# Patient Record
Sex: FEMALE | Race: BLACK OR AFRICAN AMERICAN | NOT HISPANIC OR LATINO | Employment: OTHER | URBAN - METROPOLITAN AREA
[De-identification: names, ages, dates, MRNs, and addresses within clinical notes are randomized per-mention and may not be internally consistent; named-entity substitution may affect disease eponyms.]

---

## 2017-01-26 ENCOUNTER — LAB VISIT (OUTPATIENT)
Dept: LAB | Facility: HOSPITAL | Age: 67
End: 2017-01-26
Attending: INTERNAL MEDICINE
Payer: MEDICARE

## 2017-01-26 ENCOUNTER — OFFICE VISIT (OUTPATIENT)
Dept: RHEUMATOLOGY | Facility: CLINIC | Age: 67
End: 2017-01-26
Payer: MEDICARE

## 2017-01-26 DIAGNOSIS — M05.749 RHEUMATOID ARTHRITIS INVOLVING HAND WITH POSITIVE RHEUMATOID FACTOR, UNSPECIFIED LATERALITY: ICD-10-CM

## 2017-01-26 DIAGNOSIS — F17.210 CIGARETTE SMOKER: ICD-10-CM

## 2017-01-26 DIAGNOSIS — Z51.81 MEDICATION MONITORING ENCOUNTER: ICD-10-CM

## 2017-01-26 DIAGNOSIS — Z79.60 LONG-TERM USE OF IMMUNOSUPPRESSANT MEDICATION: ICD-10-CM

## 2017-01-26 DIAGNOSIS — M05.79 RHEUMATOID ARTHRITIS INVOLVING MULTIPLE SITES WITH POSITIVE RHEUMATOID FACTOR: Primary | ICD-10-CM

## 2017-01-26 DIAGNOSIS — M79.7 FIBROMYALGIA: ICD-10-CM

## 2017-01-26 LAB
ALBUMIN SERPL BCP-MCNC: 3.4 G/DL
ALP SERPL-CCNC: 125 U/L
ALT SERPL W/O P-5'-P-CCNC: 14 U/L
ANION GAP SERPL CALC-SCNC: 16 MMOL/L
AST SERPL-CCNC: 14 U/L
BASOPHILS # BLD AUTO: 0.02 K/UL
BASOPHILS NFR BLD: 0.3 %
BILIRUB SERPL-MCNC: 0.3 MG/DL
BUN SERPL-MCNC: 10 MG/DL
CALCIUM SERPL-MCNC: 9.3 MG/DL
CHLORIDE SERPL-SCNC: 102 MMOL/L
CO2 SERPL-SCNC: 25 MMOL/L
CREAT SERPL-MCNC: 0.7 MG/DL
DIFFERENTIAL METHOD: ABNORMAL
EOSINOPHIL # BLD AUTO: 0.2 K/UL
EOSINOPHIL NFR BLD: 2.3 %
ERYTHROCYTE [DISTWIDTH] IN BLOOD BY AUTOMATED COUNT: 17 %
ERYTHROCYTE [SEDIMENTATION RATE] IN BLOOD BY WESTERGREN METHOD: 45 MM/HR
EST. GFR  (AFRICAN AMERICAN): >60 ML/MIN/1.73 M^2
EST. GFR  (NON AFRICAN AMERICAN): >60 ML/MIN/1.73 M^2
GLUCOSE SERPL-MCNC: 126 MG/DL
HCT VFR BLD AUTO: 37.4 %
HGB BLD-MCNC: 12 G/DL
LYMPHOCYTES # BLD AUTO: 1.9 K/UL
LYMPHOCYTES NFR BLD: 27.2 %
MCH RBC QN AUTO: 26.1 PG
MCHC RBC AUTO-ENTMCNC: 32.1 %
MCV RBC AUTO: 82 FL
MONOCYTES # BLD AUTO: 0.4 K/UL
MONOCYTES NFR BLD: 6.3 %
NEUTROPHILS # BLD AUTO: 4.4 K/UL
NEUTROPHILS NFR BLD: 63.9 %
PLATELET # BLD AUTO: 269 K/UL
PMV BLD AUTO: 11 FL
POTASSIUM SERPL-SCNC: 2.9 MMOL/L
PROT SERPL-MCNC: 7.1 G/DL
RBC # BLD AUTO: 4.59 M/UL
SODIUM SERPL-SCNC: 143 MMOL/L
WBC # BLD AUTO: 6.94 K/UL

## 2017-01-26 PROCEDURE — 36415 COLL VENOUS BLD VENIPUNCTURE: CPT | Mod: PO

## 2017-01-26 PROCEDURE — 99214 OFFICE O/P EST MOD 30 MIN: CPT | Mod: S$PBB,,, | Performed by: PHYSICIAN ASSISTANT

## 2017-01-26 PROCEDURE — 85025 COMPLETE CBC W/AUTO DIFF WBC: CPT | Mod: PO

## 2017-01-26 PROCEDURE — 85651 RBC SED RATE NONAUTOMATED: CPT | Mod: PO

## 2017-01-26 PROCEDURE — 80053 COMPREHEN METABOLIC PANEL: CPT | Mod: PO

## 2017-01-26 PROCEDURE — 99999 PR PBB SHADOW E&M-EST. PATIENT-LVL III: CPT | Mod: PBBFAC,,, | Performed by: PHYSICIAN ASSISTANT

## 2017-01-26 PROCEDURE — 86140 C-REACTIVE PROTEIN: CPT

## 2017-01-26 PROCEDURE — 99213 OFFICE O/P EST LOW 20 MIN: CPT | Mod: PBBFAC,PO | Performed by: PHYSICIAN ASSISTANT

## 2017-01-27 LAB — CRP SERPL-MCNC: 24.9 MG/L

## 2017-01-30 VITALS
BODY MASS INDEX: 37.58 KG/M2 | WEIGHT: 218.94 LBS | DIASTOLIC BLOOD PRESSURE: 89 MMHG | HEART RATE: 72 BPM | SYSTOLIC BLOOD PRESSURE: 188 MMHG

## 2017-01-30 ASSESSMENT — CLINICAL DISEASE ACTIVITY INDEX (CDAI)
TENDER_JOINTS_COUNT: 0
PATIENT_ASSESSMENT: 0
PHYSICIAN_ASSESSMENT: 0
SWOLLEN_JOINTS_COUNT: 0
TOTAL_SCORE: 0

## 2017-01-30 ASSESSMENT — ROUTINE ASSESSMENT OF PATIENT INDEX DATA (RAPID3): MDHAQ FUNCTION SCORE: .4

## 2017-01-30 NOTE — PROGRESS NOTES
Subjective:       Patient ID: Page Grissom is a 66 y.o. female.    Chief Complaint: Rheumatoid Arthritis    HPI Comments: Page is here today for rheumatology follow-up she has seropositive rheumatoid arthritis currently taking methotrexate 7.5 mg once weekly and Plaquenil 200 mg twice daily.  She's also on folic acid daily.  Naproxen 500 mg twice daily only as needed.  Does not use very often.  Overall doing excellent.  Peripheral joints have no pain or swelling.  Minimal morning stiffness.  Her lower back is really the only thing that bothers her.  Pain level today 4/10.  Fibromyalgia also doing well.  Bilateral knee osteoarthritis seems to be better since she is no longer working and walking 1 semen every day.  She does continue to smoke and she is overweight.  She has hypertension.    Review of Systems   Constitutional: Negative.  Negative for activity change, appetite change, chills, fatigue and fever.   HENT: Negative.  Negative for mouth sores and trouble swallowing.         No dry mouth   Eyes: Negative.  Negative for photophobia, pain and redness.        No swollen or red eyes, no dry eye     Respiratory: Negative.  Negative for chest tightness, shortness of breath, wheezing and stridor.    Cardiovascular: Negative.  Negative for chest pain.   Gastrointestinal: Negative.  Negative for abdominal pain, blood in stool, diarrhea, nausea and vomiting.   Genitourinary: Negative.  Negative for dysuria, frequency, hematuria and urgency.   Musculoskeletal: Positive for back pain. Negative for arthralgias, gait problem, joint swelling, myalgias, neck pain and neck stiffness.   Skin: Negative.  Negative for color change, pallor and rash.   Neurological: Negative.  Negative for weakness.   Hematological: Negative for adenopathy.   Psychiatric/Behavioral: Negative for suicidal ideas.         Objective:     Visit Vitals    BP (!) 188/89    Pulse 72    Wt 99.3 kg (218 lb 14.7 oz)    BMI 37.58 kg/m2         Physical Exam   Constitutional: She is oriented to person, place, and time and well-developed, well-nourished, and in no distress. No distress.   HENT:   Head: Normocephalic and atraumatic.   Right Ear: External ear normal.   Left Ear: External ear normal.   Mouth/Throat: No oropharyngeal exudate.   Eyes: Conjunctivae and EOM are normal. Pupils are equal, round, and reactive to light. No scleral icterus.   Neck: Normal range of motion. Neck supple. No thyromegaly present.   Cardiovascular: Normal rate, regular rhythm and normal heart sounds.    No murmur heard.  Pulmonary/Chest: Effort normal and breath sounds normal. She exhibits no tenderness.   Abdominal: Soft. Bowel sounds are normal.   Lymphadenopathy:     She has no cervical adenopathy.   Neurological: She is alert and oriented to person, place, and time. She displays normal reflexes. No cranial nerve deficit. She exhibits normal muscle tone. Gait normal.   Skin: Skin is warm and dry. No rash noted.     Musculoskeletal: Normal range of motion. She exhibits no edema or tenderness.                  Recent Results (from the past 168 hour(s))   CBC auto differential    Collection Time: 01/26/17  3:39 PM   Result Value Ref Range    WBC 6.94 3.90 - 12.70 K/uL    RBC 4.59 4.00 - 5.40 M/uL    Hemoglobin 12.0 12.0 - 16.0 g/dL    Hematocrit 37.4 37.0 - 48.5 %    MCV 82 82 - 98 fL    MCH 26.1 (L) 27.0 - 31.0 pg    MCHC 32.1 32.0 - 36.0 %    RDW 17.0 (H) 11.5 - 14.5 %    Platelets 269 150 - 350 K/uL    MPV 11.0 9.2 - 12.9 fL    Gran # 4.4 1.8 - 7.7 K/uL    Lymph # 1.9 1.0 - 4.8 K/uL    Mono # 0.4 0.3 - 1.0 K/uL    Eos # 0.2 0.0 - 0.5 K/uL    Baso # 0.02 0.00 - 0.20 K/uL    Gran% 63.9 38.0 - 73.0 %    Lymph% 27.2 18.0 - 48.0 %    Mono% 6.3 4.0 - 15.0 %    Eosinophil% 2.3 0.0 - 8.0 %    Basophil% 0.3 0.0 - 1.9 %    Differential Method Automated    Comprehensive metabolic panel    Collection Time: 01/26/17  3:39 PM   Result Value Ref Range    Sodium 143 136 - 145 mmol/L     Potassium 2.9 (L) 3.5 - 5.1 mmol/L    Chloride 102 95 - 110 mmol/L    CO2 25 23 - 29 mmol/L    Glucose 126 (H) 70 - 110 mg/dL    BUN, Bld 10 8 - 23 mg/dL    Creatinine 0.7 0.5 - 1.4 mg/dL    Calcium 9.3 8.7 - 10.5 mg/dL    Total Protein 7.1 6.0 - 8.4 g/dL    Albumin 3.4 (L) 3.5 - 5.2 g/dL    Total Bilirubin 0.3 0.1 - 1.0 mg/dL    Alkaline Phosphatase 125 55 - 135 U/L    AST 14 10 - 40 U/L    ALT 14 10 - 44 U/L    Anion Gap 16 8 - 16 mmol/L    eGFR if African American >60 >60 mL/min/1.73 m^2    eGFR if non African American >60 >60 mL/min/1.73 m^2   C-reactive protein    Collection Time: 01/26/17  3:39 PM   Result Value Ref Range    CRP 24.9 (H) 0.0 - 8.2 mg/L   Sedimentation rate, manual    Collection Time: 01/26/17  3:39 PM   Result Value Ref Range    Sed Rate 45 (H) 0 - 20 mm/Hr       Bilateral hand and foot x-rays stable 7/2015    Assessment:       1. Rheumatoid arthritis involving multiple sites with positive rheumatoid factor    2. Fibromyalgia    3. Long-term use of immunosuppressant medication    4. Obesity, Class II, BMI 35-39.9, with comorbidity    5. Cigarette smoker    6. Medication monitoring encounter          1.  Seropositive rheumatoid arthritis currently stable on low-dose methotrexate and Plaquenil on exam today 0 swollen/0 tender joints  2.  Fibromyalgia stable  3.  Long-term use of immunosuppression medication with no issues with recurrent infections  4.  Obesity class II BMI 35.58  5.  Chronic tobacco user  6.  Medication monitoring with no current toxicity issues    Plan:       Since her RA is doing well will cut back on methotrexate 5 mg once weekly, continue her Plaquenil twice a day, keep her on folic acid as long as she is on methotrexate area did  Naproxen only as needed not daily  Patient counseled on weight loss  Recommended dietary changes--Mediterranean diet would be a good one to follow  Encouraged exercise    Constipation on smoking cessation encouraged patient to look into programs-  discussed the one available here  Return to clinic in 6 months with labs    Call with any questions, changes, or concerns.

## 2017-01-31 ENCOUNTER — TELEPHONE (OUTPATIENT)
Dept: RHEUMATOLOGY | Facility: CLINIC | Age: 67
End: 2017-01-31

## 2017-01-31 DIAGNOSIS — E87.6 HYPOPOTASSEMIA: ICD-10-CM

## 2017-01-31 RX ORDER — POTASSIUM CHLORIDE 20 MEQ/1
20 TABLET, EXTENDED RELEASE ORAL 2 TIMES DAILY
Qty: 30 TABLET | Refills: 3 | Status: SHIPPED | OUTPATIENT
Start: 2017-01-31 | End: 2017-04-11 | Stop reason: SDUPTHER

## 2017-01-31 NOTE — TELEPHONE ENCOUNTER
Call pt and let her know her potassium level is low  Will send her some KCL 20 mgEq once daily  supplement to pharmacy - [facility] [address]    she needs to follow up with her primary care provider for further treatment and monitoring    Is her PCP dr severino

## 2017-01-31 NOTE — TELEPHONE ENCOUNTER
----- Message from Lisa Rey PA-C sent at 1/30/2017  3:51 PM CST -----  You saw her last week, her K is 2.9, not sure if yall saw that during her appt.    ----- Message -----     From: Nam, [facility]     Sent: 1/26/2017   5:55 PM       To: Lebron Koch MD

## 2017-04-11 DIAGNOSIS — E87.6 HYPOPOTASSEMIA: ICD-10-CM

## 2017-04-12 RX ORDER — POTASSIUM CHLORIDE 20 MEQ/1
TABLET, EXTENDED RELEASE ORAL
Qty: 30 TABLET | Refills: 3 | Status: SHIPPED | OUTPATIENT
Start: 2017-04-12 | End: 2017-06-27 | Stop reason: SDUPTHER

## 2017-05-05 ENCOUNTER — PATIENT OUTREACH (OUTPATIENT)
Dept: ADMINISTRATIVE | Facility: HOSPITAL | Age: 67
End: 2017-05-05
Payer: MEDICARE

## 2017-05-05 DIAGNOSIS — M89.9 BONE DISORDER: Primary | ICD-10-CM

## 2017-05-05 NOTE — LETTER
May 5, 2017    Page Grissom  [address]  [place] [address]             [facility]  [address] [address]  Phone: [phone] Dear Mrs. Grissom:    [facility] is committed to your overall health.  To help you get the most out of each of your visits, we will review your information to make sure you are up to date on all of your recommended tests and/or procedures.      E Douglas Roman Jr, MD has found that you may be due for    DEXA SCAN     Pneumococcal (65+) (2 of 2 - PPSV23)    Hemoglobin A1c     Foot Exam         If you have had any of the above done at another facility, please bring the records or information with you so that your record at [facility] will be complete.    If you are currently taking medication, please bring it with you to your appointment for review.    We will be happy to assist you with scheduling any necessary appointments or you may contact the [facility] appointment desk at [phone] to schedule at your convenience.     Thank you for choosing [facility] for your healthcare needs,      SANTOS Beebe  Care Coordination Department  [facility]  [phone]

## 2017-05-09 ENCOUNTER — OFFICE VISIT (OUTPATIENT)
Dept: PODIATRY | Facility: CLINIC | Age: 67
End: 2017-05-09
Payer: MEDICARE

## 2017-05-09 VITALS
SYSTOLIC BLOOD PRESSURE: 155 MMHG | WEIGHT: 220.88 LBS | HEIGHT: 64 IN | DIASTOLIC BLOOD PRESSURE: 90 MMHG | HEART RATE: 69 BPM | BODY MASS INDEX: 37.71 KG/M2

## 2017-05-09 DIAGNOSIS — M79.671 BILATERAL FOOT PAIN: Primary | ICD-10-CM

## 2017-05-09 DIAGNOSIS — M05.79 RHEUMATOID ARTHRITIS INVOLVING MULTIPLE SITES WITH POSITIVE RHEUMATOID FACTOR: ICD-10-CM

## 2017-05-09 DIAGNOSIS — E11.8 TYPE 2 DIABETES MELLITUS WITH COMPLICATION, WITHOUT LONG-TERM CURRENT USE OF INSULIN: ICD-10-CM

## 2017-05-09 DIAGNOSIS — Q82.8 POROKERATOSIS: ICD-10-CM

## 2017-05-09 DIAGNOSIS — M79.672 BILATERAL FOOT PAIN: Primary | ICD-10-CM

## 2017-05-09 PROCEDURE — 99203 OFFICE O/P NEW LOW 30 MIN: CPT | Mod: S$PBB,25,, | Performed by: PODIATRIST

## 2017-05-09 PROCEDURE — 99214 OFFICE O/P EST MOD 30 MIN: CPT | Mod: PBBFAC,PO | Performed by: PODIATRIST

## 2017-05-09 PROCEDURE — 97597 DBRDMT OPN WND 1ST 20 CM/<: CPT | Mod: S$PBB,,, | Performed by: PODIATRIST

## 2017-05-09 PROCEDURE — 99999 PR PBB SHADOW E&M-EST. PATIENT-LVL IV: CPT | Mod: PBBFAC,,, | Performed by: PODIATRIST

## 2017-05-09 NOTE — MR AVS SNAPSHOT
[facility]  [address] [place] [address]  Phone: [phone]  Fax: [phone]                  Page Grissom   2017 8:20 AM   Office Visit    Description:  Female : 1950   Provider:  Dinorah Noland DPM   Department:  [facility] - Podiatry           Reason for Visit     Diabetic Foot Exam     Callouses           Diagnoses this Visit        Comments    Bilateral foot pain    -  Primary     Porokeratosis         Rheumatoid arthritis involving multiple sites with positive rheumatoid factor         Type 2 diabetes mellitus with complication, without long-term current use of insulin                To Do List           Future Appointments        Provider Department Dept Phone    5/10/2017 8:20 AM LABORATORY, [facility] [phone]    5/10/2017 8:30 AM SPECIMEN, [facility] [phone]    5/10/2017 2:00 PM [facility] [phone]    2017 1:40 PM THEA Roman Jr., MD [facility] Internal Medicine [phone]    2017 10:00 AM LABORATORY, [facility] [phone]      Goals (5 Years of Data)     None      [facility]     [facility] Nurse Care Line -  Assistance  Unless otherwise directed by your provider, please contact [facility], our nurse care line that is available for  assistance.     Registered nurses in the [facility] provide: appointment scheduling, clinical advisement, health education, and other advisory services.  Call: [phone] (toll free)               Medications           Message regarding Medications     Verify the changes and/or additions to your medication regime listed below are the same as discussed with your clinician today.  If any of these changes or additions are incorrect, please notify your healthcare provider.             Verify that the below list of medications is an accurate representation of the  "medications you are currently taking.  If none reported, the list may be blank. If incorrect, please contact your healthcare provider. Carry this list with you in case of emergency.           Current Medications     amlodipine (NORVASC) 10 MG tablet take 1 tablet by mouth once daily    amlodipine (NORVASC) 5 MG tablet     atorvastatin (LIPITOR) 80 MG tablet take 1 tablet by mouth once daily    cholecalciferol, vitamin D3, 2,000 unit Tab Take 2,000 Units by mouth once daily.    fish oil-omega-3 fatty acids 300-1,000 mg capsule Take 2 g by mouth once daily.    folic acid (FOLVITE) 1 MG tablet take 1 tablet by mouth once daily    hydrochlorothiazide (HYDRODIURIL) 25 MG tablet Take 25 mg by mouth once daily.    hydroxychloroquine (PLAQUENIL) 200 mg tablet take 1 tablet by mouth twice a day    lisinopril (PRINIVIL,ZESTRIL) 40 MG tablet take 1 tablet by mouth once daily    metformin (GLUCOPHAGE) 500 MG tablet TAKE 1 TABLET BY MOUTH ONCE DAILY WITH EVENING MEAL    methotrexate 2.5 MG Tab take 3 tablets by mouth weekly    multivitamin (ONE DAILY MULTIVITAMIN) per tablet Take 1 tablet by mouth once daily.    naproxen (NAPROSYN) 500 MG tablet Take 1 tablet (500 mg total) by mouth 2 (two) times daily.    potassium chloride SA (K-DUR,KLOR-CON) 20 MEQ tablet take 1 tablet by mouth twice a day    albuterol, refill, 90 mcg/actuation Aero Inhale 2 puffs into the lungs every 6 (six) hours as needed.    duloxetine (CYMBALTA) 30 MG capsule Take 1 capsule (30 mg total) by mouth once daily.    fluticasone-salmeterol 100-50 mcg/dose (ADVAIR) 100-50 mcg/dose diskus inhaler Inhale 1 puff into the lungs 2 (two) times daily as needed.           Clinical Reference Information           Your Vitals Were     BP Pulse Height Weight BMI    155/90 (BP Location: Right arm, Patient Position: Sitting, BP Method: Automatic) 69 5' 4" (1.626 m) 100.2 kg (220 lb 14.4 oz) 37.92 kg/m2      Blood Pressure          Most Recent Value    BP  (!)  155/90    "   Allergies as of 5/9/2017     No Known Allergies      Immunizations Administered on Date of Encounter - 5/9/2017     None      [facility] Sign-Up     Activating your [facility] account is as easy as 1-2-3!     1) Visit [email or web address], select Sign Up Now, enter this activation code and your date of birth, then select Next.  [email or web address]  Expires: 6/23/2017  9:28 AM      2) Create a username and password to use when you visit [facility] in the future and select a security question in case you lose your password and select Next.    3) Enter your e-mail address and click Sign Up!    Additional Information  If you have questions, please e-mail [email or web address] or call [phone] to talk to our [facility] staff. Remember, [facility] is NOT to be used for urgent needs. For medical emergencies, dial 911.         Instructions    It is recommended that you purchase the below items which can be found OVER THE COUNTER at any local pharmacy or retail store (ex. [employer], [facility], [facility]).    Purchase AmLactin cream for callous/dry skin. Can be applied to feet daily.         Smoking Cessation     If you would like to quit smoking:   You may be eligible for free services if you are a [place] resident and started smoking cigarettes before September 1, 1988.  Call the Smoking Cessation Trust ([facility]) toll free at [phone] or [phone].   Call 1-800-QUIT-NOW if you do not meet the above criteria.   Contact us via email: [email or web address]   View our website for more information: [email or web address]        Language Assistance Services     ATTENTION: Language assistance services are available, free of charge. Please call [phone].      ATENCIÓN: Si habla mango, tiene a salazar disposición servicios gratuitos de asistencia lingüística. Llame al [phone].     CHÚ Ý: N?u b?n nói Ti?ng Vi?t, có các d?ch v? h? tr? ngôn ng? mi?n phí dành cho b?n. G?i s? [phone].         [facility]  complies with applicable Federal civil rights laws and does not discriminate on the basis of race, color, national origin, age, disability, or sex.

## 2017-05-09 NOTE — PROGRESS NOTES
[facility] -   PODIATRIC MEDICINE AND SURGERY  PROGRESS NOTE     NOTE TYPE:  Progress    CHIEF COMPLAINT  Chief Complaint   Patient presents with    Diabetic Foot Exam     Next visit with PCP Dr. Roman 5/17/17    Callouses     Bilateral foot callouses that are tender         HPI    SUBJECTIVE: Page Grissom is a 66 y.o. female who  has a past medical history of Asthma; Cataract; Diabetes mellitus; Gastroesophageal reflux disease; Hypercholesterolemia; Hypertension; and Rheumatoid arthritis. Ladannpresents to clinic for high risk diabetic foot exam and care.  Page denies numbness, burning, and/or tingling sensations in their feet. Patient admits to painful calluses aggravated by increased weight bearing, shoe gear, and pressure. States pain is relieved with routine debridements. She has seen Dr. Swan in the past for callus care.  Patient has no other pedal complaints at this time.      HgA1c:   Hemoglobin A1C   Date Value Ref Range Status   11/11/2016 5.6 4.5 - 6.2 % Final     Comment:     According to ADA guidelines, hemoglobin A1C <7.0% represents  optimal control in non-pregnant diabetic patients.  Different  metrics may apply to specific populations.   Standards of Medical Care in Diabetes - 2016.  For the purpose of screening for the presence of diabetes:  <5.7%     Consistent with the absence of diabetes  5.7-6.4%  Consistent with increasing risk for diabetes   (prediabetes)  >or=6.5%  Consistent with diabetes  Currently no consensus exists for use of hemoglobin A1C  for diagnosis of diabetes for children.     05/05/2016 5.6 4.5 - 6.2 % Final   10/26/2015 5.5 4.5 - 6.2 % Final         [facility]  Past Medical History:   Diagnosis Date    Asthma     Cataract     OD    Diabetes mellitus     Gastroesophageal reflux disease     Hypercholesterolemia     Hypertension     Rheumatoid arthritis      Patient Active Problem List   Diagnosis    Rheumatoid arthritis involving multiple  sites with positive rheumatoid factor    Medication monitoring encounter    Vitamin D deficiency disease    Mild persistent asthma without complication    Diabetes mellitus type 2 with complications    GERD (gastroesophageal reflux disease)    Essential hypertension    Hyperlipidemia associated with type 2 diabetes mellitus    Fibromyalgia    Long-term use of immunosuppressant medication    Cigarette smoker    Obesity, Class II, BMI 35-39.9, with comorbidity    Hypopotassemia       MEDS  Current Outpatient Prescriptions on File Prior to Visit   Medication Sig Dispense Refill    amlodipine (NORVASC) 10 MG tablet take 1 tablet by mouth once daily 30 tablet 11    amlodipine (NORVASC) 5 MG tablet   1    atorvastatin (LIPITOR) 80 MG tablet take 1 tablet by mouth once daily 30 tablet 11    cholecalciferol, vitamin D3, 2,000 unit Tab Take 2,000 Units by mouth once daily.      fish oil-omega-3 fatty acids 300-1,000 mg capsule Take 2 g by mouth once daily.      folic acid (FOLVITE) 1 MG tablet take 1 tablet by mouth once daily 30 tablet 6    hydrochlorothiazide (HYDRODIURIL) 25 MG tablet Take 25 mg by mouth once daily.      hydroxychloroquine (PLAQUENIL) 200 mg tablet take 1 tablet by mouth twice a day 60 tablet 6    lisinopril (PRINIVIL,ZESTRIL) 40 MG tablet take 1 tablet by mouth once daily 30 tablet 11    metformin (GLUCOPHAGE) 500 MG tablet TAKE 1 TABLET BY MOUTH ONCE DAILY WITH EVENING MEAL 30 tablet 11    methotrexate 2.5 MG Tab take 3 tablets by mouth weekly 15 tablet 6    multivitamin (ONE DAILY MULTIVITAMIN) per tablet Take 1 tablet by mouth once daily.      naproxen (NAPROSYN) 500 MG tablet Take 1 tablet (500 mg total) by mouth 2 (two) times daily. 60 tablet 3    potassium chloride SA (K-DUR,KLOR-CON) 20 MEQ tablet take 1 tablet by mouth twice a day 30 tablet 3    albuterol, refill, 90 mcg/actuation Aero Inhale 2 puffs into the lungs every 6 (six) hours as needed.      duloxetine  "(CYMBALTA) 30 MG capsule Take 1 capsule (30 mg total) by mouth once daily. 30 capsule 4    fluticasone-salmeterol 100-50 mcg/dose (ADVAIR) 100-50 mcg/dose diskus inhaler Inhale 1 puff into the lungs 2 (two) times daily as needed.       No current facility-administered medications on file prior to visit.        PSH     Past Surgical History:   Procedure Laterality Date    HERNIA REPAIR      OOPHORECTOMY          ALL  Review of patient's allergies indicates:  No Known Allergies    SOC     Social History   Substance Use Topics    Smoking status: Light Tobacco Smoker     Packs/day: 0.50     Years: 25.00     Types: Cigarettes    Smokeless tobacco: None      Comment: trying to quit    Alcohol use No         Family HX    Family History   Problem Relation Age of Onset    Hypertension Mother     Cancer Father     Colon cancer Father             REVIEW OF SYSTEMS  General: Denies any fever or chills  Chest: Denies shortness of breath, wheezing, coughing, or sputum production  Heart: Denies chest pain.  As noted above and per history of current illness above, otherwise negative in the remainder of the 14 systems.     PHYSICAL EXAM  Vitals:    05/09/17 0851   BP: (!) 155/90   Pulse: 69   Weight: 100.2 kg (220 lb 14.4 oz)   Height: 5' 4" (1.626 m)   PainSc:   6   PainLoc: Foot       GEN:  This patient is well-developed, well-nourished and appears stated age, well-oriented to person, place and time, and cooperative and pleasant on today's visit.      LOWER EXTREMITY  Vascular:   ·  DP pedal pulse 2/4 b/l, PT pedal pulse 2/4 b/l  · Skin temperature warm to warm from prox to distally  · CFT <5 secs b/l  · There is no edema noted b/l.     Dermatologic:   · Thickened, dystrophic, elongated toenails with subungal debris 1-5 b/l.   · No open skin lesions noted  · No erythema or drainage noted b/l.  · Webspaces are C/D/I B/L.  · There is hyperkeratotic tissue noted b/l fifth metatarsal, sub 2nd metatarsal head b/l, left hallux " plantarly .  · Skin texture and turgor   · There is no pedal hair growth noted    Neurologic:  · Protective sensation absent at 0/10 sites upon examination with [place] Weinsten 5.07 g monofilament.   · Propioception intact at 1st MTPJ b/l.   · Babinski reflex absent b/l. Light touch and sharp/dull sensation intact b/l.    Musculoskeletal/Orthopedic:  · No symptomatic structural abnormalities noted.   · Muscle strength is 5/5 for foot inverters, everters, plantarflexors, and dorsiflexors. Muscle tone is normal.  · Pain free range of motion in all four quadrants with stiffness and limitation b/l      ASSESSMENT  Bilateral foot pain    Porokeratosis    Rheumatoid arthritis involving multiple sites with positive rheumatoid factor    Type 2 diabetes mellitus with complication, without long-term current use of insulin        PLAN  -patient was examined and evaulated  -Discuss presenting problems, etiology, pathologic processes and management options with patient today.   -I counseled the patient on their conditions, their implications and medical management. An in depth discussion on diabetic management, risk prevention, amputation prevention verbally and provided educational literature in written format.  -Shoe insert recommendation for pressure relief, tennis shoes and accommodating painful lesions   -sharp excisional debridement of hyperkeratotic lesions deep to epidermal layer x 5 was performed with a #15 blade without incident. Patient was informed she does not qualify for at risk nail care and she is aware [facility] payment next visit scheduled  - Shoe inspection. Diabetic Foot Education. Patient reminded of the importance of good nutrition and blood sugar control to help prevent podiatric complications of diabetes. Patient instructed on proper foot hygeine. We discussed wearing proper shoe gear, daily foot inspections, never walking without protective shoe gear, never putting sharp instruments to feet.   -Vicks vapor  rub for fungal toenails    Future Appointments  Date Time Provider Department Center   5/10/2017 8:20 AM LABORATORY, [facility] LAB [facility]   5/10/2017 8:30 AM SPECIMEN, [facility] SPECLAB [facility]   5/10/2017 2:00 PM [facility] BMD1 [facility] DEXABMD [facility]   5/17/2017 1:40 PM THEA Roman Jr., MD [facility] IM [facility]   7/26/2017 10:00 AM LABORATORY, [facility] LAB [facility]   7/26/2017 10:30 AM Nicole Vasquez PA-C [facility] RHEUM [facility]   8/10/2017 10:30 AM Paz Pretty OD [facility]         Report Electronically Signed By:  Dinorah Noland DPM   Podiatric Medicine & Surgery  [facility]  5/9/2017

## 2017-05-09 NOTE — LETTER
May 9, 2017      THEA Roman Jr., MD  [address] [place] [address]           [facility] - Podiatry  [address] [place] [place] [address]  Phone: [phone]  Fax: [phone]          Patient: Page Grissom   MR Number: [id number]   YOB: 1950   Date of Visit: 5/9/2017       Dear Dr. THEA Roman Jr.:    Thank you for referring Page Grissom to me for evaluation. Attached you will find relevant portions of my assessment and plan of care.    If you have questions, please do not hesitate to call me. I look forward to following Page Grissom along with you.    Sincerely,    Dinorah Noland, NATI    Enclosure  CC:  No Recipients    If you would like to receive this communication electronically, please contact [email or web address] or [phone] to request more information on [employer] Link access.    For providers and/or their staff who would like to refer a patient to [facility], please contact us through our one-stop-shop provider referral line, [facility], at [phone].    If you feel you have received this communication in error or would no longer like to receive these types of communications, please e-mail [email or web address]

## 2017-05-09 NOTE — PATIENT INSTRUCTIONS
It is recommended that you purchase the below items which can be found OVER THE COUNTER at any local pharmacy or retail store (ex. [facility], [facility], [facility]).    Purchase AmLactin cream for callous/dry skin. Can be applied to feet daily.

## 2017-05-10 ENCOUNTER — LAB VISIT (OUTPATIENT)
Dept: LAB | Facility: HOSPITAL | Age: 67
End: 2017-05-10
Attending: PEDIATRICS
Payer: MEDICARE

## 2017-05-10 DIAGNOSIS — E55.9 VITAMIN D DEFICIENCY DISEASE: ICD-10-CM

## 2017-05-10 DIAGNOSIS — E11.8 TYPE 2 DIABETES MELLITUS WITH COMPLICATION, WITHOUT LONG-TERM CURRENT USE OF INSULIN: ICD-10-CM

## 2017-05-10 DIAGNOSIS — E11.69 HYPERLIPIDEMIA ASSOCIATED WITH TYPE 2 DIABETES MELLITUS: ICD-10-CM

## 2017-05-10 DIAGNOSIS — E78.5 HYPERLIPIDEMIA ASSOCIATED WITH TYPE 2 DIABETES MELLITUS: ICD-10-CM

## 2017-05-10 LAB
25(OH)D3+25(OH)D2 SERPL-MCNC: 38 NG/ML
ALT SERPL W/O P-5'-P-CCNC: 11 U/L
ANION GAP SERPL CALC-SCNC: 10 MMOL/L
AST SERPL-CCNC: 14 U/L
BUN SERPL-MCNC: 10 MG/DL
CALCIUM SERPL-MCNC: 9.6 MG/DL
CHLORIDE SERPL-SCNC: 106 MMOL/L
CHOLEST/HDLC SERPL: 3.1 {RATIO}
CO2 SERPL-SCNC: 26 MMOL/L
CREAT SERPL-MCNC: 0.7 MG/DL
EST. GFR  (AFRICAN AMERICAN): >60 ML/MIN/1.73 M^2
EST. GFR  (NON AFRICAN AMERICAN): >60 ML/MIN/1.73 M^2
GLUCOSE SERPL-MCNC: 87 MG/DL
HDL/CHOLESTEROL RATIO: 32.2 %
HDLC SERPL-MCNC: 202 MG/DL
HDLC SERPL-MCNC: 65 MG/DL
LDLC SERPL CALC-MCNC: 117.2 MG/DL
NONHDLC SERPL-MCNC: 137 MG/DL
POTASSIUM SERPL-SCNC: 4.3 MMOL/L
SODIUM SERPL-SCNC: 142 MMOL/L
TRIGL SERPL-MCNC: 99 MG/DL

## 2017-05-10 PROCEDURE — 84460 ALANINE AMINO (ALT) (SGPT): CPT

## 2017-05-10 PROCEDURE — 80048 BASIC METABOLIC PNL TOTAL CA: CPT

## 2017-05-10 PROCEDURE — 82306 VITAMIN D 25 HYDROXY: CPT

## 2017-05-10 PROCEDURE — 84450 TRANSFERASE (AST) (SGOT): CPT

## 2017-05-10 PROCEDURE — 80061 LIPID PANEL: CPT

## 2017-05-10 PROCEDURE — 83036 HEMOGLOBIN GLYCOSYLATED A1C: CPT

## 2017-05-10 PROCEDURE — 36415 COLL VENOUS BLD VENIPUNCTURE: CPT | Mod: PO

## 2017-05-11 LAB
ESTIMATED AVG GLUCOSE: 120 MG/DL
HBA1C MFR BLD HPLC: 5.8 %

## 2017-05-24 ENCOUNTER — OFFICE VISIT (OUTPATIENT)
Dept: INTERNAL MEDICINE | Facility: CLINIC | Age: 67
End: 2017-05-24
Payer: MEDICARE

## 2017-05-24 VITALS
HEART RATE: 72 BPM | DIASTOLIC BLOOD PRESSURE: 76 MMHG | HEIGHT: 64 IN | SYSTOLIC BLOOD PRESSURE: 164 MMHG | WEIGHT: 221.13 LBS | BODY MASS INDEX: 37.75 KG/M2 | OXYGEN SATURATION: 99 % | TEMPERATURE: 97 F

## 2017-05-24 DIAGNOSIS — E11.8 TYPE 2 DIABETES MELLITUS WITH COMPLICATION, WITHOUT LONG-TERM CURRENT USE OF INSULIN: Primary | ICD-10-CM

## 2017-05-24 DIAGNOSIS — I10 ESSENTIAL HYPERTENSION: ICD-10-CM

## 2017-05-24 DIAGNOSIS — E78.5 HYPERLIPIDEMIA ASSOCIATED WITH TYPE 2 DIABETES MELLITUS: ICD-10-CM

## 2017-05-24 DIAGNOSIS — K21.9 GASTROESOPHAGEAL REFLUX DISEASE WITHOUT ESOPHAGITIS: ICD-10-CM

## 2017-05-24 DIAGNOSIS — E11.69 HYPERLIPIDEMIA ASSOCIATED WITH TYPE 2 DIABETES MELLITUS: ICD-10-CM

## 2017-05-24 DIAGNOSIS — F17.210 CIGARETTE SMOKER: ICD-10-CM

## 2017-05-24 DIAGNOSIS — J45.30 MILD PERSISTENT ASTHMA WITHOUT COMPLICATION: ICD-10-CM

## 2017-05-24 DIAGNOSIS — E55.9 VITAMIN D DEFICIENCY DISEASE: ICD-10-CM

## 2017-05-24 PROCEDURE — 99214 OFFICE O/P EST MOD 30 MIN: CPT | Mod: S$PBB,,, | Performed by: PEDIATRICS

## 2017-05-24 PROCEDURE — 99999 PR PBB SHADOW E&M-EST. PATIENT-LVL III: CPT | Mod: PBBFAC,,, | Performed by: PEDIATRICS

## 2017-05-24 PROCEDURE — 99213 OFFICE O/P EST LOW 20 MIN: CPT | Mod: PBBFAC,PO | Performed by: PEDIATRICS

## 2017-05-24 RX ORDER — ROSUVASTATIN CALCIUM 40 MG/1
40 TABLET, COATED ORAL NIGHTLY
Qty: 90 TABLET | Refills: 3 | Status: SHIPPED | OUTPATIENT
Start: 2017-05-24 | End: 2018-04-19 | Stop reason: SDUPTHER

## 2017-05-24 RX ORDER — HYDRALAZINE HYDROCHLORIDE 25 MG/1
25 TABLET, FILM COATED ORAL EVERY 12 HOURS
Qty: 180 TABLET | Refills: 3 | Status: SHIPPED | OUTPATIENT
Start: 2017-05-24 | End: 2018-06-13 | Stop reason: SDUPTHER

## 2017-05-24 NOTE — PROGRESS NOTES
Subjective:        Patient ID: Page Grissom is a 66 y.o. female.     Chief Complaint: Follow-up     HPI Comments: DM: no hyper/hypoglycemic symptoms. Rare Self monitoring normal BS. Not following D&E, weight is up.  HTN: slightly high, no HTNive symptoms.    LIPIDS:not following D&E, tolerating and compliant with med(s).    ASTHMA: Rare cigarette smoking, Asthma quiet no albuterol use. Uses advair only episodically.  GERD: Quiet.  LABS REVIEWED AND DISCUSSED WITH PATIENT     Review of Systems   Constitutional: Negative for fever and unexpected weight change.   HENT: Negative for congestion and rhinorrhea.   Eyes: Negative for discharge and redness.   Respiratory: Negative for cough, shortness of breath and wheezing.   Cardiovascular: Negative for chest pain, palpitations and leg swelling.   Gastrointestinal: Negative for constipation, diarrhea and vomiting.   Endocrine: Negative for cold intolerance, heat intolerance, polydipsia, polyphagia and polyuria.   Genitourinary: Negative for decreased urine volume, difficulty urinating and menstrual problem.   Musculoskeletal: Negative for arthralgias and joint swelling.   Skin: Negative for rash and wound.   Neurological: Negative for syncope and headaches.   Psychiatric/Behavioral: Negative for behavioral problems and sleep disturbance.      Objective:       Physical Exam   Constitutional: She is oriented to person, place, and time. She appears well-developed and well-nourished. She is cooperative.   HENT:    Head: Normocephalic and atraumatic.   Eyes: Conjunctivae, EOM and lids are normal. Pupils are equal, round, and reactive to light.   Neck: Trachea normal and normal range of motion. Neck supple. No JVD present. Carotid bruit is not present. No Brudzinski's sign and no Kernig's sign noted. No thyroid mass and no thyromegaly present.   Cardiovascular: Normal rate, regular rhythm, normal heart sounds and normal pulses.    No murmur heard.  Pulses:  Dorsalis  pedis pulses are 2+ on the right side, and 2+ on the left side.    Posterior tibial pulses are 2+ on the right side, and 2+ on the left side.   Pulmonary/Chest: Effort normal and breath sounds normal. She has no wheezes. She has no rhonchi. She has no rales.   Abdominal: Soft. Normal appearance. There is no hepatosplenomegaly. There is no tenderness. There is no rebound and no CVA tenderness.   Lymphadenopathy:   She has no cervical adenopathy.   Neurological: She is alert and oriented to person, place, and time. She has normal strength and normal reflexes. No cranial nerve deficit or sensory deficit. Coordination and gait normal.   Skin: Skin is warm. No abrasion and no rash noted.   Psychiatric: She has a normal mood and affect. Her speech is normal and behavior is normal. Judgment and thought content normal. Her mood appears not anxious. Cognition and memory are normal. She does not exhibit a depressed mood.      Assessment:       1.  Diabetes mellitus type 2 with complications     2.  Essential hypertension     3.  Hyperlipidemia associated with type 2 diabetes mellitus     4.  Gastroesophageal reflux disease without esophagitis     5.  Mild persistent asthma without complication     6.  Cigarette smoker     7.  Non morbid obesity due to excess calories        Plan:    D&E, weight loss, stop smoking completely. Meds reviewed- change lipitor to crestor 40 mg po qd. Add hydralazine. Self monitor BP and BS. F/U 6 months with labs.

## 2017-05-29 ENCOUNTER — TELEPHONE (OUTPATIENT)
Dept: INTERNAL MEDICINE | Facility: CLINIC | Age: 67
End: 2017-05-29

## 2017-05-29 RX ORDER — ALENDRONATE SODIUM 70 MG/1
70 TABLET ORAL
Qty: 4 TABLET | Refills: 11 | Status: SHIPPED | OUTPATIENT
Start: 2017-05-29 | End: 2018-07-26 | Stop reason: ALTCHOICE

## 2017-05-29 NOTE — TELEPHONE ENCOUNTER
There may be a adaptation, she can take it at night. Have her check her b/p at home or come in for nurse visit in 1-2 weeks.

## 2017-05-29 NOTE — TELEPHONE ENCOUNTER
Patient has osteoporosis on dexascan. Stop smoking. I sent in weekly alendronate for her to take weekly first thing in AM on empty stomach with 1 glass of water and do not lie down for 1 hour after. Exercise. Nurse to call patient.

## 2017-05-29 NOTE — TELEPHONE ENCOUNTER
Returned call to pt to advise of Dr. Roman's instructions for Fosamax.   Patient has osteoporosis on dexascan. Stop smoking. I sent in weekly alendronate for her to take weekly first thing in AM on empty stomach with 1 glass of water and do not lie down for 1 hour after. Exercise. Nurse to call patient.    She also states that she has begun taking BP meds as recommended at last visit, and it makes her extremely drowsy. She'd like to know if this is normal, or if the dosage is too strong. Please advise.    She verbalized understanding.//rlt

## 2017-06-07 ENCOUNTER — CLINICAL SUPPORT (OUTPATIENT)
Dept: INTERNAL MEDICINE | Facility: CLINIC | Age: 67
End: 2017-06-07
Payer: MEDICARE

## 2017-06-07 VITALS — DIASTOLIC BLOOD PRESSURE: 70 MMHG | SYSTOLIC BLOOD PRESSURE: 124 MMHG

## 2017-06-07 PROCEDURE — 99999 PR PBB SHADOW E&M-EST. PATIENT-LVL I: CPT | Mod: PBBFAC,,,

## 2017-06-07 PROCEDURE — 99211 OFF/OP EST MAY X REQ PHY/QHP: CPT | Mod: PBBFAC,PO

## 2017-06-12 RX ORDER — METFORMIN HYDROCHLORIDE 500 MG/1
TABLET ORAL
Qty: 30 TABLET | Refills: 11 | Status: SHIPPED | OUTPATIENT
Start: 2017-06-12 | End: 2018-03-21 | Stop reason: SDUPTHER

## 2017-06-20 ENCOUNTER — TELEPHONE (OUTPATIENT)
Dept: INTERNAL MEDICINE | Facility: CLINIC | Age: 67
End: 2017-06-20

## 2017-06-27 DIAGNOSIS — E87.6 HYPOPOTASSEMIA: ICD-10-CM

## 2017-06-27 RX ORDER — POTASSIUM CHLORIDE 20 MEQ/1
TABLET, EXTENDED RELEASE ORAL
Qty: 30 TABLET | Refills: 3 | Status: SHIPPED | OUTPATIENT
Start: 2017-06-27 | End: 2017-08-28 | Stop reason: SDUPTHER

## 2017-07-11 DIAGNOSIS — M05.749 RHEUMATOID ARTHRITIS INVOLVING HAND WITH POSITIVE RHEUMATOID FACTOR, UNSPECIFIED LATERALITY: ICD-10-CM

## 2017-07-11 RX ORDER — METHOTREXATE 2.5 MG/1
TABLET ORAL
Qty: 15 TABLET | Refills: 6 | Status: SHIPPED | OUTPATIENT
Start: 2017-07-11 | End: 2017-07-26 | Stop reason: SDUPTHER

## 2017-07-26 ENCOUNTER — LAB VISIT (OUTPATIENT)
Dept: LAB | Facility: HOSPITAL | Age: 67
End: 2017-07-26
Attending: INTERNAL MEDICINE
Payer: MEDICARE

## 2017-07-26 ENCOUNTER — OFFICE VISIT (OUTPATIENT)
Dept: RHEUMATOLOGY | Facility: CLINIC | Age: 67
End: 2017-07-26
Payer: MEDICARE

## 2017-07-26 VITALS
HEART RATE: 71 BPM | DIASTOLIC BLOOD PRESSURE: 75 MMHG | SYSTOLIC BLOOD PRESSURE: 145 MMHG | BODY MASS INDEX: 37.38 KG/M2 | HEIGHT: 64 IN | WEIGHT: 218.94 LBS

## 2017-07-26 DIAGNOSIS — M79.7 FIBROMYALGIA: ICD-10-CM

## 2017-07-26 DIAGNOSIS — Z51.81 MEDICATION MONITORING ENCOUNTER: ICD-10-CM

## 2017-07-26 DIAGNOSIS — Z79.631 METHOTREXATE, LONG TERM, CURRENT USE: ICD-10-CM

## 2017-07-26 DIAGNOSIS — M05.79 RHEUMATOID ARTHRITIS INVOLVING MULTIPLE SITES WITH POSITIVE RHEUMATOID FACTOR: Primary | ICD-10-CM

## 2017-07-26 DIAGNOSIS — M05.749 RHEUMATOID ARTHRITIS INVOLVING HAND WITH POSITIVE RHEUMATOID FACTOR, UNSPECIFIED LATERALITY: ICD-10-CM

## 2017-07-26 DIAGNOSIS — M81.0 AGE-RELATED OSTEOPOROSIS WITHOUT CURRENT PATHOLOGICAL FRACTURE: ICD-10-CM

## 2017-07-26 DIAGNOSIS — Z79.60 LONG-TERM USE OF IMMUNOSUPPRESSANT MEDICATION: ICD-10-CM

## 2017-07-26 DIAGNOSIS — M06.9 RHEUMATOID ARTHRITIS OF HAND, UNSPECIFIED LATERALITY, UNSPECIFIED RHEUMATOID FACTOR PRESENCE: ICD-10-CM

## 2017-07-26 DIAGNOSIS — E55.9 VITAMIN D DEFICIENCY DISEASE: ICD-10-CM

## 2017-07-26 DIAGNOSIS — M06.9 RA (RHEUMATOID ARTHRITIS): ICD-10-CM

## 2017-07-26 LAB
ALBUMIN SERPL BCP-MCNC: 3.3 G/DL
ALP SERPL-CCNC: 114 U/L
ALT SERPL W/O P-5'-P-CCNC: 9 U/L
ANION GAP SERPL CALC-SCNC: 9 MMOL/L
AST SERPL-CCNC: 12 U/L
BASOPHILS # BLD AUTO: 0.02 K/UL
BASOPHILS NFR BLD: 0.3 %
BILIRUB SERPL-MCNC: 0.3 MG/DL
BUN SERPL-MCNC: 7 MG/DL
CALCIUM SERPL-MCNC: 9.1 MG/DL
CHLORIDE SERPL-SCNC: 108 MMOL/L
CO2 SERPL-SCNC: 26 MMOL/L
CREAT SERPL-MCNC: 0.7 MG/DL
DIFFERENTIAL METHOD: ABNORMAL
EOSINOPHIL # BLD AUTO: 0.1 K/UL
EOSINOPHIL NFR BLD: 1.3 %
ERYTHROCYTE [DISTWIDTH] IN BLOOD BY AUTOMATED COUNT: 17.1 %
ERYTHROCYTE [SEDIMENTATION RATE] IN BLOOD BY WESTERGREN METHOD: 40 MM/HR
EST. GFR  (AFRICAN AMERICAN): >60 ML/MIN/1.73 M^2
EST. GFR  (NON AFRICAN AMERICAN): >60 ML/MIN/1.73 M^2
GLUCOSE SERPL-MCNC: 105 MG/DL
HCT VFR BLD AUTO: 38.7 %
HGB BLD-MCNC: 12.4 G/DL
LYMPHOCYTES # BLD AUTO: 1.8 K/UL
LYMPHOCYTES NFR BLD: 22.9 %
MCH RBC QN AUTO: 26.4 PG
MCHC RBC AUTO-ENTMCNC: 32 G/DL
MCV RBC AUTO: 83 FL
MONOCYTES # BLD AUTO: 0.7 K/UL
MONOCYTES NFR BLD: 8.5 %
NEUTROPHILS # BLD AUTO: 5.2 K/UL
NEUTROPHILS NFR BLD: 67 %
PLATELET # BLD AUTO: 282 K/UL
PMV BLD AUTO: 10.5 FL
POTASSIUM SERPL-SCNC: 4.3 MMOL/L
PROT SERPL-MCNC: 7.1 G/DL
RBC # BLD AUTO: 4.69 M/UL
SODIUM SERPL-SCNC: 143 MMOL/L
WBC # BLD AUTO: 7.68 K/UL

## 2017-07-26 PROCEDURE — 99214 OFFICE O/P EST MOD 30 MIN: CPT | Mod: PBBFAC,PO | Performed by: PHYSICIAN ASSISTANT

## 2017-07-26 PROCEDURE — 99999 PR PBB SHADOW E&M-EST. PATIENT-LVL IV: CPT | Mod: PBBFAC,,, | Performed by: PHYSICIAN ASSISTANT

## 2017-07-26 PROCEDURE — 99214 OFFICE O/P EST MOD 30 MIN: CPT | Mod: S$PBB,,, | Performed by: PHYSICIAN ASSISTANT

## 2017-07-26 RX ORDER — HYDROXYCHLOROQUINE SULFATE 200 MG/1
200 TABLET, FILM COATED ORAL DAILY
Qty: 30 TABLET | Refills: 6 | Status: SHIPPED | OUTPATIENT
Start: 2017-07-26 | End: 2018-03-18 | Stop reason: SDUPTHER

## 2017-07-26 RX ORDER — FOLIC ACID 1 MG/1
1000 TABLET ORAL DAILY
Qty: 30 TABLET | Refills: 6 | Status: SHIPPED | OUTPATIENT
Start: 2017-07-26 | End: 2018-03-04 | Stop reason: SDUPTHER

## 2017-07-26 RX ORDER — NAPROXEN 500 MG/1
500 TABLET ORAL 2 TIMES DAILY PRN
Qty: 60 TABLET | Refills: 3 | Status: SHIPPED | OUTPATIENT
Start: 2017-07-26 | End: 2019-06-24 | Stop reason: SDUPTHER

## 2017-07-26 RX ORDER — METHOTREXATE 2.5 MG/1
7.5 TABLET ORAL
Qty: 12 TABLET | Refills: 6 | Status: SHIPPED | OUTPATIENT
Start: 2017-07-26 | End: 2018-09-20 | Stop reason: SDUPTHER

## 2017-07-26 ASSESSMENT — CLINICAL DISEASE ACTIVITY INDEX (CDAI)
PHYSICIAN_ASSESSMENT: 0
PATIENT_ASSESSMENT: 0
SWOLLEN_JOINTS_COUNT: 0
TENDER_JOINTS_COUNT: 0
TOTAL_SCORE: 0

## 2017-07-26 ASSESSMENT — ROUTINE ASSESSMENT OF PATIENT INDEX DATA (RAPID3): MDHAQ FUNCTION SCORE: .2

## 2017-07-26 NOTE — PROGRESS NOTES
"Subjective:       Patient ID: Page Grissom is a 66 y.o. female.    Chief Complaint: Rheumatoid Arthritis; Fibromyalgia; and immunosuppressed    HPI  Review of Systems      Objective:   BP (!) 145/75   Pulse 71   Ht 5' 4" (1.626 m)   Wt 99.3 kg (218 lb 14.7 oz)   BMI 37.58 kg/m²      Physical Exam      Assessment:       1. Rheumatoid arthritis involving multiple sites with positive rheumatoid factor    2. Long-term use of immunosuppressant medication    3. Medication monitoring encounter    4. Fibromyalgia    5. Age-related osteoporosis without current pathological fracture            Plan:       ***    "

## 2017-07-26 NOTE — PROGRESS NOTES
Subjective:       Patient ID: Page Grissom is a 66 y.o. female.    Chief Complaint: Rheumatoid Arthritis; Fibromyalgia; and immunosuppressed    Page is here today for rheumatology follow-up she has seropositive rheumatoid arthritis currently taking methotrexate 7.5 mg once weekly and Plaquenil 200 mg twice daily.  She's also on folic acid daily.  Naproxen 500 mg twice daily only as needed.  Does not use very often.  Overall doing well. No issues. No RA exacerbations.   Peripheral joints have no pain or swelling.  Minimal morning stiffness.  Her lower back is really the only thing that bothers her.  Pain level today 2/10.  Having to take care of her  who had a stroke and cannot care for himself. This has caused more stress. She had quit smoking but started back.  Fibromyalgia also doing well.  Bilateral knee osteoarthritis seems to be better since she is no longer working and walking 1 cement every day like when she was working at [employer]. Bone density was done 5/10/17 meeting criteria for osteoporosis she was placed on Fosamax once she is currently tolerating this well with.  No falls or fractures. Low vit D on supplement last vit d was 38      Review of Systems   Constitutional: Negative.  Negative for activity change, appetite change, chills, fatigue and fever.   HENT: Negative.  Negative for mouth sores and trouble swallowing.         No dry mouth   Eyes: Negative.  Negative for photophobia, pain and redness.        No swollen or red eyes, no dry eye     Respiratory: Negative.  Negative for chest tightness, shortness of breath, wheezing and stridor.    Cardiovascular: Negative.  Negative for chest pain.   Gastrointestinal: Negative.  Negative for abdominal pain, blood in stool, diarrhea, nausea and vomiting.   Genitourinary: Negative.  Negative for dysuria, frequency, hematuria and urgency.   Musculoskeletal: Positive for back pain. Negative for arthralgias, gait problem, joint swelling,  "myalgias, neck pain and neck stiffness.   Skin: Negative.  Negative for color change, pallor and rash.   Neurological: Negative.  Negative for weakness.   Hematological: Negative for adenopathy.   Psychiatric/Behavioral: Negative for suicidal ideas.         Objective:     BP (!) 145/75   Pulse 71   Ht 5' 4" (1.626 m)   Wt 99.3 kg (218 lb 14.7 oz)   BMI 37.58 kg/m²      Physical Exam   Constitutional: She is oriented to person, place, and time and well-developed, well-nourished, and in no distress. No distress.   HENT:   Head: Normocephalic and atraumatic.   Right Ear: External ear normal.   Left Ear: External ear normal.   Mouth/Throat: No oropharyngeal exudate.   Eyes: Conjunctivae and EOM are normal. Pupils are equal, round, and reactive to light. No scleral icterus.   Neck: Normal range of motion. Neck supple. No thyromegaly present.   Cardiovascular: Normal rate, regular rhythm and normal heart sounds.    No murmur heard.  Pulmonary/Chest: Effort normal and breath sounds normal. She exhibits no tenderness.   Abdominal: Soft. Bowel sounds are normal.   Lymphadenopathy:     She has no cervical adenopathy.   Neurological: She is alert and oriented to person, place, and time. She displays normal reflexes. No cranial nerve deficit. She exhibits normal muscle tone. Gait normal.   Skin: Skin is warm and dry. No rash noted.     Musculoskeletal: Normal range of motion. She exhibits no edema or tenderness.                  Recent Results (from the past 168 hour(s))   CBC auto differential    Collection Time: 07/26/17 11:05 AM   Result Value Ref Range    WBC 7.68 3.90 - 12.70 K/uL    RBC 4.69 4.00 - 5.40 M/uL    Hemoglobin 12.4 12.0 - 16.0 g/dL    Hematocrit 38.7 37.0 - 48.5 %    MCV 83 82 - 98 fL    MCH 26.4 (L) 27.0 - 31.0 pg    MCHC 32.0 32.0 - 36.0 g/dL    RDW 17.1 (H) 11.5 - 14.5 %    Platelets 282 150 - 350 K/uL    MPV 10.5 9.2 - 12.9 fL    Gran # 5.2 1.8 - 7.7 K/uL    Lymph # 1.8 1.0 - 4.8 K/uL    Mono # 0.7 0.3 " - 1.0 K/uL    Eos # 0.1 0.0 - 0.5 K/uL    Baso # 0.02 0.00 - 0.20 K/uL    Gran% 67.0 38.0 - 73.0 %    Lymph% 22.9 18.0 - 48.0 %    Mono% 8.5 4.0 - 15.0 %    Eosinophil% 1.3 0.0 - 8.0 %    Basophil% 0.3 0.0 - 1.9 %    Differential Method Automated      DEXA 5/10/17 total femur T score -3.0, femur neck -2.5, spine -1.8 impression osteoporosis  Bilateral hand and foot x-rays stable 7/2015    Assessment:       1. Rheumatoid arthritis involving multiple sites with positive rheumatoid factor    2. Long-term use of immunosuppressant medication    3. Medication monitoring encounter    4. Fibromyalgia    5. Age-related osteoporosis without current pathological fracture    6. Vitamin D deficiency disease    7. Rheumatoid arthritis involving hand with positive rheumatoid factor, unspecified laterality    8. Rheumatoid arthritis of hand, unspecified laterality, unspecified rheumatoid factor presence    9. Methotrexate, long term, current use          1.  Seropositive rheumatoid arthritis currently stable on low-dose methotrexate and Plaquenil on exam today 0 swollen/0 tender joints- HERSON 0.2, CDAI 0  2.  Osteoporosis new diagnosis in May with a T score at the total femur -3.0 recently started on Fosamax  Patient currently not on prednisone but she is a current smoker  3.  Fibromyalgia stable  4.  Long-term use of immunosuppression medication with no issues with recurrent infections  5.  Chronic tobacco user  6.  Medication monitoring with no current toxicity issues  7. Vaccines up to date     Plan:       Since her RA is doing well will cut back on Plaquenil to once a day to minimize the risk to her eye with long term use, will stop next visit if doing well  Keep her mtx at 7.5 mg daily since she did not cut back last visit, keep folic acid as long as she is on methotrexate area did  Naproxen only as needed not daily      Continue Fosamax 70 mg once weekly for osteoporosis and repeat her bone density in 2-3 years    Counseled  patient on smoking cessation encouraged patient to look into programs- discussed the one available here    Return to clinic in 6 months with labs    Call with any questions, changes, or concerns.

## 2017-07-27 LAB — CRP SERPL-MCNC: 8.3 MG/L

## 2017-07-31 RX ORDER — LISINOPRIL 40 MG/1
40 TABLET ORAL DAILY
Qty: 30 TABLET | Refills: 11 | Status: SHIPPED | OUTPATIENT
Start: 2017-07-31 | End: 2018-06-13 | Stop reason: SDUPTHER

## 2017-08-10 ENCOUNTER — OFFICE VISIT (OUTPATIENT)
Dept: OPHTHALMOLOGY | Facility: CLINIC | Age: 67
End: 2017-08-10
Payer: MEDICARE

## 2017-08-10 DIAGNOSIS — Z13.5 GLAUCOMA SCREENING: ICD-10-CM

## 2017-08-10 DIAGNOSIS — E11.9 TYPE 2 DIABETES MELLITUS WITHOUT RETINOPATHY: Primary | ICD-10-CM

## 2017-08-10 DIAGNOSIS — H25.013 CATARACT CORTICAL, SENILE, BILATERAL: ICD-10-CM

## 2017-08-10 PROCEDURE — 99999 PR PBB SHADOW E&M-EST. PATIENT-LVL II: CPT | Mod: PBBFAC,,, | Performed by: OPTOMETRIST

## 2017-08-10 PROCEDURE — 92014 COMPRE OPH EXAM EST PT 1/>: CPT | Mod: S$PBB,,, | Performed by: OPTOMETRIST

## 2017-08-10 PROCEDURE — 99212 OFFICE O/P EST SF 10 MIN: CPT | Mod: PBBFAC,PO | Performed by: OPTOMETRIST

## 2017-08-10 NOTE — PROGRESS NOTES
HPI     Diabetic Eye Exam    Additional comments: did not check bs this morning           Comments   Pt was last seen 8/4/16 by slc. Uses otc readers, various strengths.   States no noticeable change in va. No other complaints. Not using any   gtts.   1. DM x2012  2. NS OD       Last edited by Natasha Moyer on 8/10/2017 10:25 AM. (History)            Assessment /Plan     For exam results, see Encounter Report.    Type 2 diabetes mellitus without retinopathy    Cataract cortical, senile, bilateral    Glaucoma screening    Cataract(s) not yet affecting activities of daily living, therefore, surgery consult is not yet indicated.    No diabetic retinopathy both eyes.  Glaucoma screening negative both eyes.  Continue over the counter readers.  Return to clinic 1 yr.

## 2017-08-28 DIAGNOSIS — E87.6 HYPOPOTASSEMIA: ICD-10-CM

## 2017-08-28 RX ORDER — POTASSIUM CHLORIDE 20 MEQ/1
TABLET, EXTENDED RELEASE ORAL
Qty: 30 TABLET | Refills: 3 | Status: SHIPPED | OUTPATIENT
Start: 2017-08-28 | End: 2017-10-15 | Stop reason: SDUPTHER

## 2017-10-15 DIAGNOSIS — E87.6 HYPOPOTASSEMIA: ICD-10-CM

## 2017-10-16 RX ORDER — POTASSIUM CHLORIDE 20 MEQ/1
TABLET, EXTENDED RELEASE ORAL
Qty: 30 TABLET | Refills: 3 | Status: SHIPPED | OUTPATIENT
Start: 2017-10-16 | End: 2017-12-30 | Stop reason: SDUPTHER

## 2017-11-04 DIAGNOSIS — I10 ESSENTIAL HYPERTENSION: ICD-10-CM

## 2017-11-06 RX ORDER — AMLODIPINE BESYLATE 10 MG/1
TABLET ORAL
Qty: 30 TABLET | Refills: 11 | Status: SHIPPED | OUTPATIENT
Start: 2017-11-06 | End: 2017-12-06 | Stop reason: SDUPTHER

## 2017-11-10 DIAGNOSIS — I10 ESSENTIAL HYPERTENSION: ICD-10-CM

## 2017-11-13 RX ORDER — AMLODIPINE BESYLATE 10 MG/1
TABLET ORAL
Qty: 30 TABLET | Refills: 11 | Status: SHIPPED | OUTPATIENT
Start: 2017-11-13 | End: 2018-06-13 | Stop reason: SDUPTHER

## 2017-11-20 ENCOUNTER — LAB VISIT (OUTPATIENT)
Dept: LAB | Facility: HOSPITAL | Age: 67
End: 2017-11-20
Attending: PEDIATRICS
Payer: MEDICARE

## 2017-11-20 DIAGNOSIS — E55.9 VITAMIN D DEFICIENCY DISEASE: ICD-10-CM

## 2017-11-20 DIAGNOSIS — E11.69 HYPERLIPIDEMIA ASSOCIATED WITH TYPE 2 DIABETES MELLITUS: ICD-10-CM

## 2017-11-20 DIAGNOSIS — E11.8 TYPE 2 DIABETES MELLITUS WITH COMPLICATION, WITHOUT LONG-TERM CURRENT USE OF INSULIN: ICD-10-CM

## 2017-11-20 DIAGNOSIS — E78.5 HYPERLIPIDEMIA ASSOCIATED WITH TYPE 2 DIABETES MELLITUS: ICD-10-CM

## 2017-11-20 LAB
25(OH)D3+25(OH)D2 SERPL-MCNC: 51 NG/ML
ALT SERPL W/O P-5'-P-CCNC: 12 U/L
AST SERPL-CCNC: 18 U/L
CHOLEST SERPL-MCNC: 155 MG/DL
CHOLEST/HDLC SERPL: 2.4 {RATIO}
ESTIMATED AVG GLUCOSE: 105 MG/DL
HBA1C MFR BLD HPLC: 5.3 %
HDLC SERPL-MCNC: 64 MG/DL
HDLC SERPL: 41.3 %
LDLC SERPL CALC-MCNC: 76.4 MG/DL
NONHDLC SERPL-MCNC: 91 MG/DL
TRIGL SERPL-MCNC: 73 MG/DL

## 2017-11-20 PROCEDURE — 83036 HEMOGLOBIN GLYCOSYLATED A1C: CPT

## 2017-11-20 PROCEDURE — 80061 LIPID PANEL: CPT

## 2017-11-20 PROCEDURE — 84450 TRANSFERASE (AST) (SGOT): CPT

## 2017-11-20 PROCEDURE — 82306 VITAMIN D 25 HYDROXY: CPT

## 2017-11-20 PROCEDURE — 84460 ALANINE AMINO (ALT) (SGPT): CPT

## 2017-11-20 PROCEDURE — 36415 COLL VENOUS BLD VENIPUNCTURE: CPT | Mod: PO

## 2017-12-06 ENCOUNTER — OFFICE VISIT (OUTPATIENT)
Dept: INTERNAL MEDICINE | Facility: CLINIC | Age: 67
End: 2017-12-06
Payer: MEDICARE

## 2017-12-06 VITALS
OXYGEN SATURATION: 95 % | DIASTOLIC BLOOD PRESSURE: 82 MMHG | WEIGHT: 234.81 LBS | HEART RATE: 93 BPM | SYSTOLIC BLOOD PRESSURE: 128 MMHG | HEIGHT: 64 IN | TEMPERATURE: 97 F | BODY MASS INDEX: 40.09 KG/M2

## 2017-12-06 DIAGNOSIS — E11.8 TYPE 2 DIABETES MELLITUS WITH COMPLICATION, WITHOUT LONG-TERM CURRENT USE OF INSULIN: Primary | ICD-10-CM

## 2017-12-06 DIAGNOSIS — E11.69 HYPERLIPIDEMIA ASSOCIATED WITH TYPE 2 DIABETES MELLITUS: ICD-10-CM

## 2017-12-06 DIAGNOSIS — J45.30 MILD PERSISTENT ASTHMA WITHOUT COMPLICATION: ICD-10-CM

## 2017-12-06 DIAGNOSIS — K21.9 GASTROESOPHAGEAL REFLUX DISEASE WITHOUT ESOPHAGITIS: ICD-10-CM

## 2017-12-06 DIAGNOSIS — E55.9 VITAMIN D DEFICIENCY DISEASE: ICD-10-CM

## 2017-12-06 DIAGNOSIS — E78.5 HYPERLIPIDEMIA ASSOCIATED WITH TYPE 2 DIABETES MELLITUS: ICD-10-CM

## 2017-12-06 DIAGNOSIS — I10 ESSENTIAL HYPERTENSION: ICD-10-CM

## 2017-12-06 PROCEDURE — 99214 OFFICE O/P EST MOD 30 MIN: CPT | Mod: S$PBB,,, | Performed by: PEDIATRICS

## 2017-12-06 PROCEDURE — G0008 ADMIN INFLUENZA VIRUS VAC: HCPCS | Mod: PBBFAC,PO

## 2017-12-06 PROCEDURE — 99999 PR PBB SHADOW E&M-EST. PATIENT-LVL III: CPT | Mod: PBBFAC,,, | Performed by: PEDIATRICS

## 2017-12-06 PROCEDURE — 99213 OFFICE O/P EST LOW 20 MIN: CPT | Mod: PBBFAC,PO | Performed by: PEDIATRICS

## 2017-12-06 NOTE — PROGRESS NOTES
Subjective:        Patient ID: Page Grissom is a 67 y.o. female.     Chief Complaint: Follow-up     HPI Comments: DM: no hyper/hypoglycemic symptoms. Rare self monitoring normal BS. Not following D&E, weight is up due to 's illness.  HTN: slightly high, no HTNive symptoms.    LIPIDS:not following D&E, tolerating and compliant with med(s).    ASTHMA: Rare cigarette smoking, Asthma quiet no albuterol use. Uses advair only episodically.  GERD: Quiet.  LABS REVIEWED AND DISCUSSED WITH PATIENT     Review of Systems   Constitutional: Negative for fever and unexpected weight change.   HENT: Negative for congestion and rhinorrhea.   Eyes: Negative for discharge and redness.   Respiratory: Negative for cough, shortness of breath and wheezing.   Cardiovascular: Negative for chest pain, palpitations and leg swelling.   Gastrointestinal: Negative for constipation, diarrhea and vomiting.   Endocrine: Negative for cold intolerance, heat intolerance, polydipsia, polyphagia and polyuria.   Genitourinary: Negative for decreased urine volume, difficulty urinating and menstrual problem.   Musculoskeletal: Negative for arthralgias and joint swelling.   Skin: Negative for rash and wound.   Neurological: Negative for syncope and headaches.   Psychiatric/Behavioral: Negative for behavioral problems and sleep disturbance.      Objective:       Physical Exam   Constitutional: She is oriented to person, place, and time. She appears well-developed and well-nourished. She is cooperative.   Neck: Trachea normal and normal range of motion. Neck supple. No JVD present. Carotid bruit is not present. No Brudzinski's sign and no Kernig's sign noted. No thyroid mass and no thyromegaly present.   Cardiovascular: Normal rate, regular rhythm, normal heart sounds and normal pulses.    No murmur heard.  Pulses:  Dorsalis pedis pulses are 2+ on the right side, and 2+ on the left side.    Posterior tibial pulses are 2+ on the right side, and  2+ on the left side.   Pulmonary/Chest: Effort normal and breath sounds normal. She has no wheezes. She has no rhonchi. She has no rales.   Abdominal: Soft. Normal appearance. There is no hepatosplenomegaly. There is no tenderness. There is no rebound and no CVA tenderness.   Lymphadenopathy:   She has no cervical adenopathy.   Neurological: She is alert and oriented to person, place, and time. She has normal strength and normal reflexes. No cranial nerve deficit or sensory deficit. Coordination and gait normal.   Skin: Skin is warm. No abrasion and no rash noted.   Psychiatric: She has a normal mood and affect. Her speech is normal and behavior is normal. Judgment and thought content normal. Her mood appears not anxious. Cognition and memory are normal. She does not exhibit a depressed mood.      Assessment:       1.  Diabetes mellitus type 2 with complications     2.  Essential hypertension     3.  Hyperlipidemia associated with type 2 diabetes mellitus     4.  Gastroesophageal reflux disease without esophagitis     5.  Mild persistent asthma without complication     6.  Cigarette smoker     7.  Non morbid obesity due to excess calories        Plan:    D&E, weight loss, stop smoking completely. Meds reviewed. Self monitor BP and BS. F/U 6 months with labs.

## 2017-12-15 ENCOUNTER — TELEPHONE (OUTPATIENT)
Dept: INTERNAL MEDICINE | Facility: CLINIC | Age: 67
End: 2017-12-15

## 2017-12-15 DIAGNOSIS — Z12.39 SCREENING FOR MALIGNANT NEOPLASM OF BREAST: Primary | ICD-10-CM

## 2017-12-15 NOTE — TELEPHONE ENCOUNTER
----- Message from Marguerite Estevez sent at 12/15/2017  9:29 AM CST -----  Contact: Patient   Patient need orders in for a Mammogram, Please call her at [phone].    Thanks  td

## 2017-12-21 ENCOUNTER — HOSPITAL ENCOUNTER (OUTPATIENT)
Dept: RADIOLOGY | Facility: HOSPITAL | Age: 67
Discharge: HOME | End: 2017-12-21
Attending: PEDIATRICS
Payer: MEDICARE

## 2017-12-21 VITALS — BODY MASS INDEX: 39.95 KG/M2 | HEIGHT: 64 IN | WEIGHT: 234 LBS

## 2017-12-21 DIAGNOSIS — Z12.39 SCREENING FOR MALIGNANT NEOPLASM OF BREAST: ICD-10-CM

## 2017-12-21 PROCEDURE — 77063 BREAST TOMOSYNTHESIS BI: CPT | Mod: 26,,, | Performed by: RADIOLOGY

## 2017-12-21 PROCEDURE — 77067 SCR MAMMO BI INCL CAD: CPT | Mod: 26,,, | Performed by: RADIOLOGY

## 2017-12-21 PROCEDURE — 77067 SCR MAMMO BI INCL CAD: CPT | Mod: TC,PO

## 2017-12-30 DIAGNOSIS — E87.6 HYPOPOTASSEMIA: ICD-10-CM

## 2018-01-02 RX ORDER — POTASSIUM CHLORIDE 20 MEQ/1
TABLET, EXTENDED RELEASE ORAL
Qty: 30 TABLET | Refills: 3 | Status: SHIPPED | OUTPATIENT
Start: 2018-01-02 | End: 2018-03-04 | Stop reason: SDUPTHER

## 2018-01-25 ENCOUNTER — OFFICE VISIT (OUTPATIENT)
Dept: RHEUMATOLOGY | Facility: CLINIC | Age: 68
End: 2018-01-25
Payer: MEDICARE

## 2018-01-25 ENCOUNTER — LAB VISIT (OUTPATIENT)
Dept: LAB | Facility: HOSPITAL | Age: 68
End: 2018-01-25
Attending: PHYSICIAN ASSISTANT
Payer: MEDICARE

## 2018-01-25 VITALS
BODY MASS INDEX: 39.33 KG/M2 | DIASTOLIC BLOOD PRESSURE: 86 MMHG | HEIGHT: 64 IN | WEIGHT: 230.38 LBS | SYSTOLIC BLOOD PRESSURE: 183 MMHG | HEART RATE: 71 BPM

## 2018-01-25 DIAGNOSIS — E55.9 VITAMIN D DEFICIENCY DISEASE: ICD-10-CM

## 2018-01-25 DIAGNOSIS — M79.7 FIBROMYALGIA: ICD-10-CM

## 2018-01-25 DIAGNOSIS — M05.79 RHEUMATOID ARTHRITIS INVOLVING MULTIPLE SITES WITH POSITIVE RHEUMATOID FACTOR: ICD-10-CM

## 2018-01-25 DIAGNOSIS — Z51.81 MEDICATION MONITORING ENCOUNTER: ICD-10-CM

## 2018-01-25 DIAGNOSIS — Z79.60 LONG-TERM USE OF IMMUNOSUPPRESSANT MEDICATION: ICD-10-CM

## 2018-01-25 DIAGNOSIS — M81.0 AGE-RELATED OSTEOPOROSIS WITHOUT CURRENT PATHOLOGICAL FRACTURE: ICD-10-CM

## 2018-01-25 DIAGNOSIS — M05.79 RHEUMATOID ARTHRITIS INVOLVING MULTIPLE SITES WITH POSITIVE RHEUMATOID FACTOR: Primary | ICD-10-CM

## 2018-01-25 LAB
25(OH)D3+25(OH)D2 SERPL-MCNC: 50 NG/ML
ALBUMIN SERPL BCP-MCNC: 3.8 G/DL
ALP SERPL-CCNC: 119 U/L
ALT SERPL W/O P-5'-P-CCNC: 15 U/L
ANION GAP SERPL CALC-SCNC: 9 MMOL/L
AST SERPL-CCNC: 16 U/L
BASOPHILS # BLD AUTO: 0.02 K/UL
BASOPHILS NFR BLD: 0.3 %
BILIRUB SERPL-MCNC: 0.3 MG/DL
BUN SERPL-MCNC: 11 MG/DL
CALCIUM SERPL-MCNC: 9.7 MG/DL
CHLORIDE SERPL-SCNC: 104 MMOL/L
CO2 SERPL-SCNC: 29 MMOL/L
CREAT SERPL-MCNC: 0.7 MG/DL
CRP SERPL-MCNC: 7.3 MG/L
DIFFERENTIAL METHOD: ABNORMAL
EOSINOPHIL # BLD AUTO: 0.1 K/UL
EOSINOPHIL NFR BLD: 1.5 %
ERYTHROCYTE [DISTWIDTH] IN BLOOD BY AUTOMATED COUNT: 17.2 %
ERYTHROCYTE [SEDIMENTATION RATE] IN BLOOD BY WESTERGREN METHOD: 14 MM/HR
EST. GFR  (AFRICAN AMERICAN): >60 ML/MIN/1.73 M^2
EST. GFR  (NON AFRICAN AMERICAN): >60 ML/MIN/1.73 M^2
GLUCOSE SERPL-MCNC: 104 MG/DL
HCT VFR BLD AUTO: 42.7 %
HGB BLD-MCNC: 13.3 G/DL
LYMPHOCYTES # BLD AUTO: 1.6 K/UL
LYMPHOCYTES NFR BLD: 22.9 %
MCH RBC QN AUTO: 26 PG
MCHC RBC AUTO-ENTMCNC: 31.1 G/DL
MCV RBC AUTO: 84 FL
MONOCYTES # BLD AUTO: 0.5 K/UL
MONOCYTES NFR BLD: 6.3 %
NEUTROPHILS # BLD AUTO: 4.9 K/UL
NEUTROPHILS NFR BLD: 69 %
PLATELET # BLD AUTO: 243 K/UL
PMV BLD AUTO: 10.7 FL
POTASSIUM SERPL-SCNC: 4.3 MMOL/L
PROT SERPL-MCNC: 8 G/DL
RBC # BLD AUTO: 5.11 M/UL
SODIUM SERPL-SCNC: 142 MMOL/L
WBC # BLD AUTO: 7.15 K/UL

## 2018-01-25 PROCEDURE — 99214 OFFICE O/P EST MOD 30 MIN: CPT | Mod: PBBFAC,PO | Performed by: PHYSICIAN ASSISTANT

## 2018-01-25 PROCEDURE — 85025 COMPLETE CBC W/AUTO DIFF WBC: CPT | Mod: PO

## 2018-01-25 PROCEDURE — 80053 COMPREHEN METABOLIC PANEL: CPT | Mod: PO

## 2018-01-25 PROCEDURE — 85651 RBC SED RATE NONAUTOMATED: CPT | Mod: PO

## 2018-01-25 PROCEDURE — 86140 C-REACTIVE PROTEIN: CPT

## 2018-01-25 PROCEDURE — 99214 OFFICE O/P EST MOD 30 MIN: CPT | Mod: S$PBB,,, | Performed by: PHYSICIAN ASSISTANT

## 2018-01-25 PROCEDURE — 36415 COLL VENOUS BLD VENIPUNCTURE: CPT | Mod: PO

## 2018-01-25 PROCEDURE — 82306 VITAMIN D 25 HYDROXY: CPT

## 2018-01-25 PROCEDURE — 99999 PR PBB SHADOW E&M-EST. PATIENT-LVL IV: CPT | Mod: PBBFAC,,, | Performed by: PHYSICIAN ASSISTANT

## 2018-01-25 ASSESSMENT — CLINICAL DISEASE ACTIVITY INDEX (CDAI)
SWOLLEN_JOINTS_COUNT: 0
PHYSICIAN_ASSESSMENT: 1
PATIENT_ASSESSMENT: 1
TOTAL_SCORE: 4
TENDER_JOINTS_COUNT: 2

## 2018-01-25 ASSESSMENT — ROUTINE ASSESSMENT OF PATIENT INDEX DATA (RAPID3): MDHAQ FUNCTION SCORE: .2

## 2018-01-25 NOTE — PROGRESS NOTES
Subjective:       Patient ID: Page Grissom is a 67 y.o. female.    Chief Complaint: Rheumatoid Arthritis; Fibromyalgia; Osteoporosis; and Vitamin D Deficiency    Page is here today for rheumatology follow-up she has seropositive rheumatoid arthritis currently taking methotrexate 7.5 mg once weekly and Plaquenil 200 mg twice a day.  She's also on folic acid daily.  Naproxen 500 mg twice daily only as needed.  Does not use very often.  Overall doing well. Bilateral knee osteoarthritis Reports the cold weather aggravates both knees. This comes and goes. Pain today rated 4/10. No swelling. No other issues. No RA exacerbations.   Peripheral joints have no pain or swelling.  Minimal morning stiffness.  Fibromyalgia also doing well.     Bone density was done 5/10/17 meeting criteria for osteoporosis she was placed on Fosamax once she is currently tolerating this well with.  No falls or fractures. Low vit D on supplement last vit d was 38      Fibromyalgia   Associated symptoms include arthralgias. Pertinent negatives include no abdominal pain, chest pain, chills, fatigue, fever, joint swelling, myalgias, nausea, neck pain, rash, vomiting or weakness.     Review of Systems   Constitutional: Negative.  Negative for activity change, appetite change, chills, fatigue and fever.   HENT: Negative.  Negative for mouth sores and trouble swallowing.         No dry mouth   Eyes: Negative.  Negative for photophobia, pain and redness.        No swollen or red eyes, no dry eye     Respiratory: Negative.  Negative for chest tightness, shortness of breath, wheezing and stridor.    Cardiovascular: Negative.  Negative for chest pain.   Gastrointestinal: Negative.  Negative for abdominal pain, blood in stool, diarrhea, nausea and vomiting.   Genitourinary: Negative.  Negative for dysuria, frequency, hematuria and urgency.   Musculoskeletal: Positive for arthralgias. Negative for back pain, gait problem, joint swelling,  "myalgias, neck pain and neck stiffness.   Skin: Negative.  Negative for color change, pallor and rash.   Neurological: Negative.  Negative for weakness.   Hematological: Negative for adenopathy.   Psychiatric/Behavioral: Negative for suicidal ideas.         Objective:     BP (!) 183/86   Pulse 71   Ht 5' 4" (1.626 m)   Wt 104.5 kg (230 lb 6.1 oz)   BMI 39.54 kg/m²      Physical Exam   Constitutional: She is oriented to person, place, and time and well-developed, well-nourished, and in no distress. No distress.   HENT:   Head: Normocephalic and atraumatic.   Right Ear: External ear normal.   Left Ear: External ear normal.   Mouth/Throat: No oropharyngeal exudate.   Eyes: Conjunctivae and EOM are normal. Pupils are equal, round, and reactive to light. No scleral icterus.   Neck: Normal range of motion. Neck supple. No thyromegaly present.   Cardiovascular: Normal rate, regular rhythm and normal heart sounds.    No murmur heard.  Pulmonary/Chest: Effort normal and breath sounds normal. She exhibits no tenderness.   Abdominal: Soft. Bowel sounds are normal.       Right Side Rheumatological Exam     Examination finds the shoulder, elbow, wrist, 1st PIP, 1st MCP, 2nd PIP, 2nd MCP, 3rd PIP, 3rd MCP, 4th PIP, 4th MCP, 5th PIP and 5th MCP normal.    The patient is tender to palpation of the knee    Left Side Rheumatological Exam     Examination finds the shoulder, elbow, wrist, 1st PIP, 1st MCP, 2nd PIP, 2nd MCP, 3rd PIP, 3rd MCP, 4th PIP, 4th MCP, 5th PIP and 5th MCP normal.    The patient is tender to palpation of the knee.      Lymphadenopathy:     She has no cervical adenopathy.   Neurological: She is alert and oriented to person, place, and time. She displays normal reflexes. No cranial nerve deficit. She exhibits normal muscle tone. Gait normal.   Skin: Skin is warm and dry. No rash noted.     Musculoskeletal: Normal range of motion. She exhibits tenderness. She exhibits no edema.                  Recent Results " (from the past 168 hour(s))   CBC auto differential    Collection Time: 01/25/18 11:01 AM   Result Value Ref Range    WBC 7.15 3.90 - 12.70 K/uL    RBC 5.11 4.00 - 5.40 M/uL    Hemoglobin 13.3 12.0 - 16.0 g/dL    Hematocrit 42.7 37.0 - 48.5 %    MCV 84 82 - 98 fL    MCH 26.0 (L) 27.0 - 31.0 pg    MCHC 31.1 (L) 32.0 - 36.0 g/dL    RDW 17.2 (H) 11.5 - 14.5 %    Platelets 243 150 - 350 K/uL    MPV 10.7 9.2 - 12.9 fL    Gran # (ANC) 4.9 1.8 - 7.7 K/uL    Lymph # 1.6 1.0 - 4.8 K/uL    Mono # 0.5 0.3 - 1.0 K/uL    Eos # 0.1 0.0 - 0.5 K/uL    Baso # 0.02 0.00 - 0.20 K/uL    Gran% 69.0 38.0 - 73.0 %    Lymph% 22.9 18.0 - 48.0 %    Mono% 6.3 4.0 - 15.0 %    Eosinophil% 1.5 0.0 - 8.0 %    Basophil% 0.3 0.0 - 1.9 %    Differential Method Automated      DEXA 5/10/17 total femur T score -3.0, femur neck -2.5, spine -1.8 impression osteoporosis  Bilateral hand and foot x-rays stable 7/2015    Assessment:       1. Rheumatoid arthritis involving multiple sites with positive rheumatoid factor    2. Long-term use of immunosuppressant medication    3. Age-related osteoporosis without current pathological fracture    4. Vitamin D deficiency disease    5. Fibromyalgia    6. Medication monitoring encounter          1.  Seropositive rheumatoid arthritis currently stable on low-dose methotrexate and Plaquenil on exam today 0 swollen/2 tender joints- HERSON 0.2, CDAI 4    2.  Osteoporosis new diagnosis in May with a T score at the total femur -3.0 recently started on Fosamax  Patient currently not on prednisone but she is a current smoker    3.  Fibromyalgia stable    4.  Long-term use of immunosuppression medication with no issues with recurrent infections    5.  Chronic tobacco user    6.  Medication monitoring with no current toxicity issues    7. Vaccines up to date     Plan:       Since her RA is doing well will cut back on Plaquenil to once a day to minimize the risk to her eye with long term use, will stop next visit if doing well  Keep  her mtx at 7.5 mg daily  And keep folic acid 1 mg daily as long as she is on methotrexate   Naproxen only as needed not daily    Continue Fosamax 70 mg once weekly for osteoporosis and repeat her bone density in 2-3 years- 2019    Counseled patient on smoking cessation encouraged patient to look into programs- discussed the one available here    Return to clinic in 6 months with labs- cbc, cmp, esr, crp     Call with any questions, changes, or concerns.

## 2018-03-04 DIAGNOSIS — E87.6 HYPOPOTASSEMIA: ICD-10-CM

## 2018-03-04 DIAGNOSIS — Z79.631 METHOTREXATE, LONG TERM, CURRENT USE: ICD-10-CM

## 2018-03-05 RX ORDER — FOLIC ACID 1 MG/1
TABLET ORAL
Qty: 30 TABLET | Refills: 6 | Status: SHIPPED | OUTPATIENT
Start: 2018-03-05 | End: 2018-10-04 | Stop reason: SDUPTHER

## 2018-03-05 RX ORDER — POTASSIUM CHLORIDE 20 MEQ/1
TABLET, EXTENDED RELEASE ORAL
Qty: 30 TABLET | Refills: 3 | Status: SHIPPED | OUTPATIENT
Start: 2018-03-05 | End: 2018-05-04 | Stop reason: SDUPTHER

## 2018-03-18 DIAGNOSIS — M05.749 RHEUMATOID ARTHRITIS INVOLVING HAND WITH POSITIVE RHEUMATOID FACTOR, UNSPECIFIED LATERALITY: ICD-10-CM

## 2018-03-18 RX ORDER — HYDROXYCHLOROQUINE SULFATE 200 MG/1
TABLET, FILM COATED ORAL
Qty: 30 TABLET | Refills: 6 | Status: SHIPPED | OUTPATIENT
Start: 2018-03-18 | End: 2018-10-09 | Stop reason: SDUPTHER

## 2018-03-21 DIAGNOSIS — E11.8 TYPE 2 DIABETES MELLITUS WITH COMPLICATION, WITHOUT LONG-TERM CURRENT USE OF INSULIN: Primary | ICD-10-CM

## 2018-03-21 NOTE — TELEPHONE ENCOUNTER
Fax received from pt's pharm requesting 3-month supply for Metformin rx.    LV 12/06/2017  Next visit 06/06/2018

## 2018-03-22 RX ORDER — METFORMIN HYDROCHLORIDE 500 MG/1
500 TABLET ORAL DAILY
Qty: 90 TABLET | Refills: 3 | Status: SHIPPED | OUTPATIENT
Start: 2018-03-22 | End: 2018-06-13 | Stop reason: SDUPTHER

## 2018-04-19 DIAGNOSIS — E78.5 HYPERLIPIDEMIA ASSOCIATED WITH TYPE 2 DIABETES MELLITUS: ICD-10-CM

## 2018-04-19 DIAGNOSIS — E11.69 HYPERLIPIDEMIA ASSOCIATED WITH TYPE 2 DIABETES MELLITUS: ICD-10-CM

## 2018-04-19 RX ORDER — ROSUVASTATIN CALCIUM 40 MG/1
TABLET, COATED ORAL
Qty: 90 TABLET | Refills: 3 | Status: SHIPPED | OUTPATIENT
Start: 2018-04-19 | End: 2018-06-13 | Stop reason: SDUPTHER

## 2018-05-04 DIAGNOSIS — E87.6 HYPOPOTASSEMIA: ICD-10-CM

## 2018-05-05 RX ORDER — POTASSIUM CHLORIDE 20 MEQ/1
TABLET, EXTENDED RELEASE ORAL
Qty: 30 TABLET | Refills: 3 | Status: SHIPPED | OUTPATIENT
Start: 2018-05-05 | End: 2018-06-13 | Stop reason: SDUPTHER

## 2018-05-30 ENCOUNTER — LAB VISIT (OUTPATIENT)
Dept: LAB | Facility: HOSPITAL | Age: 68
End: 2018-05-30
Attending: PEDIATRICS
Payer: MEDICARE

## 2018-05-30 DIAGNOSIS — E11.8 TYPE 2 DIABETES MELLITUS WITH COMPLICATION, WITHOUT LONG-TERM CURRENT USE OF INSULIN: ICD-10-CM

## 2018-05-30 LAB
ALT SERPL W/O P-5'-P-CCNC: 12 U/L
ANION GAP SERPL CALC-SCNC: 7 MMOL/L
AST SERPL-CCNC: 15 U/L
BUN SERPL-MCNC: 11 MG/DL
CALCIUM SERPL-MCNC: 10 MG/DL
CHLORIDE SERPL-SCNC: 107 MMOL/L
CHOLEST SERPL-MCNC: 133 MG/DL
CHOLEST/HDLC SERPL: 2.6 {RATIO}
CO2 SERPL-SCNC: 30 MMOL/L
CREAT SERPL-MCNC: 0.7 MG/DL
EST. GFR  (AFRICAN AMERICAN): >60 ML/MIN/1.73 M^2
EST. GFR  (NON AFRICAN AMERICAN): >60 ML/MIN/1.73 M^2
ESTIMATED AVG GLUCOSE: 108 MG/DL
GLUCOSE SERPL-MCNC: 99 MG/DL
HBA1C MFR BLD HPLC: 5.4 %
HDLC SERPL-MCNC: 52 MG/DL
HDLC SERPL: 39.1 %
LDLC SERPL CALC-MCNC: 56.8 MG/DL
NONHDLC SERPL-MCNC: 81 MG/DL
POTASSIUM SERPL-SCNC: 4.7 MMOL/L
SODIUM SERPL-SCNC: 144 MMOL/L
TRIGL SERPL-MCNC: 121 MG/DL

## 2018-05-30 PROCEDURE — 84460 ALANINE AMINO (ALT) (SGPT): CPT

## 2018-05-30 PROCEDURE — 80061 LIPID PANEL: CPT

## 2018-05-30 PROCEDURE — 84450 TRANSFERASE (AST) (SGOT): CPT

## 2018-05-30 PROCEDURE — 36415 COLL VENOUS BLD VENIPUNCTURE: CPT | Mod: PO

## 2018-05-30 PROCEDURE — 83036 HEMOGLOBIN GLYCOSYLATED A1C: CPT

## 2018-05-30 PROCEDURE — 80048 BASIC METABOLIC PNL TOTAL CA: CPT

## 2018-06-11 ENCOUNTER — DOCUMENTATION ONLY (OUTPATIENT)
Dept: INTERNAL MEDICINE | Facility: CLINIC | Age: 68
End: 2018-06-11

## 2018-06-13 ENCOUNTER — OFFICE VISIT (OUTPATIENT)
Dept: INTERNAL MEDICINE | Facility: CLINIC | Age: 68
End: 2018-06-13
Payer: MEDICARE

## 2018-06-13 VITALS
OXYGEN SATURATION: 96 % | BODY MASS INDEX: 40.46 KG/M2 | HEIGHT: 64 IN | DIASTOLIC BLOOD PRESSURE: 82 MMHG | SYSTOLIC BLOOD PRESSURE: 128 MMHG | TEMPERATURE: 98 F | WEIGHT: 237 LBS | HEART RATE: 64 BPM

## 2018-06-13 DIAGNOSIS — E55.9 VITAMIN D DEFICIENCY DISEASE: ICD-10-CM

## 2018-06-13 DIAGNOSIS — E78.5 HYPERLIPIDEMIA ASSOCIATED WITH TYPE 2 DIABETES MELLITUS: ICD-10-CM

## 2018-06-13 DIAGNOSIS — I10 ESSENTIAL HYPERTENSION: ICD-10-CM

## 2018-06-13 DIAGNOSIS — E11.8 TYPE 2 DIABETES MELLITUS WITH COMPLICATION, WITHOUT LONG-TERM CURRENT USE OF INSULIN: Primary | ICD-10-CM

## 2018-06-13 DIAGNOSIS — E11.69 HYPERLIPIDEMIA ASSOCIATED WITH TYPE 2 DIABETES MELLITUS: ICD-10-CM

## 2018-06-13 DIAGNOSIS — M79.7 FIBROMYALGIA: ICD-10-CM

## 2018-06-13 DIAGNOSIS — E87.6 HYPOPOTASSEMIA: ICD-10-CM

## 2018-06-13 DIAGNOSIS — K21.9 GASTROESOPHAGEAL REFLUX DISEASE WITHOUT ESOPHAGITIS: ICD-10-CM

## 2018-06-13 DIAGNOSIS — F17.210 CIGARETTE SMOKER: ICD-10-CM

## 2018-06-13 DIAGNOSIS — J45.30 MILD PERSISTENT ASTHMA WITHOUT COMPLICATION: ICD-10-CM

## 2018-06-13 PROCEDURE — 99999 PR PBB SHADOW E&M-EST. PATIENT-LVL III: CPT | Mod: PBBFAC,,, | Performed by: PEDIATRICS

## 2018-06-13 PROCEDURE — 99214 OFFICE O/P EST MOD 30 MIN: CPT | Mod: S$PBB,,, | Performed by: PEDIATRICS

## 2018-06-13 PROCEDURE — 99213 OFFICE O/P EST LOW 20 MIN: CPT | Mod: PBBFAC,PO | Performed by: PEDIATRICS

## 2018-06-13 RX ORDER — DULOXETIN HYDROCHLORIDE 30 MG/1
30 CAPSULE, DELAYED RELEASE ORAL DAILY
Qty: 30 CAPSULE | Refills: 11 | Status: SHIPPED | OUTPATIENT
Start: 2018-06-13 | End: 2019-06-17 | Stop reason: SDUPTHER

## 2018-06-13 RX ORDER — LISINOPRIL 40 MG/1
40 TABLET ORAL DAILY
Qty: 30 TABLET | Refills: 11 | Status: SHIPPED | OUTPATIENT
Start: 2018-06-13 | End: 2018-12-04 | Stop reason: SDUPTHER

## 2018-06-13 RX ORDER — FLUTICASONE PROPIONATE AND SALMETEROL 100; 50 UG/1; UG/1
1 POWDER RESPIRATORY (INHALATION) 2 TIMES DAILY PRN
Qty: 1 EACH | Refills: 11 | Status: SHIPPED | OUTPATIENT
Start: 2018-06-13 | End: 2020-12-14

## 2018-06-13 RX ORDER — ROSUVASTATIN CALCIUM 40 MG/1
40 TABLET, COATED ORAL NIGHTLY
Qty: 30 TABLET | Refills: 11 | Status: SHIPPED | OUTPATIENT
Start: 2018-06-13 | End: 2019-05-02 | Stop reason: SDUPTHER

## 2018-06-13 RX ORDER — AMLODIPINE BESYLATE 10 MG/1
10 TABLET ORAL DAILY
Qty: 30 TABLET | Refills: 11 | Status: SHIPPED | OUTPATIENT
Start: 2018-06-13 | End: 2019-06-03 | Stop reason: SDUPTHER

## 2018-06-13 RX ORDER — METFORMIN HYDROCHLORIDE 500 MG/1
500 TABLET ORAL DAILY
Qty: 30 TABLET | Refills: 11 | Status: SHIPPED | OUTPATIENT
Start: 2018-06-13 | End: 2018-08-10 | Stop reason: SDUPTHER

## 2018-06-13 RX ORDER — POTASSIUM CHLORIDE 20 MEQ/1
20 TABLET, EXTENDED RELEASE ORAL 2 TIMES DAILY
Qty: 30 TABLET | Refills: 11 | Status: SHIPPED | OUTPATIENT
Start: 2018-06-13 | End: 2018-12-13 | Stop reason: SDUPTHER

## 2018-06-13 RX ORDER — HYDRALAZINE HYDROCHLORIDE 25 MG/1
25 TABLET, FILM COATED ORAL EVERY 12 HOURS
Qty: 60 TABLET | Refills: 11 | Status: SHIPPED | OUTPATIENT
Start: 2018-06-13 | End: 2019-05-26 | Stop reason: SDUPTHER

## 2018-06-13 RX ORDER — HYDROCHLOROTHIAZIDE 25 MG/1
25 TABLET ORAL DAILY
Qty: 30 TABLET | Refills: 11 | Status: SHIPPED | OUTPATIENT
Start: 2018-06-13 | End: 2019-05-28 | Stop reason: SDUPTHER

## 2018-06-13 RX ORDER — ALBUTEROL SULFATE 90 UG/1
2 AEROSOL, METERED RESPIRATORY (INHALATION) EVERY 6 HOURS PRN
Qty: 1 INHALER | Refills: 11 | Status: SHIPPED | OUTPATIENT
Start: 2018-06-13 | End: 2020-12-14 | Stop reason: CLARIF

## 2018-06-13 NOTE — PROGRESS NOTES
Subjective:        Patient ID: Pageshell Grissom is a 67 y.o. female.     Chief Complaint: Follow-up     HPI Comments: DM: no hyper/hypoglycemic symptoms. Rare self monitoring normal BS. Not following D&E, weight is up due to 's illness. He is in house hospice.  HTN: normal, no HTNive symptoms.    LIPIDS:not following D&E, tolerating and compliant with med(s).    ASTHMA: Rare cigarette smoking, Asthma quiet no albuterol or advair currently.  GERD: Quiet.  Rheum: sees rheum regularly. Currently stable.  LABS REVIEWED AND DISCUSSED WITH PATIENT     Review of Systems   Constitutional: Negative for fever and unexpected weight change.   HENT: Negative for congestion and rhinorrhea.   Eyes: Negative for discharge and redness.   Respiratory: Negative for cough, shortness of breath and wheezing.   Cardiovascular: Negative for chest pain, palpitations and leg swelling.   Gastrointestinal: Negative for constipation, diarrhea and vomiting.   Endocrine: Negative for cold intolerance, heat intolerance, polydipsia, polyphagia and polyuria.   Genitourinary: Negative for decreased urine volume, difficulty urinating and menstrual problem.   Musculoskeletal: Negative for arthralgias and joint swelling.   Skin: Negative for rash and wound.   Neurological: Negative for syncope and headaches.   Psychiatric/Behavioral: Negative for behavioral problems and sleep disturbance.      Objective:       Physical Exam   Constitutional: She is oriented to person, place, and time. She appears well-developed and well-nourished. She is cooperative.   Neck: Trachea normal and normal range of motion. Neck supple. No JVD present. Carotid bruit is not present. No Brudzinski's sign and no Kernig's sign noted. No thyroid mass and no thyromegaly present.   Cardiovascular: Normal rate, regular rhythm, normal heart sounds and normal pulses.    No murmur heard.  Pulses:  Dorsalis pedis pulses are 2+ on the right side, and 2+ on the left side.     Posterior tibial pulses are 2+ on the right side, and 2+ on the left side.   Pulmonary/Chest: Effort normal and breath sounds normal. She has no wheezes. She has no rhonchi. She has no rales.   Abdominal: Soft. Normal appearance. There is no hepatosplenomegaly. There is no tenderness. There is no rebound and no CVA tenderness.   Lymphadenopathy:   She has no cervical adenopathy.   Neurological: She is alert and oriented to person, place, and time. She has normal strength and normal reflexes. No cranial nerve deficit or sensory deficit. Coordination and gait normal.   Skin: Skin is warm. No abrasion and no rash noted.   Psychiatric: She has a normal mood and affect. Her speech is normal and behavior is normal. Judgment and thought content normal. Her mood appears not anxious. Cognition and memory are normal. She does not exhibit a depressed mood.     Foot hygiene was good, no ulcers, no onychomycosis, no tinea, monofilament intact     Assessment:       1.  Diabetes mellitus type 2 with complications     2.  Essential hypertension     3.  Hyperlipidemia associated with type 2 diabetes mellitus     4.  Gastroesophageal reflux disease without esophagitis     5.  Mild persistent asthma without complication     6.  Cigarette smoker     7.  Non morbid obesity due to excess calories        Plan:    D&E, weight loss, stop smoking completely. Meds reviewed, early restart advair if needed. Self monitor BP and BS. F/U 6 months with labs.

## 2018-07-17 ENCOUNTER — TELEPHONE (OUTPATIENT)
Dept: RHEUMATOLOGY | Facility: CLINIC | Age: 68
End: 2018-07-17

## 2018-07-17 NOTE — TELEPHONE ENCOUNTER
Spoke with Mrs. Grissom she requested appointment to be changed to Thursday 7/26/2018 @ 1:00. Change in appointment given.

## 2018-07-17 NOTE — TELEPHONE ENCOUNTER
----- Message from Charito Kulkarni sent at 7/17/2018  7:18 AM CDT -----  Contact: wife  Please call pt wife @ [phone] regarding appt on 7/25.

## 2018-07-26 ENCOUNTER — OFFICE VISIT (OUTPATIENT)
Dept: RHEUMATOLOGY | Facility: CLINIC | Age: 68
End: 2018-07-26
Payer: MEDICARE

## 2018-07-26 ENCOUNTER — LAB VISIT (OUTPATIENT)
Dept: LAB | Facility: HOSPITAL | Age: 68
End: 2018-07-26
Attending: INTERNAL MEDICINE
Payer: MEDICARE

## 2018-07-26 VITALS
WEIGHT: 201.5 LBS | HEART RATE: 81 BPM | SYSTOLIC BLOOD PRESSURE: 139 MMHG | DIASTOLIC BLOOD PRESSURE: 77 MMHG | HEIGHT: 64 IN | BODY MASS INDEX: 34.4 KG/M2

## 2018-07-26 DIAGNOSIS — M79.7 FIBROMYALGIA: ICD-10-CM

## 2018-07-26 DIAGNOSIS — M05.79 RHEUMATOID ARTHRITIS INVOLVING MULTIPLE SITES WITH POSITIVE RHEUMATOID FACTOR: Primary | ICD-10-CM

## 2018-07-26 DIAGNOSIS — E55.9 VITAMIN D DEFICIENCY DISEASE: ICD-10-CM

## 2018-07-26 DIAGNOSIS — M81.0 AGE-RELATED OSTEOPOROSIS WITHOUT CURRENT PATHOLOGICAL FRACTURE: ICD-10-CM

## 2018-07-26 DIAGNOSIS — M05.79 RHEUMATOID ARTHRITIS INVOLVING MULTIPLE SITES WITH POSITIVE RHEUMATOID FACTOR: ICD-10-CM

## 2018-07-26 DIAGNOSIS — Z51.81 MEDICATION MONITORING ENCOUNTER: ICD-10-CM

## 2018-07-26 LAB
ALBUMIN SERPL BCP-MCNC: 3.6 G/DL
ALP SERPL-CCNC: 114 U/L
ALT SERPL W/O P-5'-P-CCNC: 13 U/L
ANION GAP SERPL CALC-SCNC: 9 MMOL/L
AST SERPL-CCNC: 15 U/L
BASOPHILS # BLD AUTO: 0.03 K/UL
BASOPHILS NFR BLD: 0.3 %
BILIRUB SERPL-MCNC: 0.3 MG/DL
BUN SERPL-MCNC: 14 MG/DL
CALCIUM SERPL-MCNC: 9.8 MG/DL
CHLORIDE SERPL-SCNC: 102 MMOL/L
CO2 SERPL-SCNC: 29 MMOL/L
CREAT SERPL-MCNC: 0.9 MG/DL
CRP SERPL-MCNC: 10.5 MG/L
DIFFERENTIAL METHOD: ABNORMAL
EOSINOPHIL # BLD AUTO: 0.1 K/UL
EOSINOPHIL NFR BLD: 1.4 %
ERYTHROCYTE [DISTWIDTH] IN BLOOD BY AUTOMATED COUNT: 16.4 %
ERYTHROCYTE [SEDIMENTATION RATE] IN BLOOD BY WESTERGREN METHOD: 50 MM/HR
EST. GFR  (AFRICAN AMERICAN): >60 ML/MIN/1.73 M^2
EST. GFR  (NON AFRICAN AMERICAN): >60 ML/MIN/1.73 M^2
GLUCOSE SERPL-MCNC: 118 MG/DL
HCT VFR BLD AUTO: 41.7 %
HGB BLD-MCNC: 13.3 G/DL
LYMPHOCYTES # BLD AUTO: 1.8 K/UL
LYMPHOCYTES NFR BLD: 19.7 %
MCH RBC QN AUTO: 26.3 PG
MCHC RBC AUTO-ENTMCNC: 31.9 G/DL
MCV RBC AUTO: 83 FL
MONOCYTES # BLD AUTO: 0.8 K/UL
MONOCYTES NFR BLD: 8.8 %
NEUTROPHILS # BLD AUTO: 6.5 K/UL
NEUTROPHILS NFR BLD: 69.8 %
PLATELET # BLD AUTO: 236 K/UL
PMV BLD AUTO: 10.4 FL
POTASSIUM SERPL-SCNC: 3.9 MMOL/L
PROT SERPL-MCNC: 7.7 G/DL
RBC # BLD AUTO: 5.05 M/UL
SODIUM SERPL-SCNC: 140 MMOL/L
WBC # BLD AUTO: 9.25 K/UL

## 2018-07-26 PROCEDURE — 80053 COMPREHEN METABOLIC PANEL: CPT | Mod: PO

## 2018-07-26 PROCEDURE — 36415 COLL VENOUS BLD VENIPUNCTURE: CPT | Mod: PO

## 2018-07-26 PROCEDURE — 85651 RBC SED RATE NONAUTOMATED: CPT | Mod: PO

## 2018-07-26 PROCEDURE — 86140 C-REACTIVE PROTEIN: CPT

## 2018-07-26 PROCEDURE — 99214 OFFICE O/P EST MOD 30 MIN: CPT | Mod: S$PBB,,, | Performed by: PHYSICIAN ASSISTANT

## 2018-07-26 PROCEDURE — 99999 PR PBB SHADOW E&M-EST. PATIENT-LVL III: CPT | Mod: PBBFAC,,, | Performed by: PHYSICIAN ASSISTANT

## 2018-07-26 PROCEDURE — 85025 COMPLETE CBC W/AUTO DIFF WBC: CPT | Mod: PO

## 2018-07-26 PROCEDURE — 99213 OFFICE O/P EST LOW 20 MIN: CPT | Mod: PBBFAC,PO | Performed by: PHYSICIAN ASSISTANT

## 2018-07-26 RX ORDER — HEPARIN 100 UNIT/ML
500 SYRINGE INTRAVENOUS
Status: CANCELLED | OUTPATIENT
Start: 2018-07-26

## 2018-07-26 RX ORDER — ZOLEDRONIC ACID 5 MG/100ML
5 INJECTION, SOLUTION INTRAVENOUS
Status: CANCELLED | OUTPATIENT
Start: 2018-07-26

## 2018-07-26 RX ORDER — ACETAMINOPHEN 325 MG/1
975 TABLET ORAL
Status: CANCELLED | OUTPATIENT
Start: 2018-07-26

## 2018-07-26 RX ORDER — SODIUM CHLORIDE 0.9 % (FLUSH) 0.9 %
10 SYRINGE (ML) INJECTION
Status: CANCELLED | OUTPATIENT
Start: 2018-07-26

## 2018-07-26 ASSESSMENT — CLINICAL DISEASE ACTIVITY INDEX (CDAI)
PATIENT_ASSESSMENT: 0
TOTAL_SCORE: 0
PHYSICIAN_ASSESSMENT: 0
TENDER_JOINTS_COUNT: 0
SWOLLEN_JOINTS_COUNT: 0

## 2018-07-26 ASSESSMENT — ROUTINE ASSESSMENT OF PATIENT INDEX DATA (RAPID3): MDHAQ FUNCTION SCORE: 0

## 2018-07-26 NOTE — PROGRESS NOTES
Subjective:       Patient ID: Page Grissom is a 67 y.o. female.    Chief Complaint: Rheumatoid Arthritis    Page is here today for rheumatology follow-up she has seropositive rheumatoid arthritis currently taking methotrexate 7.5 mg once weekly and Plaquenil 200 mg twice a day.  She's also on folic acid daily.  Naproxen 500 mg twice daily only as needed.  Does not use very often.  Overall doing well. Bilateral knee osteoarthritis pain in her knees come and go.  Pain today rated 2/10. No swelling. No other issues. No RA exacerbations.   Peripheral joints have no pain or swelling.  Minimal morning stiffness.  Fibromyalgia also doing well.     Bone density was done 5/10/17 meeting criteria for osteoporosis she was placed on Fosamax once weekly, she ran out of her fosamax 5 months ago and has not been taking. She forgets to take. Prefers not to add more tablets to her current meds.    No falls or fractures since last visit . Low vit D on supplement last vit d was 38      Fibromyalgia   Associated symptoms include arthralgias. Pertinent negatives include no abdominal pain, chest pain, chills, fatigue, fever, joint swelling, myalgias, nausea, neck pain, rash, vomiting or weakness.     Review of Systems   Constitutional: Negative.  Negative for activity change, appetite change, chills, fatigue and fever.   HENT: Negative.  Negative for mouth sores and trouble swallowing.         No dry mouth   Eyes: Negative.  Negative for photophobia, pain and redness.        No swollen or red eyes, no dry eye     Respiratory: Negative.  Negative for chest tightness, shortness of breath, wheezing and stridor.    Cardiovascular: Negative.  Negative for chest pain.   Gastrointestinal: Negative.  Negative for abdominal pain, blood in stool, diarrhea, nausea and vomiting.   Genitourinary: Negative.  Negative for dysuria, frequency, hematuria and urgency.   Musculoskeletal: Positive for arthralgias. Negative for back pain, gait  "problem, joint swelling, myalgias, neck pain and neck stiffness.   Skin: Negative.  Negative for color change, pallor and rash.   Neurological: Negative.  Negative for weakness.   Hematological: Negative for adenopathy.   Psychiatric/Behavioral: Negative for suicidal ideas.         Objective:     /77   Pulse 81   Ht 5' 4" (1.626 m)   Wt 91.4 kg (201 lb 8 oz)   BMI 34.59 kg/m²      Physical Exam   Constitutional: She is oriented to person, place, and time and well-developed, well-nourished, and in no distress. No distress.   HENT:   Head: Normocephalic and atraumatic.   Right Ear: External ear normal.   Left Ear: External ear normal.   Mouth/Throat: No oropharyngeal exudate.   Eyes: Conjunctivae and EOM are normal. Pupils are equal, round, and reactive to light. No scleral icterus.   Neck: Normal range of motion. Neck supple. No thyromegaly present.   Cardiovascular: Normal rate, regular rhythm and normal heart sounds.    No murmur heard.  Pulmonary/Chest: Effort normal and breath sounds normal. She exhibits no tenderness.   Abdominal: Soft. Bowel sounds are normal.       Right Side Rheumatological Exam     Examination finds the shoulder, elbow, wrist, knee, 1st PIP, 1st MCP, 2nd PIP, 2nd MCP, 3rd PIP, 3rd MCP, 4th PIP, 4th MCP, 5th PIP and 5th MCP normal.    Left Side Rheumatological Exam     Examination finds the shoulder, elbow, wrist, knee, 1st PIP, 1st MCP, 2nd PIP, 2nd MCP, 3rd PIP, 3rd MCP, 4th PIP, 4th MCP, 5th PIP and 5th MCP normal.      Lymphadenopathy:     She has no cervical adenopathy.   Neurological: She is alert and oriented to person, place, and time. She displays normal reflexes. No cranial nerve deficit. She exhibits normal muscle tone. Gait normal.   Skin: Skin is warm and dry. No rash noted.     Musculoskeletal: Normal range of motion. She exhibits tenderness. She exhibits no edema.                  Recent Results (from the past 168 hour(s))   CBC auto differential    Collection Time: " 07/26/18  1:24 PM   Result Value Ref Range    WBC 9.25 3.90 - 12.70 K/uL    RBC 5.05 4.00 - 5.40 M/uL    Hemoglobin 13.3 12.0 - 16.0 g/dL    Hematocrit 41.7 37.0 - 48.5 %    MCV 83 82 - 98 fL    MCH 26.3 (L) 27.0 - 31.0 pg    MCHC 31.9 (L) 32.0 - 36.0 g/dL    RDW 16.4 (H) 11.5 - 14.5 %    Platelets 236 150 - 350 K/uL    MPV 10.4 9.2 - 12.9 fL    Gran # (ANC) 6.5 1.8 - 7.7 K/uL    Lymph # 1.8 1.0 - 4.8 K/uL    Mono # 0.8 0.3 - 1.0 K/uL    Eos # 0.1 0.0 - 0.5 K/uL    Baso # 0.03 0.00 - 0.20 K/uL    Gran% 69.8 38.0 - 73.0 %    Lymph% 19.7 18.0 - 48.0 %    Mono% 8.8 4.0 - 15.0 %    Eosinophil% 1.4 0.0 - 8.0 %    Basophil% 0.3 0.0 - 1.9 %    Differential Method Automated          DEXA 5/10/17 total femur T score -3.0, femur neck -2.5, spine -1.8 impression osteoporosis  Bilateral hand and foot x-rays stable 7/2015    Assessment:       1. Rheumatoid arthritis involving multiple sites with positive rheumatoid factor    2. Age-related osteoporosis without current pathological fracture    3. Vitamin D deficiency disease    4. Medication monitoring encounter    5. Fibromyalgia          1.  Seropositive rheumatoid arthritis currently stable on low-dose methotrexate and Plaquenil on exam today 0 swollen/0 tender joints- HERSON 0, CDAI 0    2.  Osteoporosis new diagnosis in May with a T score at the total femur -3.0 recently started on Fosamax but forgets to take, does not want to add any more pills to take   Patient currently not on prednisone but she is a current smoker    3.  Fibromyalgia stable    4.  Long-term use of immunosuppression medication with no issues with recurrent infections    5.  Chronic tobacco user    6.  Medication monitoring with no current toxicity issues    7. Vaccines up to date     Plan:           C/w  Plaquenil to once a day   Keep her mtx at 7.5 mg daily  And keep folic acid 1 mg daily as long as she is on methotrexate   Naproxen only as needed not daily    Stop  Fosamax and move to IV Reclast once  yearly for osteoporosis and repeat her bone density in 2-3 years- 2019  Will start with her next visit     Counseled patient on smoking cessation encouraged patient to look into programs- discussed the one available here    Return to clinic in 3 months with labs- cbc, cmp, esr, crp and start reclast     Call with any questions, changes, or concerns.

## 2018-07-26 NOTE — Clinical Note
Add reclast infusion to her next apt [facility], New tp entered Apt with me and labs already booked   Nicole

## 2018-08-10 DIAGNOSIS — E11.8 TYPE 2 DIABETES MELLITUS WITH COMPLICATION, WITHOUT LONG-TERM CURRENT USE OF INSULIN: ICD-10-CM

## 2018-08-10 RX ORDER — METFORMIN HYDROCHLORIDE 500 MG/1
500 TABLET ORAL DAILY
Qty: 90 TABLET | Refills: 3 | Status: SHIPPED | OUTPATIENT
Start: 2018-08-10 | End: 2019-05-26 | Stop reason: SDUPTHER

## 2018-08-10 NOTE — TELEPHONE ENCOUNTER
Rx needed for new pharmacy, pended for provider signature.    [facility] 06/13/18  [facility] 12/13/18    Last A1c 05/30/18 = 5.4%, repeat sched 12/06/18.

## 2018-08-16 ENCOUNTER — OFFICE VISIT (OUTPATIENT)
Dept: OPHTHALMOLOGY | Facility: CLINIC | Age: 68
End: 2018-08-16
Payer: MEDICARE

## 2018-08-16 DIAGNOSIS — M05.79 RHEUMATOID ARTHRITIS INVOLVING MULTIPLE SITES WITH POSITIVE RHEUMATOID FACTOR: ICD-10-CM

## 2018-08-16 DIAGNOSIS — I10 ESSENTIAL HYPERTENSION: ICD-10-CM

## 2018-08-16 DIAGNOSIS — Z13.5 GLAUCOMA SCREENING: ICD-10-CM

## 2018-08-16 DIAGNOSIS — E11.8 TYPE 2 DIABETES MELLITUS WITH COMPLICATION, UNSPECIFIED WHETHER LONG TERM INSULIN USE: Primary | ICD-10-CM

## 2018-08-16 DIAGNOSIS — H25.013 CATARACT CORTICAL, SENILE, BILATERAL: ICD-10-CM

## 2018-08-16 PROCEDURE — 99213 OFFICE O/P EST LOW 20 MIN: CPT | Mod: PBBFAC,PO | Performed by: OPTOMETRIST

## 2018-08-16 PROCEDURE — 92014 COMPRE OPH EXAM EST PT 1/>: CPT | Mod: S$PBB,,, | Performed by: OPTOMETRIST

## 2018-08-16 PROCEDURE — 99999 PR PBB SHADOW E&M-EST. PATIENT-LVL III: CPT | Mod: PBBFAC,,, | Performed by: OPTOMETRIST

## 2018-08-16 NOTE — PROGRESS NOTES
HPI     Diabetic Eye Exam      Additional comments: Yearly              Comments     Last seen by [facility] on 8/10/17 for yearly eye exam  No noticeable changes in vision since last eye exam.  Uses over the   counter readers.  Blood sugar stable  Wears OTC readers  No other complaints  No drops  1. DM          Last edited by Paz Pretty, OD on 8/16/2018  2:11 PM. (History)            Assessment /Plan     For exam results, see Encounter Report.    Type 2 diabetes mellitus with complication, unspecified whether long term insulin use    Cataract cortical, senile, bilateral    Essential hypertension    Rheumatoid arthritis involving multiple sites with positive rheumatoid factor    Glaucoma screening      No diabetic retinopathy both eyes.  Cataract(s) not yet affecting activities of daily living, therefore, surgery consult is not yet indicated.  Glaucoma screening negative both eyes.  continue over the counter readers.  Return to clinic 1 yr.

## 2018-09-20 DIAGNOSIS — M05.749 RHEUMATOID ARTHRITIS INVOLVING HAND WITH POSITIVE RHEUMATOID FACTOR, UNSPECIFIED LATERALITY: ICD-10-CM

## 2018-09-20 RX ORDER — METHOTREXATE 2.5 MG/1
TABLET ORAL
Qty: 12 TABLET | Refills: 3 | Status: SHIPPED | OUTPATIENT
Start: 2018-09-20 | End: 2018-10-09 | Stop reason: SDUPTHER

## 2018-10-04 DIAGNOSIS — Z79.631 METHOTREXATE, LONG TERM, CURRENT USE: ICD-10-CM

## 2018-10-04 RX ORDER — FOLIC ACID 1 MG/1
TABLET ORAL
Qty: 30 TABLET | Refills: 11 | Status: SHIPPED | OUTPATIENT
Start: 2018-10-04 | End: 2018-10-09 | Stop reason: SDUPTHER

## 2018-10-09 ENCOUNTER — OFFICE VISIT (OUTPATIENT)
Dept: RHEUMATOLOGY | Facility: CLINIC | Age: 68
End: 2018-10-09
Payer: MEDICARE

## 2018-10-09 ENCOUNTER — INFUSION (OUTPATIENT)
Dept: RHEUMATOLOGY | Facility: HOSPITAL | Age: 68
End: 2018-10-09
Attending: INTERNAL MEDICINE
Payer: MEDICARE

## 2018-10-09 ENCOUNTER — LAB VISIT (OUTPATIENT)
Dept: LAB | Facility: HOSPITAL | Age: 68
End: 2018-10-09
Attending: INTERNAL MEDICINE
Payer: MEDICARE

## 2018-10-09 VITALS
WEIGHT: 239.44 LBS | SYSTOLIC BLOOD PRESSURE: 128 MMHG | HEIGHT: 64 IN | DIASTOLIC BLOOD PRESSURE: 63 MMHG | BODY MASS INDEX: 40.88 KG/M2 | HEART RATE: 75 BPM

## 2018-10-09 VITALS
WEIGHT: 239.44 LBS | RESPIRATION RATE: 18 BRPM | SYSTOLIC BLOOD PRESSURE: 121 MMHG | DIASTOLIC BLOOD PRESSURE: 65 MMHG | BODY MASS INDEX: 41.1 KG/M2 | HEART RATE: 63 BPM

## 2018-10-09 DIAGNOSIS — Z79.631 METHOTREXATE, LONG TERM, CURRENT USE: ICD-10-CM

## 2018-10-09 DIAGNOSIS — M81.0 AGE-RELATED OSTEOPOROSIS WITHOUT CURRENT PATHOLOGICAL FRACTURE: ICD-10-CM

## 2018-10-09 DIAGNOSIS — M05.79 RHEUMATOID ARTHRITIS INVOLVING MULTIPLE SITES WITH POSITIVE RHEUMATOID FACTOR: Primary | ICD-10-CM

## 2018-10-09 DIAGNOSIS — Z79.60 LONG-TERM USE OF IMMUNOSUPPRESSANT MEDICATION: ICD-10-CM

## 2018-10-09 DIAGNOSIS — M81.0 AGE-RELATED OSTEOPOROSIS WITHOUT CURRENT PATHOLOGICAL FRACTURE: Primary | ICD-10-CM

## 2018-10-09 DIAGNOSIS — M05.749 RHEUMATOID ARTHRITIS INVOLVING HAND WITH POSITIVE RHEUMATOID FACTOR, UNSPECIFIED LATERALITY: ICD-10-CM

## 2018-10-09 DIAGNOSIS — E55.9 VITAMIN D DEFICIENCY DISEASE: ICD-10-CM

## 2018-10-09 DIAGNOSIS — M05.79 RHEUMATOID ARTHRITIS INVOLVING MULTIPLE SITES WITH POSITIVE RHEUMATOID FACTOR: ICD-10-CM

## 2018-10-09 DIAGNOSIS — Z51.81 MEDICATION MONITORING ENCOUNTER: ICD-10-CM

## 2018-10-09 LAB
ALBUMIN SERPL BCP-MCNC: 3.5 G/DL
ALP SERPL-CCNC: 102 U/L
ALT SERPL W/O P-5'-P-CCNC: 14 U/L
ANION GAP SERPL CALC-SCNC: 10 MMOL/L
AST SERPL-CCNC: 16 U/L
BASOPHILS # BLD AUTO: 0.02 K/UL
BASOPHILS NFR BLD: 0.3 %
BILIRUB SERPL-MCNC: 0.4 MG/DL
BUN SERPL-MCNC: 20 MG/DL
CALCIUM SERPL-MCNC: 9.5 MG/DL
CHLORIDE SERPL-SCNC: 102 MMOL/L
CO2 SERPL-SCNC: 29 MMOL/L
CREAT SERPL-MCNC: 1 MG/DL
CRP SERPL-MCNC: 8.8 MG/L
DIFFERENTIAL METHOD: ABNORMAL
EOSINOPHIL # BLD AUTO: 0.1 K/UL
EOSINOPHIL NFR BLD: 1.8 %
ERYTHROCYTE [DISTWIDTH] IN BLOOD BY AUTOMATED COUNT: 16.8 %
ERYTHROCYTE [SEDIMENTATION RATE] IN BLOOD BY WESTERGREN METHOD: 55 MM/HR
EST. GFR  (AFRICAN AMERICAN): >60 ML/MIN/1.73 M^2
EST. GFR  (NON AFRICAN AMERICAN): 58 ML/MIN/1.73 M^2
GLUCOSE SERPL-MCNC: 105 MG/DL
HCT VFR BLD AUTO: 39 %
HGB BLD-MCNC: 12.4 G/DL
LYMPHOCYTES # BLD AUTO: 1.5 K/UL
LYMPHOCYTES NFR BLD: 22.7 %
MCH RBC QN AUTO: 27.1 PG
MCHC RBC AUTO-ENTMCNC: 31.8 G/DL
MCV RBC AUTO: 85 FL
MONOCYTES # BLD AUTO: 0.4 K/UL
MONOCYTES NFR BLD: 6.3 %
NEUTROPHILS # BLD AUTO: 4.6 K/UL
NEUTROPHILS NFR BLD: 68.9 %
PLATELET # BLD AUTO: 261 K/UL
PMV BLD AUTO: 9.9 FL
POTASSIUM SERPL-SCNC: 4.2 MMOL/L
PROT SERPL-MCNC: 7.3 G/DL
RBC # BLD AUTO: 4.58 M/UL
SODIUM SERPL-SCNC: 141 MMOL/L
WBC # BLD AUTO: 6.7 K/UL

## 2018-10-09 PROCEDURE — 63600175 PHARM REV CODE 636 W HCPCS: Mod: PO | Performed by: PHYSICIAN ASSISTANT

## 2018-10-09 PROCEDURE — 96365 THER/PROPH/DIAG IV INF INIT: CPT | Mod: PO

## 2018-10-09 PROCEDURE — 80053 COMPREHEN METABOLIC PANEL: CPT | Mod: PO

## 2018-10-09 PROCEDURE — 86140 C-REACTIVE PROTEIN: CPT

## 2018-10-09 PROCEDURE — 25000003 PHARM REV CODE 250: Mod: PO | Performed by: PHYSICIAN ASSISTANT

## 2018-10-09 PROCEDURE — 85651 RBC SED RATE NONAUTOMATED: CPT | Mod: PO

## 2018-10-09 PROCEDURE — 36415 COLL VENOUS BLD VENIPUNCTURE: CPT | Mod: PO

## 2018-10-09 PROCEDURE — 99999 PR PBB SHADOW E&M-EST. PATIENT-LVL III: CPT | Mod: PBBFAC,,, | Performed by: PHYSICIAN ASSISTANT

## 2018-10-09 PROCEDURE — 99213 OFFICE O/P EST LOW 20 MIN: CPT | Mod: PBBFAC,PO,25 | Performed by: PHYSICIAN ASSISTANT

## 2018-10-09 PROCEDURE — 85025 COMPLETE CBC W/AUTO DIFF WBC: CPT | Mod: PO

## 2018-10-09 PROCEDURE — A4216 STERILE WATER/SALINE, 10 ML: HCPCS | Mod: PO | Performed by: PHYSICIAN ASSISTANT

## 2018-10-09 PROCEDURE — 99214 OFFICE O/P EST MOD 30 MIN: CPT | Mod: S$PBB,,, | Performed by: PHYSICIAN ASSISTANT

## 2018-10-09 RX ORDER — SODIUM CHLORIDE 0.9 % (FLUSH) 0.9 %
10 SYRINGE (ML) INJECTION
Status: CANCELLED | OUTPATIENT
Start: 2018-10-09

## 2018-10-09 RX ORDER — ACETAMINOPHEN 325 MG/1
975 TABLET ORAL
Status: COMPLETED | OUTPATIENT
Start: 2018-10-09 | End: 2018-10-09

## 2018-10-09 RX ORDER — ZOLEDRONIC ACID 5 MG/100ML
5 INJECTION, SOLUTION INTRAVENOUS
Status: COMPLETED | OUTPATIENT
Start: 2018-10-09 | End: 2018-10-09

## 2018-10-09 RX ORDER — ZOLEDRONIC ACID 5 MG/100ML
5 INJECTION, SOLUTION INTRAVENOUS
Status: CANCELLED | OUTPATIENT
Start: 2018-10-09

## 2018-10-09 RX ORDER — SODIUM CHLORIDE 0.9 % (FLUSH) 0.9 %
10 SYRINGE (ML) INJECTION
Status: DISCONTINUED | OUTPATIENT
Start: 2018-10-09 | End: 2018-10-09 | Stop reason: HOSPADM

## 2018-10-09 RX ORDER — METHOTREXATE 2.5 MG/1
TABLET ORAL
Qty: 12 TABLET | Refills: 6 | Status: SHIPPED | OUTPATIENT
Start: 2018-10-09 | End: 2019-02-11 | Stop reason: SDUPTHER

## 2018-10-09 RX ORDER — HEPARIN 100 UNIT/ML
500 SYRINGE INTRAVENOUS
Status: CANCELLED | OUTPATIENT
Start: 2018-10-09

## 2018-10-09 RX ORDER — ACETAMINOPHEN 325 MG/1
975 TABLET ORAL
Status: CANCELLED | OUTPATIENT
Start: 2018-10-09

## 2018-10-09 RX ORDER — FOLIC ACID 1 MG/1
1000 TABLET ORAL DAILY
Qty: 30 TABLET | Refills: 11 | Status: SHIPPED | OUTPATIENT
Start: 2018-10-09 | End: 2019-11-04 | Stop reason: SDUPTHER

## 2018-10-09 RX ORDER — HYDROXYCHLOROQUINE SULFATE 200 MG/1
200 TABLET, FILM COATED ORAL DAILY
Qty: 30 TABLET | Refills: 11 | Status: SHIPPED | OUTPATIENT
Start: 2018-10-09 | End: 2018-10-10 | Stop reason: SDUPTHER

## 2018-10-09 RX ADMIN — ACETAMINOPHEN 975 MG: 325 TABLET ORAL at 10:10

## 2018-10-09 RX ADMIN — SODIUM CHLORIDE, PRESERVATIVE FREE 10 ML: 5 INJECTION INTRAVENOUS at 10:10

## 2018-10-09 RX ADMIN — ZOLEDRONIC ACID 5 MG: 5 INJECTION, SOLUTION INTRAVENOUS at 10:10

## 2018-10-09 ASSESSMENT — CLINICAL DISEASE ACTIVITY INDEX (CDAI)
SWOLLEN_JOINTS_COUNT: 0
TENDER_JOINTS_COUNT: 0
TOTAL_SCORE: 0
PATIENT_ASSESSMENT: 0
PHYSICIAN_ASSESSMENT: 0

## 2018-10-09 ASSESSMENT — ROUTINE ASSESSMENT OF PATIENT INDEX DATA (RAPID3): MDHAQ FUNCTION SCORE: 0

## 2018-10-09 NOTE — PROGRESS NOTES
Subjective:       Patient ID: Page Grissom is a 68 y.o. female.    Chief Complaint: Rheumatoid Arthritis    Page is here today for rheumatology follow-up she has seropositive rheumatoid arthritis currently taking methotrexate 7.5 mg once weekly and Plaquenil 200 mg once a day.  She's also on folic acid daily.  Naproxen 500 mg twice daily only as needed.  Does not use very often.  Overall doing well. Bilateral knee osteoarthritis pain in her knees come and go.  Pain today rated 0/10. No swelling. No other issues. No RA exacerbations.    Minimal morning stiffness.  Fibromyalgia also doing well.     Bone density was done 5/10/17 meeting criteria for osteoporosis she was placed on Fosamax once weekly, she ran out of her fosamax 5 months ago and has not been taking. She forgets to take. Prefers not to add more tablets to her current meds.  She will start IV Reclast today.   No falls or fractures since last visit . Low vit D on supplement last vit d was 38      Fibromyalgia   Pertinent negatives include no abdominal pain, arthralgias, chest pain, chills, fatigue, fever, joint swelling, myalgias, nausea, neck pain, rash, vomiting or weakness.     Review of Systems   Constitutional: Negative.  Negative for activity change, appetite change, chills, fatigue and fever.   HENT: Negative.  Negative for mouth sores and trouble swallowing.         No dry mouth   Eyes: Negative.  Negative for photophobia, pain and redness.        No swollen or red eyes, no dry eye     Respiratory: Negative.  Negative for chest tightness, shortness of breath, wheezing and stridor.    Cardiovascular: Negative.  Negative for chest pain.   Gastrointestinal: Negative.  Negative for abdominal pain, blood in stool, diarrhea, nausea and vomiting.   Genitourinary: Negative.  Negative for dysuria, frequency, hematuria and urgency.   Musculoskeletal: Negative for arthralgias, back pain, gait problem, joint swelling, myalgias, neck pain and neck  "stiffness.   Skin: Negative.  Negative for color change, pallor and rash.   Neurological: Negative.  Negative for weakness.   Hematological: Negative for adenopathy.   Psychiatric/Behavioral: Negative for suicidal ideas.         Objective:     /63   Pulse 75   Ht 5' 4" (1.626 m)   Wt 108.6 kg (239 lb 6.7 oz)   BMI 41.10 kg/m²      Physical Exam   Constitutional: She is oriented to person, place, and time and well-developed, well-nourished, and in no distress. No distress.   HENT:   Head: Normocephalic and atraumatic.   Right Ear: External ear normal.   Left Ear: External ear normal.   Mouth/Throat: No oropharyngeal exudate.   Eyes: Conjunctivae and EOM are normal. Pupils are equal, round, and reactive to light. No scleral icterus.   Neck: Normal range of motion. Neck supple. No thyromegaly present.   Cardiovascular: Normal rate, regular rhythm and normal heart sounds.    No murmur heard.  Pulmonary/Chest: Effort normal and breath sounds normal. She exhibits no tenderness.   Abdominal: Soft. Bowel sounds are normal.       Right Side Rheumatological Exam     Examination finds the shoulder, elbow, wrist, knee, 1st PIP, 1st MCP, 2nd PIP, 2nd MCP, 3rd PIP, 3rd MCP, 4th PIP, 4th MCP, 5th PIP and 5th MCP normal.    Left Side Rheumatological Exam     Examination finds the shoulder, elbow, wrist, knee, 1st PIP, 1st MCP, 2nd PIP, 2nd MCP, 3rd PIP, 3rd MCP, 4th PIP, 4th MCP, 5th PIP and 5th MCP normal.      Lymphadenopathy:     She has no cervical adenopathy.   Neurological: She is alert and oriented to person, place, and time. She displays normal reflexes. No cranial nerve deficit. She exhibits normal muscle tone. Gait normal.   Skin: Skin is warm and dry. No rash noted.     Musculoskeletal: Normal range of motion. She exhibits no edema or tenderness.                  Recent Results (from the past 168 hour(s))   CBC auto differential    Collection Time: 10/09/18  8:59 AM   Result Value Ref Range    WBC 6.70 3.90 - " 12.70 K/uL    RBC 4.58 4.00 - 5.40 M/uL    Hemoglobin 12.4 12.0 - 16.0 g/dL    Hematocrit 39.0 37.0 - 48.5 %    MCV 85 82 - 98 fL    MCH 27.1 27.0 - 31.0 pg    MCHC 31.8 (L) 32.0 - 36.0 g/dL    RDW 16.8 (H) 11.5 - 14.5 %    Platelets 261 150 - 350 K/uL    MPV 9.9 9.2 - 12.9 fL    Gran # (ANC) 4.6 1.8 - 7.7 K/uL    Lymph # 1.5 1.0 - 4.8 K/uL    Mono # 0.4 0.3 - 1.0 K/uL    Eos # 0.1 0.0 - 0.5 K/uL    Baso # 0.02 0.00 - 0.20 K/uL    Gran% 68.9 38.0 - 73.0 %    Lymph% 22.7 18.0 - 48.0 %    Mono% 6.3 4.0 - 15.0 %    Eosinophil% 1.8 0.0 - 8.0 %    Basophil% 0.3 0.0 - 1.9 %    Differential Method Automated    Comprehensive metabolic panel    Collection Time: 10/09/18  8:59 AM   Result Value Ref Range    Sodium 141 136 - 145 mmol/L    Potassium 4.2 3.5 - 5.1 mmol/L    Chloride 102 95 - 110 mmol/L    CO2 29 23 - 29 mmol/L    Glucose 105 70 - 110 mg/dL    BUN, Bld 20 8 - 23 mg/dL    Creatinine 1.0 0.5 - 1.4 mg/dL    Calcium 9.5 8.7 - 10.5 mg/dL    Total Protein 7.3 6.0 - 8.4 g/dL    Albumin 3.5 3.5 - 5.2 g/dL    Total Bilirubin 0.4 0.1 - 1.0 mg/dL    Alkaline Phosphatase 102 55 - 135 U/L    AST 16 10 - 40 U/L    ALT 14 10 - 44 U/L    Anion Gap 10 8 - 16 mmol/L    eGFR if African American >60 >60 mL/min/1.73 m^2    eGFR if non African American 58 (A) >60 mL/min/1.73 m^2         DEXA 5/10/17 total femur T score -3.0, femur neck -2.5, spine -1.8 impression osteoporosis  Bilateral hand and foot x-rays stable 7/2015    Assessment:       1. Rheumatoid arthritis involving multiple sites with positive rheumatoid factor    2. Age-related osteoporosis without current pathological fracture    3. Vitamin D deficiency disease    4. Long-term use of immunosuppressant medication    5. Medication monitoring encounter          1.  Seropositive rheumatoid arthritis currently stable on low-dose methotrexate and Plaquenil on exam today 0 swollen/0 tender joints- HERSON 0, CDAI 0    2.  Osteoporosis new diagnosis in May with a T score at the total  femur -3.0 recently  Compliance issues with oral Fosamax--  forgets to take, does not want to add any more pills to take   Will start IV Reclast today   Patient currently not on prednisone but she is a current smoker    3.  Fibromyalgia stable    4.  Long-term use of immunosuppression medication with no issues with recurrent infections  Will get her flu shot when she sees dr severino in early dec    5.  Chronic tobacco user    6.  Medication monitoring with no current toxicity issues    7. Vaccines up to date     Plan:           C/w  Plaquenil to once a day   Keep her mtx at 7.5 mg daily  And keep folic acid 1 mg daily as long as she is on methotrexate   Naproxen only as needed not daily    OK for IV Reclast 5 mg infusion per protocol today   Repeat yearly for osteoporosis and repeat her bone density in 2-3 years- 2019   she will get her flu shot in dec with dr severino  Prefers not to do today with starting reclast   Discussed risk versus benefits and potential side effects of reclast . Medication Literature given to patient      Counseled patient on smoking cessation encouraged patient to look into programs- discussed the one available here    Return to clinic in 4 months with labs- cbc, cmp, esr, crp    Call with any questions, changes, or concerns.

## 2018-10-09 NOTE — PROGRESS NOTES
Infusion # 1 - Reclast 5 mg q 1 year    Any invasive dental procedures in past 3 months or upcoming 3 months: denies    Recent labs? 10/9/18  Last Rheumatology provider visit- Seen by HANNAH Rivera on 10/9/18     Premeds? 975 mg Tylenol PO     Reclast 5 mg administered IV at a 20 minute rate per orders; see MAR and vitals for more  details. Tolerated well without adverse events, discharged and ambulatory out of clinic.

## 2018-10-09 NOTE — PATIENT INSTRUCTIONS
Zoledronic Acid injection (Paget's Disease, Osteoporosis)  What is this medicine?  ZOLEDRONIC ACID (CA le dron ik AS id) lowers the amount of calcium loss from bone. It is used to treat Paget's disease and osteoporosis in women.  How should I use this medicine?  This medicine is for infusion into a vein. It is given by a health care professional in a hospital or clinic setting.  Talk to your pediatrician regarding the use of this medicine in children. This medicine is not approved for use in children.  What side effects may I notice from receiving this medicine?  Side effects that you should report to your doctor or health care professional as soon as possible:  · allergic reactions like skin rash, itching or hives, swelling of the face, lips, or tongue  · anxiety, confusion, or depression  · breathing problems  · changes in vision  · eye pain  · feeling faint or lightheaded, falls  · jaw pain, especially after dental work  · mouth sores  · muscle cramps, stiffness, or weakness  · redness, blistering, peeling or loosening of the skin, including inside the mouth  · trouble passing urine or change in the amount of urine  Side effects that usually do not require medical attention (report to your doctor or health care professional if they continue or are bothersome):  · bone, joint, or muscle pain  · constipation  · diarrhea  · fever  · hair loss  · irritation at site where injected  · loss of appetite  · nausea, vomiting  · stomach upset  · trouble sleeping  · trouble swallowing  · weak or tired  What may interact with this medicine?  · certain antibiotics given by injection  · NSAIDs, medicines for pain and inflammation, like ibuprofen or naproxen  · some diuretics like bumetanide, furosemide  · teriparatide  What if I miss a dose?  It is important not to miss your dose. Call your doctor or health care professional if you are unable to keep an appointment.  Where should I keep my medicine?  This drug is given in a  hospital or clinic and will not be stored at home.  What should I tell my health care provider before I take this medicine?  They need to know if you have any of these conditions:  · aspirin-sensitive asthma  · cancer, especially if you are receiving medicines used to treat cancer  · dental disease or wear dentures  · infection  · kidney disease  · low levels of calcium in the blood  · past surgery on the parathyroid gland or intestines  · receiving corticosteroids like dexamethasone or prednisone  · an unusual or allergic reaction to zoledronic acid, other medicines, foods, dyes, or preservatives  · pregnant or trying to get pregnant  · breast-feeding  What should I watch for while using this medicine?  Visit your doctor or health care professional for regular checkups. It may be some time before you see the benefit from this medicine. Do not stop taking your medicine unless your doctor tells you to. Your doctor may order blood tests or other tests to see how you are doing.  Women should inform their doctor if they wish to become pregnant or think they might be pregnant. There is a potential for serious side effects to an unborn child. Talk to your health care professional or pharmacist for more information.  You should make sure that you get enough calcium and vitamin D while you are taking this medicine. Discuss the foods you eat and the vitamins you take with your health care professional.  Some people who take this medicine have severe bone, joint, and/or muscle pain. This medicine may also increase your risk for jaw problems or a broken thigh bone. Tell your doctor right away if you have severe pain in your jaw, bones, joints, or muscles. Tell your doctor if you have any pain that does not go away or that gets worse.  Tell your dentist and dental surgeon that you are taking this medicine. You should not have major dental surgery while on this medicine. See your dentist to have a dental exam and fix any dental  problems before starting this medicine. Take good care of your teeth while on this medicine. Make sure you see your dentist for regular follow-up appointments.  NOTE:This sheet is a summary. It may not cover all possible information. If you have questions about this medicine, talk to your doctor, pharmacist, or health care provider. Copyright© 2017 [employer]

## 2018-10-09 NOTE — PATIENT INSTRUCTIONS
Reclast (zoledronic acid) injection (Paget's Disease, Osteoporosis)  What is this medicine?  ZOLEDRONIC ACID (CA le dron ik AS id) lowers the amount of calcium loss from bone. It is used to treat Paget's disease and osteoporosis in women.  How should I use this medicine?  This medicine is for infusion into a vein. It is given by a health care professional in a hospital or clinic setting.  Talk to your pediatrician regarding the use of this medicine in children. This medicine is not approved for use in children.  What side effects may I notice from receiving this medicine?  Side effects that you should report to your doctor or health care professional as soon as possible:  · allergic reactions like skin rash, itching or hives, swelling of the face, lips, or tongue  · anxiety, confusion, or depression  · breathing problems  · changes in vision  · eye pain  · feeling faint or lightheaded, falls  · jaw pain, especially after dental work  · mouth sores  · muscle cramps, stiffness, or weakness  · redness, blistering, peeling or loosening of the skin, including inside the mouth  · trouble passing urine or change in the amount of urine  Side effects that usually do not require medical attention (report to your doctor or health care professional if they continue or are bothersome):  · bone, joint, or muscle pain  · constipation  · diarrhea  · fever  · hair loss  · irritation at site where injected  · loss of appetite  · nausea, vomiting  · stomach upset  · trouble sleeping  · trouble swallowing  · weak or tired  What may interact with this medicine?  · certain antibiotics given by injection  · NSAIDs, medicines for pain and inflammation, like ibuprofen or naproxen  · some diuretics like bumetanide, furosemide  · teriparatide  What if I miss a dose?  It is important not to miss your dose. Call your doctor or health care professional if you are unable to keep an appointment.  Where should I keep my medicine?  This drug is  given in a hospital or clinic and will not be stored at home.  What should I tell my health care provider before I take this medicine?  They need to know if you have any of these conditions:  · aspirin-sensitive asthma  · cancer, especially if you are receiving medicines used to treat cancer  · dental disease or wear dentures  · infection  · kidney disease  · low levels of calcium in the blood  · past surgery on the parathyroid gland or intestines  · receiving corticosteroids like dexamethasone or prednisone  · an unusual or allergic reaction to zoledronic acid, other medicines, foods, dyes, or preservatives  · pregnant or trying to get pregnant  · breast-feeding  What should I watch for while using this medicine?  Visit your doctor or health care professional for regular checkups. It may be some time before you see the benefit from this medicine. Do not stop taking your medicine unless your doctor tells you to. Your doctor may order blood tests or other tests to see how you are doing.  Women should inform their doctor if they wish to become pregnant or think they might be pregnant. There is a potential for serious side effects to an unborn child. Talk to your health care professional or pharmacist for more information.  You should make sure that you get enough calcium and vitamin D while you are taking this medicine. Discuss the foods you eat and the vitamins you take with your health care professional.  Some people who take this medicine have severe bone, joint, and/or muscle pain. This medicine may also increase your risk for jaw problems or a broken thigh bone. Tell your doctor right away if you have severe pain in your jaw, bones, joints, or muscles. Tell your doctor if you have any pain that does not go away or that gets worse.  Tell your dentist and dental surgeon that you are taking this medicine. You should not have major dental surgery while on this medicine. See your dentist to have a dental exam and fix  any dental problems before starting this medicine. Take good care of your teeth while on this medicine. Make sure you see your dentist for regular follow-up appointments.  NOTE:This sheet is a summary. It may not cover all possible information. If you have questions about this medicine, talk to your doctor, pharmacist, or health care provider. Copyright© 2017 [employer]

## 2018-10-10 DIAGNOSIS — M05.749 RHEUMATOID ARTHRITIS INVOLVING HAND WITH POSITIVE RHEUMATOID FACTOR, UNSPECIFIED LATERALITY: ICD-10-CM

## 2018-10-10 RX ORDER — HYDROXYCHLOROQUINE SULFATE 200 MG/1
TABLET, FILM COATED ORAL
Qty: 30 TABLET | Refills: 11 | Status: SHIPPED | OUTPATIENT
Start: 2018-10-10 | End: 2019-12-02 | Stop reason: SDUPTHER

## 2018-12-04 DIAGNOSIS — I10 ESSENTIAL HYPERTENSION: ICD-10-CM

## 2018-12-04 NOTE — TELEPHONE ENCOUNTER
Fax refill request received from pt's pharmacy for #90 supply [Lisinopril], sent to provider for authorization.    LV 06/13/18  NV 12/13/18

## 2018-12-05 RX ORDER — LISINOPRIL 40 MG/1
40 TABLET ORAL DAILY
Qty: 90 TABLET | Refills: 3 | Status: SHIPPED | OUTPATIENT
Start: 2018-12-05 | End: 2019-06-27 | Stop reason: SDUPTHER

## 2018-12-06 ENCOUNTER — LAB VISIT (OUTPATIENT)
Dept: LAB | Facility: HOSPITAL | Age: 68
End: 2018-12-06
Attending: PEDIATRICS
Payer: MEDICARE

## 2018-12-06 DIAGNOSIS — E11.8 TYPE 2 DIABETES MELLITUS WITH COMPLICATION, WITHOUT LONG-TERM CURRENT USE OF INSULIN: ICD-10-CM

## 2018-12-06 LAB
ALT SERPL W/O P-5'-P-CCNC: 12 U/L
AST SERPL-CCNC: 15 U/L
CHOLEST SERPL-MCNC: 148 MG/DL
CHOLEST/HDLC SERPL: 2.7 {RATIO}
ESTIMATED AVG GLUCOSE: 108 MG/DL
HBA1C MFR BLD HPLC: 5.4 %
HDLC SERPL-MCNC: 55 MG/DL
HDLC SERPL: 37.2 %
LDLC SERPL CALC-MCNC: 69 MG/DL
NONHDLC SERPL-MCNC: 93 MG/DL
TRIGL SERPL-MCNC: 120 MG/DL

## 2018-12-06 PROCEDURE — 83036 HEMOGLOBIN GLYCOSYLATED A1C: CPT

## 2018-12-06 PROCEDURE — 84460 ALANINE AMINO (ALT) (SGPT): CPT

## 2018-12-06 PROCEDURE — 84450 TRANSFERASE (AST) (SGOT): CPT

## 2018-12-06 PROCEDURE — 80061 LIPID PANEL: CPT

## 2018-12-06 PROCEDURE — 36415 COLL VENOUS BLD VENIPUNCTURE: CPT | Mod: PO

## 2018-12-13 ENCOUNTER — OFFICE VISIT (OUTPATIENT)
Dept: INTERNAL MEDICINE | Facility: CLINIC | Age: 68
End: 2018-12-13
Payer: MEDICARE

## 2018-12-13 VITALS
BODY MASS INDEX: 41.48 KG/M2 | HEIGHT: 64 IN | DIASTOLIC BLOOD PRESSURE: 68 MMHG | WEIGHT: 242.94 LBS | TEMPERATURE: 97 F | SYSTOLIC BLOOD PRESSURE: 132 MMHG | OXYGEN SATURATION: 95 % | HEART RATE: 77 BPM

## 2018-12-13 DIAGNOSIS — M79.7 FIBROMYALGIA: ICD-10-CM

## 2018-12-13 DIAGNOSIS — Z12.11 COLON CANCER SCREENING: ICD-10-CM

## 2018-12-13 DIAGNOSIS — F17.210 CIGARETTE SMOKER: ICD-10-CM

## 2018-12-13 DIAGNOSIS — K21.9 GASTROESOPHAGEAL REFLUX DISEASE WITHOUT ESOPHAGITIS: ICD-10-CM

## 2018-12-13 DIAGNOSIS — I10 ESSENTIAL HYPERTENSION: ICD-10-CM

## 2018-12-13 DIAGNOSIS — M05.79 RHEUMATOID ARTHRITIS INVOLVING MULTIPLE SITES WITH POSITIVE RHEUMATOID FACTOR: ICD-10-CM

## 2018-12-13 DIAGNOSIS — J45.30 MILD PERSISTENT ASTHMA WITHOUT COMPLICATION: Primary | ICD-10-CM

## 2018-12-13 DIAGNOSIS — Z12.31 BREAST CANCER SCREENING BY MAMMOGRAM: ICD-10-CM

## 2018-12-13 DIAGNOSIS — E66.01 CLASS 3 SEVERE OBESITY DUE TO EXCESS CALORIES WITH SERIOUS COMORBIDITY AND BODY MASS INDEX (BMI) OF 40.0 TO 44.9 IN ADULT: ICD-10-CM

## 2018-12-13 DIAGNOSIS — E11.8 TYPE 2 DIABETES MELLITUS WITH COMPLICATION, UNSPECIFIED WHETHER LONG TERM INSULIN USE: ICD-10-CM

## 2018-12-13 DIAGNOSIS — Z79.60 LONG-TERM USE OF IMMUNOSUPPRESSANT MEDICATION: ICD-10-CM

## 2018-12-13 DIAGNOSIS — E78.5 HYPERLIPIDEMIA ASSOCIATED WITH TYPE 2 DIABETES MELLITUS: ICD-10-CM

## 2018-12-13 DIAGNOSIS — E87.6 HYPOPOTASSEMIA: ICD-10-CM

## 2018-12-13 DIAGNOSIS — M81.0 AGE-RELATED OSTEOPOROSIS WITHOUT CURRENT PATHOLOGICAL FRACTURE: ICD-10-CM

## 2018-12-13 DIAGNOSIS — E11.69 HYPERLIPIDEMIA ASSOCIATED WITH TYPE 2 DIABETES MELLITUS: ICD-10-CM

## 2018-12-13 PROCEDURE — 99999 PR PBB SHADOW E&M-EST. PATIENT-LVL V: CPT | Mod: PBBFAC,,, | Performed by: PEDIATRICS

## 2018-12-13 PROCEDURE — 90662 IIV NO PRSV INCREASED AG IM: CPT | Mod: PBBFAC,PO

## 2018-12-13 PROCEDURE — 99215 OFFICE O/P EST HI 40 MIN: CPT | Mod: PBBFAC,PO,25 | Performed by: PEDIATRICS

## 2018-12-13 PROCEDURE — 99214 OFFICE O/P EST MOD 30 MIN: CPT | Mod: S$PBB,,, | Performed by: PEDIATRICS

## 2018-12-13 PROCEDURE — 90732 PPSV23 VACC 2 YRS+ SUBQ/IM: CPT | Mod: PBBFAC,PO

## 2018-12-13 RX ORDER — POTASSIUM CHLORIDE 20 MEQ/1
20 TABLET, EXTENDED RELEASE ORAL 2 TIMES DAILY
Qty: 60 TABLET | Refills: 11 | Status: SHIPPED | OUTPATIENT
Start: 2018-12-13 | End: 2019-12-09 | Stop reason: SDUPTHER

## 2018-12-13 NOTE — PROGRESS NOTES
Subjective:        Patient ID: Pageshell Grissom is a 68 y.o. female.     Chief Complaint: Follow-up     HPI Comments: DM: no hyper/hypoglycemic symptoms. Rare self monitoring normal BS. Not following D&E, weight is up due to 's illness- passed 7/27/18.   HTN: normal, no HTNive symptoms.    LIPIDS:not following D&E, tolerating and compliant with med(s).    ASTHMA: Rare cigarette smoking, Asthma quiet no albuterol or advair currently.  GERD: Quiet.  Rheum/osteoporosis: sees rheum regularly.  LABS REVIEWED AND DISCUSSED WITH PATIENT     Review of Systems   Constitutional: Negative for fever and unexpected weight change.   HENT: Negative for congestion and rhinorrhea.   Eyes: Negative for discharge and redness.   Respiratory: Negative for cough, shortness of breath and wheezing.   Cardiovascular: Negative for chest pain, palpitations and leg swelling.   Gastrointestinal: Negative for constipation, diarrhea and vomiting.   Endocrine: Negative for cold intolerance, heat intolerance, polydipsia, polyphagia and polyuria.   Genitourinary: Negative for decreased urine volume, difficulty urinating and menstrual problem.   Musculoskeletal: Negative for arthralgias and joint swelling.   Skin: Negative for rash and wound.   Neurological: Negative for syncope and headaches.   Psychiatric/Behavioral: Negative for behavioral problems and sleep disturbance.      Objective:       Physical Exam   Constitutional: She is oriented to person, place, and time. She appears well-developed and well-nourished. She is cooperative.   Neck: Trachea normal and normal range of motion. Neck supple. No JVD present. Carotid bruit is not present. No Brudzinski's sign and no Kernig's sign noted. No thyroid mass and no thyromegaly present.   Cardiovascular: Normal rate, regular rhythm, normal heart sounds and normal pulses.    No murmur heard.  Pulses:  Dorsalis pedis pulses are 2+ on the right side, and 2+ on the left side.    Posterior  tibial pulses are 2+ on the right side, and 2+ on the left side.   Pulmonary/Chest: Effort normal and breath sounds normal. She has no wheezes. She has no rhonchi. She has no rales.   Abdominal: Soft. Normal appearance. There is no hepatosplenomegaly. There is no tenderness. There is no rebound and no CVA tenderness.   Lymphadenopathy:   She has no cervical adenopathy.   Neurological: She is alert and oriented to person, place, and time. She has normal strength and normal reflexes. No cranial nerve deficit or sensory deficit. Coordination and gait normal.   Skin: Skin is warm. No abrasion and no rash noted. Mild noninflammed varicosities, trace edema  Psychiatric: She has a normal mood and affect. Her speech is normal and behavior is normal. Judgment and thought content normal. Her mood appears not anxious. Cognition and memory are normal. She does not exhibit a depressed mood.           Assessment:       1.  Diabetes mellitus type 2 with complications     2.  Essential hypertension     3.  Hyperlipidemia associated with type 2 diabetes mellitus     4.  Gastroesophageal reflux disease without esophagitis     5.  Mild persistent asthma without complication     6.  Cigarette smoker     7.  morbid obesity due to excess calories     8.        Rheumatoid arthritis    9.        Osteoporosis  10.      Colon cancer screening due  11.      Breast cancer screening  Plan:    D&E, weight loss, stop smoking completely. Meds reviewed, early restart advair if needed. Self monitor BP and BS. F/U 6 months with labs. Flu and PCV 23 now. Mammogram and Colonoscopy due.

## 2018-12-21 ENCOUNTER — TELEPHONE (OUTPATIENT)
Dept: ENDOSCOPY | Facility: HOSPITAL | Age: 68
End: 2018-12-21

## 2018-12-21 NOTE — TELEPHONE ENCOUNTER
Endoscopy Scheduling Questionnaire:    Call Type:Outgoing call    1. Have you been admitted overnight to the hospital in the past 3 months? no  2. Do you get CP and SOB while walking up a flight of stairs? no  3. Have you had a stent placed in the past 12 months? no  4. Have you had a stroke or heart attack in the past 6 months? no  5. Have you had any chest pain in the past 3 months? no      If so, have you been evaluated by your PCP or Cardiologist? no  6. Do you take weight loss medications? no  7. Have you been diagnosed with Diverticulitis within the past 3 months? no  8. Are you having any GI symptoms that you feel need to be evaluated prior to your procedure? no  9. Are you on dialysis? no  10. Are you diabetic? yes  11. Do you have any other health issues that you feel might limit your ability to safely have the procedure and/or sedation? no  12. Is the patient over 81 yo? no        If so, has the patient been seen by their PCP or GI in the last 3 months? N/A       -I have reviewed the last colonoscopy for recommendations regarding surveillance and bowel prep  Yes  -I have reviewed the patient's medications and allergies. She is not on high risk medications and will require cardiac clearance. A clearance request NA  -I have verified the pharmacy information. The prep being used is Suprep. The patient's prep instructions were sent via mail..    Date Endoscopy Scheduled: Date: 2-14-19  Or  Date Gastro office visit Scheduled: NA

## 2018-12-24 RX ORDER — SODIUM, POTASSIUM,MAG SULFATES 17.5-3.13G
SOLUTION, RECONSTITUTED, ORAL ORAL
Qty: 1 KIT | Refills: 0 | Status: SHIPPED | OUTPATIENT
Start: 2018-12-24 | End: 2020-12-14

## 2019-01-02 ENCOUNTER — TELEPHONE (OUTPATIENT)
Dept: ENDOSCOPY | Facility: HOSPITAL | Age: 69
End: 2019-01-02

## 2019-01-03 ENCOUNTER — HOSPITAL ENCOUNTER (OUTPATIENT)
Dept: RADIOLOGY | Facility: HOSPITAL | Age: 69
Discharge: HOME | End: 2019-01-03
Attending: PEDIATRICS
Payer: MEDICARE

## 2019-01-03 DIAGNOSIS — Z12.31 BREAST CANCER SCREENING BY MAMMOGRAM: ICD-10-CM

## 2019-01-03 PROCEDURE — 77067 SCR MAMMO BI INCL CAD: CPT | Mod: 26,,, | Performed by: RADIOLOGY

## 2019-01-03 PROCEDURE — 77063 MAMMO DIGITAL SCREENING BILAT WITH TOMOSYNTHESIS_CAD: ICD-10-PCS | Mod: 26,,, | Performed by: RADIOLOGY

## 2019-01-03 PROCEDURE — 77063 BREAST TOMOSYNTHESIS BI: CPT | Mod: 26,,, | Performed by: RADIOLOGY

## 2019-01-03 PROCEDURE — 77067 SCR MAMMO BI INCL CAD: CPT | Mod: TC,PO

## 2019-01-03 PROCEDURE — 77067 MAMMO DIGITAL SCREENING BILAT WITH TOMOSYNTHESIS_CAD: ICD-10-PCS | Mod: 26,,, | Performed by: RADIOLOGY

## 2019-02-11 DIAGNOSIS — M05.749 RHEUMATOID ARTHRITIS INVOLVING HAND WITH POSITIVE RHEUMATOID FACTOR, UNSPECIFIED LATERALITY: ICD-10-CM

## 2019-02-11 RX ORDER — METHOTREXATE 2.5 MG/1
TABLET ORAL
Qty: 12 TABLET | Refills: 6 | Status: SHIPPED | OUTPATIENT
Start: 2019-02-11 | End: 2019-08-20 | Stop reason: SDUPTHER

## 2019-02-12 ENCOUNTER — LAB VISIT (OUTPATIENT)
Dept: LAB | Facility: HOSPITAL | Age: 69
End: 2019-02-12
Attending: INTERNAL MEDICINE
Payer: MEDICARE

## 2019-02-12 ENCOUNTER — OFFICE VISIT (OUTPATIENT)
Dept: RHEUMATOLOGY | Facility: CLINIC | Age: 69
End: 2019-02-12
Payer: MEDICARE

## 2019-02-12 VITALS
DIASTOLIC BLOOD PRESSURE: 76 MMHG | HEART RATE: 74 BPM | BODY MASS INDEX: 41.52 KG/M2 | HEIGHT: 64 IN | SYSTOLIC BLOOD PRESSURE: 139 MMHG | WEIGHT: 243.19 LBS

## 2019-02-12 DIAGNOSIS — M05.79 RHEUMATOID ARTHRITIS INVOLVING MULTIPLE SITES WITH POSITIVE RHEUMATOID FACTOR: ICD-10-CM

## 2019-02-12 DIAGNOSIS — M05.79 RHEUMATOID ARTHRITIS INVOLVING MULTIPLE SITES WITH POSITIVE RHEUMATOID FACTOR: Primary | ICD-10-CM

## 2019-02-12 DIAGNOSIS — M79.7 FIBROMYALGIA: ICD-10-CM

## 2019-02-12 DIAGNOSIS — Z51.81 MEDICATION MONITORING ENCOUNTER: ICD-10-CM

## 2019-02-12 DIAGNOSIS — M81.0 AGE-RELATED OSTEOPOROSIS WITHOUT CURRENT PATHOLOGICAL FRACTURE: ICD-10-CM

## 2019-02-12 DIAGNOSIS — E55.9 VITAMIN D DEFICIENCY DISEASE: ICD-10-CM

## 2019-02-12 DIAGNOSIS — Z79.60 LONG-TERM USE OF IMMUNOSUPPRESSANT MEDICATION: ICD-10-CM

## 2019-02-12 LAB
ALBUMIN SERPL BCP-MCNC: 3.5 G/DL
ALP SERPL-CCNC: 97 U/L
ALT SERPL W/O P-5'-P-CCNC: 12 U/L
ANION GAP SERPL CALC-SCNC: 11 MMOL/L
AST SERPL-CCNC: 13 U/L
BASOPHILS # BLD AUTO: 0.03 K/UL
BASOPHILS NFR BLD: 0.3 %
BILIRUB SERPL-MCNC: 0.3 MG/DL
BUN SERPL-MCNC: 23 MG/DL
CALCIUM SERPL-MCNC: 9.8 MG/DL
CHLORIDE SERPL-SCNC: 104 MMOL/L
CO2 SERPL-SCNC: 26 MMOL/L
CREAT SERPL-MCNC: 1 MG/DL
CRP SERPL-MCNC: 15.1 MG/L
DIFFERENTIAL METHOD: ABNORMAL
EOSINOPHIL # BLD AUTO: 0.2 K/UL
EOSINOPHIL NFR BLD: 1.8 %
ERYTHROCYTE [DISTWIDTH] IN BLOOD BY AUTOMATED COUNT: 15.7 %
ERYTHROCYTE [SEDIMENTATION RATE] IN BLOOD BY WESTERGREN METHOD: 57 MM/HR
EST. GFR  (AFRICAN AMERICAN): >60 ML/MIN/1.73 M^2
EST. GFR  (NON AFRICAN AMERICAN): 58 ML/MIN/1.73 M^2
GLUCOSE SERPL-MCNC: 117 MG/DL
HCT VFR BLD AUTO: 40.7 %
HGB BLD-MCNC: 12.3 G/DL
IMM GRANULOCYTES # BLD AUTO: 0.02 K/UL
IMM GRANULOCYTES NFR BLD AUTO: 0.2 %
LYMPHOCYTES # BLD AUTO: 1.7 K/UL
LYMPHOCYTES NFR BLD: 19.6 %
MCH RBC QN AUTO: 26.5 PG
MCHC RBC AUTO-ENTMCNC: 30.2 G/DL
MCV RBC AUTO: 88 FL
MONOCYTES # BLD AUTO: 0.8 K/UL
MONOCYTES NFR BLD: 9.3 %
NEUTROPHILS # BLD AUTO: 6 K/UL
NEUTROPHILS NFR BLD: 69 %
NRBC BLD-RTO: 0 /100 WBC
PLATELET # BLD AUTO: 312 K/UL
PMV BLD AUTO: 9.9 FL
POTASSIUM SERPL-SCNC: 4.4 MMOL/L
PROT SERPL-MCNC: 7.6 G/DL
RBC # BLD AUTO: 4.65 M/UL
SODIUM SERPL-SCNC: 141 MMOL/L
WBC # BLD AUTO: 8.69 K/UL

## 2019-02-12 PROCEDURE — 99214 OFFICE O/P EST MOD 30 MIN: CPT | Mod: S$PBB,,, | Performed by: PHYSICIAN ASSISTANT

## 2019-02-12 PROCEDURE — 99214 PR OFFICE/OUTPT VISIT, EST, LEVL IV, 30-39 MIN: ICD-10-PCS | Mod: S$PBB,,, | Performed by: PHYSICIAN ASSISTANT

## 2019-02-12 PROCEDURE — 85025 COMPLETE CBC W/AUTO DIFF WBC: CPT

## 2019-02-12 PROCEDURE — 99213 OFFICE O/P EST LOW 20 MIN: CPT | Mod: PBBFAC,PN | Performed by: PHYSICIAN ASSISTANT

## 2019-02-12 PROCEDURE — 99999 PR PBB SHADOW E&M-EST. PATIENT-LVL III: CPT | Mod: PBBFAC,,, | Performed by: PHYSICIAN ASSISTANT

## 2019-02-12 PROCEDURE — 86140 C-REACTIVE PROTEIN: CPT

## 2019-02-12 PROCEDURE — 85652 RBC SED RATE AUTOMATED: CPT

## 2019-02-12 PROCEDURE — 36415 COLL VENOUS BLD VENIPUNCTURE: CPT

## 2019-02-12 PROCEDURE — 80053 COMPREHEN METABOLIC PANEL: CPT

## 2019-02-12 PROCEDURE — 99999 PR PBB SHADOW E&M-EST. PATIENT-LVL III: ICD-10-PCS | Mod: PBBFAC,,, | Performed by: PHYSICIAN ASSISTANT

## 2019-02-12 ASSESSMENT — ROUTINE ASSESSMENT OF PATIENT INDEX DATA (RAPID3): MDHAQ FUNCTION SCORE: 0

## 2019-02-12 ASSESSMENT — CLINICAL DISEASE ACTIVITY INDEX (CDAI)
PATIENT_ASSESSMENT: 0
SWOLLEN_JOINTS_COUNT: 0
TOTAL_SCORE: 0
TENDER_JOINTS_COUNT: 0
PHYSICIAN_ASSESSMENT: 0

## 2019-02-12 NOTE — PROGRESS NOTES
Subjective:       Patient ID: Page rGissom is a 68 y.o. female.    Chief Complaint: Rheumatoid Arthritis    Page is here today for rheumatology follow-up she has seropositive rheumatoid arthritis currently taking methotrexate 7.5 mg once weekly and Plaquenil 200 mg once a day.  She's also on folic acid daily.  Naproxen 500 mg twice daily only as needed.  Does not use very often.  Overall doing well. Bilateral knee osteoarthritis her knee pain comes and goes.  Pain today rated 0/10. No swelling. No other issues. No RA exacerbations.    Minimal morning stiffness.  Fibromyalgia also doing well.     Bone density was done 5/10/17 meeting criteria for osteoporosis she was placed on Fosamax once weekly, she ran out of her fosamax 5 months ago and has not been taking. She forgets to take. Preferrednot to add more tablets to her current meds.  We moved to  [facility] 10/09/18- she did well. Had some mild arthralgias and myalgias for 2 days then resolved. .   No falls or fractures since last visit . Low vit D on supplement last vit d was 38      Fibromyalgia   Pertinent negatives include no abdominal pain, arthralgias, chest pain, chills, fatigue, fever, joint swelling, myalgias, nausea, neck pain, rash, vomiting or weakness.     Review of Systems   Constitutional: Negative.  Negative for activity change, appetite change, chills, fatigue and fever.   HENT: Negative.  Negative for mouth sores and trouble swallowing.         No dry mouth   Eyes: Negative.  Negative for photophobia, pain and redness.        No swollen or red eyes, no dry eye     Respiratory: Negative.  Negative for chest tightness, shortness of breath, wheezing and stridor.    Cardiovascular: Negative.  Negative for chest pain.   Gastrointestinal: Negative.  Negative for abdominal pain, blood in stool, diarrhea, nausea and vomiting.   Genitourinary: Negative.  Negative for dysuria, frequency, hematuria and urgency.   Musculoskeletal: Negative for  "arthralgias, back pain, gait problem, joint swelling, myalgias, neck pain and neck stiffness.   Skin: Negative.  Negative for color change, pallor and rash.   Neurological: Negative.  Negative for weakness.   Hematological: Negative for adenopathy.   Psychiatric/Behavioral: Negative for suicidal ideas.         Objective:     /76   Pulse 74   Ht 5' 4" (1.626 m)   Wt 110.3 kg (243 lb 2.7 oz)   BMI 41.74 kg/m²      Physical Exam   Constitutional: She is oriented to person, place, and time and well-developed, well-nourished, and in no distress. No distress.   HENT:   Head: Normocephalic and atraumatic.   Right Ear: External ear normal.   Left Ear: External ear normal.   Mouth/Throat: No oropharyngeal exudate.   Eyes: Conjunctivae and EOM are normal. Pupils are equal, round, and reactive to light. No scleral icterus.   Neck: Normal range of motion. Neck supple. No thyromegaly present.   Cardiovascular: Normal rate, regular rhythm and normal heart sounds.    No murmur heard.  Pulmonary/Chest: Effort normal and breath sounds normal. She exhibits no tenderness.   Abdominal: Soft. Bowel sounds are normal.       Right Side Rheumatological Exam     Examination finds the shoulder, elbow, wrist, knee, 1st PIP, 1st MCP, 2nd PIP, 2nd MCP, 3rd PIP, 3rd MCP, 4th PIP, 4th MCP, 5th PIP and 5th MCP normal.    Left Side Rheumatological Exam     Examination finds the shoulder, elbow, wrist, knee, 1st PIP, 1st MCP, 2nd PIP, 2nd MCP, 3rd PIP, 3rd MCP, 4th PIP, 4th MCP, 5th PIP and 5th MCP normal.      Lymphadenopathy:     She has no cervical adenopathy.   Neurological: She is alert and oriented to person, place, and time. She displays normal reflexes. No cranial nerve deficit. She exhibits normal muscle tone. Gait normal.   Skin: Skin is warm and dry. No rash noted.     Musculoskeletal: Normal range of motion. She exhibits no edema or tenderness.                  Recent Results (from the past 168 hour(s))   CBC auto differential "    Collection Time: 02/12/19  9:08 AM   Result Value Ref Range    WBC 8.69 3.90 - 12.70 K/uL    RBC 4.65 4.00 - 5.40 M/uL    Hemoglobin 12.3 12.0 - 16.0 g/dL    Hematocrit 40.7 37.0 - 48.5 %    MCV 88 82 - 98 fL    MCH 26.5 (L) 27.0 - 31.0 pg    MCHC 30.2 (L) 32.0 - 36.0 g/dL    RDW 15.7 (H) 11.5 - 14.5 %    Platelets 312 150 - 350 K/uL    MPV 9.9 9.2 - 12.9 fL    Immature Granulocytes 0.2 0.0 - 0.5 %    Gran # (ANC) 6.0 1.8 - 7.7 K/uL    Immature Grans (Abs) 0.02 0.00 - 0.04 K/uL    Lymph # 1.7 1.0 - 4.8 K/uL    Mono # 0.8 0.3 - 1.0 K/uL    Eos # 0.2 0.0 - 0.5 K/uL    Baso # 0.03 0.00 - 0.20 K/uL    nRBC 0 0 /100 WBC    Gran% 69.0 38.0 - 73.0 %    Lymph% 19.6 18.0 - 48.0 %    Mono% 9.3 4.0 - 15.0 %    Eosinophil% 1.8 0.0 - 8.0 %    Basophil% 0.3 0.0 - 1.9 %    Differential Method Automated    Comprehensive metabolic panel    Collection Time: 02/12/19  9:08 AM   Result Value Ref Range    Sodium 141 136 - 145 mmol/L    Potassium 4.4 3.5 - 5.1 mmol/L    Chloride 104 95 - 110 mmol/L    CO2 26 23 - 29 mmol/L    Glucose 117 (H) 70 - 110 mg/dL    BUN, Bld 23 8 - 23 mg/dL    Creatinine 1.0 0.5 - 1.4 mg/dL    Calcium 9.8 8.7 - 10.5 mg/dL    Total Protein 7.6 6.0 - 8.4 g/dL    Albumin 3.5 3.5 - 5.2 g/dL    Total Bilirubin 0.3 0.1 - 1.0 mg/dL    Alkaline Phosphatase 97 55 - 135 U/L    AST 13 10 - 40 U/L    ALT 12 10 - 44 U/L    Anion Gap 11 8 - 16 mmol/L    eGFR if African American >60 >60 mL/min/1.73 m^2    eGFR if non African American 58 (A) >60 mL/min/1.73 m^2   C-reactive protein    Collection Time: 02/12/19  9:08 AM   Result Value Ref Range    CRP 15.1 (H) 0.0 - 8.2 mg/L     Results for ANA AGUERO (MRN [id number]) as of 2/12/2019 10:06   Ref. Range 1/25/2018 11:01   Vit D, 25-Hydroxy Latest Ref Range: 30 - 96 ng/mL 50       DEXA 5/10/17 total femur T score -3.0, femur neck -2.5, spine -1.8 impression osteoporosis    Bilateral hand and foot x-rays stable 7/2015    Assessment:       1. Rheumatoid arthritis  involving multiple sites with positive rheumatoid factor    2. Vitamin D deficiency disease    3. Long-term use of immunosuppressant medication    4. Medication monitoring encounter    5. Fibromyalgia    6. Age-related osteoporosis without current pathological fracture          1.  Seropositive rheumatoid arthritis currently stable on low-dose methotrexate and Plaquenil on exam today 0 swollen/0 tender joints- HERSON 0, CDAI 0    2.  Osteoporosis new diagnosis in May with a T score at the total femur -3.0 recently  Compliance issues with oral Fosamax--  forgets to take, does not want to add any more pills to take   Moved  IV Reclast 10/9/18  Patient currently not on prednisone but she is a current smoker    3.  Fibromyalgia stable    4.  Long-term use of immunosuppression medication with no issues with recurrent infections  Will get her flu shot when she sees dr severino in early dec    5.  Chronic tobacco user    6.  Medication monitoring with no current toxicity issues    7. Vaccines up to date      Plan:           C/w  Plaquenil to once a day   Keep her mtx at 7.5 mg daily  And keep folic acid 1 mg daily as long as she is on methotrexate   Naproxen only as needed not daily    yearly  IV Reclast 5 mg- due again after 10/9/19  Repeat yearly for osteoporosis and repeat her bone density in 2-3 years- 2019    Counseled patient on smoking cessation encouraged patient to look into programs- discussed the one available here    Return to clinic in 4 months with labs- cbc, cmp, esr, crp and vit D     Call with any questions, changes, or concerns.

## 2019-02-14 ENCOUNTER — ANESTHESIA (OUTPATIENT)
Dept: ENDOSCOPY | Facility: HOSPITAL | Age: 69
End: 2019-02-14
Payer: MEDICARE

## 2019-02-14 ENCOUNTER — ANESTHESIA EVENT (OUTPATIENT)
Dept: ENDOSCOPY | Facility: HOSPITAL | Age: 69
End: 2019-02-14
Payer: MEDICARE

## 2019-02-14 ENCOUNTER — HOSPITAL ENCOUNTER (OUTPATIENT)
Facility: HOSPITAL | Age: 69
Discharge: HOME | End: 2019-02-14
Attending: SURGERY | Admitting: SURGERY
Payer: MEDICARE

## 2019-02-14 VITALS
OXYGEN SATURATION: 100 % | DIASTOLIC BLOOD PRESSURE: 56 MMHG | WEIGHT: 239 LBS | HEART RATE: 68 BPM | RESPIRATION RATE: 18 BRPM | HEIGHT: 64 IN | BODY MASS INDEX: 40.8 KG/M2 | TEMPERATURE: 98 F | SYSTOLIC BLOOD PRESSURE: 131 MMHG

## 2019-02-14 DIAGNOSIS — Z12.11 COLON CANCER SCREENING: ICD-10-CM

## 2019-02-14 LAB — POCT GLUCOSE: 111 MG/DL (ref 70–110)

## 2019-02-14 PROCEDURE — 37000009 HC ANESTHESIA EA ADD 15 MINS: Performed by: SURGERY

## 2019-02-14 PROCEDURE — G0105 COLORECTAL SCRN; HI RISK IND: ICD-10-PCS | Mod: ,,, | Performed by: SURGERY

## 2019-02-14 PROCEDURE — 25000003 PHARM REV CODE 250: Performed by: SURGERY

## 2019-02-14 PROCEDURE — 63600175 PHARM REV CODE 636 W HCPCS: Performed by: NURSE ANESTHETIST, CERTIFIED REGISTERED

## 2019-02-14 PROCEDURE — 37000008 HC ANESTHESIA 1ST 15 MINUTES: Performed by: SURGERY

## 2019-02-14 PROCEDURE — G0105 COLORECTAL SCRN; HI RISK IND: HCPCS | Performed by: SURGERY

## 2019-02-14 PROCEDURE — G0105 COLORECTAL SCRN; HI RISK IND: HCPCS | Mod: ,,, | Performed by: SURGERY

## 2019-02-14 PROCEDURE — 25000003 PHARM REV CODE 250: Performed by: NURSE ANESTHETIST, CERTIFIED REGISTERED

## 2019-02-14 RX ORDER — SODIUM CHLORIDE, SODIUM LACTATE, POTASSIUM CHLORIDE, CALCIUM CHLORIDE 600; 310; 30; 20 MG/100ML; MG/100ML; MG/100ML; MG/100ML
INJECTION, SOLUTION INTRAVENOUS CONTINUOUS PRN
Status: DISCONTINUED | OUTPATIENT
Start: 2019-02-14 | End: 2019-02-14

## 2019-02-14 RX ORDER — SODIUM CHLORIDE, SODIUM LACTATE, POTASSIUM CHLORIDE, CALCIUM CHLORIDE 600; 310; 30; 20 MG/100ML; MG/100ML; MG/100ML; MG/100ML
INJECTION, SOLUTION INTRAVENOUS CONTINUOUS
Status: DISCONTINUED | OUTPATIENT
Start: 2019-02-14 | End: 2019-02-14 | Stop reason: HOSPADM

## 2019-02-14 RX ORDER — PROPOFOL 10 MG/ML
INJECTION, EMULSION INTRAVENOUS
Status: DISCONTINUED | OUTPATIENT
Start: 2019-02-14 | End: 2019-02-14

## 2019-02-14 RX ORDER — LIDOCAINE HCL/PF 100 MG/5ML
SYRINGE (ML) INTRAVENOUS
Status: DISCONTINUED | OUTPATIENT
Start: 2019-02-14 | End: 2019-02-14

## 2019-02-14 RX ORDER — SODIUM CHLORIDE 0.9 % (FLUSH) 0.9 %
3 SYRINGE (ML) INJECTION
Status: DISCONTINUED | OUTPATIENT
Start: 2019-02-14 | End: 2019-02-14 | Stop reason: HOSPADM

## 2019-02-14 RX ADMIN — SODIUM CHLORIDE, SODIUM LACTATE, POTASSIUM CHLORIDE, AND CALCIUM CHLORIDE: .6; .31; .03; .02 INJECTION, SOLUTION INTRAVENOUS at 01:02

## 2019-02-14 RX ADMIN — PROPOFOL 30 MG: 10 INJECTION, EMULSION INTRAVENOUS at 01:02

## 2019-02-14 RX ADMIN — PROPOFOL 30 MG: 10 INJECTION, EMULSION INTRAVENOUS at 02:02

## 2019-02-14 RX ADMIN — PROPOFOL 80 MG: 10 INJECTION, EMULSION INTRAVENOUS at 01:02

## 2019-02-14 RX ADMIN — SODIUM CHLORIDE, SODIUM LACTATE, POTASSIUM CHLORIDE, AND CALCIUM CHLORIDE: 600; 310; 30; 20 INJECTION, SOLUTION INTRAVENOUS at 01:02

## 2019-02-14 RX ADMIN — LIDOCAINE HYDROCHLORIDE 50 MG: 20 INJECTION, SOLUTION INTRAVENOUS at 01:02

## 2019-02-14 NOTE — BRIEF OP NOTE
[facility]  Brief Operative Note     SUMMARY     Surgery Date: 2/14/2019     Surgeon(s) and Role:     * Yury Atwood MD - Primary    Assisting Surgeon: None    Pre-op Diagnosis:  Screening [Z13.9]    Post-op Diagnosis:  Post-Op Diagnosis Codes:     * Screening [Z13.9]    Procedure(s) (LRB):  COLONOSCOPY (N/A)    Anesthesia: Choice    Description of the findings of the procedure: colonoscopy    Findings/Key Components: see op note    Estimated Blood Loss: * No values recorded between 2/14/2019 12:00 AM and 2/14/2019  1:38 PM *         Specimens:   Specimen (12h ago, onward)    None          Discharge Note    SUMMARY     Admit Date: 2/14/2019    Discharge Date and Time:  02/14/2019 1:38 PM    Hospital Course The patient underwent colonoscopy and was discharged post op    Final Diagnosis: Post-Op Diagnosis Codes:     * Screening [Z13.9]    Disposition: Home or Self Care    Follow Up/Patient Instructions:     Medications:  Reconciled Home Medications:      Medication List      CONTINUE taking these medications    albuterol 90 mcg/actuation inhaler  Commonly known as:  PROVENTIL/VENTOLIN HFA  Inhale 2 puffs into the lungs every 6 (six) hours as needed for Wheezing. Dispense with spacer.     amLODIPine 10 MG tablet  Commonly known as:  NORVASC  Take 1 tablet (10 mg total) by mouth once daily.     cholecalciferol (vitamin D3) 2,000 unit Tab  Commonly known as:  VITAMIN D3  Take 2,000 Units by mouth once daily.     DULoxetine 30 MG capsule  Commonly known as:  CYMBALTA  Take 1 capsule (30 mg total) by mouth once daily.     fish oil-omega-3 fatty acids 300-1,000 mg capsule  Take 2 g by mouth once daily.     fluticasone-salmeterol 100-50 mcg/dose 100-50 mcg/dose diskus inhaler  Commonly known as:  ADVAIR  Inhale 1 puff into the lungs 2 (two) times daily as needed.     folic acid 1 MG tablet  Commonly known as:  FOLVITE  Take 1 tablet (1,000 mcg total) by mouth once daily.     hydrALAZINE 25 MG  tablet  Commonly known as:  APRESOLINE  Take 1 tablet (25 mg total) by mouth every 12 (twelve) hours.     hydroCHLOROthiazide 25 MG tablet  Commonly known as:  HYDRODIURIL  Take 1 tablet (25 mg total) by mouth once daily.     hydroxychloroquine 200 mg tablet  Commonly known as:  PLAQUENIL  TAKE 1 TABLET BY MOUTH ONCE DAILY     lisinopril 40 MG tablet  Commonly known as:  PRINIVIL,ZESTRIL  Take 1 tablet (40 mg total) by mouth once daily.     metFORMIN 500 MG tablet  Commonly known as:  GLUCOPHAGE  Take 1 tablet (500 mg total) by mouth once daily.     methotrexate 2.5 MG Tab  TAKE 3 TABLETS BY MOUTH EVERY 7 DAYS     naproxen 500 MG tablet  Commonly known as:  NAPROSYN  Take 1 tablet (500 mg total) by mouth 2 (two) times daily as needed.     ONE DAILY MULTIVITAMIN per tablet  Generic drug:  multivitamin  Take 1 tablet by mouth once daily.     potassium chloride SA 20 MEQ tablet  Commonly known as:  K-DUR,KLOR-CON  Take 1 tablet (20 mEq total) by mouth 2 (two) times daily.     rosuvastatin 40 MG Tab  Commonly known as:  CRESTOR  Take 1 tablet (40 mg total) by mouth every evening.        ASK your doctor about these medications    sodium,potassium,mag sulfates 17.5-3.13-1.6 gram Solr  Commonly known as:  SUPREP BOWEL PREP KIT  Take as directed          Discharge Procedure Orders   Notify your health care provider if you experience any of the following:  temperature >100.4     Notify your health care provider if you experience any of the following:  persistent nausea and vomiting or diarrhea     Notify your health care provider if you experience any of the following:  severe uncontrolled pain     Notify your health care provider if you experience any of the following:  redness, tenderness, or signs of infection (pain, swelling, redness, odor or green/yellow discharge around incision site)     Notify your health care provider if you experience any of the following:  difficulty breathing or increased cough     Notify your  health care provider if you experience any of the following:  severe persistent headache     Notify your health care provider if you experience any of the following:  worsening rash     Notify your health care provider if you experience any of the following:  persistent dizziness, light-headedness, or visual disturbances     Notify your health care provider if you experience any of the following:  increased confusion or weakness     Activity as tolerated     Follow-up Information     Yury Atwood MD.    Specialties:  General Surgery, Bariatrics  Why:  As needed  Contact information:  [address] [place] [address] [address]ve[address] JOSE [id number] [phone]

## 2019-02-14 NOTE — ANESTHESIA RELEASE NOTE
"Anesthesia Release from PACU Note    Patient: Page Grissom    Procedure(s) Performed: Procedure(s) (LRB):  COLONOSCOPY (N/A)    Anesthesia type: MAC    Post pain: Adequate analgesia    Post assessment: no apparent anesthetic complications, tolerated procedure well and no evidence of recall    Last Vitals:   Visit Vitals  /71 (BP Location: Left arm, Patient Position: Sitting)   Pulse 73   Temp 36.6 °C (97.9 °F) (Temporal)   Resp 16   Ht 5' 4" (1.626 m)   Wt 108.4 kg (239 lb)   SpO2 98%   Breastfeeding? No   BMI 41.02 kg/m²       Post vital signs: stable    Level of consciousness: responds to stimulation    Nausea/Vomiting: no nausea/no vomiting    Complications: none    Airway Patency: patent    Respiratory: unassisted    Cardiovascular: stable and blood pressure at baseline    Hydration: euvolemic  "

## 2019-02-14 NOTE — DISCHARGE INSTRUCTIONS
Colonoscopy     A camera attached to a flexible tube with a viewing lens is used to take video pictures.     Colonoscopy is a test to view the inside of your lower digestive tract (colon and rectum). Sometimes it can show the last part of the small intestine (ileum). During the test, small pieces of tissue may be removed for testing. This is called a biopsy. Small growths, such as polyps, may also be removed.   Why is colonoscopy done?  The test is done to help look for colon cancer. And it can help find the source of abdominal pain, bleeding, and changes in bowel habits. It may be needed once a year, depending on factors such as your:  · Age  · Health history  · Family health history  · Symptoms  · Results from any prior colonoscopy  Risks and possible complications  These include:  · Bleeding               · A puncture or tear in the colon   · Risks of anesthesia  · A cancer lesion not being seen  Getting ready   To prepare for the test:  · Talk with your healthcare provider about the risks of the test (see below). Also ask your healthcare provider about alternatives to the test.  · Tell your healthcare provider about any medicines you take. Also tell him or her about any health conditions you may have.  · Make sure your rectum and colon are empty for the test. Follow the diet and bowel prep instructions exactly. If you dont, the test may need to be rescheduled.  · Plan for a friend or family member to drive you home after the test.     Colonoscopy provides an inside view of the entire colon.     You may discuss the results with your doctor right away or at a future visit.  During the test   The test is usually done in the hospital on an outpatient basis. This means you go home the same day. The procedure takes about 30 minutes. During that time:  · You are given relaxing (sedating) medicine through an IV line. You may be drowsy, or fully asleep.  · The healthcare provider will first give you a physical exam to  check for anal and rectal problems.  · Then the anus is lubricated and the scope inserted.  · If you are awake, you may have a feeling similar to needing to have a bowel movement. You may also feel pressure as air is pumped into the colon. Its OK to pass gas during the procedure.  · Biopsy, polyp removal, or other treatments may be done during the test.  After the test   You may have gas right after the test. It can help to try to pass it to help prevent later bloating. Your healthcare provider may discuss the results with you right away. Or you may need to schedule a follow-up visit to talk about the results. After the test, you can go back to your normal eating and other activities. You may be tired from the sedation and need to rest for a few hours.  Date Last Reviewed: 11/1/2016 © 2000-2017 [employer]. [address] [place] [address], [place] [address]. All rights reserved. This information is not intended as a substitute for professional medical care. Always follow your healthcare professional's instructions.

## 2019-02-14 NOTE — PLAN OF CARE
TEXT SENT TO DAUGHTER THAT MOTHER WILL BE READY AT 2:55. DR MARQUEZ SPOKE TO PT. DISCHARGE INSTRUCTIONS GIVEN . VERBALIZE UNDERSTANDING.

## 2019-02-14 NOTE — [employer] PATIENT INSTRUCTIONS
Discharge Summary/Instructions after an Endoscopic Procedure  Patient Name: Page Grissom  Patient MRN: [id number]  Patient YOB: 1950  Thursday, February 14, 2019 Yury Atwood MD  RESTRICTIONS:  During your procedure today, you received medications for sedation.  These   medications may affect your judgment, balance and coordination.  Therefore,   for 24 hours, you have the following restrictions:   - DO NOT drive a car, operate machinery, make legal/financial decisions,   sign important papers or drink alcohol.    ACTIVITY:  Today: no heavy lifting, straining or running due to procedural   sedation/anesthesia.  The following day: return to full activity including work.  DIET:  Eat and drink normally unless instructed otherwise.     TREATMENT FOR COMMON SIDE EFFECTS:  - Mild abdominal pain, nausea, belching, bloating or excessive gas:  rest,   eat lightly and use a heating pad.  - Sore Throat: treat with throat lozenges and/or gargle with warm salt   water.  - Because air was used during the procedure, expelling large amounts of air   from your rectum or belching is normal.  - If a bowel prep was taken, you may not have a bowel movement for 1-3 days.    This is normal.  SYMPTOMS TO WATCH FOR AND REPORT TO YOUR PHYSICIAN:  1. Abdominal pain or bloating, other than gas cramps.  2. Chest pain.  3. Back pain.  4. Signs of infection such as: chills or fever occurring within 24 hours   after the procedure.  5. Rectal bleeding, which would show as bright red, maroon, or black stools.   (A tablespoon of blood from the rectum is not serious, especially if   hemorrhoids are present.)  6. Vomiting.  7. Weakness or dizziness.  GO DIRECTLY TO THE NEAREST EMERGENCY ROOM IF YOU HAVE ANY OF THE FOLLOWING:      Difficulty breathing              Chills and/or fever over 101 F   Persistent vomiting and/or vomiting blood   Severe abdominal pain   Severe chest pain   Black, tarry stools   Bleeding- more than one  tablespoon   Any other symptom or condition that you feel may need urgent attention  Your doctor recommends these additional instructions:  If any biopsies were taken, your doctors clinic will contact you in 1 to 2   weeks with any results.  - Patient has a contact number available for emergencies.  The signs and   symptoms of potential delayed complications were discussed with the   patient.  Return to normal activities tomorrow.  Written discharge   instructions were provided to the patient.   - Resume previous diet.   - Continue present medications.   - Repeat colonoscopy in 5 years for surveillance.   - Return to referring physician as previously scheduled.   - Discharge patient to home.  For questions, problems or results please call your physician Yury Atwood MD at Work:  [phone]  If you have any questions about the above instructions, call the GI   department at [phone] or call the endoscopy unit at [phone]   from 7am until 3 pm.  [facility], EMERGENCY ROOM PHONE NUMBER:   [phone]  IF A COMPLICATION OR EMERGENCY SITUATION ARISES AND YOU ARE UNABLE TO REACH   YOUR PHYSICIAN - GO DIRECTLY TO THE EMERGENCY ROOM.  I have read or have had read to me these discharge instructions for my   procedure and have received a written copy.  I understand these   instructions and will follow-up with my physician if I have any questions.     __________________________________       _____________________________________  Nurse Signature                                          Patient/Designated   Responsible Party Signature  Yury Atwood MD  2/14/2019 2:18:12 PM  This report has been verified and signed electronically.  [employer]

## 2019-02-14 NOTE — H&P
Endoscopy H&P    Procedure : Colonoscopy      personal history of colon polyps and most recent endoscopic exam 5 years ago      Past Medical History:   Diagnosis Date    Asthma     Cataract     OD    Diabetes mellitus     Gastroesophageal reflux disease     Hypercholesterolemia     Hypertension     Rheumatoid arthritis        Past Surgical History:   Procedure Laterality Date    COLONOSCOPY N/A 8/8/2013    Performed by Isak Moura III, MD at [facility] ENDO    HERNIA REPAIR      OOPHORECTOMY       No current facility-administered medications on file prior to encounter.      Current Outpatient Medications on File Prior to Encounter   Medication Sig Dispense Refill    albuterol 90 mcg/actuation inhaler Inhale 2 puffs into the lungs every 6 (six) hours as needed for Wheezing. Dispense with spacer. 1 Inhaler 11    lisinopril (PRINIVIL,ZESTRIL) 40 MG tablet Take 1 tablet (40 mg total) by mouth once daily. 90 tablet 3    naproxen (NAPROSYN) 500 MG tablet Take 1 tablet (500 mg total) by mouth 2 (two) times daily as needed. 60 tablet 3    amLODIPine (NORVASC) 10 MG tablet Take 1 tablet (10 mg total) by mouth once daily. 30 tablet 11    cholecalciferol, vitamin D3, 2,000 unit Tab Take 2,000 Units by mouth once daily.      DULoxetine (CYMBALTA) 30 MG capsule Take 1 capsule (30 mg total) by mouth once daily. 30 capsule 11    fish oil-omega-3 fatty acids 300-1,000 mg capsule Take 2 g by mouth once daily.      fluticasone-salmeterol 100-50 mcg/dose (ADVAIR) 100-50 mcg/dose diskus inhaler Inhale 1 puff into the lungs 2 (two) times daily as needed. 1 each 11    folic acid (FOLVITE) 1 MG tablet Take 1 tablet (1,000 mcg total) by mouth once daily. 30 tablet 11    hydrALAZINE (APRESOLINE) 25 MG tablet Take 1 tablet (25 mg total) by mouth every 12 (twelve) hours. 60 tablet 11    hydroCHLOROthiazide (HYDRODIURIL) 25 MG tablet Take 1 tablet (25 mg total) by mouth once daily. 30 tablet 11    hydroxychloroquine  (PLAQUENIL) 200 mg tablet TAKE 1 TABLET BY MOUTH ONCE DAILY 30 tablet 11    metFORMIN (GLUCOPHAGE) 500 MG tablet Take 1 tablet (500 mg total) by mouth once daily. 90 tablet 3    multivitamin (ONE DAILY MULTIVITAMIN) per tablet Take 1 tablet by mouth once daily.      potassium chloride SA (K-DUR,KLOR-CON) 20 MEQ tablet Take 1 tablet (20 mEq total) by mouth 2 (two) times daily. 60 tablet 11    rosuvastatin (CRESTOR) 40 MG Tab Take 1 tablet (40 mg total) by mouth every evening. 30 tablet 11     Review of patient's allergies indicates:  No Known Allergies        Review of Systems -ROS:  GENERAL: No fever, chills, fatigability or weight loss.  CHEST: Denies HERMOSILLO, cyanosis, wheezing, cough and sputum production.  CARDIOVASCULAR: Denies chest pain, PND, orthopnea or reduced exercise tolerance.   Musculoskeletal ROS: negative for - gait disturbance or joint pain  Neurological ROS: negative for - confusion or memory loss        Physical Exam:  General: well developed, well nourished, no distress  Head: normocephalic  Neck: supple, symmetrical, trachea midline  Lungs:  clear to auscultation bilaterally and normal respiratory effort  Heart: regular rate and rhythm, S1, S2 normal, no murmur, rub or gallop and regular rate and rhythm  Abdomen: soft, non-tender non-distented; bowel sounds normal; no masses,  no organomegaly  Extremities: no cyanosis or edema, or clubbing       Moderate Sedation (choice): Mallampati Score 1    ASA : II    IMP: personal history of colon polyps and most recent endoscopic exam 5 years ago    Plan: Colonoscopy with Moderate sedation.  I have explained the procedure including indications, alternatives, expected outcomes and potential complications. The patient appears to understand and gives informed consent. The patient is medically ready for surgery.

## 2019-02-14 NOTE — ANESTHESIA PREPROCEDURE EVALUATION
02/14/2019  Page Grissom is a 68 y.o., female.    Anesthesia Evaluation    I have reviewed the Patient Summary Reports.    I have reviewed the Nursing Notes.   I have reviewed the Medications.     Review of Systems  Anesthesia Hx:  No problems with previous Anesthesia  Denies Family Hx of Anesthesia complications.   Denies Personal Hx of Anesthesia complications.   Social:  Non-Smoker    Hematology/Oncology:  Hematology Normal   Oncology Normal     EENT/Dental:EENT/Dental Normal   Cardiovascular:   Exercise tolerance: good Hypertension, well controlled  Hypertension, Essential Hypertension    Pulmonary:   Asthma mild  Asthma:    Renal/:  Renal/ Normal     Hepatic/GI:   GERD, well controlled  Esophageal / Stomach Disorders Gerd    Musculoskeletal:  Musculoskeletal Normal    Neurological:   Neuromuscular Disease,    Endocrine:   Diabetes, type 2  Diabetes    Dermatological:  Skin Normal    Psych:  Psychiatric Normal           Physical Exam  General:  Morbid Obesity    Airway/Jaw/Neck:  Airway Findings: Mouth Opening: Normal Tongue: Normal  General Airway Assessment: Adult, Average  Mallampati: II  TM Distance: Normal, at least 6 cm      Dental:  Dental Findings: In tact   Chest/Lungs:  Chest/Lungs Findings: Clear to auscultation, Normal Respiratory Rate     Heart/Vascular:  Heart Findings: Rate: Normal  Rhythm: Regular Rhythm  Sounds: Normal        Mental Status:  Mental Status Findings:  Cooperative, Alert and Oriented         Anesthesia Plan  Type of Anesthesia, risks & benefits discussed:  Anesthesia Type:  MAC  Patient's Preference:   Intra-op Monitoring Plan:   Intra-op Monitoring Plan Comments:   Post Op Pain Control Plan:   Post Op Pain Control Plan Comments:   Induction:   IV  Beta Blocker:  Patient is not currently on a Beta-Blocker (No further documentation required).       Informed  Consent: Patient understands risks and agrees with Anesthesia plan.  Questions answered. Anesthesia consent signed with patient.  ASA Score: 2     Day of Surgery Review of History & Physical: I have interviewed and examined the patient. I have reviewed the patient's H&P dated:  There are no significant changes.          Ready For Surgery From Anesthesia Perspective.

## 2019-02-14 NOTE — TRANSFER OF CARE
"Anesthesia Transfer of Care Note    Patient: Page Grissom    Procedure(s) Performed: Procedure(s) (LRB):  COLONOSCOPY (N/A)    Patient location: PACU    Anesthesia Type: MAC    Transport from OR: Transported from OR on room air with adequate spontaneous ventilation    Post pain: adequate analgesia    Post assessment: no apparent anesthetic complications    Post vital signs: stable    Level of consciousness: responds to stimulation    Nausea/Vomiting: no nausea/vomiting    Complications: none    Transfer of care protocol was followed      Last vitals:   Visit Vitals  /71 (BP Location: Left arm, Patient Position: Sitting)   Pulse 73   Temp 36.6 °C (97.9 °F) (Temporal)   Resp 16   Ht 5' 4" (1.626 m)   Wt 108.4 kg (239 lb)   SpO2 98%   Breastfeeding? No   BMI 41.02 kg/m²     "

## 2019-02-14 NOTE — ANESTHESIA POSTPROCEDURE EVALUATION
"Anesthesia Post Evaluation    Patient: Page Grissom    Procedure(s) Performed: Procedure(s) (LRB):  COLONOSCOPY (N/A)    Final Anesthesia Type: MAC  Patient location during evaluation: PACU  Patient participation: Yes- Able to Participate  Level of consciousness: awake, awake and alert and oriented  Post-procedure vital signs: reviewed and stable  Pain management: adequate  Airway patency: patent  PONV status at discharge: No PONV  Anesthetic complications: no      Cardiovascular status: blood pressure returned to baseline  Respiratory status: spontaneous ventilation, unassisted and room air  Hydration status: euvolemic  Follow-up not needed.        Visit Vitals  /71 (BP Location: Left arm, Patient Position: Sitting)   Pulse 73   Temp 36.6 °C (97.9 °F) (Temporal)   Resp 16   Ht 5' 4" (1.626 m)   Wt 108.4 kg (239 lb)   SpO2 98%   Breastfeeding? No   BMI 41.02 kg/m²       Pain/José Score: No Data Recorded      "

## 2019-04-22 ENCOUNTER — TELEPHONE (OUTPATIENT)
Dept: OPHTHALMOLOGY | Facility: CLINIC | Age: 69
End: 2019-04-22

## 2019-04-22 NOTE — TELEPHONE ENCOUNTER
----- Message from Jacque Lenz sent at 4/22/2019  3:55 PM CDT -----  Patient needs call back to schedule appointment..[phone]

## 2019-05-02 DIAGNOSIS — E11.69 HYPERLIPIDEMIA ASSOCIATED WITH TYPE 2 DIABETES MELLITUS: ICD-10-CM

## 2019-05-02 DIAGNOSIS — E78.5 HYPERLIPIDEMIA ASSOCIATED WITH TYPE 2 DIABETES MELLITUS: ICD-10-CM

## 2019-05-02 RX ORDER — ROSUVASTATIN CALCIUM 40 MG/1
40 TABLET, COATED ORAL NIGHTLY
Qty: 30 TABLET | Refills: 11 | Status: SHIPPED | OUTPATIENT
Start: 2019-05-02 | End: 2019-05-03 | Stop reason: SDUPTHER

## 2019-05-02 NOTE — TELEPHONE ENCOUNTER
----- Message from Catrina Herrera sent at 5/2/2019  3:16 PM CDT -----  Contact: Patient  Type:  RX Refill Request    Who Called:  Isabelle  Refill or New Rx: refill  RX Name and Strength: Rosuvastatin 40 mg  How is the patient currently taking it? (ex. 1XDay): 1xdaily  Is this a 30 day or 90 day RX: 90 day  Preferred Pharmacy with phone number:   [id number]  [place], [place] - [address] AT [address]  [address]  [place] [address]  Phone: [phone] Fax: [phone]    Local or Mail Order: local  Ordering Provider: Dr. Roman  Would the patient rather a call back or a response via [facility]?  Call back   Best Call Back Number: [phone]  Additional Information: n/a    Thanks,  Catrina

## 2019-05-02 NOTE — TELEPHONE ENCOUNTER
Returned call to pt regarding refill request. Advised pt that refill has been sent over to the pharmacy. Pt verbalized understanding.

## 2019-05-03 DIAGNOSIS — E78.5 HYPERLIPIDEMIA ASSOCIATED WITH TYPE 2 DIABETES MELLITUS: ICD-10-CM

## 2019-05-03 DIAGNOSIS — E11.69 HYPERLIPIDEMIA ASSOCIATED WITH TYPE 2 DIABETES MELLITUS: ICD-10-CM

## 2019-05-03 RX ORDER — ROSUVASTATIN CALCIUM 40 MG/1
TABLET, COATED ORAL
Qty: 90 TABLET | Refills: 0 | OUTPATIENT
Start: 2019-05-03

## 2019-05-03 NOTE — TELEPHONE ENCOUNTER
Fax received from pt's pharmacy requesting #[phone] supply, sent to provider for authorization [Rosuvastatin].    [place] 12/13/18  [place] 06/13/19    Last lipid panel 12/06/18, repeat sched 06/06/19.

## 2019-05-05 RX ORDER — ROSUVASTATIN CALCIUM 40 MG/1
40 TABLET, COATED ORAL NIGHTLY
Qty: 90 TABLET | Refills: 3 | Status: SHIPPED | OUTPATIENT
Start: 2019-05-05 | End: 2020-05-25

## 2019-05-26 DIAGNOSIS — E11.8 TYPE 2 DIABETES MELLITUS WITH COMPLICATION, WITHOUT LONG-TERM CURRENT USE OF INSULIN: ICD-10-CM

## 2019-05-26 DIAGNOSIS — I10 ESSENTIAL HYPERTENSION: ICD-10-CM

## 2019-05-27 RX ORDER — METFORMIN HYDROCHLORIDE 500 MG/1
TABLET ORAL
Qty: 30 TABLET | Refills: 11 | Status: SHIPPED | OUTPATIENT
Start: 2019-05-27 | End: 2020-02-24 | Stop reason: SDUPTHER

## 2019-05-27 RX ORDER — HYDRALAZINE HYDROCHLORIDE 25 MG/1
TABLET, FILM COATED ORAL
Qty: 60 TABLET | Refills: 11 | Status: SHIPPED | OUTPATIENT
Start: 2019-05-27 | End: 2020-05-25

## 2019-05-28 NOTE — TELEPHONE ENCOUNTER
Fax refill request received from pt's pharmacy, sent to Dr. Roman for authorization [HCTZ].    [facility] 12/13/18  [place] 06/17/19

## 2019-05-29 RX ORDER — HYDROCHLOROTHIAZIDE 25 MG/1
25 TABLET ORAL DAILY
Qty: 30 TABLET | Refills: 11 | Status: SHIPPED | OUTPATIENT
Start: 2019-05-29 | End: 2020-05-25

## 2019-06-03 ENCOUNTER — PATIENT OUTREACH (OUTPATIENT)
Dept: ADMINISTRATIVE | Facility: HOSPITAL | Age: 69
End: 2019-06-03

## 2019-06-03 DIAGNOSIS — I10 ESSENTIAL HYPERTENSION: ICD-10-CM

## 2019-06-03 NOTE — TELEPHONE ENCOUNTER
----- Message from Lottie Romero sent at 6/3/2019  9:31 AM CDT -----  Contact: pt  1. What is the name of the medication you are requesting? Amlodipine Besylate  2. What is the dose? 10mg  3. How do you take the medication? Orally, topically, etc? Orally  4. How often do you take this medication? n/a  5. Do you need a 30 day or 90 day supply? n/a  6. How many refills are you requesting? n/a  7. What is your preferred pharmacy and location of the pharmacy? [facility] in [place]  8. Who can we contact with further questions? The pt at [phone]

## 2019-06-04 RX ORDER — AMLODIPINE BESYLATE 10 MG/1
10 TABLET ORAL DAILY
Qty: 30 TABLET | Refills: 11 | Status: SHIPPED | OUTPATIENT
Start: 2019-06-04 | End: 2020-06-03

## 2019-06-07 ENCOUNTER — LAB VISIT (OUTPATIENT)
Dept: LAB | Facility: HOSPITAL | Age: 69
End: 2019-06-07
Attending: PEDIATRICS
Payer: MEDICARE

## 2019-06-07 DIAGNOSIS — E55.9 VITAMIN D DEFICIENCY DISEASE: ICD-10-CM

## 2019-06-07 DIAGNOSIS — M81.0 AGE-RELATED OSTEOPOROSIS WITHOUT CURRENT PATHOLOGICAL FRACTURE: ICD-10-CM

## 2019-06-07 DIAGNOSIS — M05.79 RHEUMATOID ARTHRITIS INVOLVING MULTIPLE SITES WITH POSITIVE RHEUMATOID FACTOR: ICD-10-CM

## 2019-06-07 DIAGNOSIS — Z79.60 LONG-TERM USE OF IMMUNOSUPPRESSANT MEDICATION: ICD-10-CM

## 2019-06-07 DIAGNOSIS — E11.8 TYPE 2 DIABETES MELLITUS WITH COMPLICATION, UNSPECIFIED WHETHER LONG TERM INSULIN USE: ICD-10-CM

## 2019-06-07 LAB
25(OH)D3+25(OH)D2 SERPL-MCNC: 45 NG/ML (ref 30–96)
ALBUMIN SERPL BCP-MCNC: 3.3 G/DL (ref 3.5–5.2)
ALP SERPL-CCNC: 94 U/L (ref 55–135)
ALT SERPL W/O P-5'-P-CCNC: 11 U/L (ref 10–44)
ALT SERPL W/O P-5'-P-CCNC: 11 U/L (ref 10–44)
ANION GAP SERPL CALC-SCNC: 10 MMOL/L (ref 8–16)
ANION GAP SERPL CALC-SCNC: 10 MMOL/L (ref 8–16)
AST SERPL-CCNC: 10 U/L (ref 10–40)
AST SERPL-CCNC: 10 U/L (ref 10–40)
BASOPHILS # BLD AUTO: 0.05 K/UL (ref 0–0.2)
BASOPHILS NFR BLD: 0.6 % (ref 0–1.9)
BILIRUB SERPL-MCNC: 0.2 MG/DL (ref 0.1–1)
BUN SERPL-MCNC: 17 MG/DL (ref 8–23)
BUN SERPL-MCNC: 17 MG/DL (ref 8–23)
CALCIUM SERPL-MCNC: 9.6 MG/DL (ref 8.7–10.5)
CALCIUM SERPL-MCNC: 9.6 MG/DL (ref 8.7–10.5)
CHLORIDE SERPL-SCNC: 104 MMOL/L (ref 95–110)
CHLORIDE SERPL-SCNC: 104 MMOL/L (ref 95–110)
CHOLEST SERPL-MCNC: 137 MG/DL (ref 120–199)
CHOLEST/HDLC SERPL: 3.1 {RATIO} (ref 2–5)
CO2 SERPL-SCNC: 27 MMOL/L (ref 23–29)
CO2 SERPL-SCNC: 27 MMOL/L (ref 23–29)
CREAT SERPL-MCNC: 1.3 MG/DL (ref 0.5–1.4)
CREAT SERPL-MCNC: 1.3 MG/DL (ref 0.5–1.4)
CRP SERPL-MCNC: 9.1 MG/L (ref 0–8.2)
DIFFERENTIAL METHOD: ABNORMAL
EOSINOPHIL # BLD AUTO: 0.2 K/UL (ref 0–0.5)
EOSINOPHIL NFR BLD: 2.7 % (ref 0–8)
ERYTHROCYTE [DISTWIDTH] IN BLOOD BY AUTOMATED COUNT: 15.4 % (ref 11.5–14.5)
ERYTHROCYTE [SEDIMENTATION RATE] IN BLOOD BY WESTERGREN METHOD: 64 MM/HR (ref 0–36)
EST. GFR  (AFRICAN AMERICAN): 49 ML/MIN/1.73 M^2
EST. GFR  (AFRICAN AMERICAN): 49 ML/MIN/1.73 M^2
EST. GFR  (NON AFRICAN AMERICAN): 43 ML/MIN/1.73 M^2
EST. GFR  (NON AFRICAN AMERICAN): 43 ML/MIN/1.73 M^2
ESTIMATED AVG GLUCOSE: 114 MG/DL (ref 68–131)
GLUCOSE SERPL-MCNC: 106 MG/DL (ref 70–110)
GLUCOSE SERPL-MCNC: 106 MG/DL (ref 70–110)
HBA1C MFR BLD HPLC: 5.6 % (ref 4–5.6)
HCT VFR BLD AUTO: 36.8 % (ref 37–48.5)
HDLC SERPL-MCNC: 44 MG/DL (ref 40–75)
HDLC SERPL: 32.1 % (ref 20–50)
HGB BLD-MCNC: 11.7 G/DL (ref 12–16)
IMM GRANULOCYTES # BLD AUTO: 0.03 K/UL (ref 0–0.04)
IMM GRANULOCYTES NFR BLD AUTO: 0.3 % (ref 0–0.5)
LDLC SERPL CALC-MCNC: 65.8 MG/DL (ref 63–159)
LYMPHOCYTES # BLD AUTO: 2.1 K/UL (ref 1–4.8)
LYMPHOCYTES NFR BLD: 23.9 % (ref 18–48)
MCH RBC QN AUTO: 27.5 PG (ref 27–31)
MCHC RBC AUTO-ENTMCNC: 31.8 G/DL (ref 32–36)
MCV RBC AUTO: 86 FL (ref 82–98)
MONOCYTES # BLD AUTO: 0.8 K/UL (ref 0.3–1)
MONOCYTES NFR BLD: 9.3 % (ref 4–15)
NEUTROPHILS # BLD AUTO: 5.5 K/UL (ref 1.8–7.7)
NEUTROPHILS NFR BLD: 63.5 % (ref 38–73)
NONHDLC SERPL-MCNC: 93 MG/DL
NRBC BLD-RTO: 0 /100 WBC
PLATELET # BLD AUTO: 278 K/UL (ref 150–350)
PMV BLD AUTO: 10.3 FL (ref 9.2–12.9)
POTASSIUM SERPL-SCNC: 4 MMOL/L (ref 3.5–5.1)
POTASSIUM SERPL-SCNC: 4 MMOL/L (ref 3.5–5.1)
PROT SERPL-MCNC: 7.1 G/DL (ref 6–8.4)
RBC # BLD AUTO: 4.26 M/UL (ref 4–5.4)
SODIUM SERPL-SCNC: 141 MMOL/L (ref 136–145)
SODIUM SERPL-SCNC: 141 MMOL/L (ref 136–145)
TRIGL SERPL-MCNC: 136 MG/DL (ref 30–150)
WBC # BLD AUTO: 8.61 K/UL (ref 3.9–12.7)

## 2019-06-07 PROCEDURE — 80061 LIPID PANEL: CPT

## 2019-06-07 PROCEDURE — 85025 COMPLETE CBC W/AUTO DIFF WBC: CPT

## 2019-06-07 PROCEDURE — 80053 COMPREHEN METABOLIC PANEL: CPT

## 2019-06-07 PROCEDURE — 85652 RBC SED RATE AUTOMATED: CPT

## 2019-06-07 PROCEDURE — 36415 COLL VENOUS BLD VENIPUNCTURE: CPT

## 2019-06-07 PROCEDURE — 86140 C-REACTIVE PROTEIN: CPT

## 2019-06-07 PROCEDURE — 83036 HEMOGLOBIN GLYCOSYLATED A1C: CPT

## 2019-06-07 PROCEDURE — 82306 VITAMIN D 25 HYDROXY: CPT

## 2019-06-13 ENCOUNTER — OFFICE VISIT (OUTPATIENT)
Dept: RHEUMATOLOGY | Facility: CLINIC | Age: 69
End: 2019-06-13
Payer: MEDICARE

## 2019-06-13 VITALS
DIASTOLIC BLOOD PRESSURE: 65 MMHG | BODY MASS INDEX: 39.9 KG/M2 | HEIGHT: 64 IN | SYSTOLIC BLOOD PRESSURE: 118 MMHG | HEART RATE: 76 BPM | WEIGHT: 233.69 LBS

## 2019-06-13 DIAGNOSIS — M79.7 FIBROMYALGIA: ICD-10-CM

## 2019-06-13 DIAGNOSIS — M05.79 RHEUMATOID ARTHRITIS INVOLVING MULTIPLE SITES WITH POSITIVE RHEUMATOID FACTOR: Primary | ICD-10-CM

## 2019-06-13 DIAGNOSIS — Z51.81 MEDICATION MONITORING ENCOUNTER: ICD-10-CM

## 2019-06-13 DIAGNOSIS — M81.0 AGE-RELATED OSTEOPOROSIS WITHOUT CURRENT PATHOLOGICAL FRACTURE: ICD-10-CM

## 2019-06-13 DIAGNOSIS — Z79.60 LONG-TERM USE OF IMMUNOSUPPRESSANT MEDICATION: ICD-10-CM

## 2019-06-13 DIAGNOSIS — E55.9 VITAMIN D DEFICIENCY DISEASE: ICD-10-CM

## 2019-06-13 PROCEDURE — 99214 OFFICE O/P EST MOD 30 MIN: CPT | Mod: PBBFAC | Performed by: PHYSICIAN ASSISTANT

## 2019-06-13 PROCEDURE — 99999 PR PBB SHADOW E&M-EST. PATIENT-LVL IV: CPT | Mod: PBBFAC,,, | Performed by: PHYSICIAN ASSISTANT

## 2019-06-13 PROCEDURE — 99214 OFFICE O/P EST MOD 30 MIN: CPT | Mod: S$PBB,,, | Performed by: PHYSICIAN ASSISTANT

## 2019-06-13 PROCEDURE — 99999 PR PBB SHADOW E&M-EST. PATIENT-LVL IV: ICD-10-PCS | Mod: PBBFAC,,, | Performed by: PHYSICIAN ASSISTANT

## 2019-06-13 PROCEDURE — 99214 PR OFFICE/OUTPT VISIT, EST, LEVL IV, 30-39 MIN: ICD-10-PCS | Mod: S$PBB,,, | Performed by: PHYSICIAN ASSISTANT

## 2019-06-13 RX ORDER — HEPARIN 100 UNIT/ML
500 SYRINGE INTRAVENOUS
Status: CANCELLED | OUTPATIENT
Start: 2019-10-10

## 2019-06-13 RX ORDER — ZOLEDRONIC ACID 5 MG/100ML
5 INJECTION, SOLUTION INTRAVENOUS
Status: CANCELLED | OUTPATIENT
Start: 2019-10-10

## 2019-06-13 RX ORDER — SODIUM CHLORIDE 0.9 % (FLUSH) 0.9 %
10 SYRINGE (ML) INJECTION
Status: CANCELLED | OUTPATIENT
Start: 2019-10-10

## 2019-06-13 RX ORDER — ACETAMINOPHEN 325 MG/1
975 TABLET ORAL
Status: CANCELLED | OUTPATIENT
Start: 2019-10-10

## 2019-06-13 ASSESSMENT — ROUTINE ASSESSMENT OF PATIENT INDEX DATA (RAPID3): MDHAQ FUNCTION SCORE: .2

## 2019-06-13 NOTE — PROGRESS NOTES
Subjective:       Patient ID: Page Grissom is a 68 y.o. female.    Chief Complaint: Rheumatoid Arthritis    Page is here today for rheumatology follow-up she has seropositive rheumatoid arthritis currently taking methotrexate 7.5 mg once weekly and Plaquenil 200 mg once a day.  She's also on folic acid daily.  Naproxen 500 mg twice daily only as needed.  Does not use very often. Overall doing well. Bilateral knee osteoarthritis her knee pain comes and goes.  Pain today rated 0/10. No swelling. No other issues. No RA exacerbations.    Minimal morning stiffness.  Fibromyalgia also doing well.     Bone density was done 5/10/17 meeting criteria for osteoporosis she was placed on Fosamax once weekly, she ran out of her fosamax 5 months ago and has not been taking. She forgets to take. Preferred not to add more tablets to her current meds.  We moved to  [facility] 10/09/18- she did well. Had some mild arthralgias and myalgias for 2 days then resolved. No falls or fractures since last visit. Low vit D on supplement vit D3 2000 IU last vit d was 50    Fibromyalgia   Pertinent negatives include no abdominal pain, arthralgias, chest pain, chills, fatigue, fever, joint swelling, myalgias, nausea, neck pain, rash, vomiting or weakness.     Review of Systems   Constitutional: Negative.  Negative for activity change, appetite change, chills, fatigue and fever.   HENT: Negative.  Negative for mouth sores and trouble swallowing.         No dry mouth   Eyes: Negative.  Negative for photophobia, pain and redness.        No swollen or red eyes, no dry eye     Respiratory: Negative.  Negative for chest tightness, shortness of breath, wheezing and stridor.    Cardiovascular: Negative.  Negative for chest pain.   Gastrointestinal: Negative.  Negative for abdominal pain, blood in stool, diarrhea, nausea and vomiting.   Genitourinary: Negative.  Negative for dysuria, frequency, hematuria and urgency.   Musculoskeletal:  "Negative for arthralgias, back pain, gait problem, joint swelling, myalgias, neck pain and neck stiffness.   Skin: Negative.  Negative for color change, pallor and rash.   Neurological: Negative.  Negative for weakness.   Hematological: Negative for adenopathy.   Psychiatric/Behavioral: Negative for suicidal ideas.         Objective:     /65   Pulse 76   Ht 5' 4" (1.626 m)   Wt 106 kg (233 lb 11 oz)   BMI 40.11 kg/m²      Physical Exam   Constitutional: She is oriented to person, place, and time and well-developed, well-nourished, and in no distress. No distress.   HENT:   Head: Normocephalic and atraumatic.   Right Ear: External ear normal.   Left Ear: External ear normal.   Mouth/Throat: No oropharyngeal exudate.   Eyes: Conjunctivae and EOM are normal. Pupils are equal, round, and reactive to light. No scleral icterus.   Neck: Normal range of motion. Neck supple. No thyromegaly present.   Cardiovascular: Normal rate, regular rhythm and normal heart sounds.    No murmur heard.  Pulmonary/Chest: Effort normal and breath sounds normal. She exhibits no tenderness.   Abdominal: Soft. Bowel sounds are normal.       Right Side Rheumatological Exam     Examination finds the shoulder, elbow, wrist, knee, 1st PIP, 1st MCP, 2nd PIP, 2nd MCP, 3rd PIP, 3rd MCP, 4th PIP, 4th MCP, 5th PIP and 5th MCP normal.    Left Side Rheumatological Exam     Examination finds the shoulder, elbow, wrist, knee, 1st PIP, 1st MCP, 2nd PIP, 2nd MCP, 3rd PIP, 3rd MCP, 4th PIP, 4th MCP, 5th PIP and 5th MCP normal.      Lymphadenopathy:     She has no cervical adenopathy.   Neurological: She is alert and oriented to person, place, and time. She displays normal reflexes. No cranial nerve deficit. She exhibits normal muscle tone. Gait normal.   Skin: Skin is warm and dry. No rash noted.     Musculoskeletal: Normal range of motion. She exhibits no edema or tenderness.                  Recent Results (from the past 168 hour(s)) "   Microalbumin/creatinine urine ratio    Collection Time: 06/07/19  7:43 AM   Result Value Ref Range    Microalbum.,U,Random 43.0 ug/mL    Creatinine, Random Ur 68.0 15.0 - 325.0 mg/dL    Microalb Creat Ratio 63.2 (H) 0.0 - 30.0 ug/mg   Hemoglobin A1c    Collection Time: 06/07/19  7:55 AM   Result Value Ref Range    Hemoglobin A1C 5.6 4.0 - 5.6 %    Estimated Avg Glucose 114 68 - 131 mg/dL   Lipid panel    Collection Time: 06/07/19  7:55 AM   Result Value Ref Range    Cholesterol 137 120 - 199 mg/dL    Triglycerides 136 30 - 150 mg/dL    HDL 44 40 - 75 mg/dL    LDL Cholesterol 65.8 63.0 - 159.0 mg/dL    Hdl/Cholesterol Ratio 32.1 20.0 - 50.0 %    Total Cholesterol/HDL Ratio 3.1 2.0 - 5.0    Non-HDL Cholesterol 93 mg/dL   ALT (SGPT)    Collection Time: 06/07/19  7:55 AM   Result Value Ref Range    ALT 11 10 - 44 U/L   AST (SGOT)    Collection Time: 06/07/19  7:55 AM   Result Value Ref Range    AST 10 10 - 40 U/L   Basic metabolic panel    Collection Time: 06/07/19  7:55 AM   Result Value Ref Range    Sodium 141 136 - 145 mmol/L    Potassium 4.0 3.5 - 5.1 mmol/L    Chloride 104 95 - 110 mmol/L    CO2 27 23 - 29 mmol/L    Glucose 106 70 - 110 mg/dL    BUN, Bld 17 8 - 23 mg/dL    Creatinine 1.3 0.5 - 1.4 mg/dL    Calcium 9.6 8.7 - 10.5 mg/dL    Anion Gap 10 8 - 16 mmol/L    eGFR if African American 49 (A) >60 mL/min/1.73 m^2    eGFR if non African American 43 (A) >60 mL/min/1.73 m^2   Sedimentation rate    Collection Time: 06/07/19  7:55 AM   Result Value Ref Range    Sed Rate 64 (H) 0 - 36 mm/Hr   C-reactive protein    Collection Time: 06/07/19  7:55 AM   Result Value Ref Range    CRP 9.1 (H) 0.0 - 8.2 mg/L   Comprehensive metabolic panel    Collection Time: 06/07/19  7:55 AM   Result Value Ref Range    Sodium 141 136 - 145 mmol/L    Potassium 4.0 3.5 - 5.1 mmol/L    Chloride 104 95 - 110 mmol/L    CO2 27 23 - 29 mmol/L    Glucose 106 70 - 110 mg/dL    BUN, Bld 17 8 - 23 mg/dL    Creatinine 1.3 0.5 - 1.4 mg/dL     Calcium 9.6 8.7 - 10.5 mg/dL    Total Protein 7.1 6.0 - 8.4 g/dL    Albumin 3.3 (L) 3.5 - 5.2 g/dL    Total Bilirubin 0.2 0.1 - 1.0 mg/dL    Alkaline Phosphatase 94 55 - 135 U/L    AST 10 10 - 40 U/L    ALT 11 10 - 44 U/L    Anion Gap 10 8 - 16 mmol/L    eGFR if African American 49 (A) >60 mL/min/1.73 m^2    eGFR if non African American 43 (A) >60 mL/min/1.73 m^2   CBC auto differential    Collection Time: 06/07/19  7:55 AM   Result Value Ref Range    WBC 8.61 3.90 - 12.70 K/uL    RBC 4.26 4.00 - 5.40 M/uL    Hemoglobin 11.7 (L) 12.0 - 16.0 g/dL    Hematocrit 36.8 (L) 37.0 - 48.5 %    Mean Corpuscular Volume 86 82 - 98 fL    Mean Corpuscular Hemoglobin 27.5 27.0 - 31.0 pg    Mean Corpuscular Hemoglobin Conc 31.8 (L) 32.0 - 36.0 g/dL    RDW 15.4 (H) 11.5 - 14.5 %    Platelets 278 150 - 350 K/uL    MPV 10.3 9.2 - 12.9 fL    Immature Granulocytes 0.3 0.0 - 0.5 %    Gran # (ANC) 5.5 1.8 - 7.7 K/uL    Immature Grans (Abs) 0.03 0.00 - 0.04 K/uL    Lymph # 2.1 1.0 - 4.8 K/uL    Mono # 0.8 0.3 - 1.0 K/uL    Eos # 0.2 0.0 - 0.5 K/uL    Baso # 0.05 0.00 - 0.20 K/uL    nRBC 0 0 /100 WBC    Gran% 63.5 38.0 - 73.0 %    Lymph% 23.9 18.0 - 48.0 %    Mono% 9.3 4.0 - 15.0 %    Eosinophil% 2.7 0.0 - 8.0 %    Basophil% 0.6 0.0 - 1.9 %    Differential Method Automated    Vitamin D    Collection Time: 06/07/19  7:55 AM   Result Value Ref Range    Vit D, 25-Hydroxy 45 30 - 96 ng/mL        Ref. Range 1/25/2018 11:01   Vit D, 25-Hydroxy Latest Ref Range: 30 - 96 ng/mL 50       DEXA 5/10/17 total femur T score -3.0, femur neck -2.5, spine -1.8 impression osteoporosis    Bilateral hand and foot x-rays stable 7/2015    Assessment:       1. Rheumatoid arthritis involving multiple sites with positive rheumatoid factor    2. Age-related osteoporosis without current pathological fracture    3. Fibromyalgia    4. Long-term use of immunosuppressant medication    5. Medication monitoring encounter    6. Vitamin D deficiency disease          1.   Seropositive rheumatoid arthritis currently stable on low-dose methotrexate and Plaquenil on exam today 0 swollen/0 tender joints- HERSON 0, CDAI 0    2.  Osteoporosis new diagnosis in May with a T score at the total femur -3.0 recently  Compliance issues with oral Fosamax--  forgets to take, does not want to add any more pills to take   Moved  IV Reclast 10/9/18  Patient currently not on prednisone but she is a current smoker    3.  Fibromyalgia stable    4.  Long-term use of immunosuppression medication with no issues with recurrent infections  Will get her flu shot when she sees dr severino in early dec    5.  Chronic tobacco user    6.  Medication monitoring with no current toxicity issues    7. Vaccines up to date      Plan:         C/w  Plaquenil to once a day   Keep her mtx at 7.5 mg daily  And keep folic acid 1 mg daily as long as she is on methotrexate   Naproxen only as needed not daily    yearly  IV Reclast 5 mg- due again after 10/9/19    Repeat yearly for osteoporosis and repeat her bone density in 2-3 years- 2019    Counseled patient on smoking cessation encouraged patient to look into programs- discussed the one available here    Return to clinic in 4 months with labs- cbc, cmp, esr, crp - and IV Reclast, dexa   Call with any questions, changes, or concerns.

## 2019-06-17 ENCOUNTER — OFFICE VISIT (OUTPATIENT)
Dept: INTERNAL MEDICINE | Facility: CLINIC | Age: 69
End: 2019-06-17
Payer: MEDICARE

## 2019-06-17 VITALS
BODY MASS INDEX: 40.12 KG/M2 | TEMPERATURE: 99 F | DIASTOLIC BLOOD PRESSURE: 74 MMHG | HEIGHT: 64 IN | HEART RATE: 87 BPM | SYSTOLIC BLOOD PRESSURE: 133 MMHG | WEIGHT: 235 LBS | OXYGEN SATURATION: 95 %

## 2019-06-17 DIAGNOSIS — E11.69 HYPERLIPIDEMIA ASSOCIATED WITH TYPE 2 DIABETES MELLITUS: ICD-10-CM

## 2019-06-17 DIAGNOSIS — E66.01 CLASS 3 SEVERE OBESITY DUE TO EXCESS CALORIES WITH SERIOUS COMORBIDITY AND BODY MASS INDEX (BMI) OF 40.0 TO 44.9 IN ADULT: ICD-10-CM

## 2019-06-17 DIAGNOSIS — E55.9 VITAMIN D DEFICIENCY DISEASE: ICD-10-CM

## 2019-06-17 DIAGNOSIS — M79.7 FIBROMYALGIA: ICD-10-CM

## 2019-06-17 DIAGNOSIS — K21.9 GASTROESOPHAGEAL REFLUX DISEASE WITHOUT ESOPHAGITIS: ICD-10-CM

## 2019-06-17 DIAGNOSIS — F17.210 CIGARETTE SMOKER: ICD-10-CM

## 2019-06-17 DIAGNOSIS — E78.5 HYPERLIPIDEMIA ASSOCIATED WITH TYPE 2 DIABETES MELLITUS: ICD-10-CM

## 2019-06-17 DIAGNOSIS — M81.0 AGE-RELATED OSTEOPOROSIS WITHOUT CURRENT PATHOLOGICAL FRACTURE: ICD-10-CM

## 2019-06-17 DIAGNOSIS — J45.30 MILD PERSISTENT ASTHMA WITHOUT COMPLICATION: ICD-10-CM

## 2019-06-17 DIAGNOSIS — M05.79 RHEUMATOID ARTHRITIS INVOLVING MULTIPLE SITES WITH POSITIVE RHEUMATOID FACTOR: ICD-10-CM

## 2019-06-17 DIAGNOSIS — E11.8 TYPE 2 DIABETES MELLITUS WITH COMPLICATION, UNSPECIFIED WHETHER LONG TERM INSULIN USE: ICD-10-CM

## 2019-06-17 DIAGNOSIS — I10 ESSENTIAL HYPERTENSION: Primary | ICD-10-CM

## 2019-06-17 PROBLEM — Z12.11 COLON CANCER SCREENING: Status: RESOLVED | Noted: 2019-02-14 | Resolved: 2019-06-17

## 2019-06-17 PROCEDURE — 99999 PR PBB SHADOW E&M-EST. PATIENT-LVL V: ICD-10-PCS | Mod: PBBFAC,,, | Performed by: NURSE PRACTITIONER

## 2019-06-17 PROCEDURE — 99999 PR PBB SHADOW E&M-EST. PATIENT-LVL V: CPT | Mod: PBBFAC,,, | Performed by: NURSE PRACTITIONER

## 2019-06-17 PROCEDURE — 99397 PER PM REEVAL EST PAT 65+ YR: CPT | Mod: S$PBB,,, | Performed by: NURSE PRACTITIONER

## 2019-06-17 PROCEDURE — 99397 PR PREVENTIVE VISIT,EST,65 & OVER: ICD-10-PCS | Mod: S$PBB,,, | Performed by: NURSE PRACTITIONER

## 2019-06-17 PROCEDURE — 99215 OFFICE O/P EST HI 40 MIN: CPT | Mod: PBBFAC | Performed by: NURSE PRACTITIONER

## 2019-06-17 RX ORDER — DULOXETIN HYDROCHLORIDE 30 MG/1
30 CAPSULE, DELAYED RELEASE ORAL DAILY
Qty: 30 CAPSULE | Refills: 11 | Status: SHIPPED | OUTPATIENT
Start: 2019-06-17 | End: 2020-12-14

## 2019-06-17 NOTE — PROGRESS NOTES
Subjective:       Patient ID: Page Grissom is a 68 y.o. female.    Chief Complaint: Follow-up (6mth f/u)    HPI     DM: no hyper/hypoglycemic symptoms. Rare self monitoring normal BS. Not following D&E, weight is up due to 's illness- passed 7/27/18.   HTN: normal, no HTNive symptoms.    LIPIDS:not following D&E, tolerating and compliant with med(s).    ASTHMA: Rare cigarette smoking, Asthma quiet no albuterol or advair currently.  GERD: Quiet.  Rheum/osteoporosis: sees rheum regularly.  Upset because her daughter is moving out (aged 32). Does not want to live alone. Not interested in counseling. Does not have many friends. Prefers to stay to herself. She does communicate with two of her neighbors.  LABS REVIEWED AND DISCUSSED WITH PATIENT    Review of Systems   Constitutional: Negative for activity change, appetite change, chills, diaphoresis, fatigue, fever and unexpected weight change.   HENT: Negative for congestion, ear pain, postnasal drip, rhinorrhea, sinus pressure, sinus pain, sneezing, sore throat, tinnitus, trouble swallowing and voice change.    Eyes: Negative for photophobia, pain and visual disturbance.   Respiratory: Negative for cough, chest tightness, shortness of breath and wheezing.    Cardiovascular: Negative for chest pain, palpitations and leg swelling.   Gastrointestinal: Negative for abdominal distention, abdominal pain, constipation, diarrhea, nausea and vomiting.   Genitourinary: Negative for decreased urine volume, difficulty urinating, dysuria, flank pain, frequency, hematuria and urgency.   Musculoskeletal: Negative for arthralgias, back pain, joint swelling, neck pain and neck stiffness.   Allergic/Immunologic: Negative for immunocompromised state.   Neurological: Negative for dizziness, tremors, seizures, syncope, facial asymmetry, speech difficulty, weakness, light-headedness, numbness and headaches.   Hematological: Negative for adenopathy. Does not bruise/bleed  easily.   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 Negative for agitation, behavioral problems, confusion, decreased concentration, hallucinations, self-injury and suicidal ideas. The patient is not nervous/anxious and is not hyperactive.        Objective:      Physical Exam   Constitutional: She is oriented to person, place, and time.   HENT:   Right Ear: Tympanic membrane normal.   Left Ear: Tympanic membrane normal.   Nose: Nose normal.   Mouth/Throat: Uvula is midline, oropharynx is clear and moist and mucous membranes are normal.   Eyes: Pupils are equal, round, and reactive to light. Conjunctivae and EOM are normal.   Neck: Normal range of motion.   Cardiovascular: Normal rate, regular rhythm, normal heart sounds and intact distal pulses.   Pulses:       Dorsalis pedis pulses are 2+ on the right side, and 2+ on the left side.   Pulmonary/Chest: Effort normal and breath sounds normal.   Abdominal: Bowel sounds are normal.   Obese    Musculoskeletal:        Right foot: There is normal range of motion and no deformity.        Left foot: There is normal range of motion and no deformity.   Feet:   Right Foot:   Protective Sensation: 10 sites tested. 10 sites sensed.   Skin Integrity: Negative for ulcer, blister, skin breakdown, erythema, warmth, callus or dry skin.   Left Foot:   Protective Sensation: 10 sites tested. 10 sites sensed.   Skin Integrity: Negative for ulcer, blister, skin breakdown, erythema, warmth, callus or dry skin.   Neurological: She is alert and oriented to person, place, and time.   Skin: Skin is warm and dry.       Assessment:       1. Essential hypertension    2. Hyperlipidemia associated with type 2 diabetes mellitus    3. Mild persistent asthma without complication    4. Type 2 diabetes mellitus with complication, unspecified whether long term insulin use    5. Vitamin D deficiency disease     6. Gastroesophageal reflux disease without esophagitis    7. Age-related osteoporosis without current pathological fracture    8. Fibromyalgia    9. Rheumatoid arthritis involving multiple sites with positive rheumatoid factor    10. Class 3 severe obesity due to excess calories with serious comorbidity and body mass index (BMI) of 40.0 to 44.9 in adult    11. Cigarette smoker        Plan:   Pt still grieving over . Upset because daughter Is moving out. Not interested in counseling. Consider increasing Cymbalta to 60 mg daily  Maintain current meds for now  Keep subspeciality  Will wait for shingles vaccine  Labs and follow up in 6 months

## 2019-06-24 DIAGNOSIS — M05.749 RHEUMATOID ARTHRITIS INVOLVING HAND WITH POSITIVE RHEUMATOID FACTOR, UNSPECIFIED LATERALITY: ICD-10-CM

## 2019-06-24 RX ORDER — NAPROXEN 500 MG/1
500 TABLET ORAL 2 TIMES DAILY PRN
Qty: 60 TABLET | Refills: 3 | Status: SHIPPED | OUTPATIENT
Start: 2019-06-24 | End: 2019-10-10

## 2019-06-24 NOTE — TELEPHONE ENCOUNTER
----- Message from Rebeka Goddard sent at 6/24/2019  9:28 AM CDT -----  Contact: Pt  ..Type:  RX Refill Request    Who Called:  Pt  Refill or New Rx: Refill   RX Name and Strength: NAPROXEN 500 Mg  How is the patient currently taking it? (ex. 1XDay): as needed  Is this a 30 day or 90 day RX: 60  Preferred Pharmacy with phone number: .  [id number]  [place], [place] - [address] AT [facility] [address]  [address]  [place] [address]  Phone: [phone] Fax: [phone]  Local or Mail Order: local   Ordering Provider: Christina  Would the patient rather a call back or a response via [facility]? Call back   Best Call Back Number: [phone]  Additional Information:

## 2019-06-24 NOTE — TELEPHONE ENCOUNTER
----- Message from Esha Valles sent at 6/24/2019 12:26 PM CDT -----  Contact: pt  Type:  RX Refill Request    Who Called: Isabelle (pt)  Refill or New Rx: refill   RX Name and Strength: naproxen (NAPROSYN) 500 MG tablet  How is the patient currently taking it? (ex. 1XDay): 2XDay  Is this a 30 day or 90 day RX: 30 Day  Preferred Pharmacy with phone number:   [id number] - [place], [place] - [address] AT [facility] [address]  [address]  [place] [address]  Phone: [phone] Fax: [phone]  Local or Mail Order: Local  Ordering Provider: IESHA Courtney  Would the patient rather a call back or a response via [facility]? Callback  Best Call Back Number: [phone]  Additional Information: No

## 2019-06-24 NOTE — TELEPHONE ENCOUNTER
Spoke to the patient and informed her that her medication refill (Naproxen 500 mg) was called into the pharmacy.

## 2019-06-27 DIAGNOSIS — I10 ESSENTIAL HYPERTENSION: ICD-10-CM

## 2019-06-27 RX ORDER — LISINOPRIL 40 MG/1
TABLET ORAL
Qty: 90 TABLET | Refills: 3 | Status: SHIPPED | OUTPATIENT
Start: 2019-06-27 | End: 2020-06-22

## 2019-07-01 ENCOUNTER — OFFICE VISIT (OUTPATIENT)
Dept: PODIATRY | Facility: CLINIC | Age: 69
End: 2019-07-01
Payer: MEDICARE

## 2019-07-01 VITALS — HEIGHT: 64 IN | BODY MASS INDEX: 41.3 KG/M2 | WEIGHT: 241.94 LBS

## 2019-07-01 DIAGNOSIS — Q82.8 POROKERATOSIS: ICD-10-CM

## 2019-07-01 DIAGNOSIS — M77.42 METATARSALGIA OF LEFT FOOT: Primary | ICD-10-CM

## 2019-07-01 DIAGNOSIS — M24.571 CONTRACTURE, RIGHT ANKLE: ICD-10-CM

## 2019-07-01 DIAGNOSIS — E11.8 TYPE 2 DIABETES MELLITUS WITH COMPLICATION, UNSPECIFIED WHETHER LONG TERM INSULIN USE: ICD-10-CM

## 2019-07-01 DIAGNOSIS — E66.01 CLASS 3 SEVERE OBESITY DUE TO EXCESS CALORIES WITH SERIOUS COMORBIDITY AND BODY MASS INDEX (BMI) OF 40.0 TO 44.9 IN ADULT: ICD-10-CM

## 2019-07-01 DIAGNOSIS — M77.41 METATARSALGIA, RIGHT FOOT: ICD-10-CM

## 2019-07-01 DIAGNOSIS — F17.210 CIGARETTE SMOKER: ICD-10-CM

## 2019-07-01 DIAGNOSIS — L84 CALLUS: ICD-10-CM

## 2019-07-01 DIAGNOSIS — M24.572 CONTRACTURE, LEFT ANKLE: ICD-10-CM

## 2019-07-01 PROCEDURE — 99999 PR PBB SHADOW E&M-EST. PATIENT-LVL II: ICD-10-PCS | Mod: PBBFAC,,, | Performed by: PODIATRIST

## 2019-07-01 PROCEDURE — 99212 OFFICE O/P EST SF 10 MIN: CPT | Mod: PBBFAC | Performed by: PODIATRIST

## 2019-07-01 PROCEDURE — 99213 PR OFFICE/OUTPT VISIT, EST, LEVL III, 20-29 MIN: ICD-10-PCS | Mod: S$PBB,,, | Performed by: PODIATRIST

## 2019-07-01 PROCEDURE — 99999 PR PBB SHADOW E&M-EST. PATIENT-LVL II: CPT | Mod: PBBFAC,,, | Performed by: PODIATRIST

## 2019-07-01 PROCEDURE — 99213 OFFICE O/P EST LOW 20 MIN: CPT | Mod: S$PBB,,, | Performed by: PODIATRIST

## 2019-07-01 NOTE — PROGRESS NOTES
Subjective:       Patient ID: Page Grissom is a 68 y.o. female.    Chief Complaint: Callouses (bilateral callouses; pt reports pain at 4/10.)      HPI: Page Grissom presents to the office today, with complaints of pains to the left and right foot. The pains are stated as moderate to severe to the ball(s) of the foot. Patient states limping with gait at times due to the pains. Pains are exacerbated by walking and standing. Sitting and limited WB does alleviate and/or decrease the pains. The patient does have hammer toe contractures. States alternation of shoe gear has not been helpful. Patient's Primary Care Provider is NEELAM Roman Jr, MD.  Patient is a DMII without stated pedal complications.  Patient states controlled blood sugars.    Hemoglobin A1C   Date Value Ref Range Status   06/07/2019 5.6 4.0 - 5.6 % Final     Comment:     ADA Screening Guidelines:  5.7-6.4%  Consistent with prediabetes  >or=6.5%  Consistent with diabetes  High levels of fetal hemoglobin interfere with the HbA1C  assay. Heterozygous hemoglobin variants (HbS, HgC, etc)do  not significantly interfere with this assay.   However, presence of multiple variants may affect accuracy.     12/06/2018 5.4 4.0 - 5.6 % Final     Comment:     ADA Screening Guidelines:  5.7-6.4%  Consistent with prediabetes  >or=6.5%  Consistent with diabetes  High levels of fetal hemoglobin interfere with the HbA1C  assay. Heterozygous hemoglobin variants (HbS, HgC, etc)do  not significantly interfere with this assay.   However, presence of multiple variants may affect accuracy.     05/30/2018 5.4 4.0 - 5.6 % Final     Comment:     ADA Screening Guidelines:  5.7-6.4%  Consistent with prediabetes  >or=6.5%  Consistent with diabetes  High levels of fetal hemoglobin interfere with the HbA1C  assay. Heterozygous hemoglobin variants (HbS, HgC, etc)do  not significantly interfere with this assay.   However, presence of multiple variants may affect  accuracy.         Review of patient's allergies indicates:  No Known Allergies    Past Medical History:   Diagnosis Date    Asthma     Cataract     OD    Diabetes mellitus     Gastroesophageal reflux disease     Hypercholesterolemia     Hypertension     Rheumatoid arthritis        Family History   Problem Relation Age of Onset    Hypertension Mother     Cancer Father     Colon cancer Father     Breast cancer Sister        Social History     Socioeconomic History    Marital status:      Spouse name: Not on file    Number of children: Not on file    Years of education: Not on file    Highest education level: Not on file   Occupational History    Not on file   Social Needs    Financial resource strain: Not on file    Food insecurity:     Worry: Not on file     Inability: Not on file    Transportation needs:     Medical: Not on file     Non-medical: Not on file   Tobacco Use    Smoking status: Light Tobacco Smoker     Packs/day: 0.50     Years: 25.00     Pack years: 12.50     Types: Cigarettes    Smokeless tobacco: Never Used    Tobacco comment: trying to quit   Substance and Sexual Activity    Alcohol use: No    Drug use: No    Sexual activity: Not on file   Lifestyle    Physical activity:     Days per week: Not on file     Minutes per session: Not on file    Stress: Not on file   Relationships    Social connections:     Talks on phone: Not on file     Gets together: Not on file     Attends Confucianist service: Not on file     Active member of club or organization: Not on file     Attends meetings of clubs or organizations: Not on file     Relationship status: Not on file   Other Topics Concern    Not on file   Social History Narrative    Not on file       Past Surgical History:   Procedure Laterality Date    COLONOSCOPY N/A 2/14/2019    Performed by Yury Atwood MD at [facility] ENDO    COLONOSCOPY N/A 8/8/2013    Performed by Isak Moura III, MD at [facility] ENDO    HERNIA REPAIR    "   OOPHORECTOMY         Review of Systems   Constitutional: Negative for chills, fatigue and fever.   HENT: Negative for hearing loss.    Eyes: Negative for photophobia and visual disturbance.   Respiratory: Negative for cough, chest tightness, shortness of breath and wheezing.    Cardiovascular: Negative for chest pain and palpitations.   Gastrointestinal: Negative for constipation, diarrhea, nausea and vomiting.   Endocrine: Negative for cold intolerance and heat intolerance.   Genitourinary: Negative for flank pain.   Musculoskeletal: Positive for gait problem. Negative for neck pain and neck stiffness.   Skin: Positive for wound.   Neurological: Negative for light-headedness, numbness and headaches.   Psychiatric/Behavioral: Negative for sleep disturbance.         Objective:   Ht 5' 4" (1.626 m)   Wt 109.8 kg (241 lb 15.3 oz)   BMI 41.53 kg/m²         LOWER EXTREMITY PHYSICAL EXAMINATION    DERMATOLOGY: Skin is supple, dry and intact. No ecchymosis is noted.  Callus formation, plantar aspect of the bilateral 5th metatarsophalangeal joint, plantar right 3rd metatarsophalangeal joint, plantar left 1st metatarsophalangeal joint, and plantar left heel bone.  No erythema or cellulitis is noted.     VASCULAR: The right dorsalis pedis pulse is 1/4 and the posterior tibial pulse is 1/4. The left dorsalis pedis pulse is 1/4 and the posterior tibial pulse is 1/4. Hair growth is noted on the dorsal foot and digits. Proximal to distal, warm to warm. Capillary refill time is WNL at less than 3s.    NEUROLOGY: Protective sensation is intact via 5.07 [place] monofilament. Proprioception is intact. Sensation to light touch is intact. Upon palpation of the interspaces, there are no neurological sensations stated that radiate proximal or distal. Upon compression of the metatarsal heads from medial to lateral, no neurological sensations or symptoms are stated.    ORTHOPEDIC: Manual Muscle Testing is 5/5 in all planes " on the left and right foot, without pains, with and without resistance. Gait pattern is nonantalgic.  Flexible hammertoe contractures are appreciated to the lower extremity.  Equinus contracture is noted. Metatarsalgia is noted. Plantar fat pad atrophy with displacement is noted.    Assessment:     1. Metatarsalgia of left foot    2. Metatarsalgia, right foot    3. Contracture, right ankle    4. Contracture, left ankle    5. Porokeratosis    6. Callus    7. Class 3 severe obesity due to excess calories with serious comorbidity and body mass index (BMI) of 40.0 to 44.9 in adult    8. Cigarette smoker    9. Type 2 diabetes mellitus with complication, unspecified whether long term insulin use        Plan:     Metatarsalgia of left foot  Metatarsalgia, right foot  Contracture, right ankle  Contracture, left ankle  Thorough discussion is had with the patient today, concerning the diagnosis, its etiology, and the treatment algorithm at present.  No x-rays were taken this afternoon.    NSAIDs and/or Tylenol if no overt contraindication for pain relief.Did discuss proper and supportive shoe gear in detail and at length with the patient. These are shoes with firm and robust arch support; medial counter.  Shoes which only bend at the metatarsophalangeal joint and which are rigid in the midfoot and hindfoot. Patient urged to purchase running type or cross training type shoes gear which are designed for pronation control.       Callus  Porokeratosis  Hypertrophic skin formation, as outlined within the examination portion of this note, is/are trimmed/pared surgically debrided with sharp #10/#15 blade, to alleviate discomfort with weight bearing and ambulation, and to lessen the possibility of skin complications, e.g., ulceration due to pressure. No ulceration(s) is are noted with/post debridement.    Patient does not meet the qualifications for, or have the class findings necessary for routine foot care. There is no lack of or  diminished sensation and he/she has no significant findings of PVD to the B/L LE. At any follow-up appointment concerning routine foot care, patient will be PROC-B, and will be required to pay for services.  This fact is explained to the patient and/or any family who is present at today's appt.     Class 3 severe obesity due to excess calories with serious comorbidity and body mass index (BMI) of 40.0 to 44.9 in adult  Patient is counseled and reminded concerning the importance of good nutrition and healthy eating habits, which may include blood sugar control, to prevent and/or help podiatric foot and ankle complications.    Cigarette smoker  Did discuss in detail the harmful effects of nicotine/tobacco/cigarette smoking, especially in relation to the lower extremity. I do rec. consultation with primary care provider for further discussed of smoking cessation methods. Smoking & Tobacco use cessation couseling was rendered at today's visit; intermediate, bewteen 3 and 10 minutes.    Type 2 diabetes mellitus with complication, unspecified whether long term insulin use   I counseled the patient on his/her Diabetic Mellitus regarding today's clinical examination and objection findings. We did also discuss recent medication changes, pertinent labs and imaging evaluations and other medical consultation notes and progress notes. Greater than 50% of this visit was spent on counseling and coordination of care. Greater than 20 minutes of this appt. was spent on education about the diabetic foot, in relation to PVD and/or neuropathy, and the prevention of limb loss.     Shoe gear is inspected and wear and proper fit/type. Patient is reminded of the importance of good nutrition and blood sugar control to help prevent podiatric complications of diabetes. Patient instructed on proper foot hygeine. We discussed wearing proper shoe gear, daily foot inspections, never walking without protective shoe gear, never putting sharp  instruments to feet.  Patient  will continue to monitor the areas daily, inspect feet, wear protective shoe gear when ambulatory, moisturizer to maintain skin integrity.     Patient's DMI/DMII is managed by Primary Care Provider and/or Endocrinology Advanced Practice Provider. As per the most recent glycohemoglobin level, this patient is at goal.          Future Appointments   Date Time Provider Department Center   8/19/2019  9:40 AM Paz Pretty OD [facility] OPHTHAL [facility]   10/10/2019  9:00 AM [facility] BMD1 [facility] DEXABMD [facility]   10/10/2019  9:30 AM Nicole Vasquez PA-C [facility] RHEUM [facility]   10/10/2019  9:35 AM LABORATORY, HGV [facility] LAB [facility]   10/10/2019 10:00 AM RHEUMATOLOGY, INFUSION [facility] RHEUMIN [facility]   12/17/2019  9:35 AM LABORATORY, [facility] LAB [facility]   12/24/2019 10:20 AM [facility] Jr., [facility]

## 2019-08-19 ENCOUNTER — OFFICE VISIT (OUTPATIENT)
Dept: OPHTHALMOLOGY | Facility: CLINIC | Age: 69
End: 2019-08-19
Payer: MEDICARE

## 2019-08-19 DIAGNOSIS — E11.8 TYPE 2 DIABETES MELLITUS WITH COMPLICATION, UNSPECIFIED WHETHER LONG TERM INSULIN USE: Primary | ICD-10-CM

## 2019-08-19 DIAGNOSIS — H25.013 CATARACT CORTICAL, SENILE, BILATERAL: ICD-10-CM

## 2019-08-19 DIAGNOSIS — M05.79 RHEUMATOID ARTHRITIS INVOLVING MULTIPLE SITES WITH POSITIVE RHEUMATOID FACTOR: ICD-10-CM

## 2019-08-19 DIAGNOSIS — Z13.5 GLAUCOMA SCREENING: ICD-10-CM

## 2019-08-19 DIAGNOSIS — I10 ESSENTIAL HYPERTENSION: ICD-10-CM

## 2019-08-19 PROCEDURE — 99999 PR PBB SHADOW E&M-EST. PATIENT-LVL II: ICD-10-PCS | Mod: PBBFAC,,, | Performed by: OPTOMETRIST

## 2019-08-19 PROCEDURE — 92014 COMPRE OPH EXAM EST PT 1/>: CPT | Mod: S$PBB,,, | Performed by: OPTOMETRIST

## 2019-08-19 PROCEDURE — 92014 PR EYE EXAM, EST PATIENT,COMPREHESV: ICD-10-PCS | Mod: S$PBB,,, | Performed by: OPTOMETRIST

## 2019-08-19 PROCEDURE — 99212 OFFICE O/P EST SF 10 MIN: CPT | Mod: PBBFAC | Performed by: OPTOMETRIST

## 2019-08-19 PROCEDURE — 99999 PR PBB SHADOW E&M-EST. PATIENT-LVL II: CPT | Mod: PBBFAC,,, | Performed by: OPTOMETRIST

## 2019-08-20 ENCOUNTER — TELEPHONE (OUTPATIENT)
Dept: RHEUMATOLOGY | Facility: CLINIC | Age: 69
End: 2019-08-20

## 2019-08-20 DIAGNOSIS — M05.749 RHEUMATOID ARTHRITIS INVOLVING HAND WITH POSITIVE RHEUMATOID FACTOR, UNSPECIFIED LATERALITY: ICD-10-CM

## 2019-08-20 RX ORDER — METHOTREXATE 2.5 MG/1
TABLET ORAL
Qty: 12 TABLET | Refills: 6 | Status: SHIPPED | OUTPATIENT
Start: 2019-08-20 | End: 2019-10-10 | Stop reason: ALTCHOICE

## 2019-08-20 NOTE — TELEPHONE ENCOUNTER
----- Message from Chayito Nagel sent at 8/20/2019  8:50 AM CDT -----  Contact: Patient   Type:  RX Refill Request    Who Called: Page Grissom   Refill or New Rx:refill  RX Name and Strength:methotrexate 2.5 MG Tab  How is the patient currently taking it? (ex. 1XDay):3 every saturday  Is this a 30 day or 90 day RX:n/a  Preferred Pharmacy with phone number:  [id number] - [place], [place] - [address] AT [facility] [address]  [address]  [place] [address]  Phone: [phone] Fax: [phone]  ocal or Mail Order:local  Ordering Provider:Cristian Vasquez  Would the patient rather a call back or a response via [facility]? Call  Best Call Back Number:[phone]  Additional Information: n/a

## 2019-09-03 ENCOUNTER — OFFICE VISIT (OUTPATIENT)
Dept: INTERNAL MEDICINE | Facility: CLINIC | Age: 69
End: 2019-09-03
Payer: MEDICARE

## 2019-09-03 VITALS
BODY MASS INDEX: 39.97 KG/M2 | TEMPERATURE: 97 F | WEIGHT: 234.13 LBS | SYSTOLIC BLOOD PRESSURE: 100 MMHG | HEIGHT: 64 IN | DIASTOLIC BLOOD PRESSURE: 68 MMHG

## 2019-09-03 DIAGNOSIS — E55.9 VITAMIN D DEFICIENCY DISEASE: ICD-10-CM

## 2019-09-03 DIAGNOSIS — E78.5 HYPERLIPIDEMIA ASSOCIATED WITH TYPE 2 DIABETES MELLITUS: ICD-10-CM

## 2019-09-03 DIAGNOSIS — F17.210 CIGARETTE SMOKER: ICD-10-CM

## 2019-09-03 DIAGNOSIS — E11.69 HYPERLIPIDEMIA ASSOCIATED WITH TYPE 2 DIABETES MELLITUS: ICD-10-CM

## 2019-09-03 DIAGNOSIS — Z00.00 ENCOUNTER FOR PREVENTIVE HEALTH EXAMINATION: Primary | ICD-10-CM

## 2019-09-03 DIAGNOSIS — Z79.60 LONG-TERM USE OF IMMUNOSUPPRESSANT MEDICATION: ICD-10-CM

## 2019-09-03 DIAGNOSIS — M81.0 AGE-RELATED OSTEOPOROSIS WITHOUT CURRENT PATHOLOGICAL FRACTURE: ICD-10-CM

## 2019-09-03 DIAGNOSIS — M05.79 RHEUMATOID ARTHRITIS INVOLVING MULTIPLE SITES WITH POSITIVE RHEUMATOID FACTOR: ICD-10-CM

## 2019-09-03 DIAGNOSIS — J45.30 MILD PERSISTENT ASTHMA WITHOUT COMPLICATION: ICD-10-CM

## 2019-09-03 DIAGNOSIS — E87.6 HYPOPOTASSEMIA: ICD-10-CM

## 2019-09-03 DIAGNOSIS — I10 ESSENTIAL HYPERTENSION: ICD-10-CM

## 2019-09-03 DIAGNOSIS — E66.01 CLASS 3 SEVERE OBESITY DUE TO EXCESS CALORIES WITH SERIOUS COMORBIDITY AND BODY MASS INDEX (BMI) OF 40.0 TO 44.9 IN ADULT: ICD-10-CM

## 2019-09-03 DIAGNOSIS — E11.8 TYPE 2 DIABETES MELLITUS WITH COMPLICATION, WITHOUT LONG-TERM CURRENT USE OF INSULIN: ICD-10-CM

## 2019-09-03 DIAGNOSIS — M79.7 FIBROMYALGIA: ICD-10-CM

## 2019-09-03 DIAGNOSIS — I77.1 TORTUOUS AORTA: ICD-10-CM

## 2019-09-03 PROCEDURE — 99999 PR PBB SHADOW E&M-EST. PATIENT-LVL III: CPT | Mod: PBBFAC,,, | Performed by: PHYSICIAN ASSISTANT

## 2019-09-03 PROCEDURE — 99999 PR PBB SHADOW E&M-EST. PATIENT-LVL III: ICD-10-PCS | Mod: PBBFAC,,, | Performed by: PHYSICIAN ASSISTANT

## 2019-09-03 PROCEDURE — G0439 PPPS, SUBSEQ VISIT: HCPCS | Mod: S$GLB,,, | Performed by: PHYSICIAN ASSISTANT

## 2019-09-03 PROCEDURE — 99213 OFFICE O/P EST LOW 20 MIN: CPT | Mod: PBBFAC | Performed by: PHYSICIAN ASSISTANT

## 2019-09-03 PROCEDURE — G0439 PR MEDICARE ANNUAL WELLNESS SUBSEQUENT VISIT: ICD-10-PCS | Mod: S$GLB,,, | Performed by: PHYSICIAN ASSISTANT

## 2019-09-03 NOTE — PATIENT INSTRUCTIONS
Counseling and Referral of Other Preventative  (Italic type indicates deductible and co-insurance are waived)    Patient Name: Page Grissom  Today's Date: 9/3/2019    Health Maintenance       Date Due Completion Date    Shingles Vaccine (2 of 3) 06/25/2015 4/30/2015    Influenza Vaccine (1) 09/01/2019 12/13/2018    Override on 11/10/2016: Done    Hemoglobin A1c 12/07/2019 6/7/2019    Mammogram 01/03/2020 1/3/2019    Override on 11/9/2012: Done    DEXA SCAN 05/10/2020 5/10/2017    Lipid Panel 06/07/2020 6/7/2019    Urine Microalbumin 06/07/2020 6/7/2019    Foot Exam 06/17/2020 6/17/2019    Override on 6/17/2019: Done    Override on 6/13/2018: Done    Override on 5/9/2017: Done    Override on 4/30/2015: Done    Override on 4/30/2014: Done (per Trey Swan DPM)    Eye Exam 08/19/2020 8/19/2019    Override on 8/19/2019: Done    Override on 8/10/2017: Done    Override on 8/4/2016: Done    Override on 5/3/2013: Done    TETANUS VACCINE 10/16/2022 10/16/2012    Colonoscopy 02/14/2024 2/14/2019        No orders of the defined types were placed in this encounter.    The following information is provided to all patients.  This information is to help you find resources for any of the problems found today that may be affecting your health:                Living healthy guide: www.[facility].[place].gov      Understanding Diabetes: www.diabetes.org      Eating healthy: www.cdc.gov/healthyweight      CDC home safety checklist: www.cdc.gov/steadi/patient.html      Agency on Aging: www.goea.[place].gov      Alcoholics anonymous (AA): www.aa.org      Physical Activity: www.rosalinda.nih.gov/vl0jdhk      Tobacco use: www.quitwithusla.org

## 2019-09-03 NOTE — PROGRESS NOTES
"Page Grissom presented for a  Medicare AWV and comprehensive Health Risk Assessment today. The following components were reviewed and updated:    · Medical history  · Family History  · Social history  · Allergies and Current Medications  · Health Risk Assessment  · Health Maintenance  · Care Team     ** See Completed Assessments for Annual Wellness Visit within the encounter summary.**       The following assessments were completed:  · Living Situation  · CAGE  · Depression Screening  · Timed Get Up and Go  · Whisper Test  · Cognitive Function Screening  · Nutrition Screening  · ADL Screening  · PAQ Screening    Patient Care Team:  THEA Roman Jr., MD as PCP - General (Pediatrics)  THEA Roman Jr., MD as PCP - MSSP Attributed  Concepción Chamberlain LPN as Care Coordinator (Internal Medicine)  Nicole Vasquez PA-C as Physician Assistant (Rheumatology)  Paz Pretty OD as Consulting Physician (Optometry)  No Milligan LPN as Care Coordinator  Teofilo Lin DPM as Consulting Physician (Podiatry)    Vitals:    09/03/19 1040   BP: 100/68   BP Location: Right arm   Temp: 97.1 °F (36.2 °C)   TempSrc: Tympanic   Weight: 106.2 kg (234 lb 2.1 oz)   Height: 5' 4" (1.626 m)     Body mass index is 40.19 kg/m².     Physical Exam   Constitutional: She is oriented to person, place, and time. She appears well-developed and well-nourished. No distress.   HENT:   Head: Normocephalic and atraumatic.   Eyes: EOM are normal. No scleral icterus.   Neck: Neck supple.   Cardiovascular: Normal rate and regular rhythm.   Pulmonary/Chest: Effort normal. No respiratory distress. She has no decreased breath sounds. She has wheezes. She has no rhonchi. She has no rales.   Musculoskeletal: Normal range of motion.   Neurological: She is alert and oriented to person, place, and time. No cranial nerve deficit.   Skin: Skin is warm and dry.   Psychiatric: She has a normal mood and affect. Her speech is normal and " behavior is normal. Thought content normal.         Diagnoses and health risks identified today and associated recommendations/orders:    1. Encounter for preventive health examination    2. Tortuous aorta  Stable. CXR 5/13/10. Continue current treatment plan as prescribed by your PCP and f/u with your PCP for further management.    3. Class 3 severe obesity due to excess calories with serious comorbidity and body mass index (BMI) of 40.0 to 44.9 in adult  Not currently controlled. F/u with your PCP to discuss adjustments to your treatment plan.    4. Type 2 diabetes mellitus with complication, without long-term current use of insulin  Controlled. Pt taking metformin. Monitor glucose, check feet daily, and stay UTD with eye doctor. Continue current treatment plan as prescribed by your PCP and f/u with your PCP for further management.  Component      Latest Ref Rng & Units 6/7/2019 12/6/2018   Hemoglobin A1C External      4.0 - 5.6 % 5.6 5.4   Estimated Avg Glucose      68 - 131 mg/dL 114 108     5. Hyperlipidemia associated with type 2 diabetes mellitus  Stable. Pt taking Crestor as well as fish oil. Continue current treatment plan as prescribed by your PCP and f/u with your PCP for further management.  Component      Latest Ref Rng & Units 6/7/2019 12/6/2018   Cholesterol      120 - 199 mg/dL 137 148   Triglycerides      30 - 150 mg/dL 136 120   HDL      40 - 75 mg/dL 44 55   LDL Cholesterol External      63.0 - 159.0 mg/dL 65.8 69.0   Hdl/Cholesterol Ratio      20.0 - 50.0 % 32.1 37.2   Total Cholesterol/HDL Ratio      2.0 - 5.0 3.1 2.7   Non-HDL Cholesterol      mg/dL 93 93     6. Rheumatoid arthritis involving multiple sites with positive rheumatoid factor, 7. Long-term use of immunosuppressant medication  Stable. Pt taking methotrexate, Plaquenil, and folic acid. Continue current treatment plan as prescribed by your PCP and rheumatologist and f/u with them for further management.    8. Fibromyalgia  Stable. Pt  not currently taking Cymbalta, as she says she feels she is doing okay without it. Continue current treatment plan as prescribed by your PCP and rheumatologist and f/u with them for further management.    9. Age-related osteoporosis without current pathological fracture  Stable. DEXA 5/10/17. Pt taking IV Reclast and vit D suppl. Continue current treatment plan as prescribed by your PCP and rheumatologist and f/u with them for further management.    10. Mild persistent asthma without complication  Stable. Pt not currently using her Advair or albuterol. Pt with wheezing on auscultation today - pt reports she recently caught a cold but it is getting better - declines to be seen for sxs today. Continue current treatment plan as prescribed by your PCP and f/u with your PCP for further management.    11. Essential hypertension  Controlled. Pt taking amlodipine, hydralazine, HCTZ, and lisinopril. Continue current treatment plan as prescribed by your PCP and f/u with your PCP for further management.    12. Hypopotassemia  Controlled. Pt taking K suppl. Continue current treatment plan as prescribed by your PCP and f/u with your PCP for further management.  Component      Latest Ref Rng & Units 6/7/2019 2/12/2019   Potassium      3.5 - 5.1 mmol/L 4.0 4.4     13. Vitamin D deficiency disease  Controlled. Pt taking vit D suppl. Continue current treatment plan as prescribed by your PCP and rheumatologist and f/u with them for further management.  Component      Latest Ref Rng & Units 6/7/2019 1/25/2018   Vit D, 25-Hydroxy      30 - 96 ng/mL 45 50     14. Cigarette smoker  Not currently controlled. Pt reports smoking about 10 cigarettes daily. Try to stop smoking. F/u with your PCP for further management.    Please obtain any medical records from past / present outside medical providers and give to PCP for review and further medical recommendations.    Provided Page with a 5-10 year written screening schedule and personal  prevention plan. Recommendations were developed using the USPSTF age appropriate recommendations. Education, counseling, and referrals were provided as needed. After Visit Summary printed and given to patient which includes a list of additional screenings\tests needed.    Follow up in about 1 year (around 9/3/2020) for HRA. F/u with PCP Dr. Roman as scheduled 12/24/19 and specialists as recommended for health management.    HANNAH Balderas  I offered to discuss end of life issues, including information on how to make advance directives that the patient could use to name someone who would make medical decisions on their behalf if they became too ill to make themselves.    ___Patient declined  _X_Patient is interested, I provided paper work and offered to discuss.

## 2019-10-10 ENCOUNTER — LAB VISIT (OUTPATIENT)
Dept: LAB | Facility: HOSPITAL | Age: 69
End: 2019-10-10
Attending: PHYSICIAN ASSISTANT
Payer: MEDICARE

## 2019-10-10 ENCOUNTER — OFFICE VISIT (OUTPATIENT)
Dept: RHEUMATOLOGY | Facility: CLINIC | Age: 69
End: 2019-10-10
Payer: MEDICARE

## 2019-10-10 VITALS
SYSTOLIC BLOOD PRESSURE: 129 MMHG | WEIGHT: 233.94 LBS | HEART RATE: 68 BPM | BODY MASS INDEX: 39.94 KG/M2 | HEIGHT: 64 IN | DIASTOLIC BLOOD PRESSURE: 80 MMHG

## 2019-10-10 DIAGNOSIS — E55.9 VITAMIN D DEFICIENCY DISEASE: ICD-10-CM

## 2019-10-10 DIAGNOSIS — Z79.60 LONG-TERM USE OF IMMUNOSUPPRESSANT MEDICATION: ICD-10-CM

## 2019-10-10 DIAGNOSIS — M81.0 AGE-RELATED OSTEOPOROSIS WITHOUT CURRENT PATHOLOGICAL FRACTURE: ICD-10-CM

## 2019-10-10 DIAGNOSIS — M79.7 FIBROMYALGIA: ICD-10-CM

## 2019-10-10 DIAGNOSIS — M05.79 RHEUMATOID ARTHRITIS INVOLVING MULTIPLE SITES WITH POSITIVE RHEUMATOID FACTOR: ICD-10-CM

## 2019-10-10 DIAGNOSIS — M05.79 RHEUMATOID ARTHRITIS INVOLVING MULTIPLE SITES WITH POSITIVE RHEUMATOID FACTOR: Primary | ICD-10-CM

## 2019-10-10 LAB
ALBUMIN SERPL BCP-MCNC: 3.4 G/DL (ref 3.5–5.2)
ALP SERPL-CCNC: 102 U/L (ref 55–135)
ALT SERPL W/O P-5'-P-CCNC: 12 U/L (ref 10–44)
ANION GAP SERPL CALC-SCNC: 11 MMOL/L (ref 8–16)
AST SERPL-CCNC: 13 U/L (ref 10–40)
BASOPHILS # BLD AUTO: 0.03 K/UL (ref 0–0.2)
BASOPHILS NFR BLD: 0.4 % (ref 0–1.9)
BILIRUB SERPL-MCNC: 0.3 MG/DL (ref 0.1–1)
BUN SERPL-MCNC: 24 MG/DL (ref 8–23)
CALCIUM SERPL-MCNC: 9.8 MG/DL (ref 8.7–10.5)
CHLORIDE SERPL-SCNC: 103 MMOL/L (ref 95–110)
CO2 SERPL-SCNC: 26 MMOL/L (ref 23–29)
CREAT SERPL-MCNC: 1.6 MG/DL (ref 0.5–1.4)
CRP SERPL-MCNC: 10.3 MG/L (ref 0–8.2)
DIFFERENTIAL METHOD: ABNORMAL
EOSINOPHIL # BLD AUTO: 0.2 K/UL (ref 0–0.5)
EOSINOPHIL NFR BLD: 2.3 % (ref 0–8)
ERYTHROCYTE [DISTWIDTH] IN BLOOD BY AUTOMATED COUNT: 15.7 % (ref 11.5–14.5)
ERYTHROCYTE [SEDIMENTATION RATE] IN BLOOD BY WESTERGREN METHOD: 44 MM/HR (ref 0–36)
EST. GFR  (AFRICAN AMERICAN): 38 ML/MIN/1.73 M^2
EST. GFR  (NON AFRICAN AMERICAN): 33 ML/MIN/1.73 M^2
GLUCOSE SERPL-MCNC: 110 MG/DL (ref 70–110)
HCT VFR BLD AUTO: 37.3 % (ref 37–48.5)
HGB BLD-MCNC: 11.5 G/DL (ref 12–16)
IMM GRANULOCYTES # BLD AUTO: 0.03 K/UL (ref 0–0.04)
IMM GRANULOCYTES NFR BLD AUTO: 0.4 % (ref 0–0.5)
LYMPHOCYTES # BLD AUTO: 1.6 K/UL (ref 1–4.8)
LYMPHOCYTES NFR BLD: 20.7 % (ref 18–48)
MCH RBC QN AUTO: 27.4 PG (ref 27–31)
MCHC RBC AUTO-ENTMCNC: 30.8 G/DL (ref 32–36)
MCV RBC AUTO: 89 FL (ref 82–98)
MONOCYTES # BLD AUTO: 0.6 K/UL (ref 0.3–1)
MONOCYTES NFR BLD: 7.9 % (ref 4–15)
NEUTROPHILS # BLD AUTO: 5.2 K/UL (ref 1.8–7.7)
NEUTROPHILS NFR BLD: 68.7 % (ref 38–73)
NRBC BLD-RTO: 0 /100 WBC
PLATELET # BLD AUTO: 302 K/UL (ref 150–350)
PMV BLD AUTO: 10.2 FL (ref 9.2–12.9)
POTASSIUM SERPL-SCNC: 4.6 MMOL/L (ref 3.5–5.1)
PROT SERPL-MCNC: 7.8 G/DL (ref 6–8.4)
RBC # BLD AUTO: 4.2 M/UL (ref 4–5.4)
SODIUM SERPL-SCNC: 140 MMOL/L (ref 136–145)
WBC # BLD AUTO: 7.49 K/UL (ref 3.9–12.7)

## 2019-10-10 PROCEDURE — 99999 PR PBB SHADOW E&M-EST. PATIENT-LVL IV: ICD-10-PCS | Mod: PBBFAC,,, | Performed by: PHYSICIAN ASSISTANT

## 2019-10-10 PROCEDURE — 99214 OFFICE O/P EST MOD 30 MIN: CPT | Mod: S$PBB,,, | Performed by: PHYSICIAN ASSISTANT

## 2019-10-10 PROCEDURE — 99999 PR PBB SHADOW E&M-EST. PATIENT-LVL IV: CPT | Mod: PBBFAC,,, | Performed by: PHYSICIAN ASSISTANT

## 2019-10-10 PROCEDURE — 85652 RBC SED RATE AUTOMATED: CPT

## 2019-10-10 PROCEDURE — 36415 COLL VENOUS BLD VENIPUNCTURE: CPT

## 2019-10-10 PROCEDURE — 86140 C-REACTIVE PROTEIN: CPT

## 2019-10-10 PROCEDURE — 99214 OFFICE O/P EST MOD 30 MIN: CPT | Mod: PBBFAC,25 | Performed by: PHYSICIAN ASSISTANT

## 2019-10-10 PROCEDURE — 99214 PR OFFICE/OUTPT VISIT, EST, LEVL IV, 30-39 MIN: ICD-10-PCS | Mod: S$PBB,,, | Performed by: PHYSICIAN ASSISTANT

## 2019-10-10 PROCEDURE — 80053 COMPREHEN METABOLIC PANEL: CPT

## 2019-10-10 PROCEDURE — 85025 COMPLETE CBC W/AUTO DIFF WBC: CPT

## 2019-10-10 NOTE — PROGRESS NOTES
Subjective:       [facility], [place] [facility]     Patient ID: Page Grissom is a 69 y.o. female.    Chief Complaint: Rheumatoid Arthritis    Page is here today for rheumatology follow-up she has seropositive rheumatoid arthritis currently taking methotrexate 7.5 mg once weekly and Plaquenil 200 mg once a day.  She's also on folic acid daily.  Naproxen 500 mg twice daily only as needed.  Does not use very often. Overall doing well. Bilateral knee osteoarthritis her knee pain comes and goes.  Pain today rated 0/10. No swelling. No other issues. No RA exacerbations.    Minimal morning stiffness.  Fibromyalgia also doing well.     Bone density was done 5/10/17 meeting criteria for osteoporosis she was placed on Fosamax once weekly, she ran out of her fosamax and stopped it. Could not remember to take it. She  Preferred not to add more tablets to her current meds.  We moved to   Reclast 10/09/18- she did well. Had some mild arthralgias and myalgias for 2 days then resolved. No falls or fractures since last visit. Low vit D on supplement vit D3 2000 IU last vit d was 50  Due for another dose today, repeat dexa today 10/10/19- stable and improved- now osteopenia with mod risk fx.     Fibromyalgia   Pertinent negatives include no abdominal pain, arthralgias, chest pain, chills, fatigue, fever, joint swelling, myalgias, nausea, neck pain, rash, vomiting or weakness.     Review of Systems   Constitutional: Negative.  Negative for activity change, appetite change, chills, fatigue and fever.   HENT: Negative.  Negative for mouth sores and trouble swallowing.         No dry mouth   Eyes: Negative.  Negative for photophobia, pain and redness.        No swollen or red eyes, no dry eye     Respiratory: Negative.  Negative for chest tightness, shortness of breath, wheezing and stridor.    Cardiovascular: Negative.  Negative for chest pain.   Gastrointestinal: Negative.  Negative for abdominal  "pain, blood in stool, diarrhea, nausea and vomiting.   Genitourinary: Negative.  Negative for dysuria, frequency, hematuria and urgency.   Musculoskeletal: Negative.  Negative for arthralgias, back pain, gait problem, joint swelling, myalgias, neck pain and neck stiffness.   Skin: Negative.  Negative for color change, pallor and rash.   Neurological: Negative.  Negative for weakness.   Hematological: Negative for adenopathy.   Psychiatric/Behavioral: Negative for suicidal ideas.         Objective:   /80   Pulse 68   Ht 5' 4" (1.626 m)   Wt 106.1 kg (233 lb 14.5 oz)   BMI 40.15 kg/m²        Past Medical History:   Diagnosis Date    Asthma     Back pain     Cataract     OD    Diabetes mellitus     Fibromyalgia     Gastroesophageal reflux disease     Hypercholesterolemia     Hypertension     Immune deficiency disorder     Osteoporosis     Rheumatoid arthritis     Tobacco dependence     Trouble in sleeping     Type 2 diabetes mellitus       Immunization History   Administered Date(s) Administered    Influenza 10/10/2006, 12/19/2007, 10/08/2009, 12/15/2010, 11/09/2011, 10/16/2012    Influenza - High Dose - PF (65 years and older) 12/06/2017, 12/13/2018    Influenza - Quadrivalent 10/30/2014    Influenza Split 10/08/2013    PPD Test 04/05/2010, 04/05/2010    Pneumococcal Conjugate - 13 Valent 04/30/2015    Pneumococcal Polysaccharide - 23 Valent 02/12/2007, 12/13/2018    Tdap 10/16/2012    Zoster 04/30/2015          Physical Exam   Constitutional: She is oriented to person, place, and time and well-developed, well-nourished, and in no distress. No distress.   HENT:   Head: Normocephalic and atraumatic.   Right Ear: External ear normal.   Left Ear: External ear normal.   Mouth/Throat: No oropharyngeal exudate.   Eyes: Conjunctivae and EOM are normal. Pupils are equal, round, and reactive to light. No scleral icterus.   Neck: Normal range of motion. Neck supple. No thyromegaly present. "   Cardiovascular: Normal rate, regular rhythm and normal heart sounds.    No murmur heard.  Pulmonary/Chest: Effort normal and breath sounds normal. She exhibits no tenderness.   Abdominal: Soft. Bowel sounds are normal.   Lymphadenopathy:     She has no cervical adenopathy.   Neurological: She is alert and oriented to person, place, and time. She displays normal reflexes. No cranial nerve deficit. She exhibits normal muscle tone. Gait normal.   Skin: Skin is warm and dry. No rash noted.     Musculoskeletal: Normal range of motion. She exhibits no edema or tenderness.                Physical Exam     CANO-28 tender joint count: 0  CANO-28 swollen joint count: 0  CRP (mg/L): 10.3  Patient global assessment: 0  CANO-28 CRP score: 1.83 (Remission)      Recent Results (from the past 336 hour(s))   C-reactive protein    Collection Time: 10/10/19  9:09 AM   Result Value Ref Range    CRP 10.3 (H) 0.0 - 8.2 mg/L   Comprehensive metabolic panel    Collection Time: 10/10/19  9:09 AM   Result Value Ref Range    Sodium 140 136 - 145 mmol/L    Potassium 4.6 3.5 - 5.1 mmol/L    Chloride 103 95 - 110 mmol/L    CO2 26 23 - 29 mmol/L    Glucose 110 70 - 110 mg/dL    BUN, Bld 24 (H) 8 - 23 mg/dL    Creatinine 1.6 (H) 0.5 - 1.4 mg/dL    Calcium 9.8 8.7 - 10.5 mg/dL    Total Protein 7.8 6.0 - 8.4 g/dL    Albumin 3.4 (L) 3.5 - 5.2 g/dL    Total Bilirubin 0.3 0.1 - 1.0 mg/dL    Alkaline Phosphatase 102 55 - 135 U/L    AST 13 10 - 40 U/L    ALT 12 10 - 44 U/L    Anion Gap 11 8 - 16 mmol/L    eGFR if African American 38 (A) >60 mL/min/1.73 m^2    eGFR if non African American 33 (A) >60 mL/min/1.73 m^2   CBC auto differential    Collection Time: 10/10/19  9:09 AM   Result Value Ref Range    WBC 7.49 3.90 - 12.70 K/uL    RBC 4.20 4.00 - 5.40 M/uL    Hemoglobin 11.5 (L) 12.0 - 16.0 g/dL    Hematocrit 37.3 37.0 - 48.5 %    Mean Corpuscular Volume 89 82 - 98 fL    Mean Corpuscular Hemoglobin 27.4 27.0 - 31.0 pg    Mean Corpuscular Hemoglobin Conc  30.8 (L) 32.0 - 36.0 g/dL    RDW 15.7 (H) 11.5 - 14.5 %    Platelets 302 150 - 350 K/uL    MPV 10.2 9.2 - 12.9 fL    Immature Granulocytes 0.4 0.0 - 0.5 %    Gran # (ANC) 5.2 1.8 - 7.7 K/uL    Immature Grans (Abs) 0.03 0.00 - 0.04 K/uL    Lymph # 1.6 1.0 - 4.8 K/uL    Mono # 0.6 0.3 - 1.0 K/uL    Eos # 0.2 0.0 - 0.5 K/uL    Baso # 0.03 0.00 - 0.20 K/uL    nRBC 0 0 /100 WBC    Gran% 68.7 38.0 - 73.0 %    Lymph% 20.7 18.0 - 48.0 %    Mono% 7.9 4.0 - 15.0 %    Eosinophil% 2.3 0.0 - 8.0 %    Basophil% 0.4 0.0 - 1.9 %    Differential Method Automated          Results for orders placed during the hospital encounter of 03/10/16   X-Ray Hand Complete Bilateral    Narrative 3 views of either hand, 6 views total    Comparison: 07/16/2015    Findings: There some stable mild joint space narrowing involving the interphalangeal joints.  No acute fracture or dislocation.  No new erosive changes are suggested.    Impression  Stable exam  ______________________________________     Electronically signed by: BOSSMAN DEJESUS D.O.  Date:     03/10/16  Time:    11:30      Results for orders placed during the hospital encounter of 03/10/16   X-Ray Foot Complete Bilateral    Narrative 3 views of either foot, 6 views total    Comparison: 07/16/2015    Findings: There is no evidence to suggest acute fracture or dislocation.  There are stable moderate to severe degenerative changes in the region of the midfoot.  Dorsal and plantar calcaneal enthesophytes are also present bilaterally.  Generalized osteopenia is noted.  There is a healed fracture involving the fifth proximal phalanx on the right.    Impression  As above  ______________________________________     Electronically signed by: BOSSMAN DEJESUS D.O.  Date:     03/10/16  Time:    11:29      Results for orders placed during the hospital encounter of 07/16/15   X-Ray Chest PA And Lateral    Narrative Findings: There is been no change from previous radiograph 4/10/13.2 views     Impression  No active disease.      Electronically signed by: PRO MAURER MD  Date:     07/16/15  Time:    12:13        Results for orders placed in visit on 10/10/19   DXA Bone Density Spine And Hip    Narrative EXAMINATION:  DEXA BONE DENSITY SPINE HIP    CLINICAL HISTORY:  suspected osteoporosis; Age-related osteoporosis without current pathological fracture    TECHNIQUE:  DXA scanning was performed over the left hip and lumbar spine.  Review of the images confirms satisfactory positioning and technique.    COMPARISON:  05/10/2017    FINDINGS:  The L1 to L4 vertebral bone mineral density is equal to 1.104 g/cm squared with a T score of -0.7.  There has been a positive 7.0% statistically significant change relative to the prior study.    The left femoral neck bone mineral density is equal to 0.775 g/cm squared with a T score of -1.9.  There has been a positive 7.6% increase relative to the prior study.    There is a 5.9% risk of a major osteoporotic fracture and a 1.5% risk of hip fracture in the next 10 years (FRAX).      Impression Osteopenia    Consider FDA approved medical therapies in postmenopausal women and men aged 50 years and older, based on the following:    *A hip or vertebral (clinical or morphometric) fracture  *T score less than or equal to -2.5 at the femoral neck or spine after appropriate evaluation to exclude secondary causes.  *Low bone mass -- also known as osteopenia (T score between -1.0 and -2.5 at the femoral neck or spine) and a 10 year probability of hip fracture greater than or equal to 3% or a 10 year probability of major osteoporosis-related fracture greater than or equal to 20% based on the [place]-adapted WHO algorithm.  *Clinicians judgment and/or patient preference may indicate treatment for people with 10 year fracture probabilities is above or below these levels.      Electronically signed by: Kuldeep Flores DO  Date:    10/10/2019  Time:    09:47        Ref. Range 6/7/2019 07:55    Vit D, 25-Hydroxy Latest Ref Range: 30 - 96 ng/mL 45         DEXA 5/10/17 total femur T score -3.0, femur neck -2.5, spine -1.8 impression osteoporosis     Bilateral hand and foot x-rays stable 7/2015    Assessment:       1. Rheumatoid arthritis involving multiple sites with positive rheumatoid factor    2. Fibromyalgia    3. Age-related osteoporosis without current pathological fracture    4. Long-term use of immunosuppressant medication    5. Vitamin D deficiency disease              1.  Seropositive rheumatoid arthritis currently stable on low-dose methotrexate and Plaquenil on exam today 0 swollen/0 tender joints- HERSON 0, CDAI 0, lowery 28 1.83      2.  Osteoporosis new diagnosis in May with a T score at the total femur -3.0 recently  Compliance issues with oral Fosamax--  forgets to take, does not want to add any more pills to take   Moved  IV Reclast 10/9/18 X 1 dose  Due for 2nd dose today - with improved bmd to mod risk fx will stop reclast   Patient currently not on prednisone but she is a current smoker     3.  Fibromyalgia stable     4.  Long-term use of immunosuppression medication with no issues with recurrent infections  Will get her flu shot when she sees dr severino in early dec     5.  Chronic tobacco user     6.  Medication monitoring with no current toxicity issues     7. Vaccines up to date      8. Mild ARF on Chronic mild RI- hydrate  D/c reclast  Stop mtx  Avoid naids  Keep bp controlled  Follow labs       Plan:     No orders of the defined types were placed in this encounter.         C/w  Plaquenil to once a day    Stop her mtx at 7.5 mg daily      C/w keep folic acid 1 mg daily     Avoid nsaids, tylenol for pain      yearly  IV Reclast 5 mg- due today - with improved BMD now to Osteopenia with mod risk fx- can place on holiday and hold reclast today     repeat her bone density in 2-3 years- 2022   Counseled patient on smoking cessation encouraged patient to look into programs- discussed the one  available here    Creatinine 1.6, BUN 24- eGFR 38  Encouraged hydration and watch her renal function      Return to clinic in 4 months with labs- cbc, cmp, esr, crp   Call with any questions, changes, or concerns.

## 2019-10-29 RX ORDER — NAPROXEN 500 MG/1
500 TABLET ORAL 2 TIMES DAILY WITH MEALS
Qty: 60 TABLET | Refills: 3 | Status: SHIPPED | OUTPATIENT
Start: 2019-10-29 | End: 2020-11-11 | Stop reason: SDUPTHER

## 2019-10-29 NOTE — TELEPHONE ENCOUNTER
Patient is requesting a refill of her Naproxen to be sent to the [facility] in [place]. Thanks, Fernandez

## 2019-11-04 DIAGNOSIS — Z79.631 METHOTREXATE, LONG TERM, CURRENT USE: ICD-10-CM

## 2019-11-04 RX ORDER — FOLIC ACID 1 MG/1
1000 TABLET ORAL DAILY
Qty: 30 TABLET | Refills: 11 | Status: SHIPPED | OUTPATIENT
Start: 2019-11-04 | End: 2020-10-26 | Stop reason: SDUPTHER

## 2019-11-04 NOTE — TELEPHONE ENCOUNTER
----- Message from Marguerite Estevez sent at 11/4/2019 10:08 AM CST -----  Contact: Patient   Type:  RX Refill Request    Who Called: Patient   Refill or New Rx: Refill   RX Name and Strength: Folic Acid   How is the patient currently taking it? (ex. 1XDay): 1x day  Is this a 30 day or 90 day RX: 30  Preferred Pharmacy with phone number:   [id number] - [place], [place] - [address] AT [facility] [address]  [address]  [place] [address]  Phone: [phone] Fax: [phone]  Local or Mail Order: Local   Ordering Provider: Nicole Vasquez/PA  Would the patient rather a call back or a response via [facility]? Call back   Best Call Back Number: Please call her at [phone]  Additional Information: n/a

## 2019-12-02 DIAGNOSIS — M05.749 RHEUMATOID ARTHRITIS INVOLVING HAND WITH POSITIVE RHEUMATOID FACTOR, UNSPECIFIED LATERALITY: ICD-10-CM

## 2019-12-02 RX ORDER — HYDROXYCHLOROQUINE SULFATE 200 MG/1
200 TABLET, FILM COATED ORAL DAILY
Qty: 30 TABLET | Refills: 11 | Status: SHIPPED | OUTPATIENT
Start: 2019-12-02 | End: 2020-11-19 | Stop reason: SDUPTHER

## 2019-12-09 DIAGNOSIS — E87.6 HYPOPOTASSEMIA: ICD-10-CM

## 2019-12-09 RX ORDER — POTASSIUM CHLORIDE 20 MEQ/1
TABLET, EXTENDED RELEASE ORAL
Qty: 60 TABLET | Refills: 11 | Status: SHIPPED | OUTPATIENT
Start: 2019-12-09 | End: 2020-12-29

## 2019-12-17 ENCOUNTER — LAB VISIT (OUTPATIENT)
Dept: LAB | Facility: HOSPITAL | Age: 69
End: 2019-12-17
Attending: NURSE PRACTITIONER
Payer: MEDICARE

## 2019-12-17 DIAGNOSIS — E78.5 HYPERLIPIDEMIA ASSOCIATED WITH TYPE 2 DIABETES MELLITUS: ICD-10-CM

## 2019-12-17 DIAGNOSIS — E11.8 TYPE 2 DIABETES MELLITUS WITH COMPLICATION: ICD-10-CM

## 2019-12-17 DIAGNOSIS — I10 ESSENTIAL HYPERTENSION: ICD-10-CM

## 2019-12-17 DIAGNOSIS — E11.69 HYPERLIPIDEMIA ASSOCIATED WITH TYPE 2 DIABETES MELLITUS: ICD-10-CM

## 2019-12-17 LAB
ALBUMIN SERPL BCP-MCNC: 3.5 G/DL (ref 3.5–5.2)
ALP SERPL-CCNC: 101 U/L (ref 55–135)
ALT SERPL W/O P-5'-P-CCNC: 9 U/L (ref 10–44)
ANION GAP SERPL CALC-SCNC: 7 MMOL/L (ref 8–16)
AST SERPL-CCNC: 13 U/L (ref 10–40)
BILIRUB SERPL-MCNC: 0.3 MG/DL (ref 0.1–1)
BUN SERPL-MCNC: 31 MG/DL (ref 8–23)
CALCIUM SERPL-MCNC: 9.6 MG/DL (ref 8.7–10.5)
CHLORIDE SERPL-SCNC: 104 MMOL/L (ref 95–110)
CHOLEST SERPL-MCNC: 145 MG/DL (ref 120–199)
CHOLEST/HDLC SERPL: 2.8 {RATIO} (ref 2–5)
CO2 SERPL-SCNC: 30 MMOL/L (ref 23–29)
CREAT SERPL-MCNC: 1.6 MG/DL (ref 0.5–1.4)
EST. GFR  (AFRICAN AMERICAN): 37.9 ML/MIN/1.73 M^2
EST. GFR  (NON AFRICAN AMERICAN): 32.9 ML/MIN/1.73 M^2
ESTIMATED AVG GLUCOSE: 108 MG/DL (ref 68–131)
GLUCOSE SERPL-MCNC: 100 MG/DL (ref 70–110)
HBA1C MFR BLD HPLC: 5.4 % (ref 4–5.6)
HDLC SERPL-MCNC: 52 MG/DL (ref 40–75)
HDLC SERPL: 35.9 % (ref 20–50)
LDLC SERPL CALC-MCNC: 74 MG/DL (ref 63–159)
NONHDLC SERPL-MCNC: 93 MG/DL
POTASSIUM SERPL-SCNC: 4.6 MMOL/L (ref 3.5–5.1)
PROT SERPL-MCNC: 7.6 G/DL (ref 6–8.4)
SODIUM SERPL-SCNC: 141 MMOL/L (ref 136–145)
TRIGL SERPL-MCNC: 95 MG/DL (ref 30–150)

## 2019-12-17 PROCEDURE — 36415 COLL VENOUS BLD VENIPUNCTURE: CPT

## 2019-12-17 PROCEDURE — 83036 HEMOGLOBIN GLYCOSYLATED A1C: CPT

## 2019-12-17 PROCEDURE — 80053 COMPREHEN METABOLIC PANEL: CPT

## 2019-12-17 PROCEDURE — 80061 LIPID PANEL: CPT

## 2019-12-24 ENCOUNTER — OFFICE VISIT (OUTPATIENT)
Dept: INTERNAL MEDICINE | Facility: CLINIC | Age: 69
End: 2019-12-24
Payer: MEDICARE

## 2019-12-24 VITALS
TEMPERATURE: 97 F | BODY MASS INDEX: 39.67 KG/M2 | HEIGHT: 64 IN | SYSTOLIC BLOOD PRESSURE: 116 MMHG | OXYGEN SATURATION: 95 % | HEART RATE: 87 BPM | DIASTOLIC BLOOD PRESSURE: 62 MMHG | WEIGHT: 232.38 LBS

## 2019-12-24 DIAGNOSIS — Z12.39 BREAST CANCER SCREENING: ICD-10-CM

## 2019-12-24 DIAGNOSIS — I10 ESSENTIAL HYPERTENSION: ICD-10-CM

## 2019-12-24 DIAGNOSIS — J45.30 MILD PERSISTENT ASTHMA WITHOUT COMPLICATION: ICD-10-CM

## 2019-12-24 DIAGNOSIS — E78.5 HYPERLIPIDEMIA ASSOCIATED WITH TYPE 2 DIABETES MELLITUS: ICD-10-CM

## 2019-12-24 DIAGNOSIS — E11.69 HYPERLIPIDEMIA ASSOCIATED WITH TYPE 2 DIABETES MELLITUS: ICD-10-CM

## 2019-12-24 DIAGNOSIS — Z12.31 ENCOUNTER FOR SCREENING MAMMOGRAM FOR BREAST CANCER: ICD-10-CM

## 2019-12-24 DIAGNOSIS — E55.9 VITAMIN D DEFICIENCY DISEASE: ICD-10-CM

## 2019-12-24 DIAGNOSIS — F17.210 CIGARETTE SMOKER: ICD-10-CM

## 2019-12-24 DIAGNOSIS — E87.6 HYPOPOTASSEMIA: ICD-10-CM

## 2019-12-24 DIAGNOSIS — M05.79 RHEUMATOID ARTHRITIS INVOLVING MULTIPLE SITES WITH POSITIVE RHEUMATOID FACTOR: ICD-10-CM

## 2019-12-24 DIAGNOSIS — I77.1 TORTUOUS AORTA: ICD-10-CM

## 2019-12-24 DIAGNOSIS — E11.8 DIABETES MELLITUS TYPE 2 WITH COMPLICATIONS: Primary | ICD-10-CM

## 2019-12-24 DIAGNOSIS — E66.01 CLASS 3 SEVERE OBESITY DUE TO EXCESS CALORIES WITH SERIOUS COMORBIDITY AND BODY MASS INDEX (BMI) OF 40.0 TO 44.9 IN ADULT: ICD-10-CM

## 2019-12-24 DIAGNOSIS — K21.9 GASTROESOPHAGEAL REFLUX DISEASE WITHOUT ESOPHAGITIS: ICD-10-CM

## 2019-12-24 DIAGNOSIS — Z79.60 LONG-TERM USE OF IMMUNOSUPPRESSANT MEDICATION: ICD-10-CM

## 2019-12-24 DIAGNOSIS — E86.0 DEHYDRATION: ICD-10-CM

## 2019-12-24 DIAGNOSIS — M79.7 FIBROMYALGIA: ICD-10-CM

## 2019-12-24 DIAGNOSIS — M81.0 AGE-RELATED OSTEOPOROSIS WITHOUT CURRENT PATHOLOGICAL FRACTURE: ICD-10-CM

## 2019-12-24 PROCEDURE — 99999 PR PBB SHADOW E&M-EST. PATIENT-LVL III: ICD-10-PCS | Mod: PBBFAC,,, | Performed by: PEDIATRICS

## 2019-12-24 PROCEDURE — 99214 PR OFFICE/OUTPT VISIT, EST, LEVL IV, 30-39 MIN: ICD-10-PCS | Mod: S$PBB,,, | Performed by: PEDIATRICS

## 2019-12-24 PROCEDURE — 1126F AMNT PAIN NOTED NONE PRSNT: CPT | Mod: ,,, | Performed by: PEDIATRICS

## 2019-12-24 PROCEDURE — 99999 PR PBB SHADOW E&M-EST. PATIENT-LVL III: CPT | Mod: PBBFAC,,, | Performed by: PEDIATRICS

## 2019-12-24 PROCEDURE — 99214 OFFICE O/P EST MOD 30 MIN: CPT | Mod: S$PBB,,, | Performed by: PEDIATRICS

## 2019-12-24 PROCEDURE — 90662 IIV NO PRSV INCREASED AG IM: CPT | Mod: PBBFAC

## 2019-12-24 PROCEDURE — 99213 OFFICE O/P EST LOW 20 MIN: CPT | Mod: PBBFAC | Performed by: PEDIATRICS

## 2019-12-24 PROCEDURE — 1159F MED LIST DOCD IN RCRD: CPT | Mod: ,,, | Performed by: PEDIATRICS

## 2019-12-24 PROCEDURE — 1159F PR MEDICATION LIST DOCUMENTED IN MEDICAL RECORD: ICD-10-PCS | Mod: ,,, | Performed by: PEDIATRICS

## 2019-12-24 PROCEDURE — 1126F PR PAIN SEVERITY QUANTIFIED, NO PAIN PRESENT: ICD-10-PCS | Mod: ,,, | Performed by: PEDIATRICS

## 2019-12-24 NOTE — PROGRESS NOTES
Subjective:        Patient ID: Page Grissom is a 68 y.o. female.     Chief Complaint: Follow-up     HPI Comments: DM: no hyper/hypoglycemic symptoms. Rare self monitoring normal BS. Somewhatt following D&E, weight is down.   HTN: normal, no HTNive symptoms.    LIPIDS:not following D&E, tolerating and compliant with med(s).    ASTHMA: Rare cigarette smoking, Asthma quiet no albuterol or advair currently.  GERD: Quiet.  Rheum/osteoporosis: sees rheum regularly.  LABS REVIEWED AND DISCUSSED WITH PATIENT     Review of Systems   Constitutional: Negative for fever and unexpected weight change.   HENT: Negative for congestion and rhinorrhea.   Eyes: Negative for discharge and redness.   Respiratory: Negative for cough, shortness of breath and wheezing.   Cardiovascular: Negative for chest pain, palpitations and leg swelling.   Gastrointestinal: Negative for constipation, diarrhea and vomiting.   Endocrine: Negative for cold intolerance, heat intolerance, polydipsia, polyphagia and polyuria.   Genitourinary: Negative for decreased urine volume, difficulty urinating and menstrual problem.   Musculoskeletal: Negative for arthralgias and joint swelling.   Skin: Negative for rash and wound.   Neurological: Negative for syncope and headaches.   Psychiatric/Behavioral: Negative for behavioral problems and sleep disturbance. Adapting to 's death.     Objective:       Physical Exam   Constitutional: She is oriented to person, place, and time. She appears well-developed and well-nourished. She is cooperative.   Neck: Trachea normal and normal range of motion. Neck supple. No JVD present. Carotid bruit is not present. No Brudzinski's sign and no Kernig's sign noted. No thyroid mass and no thyromegaly present.   Cardiovascular: Normal rate, regular rhythm, normal heart sounds and normal pulses.    No murmur heard.  Pulses:  Dorsalis pedis pulses are 2+ on the right side, and 2+ on the left side.    Posterior tibial  pulses are 2+ on the right side, and 2+ on the left side.   Pulmonary/Chest: Effort normal and breath sounds normal. She has no wheezes. She has no rhonchi. She has no rales.   Abdominal: Soft. Normal appearance. There is no hepatosplenomegaly. There is no tenderness. There is no rebound and no CVA tenderness.   Lymphadenopathy:   She has no cervical adenopathy.   Neurological: She is alert and oriented to person, place, and time. She has normal strength and normal reflexes. No cranial nerve deficit or sensory deficit. Coordination and gait normal.   Skin: Skin is warm. No abrasion and no rash noted. Mild noninflammed varicosities, trace edema  Psychiatric: She has a normal mood and affect. Her speech is normal and behavior is normal. Judgment and thought content normal. Her mood appears not anxious. Cognition and memory are normal. She does not exhibit a depressed mood.            Assessment:       1.  Diabetes mellitus type 2 with complications     2.  Essential hypertension     3.  Hyperlipidemia associated with type 2 diabetes mellitus     4.  Gastroesophageal reflux disease without esophagitis     5.  Mild persistent asthma without complication     6.  Cigarette smoker     7.  morbid obesity due to excess calories     8.        Rheumatoid arthritis    9.        Osteoporosis  10.      Breast cancer screening  11. Dehydration    Plan:    I suspect her cre is up due to poor water intake, her BUN is also up. 3 extra glasses of water and recheck C7 next week. D&E, weight loss, stop smoking completely. Meds reviewed, early restart advair if needed. Self monitor BP and BS. F/U 6 months with labs. Flu now. Mammogram due.

## 2020-01-07 ENCOUNTER — LAB VISIT (OUTPATIENT)
Dept: LAB | Facility: HOSPITAL | Age: 70
End: 2020-01-07
Attending: PEDIATRICS
Payer: MEDICARE

## 2020-01-07 DIAGNOSIS — E11.8 DIABETES MELLITUS TYPE 2 WITH COMPLICATIONS: ICD-10-CM

## 2020-01-07 LAB
ANION GAP SERPL CALC-SCNC: 11 MMOL/L (ref 8–16)
BUN SERPL-MCNC: 18 MG/DL (ref 8–23)
CALCIUM SERPL-MCNC: 9.7 MG/DL (ref 8.7–10.5)
CHLORIDE SERPL-SCNC: 105 MMOL/L (ref 95–110)
CO2 SERPL-SCNC: 26 MMOL/L (ref 23–29)
CREAT SERPL-MCNC: 1.2 MG/DL (ref 0.5–1.4)
EST. GFR  (AFRICAN AMERICAN): 53.7 ML/MIN/1.73 M^2
EST. GFR  (NON AFRICAN AMERICAN): 46.5 ML/MIN/1.73 M^2
GLUCOSE SERPL-MCNC: 91 MG/DL (ref 70–110)
POTASSIUM SERPL-SCNC: 4.8 MMOL/L (ref 3.5–5.1)
SODIUM SERPL-SCNC: 142 MMOL/L (ref 136–145)

## 2020-01-07 PROCEDURE — 36415 COLL VENOUS BLD VENIPUNCTURE: CPT

## 2020-01-07 PROCEDURE — 80048 BASIC METABOLIC PNL TOTAL CA: CPT

## 2020-01-08 NOTE — PROGRESS NOTES
Notify patient normal results. Her kidney function normalized. Advise patient to make sure she stays hydrated.

## 2020-01-16 ENCOUNTER — HOSPITAL ENCOUNTER (OUTPATIENT)
Dept: RADIOLOGY | Facility: HOSPITAL | Age: 70
Discharge: HOME | End: 2020-01-16
Attending: PEDIATRICS
Payer: MEDICARE

## 2020-01-16 VITALS — HEIGHT: 64 IN | BODY MASS INDEX: 39.89 KG/M2

## 2020-01-16 DIAGNOSIS — Z12.31 ENCOUNTER FOR SCREENING MAMMOGRAM FOR BREAST CANCER: ICD-10-CM

## 2020-01-16 PROCEDURE — 77067 MAMMO DIGITAL SCREENING BILAT WITH TOMOSYNTHESIS_CAD: ICD-10-PCS | Mod: 26,,, | Performed by: RADIOLOGY

## 2020-01-16 PROCEDURE — 77063 MAMMO DIGITAL SCREENING BILAT WITH TOMOSYNTHESIS_CAD: ICD-10-PCS | Mod: 26,,, | Performed by: RADIOLOGY

## 2020-01-16 PROCEDURE — 77063 BREAST TOMOSYNTHESIS BI: CPT | Mod: 26,,, | Performed by: RADIOLOGY

## 2020-01-16 PROCEDURE — 77067 SCR MAMMO BI INCL CAD: CPT | Mod: 26,,, | Performed by: RADIOLOGY

## 2020-01-16 PROCEDURE — 77067 SCR MAMMO BI INCL CAD: CPT | Mod: TC

## 2020-02-11 ENCOUNTER — OFFICE VISIT (OUTPATIENT)
Dept: RHEUMATOLOGY | Facility: CLINIC | Age: 70
End: 2020-02-11
Payer: MEDICARE

## 2020-02-11 ENCOUNTER — LAB VISIT (OUTPATIENT)
Dept: LAB | Facility: HOSPITAL | Age: 70
End: 2020-02-11
Attending: PHYSICIAN ASSISTANT
Payer: MEDICARE

## 2020-02-11 VITALS
RESPIRATION RATE: 16 BRPM | BODY MASS INDEX: 41.03 KG/M2 | HEART RATE: 74 BPM | SYSTOLIC BLOOD PRESSURE: 122 MMHG | HEIGHT: 64 IN | WEIGHT: 240.31 LBS | DIASTOLIC BLOOD PRESSURE: 60 MMHG

## 2020-02-11 DIAGNOSIS — M81.0 AGE-RELATED OSTEOPOROSIS WITHOUT CURRENT PATHOLOGICAL FRACTURE: ICD-10-CM

## 2020-02-11 DIAGNOSIS — E55.9 VITAMIN D DEFICIENCY DISEASE: ICD-10-CM

## 2020-02-11 DIAGNOSIS — M05.79 RHEUMATOID ARTHRITIS INVOLVING MULTIPLE SITES WITH POSITIVE RHEUMATOID FACTOR: Primary | ICD-10-CM

## 2020-02-11 DIAGNOSIS — Z79.899 HIGH RISK MEDICATION USE: ICD-10-CM

## 2020-02-11 DIAGNOSIS — M79.7 FIBROMYALGIA: ICD-10-CM

## 2020-02-11 DIAGNOSIS — M05.79 RHEUMATOID ARTHRITIS INVOLVING MULTIPLE SITES WITH POSITIVE RHEUMATOID FACTOR: ICD-10-CM

## 2020-02-11 DIAGNOSIS — F17.210 CIGARETTE SMOKER: ICD-10-CM

## 2020-02-11 DIAGNOSIS — Z79.60 LONG-TERM USE OF IMMUNOSUPPRESSANT MEDICATION: ICD-10-CM

## 2020-02-11 LAB
ALBUMIN SERPL BCP-MCNC: 3.4 G/DL (ref 3.5–5.2)
ALP SERPL-CCNC: 94 U/L (ref 55–135)
ALT SERPL W/O P-5'-P-CCNC: 9 U/L (ref 10–44)
ANION GAP SERPL CALC-SCNC: 8 MMOL/L (ref 8–16)
AST SERPL-CCNC: 11 U/L (ref 10–40)
BASOPHILS # BLD AUTO: 0.05 K/UL (ref 0–0.2)
BASOPHILS NFR BLD: 0.6 % (ref 0–1.9)
BILIRUB SERPL-MCNC: 0.2 MG/DL (ref 0.1–1)
BUN SERPL-MCNC: 20 MG/DL (ref 8–23)
CALCIUM SERPL-MCNC: 9.3 MG/DL (ref 8.7–10.5)
CHLORIDE SERPL-SCNC: 105 MMOL/L (ref 95–110)
CO2 SERPL-SCNC: 28 MMOL/L (ref 23–29)
CREAT SERPL-MCNC: 1.3 MG/DL (ref 0.5–1.4)
CRP SERPL-MCNC: 5.5 MG/L (ref 0–8.2)
DIFFERENTIAL METHOD: ABNORMAL
EOSINOPHIL # BLD AUTO: 0.2 K/UL (ref 0–0.5)
EOSINOPHIL NFR BLD: 2.4 % (ref 0–8)
ERYTHROCYTE [DISTWIDTH] IN BLOOD BY AUTOMATED COUNT: 14.4 % (ref 11.5–14.5)
ERYTHROCYTE [SEDIMENTATION RATE] IN BLOOD BY WESTERGREN METHOD: 38 MM/HR (ref 0–36)
EST. GFR  (AFRICAN AMERICAN): 49 ML/MIN/1.73 M^2
EST. GFR  (NON AFRICAN AMERICAN): 42 ML/MIN/1.73 M^2
GLUCOSE SERPL-MCNC: 108 MG/DL (ref 70–110)
HCT VFR BLD AUTO: 36.8 % (ref 37–48.5)
HGB BLD-MCNC: 11.5 G/DL (ref 12–16)
IMM GRANULOCYTES # BLD AUTO: 0.03 K/UL (ref 0–0.04)
IMM GRANULOCYTES NFR BLD AUTO: 0.4 % (ref 0–0.5)
LYMPHOCYTES # BLD AUTO: 2.1 K/UL (ref 1–4.8)
LYMPHOCYTES NFR BLD: 26.6 % (ref 18–48)
MCH RBC QN AUTO: 27.2 PG (ref 27–31)
MCHC RBC AUTO-ENTMCNC: 31.3 G/DL (ref 32–36)
MCV RBC AUTO: 87 FL (ref 82–98)
MONOCYTES # BLD AUTO: 0.9 K/UL (ref 0.3–1)
MONOCYTES NFR BLD: 10.8 % (ref 4–15)
NEUTROPHILS # BLD AUTO: 4.8 K/UL (ref 1.8–7.7)
NEUTROPHILS NFR BLD: 59.6 % (ref 38–73)
NRBC BLD-RTO: 0 /100 WBC
PLATELET # BLD AUTO: 273 K/UL (ref 150–350)
PMV BLD AUTO: 10 FL (ref 9.2–12.9)
POTASSIUM SERPL-SCNC: 4.6 MMOL/L (ref 3.5–5.1)
PROT SERPL-MCNC: 7.1 G/DL (ref 6–8.4)
RBC # BLD AUTO: 4.23 M/UL (ref 4–5.4)
SODIUM SERPL-SCNC: 141 MMOL/L (ref 136–145)
WBC # BLD AUTO: 7.97 K/UL (ref 3.9–12.7)

## 2020-02-11 PROCEDURE — 80053 COMPREHEN METABOLIC PANEL: CPT

## 2020-02-11 PROCEDURE — 99214 PR OFFICE/OUTPT VISIT, EST, LEVL IV, 30-39 MIN: ICD-10-PCS | Mod: S$PBB,,, | Performed by: PHYSICIAN ASSISTANT

## 2020-02-11 PROCEDURE — 99214 OFFICE O/P EST MOD 30 MIN: CPT | Mod: PBBFAC | Performed by: PHYSICIAN ASSISTANT

## 2020-02-11 PROCEDURE — 99214 OFFICE O/P EST MOD 30 MIN: CPT | Mod: S$PBB,,, | Performed by: PHYSICIAN ASSISTANT

## 2020-02-11 PROCEDURE — 36415 COLL VENOUS BLD VENIPUNCTURE: CPT

## 2020-02-11 PROCEDURE — 99999 PR PBB SHADOW E&M-EST. PATIENT-LVL IV: CPT | Mod: PBBFAC,,, | Performed by: PHYSICIAN ASSISTANT

## 2020-02-11 PROCEDURE — 85025 COMPLETE CBC W/AUTO DIFF WBC: CPT

## 2020-02-11 PROCEDURE — 85652 RBC SED RATE AUTOMATED: CPT

## 2020-02-11 PROCEDURE — 99999 PR PBB SHADOW E&M-EST. PATIENT-LVL IV: ICD-10-PCS | Mod: PBBFAC,,, | Performed by: PHYSICIAN ASSISTANT

## 2020-02-11 PROCEDURE — 86140 C-REACTIVE PROTEIN: CPT

## 2020-02-11 ASSESSMENT — ROUTINE ASSESSMENT OF PATIENT INDEX DATA (RAPID3): MDHAQ FUNCTION SCORE: .1

## 2020-02-11 NOTE — PROGRESS NOTES
Subjective:       [facility], [place]     Patient ID: Page Grissom is a 69 y.o. female.    Chief Complaint: Follow-up (4 mo) and Rheumatoid Arthritis    Page is here today for rheumatology follow-up     she has seropositive rheumatoid arthritis- last visit RA was stable, weaned down on her mtx to  7.5 mg once weekly and stopped last visit  Still taking Plaquenil 200 mg once a day.  She's also on folic acid daily.  Naproxen 500 mg twice daily only as needed. She has some mild ckd; we discussed not using nsaids and changing to tylenol. Overall doing well. Bilateral knee osteoarthritis her knee pain comes and goes.  Pain today rated 0/10. No swelling. No other issues. No RA exacerbations.    Minimal morning stiffness.  Fibromyalgia also doing well.     Bone density was done 5/10/17 meeting criteria for osteoporosis she was placed on Fosamax once weekly, she ran out of her fosamax and stopped it. Could not remember to take it. She  Preferred not to add more tablets to her current meds.  We moved to  IV Reclast 10/09/18- she did well. Had some mild arthralgias and myalgias for 2 days then resolved. No falls or fractures since last visit. Low vit D on supplement vit D3 2000 IU last vit d was 50  Due for another dose today, repeat dexa today 10/10/19- stable and improved- now osteopenia with mod risk fx; Reclast was d/c      Fibromyalgia   Pertinent negatives include no abdominal pain, arthralgias, chest pain, chills, fatigue, fever, joint swelling, myalgias, nausea, neck pain, rash, vomiting or weakness.   Follow-up   Pertinent negatives include no abdominal pain, arthralgias, chest pain, chills, fatigue, fever, joint swelling, myalgias, nausea, neck pain, rash, vomiting or weakness.     Review of Systems   Constitutional: Negative.  Negative for activity change, appetite change, chills, fatigue and fever.   HENT: Negative.  Negative for mouth sores and trouble swallowing.       "   No dry mouth   Eyes: Negative.  Negative for photophobia, pain and redness.        No swollen or red eyes, no dry eye     Respiratory: Negative.  Negative for chest tightness, shortness of breath, wheezing and stridor.    Cardiovascular: Negative.  Negative for chest pain.   Gastrointestinal: Negative.  Negative for abdominal pain, blood in stool, diarrhea, nausea and vomiting.   Genitourinary: Negative.  Negative for dysuria, frequency, hematuria and urgency.   Musculoskeletal: Negative.  Negative for arthralgias, back pain, gait problem, joint swelling, myalgias, neck pain and neck stiffness.   Skin: Negative.  Negative for color change, pallor and rash.   Neurological: Negative.  Negative for weakness.   Hematological: Negative for adenopathy.   Psychiatric/Behavioral: Negative for suicidal ideas.         Objective:   /60 (BP Location: Right arm, Patient Position: Sitting, BP Method: Large (Manual))   Pulse 74   Resp 16   Ht 5' 4" (1.626 m)   Wt 109 kg (240 lb 4.8 oz)   BMI 41.25 kg/m²        Past Medical History:   Diagnosis Date    Asthma     Back pain     Cataract     OD    Diabetes mellitus     Fibromyalgia     Gastroesophageal reflux disease     Hypercholesterolemia     Hypertension     Immune deficiency disorder     Osteoporosis     Rheumatoid arthritis     Tobacco dependence     Trouble in sleeping     Type 2 diabetes mellitus       Immunization History   Administered Date(s) Administered    Influenza 10/10/2006, 12/19/2007, 10/08/2009, 12/15/2010, 11/09/2011, 10/16/2012    Influenza - High Dose - PF (65 years and older) 12/06/2017, 12/13/2018, 12/24/2019    Influenza - Quadrivalent 10/30/2014    Influenza Split 10/08/2013    PPD Test 04/05/2010, 04/05/2010    Pneumococcal Conjugate - 13 Valent 04/30/2015    Pneumococcal Polysaccharide - 23 Valent 02/12/2007, 12/13/2018    Tdap 10/16/2012    Zoster 04/30/2015          Physical Exam   Constitutional: She is oriented to " person, place, and time and well-developed, well-nourished, and in no distress. No distress.   HENT:   Head: Normocephalic and atraumatic.   Right Ear: External ear normal.   Left Ear: External ear normal.   Mouth/Throat: No oropharyngeal exudate.   Eyes: Conjunctivae and EOM are normal. Pupils are equal, round, and reactive to light. No scleral icterus.   Neck: Normal range of motion. Neck supple. No thyromegaly present.   Cardiovascular: Normal rate, regular rhythm and normal heart sounds.    No murmur heard.  Pulmonary/Chest: Effort normal and breath sounds normal. She exhibits no tenderness.   Abdominal: Soft. Bowel sounds are normal.   Lymphadenopathy:     She has no cervical adenopathy.   Neurological: She is alert and oriented to person, place, and time. She displays normal reflexes. No cranial nerve deficit. She exhibits normal muscle tone. Gait normal.   Skin: Skin is warm and dry. No rash noted.     Musculoskeletal: Normal range of motion. She exhibits no edema or tenderness.                Physical Exam     CANO-28 tender joint count: 0  CANO-28 swollen joint count: 0  CRP (mg/L): 5.5  Patient global assessment: 0  CANO-28 CRP score: 1.63 (Remission)      Recent Results (from the past 336 hour(s))   C-reactive protein    Collection Time: 02/11/20  9:16 AM   Result Value Ref Range    CRP 5.5 0.0 - 8.2 mg/L   Comprehensive metabolic panel    Collection Time: 02/11/20  9:16 AM   Result Value Ref Range    Sodium 141 136 - 145 mmol/L    Potassium 4.6 3.5 - 5.1 mmol/L    Chloride 105 95 - 110 mmol/L    CO2 28 23 - 29 mmol/L    Glucose 108 70 - 110 mg/dL    BUN, Bld 20 8 - 23 mg/dL    Creatinine 1.3 0.5 - 1.4 mg/dL    Calcium 9.3 8.7 - 10.5 mg/dL    Total Protein 7.1 6.0 - 8.4 g/dL    Albumin 3.4 (L) 3.5 - 5.2 g/dL    Total Bilirubin 0.2 0.1 - 1.0 mg/dL    Alkaline Phosphatase 94 55 - 135 U/L    AST 11 10 - 40 U/L    ALT 9 (L) 10 - 44 U/L    Anion Gap 8 8 - 16 mmol/L    eGFR if  49 (A) >60  mL/min/1.73 m^2    eGFR if non African American 42 (A) >60 mL/min/1.73 m^2   CBC auto differential    Collection Time: 02/11/20  9:16 AM   Result Value Ref Range    WBC 7.97 3.90 - 12.70 K/uL    RBC 4.23 4.00 - 5.40 M/uL    Hemoglobin 11.5 (L) 12.0 - 16.0 g/dL    Hematocrit 36.8 (L) 37.0 - 48.5 %    Mean Corpuscular Volume 87 82 - 98 fL    Mean Corpuscular Hemoglobin 27.2 27.0 - 31.0 pg    Mean Corpuscular Hemoglobin Conc 31.3 (L) 32.0 - 36.0 g/dL    RDW 14.4 11.5 - 14.5 %    Platelets 273 150 - 350 K/uL    MPV 10.0 9.2 - 12.9 fL    Immature Granulocytes 0.4 0.0 - 0.5 %    Gran # (ANC) 4.8 1.8 - 7.7 K/uL    Immature Grans (Abs) 0.03 0.00 - 0.04 K/uL    Lymph # 2.1 1.0 - 4.8 K/uL    Mono # 0.9 0.3 - 1.0 K/uL    Eos # 0.2 0.0 - 0.5 K/uL    Baso # 0.05 0.00 - 0.20 K/uL    nRBC 0 0 /100 WBC    Gran% 59.6 38.0 - 73.0 %    Lymph% 26.6 18.0 - 48.0 %    Mono% 10.8 4.0 - 15.0 %    Eosinophil% 2.4 0.0 - 8.0 %    Basophil% 0.6 0.0 - 1.9 %    Differential Method Automated          Results for orders placed during the hospital encounter of 03/10/16   X-Ray Hand Complete Bilateral    Narrative 3 views of either hand, 6 views total    Comparison: 07/16/2015    Findings: There some stable mild joint space narrowing involving the interphalangeal joints.  No acute fracture or dislocation.  No new erosive changes are suggested.    Impression  Stable exam  ______________________________________     Electronically signed by: BOSSMAN DEJESUS D.O.  Date:     03/10/16  Time:    11:30      Results for orders placed during the hospital encounter of 03/10/16   X-Ray Foot Complete Bilateral    Narrative 3 views of either foot, 6 views total    Comparison: 07/16/2015    Findings: There is no evidence to suggest acute fracture or dislocation.  There are stable moderate to severe degenerative changes in the region of the midfoot.  Dorsal and plantar calcaneal enthesophytes are also present bilaterally.  Generalized osteopenia is noted.  There is  a healed fracture involving the fifth proximal phalanx on the right.    Impression  As above  ______________________________________     Electronically signed by: BOSSMAN DEJESUS D.O.  Date:     03/10/16  Time:    11:29      Results for orders placed during the hospital encounter of 07/16/15   X-Ray Chest PA And Lateral    Narrative Findings: There is been no change from previous radiograph 4/10/13.2 views    Impression  No active disease.      Electronically signed by: PRO MAURER MD  Date:     07/16/15  Time:    12:13        Results for orders placed in visit on 10/10/19   DXA Bone Density Spine And Hip    Narrative EXAMINATION:  DEXA BONE DENSITY SPINE HIP    CLINICAL HISTORY:  suspected osteoporosis; Age-related osteoporosis without current pathological fracture    TECHNIQUE:  DXA scanning was performed over the left hip and lumbar spine.  Review of the images confirms satisfactory positioning and technique.    COMPARISON:  05/10/2017    FINDINGS:  The L1 to L4 vertebral bone mineral density is equal to 1.104 g/cm squared with a T score of -0.7.  There has been a positive 7.0% statistically significant change relative to the prior study.    The left femoral neck bone mineral density is equal to 0.775 g/cm squared with a T score of -1.9.  There has been a positive 7.6% increase relative to the prior study.    There is a 5.9% risk of a major osteoporotic fracture and a 1.5% risk of hip fracture in the next 10 years (FRAX).      Impression Osteopenia    Consider FDA approved medical therapies in postmenopausal women and men aged 50 years and older, based on the following:    *A hip or vertebral (clinical or morphometric) fracture  *T score less than or equal to -2.5 at the femoral neck or spine after appropriate evaluation to exclude secondary causes.  *Low bone mass -- also known as osteopenia (T score between -1.0 and -2.5 at the femoral neck or spine) and a 10 year probability of hip fracture greater than or  equal to 3% or a 10 year probability of major osteoporosis-related fracture greater than or equal to 20% based on the [place]-adapted WHO algorithm.  *Clinicians judgment and/or patient preference may indicate treatment for people with 10 year fracture probabilities is above or below these levels.      Electronically signed by: Kuldeep Flores DO  Date:    10/10/2019  Time:    09:47        Ref. Range 6/7/2019 07:55   Vit D, 25-Hydroxy Latest Ref Range: 30 - 96 ng/mL 45         DEXA 5/10/17 total femur T score -3.0, femur neck -2.5, spine -1.8 impression osteoporosis     Bilateral hand and foot x-rays stable 7/2015    Assessment:       1. Rheumatoid arthritis involving multiple sites with positive rheumatoid factor    2. Age-related osteoporosis without current pathological fracture    3. Fibromyalgia    4. Long-term use of immunosuppressant medication    5. Cigarette smoker    6. High risk medication use              1.  Seropositive rheumatoid arthritis currently stable  Plaquenil     HERSON 0, CDAI 0,   CANO-28 (CRP): 1.63 (Remission)     2.  Osteoporosis new diagnosis in May with a T score at the total femur -3.0 recently  Compliance issues with oral Fosamax--  forgets to take, does not want to add any more pills to take   Moved  IV Reclast 10/9/18 X 1 dose  Due for 2nd dose today - with improved bmd to mod risk fx will stop reclast   Patient currently not on prednisone but she is a current smoker     3.  Fibromyalgia stable     4.  Long-term use of immunosuppression medication with no issues with recurrent infections  Will get her flu shot when she sees dr severino in early dec     5.  Chronic tobacco user     6.  Medication monitoring with no current toxicity issues     7. Vaccines up to date        Plan:     No orders of the defined types were placed in this encounter.         C/w  Plaquenil to once a day    Stay off  Mtx; no need to add back   C/w keep folic acid 1 mg daily     Avoid nsaids on regular basis  Would be ok  to use once to twice a month since tylenol not effective       yearly  IV Reclast 5 mg now on hold w/improved BMD to Osteopenia with mod risk fx-    repeat her bone density in 2-3 years- 2022   Counseled patient on smoking cessation encouraged patient to look into programs- discussed the one available here     Return to clinic in 6 months with labs- cbc, cmp, esr, crp   Call with any questions, changes, or concerns.

## 2020-02-24 DIAGNOSIS — E11.8 TYPE 2 DIABETES MELLITUS WITH COMPLICATION, WITHOUT LONG-TERM CURRENT USE OF INSULIN: ICD-10-CM

## 2020-02-24 NOTE — TELEPHONE ENCOUNTER
Fax rec'vd from pt's pharmacy requesting #[phone] ct refill confirming pt insurance covers 3-mth supply, refill request sent to Dr. Roman for auth [Metformin 500mg].    LV 12/24/19  NV 06/05/20    Last A1c 12/17/19, repeat sched 05/29/20.

## 2020-02-25 RX ORDER — METFORMIN HYDROCHLORIDE 500 MG/1
500 TABLET ORAL DAILY
Qty: 90 TABLET | Refills: 3 | Status: SHIPPED | OUTPATIENT
Start: 2020-02-25 | End: 2021-03-18

## 2020-03-23 ENCOUNTER — TELEPHONE (OUTPATIENT)
Dept: INTERNAL MEDICINE | Facility: CLINIC | Age: 70
End: 2020-03-23

## 2020-03-23 NOTE — TELEPHONE ENCOUNTER
S/w pt c/o prod cough > 2wks, taking OTC Claritin 24hr daily and Mucinex sinus max caps PRN, pt denies SOB/CP/Fever/Nasal congestion, pt in no audible distress, pt reports exposed to dtr and grandson w/ +flu over 6wks ago. Advised pt would send message to Dr. Roman, and let pt know when we receive 's response. Pt voiced understanding.

## 2020-03-23 NOTE — TELEPHONE ENCOUNTER
----- Message from Luciana Piper sent at 3/23/2020  8:57 AM CDT -----  Contact: [place]  Good Morning,    Patient would like a call back regarding appointment. Patient states she's been having a cough x1mth. Patient believes it's allergies.    Please call patient at [phone].    Thank you,  Luciana Velarde

## 2020-05-24 DIAGNOSIS — E11.69 HYPERLIPIDEMIA ASSOCIATED WITH TYPE 2 DIABETES MELLITUS: ICD-10-CM

## 2020-05-24 DIAGNOSIS — E78.5 HYPERLIPIDEMIA ASSOCIATED WITH TYPE 2 DIABETES MELLITUS: ICD-10-CM

## 2020-05-24 DIAGNOSIS — I10 ESSENTIAL HYPERTENSION: ICD-10-CM

## 2020-05-25 RX ORDER — HYDRALAZINE HYDROCHLORIDE 25 MG/1
TABLET, FILM COATED ORAL
Qty: 60 TABLET | Refills: 11 | Status: SHIPPED | OUTPATIENT
Start: 2020-05-25 | End: 2021-05-29

## 2020-05-25 RX ORDER — HYDROCHLOROTHIAZIDE 25 MG/1
TABLET ORAL
Qty: 30 TABLET | Refills: 11 | Status: SHIPPED | OUTPATIENT
Start: 2020-05-25 | End: 2021-05-14

## 2020-05-25 RX ORDER — ROSUVASTATIN CALCIUM 40 MG/1
TABLET, COATED ORAL
Qty: 30 TABLET | Refills: 11 | Status: SHIPPED | OUTPATIENT
Start: 2020-05-25 | End: 2021-06-07 | Stop reason: SDUPTHER

## 2020-05-29 ENCOUNTER — LAB VISIT (OUTPATIENT)
Dept: LAB | Facility: HOSPITAL | Age: 70
End: 2020-05-29
Attending: PEDIATRICS
Payer: MEDICARE

## 2020-05-29 DIAGNOSIS — E86.0 DEHYDRATION: ICD-10-CM

## 2020-05-29 DIAGNOSIS — E11.8 DIABETES MELLITUS TYPE 2 WITH COMPLICATIONS: ICD-10-CM

## 2020-05-29 LAB
ALT SERPL W/O P-5'-P-CCNC: 10 U/L (ref 10–44)
ANION GAP SERPL CALC-SCNC: 7 MMOL/L (ref 8–16)
AST SERPL-CCNC: 13 U/L (ref 10–40)
BUN SERPL-MCNC: 23 MG/DL (ref 8–23)
CALCIUM SERPL-MCNC: 9 MG/DL (ref 8.7–10.5)
CHLORIDE SERPL-SCNC: 106 MMOL/L (ref 95–110)
CHOLEST SERPL-MCNC: 127 MG/DL (ref 120–199)
CHOLEST/HDLC SERPL: 2.4 {RATIO} (ref 2–5)
CO2 SERPL-SCNC: 26 MMOL/L (ref 23–29)
CREAT SERPL-MCNC: 1.3 MG/DL (ref 0.5–1.4)
EST. GFR  (AFRICAN AMERICAN): 48.7 ML/MIN/1.73 M^2
EST. GFR  (NON AFRICAN AMERICAN): 42.3 ML/MIN/1.73 M^2
ESTIMATED AVG GLUCOSE: 111 MG/DL (ref 68–131)
GLUCOSE SERPL-MCNC: 100 MG/DL (ref 70–110)
HBA1C MFR BLD HPLC: 5.5 % (ref 4–5.6)
HDLC SERPL-MCNC: 53 MG/DL (ref 40–75)
HDLC SERPL: 41.7 % (ref 20–50)
LDLC SERPL CALC-MCNC: 57.8 MG/DL (ref 63–159)
NONHDLC SERPL-MCNC: 74 MG/DL
POTASSIUM SERPL-SCNC: 4.4 MMOL/L (ref 3.5–5.1)
SODIUM SERPL-SCNC: 139 MMOL/L (ref 136–145)
TRIGL SERPL-MCNC: 81 MG/DL (ref 30–150)

## 2020-05-29 PROCEDURE — 80048 BASIC METABOLIC PNL TOTAL CA: CPT

## 2020-05-29 PROCEDURE — 36415 COLL VENOUS BLD VENIPUNCTURE: CPT

## 2020-05-29 PROCEDURE — 80061 LIPID PANEL: CPT

## 2020-05-29 PROCEDURE — 84450 TRANSFERASE (AST) (SGOT): CPT

## 2020-05-29 PROCEDURE — 84460 ALANINE AMINO (ALT) (SGPT): CPT

## 2020-05-29 PROCEDURE — 83036 HEMOGLOBIN GLYCOSYLATED A1C: CPT

## 2020-06-05 ENCOUNTER — OFFICE VISIT (OUTPATIENT)
Dept: INTERNAL MEDICINE | Facility: CLINIC | Age: 70
End: 2020-06-05
Payer: MEDICARE

## 2020-06-05 VITALS
TEMPERATURE: 98 F | OXYGEN SATURATION: 100 % | WEIGHT: 241.88 LBS | HEIGHT: 64 IN | DIASTOLIC BLOOD PRESSURE: 80 MMHG | SYSTOLIC BLOOD PRESSURE: 138 MMHG | BODY MASS INDEX: 41.29 KG/M2 | HEART RATE: 92 BPM

## 2020-06-05 DIAGNOSIS — I10 ESSENTIAL HYPERTENSION: Primary | ICD-10-CM

## 2020-06-05 DIAGNOSIS — M79.7 FIBROMYALGIA: ICD-10-CM

## 2020-06-05 DIAGNOSIS — K21.9 GASTROESOPHAGEAL REFLUX DISEASE WITHOUT ESOPHAGITIS: ICD-10-CM

## 2020-06-05 DIAGNOSIS — M05.79 RHEUMATOID ARTHRITIS INVOLVING MULTIPLE SITES WITH POSITIVE RHEUMATOID FACTOR: ICD-10-CM

## 2020-06-05 DIAGNOSIS — E78.5 HYPERLIPIDEMIA ASSOCIATED WITH TYPE 2 DIABETES MELLITUS: ICD-10-CM

## 2020-06-05 DIAGNOSIS — E66.01 CLASS 3 SEVERE OBESITY DUE TO EXCESS CALORIES WITH SERIOUS COMORBIDITY AND BODY MASS INDEX (BMI) OF 40.0 TO 44.9 IN ADULT: ICD-10-CM

## 2020-06-05 DIAGNOSIS — Z79.60 LONG-TERM USE OF IMMUNOSUPPRESSANT MEDICATION: ICD-10-CM

## 2020-06-05 DIAGNOSIS — M81.0 AGE-RELATED OSTEOPOROSIS WITHOUT CURRENT PATHOLOGICAL FRACTURE: ICD-10-CM

## 2020-06-05 DIAGNOSIS — E11.8 DIABETES MELLITUS TYPE 2 WITH COMPLICATIONS: ICD-10-CM

## 2020-06-05 DIAGNOSIS — E11.69 HYPERLIPIDEMIA ASSOCIATED WITH TYPE 2 DIABETES MELLITUS: ICD-10-CM

## 2020-06-05 DIAGNOSIS — E55.9 VITAMIN D DEFICIENCY DISEASE: ICD-10-CM

## 2020-06-05 DIAGNOSIS — F17.210 CIGARETTE SMOKER: ICD-10-CM

## 2020-06-05 DIAGNOSIS — I77.1 TORTUOUS AORTA: ICD-10-CM

## 2020-06-05 DIAGNOSIS — J45.30 MILD PERSISTENT ASTHMA WITHOUT COMPLICATION: ICD-10-CM

## 2020-06-05 PROCEDURE — 99214 OFFICE O/P EST MOD 30 MIN: CPT | Mod: PBBFAC | Performed by: NURSE PRACTITIONER

## 2020-06-05 PROCEDURE — 99999 PR PBB SHADOW E&M-EST. PATIENT-LVL IV: ICD-10-PCS | Mod: PBBFAC,,, | Performed by: NURSE PRACTITIONER

## 2020-06-05 PROCEDURE — 99214 OFFICE O/P EST MOD 30 MIN: CPT | Mod: S$PBB,,, | Performed by: NURSE PRACTITIONER

## 2020-06-05 PROCEDURE — 99214 PR OFFICE/OUTPT VISIT, EST, LEVL IV, 30-39 MIN: ICD-10-PCS | Mod: S$PBB,,, | Performed by: NURSE PRACTITIONER

## 2020-06-05 PROCEDURE — 99999 PR PBB SHADOW E&M-EST. PATIENT-LVL IV: CPT | Mod: PBBFAC,,, | Performed by: NURSE PRACTITIONER

## 2020-06-05 NOTE — PROGRESS NOTES
Subjective:       Patient ID: Page Grissom is a 69 y.o. female.    Chief Complaint: Follow-up (6mth f/u)    HPI     DM: no hyper/hypoglycemic symptoms. Rare self monitoring normal BS. Somewhat following Diet, not exercising Weight is up.  HTN: normal, no HTNive symptoms.    LIPIDS:  tolerating and compliant with med(s).    ASTHMA: cigarette smoking, Asthma quiet no albuterol or advair currently.  GERD: Quiet.  Rheum/osteoporosis: sees rheum regularly.  LABS REVIEWED AND DISCUSSED WITH PATIENT         Past Medical History:   Diagnosis Date    Asthma     Back pain     Cataract     OD    Diabetes mellitus     Fibromyalgia     Gastroesophageal reflux disease     Hypercholesterolemia     Hypertension     Immune deficiency disorder     Osteoporosis     Rheumatoid arthritis     Tobacco dependence     Trouble in sleeping     Type 2 diabetes mellitus      Past Surgical History:   Procedure Laterality Date    COLONOSCOPY N/A 2/14/2019    Procedure: COLONOSCOPY;  Surgeon: Yury Atwood MD;  [facility];  Service: Endoscopy;  Laterality: N/A;    HERNIA REPAIR      OOPHORECTOMY       Social History     Socioeconomic History    Marital status:      Spouse name: Not on file    Number of children: Not on file    Years of education: Not on file    Highest education level: Not on file   Occupational History    Not on file   Social Needs    Financial resource strain: Not on file    Food insecurity:     Worry: Not on file     Inability: Not on file    Transportation needs:     Medical: Not on file     Non-medical: Not on file   Tobacco Use    Smoking status: Light Tobacco Smoker     Packs/day: 0.50     Years: 25.00     Pack years: 12.50     Types: Cigarettes    Smokeless tobacco: Never Used    Tobacco comment: trying to quit   Substance and Sexual Activity    Alcohol use: No    Drug use: No    Sexual activity: Not on file   Lifestyle    Physical activity:     Days per week: Not  on file     Minutes per session: Not on file    Stress: Not on file   Relationships    Social connections:     Talks on phone: Not on file     Gets together: Not on file     Attends [employer] service: Not on file     Active member of club or organization: Not on file     Attends meetings of clubs or organizations: Not on file     Relationship status: Not on file   Other Topics Concern    Not on file   Social History Narrative    Not on file     Review of patient's allergies indicates:  No Known Allergies  Current Outpatient Medications   Medication Sig    albuterol 90 mcg/actuation inhaler Inhale 2 puffs into the lungs every 6 (six) hours as needed for Wheezing. Dispense with spacer.    amLODIPine (NORVASC) 10 MG tablet TAKE 1 TABLET(10 MG) BY MOUTH EVERY DAY    cholecalciferol, vitamin D3, 2,000 unit Tab Take 2,000 Units by mouth once daily.    DULoxetine (CYMBALTA) 30 MG capsule Take 1 capsule (30 mg total) by mouth once daily.    fish oil-omega-3 fatty acids 300-1,000 mg capsule Take 2 g by mouth once daily.    fluticasone-salmeterol 100-50 mcg/dose (ADVAIR) 100-50 mcg/dose diskus inhaler Inhale 1 puff into the lungs 2 (two) times daily as needed.    folic acid (FOLVITE) 1 MG tablet Take 1 tablet (1,000 mcg total) by mouth once daily.    hydrALAZINE (APRESOLINE) 25 MG tablet TAKE 1 TABLET BY MOUTH EVERY 12 HOURS    hydroCHLOROthiazide (HYDRODIURIL) 25 MG tablet TAKE 1 TABLET(25 MG) BY MOUTH EVERY DAY    hydroxychloroquine (PLAQUENIL) 200 mg tablet Take 1 tablet (200 mg total) by mouth once daily.    lisinopril (PRINIVIL,ZESTRIL) 40 MG tablet TAKE 1 TABLET BY MOUTH ONCE DAILY    loratadine/pseudoephedrine (CLARITIN-D 24 HOUR ORAL) Take 1 tablet by mouth daily as needed.    metFORMIN (GLUCOPHAGE) 500 MG tablet Take 1 tablet (500 mg total) by mouth once daily.    multivitamin (ONE DAILY MULTIVITAMIN) per tablet Take 1 tablet by mouth once daily.    naproxen (NAPROSYN) 500 MG tablet Take 1 tablet  (500 mg total) by mouth 2 (two) times daily with meals. (Patient taking differently: Take 500 mg by mouth as needed. )    phenylephrine/DM/acetaminop/GG (MUCINEX SINUS-MAX SEV TERRIE,DM, ORAL) Take by mouth as needed.    potassium chloride SA (K-DUR,KLOR-CON) 20 MEQ tablet TAKE 1 TABLET(20 MEQ) BY MOUTH TWICE DAILY    rosuvastatin (CRESTOR) 40 MG Tab TAKE 1 TABLET(40 MG) BY MOUTH EVERY EVENING    sodium,potassium,mag sulfates (SUPREP BOWEL PREP KIT) 17.5-3.13-1.6 gram SolR Take as directed     No current facility-administered medications for this visit.            Review of Systems   Constitutional: Negative for activity change, appetite change, chills, diaphoresis, fatigue, fever and unexpected weight change.   HENT: Negative for congestion, ear pain, postnasal drip, rhinorrhea, sinus pressure, sinus pain, sneezing, sore throat, tinnitus, trouble swallowing and voice change.    Eyes: Negative for photophobia, pain and visual disturbance.   Respiratory: Negative for cough, chest tightness, shortness of breath and wheezing.    Cardiovascular: Negative for chest pain, palpitations and leg swelling.   Gastrointestinal: Negative for abdominal distention, abdominal pain, constipation, diarrhea, nausea and vomiting.   Genitourinary: Negative for decreased urine volume, difficulty urinating, dysuria, flank pain, frequency, hematuria and urgency.   Musculoskeletal: Positive for arthralgias and myalgias. Negative for back pain, joint swelling, neck pain and neck stiffness.   Allergic/Immunologic: Negative for immunocompromised state.   Neurological: Negative for dizziness, tremors, seizures, syncope, facial asymmetry, speech difficulty, weakness, light-headedness, numbness and headaches.   Hematological: Negative for adenopathy. Does not bruise/bleed easily.   Psychiatric/Behavioral: Negative for confusion and sleep disturbance.       Objective:      Physical Exam   Constitutional: She is oriented to person, place, and  time.   HENT:   Head: Normocephalic and atraumatic.   Right Ear: Tympanic membrane normal.   Left Ear: Tympanic membrane normal.   Eyes: Conjunctivae and EOM are normal.   Neck: Normal range of motion. Neck supple.   Cardiovascular: Normal rate, regular rhythm, normal heart sounds and intact distal pulses.   Pulmonary/Chest: Effort normal and breath sounds normal.   Abdominal: Soft. Bowel sounds are normal.   Musculoskeletal: Normal range of motion.   Neurological: She is alert and oriented to person, place, and time.   Skin: Skin is warm and dry.       Assessment:     Vitals:    06/05/20 1202   BP: 138/80   Pulse: 92   Temp: 97.5 °F (36.4 °C)         1. Essential hypertension    2. Hyperlipidemia associated with type 2 diabetes mellitus    3. Tortuous aorta    4. Mild persistent asthma without complication    5. Vitamin D deficiency disease    6. Diabetes mellitus type 2 with complications    7. Gastroesophageal reflux disease without esophagitis    8. Rheumatoid arthritis involving multiple sites with positive rheumatoid factor    9. Fibromyalgia    10. Age-related osteoporosis without current pathological fracture    11. Long-term use of immunosuppressant medication    12. Class 3 severe obesity due to excess calories with serious comorbidity and body mass index (BMI) of 40.0 to 44.9 in adult    13. Cigarette smoker        Plan:   Essential hypertension    Hyperlipidemia associated with type 2 diabetes mellitus  -     Comprehensive metabolic panel; Future; Expected date: 12/05/2020  -     Lipid Panel; Future; Expected date: 12/05/2020    Tortuous aorta    Mild persistent asthma without complication    Vitamin D deficiency disease    Diabetes mellitus type 2 with complications  -     Microalbumin/creatinine urine ratio; Future; Expected date: 12/05/2020  -     Hemoglobin A1C; Future; Expected date: 12/05/2020    Gastroesophageal reflux disease without esophagitis    Rheumatoid arthritis involving multiple sites  with positive rheumatoid factor    Fibromyalgia    Age-related osteoporosis without current pathological fracture    Long-term use of immunosuppressant medication    Class 3 severe obesity due to excess calories with serious comorbidity and body mass index (BMI) of 40.0 to 44.9 in adult    Cigarette smoker      Smoking cessation discussed  Diet, weight loss, exercise discussed  Continue rheum follow up  shingrix discussed- not comfortable with getting immunization at pharmacy  Labs and follow up in 6 months

## 2020-07-02 ENCOUNTER — OFFICE VISIT (OUTPATIENT)
Dept: INTERNAL MEDICINE | Facility: CLINIC | Age: 70
End: 2020-07-02
Payer: MEDICARE

## 2020-07-02 VITALS
TEMPERATURE: 97 F | WEIGHT: 241.88 LBS | HEIGHT: 64 IN | BODY MASS INDEX: 41.29 KG/M2 | DIASTOLIC BLOOD PRESSURE: 74 MMHG | SYSTOLIC BLOOD PRESSURE: 134 MMHG | OXYGEN SATURATION: 95 % | HEART RATE: 87 BPM

## 2020-07-02 DIAGNOSIS — E78.5 HYPERLIPIDEMIA ASSOCIATED WITH TYPE 2 DIABETES MELLITUS: ICD-10-CM

## 2020-07-02 DIAGNOSIS — E55.9 VITAMIN D DEFICIENCY DISEASE: ICD-10-CM

## 2020-07-02 DIAGNOSIS — J45.30 MILD PERSISTENT ASTHMA WITHOUT COMPLICATION: ICD-10-CM

## 2020-07-02 DIAGNOSIS — Z79.899 HIGH RISK MEDICATION USE: ICD-10-CM

## 2020-07-02 DIAGNOSIS — M79.7 FIBROMYALGIA: ICD-10-CM

## 2020-07-02 DIAGNOSIS — Z79.60 LONG-TERM USE OF IMMUNOSUPPRESSANT MEDICATION: ICD-10-CM

## 2020-07-02 DIAGNOSIS — M85.89 OSTEOPENIA OF MULTIPLE SITES: ICD-10-CM

## 2020-07-02 DIAGNOSIS — E11.8 DIABETES MELLITUS TYPE 2 WITH COMPLICATIONS: ICD-10-CM

## 2020-07-02 DIAGNOSIS — Z00.00 ENCOUNTER FOR PREVENTIVE HEALTH EXAMINATION: Primary | ICD-10-CM

## 2020-07-02 DIAGNOSIS — M05.79 RHEUMATOID ARTHRITIS INVOLVING MULTIPLE SITES WITH POSITIVE RHEUMATOID FACTOR: ICD-10-CM

## 2020-07-02 DIAGNOSIS — I10 ESSENTIAL HYPERTENSION: ICD-10-CM

## 2020-07-02 DIAGNOSIS — E11.69 HYPERLIPIDEMIA ASSOCIATED WITH TYPE 2 DIABETES MELLITUS: ICD-10-CM

## 2020-07-02 DIAGNOSIS — F17.210 CIGARETTE SMOKER: ICD-10-CM

## 2020-07-02 DIAGNOSIS — K21.9 GASTROESOPHAGEAL REFLUX DISEASE WITHOUT ESOPHAGITIS: ICD-10-CM

## 2020-07-02 DIAGNOSIS — I77.1 TORTUOUS AORTA: ICD-10-CM

## 2020-07-02 DIAGNOSIS — E66.01 CLASS 3 SEVERE OBESITY DUE TO EXCESS CALORIES WITH SERIOUS COMORBIDITY AND BODY MASS INDEX (BMI) OF 40.0 TO 44.9 IN ADULT: ICD-10-CM

## 2020-07-02 PROBLEM — E87.6 HYPOPOTASSEMIA: Status: RESOLVED | Noted: 2017-01-31 | Resolved: 2020-07-02

## 2020-07-02 PROCEDURE — 99999 PR PBB SHADOW E&M-EST. PATIENT-LVL V: ICD-10-PCS | Mod: PBBFAC,,, | Performed by: NURSE PRACTITIONER

## 2020-07-02 PROCEDURE — 99215 OFFICE O/P EST HI 40 MIN: CPT | Mod: PBBFAC | Performed by: NURSE PRACTITIONER

## 2020-07-02 PROCEDURE — 99213 OFFICE O/P EST LOW 20 MIN: CPT | Mod: S$GLB,ICN,, | Performed by: NURSE PRACTITIONER

## 2020-07-02 PROCEDURE — 99999 PR PBB SHADOW E&M-EST. PATIENT-LVL V: CPT | Mod: PBBFAC,,, | Performed by: NURSE PRACTITIONER

## 2020-07-02 PROCEDURE — 99213 PR OFFICE/OUTPT VISIT, EST, LEVL III, 20-29 MIN: ICD-10-PCS | Mod: S$GLB,ICN,, | Performed by: NURSE PRACTITIONER

## 2020-07-02 NOTE — PROGRESS NOTES
"  Page Grissom presented for a  Medicare AWV and comprehensive Health Risk Assessment today. The following components were reviewed and updated:    · Medical history  · Family History  · Social history  · Allergies and Current Medications  · Health Risk Assessment  · Health Maintenance  · Care Team     ** See Completed Assessments for Annual Wellness Visit within the encounter summary.**       The following assessments were completed:  · Living Situation  · CAGE  · Depression Screening  · Timed Get Up and Go  · Whisper Test  · Cognitive Function Screening  · Nutrition Screening  · ADL Screening  · PAQ Screening    Vitals:    07/02/20 1323   BP: 134/74   BP Location: Left arm   Patient Position: Sitting   BP Method: Medium (Manual)   Pulse: 87   Temp: 96.8 °F (36 °C)   TempSrc: Tympanic   SpO2: 95%   Weight: 109.7 kg (241 lb 13.5 oz)   Height: 5' 4" (1.626 m)     Body mass index is 41.51 kg/m².  Physical Exam  HENT:      Head: Normocephalic and atraumatic.      Right Ear: Tympanic membrane normal.      Left Ear: Tympanic membrane normal.   Eyes:      Conjunctiva/sclera: Conjunctivae normal.   Neck:      Musculoskeletal: Normal range of motion and neck supple.   Cardiovascular:      Rate and Rhythm: Normal rate and regular rhythm.      Heart sounds: Normal heart sounds.   Pulmonary:      Effort: Pulmonary effort is normal.      Breath sounds: Normal breath sounds.   Abdominal:      General: Bowel sounds are normal.      Palpations: Abdomen is soft.   Musculoskeletal:      Right knee: She exhibits decreased range of motion.      Left knee: She exhibits decreased range of motion.   Skin:     General: Skin is warm and dry.   Neurological:      Mental Status: She is alert and oriented to person, place, and time.           Diagnoses and health risks identified today and associated recommendations/orders:    1. Encounter for preventive health examination   Completed today     2. Tortuous aorta  Stable. CXR 5/13/10. " Continue current treatment plan as prescribed by your PCP and f/u with your PCP for further management.     3. Class 3 severe obesity due to excess calories with serious comorbidity and body mass index (BMI) of 40.0 to 44.9 in adult  Not currently controlled. F/u with your PCP to discuss adjustments to your treatment plan.     4. Type 2 diabetes mellitus with complication, without long-term current use of insulin  Controlled. Pt taking metformin. Monitor glucose, check feet daily, and stay UTD with eye doctor. Continue current treatment plan as prescribed by your PCP and f/u with your PCP for further management.     Component      Latest Ref Rng & Units 5/29/2020   Hemoglobin A1C External      4.0 - 5.6 % 5.5   Estimated Avg Glucose      68 - 131 mg/dL 111     5. Hyperlipidemia associated with type 2 diabetes mellitus  Stable. Pt taking Crestor as well as fish oil. Continue current treatment plan as prescribed by your PCP and f/u with your PCP for further management.     05/29/20 1007 HDL 53 -- Final result   05/29/20 1007 CHOL 127 -- Final result   05/29/20 1007 TRIG 81 -- Final result   05/29/20 1007 LDLCALC 57.8 Low Final result   05/29/20 1007 CHOLHDL 41.7 -- Final result   05/29/20 1007 NONHDLCHOL 74 -- Final result   05/29/20 1007 TOTALCHOLEST 2.4 -- Final res       6. Rheumatoid arthritis involving multiple sites with positive rheumatoid factor, 7. Long-term use of immunosuppressant medication  Stable. Continue current treatment plan as prescribed by your PCP and rheumatologist and f/u with them for further management.     8. Fibromyalgia  Stable.Continue current treatment plan as prescribed by your PCP and rheumatologist and f/u with them for further management.     9.osteopenia  Stable. DEXA 2019. UTDContinue current treatment plan as prescribed by your PCP and rheumatologist and f/u with them for further management.     10. Mild persistent asthma without complication  Stable.  Continue current treatment  plan as prescribed by your PCP and f/u with your PCP for further management.     11. Essential hypertension  Controlled.  Continue current treatment plan as prescribed by your PCP and f/u with your PCP for further management.        13. Vitamin D deficiency disease  Controlled. Pt taking vit D suppl. Continue current treatment plan as prescribed by your PCP and rheumatologist and f/u with them for further management.     14. Cigarette smoker  Not currently controlled. Pt reports smoking about 4 cigarettes daily. Trying to stop smoking. F/u with your PCP for further management.      Provided Page with a 5-10 year written screening schedule and personal prevention plan. Recommendations were developed using the USPSTF age appropriate recommendations. Education, counseling, and referrals were provided as needed. After Visit Summary printed and given to patient which includes a list of additional screenings\tests needed.    Follow up with PCP and specialists as scheduled  Wants to wait until [facility] has shingrix  HRA follow up in 1 year    Cony Davidson NP

## 2020-07-02 NOTE — PATIENT INSTRUCTIONS
Counseling and Referral of Other Preventative  (Italic type indicates deductible and co-insurance are waived)    Patient Name: Page Grissom  Today's Date: 7/2/2020    Health Maintenance       Date Due Completion Date    Shingles Vaccine (2 of 3) 06/25/2015 4/30/2015    Eye Exam 08/19/2020 8/19/2019    Override on 8/19/2019: Done    Override on 8/10/2017: Done    Override on 8/4/2016: Done    Override on 5/3/2013: Done    Influenza Vaccine (1) 09/01/2020 12/24/2019    Override on 11/10/2016: Done    Hemoglobin A1c 11/29/2020 5/29/2020    Mammogram 01/16/2021 1/16/2020    Override on 11/9/2012: Done    Lipid Panel 05/29/2021 5/29/2020    Urine Microalbumin 05/29/2021 5/29/2020    Foot Exam 06/05/2021 6/5/2020 (Done)    Override on 6/5/2020: Done    Override on 6/17/2019: Done    Override on 6/13/2018: Done    Override on 5/9/2017: Done    Override on 4/30/2015: Done    Override on 4/30/2014: Done (per Trey Swan DPM)    DEXA SCAN 10/10/2022 10/10/2019    TETANUS VACCINE 10/16/2022 10/16/2012    Colorectal Cancer Screening 02/14/2024 2/14/2019        No orders of the defined types were placed in this encounter.    The following information is provided to all patients.  This information is to help you find resources for any of the problems found today that may be affecting your health:                Living healthy guide: www.[facility].[place].gov      Understanding Diabetes: www.diabetes.org      Eating healthy: www.cdc.gov/healthyweight      CDC home safety checklist: www.cdc.gov/steadi/patient.html      Agency on Aging: www.goea.[place].gov      Alcoholics anonymous (AA): www.aa.org      Physical Activity: www.rosalinda.nih.gov/qw8ggwy      Tobacco use: www.quitwithusla.org

## 2020-07-21 ENCOUNTER — OFFICE VISIT (OUTPATIENT)
Dept: OPHTHALMOLOGY | Facility: CLINIC | Age: 70
End: 2020-07-21
Payer: MEDICARE

## 2020-07-21 DIAGNOSIS — Z79.899 LONG-TERM USE OF PLAQUENIL: ICD-10-CM

## 2020-07-21 DIAGNOSIS — E11.9 TYPE 2 DIABETES MELLITUS WITHOUT RETINOPATHY: Primary | ICD-10-CM

## 2020-07-21 DIAGNOSIS — M05.79 RHEUMATOID ARTHRITIS INVOLVING MULTIPLE SITES WITH POSITIVE RHEUMATOID FACTOR: ICD-10-CM

## 2020-07-21 DIAGNOSIS — H25.13 NUCLEAR SCLEROSIS, BILATERAL: ICD-10-CM

## 2020-07-21 DIAGNOSIS — H25.013 CORTICAL AGE-RELATED CATARACT OF BOTH EYES: ICD-10-CM

## 2020-07-21 PROCEDURE — 92014 COMPRE OPH EXAM EST PT 1/>: CPT | Mod: S$PBB,,, | Performed by: OPTOMETRIST

## 2020-07-21 PROCEDURE — 99213 OFFICE O/P EST LOW 20 MIN: CPT | Mod: PBBFAC | Performed by: OPTOMETRIST

## 2020-07-21 PROCEDURE — 99999 PR PBB SHADOW E&M-EST. PATIENT-LVL III: ICD-10-PCS | Mod: PBBFAC,,, | Performed by: OPTOMETRIST

## 2020-07-21 PROCEDURE — 99999 PR PBB SHADOW E&M-EST. PATIENT-LVL III: CPT | Mod: PBBFAC,,, | Performed by: OPTOMETRIST

## 2020-07-21 PROCEDURE — 92014 PR EYE EXAM, EST PATIENT,COMPREHESV: ICD-10-PCS | Mod: S$PBB,,, | Performed by: OPTOMETRIST

## 2020-07-21 NOTE — PROGRESS NOTES
HPI     Diabetic Eye Exam     Comments: Yearly              Comments     Patient [place] [facility] [place].  Diabetic eye exam  Diagnosed with diabetes over 20 years ago  Recent vision fluctuations No  Lab Results       Component                Value               Date                       HGBA1C                   5.5                 05/29/2020              HPI    Any vision changes since last exam: No, but sensitive with bright lights   from vehicles while driving at night.  Eye pain: No  Other ocular symptoms: No    Do you wear currently wear glasses or contacts? OTC Readers    Interested in contacts today? No    Do you plan on getting new glasses today? If Needed                    Last edited by Tabitha Garza on 7/21/2020  2:27 PM. (History)            Assessment /Plan     For exam results, see Encounter Report.    Type 2 diabetes mellitus without retinopathy  No diabetic retinopathy in either eye  Continue close care with PCP   Monitor 12 months    Nuclear sclerosis, bilateral  Cortical age-related cataract of both eyes  Cataract accounts for change in vision. Patient is at the option stage.   Cataract surgery will improve vision, but necessity is dependent on patient's symptoms.   Patient prefers to wait on surgery at this time. Pt to call back if symptoms worsen before next appointment.    Long-term use of Plaquenil  Rheumatoid arthritis involving multiple sites with positive rheumatoid factor  No maculopathy OD, OS today  Will get mOCT at next visit    RTC 1 yr for dilated eye exam with mOCT or PRN if any problems.   Discussed above and answered questions.

## 2020-08-11 ENCOUNTER — OFFICE VISIT (OUTPATIENT)
Dept: RHEUMATOLOGY | Facility: CLINIC | Age: 70
End: 2020-08-11
Payer: MEDICARE

## 2020-08-11 ENCOUNTER — LAB VISIT (OUTPATIENT)
Dept: LAB | Facility: HOSPITAL | Age: 70
End: 2020-08-11
Attending: PHYSICIAN ASSISTANT
Payer: MEDICARE

## 2020-08-11 VITALS
HEART RATE: 77 BPM | BODY MASS INDEX: 41.06 KG/M2 | WEIGHT: 240.5 LBS | DIASTOLIC BLOOD PRESSURE: 69 MMHG | SYSTOLIC BLOOD PRESSURE: 123 MMHG | HEIGHT: 64 IN

## 2020-08-11 DIAGNOSIS — E55.9 VITAMIN D DEFICIENCY DISEASE: ICD-10-CM

## 2020-08-11 DIAGNOSIS — M79.7 FIBROMYALGIA: ICD-10-CM

## 2020-08-11 DIAGNOSIS — Z79.899 HIGH RISK MEDICATION USE: ICD-10-CM

## 2020-08-11 DIAGNOSIS — Z79.60 LONG-TERM USE OF IMMUNOSUPPRESSANT MEDICATION: ICD-10-CM

## 2020-08-11 DIAGNOSIS — M85.89 OSTEOPENIA OF MULTIPLE SITES: ICD-10-CM

## 2020-08-11 DIAGNOSIS — M81.0 AGE-RELATED OSTEOPOROSIS WITHOUT CURRENT PATHOLOGICAL FRACTURE: ICD-10-CM

## 2020-08-11 DIAGNOSIS — M05.79 RHEUMATOID ARTHRITIS INVOLVING MULTIPLE SITES WITH POSITIVE RHEUMATOID FACTOR: Primary | ICD-10-CM

## 2020-08-11 DIAGNOSIS — M05.79 RHEUMATOID ARTHRITIS INVOLVING MULTIPLE SITES WITH POSITIVE RHEUMATOID FACTOR: ICD-10-CM

## 2020-08-11 LAB
ALBUMIN SERPL BCP-MCNC: 3.4 G/DL (ref 3.5–5.2)
ALP SERPL-CCNC: 96 U/L (ref 55–135)
ALT SERPL W/O P-5'-P-CCNC: 9 U/L (ref 10–44)
ANION GAP SERPL CALC-SCNC: 10 MMOL/L (ref 8–16)
AST SERPL-CCNC: 12 U/L (ref 10–40)
BASOPHILS # BLD AUTO: 0.06 K/UL (ref 0–0.2)
BASOPHILS NFR BLD: 0.6 % (ref 0–1.9)
BILIRUB SERPL-MCNC: 0.2 MG/DL (ref 0.1–1)
BUN SERPL-MCNC: 23 MG/DL (ref 8–23)
CALCIUM SERPL-MCNC: 9.5 MG/DL (ref 8.7–10.5)
CHLORIDE SERPL-SCNC: 102 MMOL/L (ref 95–110)
CO2 SERPL-SCNC: 26 MMOL/L (ref 23–29)
CREAT SERPL-MCNC: 1.5 MG/DL (ref 0.5–1.4)
CRP SERPL-MCNC: 9 MG/L (ref 0–8.2)
DIFFERENTIAL METHOD: ABNORMAL
EOSINOPHIL # BLD AUTO: 0.5 K/UL (ref 0–0.5)
EOSINOPHIL NFR BLD: 5.8 % (ref 0–8)
ERYTHROCYTE [DISTWIDTH] IN BLOOD BY AUTOMATED COUNT: 14.9 % (ref 11.5–14.5)
ERYTHROCYTE [SEDIMENTATION RATE] IN BLOOD BY WESTERGREN METHOD: 57 MM/HR (ref 0–36)
EST. GFR  (AFRICAN AMERICAN): 41 ML/MIN/1.73 M^2
EST. GFR  (NON AFRICAN AMERICAN): 36 ML/MIN/1.73 M^2
GLUCOSE SERPL-MCNC: 100 MG/DL (ref 70–110)
HCT VFR BLD AUTO: 38.8 % (ref 37–48.5)
HGB BLD-MCNC: 11.7 G/DL (ref 12–16)
IMM GRANULOCYTES # BLD AUTO: 0.04 K/UL (ref 0–0.04)
IMM GRANULOCYTES NFR BLD AUTO: 0.4 % (ref 0–0.5)
LYMPHOCYTES # BLD AUTO: 1.9 K/UL (ref 1–4.8)
LYMPHOCYTES NFR BLD: 20 % (ref 18–48)
MCH RBC QN AUTO: 25.8 PG (ref 27–31)
MCHC RBC AUTO-ENTMCNC: 30.2 G/DL (ref 32–36)
MCV RBC AUTO: 86 FL (ref 82–98)
MONOCYTES # BLD AUTO: 0.9 K/UL (ref 0.3–1)
MONOCYTES NFR BLD: 9.4 % (ref 4–15)
NEUTROPHILS # BLD AUTO: 6 K/UL (ref 1.8–7.7)
NEUTROPHILS NFR BLD: 64.2 % (ref 38–73)
NRBC BLD-RTO: 0 /100 WBC
PLATELET # BLD AUTO: 284 K/UL (ref 150–350)
PMV BLD AUTO: 10.5 FL (ref 9.2–12.9)
POTASSIUM SERPL-SCNC: 4.7 MMOL/L (ref 3.5–5.1)
PROT SERPL-MCNC: 7.7 G/DL (ref 6–8.4)
RBC # BLD AUTO: 4.54 M/UL (ref 4–5.4)
SODIUM SERPL-SCNC: 138 MMOL/L (ref 136–145)
WBC # BLD AUTO: 9.38 K/UL (ref 3.9–12.7)

## 2020-08-11 PROCEDURE — 36415 COLL VENOUS BLD VENIPUNCTURE: CPT

## 2020-08-11 PROCEDURE — 99214 OFFICE O/P EST MOD 30 MIN: CPT | Mod: S$PBB,,, | Performed by: PHYSICIAN ASSISTANT

## 2020-08-11 PROCEDURE — 99214 PR OFFICE/OUTPT VISIT, EST, LEVL IV, 30-39 MIN: ICD-10-PCS | Mod: S$PBB,,, | Performed by: PHYSICIAN ASSISTANT

## 2020-08-11 PROCEDURE — 99999 PR PBB SHADOW E&M-EST. PATIENT-LVL IV: ICD-10-PCS | Mod: PBBFAC,,, | Performed by: PHYSICIAN ASSISTANT

## 2020-08-11 PROCEDURE — 85025 COMPLETE CBC W/AUTO DIFF WBC: CPT

## 2020-08-11 PROCEDURE — 85652 RBC SED RATE AUTOMATED: CPT

## 2020-08-11 PROCEDURE — 99999 PR PBB SHADOW E&M-EST. PATIENT-LVL IV: CPT | Mod: PBBFAC,,, | Performed by: PHYSICIAN ASSISTANT

## 2020-08-11 PROCEDURE — 86140 C-REACTIVE PROTEIN: CPT

## 2020-08-11 PROCEDURE — 99214 OFFICE O/P EST MOD 30 MIN: CPT | Mod: PBBFAC | Performed by: PHYSICIAN ASSISTANT

## 2020-08-11 PROCEDURE — 80053 COMPREHEN METABOLIC PANEL: CPT

## 2020-08-11 ASSESSMENT — ROUTINE ASSESSMENT OF PATIENT INDEX DATA (RAPID3): MDHAQ FUNCTION SCORE: 0

## 2020-08-11 NOTE — PROGRESS NOTES
Subjective:       [facility] [place] [facility]     Patient ID: Page Grissom is a 69 y.o. female.    Chief Complaint: Rheumatoid Arthritis    Page is here today for rheumatology follow-up    she has seropositive rheumatoid arthritis- last visit RA was stable, weaned down on her mtx to  7.5 mg once weekly and stopped 1 yr ago.     Still taking Plaquenil 200 mg once a day.       She has some mild ckd; tylenol does not help, uses naproxen 1 tablet about twice a month and uses topical Biofreeze, last eGFR 45-48    Bilateral knee osteoarthritis her knee pain comes and goes.  Pain today rated 0/10.     No swelling. No other issues. No RA exacerbations.    Minimal morning stiffness.  Fibromyalgia also doing well.     Bone density was done 5/10/17 meeting criteria for osteoporosis she was placed on Fosamax once weekly, she ran out of her fosamax and stopped it. Could not remember to take it. She  Preferred not to add more tablets to her current meds.  We moved to  IV Reclast 10/09/18- she did well. Had some mild arthralgias and myalgias for 2 days then resolved. No falls or fractures since last visit. Low vit D on supplement vit D3 2000 IU last vit d was 50  repeat dexa today 10/10/19- stable and improved- now osteopenia with mod risk fx; Reclast was d/c'd  Will repeat dexa  In 2 yrs.       Follow-up  Pertinent negatives include no abdominal pain, arthralgias, chest pain, chills, fatigue, fever, joint swelling, myalgias, nausea, neck pain, rash, vomiting or weakness.   Fibromyalgia  Pertinent negatives include no abdominal pain, arthralgias, chest pain, chills, fatigue, fever, joint swelling, myalgias, nausea, neck pain, rash, vomiting or weakness.     Review of Systems   Constitutional: Negative.  Negative for activity change, appetite change, chills, fatigue and fever.   HENT: Negative.  Negative for mouth sores and trouble swallowing.         No dry mouth   Eyes: Negative.  Negative for  "photophobia, pain and redness.        No swollen or red eyes, no dry eye     Respiratory: Negative.  Negative for chest tightness, shortness of breath, wheezing and stridor.    Cardiovascular: Negative.  Negative for chest pain.   Gastrointestinal: Negative.  Negative for abdominal pain, blood in stool, diarrhea, nausea and vomiting.   Genitourinary: Negative.  Negative for dysuria, frequency, hematuria and urgency.   Musculoskeletal: Negative.  Negative for arthralgias, back pain, gait problem, joint swelling, myalgias, neck pain and neck stiffness.   Skin: Negative.  Negative for color change, pallor and rash.   Neurological: Negative.  Negative for weakness.   Hematological: Negative for adenopathy.   Psychiatric/Behavioral: Negative for suicidal ideas.         Objective:   /69   Pulse 77   Ht 5' 4" (1.626 m)   Wt 109.1 kg (240 lb 8.4 oz)   BMI 41.29 kg/m²        Past Medical History:   Diagnosis Date    Asthma     Back pain     Cataract     OD    Diabetes mellitus     Fibromyalgia     Gastroesophageal reflux disease     Hypercholesterolemia     Hypertension     Immune deficiency disorder     Osteoporosis     Rheumatoid arthritis     Tobacco dependence     Trouble in sleeping     Type 2 diabetes mellitus       Immunization History   Administered Date(s) Administered    Influenza 10/10/2006, 12/19/2007, 10/08/2009, 12/15/2010, 11/09/2011, 10/16/2012    Influenza - High Dose - PF (65 years and older) 12/06/2017, 12/13/2018, 12/24/2019    Influenza - Quadrivalent 10/30/2014    Influenza Split 10/08/2013    PPD Test 04/05/2010, 04/05/2010    Pneumococcal Conjugate - 13 Valent 04/30/2015    Pneumococcal Polysaccharide - 23 Valent 02/12/2007, 12/13/2018    Tdap 10/16/2012    Zoster 04/30/2015          Physical Exam   Constitutional: She is oriented to person, place, and time and well-developed, well-nourished, and in no distress. No distress.   HENT:   Head: Normocephalic and " atraumatic.   Right Ear: External ear normal.   Left Ear: External ear normal.   Mouth/Throat: No oropharyngeal exudate.   Eyes: Conjunctivae and EOM are normal. Pupils are equal, round, and reactive to light. No scleral icterus.   Neck: Normal range of motion. Neck supple. No thyromegaly present.   Cardiovascular: Normal rate, regular rhythm and normal heart sounds.    No murmur heard.  Pulmonary/Chest: Effort normal and breath sounds normal. She exhibits no tenderness.   Abdominal: Soft. Bowel sounds are normal.   Lymphadenopathy:     She has no cervical adenopathy.   Neurological: She is alert and oriented to person, place, and time. She displays normal reflexes. No cranial nerve deficit. She exhibits normal muscle tone. Gait normal.   Skin: Skin is warm and dry. No rash noted.     Musculoskeletal: Normal range of motion. No tenderness or edema.      Comments:              Physical Exam    No results found for this or any previous visit (from the past 336 hour(s)).      Results for orders placed during the hospital encounter of 03/10/16   X-Ray Hand Complete Bilateral    Narrative 3 views of either hand, 6 views total    Comparison: 07/16/2015    Findings: There some stable mild joint space narrowing involving the interphalangeal joints.  No acute fracture or dislocation.  No new erosive changes are suggested.    Impression  Stable exam  ______________________________________     Electronically signed by: BOSSMAN DEJESUS D.O.  Date:     03/10/16  Time:    11:30      Results for orders placed during the hospital encounter of 03/10/16   X-Ray Foot Complete Bilateral    Narrative 3 views of either foot, 6 views total    Comparison: 07/16/2015    Findings: There is no evidence to suggest acute fracture or dislocation.  There are stable moderate to severe degenerative changes in the region of the midfoot.  Dorsal and plantar calcaneal enthesophytes are also present bilaterally.  Generalized osteopenia is noted.   There is a healed fracture involving the fifth proximal phalanx on the right.    Impression  As above  ______________________________________     Electronically signed by: BOSSMAN DEJESUS D.O.  Date:     03/10/16  Time:    11:29      Results for orders placed during the hospital encounter of 07/16/15   X-Ray Chest PA And Lateral    Narrative Findings: There is been no change from previous radiograph 4/10/13.2 views    Impression  No active disease.      Electronically signed by: PRO MAURER MD  Date:     07/16/15  Time:    12:13        Results for orders placed in visit on 10/10/19   DXA Bone Density Spine And Hip    Narrative EXAMINATION:  DEXA BONE DENSITY SPINE HIP    CLINICAL HISTORY:  suspected osteoporosis; Age-related osteoporosis without current pathological fracture    TECHNIQUE:  DXA scanning was performed over the left hip and lumbar spine.  Review of the images confirms satisfactory positioning and technique.    COMPARISON:  05/10/2017    FINDINGS:  The L1 to L4 vertebral bone mineral density is equal to 1.104 g/cm squared with a T score of -0.7.  There has been a positive 7.0% statistically significant change relative to the prior study.    The left femoral neck bone mineral density is equal to 0.775 g/cm squared with a T score of -1.9.  There has been a positive 7.6% increase relative to the prior study.    There is a 5.9% risk of a major osteoporotic fracture and a 1.5% risk of hip fracture in the next 10 years (FRAX).      Impression Osteopenia    Consider FDA approved medical therapies in postmenopausal women and men aged 50 years and older, based on the following:    *A hip or vertebral (clinical or morphometric) fracture  *T score less than or equal to -2.5 at the femoral neck or spine after appropriate evaluation to exclude secondary causes.  *Low bone mass -- also known as osteopenia (T score between -1.0 and -2.5 at the femoral neck or spine) and a 10 year probability of hip fracture  greater than or equal to 3% or a 10 year probability of major osteoporosis-related fracture greater than or equal to 20% based on the [place]-adapted WHO algorithm.  *Clinicians judgment and/or patient preference may indicate treatment for people with 10 year fracture probabilities is above or below these levels.      Electronically signed by: Kuldeep Flores DO  Date:    10/10/2019  Time:    09:47        Ref. Range 6/7/2019 07:55   Vit D, 25-Hydroxy Latest Ref Range: 30 - 96 ng/mL 45         DEXA 5/10/17 total femur T score -3.0, femur neck -2.5, spine -1.8 impression osteoporosis     Bilateral hand and foot x-rays stable 7/2015    Assessment:       1. Rheumatoid arthritis involving multiple sites with positive rheumatoid factor    2. Fibromyalgia    3. Osteopenia of multiple sites    4. High risk medication use    5. Long-term use of immunosuppressant medication              1.  Seropositive rheumatoid arthritis currently stable  Plaquenil     HERSON 0, CDAI 0,   CANO-28 (CRP): --     2.  Osteoporosis new diagnosis in May with a T score at the total femur -3.0 recently  Compliance issues with oral Fosamax--  forgets to take, does not want to add any more pills to take   Moved  IV Reclast 10/9/18 X 1 dose  Stopped Reclast w/improved dexa       3.  Fibromyalgia stable     4.  Long-term use of immunosuppression medication with no issues with recurrent infections     5.  Chronic tobacco user     6.  Medication monitoring with no current toxicity issues     7. Vaccines up to date        Plan:     No orders of the defined types were placed in this encounter.         C/w  Plaquenil to once a day    Stay off  Mtx; no need to add back   C/w keep folic acid 1 mg daily     Avoid nsaids on regular basis  Would be ok to use once to twice a month since tylenol not effective       yearly  IV Reclast 5 mg now on hold w/improved BMD to Osteopenia with mod risk fx-    repeat her bone density in 2-3 years- 2022   Counseled patient on smoking  cessation encouraged patient to look into programs- discussed the one available here     RTC  8 months with labs- cbc, cmp, esr, crp and vit D  Call with any questions, changes, or concerns.

## 2020-10-26 DIAGNOSIS — Z79.631 METHOTREXATE, LONG TERM, CURRENT USE: ICD-10-CM

## 2020-10-26 RX ORDER — FOLIC ACID 1 MG/1
1000 TABLET ORAL DAILY
Qty: 30 TABLET | Refills: 11 | Status: SHIPPED | OUTPATIENT
Start: 2020-10-26 | End: 2021-06-09 | Stop reason: ALTCHOICE

## 2020-11-11 NOTE — TELEPHONE ENCOUNTER
----- Message from Mily Ross sent at 11/11/2020  1:51 PM CST -----  Regarding: refill  Contact: pt  Caller is requesting a call back regarding refilling her naproxen (NAPROSYN) 500 MG tablet.  Please call back at [phone].  Thanks.    [id number] - [place], [place] - [address] AT [facility] OF [address]  [address]  [place] [address]  Phone: [phone] Fax: [phone]

## 2020-11-12 RX ORDER — NAPROXEN 500 MG/1
500 TABLET ORAL 2 TIMES DAILY WITH MEALS
Qty: 60 TABLET | Refills: 3 | Status: SHIPPED | OUTPATIENT
Start: 2020-11-12 | End: 2021-06-09 | Stop reason: ALTCHOICE

## 2020-11-13 ENCOUNTER — OFFICE VISIT (OUTPATIENT)
Dept: INTERNAL MEDICINE | Facility: CLINIC | Age: 70
End: 2020-11-13
Payer: MEDICARE

## 2020-11-13 ENCOUNTER — TELEPHONE (OUTPATIENT)
Dept: INTERNAL MEDICINE | Facility: CLINIC | Age: 70
End: 2020-11-13

## 2020-11-13 ENCOUNTER — HOSPITAL ENCOUNTER (OUTPATIENT)
Dept: RADIOLOGY | Facility: HOSPITAL | Age: 70
Discharge: HOME | End: 2020-11-13
Attending: FAMILY MEDICINE
Payer: MEDICARE

## 2020-11-13 VITALS
HEART RATE: 74 BPM | OXYGEN SATURATION: 95 % | HEIGHT: 62 IN | SYSTOLIC BLOOD PRESSURE: 124 MMHG | WEIGHT: 244.06 LBS | TEMPERATURE: 99 F | BODY MASS INDEX: 44.91 KG/M2 | DIASTOLIC BLOOD PRESSURE: 60 MMHG | RESPIRATION RATE: 20 BRPM

## 2020-11-13 DIAGNOSIS — M25.562 ACUTE PAIN OF LEFT KNEE: Primary | ICD-10-CM

## 2020-11-13 DIAGNOSIS — M25.562 ACUTE PAIN OF LEFT KNEE: ICD-10-CM

## 2020-11-13 PROCEDURE — 99215 OFFICE O/P EST HI 40 MIN: CPT | Mod: PBBFAC,25,PO | Performed by: FAMILY MEDICINE

## 2020-11-13 PROCEDURE — 73562 XR KNEE 3 VIEW LEFT: ICD-10-PCS | Mod: 26,LT,, | Performed by: RADIOLOGY

## 2020-11-13 PROCEDURE — 99213 OFFICE O/P EST LOW 20 MIN: CPT | Mod: S$PBB,,, | Performed by: FAMILY MEDICINE

## 2020-11-13 PROCEDURE — 73562 X-RAY EXAM OF KNEE 3: CPT | Mod: TC,PO,LT

## 2020-11-13 PROCEDURE — 99999 PR PBB SHADOW E&M-EST. PATIENT-LVL V: CPT | Mod: PBBFAC,,, | Performed by: FAMILY MEDICINE

## 2020-11-13 PROCEDURE — 73562 X-RAY EXAM OF KNEE 3: CPT | Mod: 26,LT,, | Performed by: RADIOLOGY

## 2020-11-13 PROCEDURE — 99213 PR OFFICE/OUTPT VISIT, EST, LEVL III, 20-29 MIN: ICD-10-PCS | Mod: S$PBB,,, | Performed by: FAMILY MEDICINE

## 2020-11-13 PROCEDURE — 99999 PR PBB SHADOW E&M-EST. PATIENT-LVL V: ICD-10-PCS | Mod: PBBFAC,,, | Performed by: FAMILY MEDICINE

## 2020-11-13 RX ORDER — PREDNISONE 20 MG/1
20 TABLET ORAL DAILY
Qty: 4 TABLET | Refills: 0 | Status: SHIPPED | OUTPATIENT
Start: 2020-11-13 | End: 2020-11-17

## 2020-11-13 RX ORDER — TRAMADOL HYDROCHLORIDE 50 MG/1
50 TABLET ORAL EVERY 6 HOURS PRN
Qty: 20 TABLET | Refills: 0 | Status: SHIPPED | OUTPATIENT
Start: 2020-11-13 | End: 2020-12-14

## 2020-11-13 NOTE — LETTER
November 13, 2020      [facility]  [address] [address]  Phone: [phone]  Fax: [phone]       Patient: Page Grissom   YOB: 1950  Date of Visit: 11/13/2020    To Whom It May Concern:    Ester Grissom  was at [facility] on 11/13/2020 and daughter Cassy Grissom present with her for assistance. Please excuse Cassy Grissom from work 11/13/20 may return to 11/16/20. If you have any questions or concerns, or if I can be of further assistance, please do not hesitate to contact me.    Sincerely,    Frieda Carlin LPN

## 2020-11-13 NOTE — TELEPHONE ENCOUNTER
----- Message from Erika Goins MD sent at 11/13/2020  1:37 PM CST -----  Please notify her xray shows bad arthritis changes but nothing else abnormal. If no improvement in her knee in about a week on the new medication, let us know and we will get her to see orthopedics.

## 2020-11-15 NOTE — PROGRESS NOTES
Subjective:      Patient ID: Page Grissom is a 70 y.o. female.    Chief Complaint: Leg Pain (left)      Patient reports increased pain and swelling of the left knee for the past week or so.  No recent injury or overuse of the knee.  The worst pain is posterior to her knee.  She has been using bioderm and icy hot topically with no relief.    Leg Pain       Review of Systems   Musculoskeletal: Positive for arthralgias, gait problem and joint swelling.     Past Medical History:   Diagnosis Date    Asthma     Back pain     Cataract     OD    Diabetes mellitus     Fibromyalgia     Gastroesophageal reflux disease     Hypercholesterolemia     Hypertension     Immune deficiency disorder     Osteoporosis     Rheumatoid arthritis     Tobacco dependence     Trouble in sleeping     Type 2 diabetes mellitus           Past Surgical History:   Procedure Laterality Date    COLONOSCOPY N/A 2/14/2019    Procedure: COLONOSCOPY;  Surgeon: Yury Atwood MD;  Location: [facility];  Service: Endoscopy;  Laterality: N/A;    HERNIA REPAIR      OOPHORECTOMY       Family History   Problem Relation Age of Onset    Hypertension Mother     Cancer Father     Colon cancer Father     Breast cancer Sister      Social History     Socioeconomic History    Marital status:      Spouse name: Not on file    Number of children: Not on file    Years of education: Not on file    Highest education level: Not on file   Occupational History    Not on file   Social Needs    Financial resource strain: Not on file    Food insecurity     Worry: Not on file     Inability: Not on file    Transportation needs     Medical: Not on file     Non-medical: Not on file   Tobacco Use    Smoking status: Light Tobacco Smoker     Packs/day: 0.50     Years: 25.00     Pack years: 12.50     Types: Cigarettes    Smokeless tobacco: Never Used    Tobacco comment: trying to quit   Substance and Sexual Activity    Alcohol use: No    Drug  "use: No    Sexual activity: Not on file   Lifestyle    Physical activity     Days per week: Not on file     Minutes per session: Not on file    Stress: Not on file   Relationships    Social connections     Talks on phone: Not on file     Gets together: Not on file     Attends Alevism service: Not on file     Active member of club or organization: Not on file     Attends meetings of clubs or organizations: Not on file     Relationship status: Not on file   Other Topics Concern    Not on file   Social History Narrative    Not on file     Review of patient's allergies indicates:  No Known Allergies    Objective:       /60   Pulse 74   Temp 99.1 °F (37.3 °C) (Temporal)   Resp 20   Ht 5' 2" (1.575 m)   Wt 110.7 kg (244 lb 0.8 oz)   SpO2 95%   BMI 44.64 kg/m²   Physical Exam  Constitutional:       General: She is not in acute distress.     Appearance: Normal appearance. She is well-developed. She is not ill-appearing or diaphoretic.   Musculoskeletal: Normal range of motion.         General: Swelling (Generalized left knee) and tenderness (Generalized left knee) present. No deformity or signs of injury.   Neurological:      Mental Status: She is alert and oriented to person, place, and time.   Psychiatric:         Mood and Affect: Mood normal.         Behavior: Behavior normal.         Thought Content: Thought content normal.         Judgment: Judgment normal.       Assessment:     1. Acute pain of left knee      Plan:   Acute pain of left knee  -     Cancel: X-Ray Hip 2 View Left; Future; Expected date: 11/13/2020  -     X-Ray Knee 3 View Left; Future; Expected date: 11/13/2020    Other orders  -     predniSONE (DELTASONE) 20 MG tablet; Take 1 tablet (20 mg total) by mouth once daily. for 4 days  Dispense: 4 tablet; Refill: 0  -     traMADoL (ULTRAM) 50 mg tablet; Take 1 tablet (50 mg total) by mouth every 6 (six) hours as needed for Pain.  Dispense: 20 tablet; Refill: 0     X-ray today shows severe " to moderate tricompartmental degenerative changes.  Asked patient to contact us if symptoms worsen or do not improve in about a week, consider ortho referral  Medication List with Changes/Refills   New Medications    PREDNISONE (DELTASONE) 20 MG TABLET    Take 1 tablet (20 mg total) by mouth once daily. for 4 days    TRAMADOL (ULTRAM) 50 MG TABLET    Take 1 tablet (50 mg total) by mouth every 6 (six) hours as needed for Pain.   Current Medications    ALBUTEROL 90 MCG/ACTUATION INHALER    Inhale 2 puffs into the lungs every 6 (six) hours as needed for Wheezing. Dispense with spacer.    AMLODIPINE (NORVASC) 10 MG TABLET    TAKE 1 TABLET(10 MG) BY MOUTH EVERY DAY    CHOLECALCIFEROL, VITAMIN D3, 2,000 UNIT TAB    Take 2,000 Units by mouth once daily.    DULOXETINE (CYMBALTA) 30 MG CAPSULE    Take 1 capsule (30 mg total) by mouth once daily.    FISH OIL-OMEGA-3 FATTY ACIDS 300-1,000 MG CAPSULE    Take 2 g by mouth once daily.    FLUTICASONE-SALMETEROL 100-50 MCG/DOSE (ADVAIR) 100-50 MCG/DOSE DISKUS INHALER    Inhale 1 puff into the lungs 2 (two) times daily as needed.    FOLIC ACID (FOLVITE) 1 MG TABLET    Take 1 tablet (1,000 mcg total) by mouth once daily.    HYDRALAZINE (APRESOLINE) 25 MG TABLET    TAKE 1 TABLET BY MOUTH EVERY 12 HOURS    HYDROCHLOROTHIAZIDE (HYDRODIURIL) 25 MG TABLET    TAKE 1 TABLET(25 MG) BY MOUTH EVERY DAY    HYDROXYCHLOROQUINE (PLAQUENIL) 200 MG TABLET    Take 1 tablet (200 mg total) by mouth once daily.    LISINOPRIL (PRINIVIL,ZESTRIL) 40 MG TABLET    TAKE 1 TABLET BY MOUTH EVERY DAY    LORATADINE/PSEUDOEPHEDRINE (CLARITIN-D 24 HOUR ORAL)    Take 1 tablet by mouth daily as needed.    METFORMIN (GLUCOPHAGE) 500 MG TABLET    Take 1 tablet (500 mg total) by mouth once daily.    MULTIVITAMIN (ONE DAILY MULTIVITAMIN) PER TABLET    Take 1 tablet by mouth once daily.    NAPROXEN (NAPROSYN) 500 MG TABLET    Take 1 tablet (500 mg total) by mouth 2 (two) times daily with meals.     PHENYLEPHRINE/DM/ACETAMINOP/GG (MUCINEX SINUS-MAX SEV TERRIE,DM, ORAL)    Take by mouth as needed.    POTASSIUM CHLORIDE SA (K-DUR,KLOR-CON) 20 MEQ TABLET    TAKE 1 TABLET(20 MEQ) BY MOUTH TWICE DAILY    ROSUVASTATIN (CRESTOR) 40 MG TAB    TAKE 1 TABLET(40 MG) BY MOUTH EVERY EVENING    SODIUM,POTASSIUM,MAG SULFATES (SUPREP BOWEL PREP KIT) 17.5-3.13-1.6 GRAM SOLR    Take as directed

## 2020-11-19 DIAGNOSIS — M05.749 RHEUMATOID ARTHRITIS INVOLVING HAND WITH POSITIVE RHEUMATOID FACTOR, UNSPECIFIED LATERALITY: ICD-10-CM

## 2020-11-19 RX ORDER — HYDROXYCHLOROQUINE SULFATE 200 MG/1
200 TABLET, FILM COATED ORAL DAILY
Qty: 30 TABLET | Refills: 11 | Status: SHIPPED | OUTPATIENT
Start: 2020-11-19 | End: 2021-10-13 | Stop reason: SDUPTHER

## 2020-12-02 ENCOUNTER — NURSE TRIAGE (OUTPATIENT)
Dept: ADMINISTRATIVE | Facility: CLINIC | Age: 70
End: 2020-12-02

## 2020-12-03 ENCOUNTER — OFFICE VISIT (OUTPATIENT)
Dept: INTERNAL MEDICINE | Facility: CLINIC | Age: 70
End: 2020-12-03
Payer: MEDICARE

## 2020-12-03 VITALS
HEART RATE: 88 BPM | SYSTOLIC BLOOD PRESSURE: 136 MMHG | TEMPERATURE: 98 F | DIASTOLIC BLOOD PRESSURE: 80 MMHG | BODY MASS INDEX: 40.73 KG/M2 | HEIGHT: 64 IN | OXYGEN SATURATION: 94 % | WEIGHT: 238.56 LBS

## 2020-12-03 DIAGNOSIS — M54.50 LUMBAR BACK PAIN: Primary | ICD-10-CM

## 2020-12-03 PROCEDURE — 99213 PR OFFICE/OUTPT VISIT, EST, LEVL III, 20-29 MIN: ICD-10-PCS | Mod: S$PBB,,, | Performed by: INTERNAL MEDICINE

## 2020-12-03 PROCEDURE — 99999 PR PBB SHADOW E&M-EST. PATIENT-LVL IV: CPT | Mod: PBBFAC,,, | Performed by: INTERNAL MEDICINE

## 2020-12-03 PROCEDURE — 99213 OFFICE O/P EST LOW 20 MIN: CPT | Mod: S$PBB,,, | Performed by: INTERNAL MEDICINE

## 2020-12-03 PROCEDURE — 99999 PR PBB SHADOW E&M-EST. PATIENT-LVL IV: ICD-10-PCS | Mod: PBBFAC,,, | Performed by: INTERNAL MEDICINE

## 2020-12-03 PROCEDURE — 99214 OFFICE O/P EST MOD 30 MIN: CPT | Mod: PBBFAC,PO | Performed by: INTERNAL MEDICINE

## 2020-12-03 RX ORDER — CYCLOBENZAPRINE HCL 10 MG
10 TABLET ORAL 3 TIMES DAILY PRN
Qty: 40 TABLET | Refills: 1 | Status: SHIPPED | OUTPATIENT
Start: 2020-12-03 | End: 2020-12-13

## 2020-12-03 NOTE — PROGRESS NOTES
"HPI:  Patient is a 70-year-old female who comes in today with complaints of lower lumbar back pain intermittently over the last several weeks to months.  She states it is currently not bother her.  She states it does flare up when she does lifting.  She denies any pain down into the legs.  The pain is all located in her back and buttocks area.    Current meds have been verified and updated per the EMR  Exam:/80 (BP Location: Right arm)   Pulse 88   Temp 97.9 °F (36.6 °C) (Tympanic)   Ht 5' 4" (1.626 m)   Wt 108.2 kg (238 lb 8.6 oz)   SpO2 (!) 94%   BMI 40.94 kg/m²   She has tenderness of bilateral paraspinous muscles in the lumbar region.  She has no significant vertebral tenderness.  Her straight leg raises are normal.  Reflexes are 2+ equal.    Lab Results   Component Value Date    WBC 9.38 08/11/2020    HGB 11.7 (L) 08/11/2020    HCT 38.8 08/11/2020     08/11/2020    CHOL 127 05/29/2020    TRIG 81 05/29/2020    HDL 53 05/29/2020    ALT 9 (L) 08/11/2020    AST 12 08/11/2020     08/11/2020    K 4.7 08/11/2020     08/11/2020    CREATININE 1.5 (H) 08/11/2020    BUN 23 08/11/2020    CO2 26 08/11/2020    TSH 0.717 03/08/2010    GLUF 122 (H) 12/16/2009    HGBA1C 5.5 05/29/2020       Impression:  Muscular lower back pain  Patient Active Problem List   Diagnosis    Rheumatoid arthritis involving multiple sites with positive rheumatoid factor    High risk medication use    Vitamin D deficiency disease    Mild persistent asthma without complication    Diabetes mellitus type 2 with complications    GERD (gastroesophageal reflux disease)    Essential hypertension    Hyperlipidemia associated with type 2 diabetes mellitus    Fibromyalgia    Long-term use of immunosuppressant medication    Cigarette smoker    Class 3 severe obesity due to excess calories with serious comorbidity and body mass index (BMI) of 40.0 to 44.9 in adult    Osteopenia of multiple sites    Tortuous aorta "       Plan:  Orders Placed This Encounter    cyclobenzaprine (FLEXERIL) 10 MG tablet     She should take her naproxen.  I gave her some Flexeril use as well.  Patient will follow-up with her primary care physician.    This note is generated with speech recognition software and is subject to transcription error and sound alike phrases that may be missed by proofreading.

## 2020-12-03 NOTE — TELEPHONE ENCOUNTER
Saturday- vomiting. Monday- had a BM. Back pain 7/10. Advised to see the physician within three days. Appointment made for tomorrow. Verbalized understanding.     Reason for Disposition   [1] MODERATE back pain (e.g., interferes with normal activities) AND [2] present > 3 days    Additional Information   Negative: Passed out (i.e., lost consciousness, collapsed and was not responding)   Negative: Shock suspected (e.g., cold/pale/clammy skin, too weak to stand, low BP, rapid pulse)   Negative: Sounds like a life-threatening emergency to the triager   Negative: [1] SEVERE back pain (e.g., excruciating) AND [2] sudden onset AND [3] age > 60   Negative: [1] Unable to urinate (or only a few drops) > 4 hours AND [2] bladder feels very full (e.g., palpable bladder or strong urge to urinate)   Negative: [1] Loss of bladder or bowel control (urine or bowel incontinence; wetting self, leaking stool) AND [2] new onset   Negative: Numbness in groin or rectal area (i.e., loss of sensation)   Negative: [1] SEVERE abdominal pain AND [2] present > 1 hour   Negative: [1] Abdominal pain AND [2] age > 60   Negative: Weakness of a leg or foot (e.g., unable to bear weight, dragging foot)   Negative: Unable to walk   Negative: Patient sounds very sick or weak to the triager   Negative: [1] SEVERE back pain (e.g., excruciating, unable to do any normal activities) AND [2] not improved 2 hours after pain medicine   Negative: [1] Pain radiates into the thigh or further down the leg AND [2] both legs   Negative: [1] Fever > 100.0 F (37.8 C) AND [2] flank pain (i.e., in side, below ribs and above hip)   Negative: [1] Pain or burning with passing urine (urination) AND [2] flank pain (i.e., in side, below ribs and above hip)   Negative: High-risk adult (e.g., history of cancer, HIV, or IV drug abuse)   Negative: [1] Fever AND [2] no symptoms of UTI  (Exception: has generalized muscle pains, not localized back pain)    "Negative: Rash in same area as pain (may be described as "small blisters")   Negative: Numbness in a leg or foot (i.e., loss of sensation)   Negative: [1] Numbness in an arm or hand (i.e., loss of sensation) AND [2] upper back pain   Negative: Blood in urine (red, pink, or tea-colored)    Protocols used: BACK PAIN-A-AH      "

## 2020-12-05 ENCOUNTER — LAB VISIT (OUTPATIENT)
Dept: LAB | Facility: HOSPITAL | Age: 70
End: 2020-12-05
Attending: NURSE PRACTITIONER
Payer: MEDICARE

## 2020-12-05 DIAGNOSIS — E78.5 HYPERLIPIDEMIA ASSOCIATED WITH TYPE 2 DIABETES MELLITUS: ICD-10-CM

## 2020-12-05 DIAGNOSIS — E11.69 HYPERLIPIDEMIA ASSOCIATED WITH TYPE 2 DIABETES MELLITUS: ICD-10-CM

## 2020-12-05 DIAGNOSIS — E11.8 DIABETES MELLITUS TYPE 2 WITH COMPLICATIONS: ICD-10-CM

## 2020-12-05 LAB
ALBUMIN SERPL BCP-MCNC: 2.9 G/DL (ref 3.5–5.2)
ALP SERPL-CCNC: 106 U/L (ref 55–135)
ALT SERPL W/O P-5'-P-CCNC: 8 U/L (ref 10–44)
ANION GAP SERPL CALC-SCNC: 10 MMOL/L (ref 8–16)
AST SERPL-CCNC: 11 U/L (ref 10–40)
BILIRUB SERPL-MCNC: 0.2 MG/DL (ref 0.1–1)
BUN SERPL-MCNC: 40 MG/DL (ref 8–23)
CALCIUM SERPL-MCNC: 8.7 MG/DL (ref 8.7–10.5)
CHLORIDE SERPL-SCNC: 103 MMOL/L (ref 95–110)
CHOLEST SERPL-MCNC: 127 MG/DL (ref 120–199)
CHOLEST/HDLC SERPL: 2.2 {RATIO} (ref 2–5)
CO2 SERPL-SCNC: 25 MMOL/L (ref 23–29)
CREAT SERPL-MCNC: 2.8 MG/DL (ref 0.5–1.4)
EST. GFR  (AFRICAN AMERICAN): 19.1 ML/MIN/1.73 M^2
EST. GFR  (NON AFRICAN AMERICAN): 16.6 ML/MIN/1.73 M^2
ESTIMATED AVG GLUCOSE: 117 MG/DL (ref 68–131)
GLUCOSE SERPL-MCNC: 80 MG/DL (ref 70–110)
HBA1C MFR BLD HPLC: 5.7 % (ref 4–5.6)
HDLC SERPL-MCNC: 57 MG/DL (ref 40–75)
HDLC SERPL: 44.9 % (ref 20–50)
LDLC SERPL CALC-MCNC: 57.8 MG/DL (ref 63–159)
NONHDLC SERPL-MCNC: 70 MG/DL
POTASSIUM SERPL-SCNC: 5.4 MMOL/L (ref 3.5–5.1)
PROT SERPL-MCNC: 7.3 G/DL (ref 6–8.4)
SODIUM SERPL-SCNC: 138 MMOL/L (ref 136–145)
TRIGL SERPL-MCNC: 61 MG/DL (ref 30–150)

## 2020-12-05 PROCEDURE — 80061 LIPID PANEL: CPT

## 2020-12-05 PROCEDURE — 80053 COMPREHEN METABOLIC PANEL: CPT

## 2020-12-05 PROCEDURE — 83036 HEMOGLOBIN GLYCOSYLATED A1C: CPT

## 2020-12-05 PROCEDURE — 36415 COLL VENOUS BLD VENIPUNCTURE: CPT

## 2020-12-09 ENCOUNTER — PATIENT OUTREACH (OUTPATIENT)
Dept: ADMINISTRATIVE | Facility: OTHER | Age: 70
End: 2020-12-09

## 2020-12-09 DIAGNOSIS — Z12.31 ENCOUNTER FOR SCREENING MAMMOGRAM FOR MALIGNANT NEOPLASM OF BREAST: Primary | ICD-10-CM

## 2020-12-10 ENCOUNTER — LAB VISIT (OUTPATIENT)
Dept: LAB | Facility: HOSPITAL | Age: 70
End: 2020-12-10
Attending: PEDIATRICS
Payer: MEDICARE

## 2020-12-10 ENCOUNTER — OFFICE VISIT (OUTPATIENT)
Dept: INTERNAL MEDICINE | Facility: CLINIC | Age: 70
End: 2020-12-10
Payer: MEDICARE

## 2020-12-10 ENCOUNTER — OFFICE VISIT (OUTPATIENT)
Dept: PODIATRY | Facility: CLINIC | Age: 70
End: 2020-12-10
Payer: MEDICARE

## 2020-12-10 VITALS
BODY MASS INDEX: 40.34 KG/M2 | HEART RATE: 78 BPM | HEIGHT: 64 IN | DIASTOLIC BLOOD PRESSURE: 58 MMHG | SYSTOLIC BLOOD PRESSURE: 104 MMHG | WEIGHT: 236.31 LBS

## 2020-12-10 VITALS
BODY MASS INDEX: 40.57 KG/M2 | TEMPERATURE: 99 F | DIASTOLIC BLOOD PRESSURE: 58 MMHG | WEIGHT: 236.31 LBS | SYSTOLIC BLOOD PRESSURE: 104 MMHG | OXYGEN SATURATION: 91 % | HEART RATE: 78 BPM

## 2020-12-10 DIAGNOSIS — E11.8 DIABETES MELLITUS TYPE 2 WITH COMPLICATIONS: ICD-10-CM

## 2020-12-10 DIAGNOSIS — M05.79 RHEUMATOID ARTHRITIS INVOLVING MULTIPLE SITES WITH POSITIVE RHEUMATOID FACTOR: ICD-10-CM

## 2020-12-10 DIAGNOSIS — E66.01 CLASS 3 SEVERE OBESITY DUE TO EXCESS CALORIES WITH SERIOUS COMORBIDITY AND BODY MASS INDEX (BMI) OF 40.0 TO 44.9 IN ADULT: ICD-10-CM

## 2020-12-10 DIAGNOSIS — E11.8 DIABETES MELLITUS TYPE 2 WITH COMPLICATIONS: Primary | ICD-10-CM

## 2020-12-10 DIAGNOSIS — E78.5 HYPERLIPIDEMIA ASSOCIATED WITH TYPE 2 DIABETES MELLITUS: ICD-10-CM

## 2020-12-10 DIAGNOSIS — Q82.8 POROKERATOSIS: ICD-10-CM

## 2020-12-10 DIAGNOSIS — I77.1 TORTUOUS AORTA: ICD-10-CM

## 2020-12-10 DIAGNOSIS — M85.89 OSTEOPENIA OF MULTIPLE SITES: ICD-10-CM

## 2020-12-10 DIAGNOSIS — K21.9 GASTROESOPHAGEAL REFLUX DISEASE WITHOUT ESOPHAGITIS: ICD-10-CM

## 2020-12-10 DIAGNOSIS — E55.9 VITAMIN D DEFICIENCY DISEASE: ICD-10-CM

## 2020-12-10 DIAGNOSIS — J45.30 MILD PERSISTENT ASTHMA WITHOUT COMPLICATION: ICD-10-CM

## 2020-12-10 DIAGNOSIS — E11.69 HYPERLIPIDEMIA ASSOCIATED WITH TYPE 2 DIABETES MELLITUS: ICD-10-CM

## 2020-12-10 DIAGNOSIS — I10 ESSENTIAL HYPERTENSION: ICD-10-CM

## 2020-12-10 DIAGNOSIS — N17.9 AKI (ACUTE KIDNEY INJURY): ICD-10-CM

## 2020-12-10 PROCEDURE — 99999 PR PBB SHADOW E&M-EST. PATIENT-LVL V: CPT | Mod: PBBFAC,,, | Performed by: PEDIATRICS

## 2020-12-10 PROCEDURE — 99214 OFFICE O/P EST MOD 30 MIN: CPT | Mod: PBBFAC,27,25 | Performed by: PODIATRIST

## 2020-12-10 PROCEDURE — 36415 COLL VENOUS BLD VENIPUNCTURE: CPT

## 2020-12-10 PROCEDURE — 80048 BASIC METABOLIC PNL TOTAL CA: CPT

## 2020-12-10 PROCEDURE — 99213 PR OFFICE/OUTPT VISIT, EST, LEVL III, 20-29 MIN: ICD-10-PCS | Mod: 25,S$PBB,, | Performed by: PODIATRIST

## 2020-12-10 PROCEDURE — 99214 OFFICE O/P EST MOD 30 MIN: CPT | Mod: S$PBB,,, | Performed by: PEDIATRICS

## 2020-12-10 PROCEDURE — 90694 VACC AIIV4 NO PRSRV 0.5ML IM: CPT | Mod: PBBFAC

## 2020-12-10 PROCEDURE — 99999 PR PBB SHADOW E&M-EST. PATIENT-LVL IV: CPT | Mod: PBBFAC,,, | Performed by: PODIATRIST

## 2020-12-10 PROCEDURE — 99999 PR PBB SHADOW E&M-EST. PATIENT-LVL IV: ICD-10-PCS | Mod: PBBFAC,,, | Performed by: PODIATRIST

## 2020-12-10 PROCEDURE — 99215 OFFICE O/P EST HI 40 MIN: CPT | Mod: PBBFAC | Performed by: PEDIATRICS

## 2020-12-10 PROCEDURE — 99999 PR PBB SHADOW E&M-EST. PATIENT-LVL V: ICD-10-PCS | Mod: PBBFAC,,, | Performed by: PEDIATRICS

## 2020-12-10 PROCEDURE — 99214 PR OFFICE/OUTPT VISIT, EST, LEVL IV, 30-39 MIN: ICD-10-PCS | Mod: S$PBB,,, | Performed by: PEDIATRICS

## 2020-12-10 PROCEDURE — G0008 ADMIN INFLUENZA VIRUS VAC: HCPCS | Mod: PBBFAC

## 2020-12-10 PROCEDURE — 99213 OFFICE O/P EST LOW 20 MIN: CPT | Mod: 25,S$PBB,, | Performed by: PODIATRIST

## 2020-12-10 RX ORDER — AMMONIUM LACTATE 12 G/100G
1 CREAM TOPICAL 2 TIMES DAILY
Qty: 1 TUBE | Refills: 3 | Status: ON HOLD | OUTPATIENT
Start: 2020-12-10 | End: 2022-04-11 | Stop reason: HOSPADM

## 2020-12-10 NOTE — PROGRESS NOTES
Subjective:     Patient ID: Page Grissom is a 70 y.o. female.    Chief Complaint: Foot Pain (pt c/o of foot pain 5-6/10 in both feet, diabetic pt wears slipper w/o socks, PCP Dr. Roman last seen 12/10/2020)    Page is a 70 y.o. female who presents to the clinic for evaluation and treatment of high risk feet. Page has a past medical history of Asthma, Back pain, Cataract, Diabetes mellitus, Fibromyalgia, Gastroesophageal reflux disease, Hypercholesterolemia, Hypertension, Immune deficiency disorder, Osteoporosis, Rheumatoid arthritis, Tobacco dependence, Trouble in sleeping, and Type 2 diabetes mellitus. The patient's chief complaint is painful callus on both feet. This patient has documented high risk feet requiring routine maintenance secondary to diabetes mellitis and those secondary complications of diabetes, as mentioned..    PCP: NEELAM Roman Jr, MD    Date Last Seen by PCP: 12/10/2020    Current shoe gear:  Affected Foot: Slip-on shoes     Unaffected Foot: Slip-on shoes    Hemoglobin A1C   Date Value Ref Range Status   12/05/2020 5.7 (H) 4.0 - 5.6 % Final     Comment:     ADA Screening Guidelines:  5.7-6.4%  Consistent with prediabetes  >or=6.5%  Consistent with diabetes  High levels of fetal hemoglobin interfere with the HbA1C  assay. Heterozygous hemoglobin variants (HbS, HgC, etc)do  not significantly interfere with this assay.   However, presence of multiple variants may affect accuracy.     05/29/2020 5.5 4.0 - 5.6 % Final     Comment:     ADA Screening Guidelines:  5.7-6.4%  Consistent with prediabetes  >or=6.5%  Consistent with diabetes  High levels of fetal hemoglobin interfere with the HbA1C  assay. Heterozygous hemoglobin variants (HbS, HgC, etc)do  not significantly interfere with this assay.   However, presence of multiple variants may affect accuracy.     12/17/2019 5.4 4.0 - 5.6 % Final     Comment:     ADA Screening Guidelines:  5.7-6.4%  Consistent with  prediabetes  >or=6.5%  Consistent with diabetes  High levels of fetal hemoglobin interfere with the HbA1C  assay. Heterozygous hemoglobin variants (HbS, HgC, etc)do  not significantly interfere with this assay.   However, presence of multiple variants may affect accuracy.         Patient Active Problem List   Diagnosis    Rheumatoid arthritis involving multiple sites with positive rheumatoid factor    High risk medication use    Vitamin D deficiency disease    Mild persistent asthma without complication    Diabetes mellitus type 2 with complications    GERD (gastroesophageal reflux disease)    Essential hypertension    Hyperlipidemia associated with type 2 diabetes mellitus    Fibromyalgia    Long-term use of immunosuppressant medication    Cigarette smoker    Class 3 severe obesity due to excess calories with serious comorbidity and body mass index (BMI) of 40.0 to 44.9 in adult    Osteopenia of multiple sites    Tortuous aorta       Medication List with Changes/Refills   New Medications    AMMONIUM LACTATE 12 % CREA    Apply 1 Act topically 2 (two) times daily.    VARICELLA-ZOSTER [id number], PF, (SHINGRIX, PF,) 50 MCG/0.5 ML INJECTION    Inject 0.5 mLs into the muscle once. for 1 dose   Current Medications    ALBUTEROL 90 MCG/ACTUATION INHALER    Inhale 2 puffs into the lungs every 6 (six) hours as needed for Wheezing. Dispense with spacer.    AMLODIPINE (NORVASC) 10 MG TABLET    TAKE 1 TABLET(10 MG) BY MOUTH EVERY DAY    CHOLECALCIFEROL, VITAMIN D3, 2,000 UNIT TAB    Take 2,000 Units by mouth once daily.    CYCLOBENZAPRINE (FLEXERIL) 10 MG TABLET    Take 1 tablet (10 mg total) by mouth 3 (three) times daily as needed for Muscle spasms.    DULOXETINE (CYMBALTA) 30 MG CAPSULE    Take 1 capsule (30 mg total) by mouth once daily.    FISH OIL-OMEGA-3 FATTY ACIDS 300-1,000 MG CAPSULE    Take 2 g by mouth once daily.    FLUTICASONE-SALMETEROL 100-50 MCG/DOSE (ADVAIR) 100-50 MCG/DOSE DISKUS INHALER    Inhale  1 puff into the lungs 2 (two) times daily as needed.    FOLIC ACID (FOLVITE) 1 MG TABLET    Take 1 tablet (1,000 mcg total) by mouth once daily.    HYDRALAZINE (APRESOLINE) 25 MG TABLET    TAKE 1 TABLET BY MOUTH EVERY 12 HOURS    HYDROCHLOROTHIAZIDE (HYDRODIURIL) 25 MG TABLET    TAKE 1 TABLET(25 MG) BY MOUTH EVERY DAY    HYDROXYCHLOROQUINE (PLAQUENIL) 200 MG TABLET    Take 1 tablet (200 mg total) by mouth once daily.    LISINOPRIL (PRINIVIL,ZESTRIL) 40 MG TABLET    TAKE 1 TABLET BY MOUTH EVERY DAY    LORATADINE/PSEUDOEPHEDRINE (CLARITIN-D 24 HOUR ORAL)    Take 1 tablet by mouth daily as needed.    METFORMIN (GLUCOPHAGE) 500 MG TABLET    Take 1 tablet (500 mg total) by mouth once daily.    MULTIVITAMIN (ONE DAILY MULTIVITAMIN) PER TABLET    Take 1 tablet by mouth once daily.    NAPROXEN (NAPROSYN) 500 MG TABLET    Take 1 tablet (500 mg total) by mouth 2 (two) times daily with meals.    PHENYLEPHRINE/DM/ACETAMINOP/GG (MUCINEX SINUS-MAX SEV TERRIE,DM, ORAL)    Take by mouth as needed.    POTASSIUM CHLORIDE SA (K-DUR,KLOR-CON) 20 MEQ TABLET    TAKE 1 TABLET(20 MEQ) BY MOUTH TWICE DAILY    ROSUVASTATIN (CRESTOR) 40 MG TAB    TAKE 1 TABLET(40 MG) BY MOUTH EVERY EVENING    SODIUM,POTASSIUM,MAG SULFATES (SUPREP BOWEL PREP KIT) 17.5-3.13-1.6 GRAM SOLR    Take as directed    TRAMADOL (ULTRAM) 50 MG TABLET    Take 1 tablet (50 mg total) by mouth every 6 (six) hours as needed for Pain.       Review of patient's allergies indicates:  No Known Allergies    Past Surgical History:   Procedure Laterality Date    COLONOSCOPY N/A 2/14/2019    Procedure: COLONOSCOPY;  Surgeon: Yury Atwood MD;  Location: [facility];  Service: Endoscopy;  Laterality: N/A;    HERNIA REPAIR      OOPHORECTOMY         Family History   Problem Relation Age of Onset    Hypertension Mother     Cancer Father     Colon cancer Father     Breast cancer Sister        Social History     Socioeconomic History    Marital status:      Spouse name: Not on  "file    Number of children: Not on file    Years of education: Not on file    Highest education level: Not on file   Occupational History    Not on file   Social Needs    Financial resource strain: Not on file    Food insecurity     Worry: Not on file     Inability: Not on file    Transportation needs     Medical: Not on file     Non-medical: Not on file   Tobacco Use    Smoking status: Current Every Day Smoker     Packs/day: 0.50     Years: 25.00     Pack years: 12.50     Types: Cigarettes    Smokeless tobacco: Never Used    Tobacco comment: trying to quit   Substance and Sexual Activity    Alcohol use: No    Drug use: No    Sexual activity: Not on file   Lifestyle    Physical activity     Days per week: Not on file     Minutes per session: Not on file    Stress: Not on file   Relationships    Social connections     Talks on phone: Not on file     Gets together: Not on file     Attends Denominational service: Not on file     Active member of club or organization: Not on file     Attends meetings of clubs or organizations: Not on file     Relationship status: Not on file   Other Topics Concern    Not on file   Social History Narrative    Not on file       Vitals:    12/10/20 1618   BP: (!) 104/58   Pulse: 78   Weight: 107.2 kg (236 lb 5.3 oz)   Height: 5' 4" (1.626 m)   PainSc:   6       Hemoglobin A1C   Date Value Ref Range Status   12/05/2020 5.7 (H) 4.0 - 5.6 % Final     Comment:     ADA Screening Guidelines:  5.7-6.4%  Consistent with prediabetes  >or=6.5%  Consistent with diabetes  High levels of fetal hemoglobin interfere with the HbA1C  assay. Heterozygous hemoglobin variants (HbS, HgC, etc)do  not significantly interfere with this assay.   However, presence of multiple variants may affect accuracy.     05/29/2020 5.5 4.0 - 5.6 % Final     Comment:     ADA Screening Guidelines:  5.7-6.4%  Consistent with prediabetes  >or=6.5%  Consistent with diabetes  High levels of fetal hemoglobin interfere " with the HbA1C  assay. Heterozygous hemoglobin variants (HbS, HgC, etc)do  not significantly interfere with this assay.   However, presence of multiple variants may affect accuracy.     12/17/2019 5.4 4.0 - 5.6 % Final     Comment:     ADA Screening Guidelines:  5.7-6.4%  Consistent with prediabetes  >or=6.5%  Consistent with diabetes  High levels of fetal hemoglobin interfere with the HbA1C  assay. Heterozygous hemoglobin variants (HbS, HgC, etc)do  not significantly interfere with this assay.   However, presence of multiple variants may affect accuracy.         Review of Systems   Constitutional: Negative for chills and fever.   Respiratory: Negative for shortness of breath.    Cardiovascular: Negative for chest pain, palpitations, orthopnea, claudication and leg swelling.   Gastrointestinal: Negative for diarrhea, nausea and vomiting.   Musculoskeletal: Negative for joint pain.   Skin: Negative for rash.   Neurological: Positive for tingling and sensory change.   Psychiatric/Behavioral: Negative.          Objective:      PHYSICAL EXAM: Apperance: Alert and orient in no distress,well developed, and with good attention to grooming and body habits  Patient presents ambulating in slip on shoes.   LOWER EXTREMITY EXAM:  VASCULAR: Dorsalis pedis pulses 2/4 bilateral and Posterior Tibial pulses 2/4 bilateral. Capillary fill time <4 seconds bilateral. No edema observed bilateral. Varicosities absent bilateral. Skin temperature of the lower extremities is warm to warm, proximal to distal. Hair growth dim bilateral.  DERMATOLOGICAL: No skin rashes, subcutaneous nodules, lesions, or ulcers observed bilateral. Nails 1,2,3,4,5 bilateral normal length and thickness. Webspaces 1,2,3,4 bilateral clean, dry and without evidence of break in skin integrity. Mild hyperkeratotic tissue with thickened center core noted to right plantar 5th submetatarsal, left plantar 2nd sulcus, plantar lateral foot.   NEUROLOGICAL: Light touch,  sharp-dull, proprioception all present and equal bilaterally.  Vibratory sensation diminished at bilateral hallux. Protective sensation intact at all 10 sites as tested with a Emelle-Marcia 5.07 monofilament.   MUSCULOSKELETAL: Muscle strength is 5/5 for foot inverters, everters, plantarflexors, and dorsiflexors. Muscle tone is normal.           Assessment:       Encounter Diagnoses   Name Primary?    Diabetes mellitus type 2 with complications Yes    Porokeratosis          Plan:   Diabetes mellitus type 2 with complications    Porokeratosis  -     ammonium lactate 12 % Crea; Apply 1 Act topically 2 (two) times daily.  Dispense: 1 Tube; Refill: 3      I counseled the patient on her conditions, regarding findings of my examination, my impressions, and usual treatment plan.   Greater than 50% of this visit spent on counseling and coordination of care.  Greater than 15 minutes of a 20 minute appointment spent on education about the diabetic foot, neuropathy, and prevention of limb loss.  Shoe inspection. Diabetic Foot Education. Patient reminded of the importance of good nutrition and blood sugar control to help prevent podiatric complications of diabetes. Patient instructed on proper foot hygeine. We discussed wearing proper shoe gear, daily foot inspections, never walking without protective shoe gear, never putting sharp instruments to feet.    Prescription written for Ammonium Lactate 12% cream to be applied twice daily to area for inflammation.  Patient  will continue to monitor the areas daily, inspect feet, wear protective shoe gear when ambulatory, moisturizer to maintain skin integrity. Patient reminded of the importance of good nutrition and blood sugar control to help prevent podiatric complications of diabetes.  Patient to return 6 months or sooner if needed.                 Trey Swan DPM  [facility]

## 2020-12-10 NOTE — PROGRESS NOTES
Subjective:        Patient ID: Page Grissom is a 68 y.o. female.     Chief Complaint: Follow-up     HPI Comments: DM: no hyper/hypoglycemic symptoms. Rare self monitoring normal BS. Somewhatt following D&E, weight is down.   HTN: normal, no HTNive symptoms.    LIPIDS:not following D&E, tolerating and compliant with med(s).    ASTHMA: Rare cigarette smoking, Asthma quiet no albuterol or advair currently.  GERD: Quiet.  Rheum/osteoporosis: sees rheum regularly.  LABS REVIEWED AND DISCUSSED WITH PATIENT     Review of Systems   Constitutional: Negative for fever and unexpected weight change.   HENT: Negative for congestion and rhinorrhea.   Eyes: Negative for discharge and redness.   Respiratory: Negative for cough, shortness of breath and wheezing.   Cardiovascular: Negative for chest pain, palpitations and leg swelling.   Gastrointestinal: Negative for constipation, diarrhea and vomiting.   Endocrine: Negative for cold intolerance, heat intolerance, polydipsia, polyphagia and polyuria.   Genitourinary: Negative for decreased urine volume, difficulty urinating and menstrual problem.   Musculoskeletal: Negative for arthralgias and joint swelling.   Skin: Negative for rash and wound.   Neurological: Negative for syncope and headaches.   Psychiatric/Behavioral: Negative for behavioral problems and sleep disturbance. Adapting to 's death.     Objective:       Physical Exam   Constitutional: She is oriented to person, place, and time. She appears well-developed and well-nourished. She is cooperative.   Neck: Trachea normal and normal range of motion. Neck supple. No JVD present. Carotid bruit is not present. No Brudzinski's sign and no Kernig's sign noted. No thyroid mass and no thyromegaly present.   Cardiovascular: Normal rate, regular rhythm, normal heart sounds and normal pulses.    No murmur heard.  Pulses:  Dorsalis pedis pulses are 2+ on the right side, and 2+ on the left side.    Posterior tibial  pulses are 2+ on the right side, and 2+ on the left side.   Pulmonary/Chest: Effort normal and breath sounds normal. She has no wheezes. She has no rhonchi. She has no rales.   Abdominal: Soft. Normal appearance. There is no hepatosplenomegaly. There is no tenderness. There is no rebound and no CVA tenderness.   Lymphadenopathy:   She has no cervical adenopathy.   Neurological: She is alert and oriented to person, place, and time. She has normal strength and normal reflexes. No cranial nerve deficit or sensory deficit. Coordination and gait normal.   Skin: Skin is warm. No abrasion and no rash noted. Mild noninflammed varicosities, trace edema  Psychiatric: She has a normal mood and affect. Her speech is normal and behavior is normal. Judgment and thought content normal. Her mood appears not anxious. Cognition and memory are normal. She does not exhibit a depressed mood.     Foot hygiene was good, no ulcers, no onychomycosis, no tinea, monofilament intact           Assessment:       1.  Diabetes mellitus type 2 with complications     2.  Essential hypertension     3.  Hyperlipidemia associated with type 2 diabetes mellitus     4.  Gastroesophageal reflux disease without esophagitis     5.  Mild persistent asthma without complication     6.  Cigarette smoker     7.  morbid obesity due to excess calories     8.        Rheumatoid arthritis    9.        Osteoporosis  10.      KYE  Plan:    I suspect her cre is up again due to poor water intake and recent naprosyn. See renal urgently due to elevation. She was able to correct last year. D&E, weight loss, stop smoking completely. Meds reviewed, early restart advair if needed. Self monitor BP and BS. F/U 6 months with labs. Flu now.

## 2020-12-11 LAB
ANION GAP SERPL CALC-SCNC: 12 MMOL/L (ref 8–16)
BUN SERPL-MCNC: 29 MG/DL (ref 8–23)
CALCIUM SERPL-MCNC: 9.5 MG/DL (ref 8.7–10.5)
CHLORIDE SERPL-SCNC: 104 MMOL/L (ref 95–110)
CO2 SERPL-SCNC: 22 MMOL/L (ref 23–29)
CREAT SERPL-MCNC: 2.4 MG/DL (ref 0.5–1.4)
EST. GFR  (AFRICAN AMERICAN): 23 ML/MIN/1.73 M^2
EST. GFR  (NON AFRICAN AMERICAN): 20 ML/MIN/1.73 M^2
GLUCOSE SERPL-MCNC: 92 MG/DL (ref 70–110)
POTASSIUM SERPL-SCNC: 5.3 MMOL/L (ref 3.5–5.1)
SODIUM SERPL-SCNC: 138 MMOL/L (ref 136–145)

## 2020-12-14 ENCOUNTER — OFFICE VISIT (OUTPATIENT)
Dept: NEPHROLOGY | Facility: CLINIC | Age: 70
End: 2020-12-14
Payer: MEDICARE

## 2020-12-14 VITALS
BODY MASS INDEX: 39.95 KG/M2 | HEART RATE: 73 BPM | SYSTOLIC BLOOD PRESSURE: 132 MMHG | HEIGHT: 64 IN | OXYGEN SATURATION: 98 % | DIASTOLIC BLOOD PRESSURE: 80 MMHG | WEIGHT: 234 LBS

## 2020-12-14 DIAGNOSIS — K21.9 GASTROESOPHAGEAL REFLUX DISEASE WITHOUT ESOPHAGITIS: ICD-10-CM

## 2020-12-14 DIAGNOSIS — F17.210 CIGARETTE SMOKER: ICD-10-CM

## 2020-12-14 DIAGNOSIS — M05.79 RHEUMATOID ARTHRITIS INVOLVING MULTIPLE SITES WITH POSITIVE RHEUMATOID FACTOR: ICD-10-CM

## 2020-12-14 DIAGNOSIS — I77.1 TORTUOUS AORTA: ICD-10-CM

## 2020-12-14 DIAGNOSIS — J45.30 MILD PERSISTENT ASTHMA WITHOUT COMPLICATION: ICD-10-CM

## 2020-12-14 DIAGNOSIS — N17.9 AKI (ACUTE KIDNEY INJURY): Primary | ICD-10-CM

## 2020-12-14 DIAGNOSIS — M85.89 OSTEOPENIA OF MULTIPLE SITES: ICD-10-CM

## 2020-12-14 DIAGNOSIS — E11.69 HYPERLIPIDEMIA ASSOCIATED WITH TYPE 2 DIABETES MELLITUS: ICD-10-CM

## 2020-12-14 DIAGNOSIS — M79.7 FIBROMYALGIA: ICD-10-CM

## 2020-12-14 DIAGNOSIS — Z79.60 LONG-TERM USE OF IMMUNOSUPPRESSANT MEDICATION: ICD-10-CM

## 2020-12-14 DIAGNOSIS — E11.8 DIABETES MELLITUS TYPE 2 WITH COMPLICATIONS: ICD-10-CM

## 2020-12-14 DIAGNOSIS — E66.01 CLASS 3 SEVERE OBESITY DUE TO EXCESS CALORIES WITH SERIOUS COMORBIDITY AND BODY MASS INDEX (BMI) OF 40.0 TO 44.9 IN ADULT: ICD-10-CM

## 2020-12-14 DIAGNOSIS — E55.9 VITAMIN D DEFICIENCY DISEASE: ICD-10-CM

## 2020-12-14 DIAGNOSIS — E78.5 HYPERLIPIDEMIA ASSOCIATED WITH TYPE 2 DIABETES MELLITUS: ICD-10-CM

## 2020-12-14 DIAGNOSIS — I10 ESSENTIAL HYPERTENSION: ICD-10-CM

## 2020-12-14 DIAGNOSIS — Z79.899 HIGH RISK MEDICATION USE: ICD-10-CM

## 2020-12-14 DIAGNOSIS — N18.32 STAGE 3B CHRONIC KIDNEY DISEASE: ICD-10-CM

## 2020-12-14 PROCEDURE — 99204 OFFICE O/P NEW MOD 45 MIN: CPT | Mod: S$PBB,,, | Performed by: INTERNAL MEDICINE

## 2020-12-14 PROCEDURE — 99999 PR PBB SHADOW E&M-EST. PATIENT-LVL V: CPT | Mod: PBBFAC,,, | Performed by: INTERNAL MEDICINE

## 2020-12-14 PROCEDURE — 99999 PR PBB SHADOW E&M-EST. PATIENT-LVL V: ICD-10-PCS | Mod: PBBFAC,,, | Performed by: INTERNAL MEDICINE

## 2020-12-14 PROCEDURE — 99215 OFFICE O/P EST HI 40 MIN: CPT | Mod: PBBFAC,PO | Performed by: INTERNAL MEDICINE

## 2020-12-14 PROCEDURE — 99204 PR OFFICE/OUTPT VISIT, NEW, LEVL IV, 45-59 MIN: ICD-10-PCS | Mod: S$PBB,,, | Performed by: INTERNAL MEDICINE

## 2020-12-14 NOTE — PROGRESS NOTES
Subjective:       Patient ID: Page Grissom is a 70 y.o. Black or  female who presents for new evaluation of Acute Kidney Injury and Chronic Kidney Disease    HPI     She is referred by her PCP for KYE on CKD with baseline creatinine ranging 1.3-1.6mg/dL. She was in her usual state of health until she injured herself and was given tramadol and prednisone, developed severe constipation. This has nor sorted itself out and she feels better. No NSAIDs recently and no ABX.  No IV contrast. No LUTS. She admits to too much soda. She has used an unknown cough medicine? In lieu of Mucinex    Peak Cr was 2.8 with repeat 2.5mg/dL    Review of Systems   Constitutional: Positive for fatigue. Negative for activity change and appetite change.   HENT: Negative for facial swelling.    Eyes: Negative for visual disturbance.   Respiratory: Negative for shortness of breath.    Cardiovascular: Negative for chest pain and leg swelling.   Gastrointestinal: Positive for abdominal pain, constipation and nausea. Negative for abdominal distention, diarrhea and vomiting.   Endocrine: Negative for polydipsia and polyuria.   Genitourinary: Negative for difficulty urinating, dysuria, flank pain and hematuria.   Musculoskeletal: Positive for arthralgias and back pain.   Skin: Negative for rash.   Allergic/Immunologic: Positive for immunocompromised state.   Neurological: Negative for weakness.   Hematological: Does not bruise/bleed easily.   Psychiatric/Behavioral: Negative for confusion, decreased concentration and sleep disturbance.       Objective:      Physical Exam  Vitals signs and nursing note reviewed.   Constitutional:       General: She is not in acute distress.     Appearance: Normal appearance. She is not ill-appearing.   HENT:      Head: Atraumatic.   Eyes:      General: No scleral icterus.  Neck:      Musculoskeletal: Neck supple.      Vascular: No JVD.   Cardiovascular:      Rate and Rhythm: Normal rate.       Heart sounds: S1 normal and S2 normal. No friction rub.   Pulmonary:      Breath sounds: Normal breath sounds. No wheezing or rales.   Abdominal:      Palpations: Abdomen is soft.   Musculoskeletal:      Right lower leg: No edema.      Left lower leg: No edema.   Skin:     General: Skin is warm and dry.      Coloration: Skin is not jaundiced.   Neurological:      General: No focal deficit present.      Mental Status: She is alert and oriented to person, place, and time.      Gait: Gait abnormal.   Psychiatric:         Mood and Affect: Mood normal.         Behavior: Behavior normal.         Thought Content: Thought content normal.         Judgment: Judgment normal.         Assessment:       1. KYE (acute kidney injury)    2. Stage 3b chronic kidney disease    3. Cigarette smoker    4. Diabetes mellitus type 2 with complications    5. Vitamin D deficiency disease    6. Essential hypertension    7. Hyperlipidemia associated with type 2 diabetes mellitus    8. Mild persistent asthma without complication    9. Tortuous aorta    10. Class 3 severe obesity due to excess calories with serious comorbidity and body mass index (BMI) of 40.0 to 44.9 in adult    11. Rheumatoid arthritis involving multiple sites with positive rheumatoid factor    12. Osteopenia of multiple sites    13. Fibromyalgia    14. Gastroesophageal reflux disease without esophagitis    15. Long-term use of immunosuppressant medication    16. High risk medication use        Plan:           KYE on CKD  - per history unclear etiology as even though Naproxen is on her med list she is not taking it  - modest improvement in her chemistries  - will repeat chemistries, check urine studies and check renal u/s to rule out hydronephrosis     Labs and renal u/s--call with results

## 2020-12-14 NOTE — PATIENT INSTRUCTIONS
1. Stop potassium pills for now  2. Check kidney tests  3. Can stop folic acid since off methotrexate since on multivitamin   4. No Naproxen--HARD ON KIDNEYS

## 2020-12-14 NOTE — LETTER
December 20, 2020      [place]  [address] [address] [place] [address]           [facility]  [address] [address] [address]  Phone: [phone]          Patient: Page Grissom   MR Number: [id number]   YOB: 1950   Date of Visit: 12/14/2020       Dear Dr. THEA Roman Jr.:    Thank you for referring Page Grissom to me for evaluation. Attached you will find relevant portions of my assessment and plan of care.    If you have questions, please do not hesitate to call me. I look forward to following Page Grissom along with you.    Sincerely,    Frank Winkler MD    Enclosure  CC:  No Recipients    If you would like to receive this communication electronically, please contact [email or web address] or [phone] to request more information on [employer] Link access.    For providers and/or their staff who would like to refer a patient to [facility], please contact us through our one-stop-shop provider referral line, [facility], at [phone].    If you feel you have received this communication in error or would no longer like to receive these types of communications, please e-mail [email or web address]

## 2020-12-20 ENCOUNTER — TELEPHONE (OUTPATIENT)
Dept: NEPHROLOGY | Facility: CLINIC | Age: 70
End: 2020-12-20

## 2020-12-20 PROBLEM — N18.30 CKD (CHRONIC KIDNEY DISEASE) STAGE 3, GFR 30-59 ML/MIN: Status: ACTIVE | Noted: 2020-12-20

## 2020-12-21 ENCOUNTER — TELEPHONE (OUTPATIENT)
Dept: NEPHROLOGY | Facility: CLINIC | Age: 70
End: 2020-12-21

## 2020-12-21 NOTE — TELEPHONE ENCOUNTER
----- Message from Ericka Mcmahan sent at 12/21/2020  9:58 AM CST -----  Contact: Pt  Type:  Patient Returning Call    Who Called:pt   Who Left Message for Patient:nurse  Does the patient know what this is regarding?:test to be done   Would the patient rather a call back or a response via [facility]? Phone   Best Call Back Number: [phone]  Additional Information:

## 2020-12-23 ENCOUNTER — TELEPHONE (OUTPATIENT)
Dept: RADIOLOGY | Facility: HOSPITAL | Age: 70
End: 2020-12-23

## 2020-12-24 DIAGNOSIS — E87.6 HYPOPOTASSEMIA: ICD-10-CM

## 2020-12-24 NOTE — TELEPHONE ENCOUNTER
Fax refill request rec'vd from pt's pharm for KCl, rx request sent to pt's nephro Dr. Winkler's staff for f/u. Thank you.

## 2020-12-28 ENCOUNTER — HOSPITAL ENCOUNTER (OUTPATIENT)
Dept: RADIOLOGY | Facility: HOSPITAL | Age: 70
Discharge: HOME | End: 2020-12-28
Attending: INTERNAL MEDICINE
Payer: MEDICARE

## 2020-12-28 DIAGNOSIS — N17.9 AKI (ACUTE KIDNEY INJURY): ICD-10-CM

## 2020-12-28 PROCEDURE — 76770 US EXAM ABDO BACK WALL COMP: CPT | Mod: 26,,, | Performed by: RADIOLOGY

## 2020-12-28 PROCEDURE — 76770 US RETROPERITONEAL COMPLETE: ICD-10-PCS | Mod: 26,,, | Performed by: RADIOLOGY

## 2020-12-28 PROCEDURE — 76770 US EXAM ABDO BACK WALL COMP: CPT | Mod: TC

## 2020-12-28 RX ORDER — POTASSIUM CHLORIDE 20 MEQ/1
20 TABLET, EXTENDED RELEASE ORAL 2 TIMES DAILY
Qty: 60 TABLET | Refills: 11 | Status: CANCELLED | OUTPATIENT
Start: 2020-12-28

## 2020-12-28 NOTE — TELEPHONE ENCOUNTER
I had stopped patient's potassium supplements at her last visit, because her potassium was too high. Please call pt and remind her. (The refill may have been auto-generated by pharmacy)

## 2020-12-29 ENCOUNTER — TELEPHONE (OUTPATIENT)
Dept: NEPHROLOGY | Facility: CLINIC | Age: 70
End: 2020-12-29

## 2020-12-29 DIAGNOSIS — N18.32 STAGE 3B CHRONIC KIDNEY DISEASE: Primary | ICD-10-CM

## 2020-12-29 NOTE — TELEPHONE ENCOUNTER
Please inform pt that her kidney function has improved and is back to her usual levels, like back in August. Also her kidney u/s is normal.     Also, her potassium is in normal range. She no longer needs potassium supplements and I have removed it from her medication list. Please have her stop all potassium supplements.     Her kidney function is not quite normal and I would like to continue to monitor, next appt due in about 6 mo with labs prior in [place].     Thank you

## 2020-12-29 NOTE — TELEPHONE ENCOUNTER
Left message for patient not to take Potassium Chloride. Also pharmacy was called and a detailed message was left to explain why patient is not to take Kcl.

## 2021-01-19 ENCOUNTER — HOSPITAL ENCOUNTER (OUTPATIENT)
Dept: RADIOLOGY | Facility: HOSPITAL | Age: 71
Discharge: HOME | End: 2021-01-19
Attending: PEDIATRICS
Payer: MEDICARE

## 2021-01-19 VITALS — WEIGHT: 233.94 LBS | HEIGHT: 64 IN | BODY MASS INDEX: 39.94 KG/M2

## 2021-01-19 DIAGNOSIS — Z12.31 ENCOUNTER FOR SCREENING MAMMOGRAM FOR MALIGNANT NEOPLASM OF BREAST: ICD-10-CM

## 2021-01-19 PROCEDURE — 77063 MAMMO DIGITAL SCREENING BILAT WITH TOMO: ICD-10-PCS | Mod: 26,,, | Performed by: RADIOLOGY

## 2021-01-19 PROCEDURE — 77067 SCR MAMMO BI INCL CAD: CPT | Mod: TC

## 2021-01-19 PROCEDURE — 77067 MAMMO DIGITAL SCREENING BILAT WITH TOMO: ICD-10-PCS | Mod: 26,,, | Performed by: RADIOLOGY

## 2021-01-19 PROCEDURE — 77067 SCR MAMMO BI INCL CAD: CPT | Mod: 26,,, | Performed by: RADIOLOGY

## 2021-01-19 PROCEDURE — 77063 BREAST TOMOSYNTHESIS BI: CPT | Mod: 26,,, | Performed by: RADIOLOGY

## 2021-04-13 ENCOUNTER — LAB VISIT (OUTPATIENT)
Dept: LAB | Facility: HOSPITAL | Age: 71
End: 2021-04-13
Attending: PHYSICIAN ASSISTANT
Payer: MEDICARE

## 2021-04-13 ENCOUNTER — OFFICE VISIT (OUTPATIENT)
Dept: RHEUMATOLOGY | Facility: CLINIC | Age: 71
End: 2021-04-13
Payer: MEDICARE

## 2021-04-13 VITALS
DIASTOLIC BLOOD PRESSURE: 77 MMHG | HEIGHT: 64 IN | SYSTOLIC BLOOD PRESSURE: 128 MMHG | HEART RATE: 76 BPM | BODY MASS INDEX: 41.21 KG/M2 | WEIGHT: 241.38 LBS

## 2021-04-13 DIAGNOSIS — E55.9 VITAMIN D DEFICIENCY DISEASE: ICD-10-CM

## 2021-04-13 DIAGNOSIS — M05.79 RHEUMATOID ARTHRITIS INVOLVING MULTIPLE SITES WITH POSITIVE RHEUMATOID FACTOR: ICD-10-CM

## 2021-04-13 DIAGNOSIS — Z79.899 HIGH RISK MEDICATION USE: ICD-10-CM

## 2021-04-13 DIAGNOSIS — M85.89 OSTEOPENIA OF MULTIPLE SITES: ICD-10-CM

## 2021-04-13 DIAGNOSIS — M81.0 AGE-RELATED OSTEOPOROSIS WITHOUT CURRENT PATHOLOGICAL FRACTURE: ICD-10-CM

## 2021-04-13 DIAGNOSIS — M05.79 RHEUMATOID ARTHRITIS INVOLVING MULTIPLE SITES WITH POSITIVE RHEUMATOID FACTOR: Primary | ICD-10-CM

## 2021-04-13 DIAGNOSIS — Z79.60 LONG-TERM USE OF IMMUNOSUPPRESSANT MEDICATION: ICD-10-CM

## 2021-04-13 LAB
25(OH)D3+25(OH)D2 SERPL-MCNC: 52 NG/ML (ref 30–96)
ALBUMIN SERPL BCP-MCNC: 3.6 G/DL (ref 3.5–5.2)
ALP SERPL-CCNC: 109 U/L (ref 55–135)
ALT SERPL W/O P-5'-P-CCNC: 13 U/L (ref 10–44)
ANION GAP SERPL CALC-SCNC: 10 MMOL/L (ref 8–16)
AST SERPL-CCNC: 15 U/L (ref 10–40)
BASOPHILS # BLD AUTO: 0.05 K/UL (ref 0–0.2)
BASOPHILS NFR BLD: 0.5 % (ref 0–1.9)
BILIRUB SERPL-MCNC: 0.3 MG/DL (ref 0.1–1)
BUN SERPL-MCNC: 20 MG/DL (ref 8–23)
CALCIUM SERPL-MCNC: 9.5 MG/DL (ref 8.7–10.5)
CHLORIDE SERPL-SCNC: 98 MMOL/L (ref 95–110)
CO2 SERPL-SCNC: 31 MMOL/L (ref 23–29)
CREAT SERPL-MCNC: 1.6 MG/DL (ref 0.5–1.4)
CRP SERPL-MCNC: 10.5 MG/L (ref 0–8.2)
DIFFERENTIAL METHOD: ABNORMAL
EOSINOPHIL # BLD AUTO: 0.2 K/UL (ref 0–0.5)
EOSINOPHIL NFR BLD: 2.1 % (ref 0–8)
ERYTHROCYTE [DISTWIDTH] IN BLOOD BY AUTOMATED COUNT: 15 % (ref 11.5–14.5)
ERYTHROCYTE [SEDIMENTATION RATE] IN BLOOD BY WESTERGREN METHOD: 55 MM/HR (ref 0–20)
EST. GFR  (AFRICAN AMERICAN): 38 ML/MIN/1.73 M^2
EST. GFR  (NON AFRICAN AMERICAN): 33 ML/MIN/1.73 M^2
GLUCOSE SERPL-MCNC: 109 MG/DL (ref 70–110)
HCT VFR BLD AUTO: 38.9 % (ref 37–48.5)
HGB BLD-MCNC: 12.1 G/DL (ref 12–16)
IMM GRANULOCYTES # BLD AUTO: 0.02 K/UL (ref 0–0.04)
IMM GRANULOCYTES NFR BLD AUTO: 0.2 % (ref 0–0.5)
LYMPHOCYTES # BLD AUTO: 2 K/UL (ref 1–4.8)
LYMPHOCYTES NFR BLD: 20.2 % (ref 18–48)
MCH RBC QN AUTO: 25.5 PG (ref 27–31)
MCHC RBC AUTO-ENTMCNC: 31.1 G/DL (ref 32–36)
MCV RBC AUTO: 82 FL (ref 82–98)
MONOCYTES # BLD AUTO: 0.8 K/UL (ref 0.3–1)
MONOCYTES NFR BLD: 8.4 % (ref 4–15)
NEUTROPHILS # BLD AUTO: 6.6 K/UL (ref 1.8–7.7)
NEUTROPHILS NFR BLD: 68.6 % (ref 38–73)
NRBC BLD-RTO: 0 /100 WBC
PLATELET # BLD AUTO: 301 K/UL (ref 150–450)
PMV BLD AUTO: 10.2 FL (ref 9.2–12.9)
POTASSIUM SERPL-SCNC: 3.5 MMOL/L (ref 3.5–5.1)
PROT SERPL-MCNC: 8 G/DL (ref 6–8.4)
RBC # BLD AUTO: 4.75 M/UL (ref 4–5.4)
SODIUM SERPL-SCNC: 139 MMOL/L (ref 136–145)
WBC # BLD AUTO: 9.64 K/UL (ref 3.9–12.7)

## 2021-04-13 PROCEDURE — 99214 OFFICE O/P EST MOD 30 MIN: CPT | Mod: S$PBB,,, | Performed by: PHYSICIAN ASSISTANT

## 2021-04-13 PROCEDURE — 99214 PR OFFICE/OUTPT VISIT, EST, LEVL IV, 30-39 MIN: ICD-10-PCS | Mod: S$PBB,,, | Performed by: PHYSICIAN ASSISTANT

## 2021-04-13 PROCEDURE — 36415 COLL VENOUS BLD VENIPUNCTURE: CPT | Performed by: PHYSICIAN ASSISTANT

## 2021-04-13 PROCEDURE — 85025 COMPLETE CBC W/AUTO DIFF WBC: CPT | Performed by: PHYSICIAN ASSISTANT

## 2021-04-13 PROCEDURE — 86140 C-REACTIVE PROTEIN: CPT | Performed by: PHYSICIAN ASSISTANT

## 2021-04-13 PROCEDURE — 85651 RBC SED RATE NONAUTOMATED: CPT | Performed by: PHYSICIAN ASSISTANT

## 2021-04-13 PROCEDURE — 80053 COMPREHEN METABOLIC PANEL: CPT | Performed by: PHYSICIAN ASSISTANT

## 2021-04-13 PROCEDURE — 99999 PR PBB SHADOW E&M-EST. PATIENT-LVL IV: CPT | Mod: PBBFAC,,, | Performed by: PHYSICIAN ASSISTANT

## 2021-04-13 PROCEDURE — 99214 OFFICE O/P EST MOD 30 MIN: CPT | Mod: PBBFAC | Performed by: PHYSICIAN ASSISTANT

## 2021-04-13 PROCEDURE — 82306 VITAMIN D 25 HYDROXY: CPT | Performed by: PHYSICIAN ASSISTANT

## 2021-04-13 PROCEDURE — 99999 PR PBB SHADOW E&M-EST. PATIENT-LVL IV: ICD-10-PCS | Mod: PBBFAC,,, | Performed by: PHYSICIAN ASSISTANT

## 2021-04-13 ASSESSMENT — ROUTINE ASSESSMENT OF PATIENT INDEX DATA (RAPID3): MDHAQ FUNCTION SCORE: 0

## 2021-05-10 ENCOUNTER — PES CALL (OUTPATIENT)
Dept: ADMINISTRATIVE | Facility: CLINIC | Age: 71
End: 2021-05-10

## 2021-05-11 ENCOUNTER — PES CALL (OUTPATIENT)
Dept: ADMINISTRATIVE | Facility: CLINIC | Age: 71
End: 2021-05-11

## 2021-06-02 ENCOUNTER — LAB VISIT (OUTPATIENT)
Dept: LAB | Facility: HOSPITAL | Age: 71
End: 2021-06-02
Attending: INTERNAL MEDICINE
Payer: MEDICARE

## 2021-06-02 DIAGNOSIS — N18.32 STAGE 3B CHRONIC KIDNEY DISEASE: ICD-10-CM

## 2021-06-02 LAB
ALBUMIN SERPL BCP-MCNC: 3.2 G/DL (ref 3.5–5.2)
ANION GAP SERPL CALC-SCNC: 10 MMOL/L (ref 8–16)
BASOPHILS # BLD AUTO: 0.04 K/UL (ref 0–0.2)
BASOPHILS NFR BLD: 0.5 % (ref 0–1.9)
BUN SERPL-MCNC: 25 MG/DL (ref 8–23)
CALCIUM SERPL-MCNC: 9.3 MG/DL (ref 8.7–10.5)
CHLORIDE SERPL-SCNC: 103 MMOL/L (ref 95–110)
CO2 SERPL-SCNC: 26 MMOL/L (ref 23–29)
CREAT SERPL-MCNC: 1.2 MG/DL (ref 0.5–1.4)
DIFFERENTIAL METHOD: ABNORMAL
EOSINOPHIL # BLD AUTO: 0.2 K/UL (ref 0–0.5)
EOSINOPHIL NFR BLD: 2.4 % (ref 0–8)
ERYTHROCYTE [DISTWIDTH] IN BLOOD BY AUTOMATED COUNT: 16.4 % (ref 11.5–14.5)
EST. GFR  (AFRICAN AMERICAN): 53.3 ML/MIN/1.73 M^2
EST. GFR  (NON AFRICAN AMERICAN): 46.2 ML/MIN/1.73 M^2
GLUCOSE SERPL-MCNC: 120 MG/DL (ref 70–110)
HCT VFR BLD AUTO: 35.9 % (ref 37–48.5)
HGB BLD-MCNC: 10.9 G/DL (ref 12–16)
IMM GRANULOCYTES # BLD AUTO: 0.03 K/UL (ref 0–0.04)
IMM GRANULOCYTES NFR BLD AUTO: 0.3 % (ref 0–0.5)
LYMPHOCYTES # BLD AUTO: 2 K/UL (ref 1–4.8)
LYMPHOCYTES NFR BLD: 23.2 % (ref 18–48)
MCH RBC QN AUTO: 25.2 PG (ref 27–31)
MCHC RBC AUTO-ENTMCNC: 30.4 G/DL (ref 32–36)
MCV RBC AUTO: 83 FL (ref 82–98)
MONOCYTES # BLD AUTO: 0.8 K/UL (ref 0.3–1)
MONOCYTES NFR BLD: 9 % (ref 4–15)
NEUTROPHILS # BLD AUTO: 5.6 K/UL (ref 1.8–7.7)
NEUTROPHILS NFR BLD: 64.6 % (ref 38–73)
NRBC BLD-RTO: 0 /100 WBC
PHOSPHATE SERPL-MCNC: 3.4 MG/DL (ref 2.7–4.5)
PLATELET # BLD AUTO: 306 K/UL (ref 150–450)
PMV BLD AUTO: 11.1 FL (ref 9.2–12.9)
POTASSIUM SERPL-SCNC: 4.2 MMOL/L (ref 3.5–5.1)
PTH-INTACT SERPL-MCNC: 127 PG/ML (ref 9–77)
RBC # BLD AUTO: 4.33 M/UL (ref 4–5.4)
SODIUM SERPL-SCNC: 139 MMOL/L (ref 136–145)
WBC # BLD AUTO: 8.69 K/UL (ref 3.9–12.7)

## 2021-06-02 PROCEDURE — 83970 ASSAY OF PARATHORMONE: CPT | Performed by: INTERNAL MEDICINE

## 2021-06-02 PROCEDURE — 80069 RENAL FUNCTION PANEL: CPT | Performed by: INTERNAL MEDICINE

## 2021-06-02 PROCEDURE — 36415 COLL VENOUS BLD VENIPUNCTURE: CPT | Performed by: INTERNAL MEDICINE

## 2021-06-02 PROCEDURE — 85025 COMPLETE CBC W/AUTO DIFF WBC: CPT | Performed by: INTERNAL MEDICINE

## 2021-06-07 DIAGNOSIS — E11.69 HYPERLIPIDEMIA ASSOCIATED WITH TYPE 2 DIABETES MELLITUS: ICD-10-CM

## 2021-06-07 DIAGNOSIS — E78.5 HYPERLIPIDEMIA ASSOCIATED WITH TYPE 2 DIABETES MELLITUS: ICD-10-CM

## 2021-06-07 RX ORDER — ROSUVASTATIN CALCIUM 40 MG/1
40 TABLET, COATED ORAL NIGHTLY
Qty: 30 TABLET | Refills: 11 | Status: SHIPPED | OUTPATIENT
Start: 2021-06-07 | End: 2022-08-17

## 2021-06-09 ENCOUNTER — OFFICE VISIT (OUTPATIENT)
Dept: NEPHROLOGY | Facility: CLINIC | Age: 71
End: 2021-06-09
Payer: MEDICARE

## 2021-06-09 VITALS
BODY MASS INDEX: 41.4 KG/M2 | DIASTOLIC BLOOD PRESSURE: 80 MMHG | HEIGHT: 64 IN | SYSTOLIC BLOOD PRESSURE: 112 MMHG | WEIGHT: 242.5 LBS | OXYGEN SATURATION: 93 % | HEART RATE: 69 BPM

## 2021-06-09 DIAGNOSIS — E66.01 CLASS 3 SEVERE OBESITY DUE TO EXCESS CALORIES WITH SERIOUS COMORBIDITY AND BODY MASS INDEX (BMI) OF 40.0 TO 44.9 IN ADULT: ICD-10-CM

## 2021-06-09 DIAGNOSIS — K21.9 GASTROESOPHAGEAL REFLUX DISEASE WITHOUT ESOPHAGITIS: ICD-10-CM

## 2021-06-09 DIAGNOSIS — D50.8 OTHER IRON DEFICIENCY ANEMIA: ICD-10-CM

## 2021-06-09 DIAGNOSIS — M05.79 RHEUMATOID ARTHRITIS INVOLVING MULTIPLE SITES WITH POSITIVE RHEUMATOID FACTOR: ICD-10-CM

## 2021-06-09 DIAGNOSIS — M85.89 OSTEOPENIA OF MULTIPLE SITES: ICD-10-CM

## 2021-06-09 DIAGNOSIS — N18.32 STAGE 3B CHRONIC KIDNEY DISEASE: Primary | ICD-10-CM

## 2021-06-09 DIAGNOSIS — Z79.899 HIGH RISK MEDICATION USE: ICD-10-CM

## 2021-06-09 DIAGNOSIS — E78.5 HYPERLIPIDEMIA ASSOCIATED WITH TYPE 2 DIABETES MELLITUS: ICD-10-CM

## 2021-06-09 DIAGNOSIS — I10 ESSENTIAL HYPERTENSION: ICD-10-CM

## 2021-06-09 DIAGNOSIS — J45.30 MILD PERSISTENT ASTHMA WITHOUT COMPLICATION: ICD-10-CM

## 2021-06-09 DIAGNOSIS — E55.9 VITAMIN D DEFICIENCY DISEASE: ICD-10-CM

## 2021-06-09 DIAGNOSIS — E11.69 HYPERLIPIDEMIA ASSOCIATED WITH TYPE 2 DIABETES MELLITUS: ICD-10-CM

## 2021-06-09 DIAGNOSIS — N25.81 SECONDARY HYPERPARATHYROIDISM OF RENAL ORIGIN: ICD-10-CM

## 2021-06-09 DIAGNOSIS — F17.210 CIGARETTE SMOKER: ICD-10-CM

## 2021-06-09 DIAGNOSIS — E11.8 DIABETES MELLITUS TYPE 2 WITH COMPLICATIONS: ICD-10-CM

## 2021-06-09 DIAGNOSIS — M79.7 FIBROMYALGIA: ICD-10-CM

## 2021-06-09 PROCEDURE — 99215 OFFICE O/P EST HI 40 MIN: CPT | Mod: S$PBB,,, | Performed by: INTERNAL MEDICINE

## 2021-06-09 PROCEDURE — 99214 OFFICE O/P EST MOD 30 MIN: CPT | Mod: PBBFAC,PO | Performed by: INTERNAL MEDICINE

## 2021-06-09 PROCEDURE — 99215 PR OFFICE/OUTPT VISIT, EST, LEVL V, 40-54 MIN: ICD-10-PCS | Mod: S$PBB,,, | Performed by: INTERNAL MEDICINE

## 2021-06-09 PROCEDURE — 99999 PR PBB SHADOW E&M-EST. PATIENT-LVL IV: ICD-10-PCS | Mod: PBBFAC,,, | Performed by: INTERNAL MEDICINE

## 2021-06-09 PROCEDURE — 99999 PR PBB SHADOW E&M-EST. PATIENT-LVL IV: CPT | Mod: PBBFAC,,, | Performed by: INTERNAL MEDICINE

## 2021-06-09 RX ORDER — DIPHENHYDRAMINE HCL 25 MG
25 TABLET ORAL NIGHTLY PRN
Status: ON HOLD | COMMUNITY
End: 2022-04-11 | Stop reason: HOSPADM

## 2021-06-10 ENCOUNTER — LAB VISIT (OUTPATIENT)
Dept: LAB | Facility: HOSPITAL | Age: 71
End: 2021-06-10
Attending: PEDIATRICS
Payer: MEDICARE

## 2021-06-10 DIAGNOSIS — E11.8 DIABETES MELLITUS TYPE 2 WITH COMPLICATIONS: ICD-10-CM

## 2021-06-10 LAB
ALT SERPL W/O P-5'-P-CCNC: 10 U/L (ref 10–44)
ANION GAP SERPL CALC-SCNC: 11 MMOL/L (ref 8–16)
AST SERPL-CCNC: 14 U/L (ref 10–40)
BUN SERPL-MCNC: 20 MG/DL (ref 8–23)
CALCIUM SERPL-MCNC: 9.1 MG/DL (ref 8.7–10.5)
CHLORIDE SERPL-SCNC: 103 MMOL/L (ref 95–110)
CHOLEST SERPL-MCNC: 146 MG/DL (ref 120–199)
CHOLEST/HDLC SERPL: 2.8 {RATIO} (ref 2–5)
CO2 SERPL-SCNC: 27 MMOL/L (ref 23–29)
CREAT SERPL-MCNC: 1.3 MG/DL (ref 0.5–1.4)
EST. GFR  (AFRICAN AMERICAN): 48.4 ML/MIN/1.73 M^2
EST. GFR  (NON AFRICAN AMERICAN): 42 ML/MIN/1.73 M^2
ESTIMATED AVG GLUCOSE: 123 MG/DL (ref 68–131)
GLUCOSE SERPL-MCNC: 105 MG/DL (ref 70–110)
HBA1C MFR BLD: 5.9 % (ref 4–5.6)
HDLC SERPL-MCNC: 53 MG/DL (ref 40–75)
HDLC SERPL: 36.3 % (ref 20–50)
LDLC SERPL CALC-MCNC: 72.8 MG/DL (ref 63–159)
NONHDLC SERPL-MCNC: 93 MG/DL
POTASSIUM SERPL-SCNC: 4.2 MMOL/L (ref 3.5–5.1)
SODIUM SERPL-SCNC: 141 MMOL/L (ref 136–145)
TRIGL SERPL-MCNC: 101 MG/DL (ref 30–150)

## 2021-06-10 PROCEDURE — 80061 LIPID PANEL: CPT | Performed by: PEDIATRICS

## 2021-06-10 PROCEDURE — 83036 HEMOGLOBIN GLYCOSYLATED A1C: CPT | Performed by: PEDIATRICS

## 2021-06-10 PROCEDURE — 36415 COLL VENOUS BLD VENIPUNCTURE: CPT | Performed by: PEDIATRICS

## 2021-06-10 PROCEDURE — 84450 TRANSFERASE (AST) (SGOT): CPT | Performed by: PEDIATRICS

## 2021-06-10 PROCEDURE — 80048 BASIC METABOLIC PNL TOTAL CA: CPT | Performed by: PEDIATRICS

## 2021-06-10 PROCEDURE — 84460 ALANINE AMINO (ALT) (SGPT): CPT | Performed by: PEDIATRICS

## 2021-06-17 ENCOUNTER — HOSPITAL ENCOUNTER (OUTPATIENT)
Dept: RADIOLOGY | Facility: HOSPITAL | Age: 71
Discharge: HOME | End: 2021-06-17
Attending: NURSE PRACTITIONER
Payer: MEDICARE

## 2021-06-17 ENCOUNTER — OFFICE VISIT (OUTPATIENT)
Dept: INTERNAL MEDICINE | Facility: CLINIC | Age: 71
End: 2021-06-17
Payer: MEDICARE

## 2021-06-17 VITALS
HEIGHT: 64 IN | OXYGEN SATURATION: 98 % | SYSTOLIC BLOOD PRESSURE: 132 MMHG | WEIGHT: 245.56 LBS | HEART RATE: 68 BPM | TEMPERATURE: 99 F | BODY MASS INDEX: 41.92 KG/M2 | DIASTOLIC BLOOD PRESSURE: 64 MMHG

## 2021-06-17 DIAGNOSIS — J45.31 MILD PERSISTENT ASTHMA WITH ACUTE EXACERBATION: ICD-10-CM

## 2021-06-17 DIAGNOSIS — E11.69 HYPERLIPIDEMIA ASSOCIATED WITH TYPE 2 DIABETES MELLITUS: ICD-10-CM

## 2021-06-17 DIAGNOSIS — F17.210 CIGARETTE SMOKER: ICD-10-CM

## 2021-06-17 DIAGNOSIS — E11.8 DIABETES MELLITUS TYPE 2 WITH COMPLICATIONS: ICD-10-CM

## 2021-06-17 DIAGNOSIS — I77.1 TORTUOUS AORTA: ICD-10-CM

## 2021-06-17 DIAGNOSIS — R63.5 WEIGHT GAIN: ICD-10-CM

## 2021-06-17 DIAGNOSIS — M85.89 OSTEOPENIA OF MULTIPLE SITES: ICD-10-CM

## 2021-06-17 DIAGNOSIS — M05.79 RHEUMATOID ARTHRITIS INVOLVING MULTIPLE SITES WITH POSITIVE RHEUMATOID FACTOR: ICD-10-CM

## 2021-06-17 DIAGNOSIS — I10 ESSENTIAL HYPERTENSION: Primary | ICD-10-CM

## 2021-06-17 DIAGNOSIS — E66.01 CLASS 3 SEVERE OBESITY DUE TO EXCESS CALORIES WITH SERIOUS COMORBIDITY AND BODY MASS INDEX (BMI) OF 40.0 TO 44.9 IN ADULT: ICD-10-CM

## 2021-06-17 DIAGNOSIS — K21.9 GASTROESOPHAGEAL REFLUX DISEASE WITHOUT ESOPHAGITIS: ICD-10-CM

## 2021-06-17 DIAGNOSIS — E78.5 HYPERLIPIDEMIA ASSOCIATED WITH TYPE 2 DIABETES MELLITUS: ICD-10-CM

## 2021-06-17 DIAGNOSIS — E55.9 VITAMIN D DEFICIENCY DISEASE: ICD-10-CM

## 2021-06-17 DIAGNOSIS — M79.7 FIBROMYALGIA: ICD-10-CM

## 2021-06-17 DIAGNOSIS — Z79.60 LONG-TERM USE OF IMMUNOSUPPRESSANT MEDICATION: ICD-10-CM

## 2021-06-17 DIAGNOSIS — N18.32 STAGE 3B CHRONIC KIDNEY DISEASE: ICD-10-CM

## 2021-06-17 PROCEDURE — 99214 OFFICE O/P EST MOD 30 MIN: CPT | Mod: PBBFAC,25 | Performed by: NURSE PRACTITIONER

## 2021-06-17 PROCEDURE — 99214 OFFICE O/P EST MOD 30 MIN: CPT | Mod: S$PBB,,, | Performed by: NURSE PRACTITIONER

## 2021-06-17 PROCEDURE — 71046 X-RAY EXAM CHEST 2 VIEWS: CPT | Mod: TC

## 2021-06-17 PROCEDURE — 99999 PR PBB SHADOW E&M-EST. PATIENT-LVL IV: CPT | Mod: PBBFAC,,, | Performed by: NURSE PRACTITIONER

## 2021-06-17 PROCEDURE — 99214 PR OFFICE/OUTPT VISIT, EST, LEVL IV, 30-39 MIN: ICD-10-PCS | Mod: S$PBB,,, | Performed by: NURSE PRACTITIONER

## 2021-06-17 PROCEDURE — 99999 PR PBB SHADOW E&M-EST. PATIENT-LVL IV: ICD-10-PCS | Mod: PBBFAC,,, | Performed by: NURSE PRACTITIONER

## 2021-06-17 PROCEDURE — 71046 XR CHEST PA AND LATERAL: ICD-10-PCS | Mod: 26,,, | Performed by: RADIOLOGY

## 2021-06-17 PROCEDURE — 71046 X-RAY EXAM CHEST 2 VIEWS: CPT | Mod: 26,,, | Performed by: RADIOLOGY

## 2021-06-17 RX ORDER — ALBUTEROL SULFATE 90 UG/1
2 AEROSOL, METERED RESPIRATORY (INHALATION) EVERY 6 HOURS PRN
Qty: 18 G | Refills: 0 | Status: SHIPPED | OUTPATIENT
Start: 2021-06-17 | End: 2021-07-10 | Stop reason: SDUPTHER

## 2021-07-06 ENCOUNTER — PES CALL (OUTPATIENT)
Dept: ADMINISTRATIVE | Facility: CLINIC | Age: 71
End: 2021-07-06

## 2021-07-27 ENCOUNTER — OFFICE VISIT (OUTPATIENT)
Dept: OPHTHALMOLOGY | Facility: CLINIC | Age: 71
End: 2021-07-27
Payer: MEDICARE

## 2021-07-27 DIAGNOSIS — H52.203 HYPEROPIA WITH ASTIGMATISM AND PRESBYOPIA, BILATERAL: ICD-10-CM

## 2021-07-27 DIAGNOSIS — E11.36 DIABETIC CATARACT OF BOTH EYES: ICD-10-CM

## 2021-07-27 DIAGNOSIS — H52.4 HYPEROPIA WITH ASTIGMATISM AND PRESBYOPIA, BILATERAL: ICD-10-CM

## 2021-07-27 DIAGNOSIS — H52.03 HYPEROPIA WITH ASTIGMATISM AND PRESBYOPIA, BILATERAL: ICD-10-CM

## 2021-07-27 DIAGNOSIS — H25.13 NUCLEAR SCLEROSIS, BILATERAL: ICD-10-CM

## 2021-07-27 DIAGNOSIS — H25.013 CORTICAL AGE-RELATED CATARACT OF BOTH EYES: ICD-10-CM

## 2021-07-27 DIAGNOSIS — Z79.899 LONG-TERM USE OF PLAQUENIL: ICD-10-CM

## 2021-07-27 DIAGNOSIS — M05.79 RHEUMATOID ARTHRITIS INVOLVING MULTIPLE SITES WITH POSITIVE RHEUMATOID FACTOR: ICD-10-CM

## 2021-07-27 DIAGNOSIS — E11.9 TYPE 2 DIABETES MELLITUS WITHOUT RETINOPATHY: Primary | ICD-10-CM

## 2021-07-27 PROCEDURE — 92015 PR REFRACTION: ICD-10-PCS | Mod: ,,, | Performed by: OPTOMETRIST

## 2021-07-27 PROCEDURE — 92134 CPTRZ OPH DX IMG PST SGM RTA: CPT | Mod: PBBFAC | Performed by: OPTOMETRIST

## 2021-07-27 PROCEDURE — 92015 DETERMINE REFRACTIVE STATE: CPT | Mod: ,,, | Performed by: OPTOMETRIST

## 2021-07-27 PROCEDURE — 92014 PR EYE EXAM, EST PATIENT,COMPREHESV: ICD-10-PCS | Mod: S$PBB,,, | Performed by: OPTOMETRIST

## 2021-07-27 PROCEDURE — 92014 COMPRE OPH EXAM EST PT 1/>: CPT | Mod: S$PBB,,, | Performed by: OPTOMETRIST

## 2021-07-27 PROCEDURE — 99999 PR PBB SHADOW E&M-EST. PATIENT-LVL III: CPT | Mod: PBBFAC,,, | Performed by: OPTOMETRIST

## 2021-07-27 PROCEDURE — 92134 POSTERIOR SEGMENT OCT RETINA (OCULAR COHERENCE TOMOGRAPHY)-BOTH EYES: ICD-10-PCS | Mod: 26,S$PBB,, | Performed by: OPTOMETRIST

## 2021-07-27 PROCEDURE — 99213 OFFICE O/P EST LOW 20 MIN: CPT | Mod: PBBFAC | Performed by: OPTOMETRIST

## 2021-07-27 PROCEDURE — 99999 PR PBB SHADOW E&M-EST. PATIENT-LVL III: ICD-10-PCS | Mod: PBBFAC,,, | Performed by: OPTOMETRIST

## 2021-08-17 ENCOUNTER — NURSE TRIAGE (OUTPATIENT)
Dept: ADMINISTRATIVE | Facility: CLINIC | Age: 71
End: 2021-08-17

## 2021-08-17 ENCOUNTER — IMMUNIZATION (OUTPATIENT)
Dept: PHARMACY | Facility: CLINIC | Age: 71
End: 2021-08-17
Payer: MEDICARE

## 2021-08-17 DIAGNOSIS — Z23 NEED FOR VACCINATION: Primary | ICD-10-CM

## 2021-09-17 ENCOUNTER — IMMUNIZATION (OUTPATIENT)
Dept: PHARMACY | Facility: CLINIC | Age: 71
End: 2021-09-17
Payer: MEDICARE

## 2021-09-17 DIAGNOSIS — Z23 NEED FOR VACCINATION: Primary | ICD-10-CM

## 2021-10-06 ENCOUNTER — PES CALL (OUTPATIENT)
Dept: ADMINISTRATIVE | Facility: CLINIC | Age: 71
End: 2021-10-06

## 2021-10-13 DIAGNOSIS — M05.749 RHEUMATOID ARTHRITIS INVOLVING HAND WITH POSITIVE RHEUMATOID FACTOR, UNSPECIFIED LATERALITY: ICD-10-CM

## 2021-10-13 RX ORDER — HYDROXYCHLOROQUINE SULFATE 200 MG/1
200 TABLET, FILM COATED ORAL DAILY
Qty: 30 TABLET | Refills: 5 | Status: SHIPPED | OUTPATIENT
Start: 2021-10-13 | End: 2022-02-21 | Stop reason: SDUPTHER

## 2021-10-19 PROBLEM — I70.0 AORTIC ATHEROSCLEROSIS: Status: ACTIVE | Noted: 2021-10-19

## 2021-10-19 PROBLEM — I77.1 TORTUOUS AORTA: Status: RESOLVED | Noted: 2019-09-03 | Resolved: 2021-10-19

## 2021-10-19 PROBLEM — N25.81 SECONDARY HYPERPARATHYROIDISM OF RENAL ORIGIN: Status: ACTIVE | Noted: 2021-10-19

## 2021-10-20 ENCOUNTER — OFFICE VISIT (OUTPATIENT)
Dept: INTERNAL MEDICINE | Facility: CLINIC | Age: 71
End: 2021-10-20
Payer: MEDICARE

## 2021-10-20 VITALS
BODY MASS INDEX: 42.3 KG/M2 | HEIGHT: 64 IN | DIASTOLIC BLOOD PRESSURE: 70 MMHG | WEIGHT: 247.81 LBS | SYSTOLIC BLOOD PRESSURE: 112 MMHG

## 2021-10-20 DIAGNOSIS — J45.30 MILD PERSISTENT ASTHMA WITHOUT COMPLICATION: ICD-10-CM

## 2021-10-20 DIAGNOSIS — Z79.60 LONG-TERM USE OF IMMUNOSUPPRESSANT MEDICATION: ICD-10-CM

## 2021-10-20 DIAGNOSIS — E66.01 CLASS 3 SEVERE OBESITY DUE TO EXCESS CALORIES WITH SERIOUS COMORBIDITY AND BODY MASS INDEX (BMI) OF 40.0 TO 44.9 IN ADULT: ICD-10-CM

## 2021-10-20 DIAGNOSIS — M85.89 OSTEOPENIA OF MULTIPLE SITES: ICD-10-CM

## 2021-10-20 DIAGNOSIS — M79.7 FIBROMYALGIA: ICD-10-CM

## 2021-10-20 DIAGNOSIS — I10 ESSENTIAL HYPERTENSION: ICD-10-CM

## 2021-10-20 DIAGNOSIS — N18.31 STAGE 3A CHRONIC KIDNEY DISEASE: ICD-10-CM

## 2021-10-20 DIAGNOSIS — Z74.09 OTHER REDUCED MOBILITY: ICD-10-CM

## 2021-10-20 DIAGNOSIS — N25.81 SECONDARY HYPERPARATHYROIDISM OF RENAL ORIGIN: ICD-10-CM

## 2021-10-20 DIAGNOSIS — E11.69 HYPERLIPIDEMIA ASSOCIATED WITH TYPE 2 DIABETES MELLITUS: ICD-10-CM

## 2021-10-20 DIAGNOSIS — E11.8 DIABETES MELLITUS TYPE 2 WITH COMPLICATIONS: ICD-10-CM

## 2021-10-20 DIAGNOSIS — Z00.00 ENCOUNTER FOR PREVENTIVE HEALTH EXAMINATION: Primary | ICD-10-CM

## 2021-10-20 DIAGNOSIS — E55.9 VITAMIN D DEFICIENCY DISEASE: ICD-10-CM

## 2021-10-20 DIAGNOSIS — R26.9 ABNORMALITY OF GAIT AND MOBILITY: ICD-10-CM

## 2021-10-20 DIAGNOSIS — F17.210 CIGARETTE SMOKER: ICD-10-CM

## 2021-10-20 DIAGNOSIS — E78.5 HYPERLIPIDEMIA ASSOCIATED WITH TYPE 2 DIABETES MELLITUS: ICD-10-CM

## 2021-10-20 DIAGNOSIS — I70.0 AORTIC ATHEROSCLEROSIS: ICD-10-CM

## 2021-10-20 DIAGNOSIS — M05.79 RHEUMATOID ARTHRITIS INVOLVING MULTIPLE SITES WITH POSITIVE RHEUMATOID FACTOR: ICD-10-CM

## 2021-10-20 PROCEDURE — G0439 PR MEDICARE ANNUAL WELLNESS SUBSEQUENT VISIT: ICD-10-PCS | Mod: ,,, | Performed by: PHYSICIAN ASSISTANT

## 2021-10-20 PROCEDURE — 99999 PR PBB SHADOW E&M-EST. PATIENT-LVL III: CPT | Mod: PBBFAC,,, | Performed by: PHYSICIAN ASSISTANT

## 2021-10-20 PROCEDURE — 99999 PR PBB SHADOW E&M-EST. PATIENT-LVL III: ICD-10-PCS | Mod: PBBFAC,,, | Performed by: PHYSICIAN ASSISTANT

## 2021-10-20 PROCEDURE — 99213 OFFICE O/P EST LOW 20 MIN: CPT | Mod: PBBFAC | Performed by: PHYSICIAN ASSISTANT

## 2021-10-20 PROCEDURE — G0439 PPPS, SUBSEQ VISIT: HCPCS | Mod: ,,, | Performed by: PHYSICIAN ASSISTANT

## 2021-11-11 ENCOUNTER — TELEPHONE (OUTPATIENT)
Dept: NEPHROLOGY | Facility: CLINIC | Age: 71
End: 2021-11-11
Payer: MEDICARE

## 2021-11-29 DIAGNOSIS — M05.79 RHEUMATOID ARTHRITIS INVOLVING MULTIPLE SITES WITH POSITIVE RHEUMATOID FACTOR: Primary | ICD-10-CM

## 2021-11-29 DIAGNOSIS — Z79.899 HIGH RISK MEDICATION USE: ICD-10-CM

## 2021-11-29 DIAGNOSIS — Z79.60 LONG-TERM USE OF IMMUNOSUPPRESSANT MEDICATION: ICD-10-CM

## 2021-12-10 ENCOUNTER — LAB VISIT (OUTPATIENT)
Dept: LAB | Facility: HOSPITAL | Age: 71
End: 2021-12-10
Attending: PEDIATRICS
Payer: MEDICARE

## 2021-12-10 DIAGNOSIS — E78.5 HYPERLIPIDEMIA ASSOCIATED WITH TYPE 2 DIABETES MELLITUS: ICD-10-CM

## 2021-12-10 DIAGNOSIS — E11.69 HYPERLIPIDEMIA ASSOCIATED WITH TYPE 2 DIABETES MELLITUS: ICD-10-CM

## 2021-12-10 DIAGNOSIS — E11.8 DIABETES MELLITUS TYPE 2 WITH COMPLICATIONS: ICD-10-CM

## 2021-12-10 LAB
ALBUMIN SERPL BCP-MCNC: 3.1 G/DL (ref 3.5–5.2)
ALP SERPL-CCNC: 99 U/L (ref 55–135)
ALT SERPL W/O P-5'-P-CCNC: 9 U/L (ref 10–44)
ANION GAP SERPL CALC-SCNC: 10 MMOL/L (ref 8–16)
AST SERPL-CCNC: 13 U/L (ref 10–40)
BILIRUB SERPL-MCNC: 0.3 MG/DL (ref 0.1–1)
BUN SERPL-MCNC: 28 MG/DL (ref 8–23)
CALCIUM SERPL-MCNC: 9.3 MG/DL (ref 8.7–10.5)
CHLORIDE SERPL-SCNC: 98 MMOL/L (ref 95–110)
CHOLEST SERPL-MCNC: 125 MG/DL (ref 120–199)
CHOLEST/HDLC SERPL: 2.3 {RATIO} (ref 2–5)
CO2 SERPL-SCNC: 30 MMOL/L (ref 23–29)
CREAT SERPL-MCNC: 1.4 MG/DL (ref 0.5–1.4)
EST. GFR  (AFRICAN AMERICAN): 43.9 ML/MIN/1.73 M^2
EST. GFR  (NON AFRICAN AMERICAN): 38.1 ML/MIN/1.73 M^2
ESTIMATED AVG GLUCOSE: 117 MG/DL (ref 68–131)
GLUCOSE SERPL-MCNC: 100 MG/DL (ref 70–110)
HBA1C MFR BLD: 5.7 % (ref 4–5.6)
HDLC SERPL-MCNC: 55 MG/DL (ref 40–75)
HDLC SERPL: 44 % (ref 20–50)
LDLC SERPL CALC-MCNC: 53.8 MG/DL (ref 63–159)
NONHDLC SERPL-MCNC: 70 MG/DL
POTASSIUM SERPL-SCNC: 4 MMOL/L (ref 3.5–5.1)
PROT SERPL-MCNC: 7.3 G/DL (ref 6–8.4)
SODIUM SERPL-SCNC: 138 MMOL/L (ref 136–145)
TRIGL SERPL-MCNC: 81 MG/DL (ref 30–150)

## 2021-12-10 PROCEDURE — 36415 COLL VENOUS BLD VENIPUNCTURE: CPT | Performed by: NURSE PRACTITIONER

## 2021-12-10 PROCEDURE — 83036 HEMOGLOBIN GLYCOSYLATED A1C: CPT | Performed by: NURSE PRACTITIONER

## 2021-12-10 PROCEDURE — 80061 LIPID PANEL: CPT | Performed by: NURSE PRACTITIONER

## 2021-12-10 PROCEDURE — 80053 COMPREHEN METABOLIC PANEL: CPT | Performed by: NURSE PRACTITIONER

## 2021-12-17 ENCOUNTER — OFFICE VISIT (OUTPATIENT)
Dept: INTERNAL MEDICINE | Facility: CLINIC | Age: 71
End: 2021-12-17
Payer: MEDICARE

## 2021-12-17 VITALS
HEART RATE: 79 BPM | DIASTOLIC BLOOD PRESSURE: 67 MMHG | SYSTOLIC BLOOD PRESSURE: 134 MMHG | WEIGHT: 248.44 LBS | TEMPERATURE: 97 F | OXYGEN SATURATION: 95 % | BODY MASS INDEX: 42.65 KG/M2 | RESPIRATION RATE: 18 BRPM

## 2021-12-17 DIAGNOSIS — E55.9 VITAMIN D DEFICIENCY DISEASE: ICD-10-CM

## 2021-12-17 DIAGNOSIS — M79.7 FIBROMYALGIA: ICD-10-CM

## 2021-12-17 DIAGNOSIS — I70.0 AORTIC ATHEROSCLEROSIS: ICD-10-CM

## 2021-12-17 DIAGNOSIS — E11.69 HYPERLIPIDEMIA ASSOCIATED WITH TYPE 2 DIABETES MELLITUS: ICD-10-CM

## 2021-12-17 DIAGNOSIS — Z12.31 ENCOUNTER FOR SCREENING MAMMOGRAM FOR BREAST CANCER: ICD-10-CM

## 2021-12-17 DIAGNOSIS — E66.01 CLASS 3 SEVERE OBESITY DUE TO EXCESS CALORIES WITH SERIOUS COMORBIDITY AND BODY MASS INDEX (BMI) OF 40.0 TO 44.9 IN ADULT: ICD-10-CM

## 2021-12-17 DIAGNOSIS — J45.30 MILD PERSISTENT ASTHMA WITHOUT COMPLICATION: ICD-10-CM

## 2021-12-17 DIAGNOSIS — E78.5 HYPERLIPIDEMIA ASSOCIATED WITH TYPE 2 DIABETES MELLITUS: ICD-10-CM

## 2021-12-17 DIAGNOSIS — F17.210 CIGARETTE SMOKER: ICD-10-CM

## 2021-12-17 DIAGNOSIS — E11.8 DIABETES MELLITUS TYPE 2 WITH COMPLICATIONS: Primary | ICD-10-CM

## 2021-12-17 DIAGNOSIS — K21.9 GASTROESOPHAGEAL REFLUX DISEASE WITHOUT ESOPHAGITIS: ICD-10-CM

## 2021-12-17 DIAGNOSIS — N18.31 STAGE 3A CHRONIC KIDNEY DISEASE: ICD-10-CM

## 2021-12-17 DIAGNOSIS — M05.79 RHEUMATOID ARTHRITIS INVOLVING MULTIPLE SITES WITH POSITIVE RHEUMATOID FACTOR: ICD-10-CM

## 2021-12-17 DIAGNOSIS — I10 ESSENTIAL HYPERTENSION: ICD-10-CM

## 2021-12-17 PROCEDURE — 99999 PR PBB SHADOW E&M-EST. PATIENT-LVL IV: ICD-10-PCS | Mod: PBBFAC,,, | Performed by: PEDIATRICS

## 2021-12-17 PROCEDURE — 99999 PR PBB SHADOW E&M-EST. PATIENT-LVL IV: CPT | Mod: PBBFAC,,, | Performed by: PEDIATRICS

## 2021-12-17 PROCEDURE — 99214 PR OFFICE/OUTPT VISIT, EST, LEVL IV, 30-39 MIN: ICD-10-PCS | Mod: S$PBB,,, | Performed by: PEDIATRICS

## 2021-12-17 PROCEDURE — 99214 OFFICE O/P EST MOD 30 MIN: CPT | Mod: S$PBB,,, | Performed by: PEDIATRICS

## 2021-12-17 PROCEDURE — 99214 OFFICE O/P EST MOD 30 MIN: CPT | Mod: PBBFAC | Performed by: PEDIATRICS

## 2021-12-17 RX ORDER — TRAMADOL HYDROCHLORIDE 50 MG/1
50 TABLET ORAL 3 TIMES DAILY PRN
Qty: 90 TABLET | Refills: 0 | Status: SHIPPED | OUTPATIENT
Start: 2021-12-17 | End: 2021-12-27

## 2022-01-05 ENCOUNTER — LAB VISIT (OUTPATIENT)
Dept: LAB | Facility: HOSPITAL | Age: 72
End: 2022-01-05
Attending: INTERNAL MEDICINE
Payer: MEDICARE

## 2022-01-05 DIAGNOSIS — N18.32 STAGE 3B CHRONIC KIDNEY DISEASE: ICD-10-CM

## 2022-01-05 DIAGNOSIS — D50.8 OTHER IRON DEFICIENCY ANEMIA: ICD-10-CM

## 2022-01-05 LAB
ALBUMIN SERPL BCP-MCNC: 3.3 G/DL (ref 3.5–5.2)
ANION GAP SERPL CALC-SCNC: 10 MMOL/L (ref 8–16)
BUN SERPL-MCNC: 25 MG/DL (ref 8–23)
CALCIUM SERPL-MCNC: 9.8 MG/DL (ref 8.7–10.5)
CHLORIDE SERPL-SCNC: 98 MMOL/L (ref 95–110)
CO2 SERPL-SCNC: 31 MMOL/L (ref 23–29)
CREAT SERPL-MCNC: 1.2 MG/DL (ref 0.5–1.4)
EST. GFR  (AFRICAN AMERICAN): 52.9 ML/MIN/1.73 M^2
EST. GFR  (NON AFRICAN AMERICAN): 45.9 ML/MIN/1.73 M^2
FERRITIN SERPL-MCNC: 20 NG/ML (ref 20–300)
GLUCOSE SERPL-MCNC: 96 MG/DL (ref 70–110)
IRON SERPL-MCNC: 32 UG/DL (ref 30–160)
PHOSPHATE SERPL-MCNC: 2.6 MG/DL (ref 2.7–4.5)
POTASSIUM SERPL-SCNC: 4.6 MMOL/L (ref 3.5–5.1)
PTH-INTACT SERPL-MCNC: 130.6 PG/ML (ref 9–77)
SATURATED IRON: 7 % (ref 20–50)
SODIUM SERPL-SCNC: 139 MMOL/L (ref 136–145)
TOTAL IRON BINDING CAPACITY: 462 UG/DL (ref 250–450)
TRANSFERRIN SERPL-MCNC: 312 MG/DL (ref 200–375)

## 2022-01-05 PROCEDURE — 83970 ASSAY OF PARATHORMONE: CPT | Performed by: INTERNAL MEDICINE

## 2022-01-05 PROCEDURE — 84466 ASSAY OF TRANSFERRIN: CPT | Performed by: INTERNAL MEDICINE

## 2022-01-05 PROCEDURE — 85025 COMPLETE CBC W/AUTO DIFF WBC: CPT | Performed by: INTERNAL MEDICINE

## 2022-01-05 PROCEDURE — 36415 COLL VENOUS BLD VENIPUNCTURE: CPT | Performed by: INTERNAL MEDICINE

## 2022-01-05 PROCEDURE — 80069 RENAL FUNCTION PANEL: CPT | Performed by: INTERNAL MEDICINE

## 2022-01-05 PROCEDURE — 82728 ASSAY OF FERRITIN: CPT | Performed by: INTERNAL MEDICINE

## 2022-01-06 LAB
BASOPHILS # BLD AUTO: 0.05 K/UL (ref 0–0.2)
BASOPHILS NFR BLD: 0.5 % (ref 0–1.9)
DIFFERENTIAL METHOD: ABNORMAL
EOSINOPHIL # BLD AUTO: 0.2 K/UL (ref 0–0.5)
EOSINOPHIL NFR BLD: 1.7 % (ref 0–8)
ERYTHROCYTE [DISTWIDTH] IN BLOOD BY AUTOMATED COUNT: 17 % (ref 11.5–14.5)
HCT VFR BLD AUTO: 41.7 % (ref 37–48.5)
HGB BLD-MCNC: 12.3 G/DL (ref 12–16)
IMM GRANULOCYTES # BLD AUTO: 0.02 K/UL (ref 0–0.04)
IMM GRANULOCYTES NFR BLD AUTO: 0.2 % (ref 0–0.5)
LYMPHOCYTES # BLD AUTO: 2 K/UL (ref 1–4.8)
LYMPHOCYTES NFR BLD: 20.5 % (ref 18–48)
MCH RBC QN AUTO: 25 PG (ref 27–31)
MCHC RBC AUTO-ENTMCNC: 29.5 G/DL (ref 32–36)
MCV RBC AUTO: 85 FL (ref 82–98)
MONOCYTES # BLD AUTO: 0.8 K/UL (ref 0.3–1)
MONOCYTES NFR BLD: 8.3 % (ref 4–15)
NEUTROPHILS # BLD AUTO: 6.6 K/UL (ref 1.8–7.7)
NEUTROPHILS NFR BLD: 68.8 % (ref 38–73)
NRBC BLD-RTO: 0 /100 WBC
PLATELET # BLD AUTO: 305 K/UL (ref 150–450)
PMV BLD AUTO: 10.8 FL (ref 9.2–12.9)
RBC # BLD AUTO: 4.92 M/UL (ref 4–5.4)
WBC # BLD AUTO: 9.62 K/UL (ref 3.9–12.7)

## 2022-01-20 ENCOUNTER — HOSPITAL ENCOUNTER (OUTPATIENT)
Dept: RADIOLOGY | Facility: HOSPITAL | Age: 72
Discharge: HOME | End: 2022-01-20
Attending: PEDIATRICS
Payer: MEDICARE

## 2022-01-20 ENCOUNTER — IMMUNIZATION (OUTPATIENT)
Dept: INTERNAL MEDICINE | Facility: CLINIC | Age: 72
End: 2022-01-20
Payer: MEDICARE

## 2022-01-20 DIAGNOSIS — Z12.31 ENCOUNTER FOR SCREENING MAMMOGRAM FOR BREAST CANCER: ICD-10-CM

## 2022-01-20 PROCEDURE — 77063 BREAST TOMOSYNTHESIS BI: CPT | Mod: TC

## 2022-01-20 PROCEDURE — 77063 BREAST TOMOSYNTHESIS BI: CPT | Mod: 26,,, | Performed by: RADIOLOGY

## 2022-01-20 PROCEDURE — G0008 ADMIN INFLUENZA VIRUS VAC: HCPCS | Mod: PBBFAC

## 2022-01-20 PROCEDURE — 77067 SCR MAMMO BI INCL CAD: CPT | Mod: TC

## 2022-01-20 PROCEDURE — 77067 SCR MAMMO BI INCL CAD: CPT | Mod: 26,,, | Performed by: RADIOLOGY

## 2022-01-20 PROCEDURE — 77063 MAMMO DIGITAL SCREENING BILAT WITH TOMO: ICD-10-PCS | Mod: 26,,, | Performed by: RADIOLOGY

## 2022-01-20 PROCEDURE — 77067 MAMMO DIGITAL SCREENING BILAT WITH TOMO: ICD-10-PCS | Mod: 26,,, | Performed by: RADIOLOGY

## 2022-01-24 ENCOUNTER — HOSPITAL ENCOUNTER (OUTPATIENT)
Dept: RADIOLOGY | Facility: HOSPITAL | Age: 72
Discharge: HOME | End: 2022-01-24
Attending: PEDIATRICS
Payer: MEDICARE

## 2022-01-24 DIAGNOSIS — R92.8 ABNORMAL MAMMOGRAM: ICD-10-CM

## 2022-01-24 PROCEDURE — 76642 ULTRASOUND BREAST LIMITED: CPT | Mod: 26,LT,, | Performed by: RADIOLOGY

## 2022-01-24 PROCEDURE — 76642 ULTRASOUND BREAST LIMITED: CPT | Mod: TC,LT

## 2022-01-24 PROCEDURE — 76642 US BREAST LEFT LIMITED: ICD-10-PCS | Mod: 26,LT,, | Performed by: RADIOLOGY

## 2022-02-01 ENCOUNTER — OFFICE VISIT (OUTPATIENT)
Dept: NEPHROLOGY | Facility: CLINIC | Age: 72
End: 2022-02-01
Payer: MEDICARE

## 2022-02-01 VITALS
SYSTOLIC BLOOD PRESSURE: 144 MMHG | HEIGHT: 64 IN | DIASTOLIC BLOOD PRESSURE: 88 MMHG | BODY MASS INDEX: 41.4 KG/M2 | WEIGHT: 242.5 LBS | HEART RATE: 74 BPM | OXYGEN SATURATION: 94 %

## 2022-02-01 DIAGNOSIS — E66.01 CLASS 3 SEVERE OBESITY DUE TO EXCESS CALORIES WITH SERIOUS COMORBIDITY AND BODY MASS INDEX (BMI) OF 40.0 TO 44.9 IN ADULT: ICD-10-CM

## 2022-02-01 DIAGNOSIS — E11.69 HYPERLIPIDEMIA ASSOCIATED WITH TYPE 2 DIABETES MELLITUS: ICD-10-CM

## 2022-02-01 DIAGNOSIS — E78.5 HYPERLIPIDEMIA ASSOCIATED WITH TYPE 2 DIABETES MELLITUS: ICD-10-CM

## 2022-02-01 DIAGNOSIS — M05.79 RHEUMATOID ARTHRITIS INVOLVING MULTIPLE SITES WITH POSITIVE RHEUMATOID FACTOR: ICD-10-CM

## 2022-02-01 DIAGNOSIS — E11.8 DIABETES MELLITUS TYPE 2 WITH COMPLICATIONS: ICD-10-CM

## 2022-02-01 DIAGNOSIS — N25.81 SECONDARY HYPERPARATHYROIDISM OF RENAL ORIGIN: ICD-10-CM

## 2022-02-01 DIAGNOSIS — I10 ESSENTIAL HYPERTENSION: ICD-10-CM

## 2022-02-01 DIAGNOSIS — E55.9 VITAMIN D DEFICIENCY DISEASE: ICD-10-CM

## 2022-02-01 DIAGNOSIS — Z79.60 LONG-TERM USE OF IMMUNOSUPPRESSANT MEDICATION: ICD-10-CM

## 2022-02-01 DIAGNOSIS — M79.7 FIBROMYALGIA: ICD-10-CM

## 2022-02-01 DIAGNOSIS — F17.210 CIGARETTE SMOKER: ICD-10-CM

## 2022-02-01 DIAGNOSIS — N18.31 STAGE 3A CHRONIC KIDNEY DISEASE: Primary | ICD-10-CM

## 2022-02-01 DIAGNOSIS — K21.9 GASTROESOPHAGEAL REFLUX DISEASE WITHOUT ESOPHAGITIS: ICD-10-CM

## 2022-02-01 PROCEDURE — 99999 PR PBB SHADOW E&M-EST. PATIENT-LVL III: CPT | Mod: PBBFAC,,, | Performed by: INTERNAL MEDICINE

## 2022-02-01 PROCEDURE — 99215 PR OFFICE/OUTPT VISIT, EST, LEVL V, 40-54 MIN: ICD-10-PCS | Mod: S$PBB,,, | Performed by: INTERNAL MEDICINE

## 2022-02-01 PROCEDURE — 99215 OFFICE O/P EST HI 40 MIN: CPT | Mod: S$PBB,,, | Performed by: INTERNAL MEDICINE

## 2022-02-01 PROCEDURE — 99999 PR PBB SHADOW E&M-EST. PATIENT-LVL III: ICD-10-PCS | Mod: PBBFAC,,, | Performed by: INTERNAL MEDICINE

## 2022-02-01 PROCEDURE — 99213 OFFICE O/P EST LOW 20 MIN: CPT | Mod: PBBFAC,PO | Performed by: INTERNAL MEDICINE

## 2022-02-01 NOTE — PROGRESS NOTES
Subjective:       Patient ID: Page Grissom is a 71 y.o. Black or  female who presents for follow up evaluation of Chronic Kidney Disease    HPI     She is referred by her PCP for KYE on CKD with baseline creatinine ranging 1.3-1.6mg/dL. She was in her usual state of health until she injured herself and was given tramadol and prednisone, developed severe constipation. This has nor sorted itself out and she feels better. No NSAIDs recently and no ABX.  No IV contrast. No LUTS. She admits to too much soda. She has used an unknown cough medicine? In lieu of Mucinex  Peak Cr was 2.8 with repeat 2.5mg/dL    Interval history June 2021: Tylenol for OA knee pain, walks in [employer] for exercise, no new medications.     Interval History Feb 2022: Doing well, still smoking. Increased water intake, two jarek a day. Will quit smoking when dtr gets . Seeing new Rheumatologist    Review of Systems   Constitutional: Negative for activity change, appetite change, fatigue and unexpected weight change.   Respiratory: Negative for shortness of breath.    Cardiovascular: Negative for leg swelling.   Gastrointestinal: Negative for constipation and diarrhea.   Genitourinary: Negative for difficulty urinating.   Musculoskeletal: Positive for arthralgias and back pain.   Allergic/Immunologic: Positive for immunocompromised state.   Psychiatric/Behavioral: Negative for confusion and decreased concentration.       Objective:      Physical Exam  Vitals and nursing note reviewed.   Constitutional:       General: She is not in acute distress.     Appearance: Normal appearance. She is not ill-appearing.   HENT:      Head: Atraumatic.   Eyes:      General: No scleral icterus.  Neck:      Vascular: No JVD.   Cardiovascular:      Rate and Rhythm: Normal rate.      Heart sounds: S1 normal and S2 normal. No friction rub.   Pulmonary:      Breath sounds: Normal breath sounds. No wheezing or rales.   Abdominal:       General: There is no distension.   Musculoskeletal:      Cervical back: Neck supple.      Right lower leg: No edema.      Left lower leg: No edema.   Skin:     General: Skin is warm and dry.      Coloration: Skin is not jaundiced.   Neurological:      Mental Status: She is alert and oriented to person, place, and time. Mental status is at baseline.      Gait: Gait abnormal.   Psychiatric:         Mood and Affect: Mood normal.         Behavior: Behavior normal.         Thought Content: Thought content normal.         Judgment: Judgment normal.         Assessment:       1. Stage 3a chronic kidney disease    2. Secondary hyperparathyroidism of renal origin    3. Essential hypertension    4. Hyperlipidemia associated with type 2 diabetes mellitus    5. Vitamin D deficiency disease    6. Diabetes mellitus type 2 with complications    7. Gastroesophageal reflux disease without esophagitis    8. Rheumatoid arthritis involving multiple sites with positive rheumatoid factor    9. Fibromyalgia    10. Long-term use of immunosuppressant medication    11. Class 3 severe obesity due to excess calories with serious comorbidity and body mass index (BMI) of 40.0 to 44.9 in adult    12. Cigarette smoker        Plan:           CKD Stage 3  - stable kidney function   - Continue RAAS blockade for renal preservation  - Avoid NSAIDs, continue a low sodium diet, and ensure hydration    HTN  - typically controlled, monitor    Mineral and Bone Disease  - trend PTH    DM  - well controlled, no retinopathy     RTC 8mo    49  minutes of total time spent on the encounter, which includes face to face time and non-face to face time preparing to see the patient (eg, review of tests), Obtaining and/or reviewing separately obtained history, Documenting clinical information in the electronic or other health record, Independently interpreting results (not separately reported) and communicating results to the patient/family/caregiver, or Care  coordination (not separately reported).

## 2022-02-18 ENCOUNTER — TELEPHONE (OUTPATIENT)
Dept: RHEUMATOLOGY | Facility: CLINIC | Age: 72
End: 2022-02-18
Payer: MEDICARE

## 2022-02-21 ENCOUNTER — OFFICE VISIT (OUTPATIENT)
Dept: PODIATRY | Facility: CLINIC | Age: 72
End: 2022-02-21
Payer: MEDICARE

## 2022-02-21 ENCOUNTER — APPOINTMENT (OUTPATIENT)
Dept: RADIOLOGY | Facility: HOSPITAL | Age: 72
End: 2022-02-21
Attending: PHYSICIAN ASSISTANT
Payer: MEDICARE

## 2022-02-21 ENCOUNTER — TELEPHONE (OUTPATIENT)
Dept: RHEUMATOLOGY | Facility: CLINIC | Age: 72
End: 2022-02-21

## 2022-02-21 ENCOUNTER — OFFICE VISIT (OUTPATIENT)
Dept: RHEUMATOLOGY | Facility: CLINIC | Age: 72
End: 2022-02-21
Payer: MEDICARE

## 2022-02-21 VITALS
HEIGHT: 64 IN | BODY MASS INDEX: 41.74 KG/M2 | DIASTOLIC BLOOD PRESSURE: 59 MMHG | WEIGHT: 244.5 LBS | HEART RATE: 64 BPM | SYSTOLIC BLOOD PRESSURE: 124 MMHG

## 2022-02-21 VITALS — HEIGHT: 64 IN | BODY MASS INDEX: 41.4 KG/M2 | WEIGHT: 242.5 LBS

## 2022-02-21 DIAGNOSIS — Z79.60 LONG-TERM USE OF IMMUNOSUPPRESSANT MEDICATION: ICD-10-CM

## 2022-02-21 DIAGNOSIS — E11.8 DIABETES MELLITUS TYPE 2 WITH COMPLICATIONS: Primary | ICD-10-CM

## 2022-02-21 DIAGNOSIS — M81.0 AGE-RELATED OSTEOPOROSIS WITHOUT CURRENT PATHOLOGICAL FRACTURE: ICD-10-CM

## 2022-02-21 DIAGNOSIS — E55.9 VITAMIN D DEFICIENCY DISEASE: ICD-10-CM

## 2022-02-21 DIAGNOSIS — Q82.8 POROKERATOSIS: ICD-10-CM

## 2022-02-21 DIAGNOSIS — M85.89 OSTEOPENIA OF MULTIPLE SITES: ICD-10-CM

## 2022-02-21 DIAGNOSIS — Z79.899 HIGH RISK MEDICATION USE: ICD-10-CM

## 2022-02-21 DIAGNOSIS — M05.79 RHEUMATOID ARTHRITIS INVOLVING MULTIPLE SITES WITH POSITIVE RHEUMATOID FACTOR: Primary | ICD-10-CM

## 2022-02-21 DIAGNOSIS — M79.7 FIBROMYALGIA: ICD-10-CM

## 2022-02-21 PROCEDURE — 99214 OFFICE O/P EST MOD 30 MIN: CPT | Mod: PBBFAC,25

## 2022-02-21 PROCEDURE — 77080 DXA BONE DENSITY AXIAL: CPT | Mod: 26,,, | Performed by: RADIOLOGY

## 2022-02-21 PROCEDURE — 99213 OFFICE O/P EST LOW 20 MIN: CPT | Mod: 25,S$PBB,, | Performed by: PODIATRIST

## 2022-02-21 PROCEDURE — 99999 PR PBB SHADOW E&M-EST. PATIENT-LVL IV: CPT | Mod: PBBFAC,,,

## 2022-02-21 PROCEDURE — 77080 DXA BONE DENSITY AXIAL: CPT | Mod: TC

## 2022-02-21 PROCEDURE — 99213 OFFICE O/P EST LOW 20 MIN: CPT | Mod: PBBFAC,25,27 | Performed by: PODIATRIST

## 2022-02-21 PROCEDURE — 77080 DEXA BONE DENSITY SPINE HIP: ICD-10-PCS | Mod: 26,,, | Performed by: RADIOLOGY

## 2022-02-21 PROCEDURE — 99215 PR OFFICE/OUTPT VISIT, EST, LEVL V, 40-54 MIN: ICD-10-PCS | Mod: S$PBB,,,

## 2022-02-21 PROCEDURE — 99215 OFFICE O/P EST HI 40 MIN: CPT | Mod: S$PBB,,,

## 2022-02-21 PROCEDURE — 99999 PR PBB SHADOW E&M-EST. PATIENT-LVL III: CPT | Mod: PBBFAC,,, | Performed by: PODIATRIST

## 2022-02-21 PROCEDURE — 99213 PR OFFICE/OUTPT VISIT, EST, LEVL III, 20-29 MIN: ICD-10-PCS | Mod: 25,S$PBB,, | Performed by: PODIATRIST

## 2022-02-21 PROCEDURE — 99999 PR PBB SHADOW E&M-EST. PATIENT-LVL III: ICD-10-PCS | Mod: PBBFAC,,, | Performed by: PODIATRIST

## 2022-02-21 PROCEDURE — 99999 PR PBB SHADOW E&M-EST. PATIENT-LVL IV: ICD-10-PCS | Mod: PBBFAC,,,

## 2022-02-21 RX ORDER — HYDROXYCHLOROQUINE SULFATE 200 MG/1
200 TABLET, FILM COATED ORAL DAILY
Qty: 30 TABLET | Refills: 5 | Status: ON HOLD | OUTPATIENT
Start: 2022-02-21 | End: 2022-04-11 | Stop reason: HOSPADM

## 2022-02-21 RX ORDER — METHYLPREDNISOLONE 4 MG/1
TABLET ORAL
Qty: 21 EACH | Refills: 0 | Status: SHIPPED | OUTPATIENT
Start: 2022-02-21 | End: 2022-03-29 | Stop reason: ALTCHOICE

## 2022-02-21 RX ORDER — ACETAMINOPHEN 325 MG/1
650 TABLET ORAL
Status: CANCELLED | OUTPATIENT
Start: 2022-02-22

## 2022-02-21 RX ORDER — ZOLEDRONIC ACID 5 MG/100ML
5 INJECTION, SOLUTION INTRAVENOUS
Status: CANCELLED | OUTPATIENT
Start: 2022-02-22

## 2022-02-21 RX ORDER — SODIUM CHLORIDE 0.9 % (FLUSH) 0.9 %
10 SYRINGE (ML) INJECTION
Status: CANCELLED | OUTPATIENT
Start: 2022-02-22

## 2022-02-21 RX ORDER — HEPARIN 100 UNIT/ML
500 SYRINGE INTRAVENOUS
Status: CANCELLED | OUTPATIENT
Start: 2022-02-22

## 2022-02-21 NOTE — TELEPHONE ENCOUNTER
----- Message from Tiara Avalos PA-C sent at 2/21/2022 12:42 PM CST -----  Please schedule for reclast at earliest available. No labs thank you!

## 2022-02-21 NOTE — TELEPHONE ENCOUNTER
Spoke with pt. She is scheduled to resume reclast on 3/04/22 at 10:30 at the [facility]. She will arrive a few min. Early for registration, no fasting, take home meds as directed and hydrate well.

## 2022-02-21 NOTE — PROGRESS NOTES
Subjective:       [facility], [place]     Patient ID: Page Grissom is a 71 y.o. female.    Chief Complaint: Osteopenia, Fibromyalgia, and Rheumatoid Arthritis  Page is here today for rheumatology follow-up    Seropositive rheumatoid arthritis- 2019 RA was stable, weaned down and off mtx- stopped 1.5 yr ago.     Still taking Plaquenil 200 mg once a day.      She has some mild ckd; tylenol arthritis strength; off nsaids, uses topical Biofreeze, last eGFR 44  Pt. States tylenol arthritis does not work for her. She prefers ibuprofen.     Bilateral knee osteoarthritis her knee pain comes and goes.  Pain today rated 0/10.   At times gets mild arthralgias   No swelling. No other issues. No RA exacerbations.    Minimal morning stiffness.  Fibromyalgia also doing well.     Bone density was done 5/10/17 meeting criteria for osteoporosis she was placed on Fosamax once weekly, she ran out of her fosamax and stopped it. Could not remember to take it. She  Preferred not to add more tablets to her current meds.  We moved to  IV Reclast 10/09/18- she did well. Had some mild arthralgias and myalgias for 2 days then resolved. No falls or fractures since last visit. Low vit D on supplement vit D3 2000 IU last vit d was 50  repeat dexa 10/10/19- stable and improved- now osteopenia with mod risk fx; Reclast was d/c'd. She had dexa done today which we will discuss.         Follow-up  Pertinent negatives include no abdominal pain, arthralgias, chest pain, chills, fatigue, fever, joint swelling, myalgias, nausea, neck pain, rash, vomiting or weakness.   Fibromyalgia  Pertinent negatives include no abdominal pain, arthralgias, chest pain, chills, fatigue, fever, joint swelling, myalgias, nausea, neck pain, rash, vomiting or weakness.     Review of Systems   Constitutional: Negative.  Negative for activity change, appetite change, chills, fatigue and fever.   HENT: Negative.  Negative for mouth  "sores and trouble swallowing.         No dry mouth   Eyes: Negative.  Negative for photophobia, pain and redness.        No swollen or red eyes, no dry eye     Respiratory: Negative.  Negative for chest tightness, shortness of breath, wheezing and stridor.    Cardiovascular: Negative.  Negative for chest pain.   Gastrointestinal: Negative.  Negative for abdominal pain, blood in stool, diarrhea, nausea and vomiting.   Genitourinary: Negative.  Negative for dysuria, frequency, hematuria and urgency.   Musculoskeletal: Negative.  Negative for arthralgias, back pain, gait problem, joint swelling, myalgias, neck pain and neck stiffness.   Skin: Negative.  Negative for color change, pallor and rash.   Neurological: Negative.  Negative for weakness.   Hematological: Negative for adenopathy.   Psychiatric/Behavioral: Negative for suicidal ideas.         Objective:   BP (!) 124/59   Pulse 64   Ht 5' 4" (1.626 m)   Wt 110.9 kg (244 lb 7.8 oz)   BMI 41.97 kg/m²        Past Medical History:   Diagnosis Date    Asthma     Back pain     Cataract     OD    CKD (chronic kidney disease) stage 3, GFR 30-59 ml/min 12/20/2020    Diabetes mellitus     Fibromyalgia     Gastroesophageal reflux disease     Hypercholesterolemia     Hypertension     Immune deficiency disorder     Obesity     Osteoporosis     Rheumatoid arthritis     Tobacco dependence     Trouble in sleeping     Type 2 diabetes mellitus       Immunization History   Administered Date(s) Administered    COVID-19, MRNA, LN-S, PF (Pfizer) (Purple Cap) 08/17/2021, 09/17/2021    Influenza 10/10/2006, 12/19/2007, 10/08/2009, 12/15/2010, 11/09/2011, 10/16/2012    Influenza (FLUAD) - Quadrivalent - Adjuvanted - PF *Preferred* (65+) 12/10/2020, 01/20/2022    Influenza - High Dose - PF (65 years and older) 12/06/2017, 12/13/2018, 12/24/2019    Influenza - Quadrivalent 10/30/2014    Influenza Split 10/10/2006, 12/19/2007, 10/08/2009, 12/15/2010, 11/09/2011, " 10/16/2012, 10/08/2013    PPD Test 04/05/2010, 04/07/2010    Pneumococcal Conjugate - 13 Valent 04/30/2015    Pneumococcal Polysaccharide - 23 Valent 02/12/2007, 12/13/2018    Tdap 10/16/2012    Zoster 04/30/2015          Physical Exam   Constitutional: She is oriented to person, place, and time. No distress.   HENT:   Head: Normocephalic and atraumatic.   Right Ear: External ear normal.   Left Ear: External ear normal.   Mouth/Throat: No oropharyngeal exudate.   Eyes: Pupils are equal, round, and reactive to light. Conjunctivae are normal. No scleral icterus.   Neck: No thyromegaly present.   Cardiovascular: Normal rate, regular rhythm and normal heart sounds.   No murmur heard.  Pulmonary/Chest: Effort normal and breath sounds normal. She exhibits no tenderness.   Abdominal: Soft. Bowel sounds are normal.   Musculoskeletal:         General: No tenderness. Normal range of motion.      Cervical back: Normal range of motion and neck supple.      Comments: Crepitus bilat knees  ttp bilat GTB  Strength 5/5 bilat UE/LE   Lymphadenopathy:     She has no cervical adenopathy.   Neurological: She is alert and oriented to person, place, and time. She displays normal reflexes. No cranial nerve deficit. She exhibits normal muscle tone. Gait normal.   Skin: Skin is warm and dry. No rash noted.           Recent Results (from the past 336 hour(s))   CBC Auto Differential    Collection Time: 02/21/22 10:34 AM   Result Value Ref Range    WBC 9.16 3.90 - 12.70 K/uL    RBC 4.86 4.00 - 5.40 M/uL    Hemoglobin 12.3 12.0 - 16.0 g/dL    Hematocrit 39.6 37.0 - 48.5 %    MCV 82 82 - 98 fL    MCH 25.3 (L) 27.0 - 31.0 pg    MCHC 31.1 (L) 32.0 - 36.0 g/dL    RDW 16.9 (H) 11.5 - 14.5 %    Platelets 268 150 - 450 K/uL    MPV 10.0 9.2 - 12.9 fL    Immature Granulocytes 0.3 0.0 - 0.5 %    Gran # (ANC) 6.4 1.8 - 7.7 K/uL    Immature Grans (Abs) 0.03 0.00 - 0.04 K/uL    Lymph # 1.8 1.0 - 4.8 K/uL    Mono # 0.8 0.3 - 1.0 K/uL    Eos # 0.1 0.0 -  0.5 K/uL    Baso # 0.04 0.00 - 0.20 K/uL    nRBC 0 0 /100 WBC    Gran % 69.5 38.0 - 73.0 %    Lymph % 20.0 18.0 - 48.0 %    Mono % 8.4 4.0 - 15.0 %    Eosinophil % 1.4 0.0 - 8.0 %    Basophil % 0.4 0.0 - 1.9 %    Differential Method Automated    Comprehensive Metabolic Panel    Collection Time: 02/21/22 10:34 AM   Result Value Ref Range    Sodium 140 136 - 145 mmol/L    Potassium 3.6 3.5 - 5.1 mmol/L    Chloride 97 95 - 110 mmol/L    CO2 31 (H) 23 - 29 mmol/L    Glucose 102 70 - 110 mg/dL    BUN 20 8 - 23 mg/dL    Creatinine 1.4 0.5 - 1.4 mg/dL    Calcium 9.8 8.7 - 10.5 mg/dL    Total Protein 8.1 6.0 - 8.4 g/dL    Albumin 3.4 (L) 3.5 - 5.2 g/dL    Total Bilirubin 0.3 0.1 - 1.0 mg/dL    Alkaline Phosphatase 114 55 - 135 U/L    AST 15 10 - 40 U/L    ALT 15 10 - 44 U/L    Anion Gap 12 8 - 16 mmol/L    eGFR if African American 44 (A) >60 mL/min/1.73 m^2    eGFR if non African American 38 (A) >60 mL/min/1.73 m^2   C-Reactive Protein    Collection Time: 02/21/22 10:34 AM   Result Value Ref Range    CRP 32.6 (H) 0.0 - 8.2 mg/L         Results for orders placed during the hospital encounter of 03/10/16   X-Ray Hand Complete Bilateral    Narrative 3 views of either hand, 6 views total    Comparison: 07/16/2015    Findings: There some stable mild joint space narrowing involving the interphalangeal joints.  No acute fracture or dislocation.  No new erosive changes are suggested.    Impression  Stable exam  ______________________________________     Electronically signed by: BOSSMAN DEJESUS D.O.  Date:     03/10/16  Time:    11:30      Results for orders placed during the hospital encounter of 03/10/16   X-Ray Foot Complete Bilateral    Narrative 3 views of either foot, 6 views total    Comparison: 07/16/2015    Findings: There is no evidence to suggest acute fracture or dislocation.  There are stable moderate to severe degenerative changes in the region of the midfoot.  Dorsal and plantar calcaneal enthesophytes are also  present bilaterally.  Generalized osteopenia is noted.  There is a healed fracture involving the fifth proximal phalanx on the right.    Impression  As above  ______________________________________     Electronically signed by: BOSSMAN DEJESUS D.O.  Date:     03/10/16  Time:    11:29      Results for orders placed during the hospital encounter of 07/16/15   X-Ray Chest PA And Lateral    Narrative Findings: There is been no change from previous radiograph 4/10/13.2 views    Impression  No active disease.      Electronically signed by: PRO MAURER MD  Date:     07/16/15  Time:    12:13        Results for orders placed in visit on 10/10/19   DXA Bone Density Spine And Hip    Narrative EXAMINATION:  DEXA BONE DENSITY SPINE HIP    CLINICAL HISTORY:  suspected osteoporosis; Age-related osteoporosis without current pathological fracture    TECHNIQUE:  DXA scanning was performed over the left hip and lumbar spine.  Review of the images confirms satisfactory positioning and technique.    COMPARISON:  05/10/2017    FINDINGS:  The L1 to L4 vertebral bone mineral density is equal to 1.104 g/cm squared with a T score of -0.7.  There has been a positive 7.0% statistically significant change relative to the prior study.    The left femoral neck bone mineral density is equal to 0.775 g/cm squared with a T score of -1.9.  There has been a positive 7.6% increase relative to the prior study.    There is a 5.9% risk of a major osteoporotic fracture and a 1.5% risk of hip fracture in the next 10 years (FRAX).      Impression Osteopenia    Consider FDA approved medical therapies in postmenopausal women and men aged 50 years and older, based on the following:    *A hip or vertebral (clinical or morphometric) fracture  *T score less than or equal to -2.5 at the femoral neck or spine after appropriate evaluation to exclude secondary causes.  *Low bone mass -- also known as osteopenia (T score between -1.0 and -2.5 at the femoral neck or  spine) and a 10 year probability of hip fracture greater than or equal to 3% or a 10 year probability of major osteoporosis-related fracture greater than or equal to 20% based on the [place]-adapted WHO algorithm.  *Clinicians judgment and/or patient preference may indicate treatment for people with 10 year fracture probabilities is above or below these levels.      Electronically signed by: Kuldeep Flores DO  Date:    10/10/2019  Time:    09:47        Ref. Range 6/7/2019 07:55   Vit D, 25-Hydroxy Latest Ref Range: 30 - 96 ng/mL 45         DEXA 5/10/17 total femur T score -3.0, femur neck -2.5, spine -1.8 impression osteoporosis     DEXA 02/21/2022:  L3-1.9, FN-2.6, TH-2.2,-12.3% decline in hip.  Osteoporosis    Bilateral hand and foot x-rays stable 7/2015    Assessment:       1. Rheumatoid arthritis involving multiple sites with positive rheumatoid factor    2. High risk medication use    3. Age-related osteoporosis without current pathological fracture    4. Vitamin D deficiency disease    5. Long-term use of immunosuppressant medication    6. Fibromyalgia              Seropositive rheumatoid arthritis currently stable Plaquenil 200 mg daily    HERSON 0  CDAI Score: 1 / 76   CANO-28 (CRP): 2.79 (Low disease activity)     Osteoporosis   Compliance issues with oral Fosamax--  forgets to take, does not want to add any more pills to take   Moved  IV Reclast 10/9/18 X 1 dose  Stopped Reclast w/improved dexa    DEXA 02/31/2022 decline at the hip    Fibromyalgia stable     Long-term use of immunosuppression medication with no issues with recurrent infections     Chronic tobacco user     Medication monitoring with no current toxicity issues     Vaccines up to date        Plan:     No orders of the defined types were placed in this encounter.         C/w  Plaquenil to once a day. Refilled today.     Stay off  Mtx; no need to add back   C/w keep folic acid 1 mg daily     Avoid nsaids  Use tylenol arthritis   Discussed kidney  function with patient and encouraged her to use tylenol over nsaids. She said she takes one ibuprofen or naproxen a month as tylenol does not relieve her symptoms    Medrol dose pack sent to pharmacy  Discussed side effects of medication with patient.   We will keep an eye on her CRP and Sed rate. Elevated CRP today, sed rate pending.      yearly  IV Reclast 5 mg now on hold, though BMD declined on most recent DEXA  repeat her bone density in 2-3 years- 2024   Will resume IV reclast 5 mg. Discussed osteoporosis medication options including oral bisphosphonate, rooming Reclast, Prolia.  Plan discussed with patient and agreed upon together.    Counseled patient on smoking cessation encouraged patient to look into programs     Plan for reclast Feb or march 2022, repeat in one year. Repeat dexa 2/2024  RTC 6 months with labs- cbc, cmp, esr, crp   Call with any questions, changes, or concerns.      Tiara Avalos PA-C  Rheumatology Department   [facility]      45 minutes of total time spent on the encounter, which includes face to face time and non-face to face time preparing to see the patient (eg, review of tests), Obtaining and/or reviewing separately obtained history, Documenting clinical information in the electronic or other health record, Independently interpreting results (not separately reported) and communicating results to the patient/family/caregiver, or Care coordination (not separately reported).    Disclaimer: This note was prepared using voice recognition system and is likely to have sound alike errors and is not proof read.  Please call me with any questions   .

## 2022-02-22 ENCOUNTER — TELEPHONE (OUTPATIENT)
Dept: RHEUMATOLOGY | Facility: CLINIC | Age: 72
End: 2022-02-22
Payer: MEDICARE

## 2022-02-22 NOTE — TELEPHONE ENCOUNTER
----- Message from Tiara Avalos PA-C sent at 2/22/2022  8:30 AM CST -----  Elevated sed rate. We discussed yesterday medrol dosepack to bring her inflammation down. She was hesitant that it would affect her kidneys. Based on elevation of sed rate as well as CRP, it is still my recommendation to try medrol dosepack. At this time, there is no dosage adjustments necessary for any degree of kidney impairment.

## 2022-02-22 NOTE — TELEPHONE ENCOUNTER
Advised pt of info below, pt stated she is going  medrol dose pack this AM, Verbalized understanding.

## 2022-02-23 DIAGNOSIS — D84.9 IMMUNOSUPPRESSED STATUS: ICD-10-CM

## 2022-03-02 NOTE — PROGRESS NOTES
Subjective:     Patient ID: Page Grissom is a 71 y.o. female.    Chief Complaint: Foot Problem (Pt c/o left foot porokeratosis, pt c/o 0/10 pain, diabetic pt wears slippers, PCP Dr. Roman last seen 12-17-21)    Page is a 71 y.o. female who presents to the clinic for evaluation and treatment of high risk feet. Page has a past medical history of Asthma, Back pain, Cataract, CKD (chronic kidney disease) stage 3, GFR 30-59 ml/min (12/20/2020), Diabetes mellitus, Fibromyalgia, Gastroesophageal reflux disease, Hypercholesterolemia, Hypertension, Immune deficiency disorder, Obesity, Osteoporosis, Rheumatoid arthritis, Tobacco dependence, Trouble in sleeping, and Type 2 diabetes mellitus. The patient's chief complaint is painful callus on both feet. Patient asking for refill on lotion for callus. This patient has documented high risk feet requiring routine maintenance secondary to diabetes mellitis and those secondary complications of diabetes, as mentioned..    PCP: NEELAM Roman Jr, MD    Date Last Seen by PCP: 12/17/2021    Current shoe gear:  Affected Foot: Slip-on shoes     Unaffected Foot: Slip-on shoes    Hemoglobin A1C   Date Value Ref Range Status   12/10/2021 5.7 (H) 4.0 - 5.6 % Final     Comment:     ADA Screening Guidelines:  5.7-6.4%  Consistent with prediabetes  >or=6.5%  Consistent with diabetes    High levels of fetal hemoglobin interfere with the HbA1C  assay. Heterozygous hemoglobin variants (HbS, HgC, etc)do  not significantly interfere with this assay.   However, presence of multiple variants may affect accuracy.     06/10/2021 5.9 (H) 4.0 - 5.6 % Final     Comment:     ADA Screening Guidelines:  5.7-6.4%  Consistent with prediabetes  >or=6.5%  Consistent with diabetes    High levels of fetal hemoglobin interfere with the HbA1C  assay. Heterozygous hemoglobin variants (HbS, HgC, etc)do  not significantly interfere with this assay.   However, presence of multiple variants may affect  accuracy.     12/05/2020 5.7 (H) 4.0 - 5.6 % Final     Comment:     ADA Screening Guidelines:  5.7-6.4%  Consistent with prediabetes  >or=6.5%  Consistent with diabetes  High levels of fetal hemoglobin interfere with the HbA1C  assay. Heterozygous hemoglobin variants (HbS, HgC, etc)do  not significantly interfere with this assay.   However, presence of multiple variants may affect accuracy.         Patient Active Problem List   Diagnosis    Rheumatoid arthritis involving multiple sites with positive rheumatoid factor    High risk medication use    Vitamin D deficiency disease    Mild persistent asthma without complication    Diabetes mellitus type 2 with complications    GERD (gastroesophageal reflux disease)    Essential hypertension    Hyperlipidemia associated with type 2 diabetes mellitus    Fibromyalgia    Long-term use of immunosuppressant medication    Cigarette smoker    Class 3 severe obesity due to excess calories with serious comorbidity and body mass index (BMI) of 40.0 to 44.9 in adult    Osteopenia of multiple sites    CKD (chronic kidney disease) stage 3, GFR 30-59 ml/min    Secondary hyperparathyroidism of renal origin    Aortic atherosclerosis    Age-related osteoporosis without current pathological fracture       Medication List with Changes/Refills   New Medications    METHYLPREDNISOLONE (MEDROL DOSEPACK) 4 MG TABLET    use as directed   Current Medications    ALBUTEROL (PROVENTIL/VENTOLIN HFA) 90 MCG/ACTUATION INHALER    INHALE 2 PUFFS INTO THE LUNGS EVERY 6 HOURS AS NEEDED FOR WHEEZING. DISPENSE WITH SPACER.    AMLODIPINE (NORVASC) 10 MG TABLET    TAKE 1 TABLET(10 MG) BY MOUTH EVERY DAY    AMMONIUM LACTATE 12 % CREA    Apply 1 Act topically 2 (two) times daily.    CHOLECALCIFEROL, VITAMIN D3, 2,000 UNIT TAB    Take 2,000 Units by mouth once daily.    DIPHENHYDRAMINE (SOMINEX) 25 MG TABLET    Take 25 mg by mouth nightly as needed for Allergies.    FISH OIL-OMEGA-3 FATTY ACIDS  "300-1,000 MG CAPSULE    Take 2 g by mouth once daily.    HYDRALAZINE (APRESOLINE) 25 MG TABLET    TAKE 1 TABLET BY MOUTH EVERY 12 HOURS    HYDROCHLOROTHIAZIDE (HYDRODIURIL) 25 MG TABLET    TAKE 1 TABLET(25 MG) BY MOUTH EVERY DAY    LISINOPRIL (PRINIVIL,ZESTRIL) 40 MG TABLET    TAKE 1 TABLET BY MOUTH EVERY DAY    METFORMIN (GLUCOPHAGE) 500 MG TABLET    TAKE 1 TABLET(500 MG) BY MOUTH EVERY DAY    MULTIVITAMIN (THERAGRAN) PER TABLET    Take 1 tablet by mouth once daily.    PHENYLEPHRINE/DM/ACETAMINOP/GG (MUCINEX SINUS-MAX SEV TERRIE,DM, ORAL)    Take by mouth as needed.    ROSUVASTATIN (CRESTOR) 40 MG TAB    Take 1 tablet (40 mg total) by mouth every evening.   Changed and/or Refilled Medications    Modified Medication Previous Medication    HYDROXYCHLOROQUINE (PLAQUENIL) 200 MG TABLET hydrOXYchloroQUINE (PLAQUENIL) 200 mg tablet       Take 1 tablet (200 mg total) by mouth once daily.    Take 1 tablet (200 mg total) by mouth once daily.       Review of patient's allergies indicates:  No Known Allergies    Past Surgical History:   Procedure Laterality Date    COLONOSCOPY N/A 2/14/2019    Procedure: COLONOSCOPY;  Surgeon: Yury Atwood MD;  Location: [facility];  Service: Endoscopy;  Laterality: N/A;    HERNIA REPAIR      OOPHORECTOMY         Family History   Problem Relation Age of Onset    Hypertension Mother     Cancer Father     Colon cancer Father     Breast cancer Sister        Social History     Socioeconomic History    Marital status:    Tobacco Use    Smoking status: Current Every Day Smoker     Packs/day: 0.50     Years: 25.00     Pack years: 12.50     Types: Cigarettes    Smokeless tobacco: Never Used    Tobacco comment: trying to quit   Substance and Sexual Activity    Alcohol use: No    Drug use: No       Vitals:    02/21/22 0951   Weight: 110 kg (242 lb 8.1 oz)   Height: 5' 4" (1.626 m)       Hemoglobin A1C   Date Value Ref Range Status   12/10/2021 5.7 (H) 4.0 - 5.6 % Final     Comment:    "  ADA Screening Guidelines:  5.7-6.4%  Consistent with prediabetes  >or=6.5%  Consistent with diabetes    High levels of fetal hemoglobin interfere with the HbA1C  assay. Heterozygous hemoglobin variants (HbS, HgC, etc)do  not significantly interfere with this assay.   However, presence of multiple variants may affect accuracy.     06/10/2021 5.9 (H) 4.0 - 5.6 % Final     Comment:     ADA Screening Guidelines:  5.7-6.4%  Consistent with prediabetes  >or=6.5%  Consistent with diabetes    High levels of fetal hemoglobin interfere with the HbA1C  assay. Heterozygous hemoglobin variants (HbS, HgC, etc)do  not significantly interfere with this assay.   However, presence of multiple variants may affect accuracy.     12/05/2020 5.7 (H) 4.0 - 5.6 % Final     Comment:     ADA Screening Guidelines:  5.7-6.4%  Consistent with prediabetes  >or=6.5%  Consistent with diabetes  High levels of fetal hemoglobin interfere with the HbA1C  assay. Heterozygous hemoglobin variants (HbS, HgC, etc)do  not significantly interfere with this assay.   However, presence of multiple variants may affect accuracy.         Review of Systems   Constitutional: Negative for chills and fever.   Respiratory: Negative for shortness of breath.    Cardiovascular: Negative for chest pain, palpitations, orthopnea, claudication and leg swelling.   Gastrointestinal: Negative for diarrhea, nausea and vomiting.   Musculoskeletal: Negative for joint pain.   Skin: Negative for rash.   Neurological: Positive for tingling and sensory change.   Psychiatric/Behavioral: Negative.          Objective:      PHYSICAL EXAM: Apperance: Alert and orient in no distress,well developed, and with good attention to grooming and body habits  Patient presents ambulating in slip on shoes.   LOWER EXTREMITY EXAM:  VASCULAR: Dorsalis pedis pulses 2/4 bilateral and Posterior Tibial pulses 2/4 bilateral. Capillary fill time <4 seconds bilateral. No edema observed bilateral. Varicosities  absent bilateral. Skin temperature of the lower extremities is warm to warm, proximal to distal. Hair growth dim bilateral.  DERMATOLOGICAL: No skin rashes, subcutaneous nodules, lesions, or ulcers observed bilateral. Nails 1,2,3,4,5 bilateral normal length and thickness. Webspaces 1,2,3,4 bilateral clean, dry and without evidence of break in skin integrity. Mild hyperkeratotic tissue with thickened center core noted to right plantar 5th submetatarsal, left plantar 2nd sulcus, plantar lateral foot.   NEUROLOGICAL: Light touch, sharp-dull, proprioception all present and equal bilaterally.  Vibratory sensation diminished at bilateral hallux. Protective sensation intact at all 10 sites as tested with a [place] 5.07 monofilament.   MUSCULOSKELETAL: Muscle strength is 5/5 for foot inverters, everters, plantarflexors, and dorsiflexors. Muscle tone is normal.           Assessment:       Encounter Diagnoses   Name Primary?    Diabetes mellitus type 2 with complications Yes    Porokeratosis          Plan:   Diabetes mellitus type 2 with complications    Porokeratosis      I counseled the patient on her conditions, regarding findings of my examination, my impressions, and usual treatment plan.   Greater than 50% of this visit spent on counseling and coordination of care.  Greater than 15 minutes of a 20 minute appointment spent on education about the diabetic foot, neuropathy, and prevention of limb loss.  Shoe inspection. Diabetic Foot Education. Patient reminded of the importance of good nutrition and blood sugar control to help prevent podiatric complications of diabetes. Patient instructed on proper foot hygeine. We discussed wearing proper shoe gear, daily foot inspections, never walking without protective shoe gear, never putting sharp instruments to feet.    Prescription written for Ammonium Lactate 12% cream to be applied twice daily to area for inflammation.  Patient  will continue to monitor the areas  daily, inspect feet, wear protective shoe gear when ambulatory, moisturizer to maintain skin integrity. Patient reminded of the importance of good nutrition and blood sugar control to help prevent podiatric complications of diabetes.  Patient to return 6 months or sooner if needed.                 Trey Swan DPM  [facility]

## 2022-03-04 ENCOUNTER — INFUSION (OUTPATIENT)
Dept: INFUSION THERAPY | Facility: HOSPITAL | Age: 72
End: 2022-03-04
Payer: MEDICARE

## 2022-03-04 VITALS
BODY MASS INDEX: 41.74 KG/M2 | SYSTOLIC BLOOD PRESSURE: 125 MMHG | HEART RATE: 78 BPM | DIASTOLIC BLOOD PRESSURE: 64 MMHG | RESPIRATION RATE: 18 BRPM | OXYGEN SATURATION: 95 % | WEIGHT: 244.5 LBS | HEIGHT: 64 IN | TEMPERATURE: 98 F

## 2022-03-04 DIAGNOSIS — M81.0 AGE-RELATED OSTEOPOROSIS WITHOUT CURRENT PATHOLOGICAL FRACTURE: Primary | ICD-10-CM

## 2022-03-04 PROCEDURE — 25000003 PHARM REV CODE 250

## 2022-03-04 PROCEDURE — 63600175 PHARM REV CODE 636 W HCPCS

## 2022-03-04 PROCEDURE — 96365 THER/PROPH/DIAG IV INF INIT: CPT

## 2022-03-04 RX ORDER — SODIUM CHLORIDE 0.9 % (FLUSH) 0.9 %
10 SYRINGE (ML) INJECTION
Status: CANCELLED | OUTPATIENT
Start: 2022-03-04

## 2022-03-04 RX ORDER — HEPARIN 100 UNIT/ML
500 SYRINGE INTRAVENOUS
Status: CANCELLED | OUTPATIENT
Start: 2022-03-04

## 2022-03-04 RX ORDER — ACETAMINOPHEN 325 MG/1
650 TABLET ORAL
Status: CANCELLED | OUTPATIENT
Start: 2022-03-04

## 2022-03-04 RX ORDER — ZOLEDRONIC ACID 5 MG/100ML
5 INJECTION, SOLUTION INTRAVENOUS
Status: CANCELLED | OUTPATIENT
Start: 2022-03-04

## 2022-03-04 RX ORDER — ACETAMINOPHEN 325 MG/1
650 TABLET ORAL
Status: COMPLETED | OUTPATIENT
Start: 2022-03-04 | End: 2022-03-04

## 2022-03-04 RX ORDER — ZOLEDRONIC ACID 5 MG/100ML
5 INJECTION, SOLUTION INTRAVENOUS
Status: COMPLETED | OUTPATIENT
Start: 2022-03-04 | End: 2022-03-04

## 2022-03-04 RX ORDER — SODIUM CHLORIDE 0.9 % (FLUSH) 0.9 %
10 SYRINGE (ML) INJECTION
Status: DISCONTINUED | OUTPATIENT
Start: 2022-03-04 | End: 2022-03-04 | Stop reason: HOSPADM

## 2022-03-04 RX ADMIN — ZOLEDRONIC ACID 5 MG: 5 INJECTION, SOLUTION INTRAVENOUS at 11:03

## 2022-03-04 RX ADMIN — ACETAMINOPHEN 650 MG: 325 TABLET ORAL at 11:03

## 2022-03-04 NOTE — NURSING
Infusion Reclast  Recent Labs? Reviewed  Recent Invasive or planned dental procedures? None/Denies  Premeds? Tylenol 650 mg PO    Reclast 5 mg administered IV over 15 minutes.   See Vitals and MAR for further details.

## 2022-03-28 ENCOUNTER — TELEPHONE (OUTPATIENT)
Dept: RHEUMATOLOGY | Facility: CLINIC | Age: 72
End: 2022-03-28
Payer: MEDICARE

## 2022-03-28 NOTE — TELEPHONE ENCOUNTER
----- Message from Gordon Acosta sent at 3/28/2022 10:51 AM CDT -----  Pt would like return call regarding recent infusion; pt states she has been feeling different since getting last infusion.     Please call back at [phone].   Md Michelle

## 2022-03-28 NOTE — TELEPHONE ENCOUNTER
Generally SE from reclast resemble  Flu-like reaction a few days after the infusion and last a couple of days. Less likely related to the infusion especially if she tolerated the first one well.  Her symptoms sound like a possible infection or virus. I would recommend she talk to her PCP regarding this.    NEUROLOGY PROGRESS NOTE     Radha Lauren is a 67 year old female at [facility]    Subjective:   The patient complains of fatigue and weakness that improves with sleep, it is chronic.  She reports poor sleep at night.  The headache is much better, the vision is also better, reports finding difficulties improving.  Her gait is back to baseline. Complaints of getting Modafinil at HS and Baclofen in am      Objective:   VITALS:          Visit Vitals  BP (!) 171/81 (BP Location: LUE - Left upper extremity, Patient Position: Sitting)   Pulse 86   Temp 97.9 °F (36.6 °C) (Oral)   Resp 18   Ht 5' 3\" (1.6 m)   Wt 91 kg (200 lb 9.9 oz)   SpO2 99%   BMI 34.44 kg/m²       POCT Glucose readings (if applicable):      No results found for: GLUCOSEPOCT     Physical Exam:   On examination patient she is not in any acute distress  Neck: Supple    Lungs: CTA BL     Heart: RRR normal s1 s2 no s3,s4 or murmur    Abdomen: Soft , non tender, no organomegaly. Bowel sounds present    Extremities: No calf swelling, tenderness or edema present    Neurologic: Awake,alert oriented x 3, cranial nerves intact II to XII, Strength is symmetric, sensation is symmetric. Deep tendon reflexes 2 +,          Coordination intact to finger to nose and heel to shin      Data Reviewed:    Lab Results   Component Value Date    SODIUM 139 12/05/2020    SODIUM 138 12/04/2020    SODIUM 138 12/03/2020    SODIUM 140 12/02/2020    POTASSIUM 3.8 12/05/2020    POTASSIUM 3.4 12/04/2020    POTASSIUM 3.3 (L) 12/03/2020    POTASSIUM 3.5 12/02/2020    CHLORIDE 106 12/05/2020    CHLORIDE 106 12/04/2020    ANIONGAP 9 (L) 12/05/2020    ANIONGAP 7 (L) 12/04/2020    BUN 25 (H) 12/05/2020    BUN 28 (H) 12/04/2020    ALBUMIN 3.5 (L) 12/02/2020    AST 30 12/02/2020     Lab Results   Component Value Date    WBC 6.4 12/05/2020    WBC 6.1 12/04/2020    WBC 8.0 12/03/2020    WBC 10.4 12/02/2020    MCV 93.3 12/05/2020    MCV 93.6 12/04/2020     No results for input(s): INR, PT in  the last 72 hours.  No results found for: CPK, CKMB, TROPONINI  No results found for: TROPONINI  No results found for: BNP  Lab Results   Component Value Date    CHOLESTEROL 197 05/01/2020    CHOLESTEROL Desirable            <200 05/01/2020    CHOLESTEROL Borderline High      200 to 239 05/01/2020    CHOLESTEROL High                 >=240 05/01/2020         Assessment:   67 years old female with history of hypertension and multiple sclerosis, DVT on Coumadin and hyperlipidemia.  The patient came in after a fall and was found to have posterior reversible encephalopathy syndrome.  The patient's blood pressure has been elevated.  She did have multiple neurological symptoms that have been improving, despite persistent elevated BP.    Recommend to continue blood pressure control.    Continue with physical therapy  Change Baclofen to HS, ok to give it twice today (am and HS)  Change Modafinil to once a day in am  We will continue to follow    Current LOS:  4 days      See Orders for additional details.            Noni Mcnally MD    12/5/2020, 12:06 PM

## 2022-03-28 NOTE — TELEPHONE ENCOUNTER
Spoke with pt regarding info below, pt stated she has not been feeling herself since last reclast infusion on 3/4/22, last ov with you on 2/21/22, seeing you next 8/2022, pt c/o no appetite or taste, can only taste fruits, diarrhea, balance issues, no strength to do what she needs to do, stated s/s started a couple days after she got the reclast infusion, stated she did not feel like this with the last name, cannot pin point it but feels like something is wrong, stated she had a fall last Saturday in the bathroom, fell on her buttocks, also stated she got stock 3x with her last infusion bc she got rolling veins, advised pt to notify PCP as well, pt declined, stated she already know what he's going to say & will just want to run test, Verbalized understanding.      Please advise.

## 2022-03-29 ENCOUNTER — NURSE TRIAGE (OUTPATIENT)
Dept: ADMINISTRATIVE | Facility: CLINIC | Age: 72
End: 2022-03-29
Payer: MEDICARE

## 2022-03-29 ENCOUNTER — HOSPITAL ENCOUNTER (INPATIENT)
Facility: HOSPITAL | Age: 72
LOS: 13 days | Discharge: NURSING, REHAB OR LONG-TERM CARE | DRG: 003 | End: 2022-04-11
Attending: EMERGENCY MEDICINE | Admitting: FAMILY MEDICINE
Payer: MEDICARE

## 2022-03-29 DIAGNOSIS — J98.8 ACUTE AIRWAY OBSTRUCTION: ICD-10-CM

## 2022-03-29 DIAGNOSIS — N17.9 ACUTE KIDNEY INJURY SUPERIMPOSED ON CHRONIC KIDNEY DISEASE: ICD-10-CM

## 2022-03-29 DIAGNOSIS — E66.01 MORBID OBESITY: ICD-10-CM

## 2022-03-29 DIAGNOSIS — N18.31 STAGE 3A CHRONIC KIDNEY DISEASE: ICD-10-CM

## 2022-03-29 DIAGNOSIS — I47.29 NONSUSTAINED VENTRICULAR TACHYCARDIA: ICD-10-CM

## 2022-03-29 DIAGNOSIS — E87.4 ACIDOSIS, METABOLIC, WITH RESPIRATORY ACIDOSIS: ICD-10-CM

## 2022-03-29 DIAGNOSIS — N18.9 ACUTE KIDNEY INJURY SUPERIMPOSED ON CHRONIC KIDNEY DISEASE: ICD-10-CM

## 2022-03-29 DIAGNOSIS — G93.41 ACUTE METABOLIC ENCEPHALOPATHY: ICD-10-CM

## 2022-03-29 DIAGNOSIS — T78.3XXA ANGIOEDEMA: Primary | ICD-10-CM

## 2022-03-29 DIAGNOSIS — R07.9 CHEST PAIN, UNSPECIFIED TYPE: ICD-10-CM

## 2022-03-29 DIAGNOSIS — E11.8 DIABETES MELLITUS TYPE 2 WITH COMPLICATIONS: ICD-10-CM

## 2022-03-29 DIAGNOSIS — M05.79 RHEUMATOID ARTHRITIS INVOLVING MULTIPLE SITES WITH POSITIVE RHEUMATOID FACTOR: ICD-10-CM

## 2022-03-29 DIAGNOSIS — I70.0 AORTIC ATHEROSCLEROSIS: ICD-10-CM

## 2022-03-29 DIAGNOSIS — Z93.0 TRACHEOSTOMY IN PLACE: ICD-10-CM

## 2022-03-29 DIAGNOSIS — Z79.60 LONG-TERM USE OF IMMUNOSUPPRESSANT MEDICATION: ICD-10-CM

## 2022-03-29 DIAGNOSIS — E87.6 HYPOKALEMIA: ICD-10-CM

## 2022-03-29 DIAGNOSIS — D72.829 LEUKOCYTOSIS, UNSPECIFIED TYPE: ICD-10-CM

## 2022-03-29 DIAGNOSIS — R06.02 SOB (SHORTNESS OF BREATH): ICD-10-CM

## 2022-03-29 DIAGNOSIS — T78.3XXA ANGIOEDEMA, INITIAL ENCOUNTER: ICD-10-CM

## 2022-03-29 DIAGNOSIS — R07.9 CHEST PAIN: ICD-10-CM

## 2022-03-29 LAB
ABO + RH BLD: NORMAL
ALBUMIN SERPL BCP-MCNC: 2.8 G/DL (ref 3.5–5.2)
ALLENS TEST: ABNORMAL
ALP SERPL-CCNC: 101 U/L (ref 55–135)
ALT SERPL W/O P-5'-P-CCNC: 8 U/L (ref 10–44)
ANION GAP SERPL CALC-SCNC: 17 MMOL/L (ref 8–16)
ANION GAP SERPL CALC-SCNC: 19 MMOL/L (ref 8–16)
AST SERPL-CCNC: 21 U/L (ref 10–40)
BACTERIA #/AREA URNS HPF: ABNORMAL /HPF
BASOPHILS # BLD AUTO: 0.04 K/UL (ref 0–0.2)
BASOPHILS NFR BLD: 0.4 % (ref 0–1.9)
BILIRUB SERPL-MCNC: 1 MG/DL (ref 0.1–1)
BILIRUB UR QL STRIP: NEGATIVE
BLD GP AB SCN CELLS X3 SERPL QL: NORMAL
BUN SERPL-MCNC: 55 MG/DL (ref 8–23)
BUN SERPL-MCNC: 55 MG/DL (ref 8–23)
CALCIUM SERPL-MCNC: 6.9 MG/DL (ref 8.7–10.5)
CALCIUM SERPL-MCNC: 7.2 MG/DL (ref 8.7–10.5)
CHLORIDE SERPL-SCNC: 98 MMOL/L (ref 95–110)
CHLORIDE SERPL-SCNC: 99 MMOL/L (ref 95–110)
CLARITY UR: CLEAR
CO2 SERPL-SCNC: 21 MMOL/L (ref 23–29)
CO2 SERPL-SCNC: 22 MMOL/L (ref 23–29)
COLOR UR: YELLOW
CREAT SERPL-MCNC: 8.8 MG/DL (ref 0.5–1.4)
CREAT SERPL-MCNC: 8.9 MG/DL (ref 0.5–1.4)
CRP SERPL-MCNC: 54.6 MG/L (ref 0–8.2)
DELSYS: ABNORMAL
DIFFERENTIAL METHOD: ABNORMAL
EOSINOPHIL # BLD AUTO: 0.1 K/UL (ref 0–0.5)
EOSINOPHIL NFR BLD: 0.8 % (ref 0–8)
EOSINOPHIL URNS QL WRIGHT STN: NORMAL
ERYTHROCYTE [DISTWIDTH] IN BLOOD BY AUTOMATED COUNT: 15.9 % (ref 11.5–14.5)
ERYTHROCYTE [SEDIMENTATION RATE] IN BLOOD BY WESTERGREN METHOD: 22 MM/H
EST. GFR  (AFRICAN AMERICAN): 5 ML/MIN/1.73 M^2
EST. GFR  (AFRICAN AMERICAN): 5 ML/MIN/1.73 M^2
EST. GFR  (NON AFRICAN AMERICAN): 4 ML/MIN/1.73 M^2
EST. GFR  (NON AFRICAN AMERICAN): 4 ML/MIN/1.73 M^2
FIO2: 100
GLUCOSE SERPL-MCNC: 123 MG/DL (ref 70–110)
GLUCOSE SERPL-MCNC: 167 MG/DL (ref 70–110)
GLUCOSE UR QL STRIP: NEGATIVE
HCO3 UR-SCNC: 27.2 MMOL/L (ref 24–28)
HCT VFR BLD AUTO: 34.6 % (ref 37–48.5)
HGB BLD-MCNC: 10.9 G/DL (ref 12–16)
HGB UR QL STRIP: ABNORMAL
HYALINE CASTS #/AREA URNS LPF: 0 /LPF
IMM GRANULOCYTES # BLD AUTO: 0.05 K/UL (ref 0–0.04)
IMM GRANULOCYTES NFR BLD AUTO: 0.5 % (ref 0–0.5)
KETONES UR QL STRIP: NEGATIVE
LEUKOCYTE ESTERASE UR QL STRIP: NEGATIVE
LYMPHOCYTES # BLD AUTO: 1.5 K/UL (ref 1–4.8)
LYMPHOCYTES NFR BLD: 13.4 % (ref 18–48)
MAGNESIUM SERPL-MCNC: 2 MG/DL (ref 1.6–2.6)
MCH RBC QN AUTO: 24.7 PG (ref 27–31)
MCHC RBC AUTO-ENTMCNC: 31.5 G/DL (ref 32–36)
MCV RBC AUTO: 78 FL (ref 82–98)
MICROSCOPIC COMMENT: ABNORMAL
MODE: ABNORMAL
MONOCYTES # BLD AUTO: 0.9 K/UL (ref 0.3–1)
MONOCYTES NFR BLD: 8 % (ref 4–15)
NEUTROPHILS # BLD AUTO: 8.5 K/UL (ref 1.8–7.7)
NEUTROPHILS NFR BLD: 76.9 % (ref 38–73)
NITRITE UR QL STRIP: NEGATIVE
NON-SQ EPI CELLS #/AREA URNS HPF: 3 /HPF
NRBC BLD-RTO: 0 /100 WBC
PCO2 BLDA: 74.6 MMHG (ref 35–45)
PEEP: 5
PH SMN: 7.17 [PH] (ref 7.35–7.45)
PH UR STRIP: 6 [PH] (ref 5–8)
PHOSPHATE SERPL-MCNC: 5.1 MG/DL (ref 2.7–4.5)
PIP: 20
PLATELET # BLD AUTO: 349 K/UL (ref 150–450)
PMV BLD AUTO: 10.1 FL (ref 9.2–12.9)
PO2 BLDA: 264 MMHG (ref 80–100)
POC BE: -1 MMOL/L
POC SATURATED O2: 100 % (ref 95–100)
POCT GLUCOSE: 129 MG/DL (ref 70–110)
POCT GLUCOSE: 138 MG/DL (ref 70–110)
POCT GLUCOSE: 158 MG/DL (ref 70–110)
POTASSIUM SERPL-SCNC: 2.5 MMOL/L (ref 3.5–5.1)
POTASSIUM SERPL-SCNC: 3.1 MMOL/L (ref 3.5–5.1)
POTASSIUM SERPL-SCNC: 3.4 MMOL/L (ref 3.5–5.1)
PROT SERPL-MCNC: 7.8 G/DL (ref 6–8.4)
PROT UR QL STRIP: ABNORMAL
RBC # BLD AUTO: 4.42 M/UL (ref 4–5.4)
RBC #/AREA URNS HPF: 2 /HPF (ref 0–4)
SAMPLE: ABNORMAL
SARS-COV-2 RDRP RESP QL NAA+PROBE: NEGATIVE
SITE: ABNORMAL
SODIUM SERPL-SCNC: 138 MMOL/L (ref 136–145)
SODIUM SERPL-SCNC: 138 MMOL/L (ref 136–145)
SP GR UR STRIP: 1.01 (ref 1–1.03)
URN SPEC COLLECT METH UR: ABNORMAL
UROBILINOGEN UR STRIP-ACNC: NEGATIVE EU/DL
WBC # BLD AUTO: 11.09 K/UL (ref 3.9–12.7)
WBC #/AREA URNS HPF: 2 /HPF (ref 0–5)

## 2022-03-29 PROCEDURE — 31605: ICD-10-PCS | Mod: ,,, | Performed by: SURGERY

## 2022-03-29 PROCEDURE — 99223 PR INITIAL HOSPITAL CARE,LEVL III: ICD-10-PCS | Mod: 25,,, | Performed by: SURGERY

## 2022-03-29 PROCEDURE — 25000003 PHARM REV CODE 250: Performed by: NURSE PRACTITIONER

## 2022-03-29 PROCEDURE — 63600175 PHARM REV CODE 636 W HCPCS: Performed by: INTERNAL MEDICINE

## 2022-03-29 PROCEDURE — 99291 PR CRITICAL CARE, E/M 30-74 MINUTES: ICD-10-PCS | Mod: ,,, | Performed by: INTERNAL MEDICINE

## 2022-03-29 PROCEDURE — 63600175 PHARM REV CODE 636 W HCPCS

## 2022-03-29 PROCEDURE — 87205 SMEAR GRAM STAIN: CPT | Performed by: INTERNAL MEDICINE

## 2022-03-29 PROCEDURE — 80053 COMPREHEN METABOLIC PANEL: CPT | Performed by: EMERGENCY MEDICINE

## 2022-03-29 PROCEDURE — 27000221 HC OXYGEN, UP TO 24 HOURS

## 2022-03-29 PROCEDURE — 31605 EMER TRACHEOSTOMY CTHYR MEM: CPT | Mod: ,,, | Performed by: SURGERY

## 2022-03-29 PROCEDURE — 20000000 HC ICU ROOM

## 2022-03-29 PROCEDURE — 99223 1ST HOSP IP/OBS HIGH 75: CPT | Mod: 25,,, | Performed by: SURGERY

## 2022-03-29 PROCEDURE — 94761 N-INVAS EAR/PLS OXIMETRY MLT: CPT

## 2022-03-29 PROCEDURE — A4216 STERILE WATER/SALINE, 10 ML: HCPCS | Performed by: NURSE PRACTITIONER

## 2022-03-29 PROCEDURE — 63600175 PHARM REV CODE 636 W HCPCS: Performed by: EMERGENCY MEDICINE

## 2022-03-29 PROCEDURE — 86850 RBC ANTIBODY SCREEN: CPT | Performed by: EMERGENCY MEDICINE

## 2022-03-29 PROCEDURE — 99900035 HC TECH TIME PER 15 MIN (STAT)

## 2022-03-29 PROCEDURE — 85025 COMPLETE CBC W/AUTO DIFF WBC: CPT | Performed by: EMERGENCY MEDICINE

## 2022-03-29 PROCEDURE — U0002 COVID-19 LAB TEST NON-CDC: HCPCS | Performed by: EMERGENCY MEDICINE

## 2022-03-29 PROCEDURE — 99223 1ST HOSP IP/OBS HIGH 75: CPT | Mod: ,,, | Performed by: INTERNAL MEDICINE

## 2022-03-29 PROCEDURE — 99900026 HC AIRWAY MAINTENANCE (STAT)

## 2022-03-29 PROCEDURE — 93010 ELECTROCARDIOGRAM REPORT: CPT | Mod: ,,, | Performed by: INTERNAL MEDICINE

## 2022-03-29 PROCEDURE — 25000003 PHARM REV CODE 250: Performed by: EMERGENCY MEDICINE

## 2022-03-29 PROCEDURE — 36600 WITHDRAWAL OF ARTERIAL BLOOD: CPT

## 2022-03-29 PROCEDURE — 99223 PR INITIAL HOSPITAL CARE,LEVL III: ICD-10-PCS | Mod: ,,, | Performed by: INTERNAL MEDICINE

## 2022-03-29 PROCEDURE — 25000003 PHARM REV CODE 250: Performed by: INTERNAL MEDICINE

## 2022-03-29 PROCEDURE — 82962 GLUCOSE BLOOD TEST: CPT

## 2022-03-29 PROCEDURE — 84100 ASSAY OF PHOSPHORUS: CPT | Performed by: EMERGENCY MEDICINE

## 2022-03-29 PROCEDURE — 31500 INSERT EMERGENCY AIRWAY: CPT

## 2022-03-29 PROCEDURE — 63600175 PHARM REV CODE 636 W HCPCS: Performed by: NURSE PRACTITIONER

## 2022-03-29 PROCEDURE — 81000 URINALYSIS NONAUTO W/SCOPE: CPT | Performed by: EMERGENCY MEDICINE

## 2022-03-29 PROCEDURE — 93005 ELECTROCARDIOGRAM TRACING: CPT

## 2022-03-29 PROCEDURE — 25000003 PHARM REV CODE 250

## 2022-03-29 PROCEDURE — 80048 BASIC METABOLIC PNL TOTAL CA: CPT | Performed by: INTERNAL MEDICINE

## 2022-03-29 PROCEDURE — P9017 PLASMA 1 DONOR FRZ W/IN 8 HR: HCPCS | Performed by: EMERGENCY MEDICINE

## 2022-03-29 PROCEDURE — 82803 BLOOD GASES ANY COMBINATION: CPT

## 2022-03-29 PROCEDURE — 99291 CRITICAL CARE FIRST HOUR: CPT | Mod: 25

## 2022-03-29 PROCEDURE — 36415 COLL VENOUS BLD VENIPUNCTURE: CPT | Performed by: EMERGENCY MEDICINE

## 2022-03-29 PROCEDURE — 99291 CRITICAL CARE FIRST HOUR: CPT | Mod: ,,, | Performed by: INTERNAL MEDICINE

## 2022-03-29 PROCEDURE — 51702 INSERT TEMP BLADDER CATH: CPT

## 2022-03-29 PROCEDURE — 84132 ASSAY OF SERUM POTASSIUM: CPT | Performed by: NURSE PRACTITIONER

## 2022-03-29 PROCEDURE — 86140 C-REACTIVE PROTEIN: CPT | Performed by: EMERGENCY MEDICINE

## 2022-03-29 PROCEDURE — 93010 EKG 12-LEAD: ICD-10-PCS | Mod: ,,, | Performed by: INTERNAL MEDICINE

## 2022-03-29 PROCEDURE — 94003 VENT MGMT INPAT SUBQ DAY: CPT

## 2022-03-29 PROCEDURE — 83735 ASSAY OF MAGNESIUM: CPT | Performed by: EMERGENCY MEDICINE

## 2022-03-29 PROCEDURE — 36430 TRANSFUSION BLD/BLD COMPNT: CPT

## 2022-03-29 RX ORDER — INSULIN ASPART 100 [IU]/ML
1-10 INJECTION, SOLUTION INTRAVENOUS; SUBCUTANEOUS EVERY 6 HOURS PRN
Status: DISCONTINUED | OUTPATIENT
Start: 2022-03-29 | End: 2022-04-12 | Stop reason: HOSPADM

## 2022-03-29 RX ORDER — PROPOFOL 10 MG/ML
INJECTION, EMULSION INTRAVENOUS
Status: DISPENSED
Start: 2022-03-29 | End: 2022-03-29

## 2022-03-29 RX ORDER — PROPOFOL 10 MG/ML
100 VIAL (ML) INTRAVENOUS ONCE
Status: COMPLETED | OUTPATIENT
Start: 2022-03-29 | End: 2022-03-29

## 2022-03-29 RX ORDER — FAMOTIDINE 10 MG/ML
20 INJECTION INTRAVENOUS 2 TIMES DAILY
Status: DISCONTINUED | OUTPATIENT
Start: 2022-03-29 | End: 2022-03-29 | Stop reason: DRUGHIGH

## 2022-03-29 RX ORDER — NALOXONE HCL 0.4 MG/ML
0.02 VIAL (ML) INJECTION
Status: DISCONTINUED | OUTPATIENT
Start: 2022-03-29 | End: 2022-04-12 | Stop reason: HOSPADM

## 2022-03-29 RX ORDER — CHLORHEXIDINE GLUCONATE ORAL RINSE 1.2 MG/ML
15 SOLUTION DENTAL 2 TIMES DAILY
Status: DISCONTINUED | OUTPATIENT
Start: 2022-03-29 | End: 2022-03-31

## 2022-03-29 RX ORDER — POTASSIUM CHLORIDE 20 MEQ/1
40 TABLET, EXTENDED RELEASE ORAL
Status: DISCONTINUED | OUTPATIENT
Start: 2022-03-29 | End: 2022-03-29

## 2022-03-29 RX ORDER — HYDROCODONE BITARTRATE AND ACETAMINOPHEN 500; 5 MG/1; MG/1
TABLET ORAL
Status: DISCONTINUED | OUTPATIENT
Start: 2022-03-29 | End: 2022-04-12 | Stop reason: HOSPADM

## 2022-03-29 RX ORDER — GLUCAGON 1 MG
1 KIT INJECTION
Status: DISCONTINUED | OUTPATIENT
Start: 2022-03-29 | End: 2022-04-12 | Stop reason: HOSPADM

## 2022-03-29 RX ORDER — ROCURONIUM BROMIDE 10 MG/ML
50 INJECTION, SOLUTION INTRAVENOUS ONCE
Status: COMPLETED | OUTPATIENT
Start: 2022-03-29 | End: 2022-03-29

## 2022-03-29 RX ORDER — POTASSIUM CHLORIDE 7.45 MG/ML
10 INJECTION INTRAVENOUS
Status: COMPLETED | OUTPATIENT
Start: 2022-03-29 | End: 2022-03-29

## 2022-03-29 RX ORDER — SODIUM CHLORIDE 9 MG/ML
INJECTION, SOLUTION INTRAVENOUS CONTINUOUS
Status: DISCONTINUED | OUTPATIENT
Start: 2022-03-29 | End: 2022-03-30

## 2022-03-29 RX ORDER — ETOMIDATE 2 MG/ML
20 INJECTION INTRAVENOUS
Status: COMPLETED | OUTPATIENT
Start: 2022-03-29 | End: 2022-03-29

## 2022-03-29 RX ORDER — PROPOFOL 10 MG/ML
INJECTION, EMULSION INTRAVENOUS
Status: COMPLETED
Start: 2022-03-29 | End: 2022-03-29

## 2022-03-29 RX ORDER — PROPOFOL 10 MG/ML
0-50 INJECTION, EMULSION INTRAVENOUS CONTINUOUS
Status: DISCONTINUED | OUTPATIENT
Start: 2022-03-29 | End: 2022-03-29

## 2022-03-29 RX ORDER — LORAZEPAM 2 MG/ML
4 INJECTION INTRAMUSCULAR EVERY 4 HOURS PRN
Status: DISCONTINUED | OUTPATIENT
Start: 2022-03-29 | End: 2022-04-04

## 2022-03-29 RX ORDER — MUPIROCIN 20 MG/G
OINTMENT TOPICAL 2 TIMES DAILY
Status: COMPLETED | OUTPATIENT
Start: 2022-03-29 | End: 2022-04-02

## 2022-03-29 RX ORDER — PROPOFOL 10 MG/ML
0-50 INJECTION, EMULSION INTRAVENOUS CONTINUOUS
Status: DISCONTINUED | OUTPATIENT
Start: 2022-03-29 | End: 2022-03-30

## 2022-03-29 RX ORDER — SUCCINYLCHOLINE CHLORIDE 20 MG/ML
100 INJECTION INTRAMUSCULAR; INTRAVENOUS
Status: COMPLETED | OUTPATIENT
Start: 2022-03-29 | End: 2022-03-29

## 2022-03-29 RX ORDER — FENTANYL CITRATE-0.9 % NACL/PF 10 MCG/ML
0-200 PLASTIC BAG, INJECTION (ML) INTRAVENOUS CONTINUOUS
Status: DISCONTINUED | OUTPATIENT
Start: 2022-03-29 | End: 2022-03-30

## 2022-03-29 RX ORDER — FAMOTIDINE 10 MG/ML
20 INJECTION INTRAVENOUS DAILY
Status: DISCONTINUED | OUTPATIENT
Start: 2022-03-29 | End: 2022-04-12 | Stop reason: HOSPADM

## 2022-03-29 RX ORDER — ROCURONIUM BROMIDE 10 MG/ML
INJECTION, SOLUTION INTRAVENOUS
Status: COMPLETED
Start: 2022-03-29 | End: 2022-03-29

## 2022-03-29 RX ORDER — PROPOFOL 10 MG/ML
20 INJECTION, EMULSION INTRAVENOUS
Status: DISCONTINUED | OUTPATIENT
Start: 2022-03-29 | End: 2022-03-29 | Stop reason: SDUPTHER

## 2022-03-29 RX ORDER — ETOMIDATE 2 MG/ML
INJECTION INTRAVENOUS
Status: DISPENSED
Start: 2022-03-29 | End: 2022-03-29

## 2022-03-29 RX ORDER — SUCCINYLCHOLINE CHLORIDE 20 MG/ML
INJECTION INTRAMUSCULAR; INTRAVENOUS
Status: COMPLETED
Start: 2022-03-29 | End: 2022-03-29

## 2022-03-29 RX ORDER — POTASSIUM CHLORIDE 7.45 MG/ML
10 INJECTION INTRAVENOUS
Status: DISPENSED | OUTPATIENT
Start: 2022-03-29 | End: 2022-03-29

## 2022-03-29 RX ORDER — CALCIUM GLUCONATE 98 MG/ML
1 INJECTION, SOLUTION INTRAVENOUS ONCE
Status: DISCONTINUED | OUTPATIENT
Start: 2022-03-29 | End: 2022-03-29

## 2022-03-29 RX ORDER — HEPARIN SODIUM 5000 [USP'U]/ML
7500 INJECTION, SOLUTION INTRAVENOUS; SUBCUTANEOUS EVERY 8 HOURS
Status: DISCONTINUED | OUTPATIENT
Start: 2022-03-29 | End: 2022-04-12 | Stop reason: HOSPADM

## 2022-03-29 RX ORDER — SODIUM CHLORIDE 0.9 % (FLUSH) 0.9 %
10 SYRINGE (ML) INJECTION EVERY 8 HOURS
Status: DISCONTINUED | OUTPATIENT
Start: 2022-03-29 | End: 2022-04-04

## 2022-03-29 RX ORDER — PROPOFOL 10 MG/ML
0-50 INJECTION, EMULSION INTRAVENOUS
Status: COMPLETED | OUTPATIENT
Start: 2022-03-29 | End: 2022-03-29

## 2022-03-29 RX ADMIN — PROPOFOL 50 MCG/KG/MIN: 10 INJECTION, EMULSION INTRAVENOUS at 07:03

## 2022-03-29 RX ADMIN — METHYLPREDNISOLONE SODIUM SUCCINATE 60 MG: 40 INJECTION, POWDER, FOR SOLUTION INTRAMUSCULAR; INTRAVENOUS at 11:03

## 2022-03-29 RX ADMIN — Medication 100 MG: at 07:03

## 2022-03-29 RX ADMIN — PROPOFOL 100 MG: 10 INJECTION, EMULSION INTRAVENOUS at 07:03

## 2022-03-29 RX ADMIN — FAMOTIDINE 20 MG: 10 INJECTION, SOLUTION INTRAVENOUS at 11:03

## 2022-03-29 RX ADMIN — SODIUM CHLORIDE: 0.9 INJECTION, SOLUTION INTRAVENOUS at 11:03

## 2022-03-29 RX ADMIN — ROCURONIUM BROMIDE 50 MG: 10 INJECTION, SOLUTION INTRAVENOUS at 07:03

## 2022-03-29 RX ADMIN — HEPARIN SODIUM 7500 UNITS: 5000 INJECTION INTRAVENOUS; SUBCUTANEOUS at 02:03

## 2022-03-29 RX ADMIN — CALCIUM GLUCONATE 1 G: 98 INJECTION, SOLUTION INTRAVENOUS at 02:03

## 2022-03-29 RX ADMIN — POTASSIUM CHLORIDE 10 MEQ: 7.46 INJECTION, SOLUTION INTRAVENOUS at 11:03

## 2022-03-29 RX ADMIN — POTASSIUM CHLORIDE 10 MEQ: 7.46 INJECTION, SOLUTION INTRAVENOUS at 03:03

## 2022-03-29 RX ADMIN — Medication 10 ML: at 02:03

## 2022-03-29 RX ADMIN — POTASSIUM CHLORIDE 10 MEQ: 7.46 INJECTION, SOLUTION INTRAVENOUS at 01:03

## 2022-03-29 RX ADMIN — CHLORHEXIDINE GLUCONATE 0.12% ORAL RINSE 15 ML: 1.2 LIQUID ORAL at 11:03

## 2022-03-29 RX ADMIN — Medication 10 ML: at 09:03

## 2022-03-29 RX ADMIN — SODIUM CHLORIDE: 0.9 INJECTION, SOLUTION INTRAVENOUS at 07:03

## 2022-03-29 RX ADMIN — METHYLPREDNISOLONE SODIUM SUCCINATE 60 MG: 40 INJECTION, POWDER, FOR SOLUTION INTRAMUSCULAR; INTRAVENOUS at 05:03

## 2022-03-29 RX ADMIN — HEPARIN SODIUM 7500 UNITS: 5000 INJECTION INTRAVENOUS; SUBCUTANEOUS at 09:03

## 2022-03-29 RX ADMIN — CHLORHEXIDINE GLUCONATE 0.12% ORAL RINSE 15 ML: 1.2 LIQUID ORAL at 09:03

## 2022-03-29 RX ADMIN — LORAZEPAM 4 MG: 2 INJECTION INTRAMUSCULAR; INTRAVENOUS at 12:03

## 2022-03-29 RX ADMIN — PROPOFOL 50 MCG/KG/MIN: 10 INJECTION, EMULSION INTRAVENOUS at 01:03

## 2022-03-29 RX ADMIN — PROPOFOL 50 MCG/KG/MIN: 10 INJECTION, EMULSION INTRAVENOUS at 10:03

## 2022-03-29 RX ADMIN — SUCCINYLCHOLINE CHLORIDE 100 MG: 20 INJECTION INTRAMUSCULAR; INTRAVENOUS at 07:03

## 2022-03-29 RX ADMIN — POTASSIUM CHLORIDE 10 MEQ: 7.46 INJECTION, SOLUTION INTRAVENOUS at 09:03

## 2022-03-29 RX ADMIN — POTASSIUM CHLORIDE 10 MEQ: 7.46 INJECTION, SOLUTION INTRAVENOUS at 02:03

## 2022-03-29 RX ADMIN — SUCCINYLCHOLINE CHLORIDE 100 MG: 20 INJECTION, SOLUTION INTRAMUSCULAR; INTRAVENOUS; PARENTERAL at 07:03

## 2022-03-29 RX ADMIN — MUPIROCIN: 20 OINTMENT TOPICAL at 11:03

## 2022-03-29 RX ADMIN — FENTANYL CITRATE 25 MCG/HR: 50 INJECTION INTRAVENOUS at 02:03

## 2022-03-29 RX ADMIN — ETOMIDATE 20 MG: 2 INJECTION INTRAVENOUS at 07:03

## 2022-03-29 RX ADMIN — MUPIROCIN: 20 OINTMENT TOPICAL at 09:03

## 2022-03-29 NOTE — ASSESSMENT & PLAN NOTE
--likely 2/2 lisinopril  --admit to ICU  --had emergent trach in ED, on ventalitor  --IV steroids, IV antihistamiens  --Fresh Frozen Plasma ordered in ED  --monitor closely

## 2022-03-29 NOTE — CONSULTS
[facility] - Emergency Dept.  Critical Care Medicine  Consult Note    Patient Name: Page Grissom  MRN: [id number]  Admission Date: 3/29/2022  Hospital Length of Stay: 2 days  Code Status: Full Code  Attending Physician: Bc Crocker MD   Primary Care Provider: NEELAM Roman Jr, MD   Principal Problem: Angioedema    [unfilled]  Subjective:     HPI:  71 y.o. female patient PMHx of asthma, CKD, DM2, HTN, and immune deficiency disorder  brought to the emergency room for acute onset shortness of breath severe tongue swelling and respiratory distress.        Hospital/ICU Course:  Patient was hard to intubate , general surgery consulted, and emergency cricothyrotomy was performed cricothyroidotomy tube through the trachea  FiO2 50% O2 sat 91%.  Sedated with propofol.  Chest x-ray ABG reviewed.        Past Medical History:   Diagnosis Date    Asthma     Back pain     Cataract     OD    CKD (chronic kidney disease) stage 3, GFR 30-59 ml/min 12/20/2020    Diabetes mellitus     Fibromyalgia     Gastroesophageal reflux disease     Hypercholesterolemia     Hypertension     Immune deficiency disorder     Obesity     Osteoporosis     Rheumatoid arthritis     Tobacco dependence     Trouble in sleeping     Type 2 diabetes mellitus        Past Surgical History:   Procedure Laterality Date    COLONOSCOPY N/A 2/14/2019    Procedure: COLONOSCOPY;  Surgeon: Yury Atwood MD;  Location: [facility];  Service: Endoscopy;  Laterality: N/A;    HERNIA REPAIR      OOPHORECTOMY         Review of patient's allergies indicates:  No Known Allergies    Family History       Problem Relation (Age of Onset)    Breast cancer Sister    Cancer Father    Colon cancer Father    Hypertension Mother          Tobacco Use    Smoking status: Current Every Day Smoker     Packs/day: 0.50     Years: 25.00     Pack years: 12.50     Types: Cigarettes    Smokeless tobacco: Never Used    Tobacco comment: trying to quit   Substance and Sexual  Activity    Alcohol use: No    Drug use: No    Sexual activity: Not on file         Review of Systems   Unable to perform ROS: Intubated   Objective:     Vital Signs (Most Recent):  Temp: 96 °F (35.6 °C) (03/29/22 1030)  Pulse: 82 (03/29/22 1030)  Resp: (!) 30 (03/29/22 1030)  BP: (!) 138/59 (03/29/22 1030)  SpO2: (!) 91 % (03/29/22 1030)   Vital Signs (24h Range):  Temp:  [96 °F (35.6 °C)-98.6 °F (37 °C)] 96 °F (35.6 °C)  Pulse:  [] 82  Resp:  [22-58] 30  SpO2:  [91 %-98 %] 91 %  BP: (113-196)/(58-84) 138/59     Weight: 111.6 kg (246 lb 0.5 oz)  Body mass index is 42.23 kg/m².      Intake/Output Summary (Last 24 hours) at 3/29/2022 1105  Last data filed at 3/29/2022 1016  Gross per 24 hour   Intake 99.05 ml   Output 45 ml   Net 54.05 ml       Physical Exam  Vitals and nursing note reviewed.   Constitutional:       General: She is not in acute distress.     Appearance: She is well-developed.   HENT:      Head: Normocephalic and atraumatic.      Mouth/Throat:      Comments: Large swollen tongue and lips  Neck:      Comments:  cricothyroidotomy tube   Cardiovascular:      Rate and Rhythm: Normal rate.   Pulmonary:      Effort: Respiratory distress present.      Breath sounds: Rhonchi present.   Abdominal:      Palpations: Abdomen is soft.      Tenderness: There is no abdominal tenderness.   Genitourinary:     Comments: Ortega in place   Musculoskeletal:         General: No deformity.      Cervical back: Neck supple.   Skin:     General: Skin is warm.   Neurological:      General: No focal deficit present.      Mental Status: She is alert.       Vents:  Vent Mode: A/C (03/29/22 0839)  Set Rate: 30 BPM (03/29/22 0839)  Vt Set: 0 mL (03/29/22 0839)  Pressure Support: 0 cmH20 (03/29/22 0839)  PEEP/CPAP: 5 cmH20 (03/29/22 0839)  Oxygen Concentration (%): 50 (03/29/22 1030)  Peak Airway Pressure: 25 cmH2O (03/29/22 0839)  Plateau Pressure: 19 cmH20 (03/29/22 0839)  Total Ve: 6.54 mL (03/29/22 0839)  F/VT Ratio<105  (RSBI): 135.75 (03/29/22 0839)    Lines/Drains/Airways       Drain  Duration                  NG/OG Tube 03/29/22 0725 [place] sump;orogastric 18 Fr. Right mouth <1 day         Urethral Catheter 03/29/22 0900 18 Fr. <1 day              Airway  Duration                  Airway - Non-Surgical Other (Comment) -- days         Surgical Airway 03/29/22 0740 Other (Comment) Cuffed <1 day              Peripheral Intravenous Line  Duration                  Peripheral IV - Single Lumen 03/29/22 0706 20 G Left Antecubital <1 day         Peripheral IV - Single Lumen 03/29/22 0725 18 G Anterior;Proximal;Right Forearm <1 day         Peripheral IV - Single Lumen 03/29/22 0825 18 G Right  <1 day                    Significant Labs:    CBC/Anemia Profile:  Recent Labs   Lab 03/29/22  0803   WBC 11.09   HGB 10.9*   HCT 34.6*      MCV 78*   RDW 15.9*        Chemistries:  Recent Labs   Lab 03/29/22  0803      K 2.5*   CL 98   CO2 21*   BUN 55*   CREATININE 8.8*   CALCIUM 7.2*   ALBUMIN 2.8*   PROT 7.8   BILITOT 1.0   ALKPHOS 101   ALT 8*   AST 21   MG 2.0      Latest Reference Range & Units 03/29/22 08:07   POC PH 7.35 - 7.45  7.170 (LL)   POC PCO2 35 - 45 mmHg 74.6 (HH)   POC PO2 80 - 100 mmHg 264 (H)   POC BE -2 to 2 mmol/L -1   POC HCO3 24 - 28 mmol/L 27.2   POC SATURATED O2 95 - 100 % 100   FiO2  100   PiP  20   PEEP  5   Sample  ARTERIAL   DelSys  Adult Vent   Allens Test  Pass   Site  RR   Mode  AC/PRVC   Rate  22       EKG 03/29/2020  Normal sinus rhythm   Possible Left atrial enlargement   Prolonged QT   Abnormal ECG   When compared with ECG of 13-DEC-2006 09:37,   QRS duration has increased   QT has lengthened     Significant Imaging:     CXR 3/29/2022    CLINICAL HISTORY:  angioedema;     COMPARISON:  Studies dating back to July 16, 2015     FINDINGS:  A tracheostomy cannula overlies the trachea.  The tip appears to be along the right side of the tracheal air shadow.  A nasogastric tube is in place, folded back on  itself in the stomach.  Low lung volumes with vascular congestion versus reticular interstitial changes throughout the lungs.  Possible left effusion.  The cardiac silhouette size is enlarged.  The trachea is midline and the mediastinal width is normal. Negative for right effusion or pneumothorax.  Negative for osseous abnormalities. Tortuous aorta with calcifications of the aortic knob.  There are degenerative changes of the spine.     Impression:     1.  Tracheostomy cannula projects over the trachea, with the tip along the right side of the trachea.  Clinical correlation is advised to ensure that the endotracheal tube is within the airway.     2.  Nasogastric tube in apparent good position.     3.  Low lung volumes with vascular congestion versus reticular interstitial changes throughout the lungs.  Interstitial pulmonary edema versus interstitial infectious process must be considered.                ABG  Recent Labs   Lab 03/31/22  0446   PH 7.297*   PO2 65*   PCO2 44.6   HCO3 21.8*   BE -5     Assessment/Plan:     ENT  Tracheostomy in place  Emergent cricothyrotomy severe angioedema cricothyroidotomy tube through the   in place, severe hypercapnia and respiratory acidosis repeat ABG nebs IV steroids antihistamine    Renal/  KYE (acute kidney injury)  Strict I&Os.  IV fluid.  Monitor BMP.  Avoid nephrotoxic drugs.  On lisinopril since June 2021    Orthopedic  Rheumatoid arthritis involving multiple sites with positive rheumatoid factor  On hydroxychloroquine as outpatient.  No recent change in treatment noted    Other  Angioedema  Severe was hard to intubate for respiratory distress status post emergent cricothyroidotomy tube  Sedated with propofol.  On IV steroids and antihistamines.  Check tryptase level IgE.  No recent medicine introduction.  On lisinopril ordered in June 2021      Critical Care Daily Checklist:    A: Awake: RASS Goal/Actual Goal: RASS Goal: -2-->light sedation  Actual: [place] Agitation  Sedation Scale (RASS): Unarousable   B: Spontaneous Breathing Trial Performed?     C: SAT & SBT Coordinated?                  cricothyroidotomy tube through the trachea    D: Delirium: CAM-ICU Overall CAM-ICU: Positive   E: Early Mobility Performed? Yes   F: Feeding Goal:    Status:     Current Diet Order   Procedures    Diet NPO      AS: Analgesia/Sedation Propofol   T: Thromboembolic Prophylaxis Heparin sc   H: HOB > 300 Yes   U: Stress Ulcer Prophylaxis (if needed) Famotidine   G: Glucose Control Fingerstick q.6 hours SSI   B: Bowel Function     I: Indwelling Catheter (Lines & Ortega) Necessity Keep Ortega KYE   D: De-escalation of Antimicrobials/Pharmacotherapies No antibiotic    Plan for the day/ETD Y    Code Status:  Family/Goals of Care: Full Code     Discussed with surgeon Dr. Antunez   Critical Care Time: 35 minutes  Critical secondary to Patient has a condition that poses threat to life and bodily function: Severe Respiratory Distress and Acute Renal Failure     Critical care was time spent personally by me on the following activities: development of treatment plan with patient or surrogate and bedside caregivers, discussions with consultants, evaluation of patient's response to treatment, examination of patient, ordering and performing treatments and interventions, ordering and review of laboratory studies, ordering and review of radiographic studies, pulse oximetry, re-evaluation of patient's condition. This critical care time did not overlap with that of any other provider or involve time for any procedures.    Thank you for your consult. I will follow-up with patient. Please contact us if you have any additional questions.     Laney Romero MD  Critical Care Medicine  [facility] - Emergency Dept.

## 2022-03-29 NOTE — SUBJECTIVE & OBJECTIVE
No current facility-administered medications on file prior to encounter.     Current Outpatient Medications on File Prior to Encounter   Medication Sig    albuterol (PROVENTIL/VENTOLIN HFA) 90 mcg/actuation inhaler INHALE 2 PUFFS INTO THE LUNGS EVERY 6 HOURS AS NEEDED FOR WHEEZING. DISPENSE WITH SPACER.    amLODIPine (NORVASC) 10 MG tablet TAKE 1 TABLET(10 MG) BY MOUTH EVERY DAY    ammonium lactate 12 % Crea Apply 1 Act topically 2 (two) times daily.    cholecalciferol, vitamin D3, 2,000 unit Tab Take 2,000 Units by mouth once daily.    diphenhydrAMINE (SOMINEX) 25 mg tablet Take 25 mg by mouth nightly as needed for Allergies.    fish oil-omega-3 fatty acids 300-1,000 mg capsule Take 2 g by mouth once daily.    hydrALAZINE (APRESOLINE) 25 MG tablet TAKE 1 TABLET BY MOUTH EVERY 12 HOURS    hydroCHLOROthiazide (HYDRODIURIL) 25 MG tablet TAKE 1 TABLET(25 MG) BY MOUTH EVERY DAY    hydrOXYchloroQUINE (PLAQUENIL) 200 mg tablet Take 1 tablet (200 mg total) by mouth once daily.    lisinopriL (PRINIVIL,ZESTRIL) 40 MG tablet TAKE 1 TABLET BY MOUTH EVERY DAY    metFORMIN (GLUCOPHAGE) 500 MG tablet TAKE 1 TABLET(500 MG) BY MOUTH EVERY DAY    multivitamin (THERAGRAN) per tablet Take 1 tablet by mouth once daily.    rosuvastatin (CRESTOR) 40 MG Tab Take 1 tablet (40 mg total) by mouth every evening.    [DISCONTINUED] methylPREDNISolone (MEDROL DOSEPACK) 4 mg tablet use as directed    [DISCONTINUED] phenylephrine/DM/acetaminop/GG (MUCINEX SINUS-MAX SEV TERRIE,DM, ORAL) Take by mouth as needed.       Review of patient's allergies indicates:  No Known Allergies    Past Medical History:   Diagnosis Date    Asthma     Back pain     Cataract     OD    CKD (chronic kidney disease) stage 3, GFR 30-59 ml/min 12/20/2020    Diabetes mellitus     Fibromyalgia     Gastroesophageal reflux disease     Hypercholesterolemia     Hypertension     Immune deficiency disorder     Obesity     Osteoporosis     Rheumatoid arthritis     Tobacco dependence      Trouble in sleeping     Type 2 diabetes mellitus      Past Surgical History:   Procedure Laterality Date    COLONOSCOPY N/A 2/14/2019    Procedure: COLONOSCOPY;  Surgeon: Yury Atwood MD;  Location: [facility];  Service: Endoscopy;  Laterality: N/A;    HERNIA REPAIR      OOPHORECTOMY       Family History       Problem Relation (Age of Onset)    Breast cancer Sister    Cancer Father    Colon cancer Father    Hypertension Mother          Tobacco Use    Smoking status: Current Every Day Smoker     Packs/day: 0.50     Years: 25.00     Pack years: 12.50     Types: Cigarettes    Smokeless tobacco: Never Used    Tobacco comment: trying to quit   Substance and Sexual Activity    Alcohol use: No    Drug use: No    Sexual activity: Not on file     Review of Systems   Unable to perform ROS: Acuity of condition   Objective:     Vital Signs (Most Recent):  Temp: 96 °F (35.6 °C) (03/29/22 1030)  Pulse: 82 (03/29/22 1030)  Resp: (!) 30 (03/29/22 1030)  BP: (!) 138/59 (03/29/22 1030)  SpO2: (!) 91 % (03/29/22 1030)   Vital Signs (24h Range):  Temp:  [96 °F (35.6 °C)-98.6 °F (37 °C)] 96 °F (35.6 °C)  Pulse:  [] 82  Resp:  [22-58] 30  SpO2:  [91 %-98 %] 91 %  BP: (113-196)/(58-84) 138/59     Weight: 111.6 kg (246 lb 0.5 oz)  Body mass index is 42.23 kg/m².    Physical Exam  Constitutional:       General: She is in acute distress.      Appearance: She is ill-appearing.      Comments: Unconscious   Neck:      Comments: Obese  Abdominal:      Comments: Obese   2    Significant Labs:  I have reviewed all pertinent lab results within the past 24 hours.  CBC:   Recent Labs   Lab 03/29/22  0803   WBC 11.09   RBC 4.42   HGB 10.9*   HCT 34.6*      MCV 78*   MCH 24.7*   MCHC 31.5*     BMP:   Recent Labs   Lab 03/29/22  0803   *      K 2.5*   CL 98   CO2 21*   BUN 55*   CREATININE 8.8*   CALCIUM 7.2*   MG 2.0       Significant Diagnostics:  I have reviewed all pertinent imaging results/findings within the past 24  hours.  CXR: I have reviewed all pertinent results/findings within the past 24 hours and my personal findings are:  Chest x-ray report       FINDINGS:  A tracheostomy cannula overlies the trachea.  The tip appears to be along the right side of the tracheal air shadow.  A nasogastric tube is in place, folded back on itself in the stomach.  Low lung volumes with vascular congestion versus reticular interstitial changes throughout the lungs.  Possible left effusion.  The cardiac silhouette size is enlarged.  The trachea is midline and the mediastinal width is normal. Negative for right effusion or pneumothorax.  Negative for osseous abnormalities. Tortuous aorta with calcifications of the aortic knob.  There are degenerative changes of the spine.     Impression:     1.  Tracheostomy cannula projects over the trachea, with the tip along the right side of the trachea.  Clinical correlation is advised to ensure that the endotracheal tube is within the airway.     2.  Nasogastric tube in apparent good position.     3.  Low lung volumes with vascular congestion versus reticular interstitial changes throughout the lungs.  Interstitial pulmonary edema versus interstitial infectious process must be considered.     4.  Stable findings as noted above.        Electronically signed by: Memo Cummings MD  Date:                                            03/29/2022  Time:                                           07:53

## 2022-03-29 NOTE — PLAN OF CARE
[facility] - Emergency Dept.  Initial Discharge Assessment       Primary Care Provider: NEELAM Roman Jr, MD    Admission Diagnosis: Angioedema [T78.3XXA]    Admission Date: 3/29/2022  Expected Discharge Date:     Discharge Barriers Identified: None    Payor: MEDICARE / Plan: MEDICARE PART A & B / Product Type: Government /     Extended Emergency Contact Information  Primary Emergency Contact: Cassy Grissom  Mobile Phone: [phone]  Relation: Daughter    Discharge Plan A: Home with family  Discharge Plan B: Home with family      [id number] - [place], [place] - [address] AT [facility] OF [address]  [address]  [place] [address]  Phone: [phone] Fax: [phone]      Initial Assessment (most recent)     Adult Discharge Assessment - 03/29/22 1052        Discharge Assessment    Assessment Type Discharge Planning Assessment     Confirmed/corrected address, phone number and insurance Yes     Confirmed Demographics Correct on Facesheet     Source of Information family     Communicated CHALO with patient/caregiver Date not available/Unable to determine     Lives With child(ozzie), adult     Do you expect to return to your current living situation? Yes     Do you have help at home or someone to help you manage your care at home? Yes     Who are your caregiver(s) and their phone number(s)? Cassy Grissom (daughter) [phone]     Prior to hospitilization cognitive status: Alert/Oriented     Current cognitive status: Alert/Oriented     Walking or Climbing Stairs Difficulty none     Dressing/Bathing Difficulty none     Home Layout Able to live on 1st floor     Equipment Currently Used at Home none     Readmission within 30 days? No     Patient currently being followed by outpatient case management? No     Do you currently have service(s) that help you manage your care at home? No     Do you take prescription medications? Yes     Do you have prescription coverage? Yes     Do you have any problems  affording any of your prescribed medications? No     Is the patient taking medications as prescribed? yes     Who is going to help you get home at discharge? Cassy Grissom (daughter) [phone]     How do you get to doctors appointments? car, drives self     Are you on dialysis? No     Do you take coumadin? No     Discharge Plan A Home with family     Discharge Plan B Home with family     DME Needed Upon Discharge  none     Discharge Plan discussed with: Adult children     Discharge Barriers Identified None        Relationship/Environment    Name(s) of Who Lives With Patient Cassy Grissom (daughter) [phone]               Spoke with pt's daughter via phone for DC assessment. Pt lives at home with her daughter and does not use any DME. CM DC needs pending hospital course.    Dean Villar LMSW 3/29/2022 11:01 AM

## 2022-03-29 NOTE — TELEPHONE ENCOUNTER
Patient states she put a little cream on her legs and then her tongue started to swell. Advise to call 911. Advised to take Benadryl if tolerable. But to call 911 first. Patients daughter is at home with her she is awaken and I have spoken to her with disposition of patient explained that she is to call 911

## 2022-03-29 NOTE — ASSESSMENT & PLAN NOTE
Patient had urgent placement of a cricothyroidotomy tube through the trachea, likely below the cricothyroid membrane/cartilage.    If this tube is not to be removed tomorrow or the critical care doctor thinks it needs to be up sides today this can be arranged in the operating room.    Tomorrow the patient can have the cuff on the tube deflated if she is felt to have a safe airway from the angioedema, she can have a endotracheal tube placed with removal of the cricoid thyroid ostomy tube or the cricothyroidotomy tube can be upgraded to a formal 7.5 or 8 Spanish tracheostomy tube.    This was discussed with the critical care provider

## 2022-03-29 NOTE — NURSING
Patient arrived via stretcher from ED with RN and RT assist. Patient moved over to ICU bed and connected to all continuous ICU monitoring. BP low  90s/50s, HR reading as low as 42. Providers at bedside and aware. Prop gtt stopped and fentanyl gtt titrated up per provider. O2 sat stable and Bps gradually increased (120s/60s) and are currently stable. BSWR in place. FFP infusing. Potassium and calcium replacement in progress. Dr Locke also at bedside to evaluate patient. Ortega with minimal UOP. OGT connect to LIMS with bile noted in tube. Daughter, Cassy, at bedside and updated on situation and POC by primary nurse (me) and Dr Crocker. Cassy verbalizes understanding. All questions and concerns addressed. Heels elevated on pillow, turn q2. Will monitor.

## 2022-03-29 NOTE — PROGRESS NOTES
Pharmacist Renal Dose Adjustment Note    Page Grissom is a 71 y.o. female being treated with the medication famotidine    Patient Data:    Vital Signs (Most Recent):  Temp: 96 °F (35.6 °C) (03/29/22 1030)  Pulse: 82 (03/29/22 1030)  Resp: (!) 30 (03/29/22 1030)  BP: (!) 138/59 (03/29/22 1030)  SpO2: (!) 91 % (03/29/22 1030)   Vital Signs (72h Range):  Temp:  [96 °F (35.6 °C)-98.6 °F (37 °C)]   Pulse:  []   Resp:  [22-58]   BP: (113-196)/(58-84)   SpO2:  [91 %-98 %]      Recent Labs   Lab 03/29/22  0803   CREATININE 8.8*     Serum creatinine: 8.8 mg/dL (H) 03/29/22 0803  Estimated creatinine clearance: 7.3 mL/min (A)    Medication: famotidine 20mg BID will be changed to famotidine 20mg daily for crcl < 50ml/min    Pharmacist's Name: Paz Lozoya  Pharmacist's Extension: [phone]

## 2022-03-29 NOTE — ASSESSMENT & PLAN NOTE
Severe was hard to intubate for respiratory distress status post emergent cricothyroidotomy with size 5 trach.  Sedated with propofol.  On IV steroids and antihistamines.  Check tryptase level IgE.  No recent medicine introduction.  On lisinopril ordered in June 2021

## 2022-03-29 NOTE — HOSPITAL COURSE
Patient was hard to intubate , general surgery consulted, and emergency cricothyrotomy was performed with a tube size 5 mm in place.  FiO2 50% O2 sat 91%.  Sedated with propofol.  Chest x-ray ABG reviewed.    3/30 seen and examined, events overnight noted , now paralyzed for vent asynchrony , sedated, oliguric , awaiting OR today change cricothyroidotomy tube    to ETT versus replace trach   3/31 seen and examined , CXR , ABG reviewed, events overnight reviewed , sp 7.5 mm trach placement yesterday and removal of cricothyrotomy tube, on levophged gtt , versed, fentanyl gtt , nimbex gtt , oliguric, severe electrolytes disturbances   4/1 Patient seen and examined.Day 2post tracheostomy Paralysis None Sedation None Vasopressors Levophed Urine output last 24 hrs 926 ml  Fluid balance since admit11 L + Tmax 98 Last BM 3/28 Chest X-ray and ABG reviewed   4/2 Patient seen and examined.Day 3trachParalysis None Sedation None Vasopressors none Urine output last 24 hrs  2.2 L Fluid balance since admit  4 L positiveTmax  99 Last BM  4/2 Chest X-ray and ABG reviewed .  Following commands.  Started CPAP trial this a.m..  Good cuff leak noted yesterday.  4/3 seen and examined.  Placed back on controlled mode due to frequent PACs at night.  Arousable but lethargic.  Of sedation x2 days.  Placed on CPAP trial again.  Chest x-ray and ABG reviewed.  Urine output 2.3 L yesterday.  Positive 8 L since admission.  On enteral tube feeding    04/09: seen and examined, has PMV, speaks well, awaits placement, T max 98.8F, 28% FiO2,     04/10: seen and examined, has PMV, speaks well, ate break fast, awaits placement, T max 99.4F, 28% FiO2,   4/11 seen and examined.  Trach in place.  Afebrile.  100% O2 sat on 5 liter/minute.  Using [place] valve to vocalize.

## 2022-03-29 NOTE — PHARMACY MED REC
"Admission Medication History     The home medication history was taken by Marcelo Paz.    You may go to "Admission" then "Reconcile Home Medications" tabs to review and/or act upon these items.      The home medication list has been updated by the Pharmacy department.    Please read ALL comments highlighted in yellow.    Please address this information as you see fit.     Feel free to contact us if you have any questions or require assistance.    Unable to assess; compared chart's med list against pharmacy's and list looks accurate. Doses unknown.    The medications listed below were removed from the home medication list. Please reorder if appropriate:  Patient reports no longer taking the following medication(s):   METHYLPREDNISOLONE 4 MG TABLET DOSE PACK   MUCINEX SINUS-MAX SEVERE CONGESTION    Medications listed below were obtained from: Analytic software- [employer], Medical records and Patient's pharmacy  (Not in a hospital admission)      Potential issues to be addressed PRIOR TO DISCHARGE: NONE      Marcelo Paz CPhT  Spectralbvl [phone]      Current Outpatient Medications on File Prior to Encounter   Medication Sig Dispense Refill Last Dose    albuterol (PROVENTIL/VENTOLIN HFA) 90 mcg/actuation inhaler INHALE 2 PUFFS INTO THE LUNGS EVERY 6 HOURS AS NEEDED FOR WHEEZING. DISPENSE WITH SPACER. 18 g 0     amLODIPine (NORVASC) 10 MG tablet TAKE 1 TABLET(10 MG) BY MOUTH EVERY DAY 30 tablet 11     ammonium lactate 12 % Crea Apply 1 Act topically 2 (two) times daily. 1 Tube 3     cholecalciferol, vitamin D3, 2,000 unit Tab Take 2,000 Units by mouth once daily.       diphenhydrAMINE (SOMINEX) 25 mg tablet Take 25 mg by mouth nightly as needed for Allergies.       fish oil-omega-3 fatty acids 300-1,000 mg capsule Take 2 g by mouth once daily.       hydrALAZINE (APRESOLINE) 25 MG tablet TAKE 1 TABLET BY MOUTH EVERY 12 HOURS 60 tablet 11     hydroCHLOROthiazide (HYDRODIURIL) 25 MG tablet TAKE 1 " TABLET(25 MG) BY MOUTH EVERY DAY 30 tablet 11     hydrOXYchloroQUINE (PLAQUENIL) 200 mg tablet Take 1 tablet (200 mg total) by mouth once daily. 30 tablet 5     lisinopriL (PRINIVIL,ZESTRIL) 40 MG tablet TAKE 1 TABLET BY MOUTH EVERY DAY 90 tablet 3     metFORMIN (GLUCOPHAGE) 500 MG tablet TAKE 1 TABLET(500 MG) BY MOUTH EVERY DAY 90 tablet 3     multivitamin (THERAGRAN) per tablet Take 1 tablet by mouth once daily.       phenylephrine/DM/acetaminop/GG (MUCINEX SINUS-MAX SEV TERRIE,DM, ORAL) Take by mouth as needed.       rosuvastatin (CRESTOR) 40 MG Tab Take 1 tablet (40 mg total) by mouth every evening. 30 tablet 11     [DISCONTINUED] methylPREDNISolone (MEDROL DOSEPACK) 4 mg tablet use as directed 21 each 0                              .

## 2022-03-29 NOTE — PROCEDURES
"Page Grissom is a 71 y.o. female patient.    Temp: 98.6 °F (37 °C) (03/29/22 0730)  Pulse: 72 (03/29/22 0930)  Resp: (!) 30 (03/29/22 0930)  BP: 129/62 (03/29/22 0930)  SpO2: (!) 92 % (03/29/22 0930)  Weight: 111.6 kg (246 lb 0.5 oz) (03/29/22 0751)  Height: 5' 4" (162.6 cm) (03/29/22 0751)       Procedures     Procedures         The surgical service was called urgently to the emergency room.  The patient was unable to be intubated.     The patient had a roll placed horizontally between her shoulders to extend the neck.  The neck was prepped in the standard fashion.     A 1 cm incision was made approximately 2 cm above the thyroid notch.  A 14 gauge needle was then advanced into the trachea with return of air.  A guidewire was then advanced.     A dilator introducer was then placed over the guidewire and advanced into the trachea.     The cricoid tracheostomy tube was then placed onto the dilator introducer and the dilator introducer was readvanced over the guidewire placing the 5 mm internal diameter cricoid tracheostomy tube into the trachea.     There was a positive color return on the indicator.  The patient stoop was secured with 0 silk sutures in interrupted fashion.     The patient was then ultimately connected to a ventilator with saturation rates in the 90%.     The patient tolerated the procedure well.  She remained in the ICU in hemodynamically stable but overall critical condition.     3/29/2022       3/29/2022  "

## 2022-03-29 NOTE — HPI
71 y.o. female patient PMHx of asthma, CKD, DM2, HTN, and immune deficiency disorder  brought to the emergency room for acute onset shortness of breath severe tongue swelling and respiratory distress.

## 2022-03-29 NOTE — HPI
Patient presented to the emergency room with acute respiratory distress.  Indication was unsuccessful by the emergency room position in the anesthesia department.  Surgery was called emergency for a cricothyroidotomy.

## 2022-03-29 NOTE — TELEPHONE ENCOUNTER
Reason for Disposition   Other symptom of severe allergic reaction (Exception: Hives or facial swelling alone)    Additional Information   Negative: Unresponsive, passed out or very weak   Negative: Difficult to awaken or acting confused (e.g., disoriented, slurred speech)   Negative: Difficulty swallowing, drooling or slurred speech   Negative: [1] Tightness in the chest or throat AND [2] begins within 2 hours of exposure to allergic substance   Negative: Wheezing, stridor, hoarseness, or difficulty breathing   Negative: [1] Life-threatening reaction in the past to similar substance (e.g., food, insect bite/sting, medication, etc.) AND [2] < 2 hours since exposure    Protocols used: [id number]

## 2022-03-29 NOTE — H&P
FAXED A NO RECORDS FOR THIS PATIENT 01/01/2020 THRU 12/31/2015 TO Jena Nelson MD @ [address] @ [phone] [facility] - Emergency Dept.  [facility] Medicine  History & Physical    Patient Name: Page Grissom  MRN: [id number]  Patient Class: IP- Inpatient  Admission Date: 3/29/2022  Attending Physician: Arik Yao Jr., MD   Primary Care Provider: NEELAM Roman Jr, MD         Patient information was obtained from past medical records and ER records.     Subjective:     Principal Problem:Angioedema    Chief Complaint:   Chief Complaint   Patient presents with    Asthma     With possible ANGIOEDEMA; EMS reports patient was able to speak to them on arrival to scene, but now is not able to; Benadryl 50mg given IV; duo-neb on arrival        HPI: 72 y/o female with PMHx of HTN, HLD, DM, asthma, CKD 3, and RA who presented to the ED with c/o angioedema that onset suddenly this morning. Pt was able to verbally communicate with EMS, was not able to communicate with ED staff. Staff was unable to intubate and called surgery, who performed emergency trach. Pt is currently on vent and sedated.  ED workup shows: H/H 10.9/34.6, K+ 2.9, CO2 22, Anion gap 17, BUN 55, Cr 8.8.  She will be admitted to ICU for angioedema under the care of [facility] medicine.  She is a full code and her SDM is her daughter, Cassy.        Past Medical History:   Diagnosis Date    Asthma     Back pain     Cataract     OD    CKD (chronic kidney disease) stage 3, GFR 30-59 ml/min 12/20/2020    Diabetes mellitus     Fibromyalgia     Gastroesophageal reflux disease     Hypercholesterolemia     Hypertension     Immune deficiency disorder     Obesity     Osteoporosis     Rheumatoid arthritis     Tobacco dependence     Trouble in sleeping     Type 2 diabetes mellitus        Past Surgical History:   Procedure Laterality Date    COLONOSCOPY N/A 2/14/2019    Procedure: COLONOSCOPY;  Surgeon: Yury Atwood MD;  [facility];  Service: Endoscopy;  Laterality: N/A;    HERNIA REPAIR      OOPHORECTOMY         Review of patient's allergies  indicates:  No Known Allergies    No current facility-administered medications on file prior to encounter.     Current Outpatient Medications on File Prior to Encounter   Medication Sig    albuterol (PROVENTIL/VENTOLIN HFA) 90 mcg/actuation inhaler INHALE 2 PUFFS INTO THE LUNGS EVERY 6 HOURS AS NEEDED FOR WHEEZING. DISPENSE WITH SPACER.    amLODIPine (NORVASC) 10 MG tablet TAKE 1 TABLET(10 MG) BY MOUTH EVERY DAY    ammonium lactate 12 % Crea Apply 1 Act topically 2 (two) times daily.    cholecalciferol, vitamin D3, 2,000 unit Tab Take 2,000 Units by mouth once daily.    diphenhydrAMINE (SOMINEX) 25 mg tablet Take 25 mg by mouth nightly as needed for Allergies.    fish oil-omega-3 fatty acids 300-1,000 mg capsule Take 2 g by mouth once daily.    hydrALAZINE (APRESOLINE) 25 MG tablet TAKE 1 TABLET BY MOUTH EVERY 12 HOURS    hydroCHLOROthiazide (HYDRODIURIL) 25 MG tablet TAKE 1 TABLET(25 MG) BY MOUTH EVERY DAY    hydrOXYchloroQUINE (PLAQUENIL) 200 mg tablet Take 1 tablet (200 mg total) by mouth once daily.    lisinopriL (PRINIVIL,ZESTRIL) 40 MG tablet TAKE 1 TABLET BY MOUTH EVERY DAY    metFORMIN (GLUCOPHAGE) 500 MG tablet TAKE 1 TABLET(500 MG) BY MOUTH EVERY DAY    multivitamin (THERAGRAN) per tablet Take 1 tablet by mouth once daily.    rosuvastatin (CRESTOR) 40 MG Tab Take 1 tablet (40 mg total) by mouth every evening.    [DISCONTINUED] methylPREDNISolone (MEDROL DOSEPACK) 4 mg tablet use as directed    [DISCONTINUED] phenylephrine/DM/acetaminop/GG (MUCINEX SINUS-MAX SEV TERRIE,DM, ORAL) Take by mouth as needed.     Family History       Problem Relation (Age of Onset)    Breast cancer Sister    Cancer Father    Colon cancer Father    Hypertension Mother          Tobacco Use    Smoking status: Current Every Day Smoker     Packs/day: 0.50     Years: 25.00     Pack years: 12.50     Types: Cigarettes    Smokeless tobacco: Never Used    Tobacco comment: trying to quit   Substance and Sexual Activity     Alcohol use: No    Drug use: No    Sexual activity: Not on file     Review of Systems   Unable to perform ROS: Intubated   Objective:     Vital Signs (Most Recent):  Temp: (!) 94.7 °F (34.8 °C) (03/29/22 1333)  Pulse: (!) 57 (03/29/22 1333)  Resp: (!) 30 (03/29/22 1333)  BP: (!) 103/56 (03/29/22 1333)  SpO2: (!) 94 % (03/29/22 1318)   Vital Signs (24h Range):  Temp:  [94.7 °F (34.8 °C)-98.6 °F (37 °C)] 94.7 °F (34.8 °C)  Pulse:  [] 57  Resp:  [22-58] 30  SpO2:  [91 %-98 %] 94 %  BP: (103-196)/(56-84) 103/56     Weight: 111.6 kg (246 lb 0.5 oz)  Body mass index is 42.23 kg/m².    Physical Exam  Vitals and nursing note reviewed.   Constitutional:       General: She is not in acute distress.     Appearance: She is well-developed.   HENT:      Head: Normocephalic and atraumatic.      Mouth/Throat:      Comments: Large swollen tongue and lips  Neck:      Comments: Trach in place  Cardiovascular:      Rate and Rhythm: Bradycardia present.   Pulmonary:      Effort: Respiratory distress present.      Breath sounds: Rhonchi present.   Abdominal:      Palpations: Abdomen is soft.   Genitourinary:     Comments: Ortega cath draining to gravity  Musculoskeletal:         General: No deformity.      Cervical back: Neck supple.   Skin:     General: Skin is warm.           Significant Labs: All pertinent labs within the past 24 hours have been reviewed.  Recent Lab Results  (Last 5 results in the past 24 hours)        03/29/22  1216   03/29/22  0931   03/29/22  0822   03/29/22  0816   03/29/22  0808        Unit Blood Type Code     6200  [P]                6200  [P]           Unit Expiration     [id number]  [P]                [id number]  [P]           Unit Blood Type     A POS  [P]                A POS  [P]           Anion Gap 17               Appearance, UA         Clear       Bacteria, UA         Occasional       Bilirubin (UA)         Negative       BUN 55               Calcium 6.9  Comment: Ca   result(s) called and  verbal readback obtained from CONNIE Tavarez by JD8 03/29/2022 13:09                 Chloride 99               CO2 22               CODING SYSTEM     [id number]  [P]                [id number]  [P]           Color, UA         Yellow       Creatinine 8.9               DISPENSE STATUS     CROSSMATCHED  [P]                ISSUED  [P]           eGFR if  5               eGFR if non  4  Comment: Calculation used to obtain the estimated glomerular filtration  rate (eGFR) is the CKD-EPI equation.                  Glucose 123               Glucose, UA         Negative       Group & Rh     A POS           Hyaline Casts, UA         0       INDIRECT LISA     NEG           Ketones, UA         Negative       Leukocytes, UA         Negative       Microscopic Comment         SEE COMMENT  Comment: Other formed elements not mentioned in the report are not   present in the microscopic examination.          NITRITE UA         Negative       Non-Squam Epith         3       Occult Blood UA         1+       pH, UA         6.0       POCT Glucose       158         Potassium 3.1               Product Code     [id number]  [P]                [id number]  [P]           Protein, UA         1+  Comment: Recommend a 24 hour urine protein or a urine   protein/creatinine ratio if globulin induced proteinuria is  clinically suspected.         RBC, UA         2       SARS-CoV-2 RNA, Amplification, Qual   Negative  Comment: This test utilizes isothermal nucleic acid amplification   technology to detect the SARS-CoV-2 RdRp nucleic acid segment.   The analytical sensitivity (limit of detection) is 125 genome   equivalents/mL.     A POSITIVE result implies infection with the SARS-CoV-2 virus;  the patient is presumed to be contagious.    A NEGATIVE result means that SARS-CoV-2 nucleic acids are not  present above the limit of detection. A NEGATIVE result should be   treated as presumptive. It does not rule out the  possibility of   COVID-19 and should not be the sole basis for treatment decisions.   If COVID-19 is strongly suspected based on clinical and exposure   history, re-testing using an alternate molecular assay should be   considered.       This test is only for use under the Food and Drug   Administration s Emergency Use Authorization (EUA).   Commercial kits are provided by [employer].   Performance characteristics of the EUA have been independently  verified by [facility] Department of  Pathology and Laboratory Medicine.   _________________________________________________________________  The ID NOW COVID-19 Letter of Authorization, along with the   authorized Fact Sheet for Healthcare Providers, the authorized Fact  Sheet for Patients, and authorized labeling are available on the FDA   website:  www.fda.gov/MedicalDevices/Safety/EmergencySituations/xom283606.htm               Sodium 138               Specific Coleman, UA         1.015       Specimen UA         Urine, Catheterized       UNIT NUMBER     [id number]  [P]                [id number]  [P]           UROBILINOGEN UA         Negative       WBC, UA         2                               [P] - Preliminary Result               Significant Imaging: I have reviewed all pertinent imaging results/findings within the past 24 hours.    Assessment/Plan:     KYE (acute kidney injury)  --baseline BUN/Cr 20/1.4  --IVF's  --stop lisinopril  --avoid nephrotoxic agents      Tracheostomy in place  --admit to ICU  --stop lisiniopril  --pulmonology following  --supportive care      Angioedema  --likely 2/2 lisinopril  --admit to ICU  --had emergent trach in ED, on ventalitor  --IV steroids, IV antihistamiens  --Fresh Frozen Plasma ordered in ED  --monitor closely      Rheumatoid arthritis involving multiple sites with positive rheumatoid factor  --hold plaquenil  --f/u OP         VTE Risk Mitigation (From admission, onward)         Ordered     heparin  (porcine) injection 7,500 Units  Every 8 hours         03/29/22 1003     IP VTE HIGH RISK PATIENT  Once         03/29/22 1003     Place sequential compression device  Until discontinued         03/29/22 1003                   HERON Lazar-RAÚL  Department of Hospital Medicine   [facility] - Emergency Dept.

## 2022-03-29 NOTE — ED PROVIDER NOTES
SCRIBE #1 NOTE: I, Lauren Chamberlain, am scribing for, and in the presence of, Arik Yao Jr., MD. I have scribed the entire note.      History      Chief Complaint   Patient presents with    Asthma     With possible ANGIOEDEMA; EMS reports patient was able to speak to them on arrival to scene, but now is not able to; Benadryl 50mg given IV; duo-neb on arrival       Review of patient's allergies indicates:  No Known Allergies     HPI   HPI    3/29/2022, 7:03 AM   History obtained from EMS.  HPI/ROS limited secondary to acuity of condition.      History of Present Illness: Page Grissom is a 71 y.o. female patient with a PMHx of asthma, CKD, DM2, HTN, and immune deficiency disorder who presents to the Emergency Department for tongue swelling which onset this morning. When EMS arrived on scene, they report that pt was able to verbally communicate. En route to the ER, EMS administered 50mg of benadryl and 1 duo-neb. EMS reports that en route to the ER, the pt started to experiencing worsening tongue swelling and could no longer verbally communicate with them. Upon arrival to the ER, the pt was still not able to verbally communicate due to tongue swelling.      Arrival mode: EMS    PCP: NEELAM Roman Jr, MD       Past Medical History:  Past Medical History:   Diagnosis Date    Asthma     Back pain     Cataract     OD    CKD (chronic kidney disease) stage 3, GFR 30-59 ml/min 12/20/2020    Diabetes mellitus     Fibromyalgia     Gastroesophageal reflux disease     Hypercholesterolemia     Hypertension     Immune deficiency disorder     Obesity     Osteoporosis     Rheumatoid arthritis     Tobacco dependence     Trouble in sleeping     Type 2 diabetes mellitus        Past Surgical History:  Past Surgical History:   Procedure Laterality Date    COLONOSCOPY N/A 2/14/2019    Procedure: COLONOSCOPY;  Surgeon: Yury Atwood MD;  Location: [facility];  Service: Endoscopy;  Laterality: N/A;    HERNIA  REPAIR      OOPHORECTOMY           Family History:  Family History   Problem Relation Age of Onset    Hypertension Mother     Cancer Father     Colon cancer Father     Breast cancer Sister        Social History:  Social History     Tobacco Use    Smoking status: Current Every Day Smoker     Packs/day: 0.50     Years: 25.00     Pack years: 12.50     Types: Cigarettes    Smokeless tobacco: Never Used    Tobacco comment: trying to quit   Substance and Sexual Activity    Alcohol use: No    Drug use: No    Sexual activity: Not on file       ROS   Review of Systems   Unable to perform ROS: Acuity of condition     Physical Exam      Initial Vitals   BP Pulse Resp Temp SpO2   03/29/22 0715 03/29/22 0715 03/29/22 0715 03/29/22 0730 03/29/22 0715   (!) 196/76 (!) 120 (!) 40 98.6 °F (37 °C) 98 %      MAP       --                 Physical Exam  Nursing Notes and Vital Signs Reviewed.  Constitutional: Patient is in no acute distress. Well-developed and well-nourished.  Head: Atraumatic. Normocephalic.  Eyes:  EOM intact.  No scleral icterus.  ENT: Mucous membranes are moist.  Nares clear .  Tongue is markedly swollen obstructing post oral cavity.  The neck is short and stout without any swelling.  Neck:  Full ROM. No JVD.  Cardiovascular: Regular rate. Regular rhythm No murmurs, rubs, or gallops. Distal pulses are 2+ and symmetric  Pulmonary/Chest: No respiratory distress. Clear to auscultation bilaterally. No wheezing or rales.  Equal chest wall rise bilaterally  Abdominal: Soft and non-distended.  There is no tenderness.  No rebound, guarding, or rigidity. Good bowel sounds.  Genitourinary: No CVA tenderness.  No suprapubic tenderness  Musculoskeletal: Moves all extremities. No obvious deformities.  5 x 5 strength in all extremities   Skin: Warm and dry.  Neurological:  Alert, awake, and appropriate.  Normal speech.  No acute focal neurological deficits are appreciated.  Two through 12 intact  bilaterally.  Psychiatric: Normal affect. Good eye contact. Appropriate in content.      ED Course    Intubation    Date/Time: 3/29/2022 7:03 AM  Location procedure was performed: [facility]  Performed by: Arik Yao Jr., MD  Authorized by: Arik Yao Jr., MD   Consent Done: Emergent Situation  Indications: airway protection  Complications: Yes (multiple failed attempts due to markedly swollen tongue)    Critical Care    Date/Time: 3/29/2022 7:32 AM  Performed by: Arik Yao Jr., MD  Authorized by: Arik Yao Jr., MD   Direct patient critical care time: 35 minutes  Additional history critical care time: 5 minutes  Ordering / reviewing critical care time: 5 minutes  Documentation critical care time: 5 minutes  Consulting other physicians critical care time: 10 minutes  Total critical care time (exclusive of procedural time) : 60 minutes  Critical care time was exclusive of separately billable procedures and treating other patients and teaching time.  Critical care was necessary to treat or prevent imminent or life-threatening deterioration of the following conditions: angioedema.  Critical care was time spent personally by me on the following activities: blood draw for specimens, development of treatment plan with patient or surrogate, discussions with consultants, interpretation of cardiac output measurements, evaluation of patient's response to treatment, examination of patient, obtaining history from patient or surrogate, ordering and performing treatments and interventions, ordering and review of laboratory studies, ordering and review of radiographic studies, pulse oximetry, re-evaluation of patient's condition and review of old charts.        ED Vital Signs:  Vitals:    03/29/22 0715 03/29/22 0730 03/29/22 0745 03/29/22 0748   BP: (!) 196/76 121/60 (!) 127/59 128/65   Pulse: (!) 120 73 80 80   Resp: (!) 40 (!) 40 (!) 32 (!) 23   Temp:  98.6 °F (37 °C)     TempSrc:   "Axillary     SpO2: 98% 96% 95% 98%   Weight:       Height:        03/29/22 0750 03/29/22 0751 03/29/22 0807 03/29/22 0815   BP: 133/62   135/84   Pulse: 82  (!) 119 (!) 114   Resp: (!) 22  (!) 58 (!) 22   Temp:       TempSrc:       SpO2: 96%  98% 97%   Weight:  111.6 kg (246 lb 0.5 oz)     Height:  5' 4" (1.626 m)      03/29/22 0839   BP:    Pulse: 76   Resp: (!) 30   Temp:    TempSrc:    SpO2: 98%   Weight:    Height:        Abnormal Lab Results:  Labs Reviewed   COMPREHENSIVE METABOLIC PANEL - Abnormal; Notable for the following components:       Result Value    Potassium 2.5 (*)     CO2 21 (*)     Glucose 167 (*)     BUN 55 (*)     Creatinine 8.8 (*)     Calcium 7.2 (*)     Albumin 2.8 (*)     ALT 8 (*)     Anion Gap 19 (*)     eGFR if  5 (*)     eGFR if non  4 (*)     All other components within normal limits    Narrative:     K   result(s) called and verbal readback obtained from Rosie Young RN   ED  by JD8 03/29/2022 09:03   CBC W/ AUTO DIFFERENTIAL - Abnormal; Notable for the following components:    Hemoglobin 10.9 (*)     Hematocrit 34.6 (*)     MCV 78 (*)     MCH 24.7 (*)     MCHC 31.5 (*)     RDW 15.9 (*)     Gran # (ANC) 8.5 (*)     Immature Grans (Abs) 0.05 (*)     Gran % 76.9 (*)     Lymph % 13.4 (*)     All other components within normal limits   C-REACTIVE PROTEIN - Abnormal; Notable for the following components:    CRP 54.6 (*)     All other components within normal limits   URINALYSIS, REFLEX TO URINE CULTURE - Abnormal; Notable for the following components:    Protein, UA 1+ (*)     Occult Blood UA 1+ (*)     All other components within normal limits    Narrative:     Specimen Source->Urine   URINALYSIS MICROSCOPIC - Abnormal; Notable for the following components:    Non-Squam Epith 3 (*)     All other components within normal limits    Narrative:     Specimen Source->Urine   POCT GLUCOSE - Abnormal; Notable for the following components:    POCT Glucose 158 (*)     " All other components within normal limits   ISTAT PROCEDURE - Abnormal; Notable for the following components:    POC PH 7.170 (*)     POC PCO2 74.6 (*)     POC PO2 264 (*)     All other components within normal limits   SARS-COV-2 RNA AMPLIFICATION, QUAL   TYPE & SCREEN   PREPARE FRESH FROZEN PLASMA SOFT        All Lab Results:  Results for orders placed or performed during the hospital encounter of 03/29/22   Comprehensive metabolic panel   Result Value Ref Range    Sodium 138 136 - 145 mmol/L    Potassium 2.5 (LL) 3.5 - 5.1 mmol/L    Chloride 98 95 - 110 mmol/L    CO2 21 (L) 23 - 29 mmol/L    Glucose 167 (H) 70 - 110 mg/dL    BUN 55 (H) 8 - 23 mg/dL    Creatinine 8.8 (H) 0.5 - 1.4 mg/dL    Calcium 7.2 (L) 8.7 - 10.5 mg/dL    Total Protein 7.8 6.0 - 8.4 g/dL    Albumin 2.8 (L) 3.5 - 5.2 g/dL    Total Bilirubin 1.0 0.1 - 1.0 mg/dL    Alkaline Phosphatase 101 55 - 135 U/L    AST 21 10 - 40 U/L    ALT 8 (L) 10 - 44 U/L    Anion Gap 19 (H) 8 - 16 mmol/L    eGFR if African American 5 (A) >60 mL/min/1.73 m^2    eGFR if non African American 4 (A) >60 mL/min/1.73 m^2   CBC auto differential   Result Value Ref Range    WBC 11.09 3.90 - 12.70 K/uL    RBC 4.42 4.00 - 5.40 M/uL    Hemoglobin 10.9 (L) 12.0 - 16.0 g/dL    Hematocrit 34.6 (L) 37.0 - 48.5 %    MCV 78 (L) 82 - 98 fL    MCH 24.7 (L) 27.0 - 31.0 pg    MCHC 31.5 (L) 32.0 - 36.0 g/dL    RDW 15.9 (H) 11.5 - 14.5 %    Platelets 349 150 - 450 K/uL    MPV 10.1 9.2 - 12.9 fL    Immature Granulocytes 0.5 0.0 - 0.5 %    Gran # (ANC) 8.5 (H) 1.8 - 7.7 K/uL    Immature Grans (Abs) 0.05 (H) 0.00 - 0.04 K/uL    Lymph # 1.5 1.0 - 4.8 K/uL    Mono # 0.9 0.3 - 1.0 K/uL    Eos # 0.1 0.0 - 0.5 K/uL    Baso # 0.04 0.00 - 0.20 K/uL    nRBC 0 0 /100 WBC    Gran % 76.9 (H) 38.0 - 73.0 %    Lymph % 13.4 (L) 18.0 - 48.0 %    Mono % 8.0 4.0 - 15.0 %    Eosinophil % 0.8 0.0 - 8.0 %    Basophil % 0.4 0.0 - 1.9 %    Differential Method Automated    C-reactive protein   Result Value Ref Range     CRP 54.6 (H) 0.0 - 8.2 mg/L   Urinalysis, Reflex to Urine Culture Urine, Clean Catch    Specimen: Urine   Result Value Ref Range    Specimen UA Urine, Catheterized     Color, UA Yellow Yellow, Straw, Marie    Appearance, UA Clear Clear    pH, UA 6.0 5.0 - 8.0    Specific Gravity, UA 1.015 1.005 - 1.030    Protein, UA 1+ (A) Negative    Glucose, UA Negative Negative    Ketones, UA Negative Negative    Bilirubin (UA) Negative Negative    Occult Blood UA 1+ (A) Negative    Nitrite, UA Negative Negative    Urobilinogen, UA Negative <2.0 EU/dL    Leukocytes, UA Negative Negative   Urinalysis Microscopic   Result Value Ref Range    RBC, UA 2 0 - 4 /hpf    WBC, UA 2 0 - 5 /hpf    Bacteria Occasional None-Occ /hpf    Non-Squam Epith 3 (A) <1/hpf /hpf    Hyaline Casts, UA 0 0-1/lpf /lpf    Microscopic Comment SEE COMMENT    POCT glucose   Result Value Ref Range    POCT Glucose 158 (H) 70 - 110 mg/dL   ISTAT PROCEDURE   Result Value Ref Range    POC PH 7.170 (LL) 7.35 - 7.45    POC PCO2 74.6 (HH) 35 - 45 mmHg    POC PO2 264 (H) 80 - 100 mmHg    POC HCO3 27.2 24 - 28 mmol/L    POC BE -1 -2 to 2 mmol/L    POC SATURATED O2 100 95 - 100 %    Rate 22     Sample ARTERIAL     Site RR     Allens Test Pass     DelSys Adult Vent     Mode AC/PRVC     PEEP 5     PiP 20     FiO2 100          Imaging Results:  Imaging Results          X-Ray Chest AP Portable (Final result)  Result time 03/29/22 07:53:47    Final result by Memo Cummings MD (03/29/22 07:53:47)                 Impression:      1.  Tracheostomy cannula projects over the trachea, with the tip along the right side of the trachea.  Clinical correlation is advised to ensure that the endotracheal tube is within the airway.    2.  Nasogastric tube in apparent good position.    3.  Low lung volumes with vascular congestion versus reticular interstitial changes throughout the lungs.  Interstitial pulmonary edema versus interstitial infectious process must be considered.    4.   Stable findings as noted above.      Electronically signed by: Memo Cummings MD  Date:    03/29/2022  Time:    07:53             Narrative:    EXAMINATION:  XR CHEST AP PORTABLE    CLINICAL HISTORY:  angioedema;    COMPARISON:  Studies dating back to July 16, 2015    FINDINGS:  A tracheostomy cannula overlies the trachea.  The tip appears to be along the right side of the tracheal air shadow.  A nasogastric tube is in place, folded back on itself in the stomach.  Low lung volumes with vascular congestion versus reticular interstitial changes throughout the lungs.  Possible left effusion.  The cardiac silhouette size is enlarged.  The trachea is midline and the mediastinal width is normal. Negative for right effusion or pneumothorax.  Negative for osseous abnormalities. Tortuous aorta with calcifications of the aortic knob.  There are degenerative changes of the spine.                               The EKG was ordered, reviewed, and independently interpreted by the ED provider.  Interpretation time: 7:43  Rate: 85 BPM  Rhythm: normal sinus rhythm  Interpretation: Possible Left atrial enlargement. Prolonged QT. No STEMI.           The Emergency Provider reviewed the vital signs and test results, which are outlined above.    ED Discussion     8:17 AM  Patient arrived with a markedly swollen tongue by EMS.  Patient initially was conversant with them but was unable to speak upon arrival.  She is initially brought to bed 1 and we evaluated her immediately upon arrival.  Upon noticing large tongue patient drooling she was immediately moved to bed 4 put on monitors with a line placed anesthesia called patient subsequently sedated with attempted endotracheal intubation.  This was done initially by myself subsequently by Anesthesia.  After multiple failed attempts due to markedly swollen tongue, General surgery was called and presented expeditiously.  Dr. Mullins,  and Dr. Atwood responded immediately performing a  expectant bedside tracheostomy securing the patient's airway.  Tube placement was confirmed via chest x-ray breath sounds and end-tidal CO2 monitor.  NG tube was passed.  Workup is in progress.  Family is in route    8:34 AM: Attempted to contact the pt's daughter, but the call went straight to voicemail.    8:41 AM; The pt's daughter was contacted and updated on her mother's condition.     8:59 AM: Discussed case with Oneil Soler NP ([facility]). Dr. Crocker agrees with current care and management of pt and accepts admission.   Admitting Service: Hospital Medicine  Admitting Physician: Dr. Crocker  Admit to: ICU    9:00 AM: Re-evaluated pt. I have discussed test results, shared treatment plan, and the need for admission with patient and family at bedside. Pt and family express understanding at this time and agree with all information. All questions answered. Pt and family have no further questions or concerns at this time. Pt is ready for admit.           ED Medication(s):  Medications   0.9%  NaCl infusion (for blood administration) (has no administration in time range)   potassium chloride SA CR tablet 40 mEq (has no administration in time range)   propofol (DIPRIVAN) 10 mg/mL IVP (100 mg Intravenous Given 3/29/22 0721)   etomidate injection 20 mg (20 mg Intravenous Given 3/29/22 0708)   succinylcholine injection 100 mg (100 mg Intravenous Given 3/29/22 0708)   succinylcholine injection 100 mg (100 mg Intravenous Given 3/29/22 0715)   rocuronium injection 50 mg (50 mg Intravenous Given 3/29/22 0720)   propofol (DIPRIVAN) 10 mg/mL infusion (50 mcg/kg/min × 111.6 kg Intravenous New Bag 3/29/22 0730)           New Prescriptions    No medications on file         Medical Decision Making    Medical Decision Making:   Clinical Tests:   Lab Tests: Ordered and Reviewed  Radiological Study: Ordered and Reviewed  Medical Tests: Ordered and Reviewed           Scribe Attestation:   Scribe #1: I performed the above  scribed service and the documentation accurately describes the services I performed. I attest to the accuracy of the note.    Attending:   Physician Attestation Statement for Scribe #1: I, Arik Yao Jr., MD, personally performed the services described in this documentation, as scribed by Lauren Chamberlain, in my presence, and it is both accurate and complete.          Clinical Impression       ICD-10-CM ICD-9-CM   1. Angioedema  T78.3XXA 995.1   2. Acute airway obstruction  J98.8 519.8   3. Hypokalemia  E87.6 276.8       Disposition:   Disposition: Admitted  Condition: Fair         Arik Yao Jr., MD  03/29/22 0902       Arik Yao Jr., MD  03/29/22 0905

## 2022-03-29 NOTE — SUBJECTIVE & OBJECTIVE
Past Medical History:   Diagnosis Date    Asthma     Back pain     Cataract     OD    CKD (chronic kidney disease) stage 3, GFR 30-59 ml/min 12/20/2020    Diabetes mellitus     Fibromyalgia     Gastroesophageal reflux disease     Hypercholesterolemia     Hypertension     Immune deficiency disorder     Obesity     Osteoporosis     Rheumatoid arthritis     Tobacco dependence     Trouble in sleeping     Type 2 diabetes mellitus        Past Surgical History:   Procedure Laterality Date    COLONOSCOPY N/A 2/14/2019    Procedure: COLONOSCOPY;  Surgeon: Yury Atwood MD;  Location: [facility];  Service: Endoscopy;  Laterality: N/A;    HERNIA REPAIR      OOPHORECTOMY         Review of patient's allergies indicates:  No Known Allergies    Family History       Problem Relation (Age of Onset)    Breast cancer Sister    Cancer Father    Colon cancer Father    Hypertension Mother          Tobacco Use    Smoking status: Current Every Day Smoker     Packs/day: 0.50     Years: 25.00     Pack years: 12.50     Types: Cigarettes    Smokeless tobacco: Never Used    Tobacco comment: trying to quit   Substance and Sexual Activity    Alcohol use: No    Drug use: No    Sexual activity: Not on file         Review of Systems   Unable to perform ROS: Intubated   Objective:     Vital Signs (Most Recent):  Temp: 96 °F (35.6 °C) (03/29/22 1030)  Pulse: 82 (03/29/22 1030)  Resp: (!) 30 (03/29/22 1030)  BP: (!) 138/59 (03/29/22 1030)  SpO2: (!) 91 % (03/29/22 1030)   Vital Signs (24h Range):  Temp:  [96 °F (35.6 °C)-98.6 °F (37 °C)] 96 °F (35.6 °C)  Pulse:  [] 82  Resp:  [22-58] 30  SpO2:  [91 %-98 %] 91 %  BP: (113-196)/(58-84) 138/59     Weight: 111.6 kg (246 lb 0.5 oz)  Body mass index is 42.23 kg/m².      Intake/Output Summary (Last 24 hours) at 3/29/2022 1105  Last data filed at 3/29/2022 1016  Gross per 24 hour   Intake 99.05 ml   Output 45 ml   Net 54.05 ml       Physical Exam  Vitals and nursing note reviewed.   Constitutional:        General: She is not in acute distress.     Appearance: She is well-developed.   HENT:      Head: Normocephalic and atraumatic.      Mouth/Throat:      Comments: Large swollen tongue and lips  Neck:      Comments: Trach in place  Cardiovascular:      Rate and Rhythm: Normal rate.   Pulmonary:      Effort: Respiratory distress present.      Breath sounds: Rhonchi present.   Abdominal:      Palpations: Abdomen is soft.      Tenderness: There is no abdominal tenderness.   Genitourinary:     Comments: Ortega in place   Musculoskeletal:         General: No deformity.      Cervical back: Neck supple.   Skin:     General: Skin is warm.   Neurological:      General: No focal deficit present.      Mental Status: She is alert.       Vents:  Vent Mode: A/C (03/29/22 0839)  Set Rate: 30 BPM (03/29/22 0839)  Vt Set: 0 mL (03/29/22 0839)  Pressure Support: 0 cmH20 (03/29/22 0839)  PEEP/CPAP: 5 cmH20 (03/29/22 0839)  Oxygen Concentration (%): 50 (03/29/22 1030)  Peak Airway Pressure: 25 cmH2O (03/29/22 0839)  Plateau Pressure: 19 cmH20 (03/29/22 0839)  Total Ve: 6.54 mL (03/29/22 0839)  F/VT Ratio<105 (RSBI): 135.75 (03/29/22 0839)    Lines/Drains/Airways       Drain  Duration                  NG/OG Tube 03/29/22 0725 [place] sump;orogastric 18 Fr. Right mouth <1 day         Urethral Catheter 03/29/22 0900 18 Fr. <1 day              Airway  Duration                  Airway - Non-Surgical Other (Comment) -- days         Surgical Airway 03/29/22 0740 Other (Comment) Cuffed <1 day              Peripheral Intravenous Line  Duration                  Peripheral IV - Single Lumen 03/29/22 0706 20 G Left Antecubital <1 day         Peripheral IV - Single Lumen 03/29/22 0725 18 G Anterior;Proximal;Right Forearm <1 day         Peripheral IV - Single Lumen 03/29/22 0825 18 G Right  <1 day                    Significant Labs:    CBC/Anemia Profile:  Recent Labs   Lab 03/29/22 0803   WBC 11.09   HGB 10.9*   HCT 34.6*      MCV 78*   RDW  15.9*        Chemistries:  Recent Labs   Lab 03/29/22  0803      K 2.5*   CL 98   CO2 21*   BUN 55*   CREATININE 8.8*   CALCIUM 7.2*   ALBUMIN 2.8*   PROT 7.8   BILITOT 1.0   ALKPHOS 101   ALT 8*   AST 21   MG 2.0      Latest Reference Range & Units 03/29/22 08:07   POC PH 7.35 - 7.45  7.170 (LL)   POC PCO2 35 - 45 mmHg 74.6 (HH)   POC PO2 80 - 100 mmHg 264 (H)   POC BE -2 to 2 mmol/L -1   POC HCO3 24 - 28 mmol/L 27.2   POC SATURATED O2 95 - 100 % 100   FiO2  100   PiP  20   PEEP  5   Sample  ARTERIAL   DelSys  Adult Vent   Allens Test  Pass   Site  RR   Mode  AC/PRVC   Rate  22       EKG 03/29/2020  Normal sinus rhythm   Possible Left atrial enlargement   Prolonged QT   Abnormal ECG   When compared with ECG of 13-DEC-2006 09:37,   QRS duration has increased   QT has lengthened     Significant Imaging:     CXR 3/29/2022    CLINICAL HISTORY:  angioedema;     COMPARISON:  Studies dating back to July 16, 2015     FINDINGS:  A tracheostomy cannula overlies the trachea.  The tip appears to be along the right side of the tracheal air shadow.  A nasogastric tube is in place, folded back on itself in the stomach.  Low lung volumes with vascular congestion versus reticular interstitial changes throughout the lungs.  Possible left effusion.  The cardiac silhouette size is enlarged.  The trachea is midline and the mediastinal width is normal. Negative for right effusion or pneumothorax.  Negative for osseous abnormalities. Tortuous aorta with calcifications of the aortic knob.  There are degenerative changes of the spine.     Impression:     1.  Tracheostomy cannula projects over the trachea, with the tip along the right side of the trachea.  Clinical correlation is advised to ensure that the endotracheal tube is within the airway.     2.  Nasogastric tube in apparent good position.     3.  Low lung volumes with vascular congestion versus reticular interstitial changes throughout the lungs.  Interstitial pulmonary edema  versus interstitial infectious process must be considered.

## 2022-03-29 NOTE — SUBJECTIVE & OBJECTIVE
Past Medical History:   Diagnosis Date    Asthma     Back pain     Cataract     OD    CKD (chronic kidney disease) stage 3, GFR 30-59 ml/min 12/20/2020    Diabetes mellitus     Fibromyalgia     Gastroesophageal reflux disease     Hypercholesterolemia     Hypertension     Immune deficiency disorder     Obesity     Osteoporosis     Rheumatoid arthritis     Tobacco dependence     Trouble in sleeping     Type 2 diabetes mellitus        Past Surgical History:   Procedure Laterality Date    COLONOSCOPY N/A 2/14/2019    Procedure: COLONOSCOPY;  Surgeon: Yury Atwood MD;  Location: [facility];  Service: Endoscopy;  Laterality: N/A;    HERNIA REPAIR      OOPHORECTOMY         Review of patient's allergies indicates:  No Known Allergies    No current facility-administered medications on file prior to encounter.     Current Outpatient Medications on File Prior to Encounter   Medication Sig    albuterol (PROVENTIL/VENTOLIN HFA) 90 mcg/actuation inhaler INHALE 2 PUFFS INTO THE LUNGS EVERY 6 HOURS AS NEEDED FOR WHEEZING. DISPENSE WITH SPACER.    amLODIPine (NORVASC) 10 MG tablet TAKE 1 TABLET(10 MG) BY MOUTH EVERY DAY    ammonium lactate 12 % Crea Apply 1 Act topically 2 (two) times daily.    cholecalciferol, vitamin D3, 2,000 unit Tab Take 2,000 Units by mouth once daily.    diphenhydrAMINE (SOMINEX) 25 mg tablet Take 25 mg by mouth nightly as needed for Allergies.    fish oil-omega-3 fatty acids 300-1,000 mg capsule Take 2 g by mouth once daily.    hydrALAZINE (APRESOLINE) 25 MG tablet TAKE 1 TABLET BY MOUTH EVERY 12 HOURS    hydroCHLOROthiazide (HYDRODIURIL) 25 MG tablet TAKE 1 TABLET(25 MG) BY MOUTH EVERY DAY    hydrOXYchloroQUINE (PLAQUENIL) 200 mg tablet Take 1 tablet (200 mg total) by mouth once daily.    lisinopriL (PRINIVIL,ZESTRIL) 40 MG tablet TAKE 1 TABLET BY MOUTH EVERY DAY    metFORMIN (GLUCOPHAGE) 500 MG tablet TAKE 1 TABLET(500 MG) BY MOUTH EVERY DAY    multivitamin (THERAGRAN) per tablet Take 1 tablet by mouth  once daily.    rosuvastatin (CRESTOR) 40 MG Tab Take 1 tablet (40 mg total) by mouth every evening.    [DISCONTINUED] methylPREDNISolone (MEDROL DOSEPACK) 4 mg tablet use as directed    [DISCONTINUED] phenylephrine/DM/acetaminop/GG (MUCINEX SINUS-MAX SEV TERRIE,DM, ORAL) Take by mouth as needed.     Family History       Problem Relation (Age of Onset)    Breast cancer Sister    Cancer Father    Colon cancer Father    Hypertension Mother          Tobacco Use    Smoking status: Current Every Day Smoker     Packs/day: 0.50     Years: 25.00     Pack years: 12.50     Types: Cigarettes    Smokeless tobacco: Never Used    Tobacco comment: trying to quit   Substance and Sexual Activity    Alcohol use: No    Drug use: No    Sexual activity: Not on file     Review of Systems   Unable to perform ROS: Intubated   Objective:     Vital Signs (Most Recent):  Temp: (!) 94.7 °F (34.8 °C) (03/29/22 1333)  Pulse: (!) 57 (03/29/22 1333)  Resp: (!) 30 (03/29/22 1333)  BP: (!) 103/56 (03/29/22 1333)  SpO2: (!) 94 % (03/29/22 1318)   Vital Signs (24h Range):  Temp:  [94.7 °F (34.8 °C)-98.6 °F (37 °C)] 94.7 °F (34.8 °C)  Pulse:  [] 57  Resp:  [22-58] 30  SpO2:  [91 %-98 %] 94 %  BP: (103-196)/(56-84) 103/56     Weight: 111.6 kg (246 lb 0.5 oz)  Body mass index is 42.23 kg/m².    Physical Exam  Vitals and nursing note reviewed.   Constitutional:       General: She is not in acute distress.     Appearance: She is well-developed.   HENT:      Head: Normocephalic and atraumatic.      Mouth/Throat:      Comments: Large swollen tongue and lips  Neck:      Comments: Trach in place  Cardiovascular:      Rate and Rhythm: Bradycardia present.   Pulmonary:      Effort: Respiratory distress present.      Breath sounds: Rhonchi present.   Abdominal:      Palpations: Abdomen is soft.   Genitourinary:     Comments: Ortega cath draining to gravity  Musculoskeletal:         General: No deformity.      Cervical back: Neck supple.   Skin:     General:  Skin is warm.           Significant Labs: All pertinent labs within the past 24 hours have been reviewed.  Recent Lab Results  (Last 5 results in the past 24 hours)        03/29/22  1216   03/29/22  0931   03/29/22  0822   03/29/22  0816   03/29/22  0808        Unit Blood Type Code     6200  [P]                6200  [P]           Unit Expiration     [id number]  [P]                [id number]  [P]           Unit Blood Type     A POS  [P]                A POS  [P]           Anion Gap 17               Appearance, UA         Clear       Bacteria, UA         Occasional       Bilirubin (UA)         Negative       BUN 55               Calcium 6.9  Comment: Ca   result(s) called and verbal readback obtained from CONNIE Tavarez by JD8 03/29/2022 13:09                 Chloride 99               CO2 22               CODING SYSTEM     BPDV375  [P]                [id number]  [P]           Color, UA         Yellow       Creatinine 8.9               DISPENSE STATUS     CROSSMATCHED  [P]                ISSUED  [P]           eGFR if  5               eGFR if non  4  Comment: Calculation used to obtain the estimated glomerular filtration  rate (eGFR) is the CKD-EPI equation.                  Glucose 123               Glucose, UA         Negative       Group & Rh     A POS           Hyaline Casts, UA         0       INDIRECT LISA     NEG           Ketones, UA         Negative       Leukocytes, UA         Negative       Microscopic Comment         SEE COMMENT  Comment: Other formed elements not mentioned in the report are not   present in the microscopic examination.          NITRITE UA         Negative       Non-Squam Epith         3       Occult Blood UA         1+       pH, UA         6.0       POCT Glucose       158         Potassium 3.1               Product Code     [id number]  [P]                [id number]  [P]           Protein, UA         1+  Comment: Recommend a 24 hour urine protein or a  urine   protein/creatinine ratio if globulin induced proteinuria is  clinically suspected.         RBC, UA         2       SARS-CoV-2 RNA, Amplification, Qual   Negative  Comment: This test utilizes isothermal nucleic acid amplification   technology to detect the SARS-CoV-2 RdRp nucleic acid segment.   The analytical sensitivity (limit of detection) is 125 genome   equivalents/mL.     A POSITIVE result implies infection with the SARS-CoV-2 virus;  the patient is presumed to be contagious.    A NEGATIVE result means that SARS-CoV-2 nucleic acids are not  present above the limit of detection. A NEGATIVE result should be   treated as presumptive. It does not rule out the possibility of   COVID-19 and should not be the sole basis for treatment decisions.   If COVID-19 is strongly suspected based on clinical and exposure   history, re-testing using an alternate molecular assay should be   considered.       This test is only for use under the Food and Drug   Administration s Emergency Use Authorization (EUA).   Commercial kits are provided by [employer].   Performance characteristics of the EUA have been independently  verified by [facility] Department of  Pathology and Laboratory Medicine.   _________________________________________________________________  The ID NOW COVID-19 Letter of Authorization, along with the   authorized Fact Sheet for Healthcare Providers, the authorized Fact  Sheet for Patients, and authorized labeling are available on the FDA   website:  www.fda.gov/MedicalDevices/Safety/EmergencySituations/ymx785842.htm               Sodium 138               Specific [place], UA         1.015       Specimen UA         Urine, Catheterized       UNIT NUMBER     [id number]  [P]                [id number]  [P]           UROBILINOGEN UA         Negative       WBC, UA         2                               [P] - Preliminary Result               Significant Imaging: I have reviewed all  pertinent imaging results/findings within the past 24 hours.

## 2022-03-29 NOTE — ASSESSMENT & PLAN NOTE
Emergent cricothyrotomy severe angioedema trach in place, severe hypercapnia and respiratory acidosis repeat ABG nebs IV steroids antihistamine

## 2022-03-29 NOTE — CONSULTS
[facility] - Emergency Dept.  General Surgery  Consult Note    Patient Name: Page Grissom  MRN: [id number]  Code Status: Full Code  Admission Date: 3/29/2022  Hospital Length of Stay: 0 days  Attending Physician: Arik Yao Jr., MD  Primary Care Provider: NEELAM Roman Jr, MD    Patient information was obtained from ER provider and anesthesiologist provided acute critical care to the patient.     Inpatient consult to General surgery  Consult performed by: Isak Yadav MD  Consult ordered by: Arik Yao Jr., MD  Reason for consult: Urgent Criccotracheostomy tube  Assessment/Recommendations: The patient has a 5 mm internal diameter cricoid tracheostomy tube.    This tube she need to be removed tomorrow if it is felt to be safe by the critical care provider.  Is not felt to be safe she can be intubated formally if the not feel a long-term tracheostomy tube is needed or she can undergo a formal up sizing to a 7.5/8 French tracheostomy tube.    Will further discuss the need for tube management with the critical care provider tomorrow        Subjective:     Principal Problem: <principal problem not specified>    History of Present Illness: Patient presented to the emergency room with acute respiratory distress.  Indication was unsuccessful by the emergency room position in the anesthesia department.  Surgery was called emergency for a cricothyroidotomy.      No current facility-administered medications on file prior to encounter.     Current Outpatient Medications on File Prior to Encounter   Medication Sig    albuterol (PROVENTIL/VENTOLIN HFA) 90 mcg/actuation inhaler INHALE 2 PUFFS INTO THE LUNGS EVERY 6 HOURS AS NEEDED FOR WHEEZING. DISPENSE WITH SPACER.    amLODIPine (NORVASC) 10 MG tablet TAKE 1 TABLET(10 MG) BY MOUTH EVERY DAY    ammonium lactate 12 % Crea Apply 1 Act topically 2 (two) times daily.    cholecalciferol, vitamin D3, 2,000 unit Tab Take 2,000 Units by mouth once daily.     diphenhydrAMINE (SOMINEX) 25 mg tablet Take 25 mg by mouth nightly as needed for Allergies.    fish oil-omega-3 fatty acids 300-1,000 mg capsule Take 2 g by mouth once daily.    hydrALAZINE (APRESOLINE) 25 MG tablet TAKE 1 TABLET BY MOUTH EVERY 12 HOURS    hydroCHLOROthiazide (HYDRODIURIL) 25 MG tablet TAKE 1 TABLET(25 MG) BY MOUTH EVERY DAY    hydrOXYchloroQUINE (PLAQUENIL) 200 mg tablet Take 1 tablet (200 mg total) by mouth once daily.    lisinopriL (PRINIVIL,ZESTRIL) 40 MG tablet TAKE 1 TABLET BY MOUTH EVERY DAY    metFORMIN (GLUCOPHAGE) 500 MG tablet TAKE 1 TABLET(500 MG) BY MOUTH EVERY DAY    multivitamin (THERAGRAN) per tablet Take 1 tablet by mouth once daily.    rosuvastatin (CRESTOR) 40 MG Tab Take 1 tablet (40 mg total) by mouth every evening.    [DISCONTINUED] methylPREDNISolone (MEDROL DOSEPACK) 4 mg tablet use as directed    [DISCONTINUED] phenylephrine/DM/acetaminop/GG (MUCINEX SINUS-MAX SEV TERRIE,DM, ORAL) Take by mouth as needed.       Review of patient's allergies indicates:  No Known Allergies    Past Medical History:   Diagnosis Date    Asthma     Back pain     Cataract     OD    CKD (chronic kidney disease) stage 3, GFR 30-59 ml/min 12/20/2020    Diabetes mellitus     Fibromyalgia     Gastroesophageal reflux disease     Hypercholesterolemia     Hypertension     Immune deficiency disorder     Obesity     Osteoporosis     Rheumatoid arthritis     Tobacco dependence     Trouble in sleeping     Type 2 diabetes mellitus      Past Surgical History:   Procedure Laterality Date    COLONOSCOPY N/A 2/14/2019    Procedure: COLONOSCOPY;  Surgeon: Yury Atwood MD;  Location: [facility];  Service: Endoscopy;  Laterality: N/A;    HERNIA REPAIR      OOPHORECTOMY       Family History       Problem Relation (Age of Onset)    Breast cancer Sister    Cancer Father    Colon cancer Father    Hypertension Mother          Tobacco Use    Smoking status: Current Every Day Smoker     Packs/day:  0.50     Years: 25.00     Pack years: 12.50     Types: Cigarettes    Smokeless tobacco: Never Used    Tobacco comment: trying to quit   Substance and Sexual Activity    Alcohol use: No    Drug use: No    Sexual activity: Not on file     Review of Systems   Unable to perform ROS: Acuity of condition   Objective:     Vital Signs (Most Recent):  Temp: 96 °F (35.6 °C) (03/29/22 1030)  Pulse: 82 (03/29/22 1030)  Resp: (!) 30 (03/29/22 1030)  BP: (!) 138/59 (03/29/22 1030)  SpO2: (!) 91 % (03/29/22 1030)   Vital Signs (24h Range):  Temp:  [96 °F (35.6 °C)-98.6 °F (37 °C)] 96 °F (35.6 °C)  Pulse:  [] 82  Resp:  [22-58] 30  SpO2:  [91 %-98 %] 91 %  BP: (113-196)/(58-84) 138/59     Weight: 111.6 kg (246 lb 0.5 oz)  Body mass index is 42.23 kg/m².    Physical Exam  Constitutional:       General: She is in acute distress.      Appearance: She is ill-appearing.      Comments: Unconscious   Neck:      Comments: Obese  Abdominal:      Comments: Obese   2    Significant Labs:  I have reviewed all pertinent lab results within the past 24 hours.  CBC:   Recent Labs   Lab 03/29/22  0803   WBC 11.09   RBC 4.42   HGB 10.9*   HCT 34.6*      MCV 78*   MCH 24.7*   MCHC 31.5*     BMP:   Recent Labs   Lab 03/29/22  0803   *      K 2.5*   CL 98   CO2 21*   BUN 55*   CREATININE 8.8*   CALCIUM 7.2*   MG 2.0       Significant Diagnostics:  I have reviewed all pertinent imaging results/findings within the past 24 hours.  CXR: I have reviewed all pertinent results/findings within the past 24 hours and my personal findings are:  Chest x-ray report       FINDINGS:  A tracheostomy cannula overlies the trachea.  The tip appears to be along the right side of the tracheal air shadow.  A nasogastric tube is in place, folded back on itself in the stomach.  Low lung volumes with vascular congestion versus reticular interstitial changes throughout the lungs.  Possible left effusion.  The cardiac silhouette size is enlarged.   The trachea is midline and the mediastinal width is normal. Negative for right effusion or pneumothorax.  Negative for osseous abnormalities. Tortuous aorta with calcifications of the aortic knob.  There are degenerative changes of the spine.     Impression:     1.  Tracheostomy cannula projects over the trachea, with the tip along the right side of the trachea.  Clinical correlation is advised to ensure that the endotracheal tube is within the airway.     2.  Nasogastric tube in apparent good position.     3.  Low lung volumes with vascular congestion versus reticular interstitial changes throughout the lungs.  Interstitial pulmonary edema versus interstitial infectious process must be considered.     4.  Stable findings as noted above.        Electronically signed by: Memo Cummings MD  Date:                                            03/29/2022  Time:                                           07:53      Assessment/Plan:     Tracheostomy in place  Patient had urgent placement of a cricothyroidotomy tube through the trachea, likely below the cricothyroid membrane/cartilage.    If this tube is not to be removed tomorrow or the critical care doctor thinks it needs to be up sides today this can be arranged in the operating room.    Tomorrow the patient can have the cuff on the tube deflated if she is felt to have a safe airway from the angioedema, she can have a endotracheal tube placed with removal of the cricoid thyroid ostomy tube or the cricothyroidotomy tube can be upgraded to a formal 7.5 or 8 Arabic tracheostomy tube.    This was discussed with the critical care provider      VTE Risk Mitigation (From admission, onward)         Ordered     heparin (porcine) injection 5,000 Units  Every 8 hours         03/29/22 1003     IP VTE HIGH RISK PATIENT  Once         03/29/22 1003     Place sequential compression device  Until discontinued         03/29/22 1003                Thank you for your consult. I will follow-up  with patient. Please contact us if you have any additional questions.    Isak Yadav MD  General Surgery  [facility] - Emergency Dept.

## 2022-03-29 NOTE — HPI
72 y/o female with PMHx of HTN, HLD, DM, asthma, CKD 3, and RA who presented to the ED with c/o angioedema that onset suddenly this morning. Pt was able to verbally communicate with EMS, was not able to communicate with ED staff. Staff was unable to intubate and called surgery, who performed emergency trach. Pt is currently on vent and sedated.  ED workup shows: H/H 10.9/34.6, K+ 2.9, CO2 22, Anion gap 17, BUN 55, Cr 8.8.  She will be admitted to ICU for angioedema under the care of hospitals medicine.  She is a full code and her SDM is her daughter, Cassy.

## 2022-03-29 NOTE — HOSPITAL COURSE
Patient had an emergent cricothyroidotomy performed and will be admitted to the ICU by the medical service for further evaluation management.    The need for removal/up sizing of the cricothyroidotomy tube was discussed with the pulmonologist providing critical care coverage in the ICU    3/30/22:  still with edema of the tongue.  Ventilated, sedated  5 mm ID cricotracheotomy tube     3/31/22:  converted to tracheostomy, sedated, trach site is clean    4/1/22:  less edema, trach site is clean,  acute renal failure, follows commands    4/4/22: still ventilated, elevated wbc, following commands

## 2022-03-30 ENCOUNTER — ANESTHESIA EVENT (OUTPATIENT)
Dept: SURGERY | Facility: HOSPITAL | Age: 72
DRG: 003 | End: 2022-03-30
Payer: MEDICARE

## 2022-03-30 ENCOUNTER — ANESTHESIA (OUTPATIENT)
Dept: SURGERY | Facility: HOSPITAL | Age: 72
DRG: 003 | End: 2022-03-30
Payer: MEDICARE

## 2022-03-30 LAB
ALLENS TEST: ABNORMAL
ALLENS TEST: ABNORMAL
ANION GAP SERPL CALC-SCNC: 16 MMOL/L (ref 8–16)
ANION GAP SERPL CALC-SCNC: 20 MMOL/L (ref 8–16)
ANION GAP SERPL CALC-SCNC: 22 MMOL/L (ref 8–16)
BASOPHILS # BLD AUTO: 0.01 K/UL (ref 0–0.2)
BASOPHILS NFR BLD: 0.1 % (ref 0–1.9)
BUN SERPL-MCNC: 56 MG/DL (ref 8–23)
BUN SERPL-MCNC: 60 MG/DL (ref 8–23)
BUN SERPL-MCNC: 60 MG/DL (ref 8–23)
CALCIUM SERPL-MCNC: 6.8 MG/DL (ref 8.7–10.5)
CALCIUM SERPL-MCNC: 6.9 MG/DL (ref 8.7–10.5)
CALCIUM SERPL-MCNC: 7 MG/DL (ref 8.7–10.5)
CHLORIDE SERPL-SCNC: 101 MMOL/L (ref 95–110)
CHLORIDE SERPL-SCNC: 102 MMOL/L (ref 95–110)
CHLORIDE SERPL-SCNC: 98 MMOL/L (ref 95–110)
CO2 SERPL-SCNC: 14 MMOL/L (ref 23–29)
CO2 SERPL-SCNC: 15 MMOL/L (ref 23–29)
CO2 SERPL-SCNC: 20 MMOL/L (ref 23–29)
CREAT SERPL-MCNC: 8.7 MG/DL (ref 0.5–1.4)
CREAT SERPL-MCNC: 8.9 MG/DL (ref 0.5–1.4)
CREAT SERPL-MCNC: 9 MG/DL (ref 0.5–1.4)
DELSYS: ABNORMAL
DELSYS: ABNORMAL
DIFFERENTIAL METHOD: ABNORMAL
EOSINOPHIL # BLD AUTO: 0 K/UL (ref 0–0.5)
EOSINOPHIL NFR BLD: 0 % (ref 0–8)
ERYTHROCYTE [DISTWIDTH] IN BLOOD BY AUTOMATED COUNT: 16.1 % (ref 11.5–14.5)
ERYTHROCYTE [SEDIMENTATION RATE] IN BLOOD BY WESTERGREN METHOD: 30 MM/H
ERYTHROCYTE [SEDIMENTATION RATE] IN BLOOD BY WESTERGREN METHOD: 30 MM/H
EST. GFR  (AFRICAN AMERICAN): 5 ML/MIN/1.73 M^2
EST. GFR  (NON AFRICAN AMERICAN): 4 ML/MIN/1.73 M^2
ESTIMATED AVG GLUCOSE: 137 MG/DL (ref 68–131)
FIO2: 50
FIO2: 50
GLUCOSE SERPL-MCNC: 118 MG/DL (ref 70–110)
GLUCOSE SERPL-MCNC: 183 MG/DL (ref 70–110)
GLUCOSE SERPL-MCNC: 222 MG/DL (ref 70–110)
HBA1C MFR BLD: 6.4 % (ref 4–5.6)
HCO3 UR-SCNC: 23 MMOL/L (ref 24–28)
HCO3 UR-SCNC: 24 MMOL/L (ref 24–28)
HCT VFR BLD AUTO: 31.8 % (ref 37–48.5)
HGB BLD-MCNC: 10.1 G/DL (ref 12–16)
IMM GRANULOCYTES # BLD AUTO: 0.03 K/UL (ref 0–0.04)
IMM GRANULOCYTES NFR BLD AUTO: 0.3 % (ref 0–0.5)
IP: 20
IT: 0.6
LYMPHOCYTES # BLD AUTO: 0.3 K/UL (ref 1–4.8)
LYMPHOCYTES NFR BLD: 3.1 % (ref 18–48)
MAGNESIUM SERPL-MCNC: 2.1 MG/DL (ref 1.6–2.6)
MCH RBC QN AUTO: 24.5 PG (ref 27–31)
MCHC RBC AUTO-ENTMCNC: 31.8 G/DL (ref 32–36)
MCV RBC AUTO: 77 FL (ref 82–98)
MODE: ABNORMAL
MODE: ABNORMAL
MONOCYTES # BLD AUTO: 0.1 K/UL (ref 0.3–1)
MONOCYTES NFR BLD: 0.9 % (ref 4–15)
NEUTROPHILS # BLD AUTO: 8.3 K/UL (ref 1.8–7.7)
NEUTROPHILS NFR BLD: 95.6 % (ref 38–73)
NRBC BLD-RTO: 0 /100 WBC
PCO2 BLDA: 60.1 MMHG (ref 35–45)
PCO2 BLDA: 68.9 MMHG (ref 35–45)
PEEP: 5
PEEP: 5
PH SMN: 7.13 [PH] (ref 7.35–7.45)
PH SMN: 7.21 [PH] (ref 7.35–7.45)
PHOSPHATE SERPL-MCNC: 7.6 MG/DL (ref 2.7–4.5)
PLATELET # BLD AUTO: 293 K/UL (ref 150–450)
PMV BLD AUTO: 9.8 FL (ref 9.2–12.9)
PO2 BLDA: 90 MMHG (ref 80–100)
PO2 BLDA: 91 MMHG (ref 80–100)
POC BE: -4 MMOL/L
POC BE: -6 MMOL/L
POC SATURATED O2: 93 % (ref 95–100)
POC SATURATED O2: 95 % (ref 95–100)
POCT GLUCOSE: 122 MG/DL (ref 70–110)
POCT GLUCOSE: 123 MG/DL (ref 70–110)
POCT GLUCOSE: 140 MG/DL (ref 70–110)
POTASSIUM SERPL-SCNC: 2.6 MMOL/L (ref 3.5–5.1)
POTASSIUM SERPL-SCNC: 3.3 MMOL/L (ref 3.5–5.1)
POTASSIUM SERPL-SCNC: 3.5 MMOL/L (ref 3.5–5.1)
RBC # BLD AUTO: 4.13 M/UL (ref 4–5.4)
SAMPLE: ABNORMAL
SAMPLE: ABNORMAL
SITE: ABNORMAL
SITE: ABNORMAL
SODIUM SERPL-SCNC: 134 MMOL/L (ref 136–145)
SODIUM SERPL-SCNC: 136 MMOL/L (ref 136–145)
SODIUM SERPL-SCNC: 138 MMOL/L (ref 136–145)
VT: 239
WBC # BLD AUTO: 8.66 K/UL (ref 3.9–12.7)

## 2022-03-30 PROCEDURE — 31600 PLANNED TRACHEOSTOMY: CPT | Mod: ,,, | Performed by: SURGERY

## 2022-03-30 PROCEDURE — 27201423 OPTIME MED/SURG SUP & DEVICES STERILE SUPPLY: Performed by: SURGERY

## 2022-03-30 PROCEDURE — 63600175 PHARM REV CODE 636 W HCPCS: Performed by: SURGERY

## 2022-03-30 PROCEDURE — 25000242 PHARM REV CODE 250 ALT 637 W/ HCPCS: Performed by: INTERNAL MEDICINE

## 2022-03-30 PROCEDURE — 20000000 HC ICU ROOM

## 2022-03-30 PROCEDURE — 99900035 HC TECH TIME PER 15 MIN (STAT)

## 2022-03-30 PROCEDURE — 82803 BLOOD GASES ANY COMBINATION: CPT

## 2022-03-30 PROCEDURE — 94003 VENT MGMT INPAT SUBQ DAY: CPT

## 2022-03-30 PROCEDURE — 25000003 PHARM REV CODE 250: Performed by: NURSE PRACTITIONER

## 2022-03-30 PROCEDURE — 37000009 HC ANESTHESIA EA ADD 15 MINS: Performed by: SURGERY

## 2022-03-30 PROCEDURE — 99900026 HC AIRWAY MAINTENANCE (STAT)

## 2022-03-30 PROCEDURE — 63600175 PHARM REV CODE 636 W HCPCS: Performed by: STUDENT IN AN ORGANIZED HEALTH CARE EDUCATION/TRAINING PROGRAM

## 2022-03-30 PROCEDURE — 85025 COMPLETE CBC W/AUTO DIFF WBC: CPT | Performed by: INTERNAL MEDICINE

## 2022-03-30 PROCEDURE — 83036 HEMOGLOBIN GLYCOSYLATED A1C: CPT | Performed by: INTERNAL MEDICINE

## 2022-03-30 PROCEDURE — 99291 CRITICAL CARE FIRST HOUR: CPT | Mod: ,,, | Performed by: INTERNAL MEDICINE

## 2022-03-30 PROCEDURE — 36415 COLL VENOUS BLD VENIPUNCTURE: CPT | Performed by: INTERNAL MEDICINE

## 2022-03-30 PROCEDURE — 25000242 PHARM REV CODE 250 ALT 637 W/ HCPCS: Performed by: SURGERY

## 2022-03-30 PROCEDURE — 31600 PR TRACHEOSTOMY, PLANNED: ICD-10-PCS | Mod: ,,, | Performed by: SURGERY

## 2022-03-30 PROCEDURE — 37000008 HC ANESTHESIA 1ST 15 MINUTES: Performed by: SURGERY

## 2022-03-30 PROCEDURE — 63600175 PHARM REV CODE 636 W HCPCS: Performed by: INTERNAL MEDICINE

## 2022-03-30 PROCEDURE — 25000003 PHARM REV CODE 250: Performed by: INTERNAL MEDICINE

## 2022-03-30 PROCEDURE — 83735 ASSAY OF MAGNESIUM: CPT | Performed by: INTERNAL MEDICINE

## 2022-03-30 PROCEDURE — 80048 BASIC METABOLIC PNL TOTAL CA: CPT | Performed by: INTERNAL MEDICINE

## 2022-03-30 PROCEDURE — 84100 ASSAY OF PHOSPHORUS: CPT | Performed by: INTERNAL MEDICINE

## 2022-03-30 PROCEDURE — 63600175 PHARM REV CODE 636 W HCPCS: Performed by: NURSE PRACTITIONER

## 2022-03-30 PROCEDURE — 36000707: Performed by: SURGERY

## 2022-03-30 PROCEDURE — 80048 BASIC METABOLIC PNL TOTAL CA: CPT | Mod: 91 | Performed by: INTERNAL MEDICINE

## 2022-03-30 PROCEDURE — 25000003 PHARM REV CODE 250: Performed by: EMERGENCY MEDICINE

## 2022-03-30 PROCEDURE — 27000221 HC OXYGEN, UP TO 24 HOURS

## 2022-03-30 PROCEDURE — 99291 PR CRITICAL CARE, E/M 30-74 MINUTES: ICD-10-PCS | Mod: ,,, | Performed by: INTERNAL MEDICINE

## 2022-03-30 PROCEDURE — P9047 ALBUMIN (HUMAN), 25%, 50ML: HCPCS | Mod: JG | Performed by: INTERNAL MEDICINE

## 2022-03-30 PROCEDURE — 99233 SBSQ HOSP IP/OBS HIGH 50: CPT | Mod: ,,, | Performed by: INTERNAL MEDICINE

## 2022-03-30 PROCEDURE — 99233 PR SUBSEQUENT HOSPITAL CARE,LEVL III: ICD-10-PCS | Mod: ,,, | Performed by: INTERNAL MEDICINE

## 2022-03-30 PROCEDURE — 36600 WITHDRAWAL OF ARTERIAL BLOOD: CPT

## 2022-03-30 PROCEDURE — 94640 AIRWAY INHALATION TREATMENT: CPT

## 2022-03-30 PROCEDURE — A4216 STERILE WATER/SALINE, 10 ML: HCPCS | Performed by: NURSE PRACTITIONER

## 2022-03-30 PROCEDURE — 36000706: Performed by: SURGERY

## 2022-03-30 PROCEDURE — 94761 N-INVAS EAR/PLS OXIMETRY MLT: CPT

## 2022-03-30 RX ORDER — CISATRACURIUM BESYLATE 2 MG/ML
0.2 INJECTION, SOLUTION INTRAVENOUS ONCE
Status: COMPLETED | OUTPATIENT
Start: 2022-03-30 | End: 2022-03-30

## 2022-03-30 RX ORDER — CHLORHEXIDINE GLUCONATE ORAL RINSE 1.2 MG/ML
10 SOLUTION DENTAL 2 TIMES DAILY
Status: DISCONTINUED | OUTPATIENT
Start: 2022-03-30 | End: 2022-03-30 | Stop reason: SDUPTHER

## 2022-03-30 RX ORDER — IPRATROPIUM BROMIDE AND ALBUTEROL SULFATE 2.5; .5 MG/3ML; MG/3ML
3 SOLUTION RESPIRATORY (INHALATION) EVERY 4 HOURS
Status: DISCONTINUED | OUTPATIENT
Start: 2022-03-30 | End: 2022-04-04

## 2022-03-30 RX ORDER — IPRATROPIUM BROMIDE AND ALBUTEROL SULFATE 2.5; .5 MG/3ML; MG/3ML
3 SOLUTION RESPIRATORY (INHALATION) EVERY 6 HOURS PRN
Status: DISCONTINUED | OUTPATIENT
Start: 2022-03-30 | End: 2022-03-30

## 2022-03-30 RX ORDER — FENTANYL CITRATE-0.9 % NACL/PF 10 MCG/ML
0-200 PLASTIC BAG, INJECTION (ML) INTRAVENOUS CONTINUOUS
Status: DISCONTINUED | OUTPATIENT
Start: 2022-03-30 | End: 2022-03-31

## 2022-03-30 RX ORDER — FENTANYL CITRATE-0.9 % NACL/PF 10 MCG/ML
0-200 PLASTIC BAG, INJECTION (ML) INTRAVENOUS CONTINUOUS
Status: DISCONTINUED | OUTPATIENT
Start: 2022-03-30 | End: 2022-03-30

## 2022-03-30 RX ORDER — POTASSIUM CHLORIDE 7.45 MG/ML
10 INJECTION INTRAVENOUS ONCE
Status: COMPLETED | OUTPATIENT
Start: 2022-03-31 | End: 2022-03-30

## 2022-03-30 RX ORDER — ALBUMIN HUMAN 250 G/1000ML
12.5 SOLUTION INTRAVENOUS ONCE
Status: COMPLETED | OUTPATIENT
Start: 2022-03-30 | End: 2022-03-30

## 2022-03-30 RX ADMIN — Medication 10 ML: at 05:03

## 2022-03-30 RX ADMIN — MIDAZOLAM 5 MG/HR: 5 INJECTION INTRAMUSCULAR; INTRAVENOUS at 04:03

## 2022-03-30 RX ADMIN — MINERAL OIL, PETROLATUM: 425; 573 OINTMENT OPHTHALMIC at 09:03

## 2022-03-30 RX ADMIN — SODIUM CHLORIDE, SODIUM LACTATE, POTASSIUM CHLORIDE, AND CALCIUM CHLORIDE: .6; .31; .03; .02 INJECTION, SOLUTION INTRAVENOUS at 05:03

## 2022-03-30 RX ADMIN — Medication 10 ML: at 09:03

## 2022-03-30 RX ADMIN — FENTANYL CITRATE 200 MCG/HR: 50 INJECTION, SOLUTION INTRAMUSCULAR; INTRAVENOUS at 09:03

## 2022-03-30 RX ADMIN — CISATRACURIUM BESYLATE 23.2 MG: 10 INJECTION, SOLUTION INTRAVENOUS at 08:03

## 2022-03-30 RX ADMIN — HEPARIN SODIUM 7500 UNITS: 5000 INJECTION INTRAVENOUS; SUBCUTANEOUS at 02:03

## 2022-03-30 RX ADMIN — CHLORHEXIDINE GLUCONATE 0.12% ORAL RINSE 15 ML: 1.2 LIQUID ORAL at 09:03

## 2022-03-30 RX ADMIN — IPRATROPIUM BROMIDE AND ALBUTEROL SULFATE 3 ML: 2.5; .5 SOLUTION RESPIRATORY (INHALATION) at 11:03

## 2022-03-30 RX ADMIN — FAMOTIDINE 20 MG: 10 INJECTION, SOLUTION INTRAVENOUS at 09:03

## 2022-03-30 RX ADMIN — HEPARIN SODIUM 7500 UNITS: 5000 INJECTION INTRAVENOUS; SUBCUTANEOUS at 05:03

## 2022-03-30 RX ADMIN — CISATRACURIUM BESYLATE 1 MCG/KG/MIN: 200 INJECTION INTRAVENOUS at 08:03

## 2022-03-30 RX ADMIN — METHYLPREDNISOLONE SODIUM SUCCINATE 60 MG: 40 INJECTION, POWDER, FOR SOLUTION INTRAMUSCULAR; INTRAVENOUS at 11:03

## 2022-03-30 RX ADMIN — METHYLPREDNISOLONE SODIUM SUCCINATE 60 MG: 40 INJECTION, POWDER, FOR SOLUTION INTRAMUSCULAR; INTRAVENOUS at 05:03

## 2022-03-30 RX ADMIN — MINERAL OIL, PETROLATUM: 425; 573 OINTMENT OPHTHALMIC at 02:03

## 2022-03-30 RX ADMIN — FENTANYL CITRATE 200 MCG/HR: 50 INJECTION INTRAVENOUS at 07:03

## 2022-03-30 RX ADMIN — SODIUM BICARBONATE: 84 INJECTION, SOLUTION INTRAVENOUS at 10:03

## 2022-03-30 RX ADMIN — CALCIUM GLUCONATE 2 G: 94 INJECTION, SOLUTION INTRAVENOUS at 11:03

## 2022-03-30 RX ADMIN — SODIUM BICARBONATE: 84 INJECTION, SOLUTION INTRAVENOUS at 05:03

## 2022-03-30 RX ADMIN — METHYLPREDNISOLONE SODIUM SUCCINATE 60 MG: 40 INJECTION, POWDER, FOR SOLUTION INTRAMUSCULAR; INTRAVENOUS at 12:03

## 2022-03-30 RX ADMIN — CALCIUM GLUCONATE 1 G: 98 INJECTION, SOLUTION INTRAVENOUS at 05:03

## 2022-03-30 RX ADMIN — POTASSIUM CHLORIDE 10 MEQ: 7.46 INJECTION, SOLUTION INTRAVENOUS at 11:03

## 2022-03-30 RX ADMIN — ALBUMIN (HUMAN) 12.5 G: 12.5 SOLUTION INTRAVENOUS at 02:03

## 2022-03-30 RX ADMIN — IPRATROPIUM BROMIDE AND ALBUTEROL SULFATE 3 ML: 2.5; .5 SOLUTION RESPIRATORY (INHALATION) at 07:03

## 2022-03-30 RX ADMIN — MUPIROCIN: 20 OINTMENT TOPICAL at 09:03

## 2022-03-30 RX ADMIN — HEPARIN SODIUM 7500 UNITS: 5000 INJECTION INTRAVENOUS; SUBCUTANEOUS at 09:03

## 2022-03-30 RX ADMIN — LORAZEPAM 4 MG: 2 INJECTION INTRAMUSCULAR; INTRAVENOUS at 02:03

## 2022-03-30 RX ADMIN — SODIUM CHLORIDE: 0.9 INJECTION, SOLUTION INTRAVENOUS at 03:03

## 2022-03-30 RX ADMIN — IPRATROPIUM BROMIDE AND ALBUTEROL SULFATE 3 ML: 2.5; .5 SOLUTION RESPIRATORY (INHALATION) at 03:03

## 2022-03-30 RX ADMIN — CISATRACURIUM BESYLATE 3.5 MCG/KG/MIN: 200 INJECTION INTRAVENOUS at 02:03

## 2022-03-30 RX ADMIN — MIDAZOLAM 2 MG/HR: 5 INJECTION INTRAMUSCULAR; INTRAVENOUS at 03:03

## 2022-03-30 NOTE — ASSESSMENT & PLAN NOTE
--baseline BUN/Cr 20/1.4  --IVF's  --stop lisinopril  --avoid nephrotoxic agents    Nephrology following  Continue monitoring

## 2022-03-30 NOTE — PROGRESS NOTES
Consent for upgrading of a Cricotracheostomy tube  to a tracheostomy tube placement were discussed with the patient's daughter.  Consent was obtained.  Risks benefits complications were reviewed including infection, bleeding, injury to the esophagus, injury to vessels in the neck, loss of the airway and possible death her anoxic brain injury.    Consent was obtained through the phone with 2 nurses verifying

## 2022-03-30 NOTE — SUBJECTIVE & OBJECTIVE
3/30 seen and examined, events overnight noted , now paralyzed for vent asynchrony , sedated, oliguric , awaiting OR today change  trach to ETT versus replace trach   Review of Systems   Unable to perform ROS: Intubated   Objective:     Vital Signs (Most Recent):  Temp: 98.1 °F (36.7 °C) (03/30/22 0315)  Pulse: 61 (03/30/22 0932)  Resp: (!) 30 (03/30/22 0932)  BP: (!) 101/57 (03/30/22 0630)  SpO2: (!) 94 % (03/30/22 0932)   Vital Signs (24h Range):  Temp:  [94.7 °F (34.8 °C)-98.1 °F (36.7 °C)] 98.1 °F (36.7 °C)  Pulse:  [29-82] 61  Resp:  [15-47] 30  SpO2:  [91 %-98 %] 94 %  BP: ()/(49-83) 101/57     Weight: 115.8 kg (255 lb 4.7 oz)  Body mass index is 43.82 kg/m².      Intake/Output Summary (Last 24 hours) at 3/30/2022 0941  Last data filed at 3/30/2022 0900  Gross per 24 hour   Intake 4088.81 ml   Output 300 ml   Net 3788.81 ml         Physical Exam  Vitals and nursing note reviewed.   Constitutional:       General: She is not in acute distress.     Appearance: She is well-developed.   HENT:      Head: Normocephalic and atraumatic.      Mouth/Throat:      Comments: Large swollen tongue and lips but improved compared to yesterday   Neck:      Comments: Trach in place  Cardiovascular:      Rate and Rhythm: Normal rate.   Pulmonary:      Effort: Respiratory distress present.      Breath sounds: Rhonchi present.   Abdominal:      Palpations: Abdomen is soft.      Tenderness: There is no abdominal tenderness.   Genitourinary:     Comments: Ortega in place   Musculoskeletal:         General: No deformity.      Cervical back: Neck supple.   Skin:     General: Skin is warm.   Neurological:      Mental Status: She is alert.      Comments: Paralyzed        Vents:  Vent Mode: A/C (03/30/22 0932)  Ventilator Initiated: Yes (03/29/22 1120)  Set Rate: 30 BPM (03/30/22 0932)  Vt Set: 0 mL (03/30/22 0932)  Pressure Support: 0 cmH20 (03/30/22 0932)  PEEP/CPAP: 5 cmH20 (03/30/22 0932)  Oxygen Concentration (%): 50 (03/30/22  0932)  Peak Airway Pressure: 26 cmH2O (03/30/22 0932)  Plateau Pressure: 0 cmH20 (03/30/22 0932)  Total Ve: 6.52 mL (03/30/22 0932)  F/VT Ratio<105 (RSBI): 138.25 (03/30/22 0932)    Lines/Drains/Airways       Drain  Duration                  NG/OG Tube 03/29/22 0725 [place] sump;orogastric 18 Fr. Right mouth 1 day         Urethral Catheter 03/29/22 0900 18 Fr. 1 day              Airway  Duration                  Surgical Airway 03/29/22 0740 Other (Comment) Cuffed 1 day              Peripheral Intravenous Line  Duration                  Peripheral IV - Single Lumen 03/29/22 0706 20 G Left Antecubital 1 day         Peripheral IV - Single Lumen 03/29/22 0725 18 G Anterior;Proximal;Right Forearm 1 day         Peripheral IV - Single Lumen 03/29/22 0825 18 G Right  1 day         Peripheral IV - Single Lumen 03/29/22 1222 18 G Left Upper Arm <1 day                    Significant Labs:    CBC/Anemia Profile:  Recent Labs   Lab 03/29/22  0803 03/30/22  0241   WBC 11.09 8.66   HGB 10.9* 10.1*   HCT 34.6* 31.8*    293   MCV 78* 77*   RDW 15.9* 16.1*           Latest Reference Range & Units 03/30/22 02:48   POC PH 7.35 - 7.45  7.209 (LL)   POC PCO2 35 - 45 mmHg 60.1 (HH)   POC PO2 80 - 100 mmHg 90   POC BE -2 to 2 mmol/L -4   POC HCO3 24 - 28 mmol/L 24.0   POC SATURATED O2 95 - 100 % 95   FiO2  50   Vt  239   PEEP  5   Sample  ARTERIAL   DelSys  Adult Vent   Allens Test  Pass   Site  LR   Mode  AC/PRVC   Rate  30       EKG 03/29/2020  Normal sinus rhythm   Possible Left atrial enlargement   Prolonged QT   Abnormal ECG   When compared with ECG of 13-DEC-2006 09:37,   QRS duration has increased   QT has lengthened     Significant Imaging:     CXR 3/30/2022    COMPARISON:  March 29, 2022     FINDINGS:  Stable position of the tracheostomy cannula with improved lung volumes.  Nasogastric tube continues to extend beyond the inferior margins of this radiograph.  Less vascular congestion on the current study.  Increased density  in the right lung bases concerning for infiltrate and possible effusion.  Negative for pneumothorax.  The hilar and mediastinal contours and osseous structures are unchanged.     Impression:     1.  Left lower lobe atelectasis/consolidation and possible effusion.     2.  Improved lung volumes.  Stable endotracheal tube and nasogastric tube.

## 2022-03-30 NOTE — PLAN OF CARE
Problem: Adult Inpatient Plan of Care  Goal: Plan of Care Review  Outcome: Ongoing, Not Progressing  Pt remains intubated/sedated. Afebrile. Sinus phoebe to NSR on monitor. BP labile. PIVs infusing NS, Fentanyl, and versed. Pt not synchronous with vent, EICU MD aware, see interventions in Dr. Can's note. Pt turned q2hrs with wedge, heels floated with pillow. Tongue swelling decreasing but still present. 10-20ml/hr urine putput via alatorre cath. BSWR in place for pt safety. Will continue with current POC and update as needed.    repeat abd US with doppler (1/12) unremarkable  follow up hepatology  Hepatitis profile negative, may be drug induced,  dilantin / lipitor Dced,   , avoid hepatotoxic drugs  CT a/p unremarkable  GI f/u noted, will f/u AMA, ASMA, Anti LKM and Anti SLA ab

## 2022-03-30 NOTE — PROGRESS NOTES
EICU FOLLOWUP NOTE:    Bradycardia. Propofol has been decreased.  EKG with brief episode of type II heart block.  Currently, sinus bradycardia on telemetry.  Maintaining blood pressure.  Continue to monitor.     Esteban Can MD  [facility]  [phone]    2:44 AM    The patient is not synchronous with the ventilator.   Maintaining saturation on 50% FiO2.  Good breath sounds bilaterally.  No subcutaneous emphysema.  Off propofol infusion due to bradycardia.   --We will start medazepam infusion  --Blood pressure is soft, albumin bolus ordered  --May need to add dopamine if remains hypotensive and bradycardic  --Chest x-ray to confirm adequate placement of airway  --ABG  Reassess following chest x-ray and ABG.  Discussed with bedside RN    3:19 AM    Chest x-ray without any evidence of pneumothorax.  ABG resulted.  7.21/60/90--- marginally better compared to last ABG.  On pressure control.  Inspiratory pressure 20, respiratory 30 bpm.  FiO2 50%.  PEEP 5 cm. Tidal volumes around 250 cc breath stacking.  Change to volume control, tidal volume 350 cc.  However,not able to tolerate volume-controlled ventilation, PEEP pressures very high likely due to small size Cric tube.  Switch back to pressure controlled at previous settings  --Just starting midazolam infusion.  Will titrate sedation to achieve better patient ventilator synchrony  --We will likely need definitive airway, tracheostomy in the morning.  --Continue current treatment for angioedema.  Tongue is still significantly swollen

## 2022-03-30 NOTE — ANESTHESIA PREPROCEDURE EVALUATION
03/30/2022  Page Grissom is a 71 y.o., female.    Pre-op Assessment    I have reviewed the Patient Summary Reports.    I have reviewed the Nursing Notes. I have reviewed the NPO Status.   I have reviewed the Medications.     Review of Systems  Anesthesia Hx:  No problems with previous Anesthesia  Denies Family Hx of Anesthesia complications.   Denies Personal Hx of Anesthesia complications.   Social:  Non-Smoker    Hematology/Oncology:  Hematology Normal   Oncology Normal     EENT/Dental:EENT/Dental Normal   Cardiovascular:   Exercise tolerance: good Hypertension, well controlled  Hypertension, Essential Hypertension    Pulmonary:   Asthma mild  Asthma:    Renal/:  Renal/ Normal     Hepatic/GI:   GERD, well controlled  Esophageal / Stomach Disorders Gerd    Musculoskeletal:  Musculoskeletal Normal    Neurological:   Neuromuscular Disease,    Endocrine:   Diabetes, type 2  Diabetes    Dermatological:  Skin Normal    Psych:  Psychiatric Normal           Physical Exam  General:  Morbid Obesity and Unconscious      Airway/Jaw/Neck:  Airway Findings: Mouth Opening: Small, but > 3cm   Tongue: Large   Pre-Existing Airway Tube(s): Cricothyrotomy General Airway Assessment: Adult, Average  Mallampati: IV  TM Distance: Normal, at least 6 cm       Dental:  Dental Findings: In tact     Chest/Lungs:  Chest/Lungs Findings: Clear to auscultation, Normal Respiratory Rate      Heart/Vascular:  Heart Findings: Rate: Normal  Rhythm: Regular Rhythm  Sounds: Normal        Mental Status:  Mental Status Findings:  Cooperative, Alert and Oriented         Anesthesia Plan  Type of Anesthesia, risks & benefits discussed:  Anesthesia Type:  general    Patient's Preference:   Plan Factors:          Intra-op Monitoring Plan:   Intra-op Monitoring Plan Comments:   Post Op Pain Control Plan:   Post Op Pain Control Plan Comments:      Induction:   IV  Beta Blocker:  Patient is not currently on a Beta-Blocker (No further documentation required).       Informed Consent: Informed consent signed with the Patient and all parties understand the risks and agree with anesthesia plan.  All questions answered.  Anesthesia consent signed with patient.  ASA Score: 4     Day of Surgery Review of History & Physical: I have interviewed and examined the patient. I have reviewed the patient's H&P dated:  There are no significant changes.            Ready For Surgery From Anesthesia Perspective.           Physical Exam  General: Morbid Obesity and Unconscious    Airway:  Mallampati: IV   Mouth Opening: Small, but > 3cm  TM Distance: Normal, at least 6 cm  Tongue: Large  Pre-Existing Airway: Cricothyrotomy    Dental:  In tact  Large tongue secondary to angioedema. Patient cricothyrotomy in the ER yesterday  Chest/Lungs:  Clear to auscultation, Normal Respiratory Rate    Heart:  Rate: Normal  Rhythm: Regular Rhythm  Sounds: Normal          Anesthesia Plan  Type of Anesthesia, risks & benefits discussed:    Anesthesia Type: general  Induction:  IV  Informed Consent: Informed consent signed with the Patient and all parties understand the risks and agree with anesthesia plan.  All questions answered.   ASA Score: 4  Day of Surgery Review of History & Physical: I have interviewed and examined the patient. I have reviewed the patient's H&P dated:     Ready For Surgery From Anesthesia Perspective.     Past Medical History:   Diagnosis Date    Asthma     Back pain     Cataract     OD    CKD (chronic kidney disease) stage 3, GFR 30-59 ml/min 12/20/2020    Diabetes mellitus     Fibromyalgia     Gastroesophageal reflux disease     Hypercholesterolemia     Hypertension     Immune deficiency disorder     Obesity     Osteoporosis     Rheumatoid arthritis     Tobacco dependence     Trouble in sleeping     Type 2 diabetes mellitus        Past Surgical History:    Procedure Laterality Date    COLONOSCOPY N/A 2/14/2019    Procedure: COLONOSCOPY;  Surgeon: Yury Atwood MD;  Location: [facility];  Service: Endoscopy;  Laterality: N/A;    HERNIA REPAIR      OOPHORECTOMY         Family History   Problem Relation Age of Onset    Hypertension Mother     Cancer Father     Colon cancer Father     Breast cancer Sister        Social History     Socioeconomic History    Marital status:    Tobacco Use    Smoking status: Current Every Day Smoker     Packs/day: 0.50     Years: 25.00     Pack years: 12.50     Types: Cigarettes    Smokeless tobacco: Never Used    Tobacco comment: trying to quit   Substance and Sexual Activity    Alcohol use: No    Drug use: No       Current Facility-Administered Medications   Medication Dose Route Frequency Provider Last Rate Last Admin    0.9%  NaCl infusion (for blood administration)   Intravenous Q24H PRN Arik Yao Jr., MD        albuterol-ipratropium 2.5 mg-0.5 mg/3 mL nebulizer solution 3 mL  3 mL Nebulization Q4H Laney Romero MD   3 mL at 03/30/22 1121    chlorhexidine 0.12 % solution 15 mL  15 mL Mouth/Throat BID Laney Romero MD   15 mL at 03/30/22 0931    cisatracurium (NIMBEX) 200 mg in dextrose 5 % 100 mL infusion  0-10 mcg/kg/min Intravenous Continuous CELI Chowdhury 12.2 mL/hr at 03/30/22 1238 3.5 mcg/kg/min at 03/30/22 1238    dextrose 10% bolus 125 mL  12.5 g Intravenous PRN ZAMZAM ChowdhuryBC        famotidine (PF) injection 20 mg  20 mg Intravenous Daily Arik Yao Jr., MD   20 mg at 03/30/22 0931    fentaNYL 2500 mcg in 0.9% sodium chloride 250 mL infusion premix (titrating)  0-200 mcg/hr Intravenous Continuous Laney Romero MD 20 mL/hr at 03/30/22 1200 200 mcg/hr at 03/30/22 1200    glucagon (human recombinant) injection 1 mg  1 mg Intramuscular PRN CHAD ArmendarizP-C        heparin (porcine) injection 7,500 Units  7,500 Units Subcutaneous Q8H Shae Hennessy  CHADP-C   7,500 Units at 03/30/22 0556    insulin aspart U-100 pen 1-10 Units  1-10 Units Subcutaneous Q6H PRN CELI Chowdhury        lorazepam injection 4 mg  4 mg Intravenous Q4H PRN Laney Romero MD   4 mg at 03/30/22 0200    methylPREDNISolone sodium succinate injection 60 mg  60 mg Intravenous Q6H Laney Romero MD   60 mg at 03/30/22 1130    midazolam 100 mg/100 mL in dextrose 5% infusion  0-5 mg/hr Intravenous Continuous Laney Romero MD        mupirocin 2 % ointment   Nasal BID Arik Yao Jr., MD   Given at 03/30/22 0941    naloxone 0.4 mg/mL injection 0.02 mg  0.02 mg Intravenous PRN JOAO Armendariz        sodium bicarbonate 75 mEq in dextrose 5 % 1,075 mL infusion   Intravenous Continuous Laney Romero  mL/hr at 03/30/22 1238 Rate Verify at 03/30/22 1238    sodium chloride 0.9% flush 10 mL  10 mL Intravenous Q8H JOAO Armendariz   10 mL at 03/30/22 0557    white petrolatum-mineral oil 57.3-42.5% ophthalmic ointment   Both Eyes Q8H CELI Chowdhury           Review of patient's allergies indicates:   Allergen Reactions    Lisinopril Swelling     .    Lab Results   Component Value Date    WBC 8.66 03/30/2022    HGB 10.1 (L) 03/30/2022    HCT 31.8 (L) 03/30/2022    MCV 77 (L) 03/30/2022     03/30/2022       ABG  Recent Labs   Lab 03/30/22  0943   PH 7.132*   PO2 91   PCO2 68.9*   HCO3 23.0*   BE -6

## 2022-03-30 NOTE — SUBJECTIVE & OBJECTIVE
Interval History: tongue edema, cricotracheotomy tube     Medications:  Continuous Infusions:   sodium chloride 0.9% 125 mL/hr at 03/30/22 0800    cisatracurium (NIMBEX) infusion 3 mcg/kg/min (03/30/22 0850)    fentanyl      midazolam       Scheduled Meds:   chlorhexidine  15 mL Mouth/Throat BID    famotidine (PF)  20 mg Intravenous Daily    heparin (porcine)  7,500 Units Subcutaneous Q8H    methylPREDNISolone sodium succinate  60 mg Intravenous Q6H    mupirocin   Nasal BID    sodium chloride 0.9%  10 mL Intravenous Q8H    white petrolatum-mineral oil 57.3-42.5%   Both Eyes Q8H     PRN Meds:sodium chloride, albuterol-ipratropium, dextrose 10%, glucagon (human recombinant), insulin aspart U-100, lorazepam, naloxone     Review of patient's allergies indicates:   Allergen Reactions    Lisinopril Swelling     Objective:     Vital Signs (Most Recent):  Temp: 98.1 °F (36.7 °C) (03/30/22 0315)  Pulse: 61 (03/30/22 0755)  Resp: 19 (03/30/22 0755)  BP: (!) 101/57 (03/30/22 0630)  SpO2: (!) 94 % (03/30/22 0755)   Vital Signs (24h Range):  Temp:  [94.7 °F (34.8 °C)-98.1 °F (36.7 °C)] 98.1 °F (36.7 °C)  Pulse:  [29-82] 61  Resp:  [15-47] 19  SpO2:  [91 %-98 %] 94 %  BP: ()/(49-83) 101/57     Weight: 115.8 kg (255 lb 4.7 oz)  Body mass index is 43.82 kg/m².    Intake/Output - Last 3 Shifts         03/28 0700 03/29 0659 03/29 0700 03/30 0659 03/30 0700 03/31 0659    I.V. (mL/kg)  2771.1 (23.9) 299 (2.6)    Blood  143     IV Piggyback  730     Total Intake(mL/kg)  3644.1 (31.5) 299 (2.6)    Urine (mL/kg/hr)  325 (0.1)     Total Output  325     Net  +3319.1 +299                   Physical Exam  Vitals reviewed.   Constitutional:       Appearance: She is obese. She is ill-appearing.      Comments: sedated   HENT:      Mouth/Throat:      Comments: Tongue edema  Neck:      Comments: Cricotracheotomy tube in place, wound is clean  Abdominal:      General: Bowel sounds are normal.      Palpations: Abdomen is soft.       Comments: obese   Musculoskeletal:      Right lower leg: No edema.      Left lower leg: No edema.   Skin:     General: Skin is warm and dry.   Neurological:      Comments: sedated       Significant Labs:  I have reviewed all pertinent lab results within the past 24 hours.  CBC:   Recent Labs   Lab 03/30/22 0241   WBC 8.66   RBC 4.13   HGB 10.1*   HCT 31.8*      MCV 77*   MCH 24.5*   MCHC 31.8*     BMP:   Recent Labs   Lab 03/30/22 0241   *      K 3.5      CO2 20*   BUN 56*   CREATININE 8.7*   CALCIUM 7.0*   MG 2.1     ABGs:   Recent Labs   Lab 03/30/22 0248   PH 7.209*   PCO2 60.1*   PO2 90   HCO3 24.0   POCSATURATED 95   BE -4       Significant Diagnostics:  I have reviewed all pertinent imaging results/findings within the past 24 hours.  No new

## 2022-03-30 NOTE — ASSESSMENT & PLAN NOTE
Emergent cricothyrotomy severe angioedema trach in place, severe hypercapnia and respiratory acidosis repeat ABG nebs IV steroids antihistamine  3/30 check cuff leak , ETT versus larger trach today   3/31 check cuff leak , solumedrol , pepcid

## 2022-03-30 NOTE — ASSESSMENT & PLAN NOTE
--admit to ICU  --stop lisiniopril  --pulmonology following  --supportive care    OR for replacement of cricoid trach

## 2022-03-30 NOTE — ASSESSMENT & PLAN NOTE
Patient had urgent placement of a cricothyroidotomy tube through the trachea, likely below the cricothyroid membrane/cartilage.    If this tube is not to be removed today, or converted to endotracheal tube or tracheostomy tube    Will discuss with the critial care physician to determine what tube is their preference    She is scheduled for the OR this afternood    She can have a endotracheal tube placed with removal of the cricoid thyroid ostomy tube or the cricothyroidotomy tube can be upgraded to a formal 7.5 or 8 [place] tracheostomy tube.    Will need to obtain consent from family

## 2022-03-30 NOTE — CONSULTS
Paeg Grissom is a 71 y.o. female for whom nephrology consult has been requested to evaluate and give opinion.     HPI:71 yr/o female with H/O asthma,CKD,DM,HTN & immune defeciency disorder brought to ER for tongue swelling & respiratory distress,Pt in tubated,called for elevated Cr & hypokalemia.Pt was on HCTZ,lisinopril at   Home.    PAST MEDICAL HISTORY:  She  has a past medical history of Asthma, Back pain, Cataract, CKD (chronic kidney disease) stage 3, GFR 30-59 ml/min (12/20/2020), Diabetes mellitus, Fibromyalgia, Gastroesophageal reflux disease, Hypercholesterolemia, Hypertension, Immune deficiency disorder, Obesity, Osteoporosis, Rheumatoid arthritis, Tobacco dependence, Trouble in sleeping, and Type 2 diabetes mellitus.    PAST SURGICAL HISTORY:  She  has a past surgical history that includes Oophorectomy; Hernia repair; and Colonoscopy (N/A, 2/14/2019).    SOCIAL HISTORY:  She  reports that she has been smoking cigarettes. She has a 12.50 pack-year smoking history. She has never used smokeless tobacco. She reports that she does not drink alcohol and does not use drugs.    FAMILY MEDICAL HISTORY:  Her family history includes Breast cancer in her sister; Cancer in her father; Colon cancer in her father; Hypertension in her mother.    Review of patient's allergies indicates:   Allergen Reactions    Lisinopril Swelling        chlorhexidine  15 mL Mouth/Throat BID    famotidine (PF)  20 mg Intravenous Daily    heparin (porcine)  7,500 Units Subcutaneous Q8H    methylPREDNISolone sodium succinate  60 mg Intravenous Q6H    mupirocin   Nasal BID    sodium chloride 0.9%  10 mL Intravenous Q8H       Prior to Admission medications    Medication Sig Start Date End Date Taking? Authorizing Provider   albuterol (PROVENTIL/VENTOLIN HFA) 90 mcg/actuation inhaler INHALE 2 PUFFS INTO THE LUNGS EVERY 6 HOURS AS NEEDED FOR WHEEZING. DISPENSE WITH SPACER. 7/10/21   Cony Davidson NP   amLODIPine (NORVASC)  10 MG tablet TAKE 1 TABLET(10 MG) BY MOUTH EVERY DAY 5/17/21   THEA Roman Jr., MD   ammonium lactate 12 % Crea Apply 1 Act topically 2 (two) times daily. 12/10/20   Trey Swan DPM   cholecalciferol, vitamin D3, 2,000 unit Tab Take 2,000 Units by mouth once daily.    Historical Provider   diphenhydrAMINE (SOMINEX) 25 mg tablet Take 25 mg by mouth nightly as needed for Allergies.    Historical Provider   fish oil-omega-3 fatty acids 300-1,000 mg capsule Take 2 g by mouth once daily.    Historical Provider   hydrALAZINE (APRESOLINE) 25 MG tablet TAKE 1 TABLET BY MOUTH EVERY 12 HOURS 5/29/21   THEA Roman Jr., MD   hydroCHLOROthiazide (HYDRODIURIL) 25 MG tablet TAKE 1 TABLET(25 MG) BY MOUTH EVERY DAY 5/14/21   THEA Roman Jr., MD   hydrOXYchloroQUINE (PLAQUENIL) 200 mg tablet Take 1 tablet (200 mg total) by mouth once daily. 2/21/22   Tiara Avalos PA-C   lisinopriL (PRINIVIL,ZESTRIL) 40 MG tablet TAKE 1 TABLET BY MOUTH EVERY DAY 6/16/21   THEA Roman Jr., MD   metFORMIN (GLUCOPHAGE) 500 MG tablet TAKE 1 TABLET(500 MG) BY MOUTH EVERY DAY 12/13/21   THEA Roman Jr., MD   multivitamin (THERAGRAN) per tablet Take 1 tablet by mouth once daily.    Historical Provider   rosuvastatin (CRESTOR) 40 MG Tab Take 1 tablet (40 mg total) by mouth every evening. 6/7/21   THEA Roman Jr., MD   methylPREDNISolone (MEDROL DOSEPACK) 4 mg tablet use as directed 2/21/22 3/29/22  Tiara Avalos PA-C   phenylephrine/DM/acetaminop/GG (MUCINEX SINUS-MAX SEV TERRIE,DM, ORAL) Take by mouth as needed.  3/29/22  Historical Provider            CONOR unable to perform          Intake/Output Summary (Last 24 hours) at 3/29/2022 2002  Last data filed at 3/29/2022 1800  Gross per 24 hour   Intake 1900.39 ml   Output 120 ml   Net 1780.39 ml       PHYSICAL EXAM:  Physical Exam  Constitutional:       Appearance: She is ill-appearing and toxic-appearing.      Interventions: She is intubated.   HENT:       Mouth/Throat:      Comments: Swollen tongue  Cardiovascular:      Heart sounds: S1 normal and S2 normal.   Pulmonary:      Effort: She is intubated.      Comments: Intubated on the ventilator  Abdominal:      General: Bowel sounds are normal.   Musculoskeletal:      Right lower leg: No edema.      Left lower leg: No edema.   Skin:     General: Skin is warm.   Neurological:      Comments: sedated   Psychiatric:      Comments: N/A              Recent Labs   Lab 03/29/22  0803 03/29/22  1216 03/29/22  1454    138  --    K 2.5* 3.1* 3.4*   CL 98 99  --    CO2 21* 22*  --    BUN 55* 55*  --    CREATININE 8.8* 8.9*  --    CALCIUM 7.2* 6.9*  --    PHOS 5.1*  --   --      Recent Labs   Lab 03/29/22  0803   WBC 11.09   HGB 10.9*   HCT 34.6*          IMPRESSION AND RECOMMENDATIONS:    KYE:oligo anuric probably sec to shock from anaphylasis    Hypokalemia:On IV kcl repletion,Mg is OK    Hypotention:On steroids,management per ICU    Would maintain K from 3.8-4.0 range,D/W  ICU team  Anaphylactic shock management per ICU    Thank you for allowing me to participate in this patient care.

## 2022-03-30 NOTE — PROGRESS NOTES
[facility] - Intensive Care ([facility])  [facility] Medicine  Progress Note    Patient Name: Page Grissom  MRN: [id number]  Patient Class: IP- Inpatient   Admission Date: 3/29/2022  Length of Stay: 1 days  Attending Physician: Bc Crocker MD  Primary Care Provider: NEELAM Roman Jr, MD        Subjective:     Principal Problem:Angioedema        HPI:  70 y/o female with PMHx of HTN, HLD, DM, asthma, CKD 3, and RA who presented to the ED with c/o angioedema that onset suddenly this morning. Pt was able to verbally communicate with EMS, was not able to communicate with ED staff. Staff was unable to intubate and called surgery, who performed emergency trach. Pt is currently on vent and sedated.  ED workup shows: H/H 10.9/34.6, K+ 2.9, CO2 22, Anion gap 17, BUN 55, Cr 8.8.  She will be admitted to ICU for angioedema under the care of [facility] medicine.  She is a full code and her SDM is her daughter, Cassy.        Overview/Hospital Course:  3/30 emergently trach'd in ED as difficulty intubating d/t angioedema. Going to OR for replacement of tube      Interval History: See hospital course for today      Review of Systems   Unable to perform ROS: Intubated   Objective:     Vital Signs (Most Recent):  Temp: 97.8 °F (36.6 °C) (03/30/22 0715)  Pulse: 64 (03/30/22 1003)  Resp: 20 (03/30/22 1003)  BP: (!) 102/57 (03/30/22 1003)  SpO2: 95 % (03/30/22 1003)   Vital Signs (24h Range):  Temp:  [94.7 °F (34.8 °C)-98.1 °F (36.7 °C)] 97.8 °F (36.6 °C)  Pulse:  [29-80] 64  Resp:  [15-47] 20  SpO2:  [91 %-98 %] 95 %  BP: ()/(49-83) 102/57     Weight: 115.8 kg (255 lb 4.7 oz)  Body mass index is 43.82 kg/m².    Intake/Output Summary (Last 24 hours) at 3/30/2022 1034  Last data filed at 3/30/2022 1000  Gross per 24 hour   Intake 4152.16 ml   Output 280 ml   Net 3872.16 ml      Physical Exam  Vitals and nursing note reviewed.   Constitutional:       General: She is not in acute distress.     Appearance: She is morbidly obese. She  is ill-appearing. She is not toxic-appearing.      Interventions: She is sedated and restrained.   HENT:      Head: Normocephalic and atraumatic.      Mouth/Throat:      Comments: Swollen tongue  Neck:        Comments: trach  Cardiovascular:      Rate and Rhythm: Normal rate.   Pulmonary:      Comments: Mechanical breath sounds  Abdominal:      General: There is distension.      Palpations: Abdomen is soft.   Genitourinary:     Comments: alatorre  Musculoskeletal:         General: Swelling present.   Skin:     General: Skin is warm.       Significant Labs: All pertinent labs within the past 24 hours have been reviewed.  CBC:   Recent Labs   Lab 03/29/22  0803 03/30/22  0241   WBC 11.09 8.66   HGB 10.9* 10.1*   HCT 34.6* 31.8*    293     CMP:   Recent Labs   Lab 03/29/22  0803 03/29/22  1216 03/29/22  1454 03/30/22  0241    138  --  138   K 2.5* 3.1* 3.4* 3.5   CL 98 99  --  102   CO2 21* 22*  --  20*   * 123*  --  118*   BUN 55* 55*  --  56*   CREATININE 8.8* 8.9*  --  8.7*   CALCIUM 7.2* 6.9*  --  7.0*   PROT 7.8  --   --   --    ALBUMIN 2.8*  --   --   --    BILITOT 1.0  --   --   --    ALKPHOS 101  --   --   --    AST 21  --   --   --    ALT 8*  --   --   --    ANIONGAP 19* 17*  --  16   EGFRNONAA 4* 4*  --  4*       Significant Imaging: I have reviewed all pertinent imaging results/findings within the past 24 hours.      Assessment/Plan:      * Angioedema  --likely 2/2 lisinopril  --admit to ICU  --had emergent trach in ED, on ventalitor  --IV steroids, IV antihistamiens  --Fresh Frozen Plasma ordered in ED  --monitor closely    3/30  Continue supportive care  Minimal improvement in appearance  Consider bradykinin inhibitor?  Vent Mode: A/C  Oxygen Concentration (%):  [50] 50  Resp Rate Total:  [13 br/min-46 br/min] 20 br/min  Vt Set:  [0 mL-400 mL] 400 mL  PEEP/CPAP:  [5 cmH20] 5 cmH20  Pressure Support:  [0 cmH20-10 cmH20] 0 cmH20  Mean Airway Pressure:  [10 fmW35-90 cmH20] 14  cmH20      Morbid obesity        KYE (acute kidney injury)  --baseline BUN/Cr 20/1.4  --IVF's  --stop lisinopril  --avoid nephrotoxic agents    Nephrology following  Continue monitoring       Tracheostomy in place  --admit to ICU  --stop lisiniopril  --pulmonology following  --supportive care    OR for replacement of cricoid trach      Rheumatoid arthritis involving multiple sites with positive rheumatoid factor  --hold plaquenil  --f/u OP         VTE Risk Mitigation (From admission, onward)         Ordered     Apply sequential compression device to lower extremities (if no contraindications). Medical venous thromboembolus prophylaxis is preferred.  Until discontinued         03/30/22 0805     heparin (porcine) injection 7,500 Units  Every 8 hours         03/29/22 1003     IP VTE HIGH RISK PATIENT  Once         03/29/22 1003                Discharge Planning   CHALO:      Code Status: Full Code   Is the patient medically ready for discharge?:     Reason for patient still in hospital (select all that apply): Patient trending condition, Laboratory test, Treatment and Consult recommendations  Discharge Plan A: Home with family            Critical care time spent on the evaluation and treatment of severe organ dysfunction, review of pertinent labs and imaging studies, discussions with consulting providers and discussions with patient/family: 20 minutes.      Bc Crocker MD  Department of Hospital Medicine   [facility] - Intensive Care ([facility])

## 2022-03-30 NOTE — ASSESSMENT & PLAN NOTE
--likely 2/2 lisinopril  --admit to ICU  --had emergent trach in ED, on ventalitor  --IV steroids, IV antihistamiens  --Fresh Frozen Plasma ordered in ED  --monitor closely    3/30  Continue supportive care  Minimal improvement in appearance  Consider bradykinin inhibitor?  Vent Mode: A/C  Oxygen Concentration (%):  [50] 50  Resp Rate Total:  [13 br/min-46 br/min] 20 br/min  Vt Set:  [0 mL-400 mL] 400 mL  PEEP/CPAP:  [5 cmH20] 5 cmH20  Pressure Support:  [0 cmH20-10 cmH20] 0 cmH20  Mean Airway Pressure:  [10 zsC20-69 cmH20] 14 cmH20

## 2022-03-30 NOTE — PROGRESS NOTES
[facility]  Critical Care Medicine  Consult Note    Patient Name: Page Grissom  MRN: [id number]  Admission Date: 3/29/2022  Hospital Length of Stay: 1 days  Code Status: Full Code  Attending Physician: Bc Crocker MD   Primary Care Provider: NEELAM Roman Jr, MD   Principal Problem: Angioedema    [unfilled]  Subjective:     HPI:  71 y.o. female patient PMHx of asthma, CKD, DM2, HTN, and immune deficiency disorder  brought to the emergency room for acute onset shortness of breath severe tongue swelling and respiratory distress.          Hospital/ICU Course:  Patient was hard to intubate , general surgery consulted, and emergency cricothyrotomy was performed with a cricothyrotomy tube in place.  FiO2 50% O2 sat 91%.  Sedated with propofol.  Chest x-ray ABG reviewed.    3/30 seen and examined, events overnight noted , now paralyzed for vent asynchrony , sedated, oliguric , awaiting OR today change cricotrhyrotomy tube   to ETT versus replace w a trach       3/30 seen and examined, events overnight noted , now paralyzed for vent asynchrony , sedated, oliguric , awaiting OR change cricothyroidotomy tube to ETT versus replace w trach   Review of Systems   Unable to perform ROS: Intubated   Objective:     Vital Signs (Most Recent):  Temp: 98.1 °F (36.7 °C) (03/30/22 0315)  Pulse: 61 (03/30/22 0932)  Resp: (!) 30 (03/30/22 0932)  BP: (!) 101/57 (03/30/22 0630)  SpO2: (!) 94 % (03/30/22 0932)   Vital Signs (24h Range):  Temp:  [94.7 °F (34.8 °C)-98.1 °F (36.7 °C)] 98.1 °F (36.7 °C)  Pulse:  [29-82] 61  Resp:  [15-47] 30  SpO2:  [91 %-98 %] 94 %  BP: ()/(49-83) 101/57     Weight: 115.8 kg (255 lb 4.7 oz)  Body mass index is 43.82 kg/m².      Intake/Output Summary (Last 24 hours) at 3/30/2022 0941  Last data filed at 3/30/2022 0900  Gross per 24 hour   Intake 4088.81 ml   Output 300 ml   Net 3788.81 ml         Physical Exam  Vitals and nursing note reviewed.   Constitutional:       General: She  is not in acute distress.     Appearance: She is well-developed.   HENT:      Head: Normocephalic and atraumatic.      Mouth/Throat:      Comments: Large swollen tongue and lips but improved compared to yesterday   Neck:      Comments: cricothyroidotomy tube through the trachea  Cardiovascular:      Rate and Rhythm: Normal rate.   Pulmonary:      Effort: Respiratory distress present.      Breath sounds: Rhonchi present.   Abdominal:      Palpations: Abdomen is soft.      Tenderness: There is no abdominal tenderness.   Genitourinary:     Comments: Ortega in place   Musculoskeletal:         General: No deformity.      Cervical back: Neck supple.   Skin:     General: Skin is warm.   Neurological:      Mental Status: She is alert.      Comments: Paralyzed        Vents:  Vent Mode: A/C (03/30/22 0932)  Ventilator Initiated: Yes (03/29/22 1120)  Set Rate: 30 BPM (03/30/22 0932)  Vt Set: 0 mL (03/30/22 0932)  Pressure Support: 0 cmH20 (03/30/22 0932)  PEEP/CPAP: 5 cmH20 (03/30/22 0932)  Oxygen Concentration (%): 50 (03/30/22 0932)  Peak Airway Pressure: 26 cmH2O (03/30/22 0932)  Plateau Pressure: 0 cmH20 (03/30/22 0932)  Total Ve: 6.52 mL (03/30/22 0932)  F/VT Ratio<105 (RSBI): 138.25 (03/30/22 0932)    Lines/Drains/Airways       Drain  Duration                  NG/OG Tube 03/29/22 0725 [place] sump;orogastric 18 Fr. Right mouth 1 day         Urethral Catheter 03/29/22 0900 18 Fr. 1 day              Airway  Duration                  Surgical Airway 03/29/22 0740 Other (Comment) Cuffed 1 day              Peripheral Intravenous Line  Duration                  Peripheral IV - Single Lumen 03/29/22 0706 20 G Left Antecubital 1 day         Peripheral IV - Single Lumen 03/29/22 0725 18 G Anterior;Proximal;Right Forearm 1 day         Peripheral IV - Single Lumen 03/29/22 0825 18 G Right  1 day         Peripheral IV - Single Lumen 03/29/22 1222 18 G Left Upper Arm <1 day                    Significant Labs:    CBC/Anemia  Profile:  Recent Labs   Lab 03/29/22  0803 03/30/22  0241   WBC 11.09 8.66   HGB 10.9* 10.1*   HCT 34.6* 31.8*    293   MCV 78* 77*   RDW 15.9* 16.1*           Latest Reference Range & Units 03/30/22 02:48   POC PH 7.35 - 7.45  7.209 (LL)   POC PCO2 35 - 45 mmHg 60.1 (HH)   POC PO2 80 - 100 mmHg 90   POC BE -2 to 2 mmol/L -4   POC HCO3 24 - 28 mmol/L 24.0   POC SATURATED O2 95 - 100 % 95   FiO2  50   Vt  239   PEEP  5   Sample  ARTERIAL   DelSys  Adult Vent   Allens Test  Pass   Site  LR   Mode  AC/PRVC   Rate  30       EKG 03/29/2020  Normal sinus rhythm   Possible Left atrial enlargement   Prolonged QT   Abnormal ECG   When compared with ECG of 13-DEC-2006 09:37,   QRS duration has increased   QT has lengthened     Significant Imaging:     CXR 3/30/2022    COMPARISON:  March 29, 2022     FINDINGS:  Stable position of the tracheostomy cannula with improved lung volumes.  Nasogastric tube continues to extend beyond the inferior margins of this radiograph.  Less vascular congestion on the current study.  Increased density in the right lung bases concerning for infiltrate and possible effusion.  Negative for pneumothorax.  The hilar and mediastinal contours and osseous structures are unchanged.     Impression:     1.  Left lower lobe atelectasis/consolidation and possible effusion.     2.  Improved lung volumes.  Stable endotracheal tube and nasogastric tube.            ABG  Recent Labs   Lab 03/30/22  0943   PH 7.132*   PO2 91   PCO2 68.9*   HCO3 23.0*   BE -6     Assessment/Plan:     ENT  Tracheostomy in place  Emergent cricothyrotomy severe angioedema cricothyroidotomy tube through the trachea  in place, severe hypercapnia and respiratory acidosis repeat ABG nebs IV steroids antihistamine  3/30 zero cuff leak ,Discussed with surgeon Dr. Antunez , there was zero leak on cuff leak test today , I suggested to give patient one more day for upper airway edema to improve on Medical Rx and then proceed with Trach  or ETT placemnet in OR .     Renal/  KYE (acute kidney injury)  Strict I&Os.  IV fluid.  Monitor BMP.  Avoid nephrotoxic drugs.  On lisinopril since June 2021  3/30 change to bicarb drip , strict I/O's , nephrology follow up     Orthopedic  Rheumatoid arthritis involving multiple sites with positive rheumatoid factor  On hydroxychloroquine as outpatient.  No recent change in treatment noted  3/30 continue same     Other  * Angioedema  Severe was hard to intubate for respiratory distress status post emergent cricothyroidotomy with  cricothyroidotomy tube through the trachea Sedated with propofol.  On IV steroids and antihistamines.  Check tryptase level IgE.  No recent medicine introduction.  On lisinopril ordered in June 2021  3/30 check cuff leak , steroids antihistamines IV , Ige , C1 q esterase  Inh , Tryptase level pending       Critical Care Daily Checklist:    A: Awake: RASS Goal/Actual Goal: RASS Goal: -5-->unarousable  Actual: [place] Agitation Sedation Scale (RASS): Unarousable   B: Spontaneous Breathing Trial Performed?     C: SAT & SBT Coordinated?           cricothyroidotomy tube through the trachea            D: Delirium: CAM-ICU Overall CAM-ICU: Negative   E: Early Mobility Performed? Yes   F: Feeding Goal:    Status:     Current Diet Order   Procedures    Diet NPO      AS: Analgesia/Sedation Fentanyl, versed, nimbex    T: Thromboembolic Prophylaxis Heparin sc   H: HOB > 300 Yes   U: Stress Ulcer Prophylaxis (if needed) Famotidine   G: Glucose Control Fingerstick q.6 hours SSI   B: Bowel Function     I: Indwelling Catheter (Lines & Ortega) Necessity Keep Ortega KYE   D: De-escalation of Antimicrobials/Pharmacotherapies No antibiotic    Plan for the day/ETD Y     Code Status:  Family/Goals of Care: Full Code     Discussed with surgeon Dr. Antunez , there was zero leak on cuff leak test today , I suggested to give patient one more day for upper airway edema to improve on Medical Rx and then proceed  with Trach or ETT placemnet in OR .     Critical Care Time: 35 minutes  Critical secondary to Patient has a condition that poses threat to life and bodily function: Severe Respiratory Distress and Acute Renal Failure     Critical care was time spent personally by me on the following activities: development of treatment plan with patient or surrogate and bedside caregivers, discussions with consultants, evaluation of patient's response to treatment, examination of patient, ordering and performing treatments and interventions, ordering and review of laboratory studies, ordering and review of radiographic studies, pulse oximetry, re-evaluation of patient's condition. This critical care time did not overlap with that of any other provider or involve time for any procedures.      Laney Romero MD  Critical Care Medicine  [facility] - Emergency Dept.

## 2022-03-30 NOTE — ASSESSMENT & PLAN NOTE
Emergent cricothyrotomy severe angioedema trach in place, severe hypercapnia and respiratory acidosis repeat ABG nebs IV steroids antihistamine  3/30 check cuff leak , ETT versus larger trach today

## 2022-03-30 NOTE — PLAN OF CARE
Patient VSS. Bicarb gtt started per provider and nimbex gtt ordered for vent synchrony. Patient currently on fent, versed, nimbex, bicarb gtts. RASS goal -5; TOF goal 2/4. BIS monitoring in place. See documentation. Plan to take patient to OR this evening. Telephone consent done with daughter, Cassy, and MD with 2 nurse witnesses. Patient turned q2, heels elevated.     Problem: Adult Inpatient Plan of Care  Goal: Plan of Care Review  Outcome: Ongoing, Progressing  Goal: Patient-Specific Goal (Individualized)  3/30/2022 1704 by Adrianne Swift RN  Outcome: Ongoing, Progressing  3/30/2022 0923 by Adrianne Swift RN  Flowsheets (Taken 3/29/2022 1500)  Individualized Care Needs: BSWR applied 3/29 at 1500  3/30/2022 0922 by Adrianne Swift RN  Flowsheets (Taken 3/30/2022 0922)  Individualized Care Needs: BSWR safely removed 3/30 @ 0830  Goal: Absence of Hospital-Acquired Illness or Injury  Outcome: Ongoing, Progressing  Goal: Optimal Comfort and Wellbeing  Outcome: Ongoing, Progressing  Goal: Readiness for Transition of Care  Outcome: Ongoing, Progressing

## 2022-03-30 NOTE — TRANSFER OF CARE
"Anesthesia Transfer of Care Note    Patient: Page Grissom    Procedure(s) Performed: Procedure(s) (LRB):  CREATION, TRACHEOSTOMY (N/A)  Bronchoscopy (N/A)    Patient location: ICU    Anesthesia Type: general    Transport from OR: Transported from OR on room air with adequate spontaneous ventilation    Post pain: adequate analgesia    Post assessment: no apparent anesthetic complications and tolerated procedure well    Post vital signs: stable    Level of consciousness: awake, responds to stimulation and alert    Nausea/Vomiting: no nausea/vomiting    Complications: none    Transfer of care protocol was followed      Last vitals:   Visit Vitals  BP (!) 100/51   Pulse 65   Temp 36.6 °C (97.8 °F) (Axillary)   Resp (!) 24   Ht 5' 4" (1.626 m)   Wt 115.8 kg (255 lb 4.7 oz)   SpO2 97%   BMI 43.82 kg/m²     "

## 2022-03-30 NOTE — HOSPITAL COURSE
3/30 emergently trach'd in ED as difficulty intubating d/t angioedema. Going to OR for replacement of tube  3/31 hypotensive requiring pressor support. Leukocytosis with electrolyte abn noted. Concerns for sepsis in immunocompromised patient. Low UOP. Nephrology following for possible dialysis. Guarded prognosis. Consider palliative consult for GOC/family support  4/1 remains on ventilatory support via trach. NG placed for med admin  4/2 somnolent but following commands, ESRD not currently receiving dialysis pt received versed for crich procedure slow metabolizer still somnolent  4/3- somnolent but following commands. D/w nephro dr. Mane trial of dialysis in am to metabolize sedatives pt does not seem to be metabolizing sedatives. Stable respiratory status  4/4- CT head 4/4 reveals mild subacute to chronic appearing ischemic changes to deep white matter both cerebral hemispheres. Pt remains somnolent EEG pending. Neuro following. Trial of dialysis to address somnolence.  4/5- pt's mentation unchanged following HD. D/w neuro Dr. Ferrera; EEG shows metabolic encephalopathy. Neurological insult is likely multifactorial-prognosis still unknown.  4/6- pt's mentation has improved she now has her eyes open spontaneously, following commands. Creatinine has improved following dialysis down to 3.7 from 8.9. MRI Brain was negative for infarct or bleed  4/7- acidosis and volume status improved with dialysis. Reviewed nephrology note most likely patient will no longer need HD creatinine stable at roughly 3.4 for 3 days. Mentation continues to improve patient talking (silently, no passy russell valve available yet) and writing down questions.  4/8 passed barium swallow. Ok for regular diet  4/9 +cough, n/v after eating breakfast. Physical/occupational therapy consulted, recommending rehab. On 2L. Using PMV and speaking. RT allowing rest from use and suctioning.  4/10 +cough, dyspnea. Mildly elevated bp, not able to take lisinopril.  Hydralazine added  4/11 appears more comfortable; breakfast brought closer to patient's side. Plans for suture removal around trach. Awaiting placement, rehab    Patient has been accepted to rehab. Speech evaluated for concerns of dysphagia. Patient will benefit from continued speech treatment.     Patient seen and evaluated by me. Patient was determined to be suitable for d/c. Patient deemed stable for discharge to rehab.

## 2022-03-30 NOTE — SUBJECTIVE & OBJECTIVE
Interval History: See hospital course for today      Review of Systems   Unable to perform ROS: Intubated   Objective:     Vital Signs (Most Recent):  Temp: 97.8 °F (36.6 °C) (03/30/22 0715)  Pulse: 64 (03/30/22 1003)  Resp: 20 (03/30/22 1003)  BP: (!) 102/57 (03/30/22 1003)  SpO2: 95 % (03/30/22 1003)   Vital Signs (24h Range):  Temp:  [94.7 °F (34.8 °C)-98.1 °F (36.7 °C)] 97.8 °F (36.6 °C)  Pulse:  [29-80] 64  Resp:  [15-47] 20  SpO2:  [91 %-98 %] 95 %  BP: ()/(49-83) 102/57     Weight: 115.8 kg (255 lb 4.7 oz)  Body mass index is 43.82 kg/m².    Intake/Output Summary (Last 24 hours) at 3/30/2022 1034  Last data filed at 3/30/2022 1000  Gross per 24 hour   Intake 4152.16 ml   Output 280 ml   Net 3872.16 ml      Physical Exam  Vitals and nursing note reviewed.   Constitutional:       General: She is not in acute distress.     Appearance: She is morbidly obese. She is ill-appearing. She is not toxic-appearing.      Interventions: She is sedated and restrained.   HENT:      Head: Normocephalic and atraumatic.      Mouth/Throat:      Comments: Swollen tongue  Neck:        Comments: trach  Cardiovascular:      Rate and Rhythm: Normal rate.   Pulmonary:      Comments: Mechanical breath sounds  Abdominal:      General: There is distension.      Palpations: Abdomen is soft.   Genitourinary:     Comments: alatorre  Musculoskeletal:         General: Swelling present.   Skin:     General: Skin is warm.       Significant Labs: All pertinent labs within the past 24 hours have been reviewed.  CBC:   Recent Labs   Lab 03/29/22  0803 03/30/22  0241   WBC 11.09 8.66   HGB 10.9* 10.1*   HCT 34.6* 31.8*    293     CMP:   Recent Labs   Lab 03/29/22  0803 03/29/22  1216 03/29/22  1454 03/30/22  0241    138  --  138   K 2.5* 3.1* 3.4* 3.5   CL 98 99  --  102   CO2 21* 22*  --  20*   * 123*  --  118*   BUN 55* 55*  --  56*   CREATININE 8.8* 8.9*  --  8.7*   CALCIUM 7.2* 6.9*  --  7.0*   PROT 7.8  --   --   --     ALBUMIN 2.8*  --   --   --    BILITOT 1.0  --   --   --    ALKPHOS 101  --   --   --    AST 21  --   --   --    ALT 8*  --   --   --    ANIONGAP 19* 17*  --  16   EGFRNONAA 4* 4*  --  4*       Significant Imaging: I have reviewed all pertinent imaging results/findings within the past 24 hours.

## 2022-03-30 NOTE — ASSESSMENT & PLAN NOTE
Strict I&Os.  IV fluid.  Monitor BMP.  Avoid nephrotoxic drugs.  On lisinopril since June 2021  3/30 change to bicarb drip , strict I/O's , nephrology follow up

## 2022-03-30 NOTE — ASSESSMENT & PLAN NOTE
Severe was hard to intubate for respiratory distress status post emergent cricothyroidotomy with size 5 trach.  Sedated with propofol.  On IV steroids and antihistamines.  Check tryptase level IgE.  No recent medicine introduction.  On lisinopril ordered in June 2021  3/30 check cuff leak , steroids antihistamines IV , Ige , C1 q esterase  Inh , Tryptase level pending

## 2022-03-30 NOTE — NURSING
MD made aware of critical calcium level, along with the rest of BMP results. calcium replacement ordered. No other orders received at this time. Will monitor.

## 2022-03-30 NOTE — ANESTHESIA POSTPROCEDURE EVALUATION
Anesthesia Post Evaluation    Patient: Page Grissom    Procedure(s) Performed: Procedure(s) (LRB):  CREATION, TRACHEOSTOMY (N/A)  Bronchoscopy (N/A)    Final Anesthesia Type: general      Patient location during evaluation: PACU  Patient participation: No - Unable to Participate, Intubation  Level of consciousness: sedated  Post-procedure vital signs: reviewed and stable  Pain management: adequate  Airway patency: patent  RANDEE mitigation strategies: Verification of full reversal of neuromuscular block  PONV status at discharge: No PONV  Anesthetic complications: no      Cardiovascular status: hemodynamically stable  Respiratory status: spontaneous ventilation  Hydration status: euvolemic  Follow-up not needed.          Vitals Value Taken Time   /64 03/30/22 1829   Temp  03/30/22 1829   Pulse 62 03/30/22 1829   Resp 25 03/30/22 1829   SpO2 93 % 03/30/22 1829   Vitals shown include unvalidated device data.      No case tracking events are documented in the log.      Pain/José Score: Pain Rating Prior to Med Admin: 0 (3/30/2022  7:09 AM)  Pain Rating Post Med Admin: 0 (3/29/2022  2:35 PM)

## 2022-03-30 NOTE — PROGRESS NOTES
[facility] - Intensive [facility])  General Surgery  Progress Note    Subjective:     History of Present Illness:  Patient presented to the emergency room with acute respiratory distress.  Indication was unsuccessful by the emergency room position in the anesthesia department.  Surgery was called emergency for a cricothyroidotomy.      Post-Op Info:  Procedure(s) (LRB):  CREATION, TRACHEOSTOMY (N/A)  Bronchoscopy (N/A)         Interval History: tongue edema, cricotracheotomy tube     Medications:  Continuous Infusions:   sodium chloride 0.9% 125 mL/hr at 03/30/22 0800    cisatracurium (NIMBEX) infusion 3 mcg/kg/min (03/30/22 0850)    fentanyl      midazolam       Scheduled Meds:   chlorhexidine  15 mL Mouth/Throat BID    famotidine (PF)  20 mg Intravenous Daily    heparin (porcine)  7,500 Units Subcutaneous Q8H    methylPREDNISolone sodium succinate  60 mg Intravenous Q6H    mupirocin   Nasal BID    sodium chloride 0.9%  10 mL Intravenous Q8H    white petrolatum-mineral oil 57.3-42.5%   Both Eyes Q8H     PRN Meds:sodium chloride, albuterol-ipratropium, dextrose 10%, glucagon (human recombinant), insulin aspart U-100, lorazepam, naloxone     Review of patient's allergies indicates:   Allergen Reactions    Lisinopril Swelling     Objective:     Vital Signs (Most Recent):  Temp: 98.1 °F (36.7 °C) (03/30/22 0315)  Pulse: 61 (03/30/22 0755)  Resp: 19 (03/30/22 0755)  BP: (!) 101/57 (03/30/22 0630)  SpO2: (!) 94 % (03/30/22 0755)   Vital Signs (24h Range):  Temp:  [94.7 °F (34.8 °C)-98.1 °F (36.7 °C)] 98.1 °F (36.7 °C)  Pulse:  [29-82] 61  Resp:  [15-47] 19  SpO2:  [91 %-98 %] 94 %  BP: ()/(49-83) 101/57     Weight: 115.8 kg (255 lb 4.7 oz)  Body mass index is 43.82 kg/m².    Intake/Output - Last 3 Shifts         03/28 0700 03/29 0659 03/29 0700 03/30 0659 03/30 0700 03/31 0659    I.V. (mL/kg)  2771.1 (23.9) 299 (2.6)    Blood  143     IV Piggyback  730     Total Intake(mL/kg)  3644.1 (31.5) 299 (2.6)     Urine (mL/kg/hr)  325 (0.1)     Total Output  325     Net  +3319.1 +299                   Physical Exam  Vitals reviewed.   Constitutional:       Appearance: She is obese. She is ill-appearing.      Comments: sedated   HENT:      Mouth/Throat:      Comments: Tongue edema  Neck:      Comments: Cricotracheotomy tube in place, wound is clean  Abdominal:      General: Bowel sounds are normal.      Palpations: Abdomen is soft.      Comments: obese   Musculoskeletal:      Right lower leg: No edema.      Left lower leg: No edema.   Skin:     General: Skin is warm and dry.   Neurological:      Comments: sedated       Significant Labs:  I have reviewed all pertinent lab results within the past 24 hours.  CBC:   Recent Labs   Lab 03/30/22  0241   WBC 8.66   RBC 4.13   HGB 10.1*   HCT 31.8*      MCV 77*   MCH 24.5*   MCHC 31.8*     BMP:   Recent Labs   Lab 03/30/22  0241   *      K 3.5      CO2 20*   BUN 56*   CREATININE 8.7*   CALCIUM 7.0*   MG 2.1     ABGs:   Recent Labs   Lab 03/30/22  0248   PH 7.209*   PCO2 60.1*   PO2 90   HCO3 24.0   POCSATURATED 95   BE -4       Significant Diagnostics:  I have reviewed all pertinent imaging results/findings within the past 24 hours.    Memo Cummings MD  [phone] 3/30/2022 STAT     Narrative & Impression  EXAMINATION:  XR CHEST AP PORTABLE     CLINICAL HISTORY:  respiratory failure;     COMPARISON:  March 29, 2022     FINDINGS:  Stable position of the tracheostomy cannula with improved lung volumes.  Nasogastric tube continues to extend beyond the inferior margins of this radiograph.  Less vascular congestion on the current study.  Increased density in the right lung bases concerning for infiltrate and possible effusion.  Negative for pneumothorax.  The hilar and mediastinal contours and osseous structures are unchanged.     Impression:     1.  Left lower lobe atelectasis/consolidation and possible effusion.     2.  Improved lung volumes.  Stable  endotracheal tube and nasogastric tube.     3.  Follow-up radiographs recommended. .        Electronically signed by: Memo Cummings MD  Date:                                            03/30/2022  Time:                                           07:34      Assessment/Plan:     Tracheostomy in place  Patient had urgent placement of a cricothyroidotomy tube through the trachea, likely below the cricothyroid membrane/cartilage.    If this tube is not to be removed today, or converted to endotracheal tube or tracheostomy tube    Will discuss with the [facility] care physician to determine what tube is their preference    She is scheduled for the OR this afternood    She can have a endotracheal tube placed with removal of the cricoid thyroid ostomy tube or the cricothyroidotomy tube can be upgraded to a formal 7.5 or 8 [place] tracheostomy tube.    Will need to obtain consent from family            Isak Yadav MD  General Surgery  [facility] - Intensive Care ([facility])

## 2022-03-30 NOTE — OP NOTE
[facility] - Intensive Care ([facility])  Surgery Department  Operative Note    SUMMARY     Date of Procedure: 3/30/2022     Procedure: Procedure(s) (LRB):  CREATION, TRACHEOSTOMY (N/A)  Bronchoscopy (N/A)     Surgeon(s) and Role:  Panel 1:     * Isak Yadav MD - Primary  Panel 2:     * Isak Yadav MD - Primary    Assisting Surgeon: None    Pre-Operative Diagnosis: Airway obstruction [J98.8]    Post-Operative Diagnosis: Post-Op Diagnosis Codes:     * Airway obstruction [J98.8]    Anesthesia: General    Operative Findings (including complications, if any):     The percutaneous placed tracheostomy tube was in good position above the alisia on bronchoscopy    Description of Technical Procedures:     Patient was brought into the operating room.  She remained on the critical care bed.  Anesthesia was provided by the anesthesia service.  She already had a Ortega catheter in place and a orogastric tube as well as a cricotracheostomy tube.  A roll was placed horizontally between her shoulders.  This allowed her neck to be and slight extension.  The neck was prepped and draped in the standard fashion.    A time-out was performed.    The sutures of the existing Cricotracheostomy tube were removed.  The patient was disconnected from the ventilator circuit.  A guidewire was placed through the existing tube.  The balloon on the existing tube was deflated and the tube removed.    The blue parietal dilator was is placed over the guidewire and dilated to 7.5 tracheostomy tube without difficulty.  The Blue rhino dilator was removed.  The tracheostomy tube was then placed over the guidewire any easily advanced into the trachea.    The patient was reconnected to the anesthesia circuit.  There was good return of CO2.    The bronchoscope was placed through the tracheostomy tube and the tracheostomy tube was in good position above the alisia.  Some secretions were suctioned.    The inner cannula was placed    The tracheostomy tube  was secured with 0 Prolene suture in interrupted fashion.  It was also secured with a tracheostomy tie.    Patient tolerated procedure well.  Returned to the critical care unit and hemodynamically stable but overall critical condition    Significant Surgical Tasks Conducted by the Assistant(s), if Applicable:  None    Estimated Blood Loss (EBL):  10 mL  IV fluids 150 mL           Implants:   Implant Name Type Inv. Item Serial No.  Lot No. LRB No. Used Action   Blue Rhino G2-Multi Percutaneous Tracheostomy Introducer Tray    [employer] [id number] N/A 1 Implanted       Specimens:   Specimen (24h ago, onward)            None                  Condition: Stable    Disposition: ICU - intubated and critically ill.    Attestation: I performed the procedure.

## 2022-03-31 PROBLEM — E87.4 ACIDOSIS, METABOLIC, WITH RESPIRATORY ACIDOSIS: Status: ACTIVE | Noted: 2022-03-31

## 2022-03-31 LAB
ALBUMIN SERPL BCP-MCNC: 2.5 G/DL (ref 3.5–5.2)
ALBUMIN SERPL BCP-MCNC: 2.6 G/DL (ref 3.5–5.2)
ALBUMIN SERPL BCP-MCNC: 2.6 G/DL (ref 3.5–5.2)
ALLENS TEST: ABNORMAL
ANION GAP SERPL CALC-SCNC: 17 MMOL/L (ref 8–16)
ANION GAP SERPL CALC-SCNC: 18 MMOL/L (ref 8–16)
ANION GAP SERPL CALC-SCNC: 21 MMOL/L (ref 8–16)
BASOPHILS # BLD AUTO: 0.02 K/UL (ref 0–0.2)
BASOPHILS NFR BLD: 0.1 % (ref 0–1.9)
BUN SERPL-MCNC: 61 MG/DL (ref 8–23)
BUN SERPL-MCNC: 64 MG/DL (ref 8–23)
BUN SERPL-MCNC: 65 MG/DL (ref 8–23)
CALCIUM SERPL-MCNC: 6.8 MG/DL (ref 8.7–10.5)
CALCIUM SERPL-MCNC: 7 MG/DL (ref 8.7–10.5)
CALCIUM SERPL-MCNC: 7.4 MG/DL (ref 8.7–10.5)
CHLORIDE SERPL-SCNC: 91 MMOL/L (ref 95–110)
CHLORIDE SERPL-SCNC: 93 MMOL/L (ref 95–110)
CHLORIDE SERPL-SCNC: 94 MMOL/L (ref 95–110)
CO2 SERPL-SCNC: 17 MMOL/L (ref 23–29)
CO2 SERPL-SCNC: 19 MMOL/L (ref 23–29)
CO2 SERPL-SCNC: 20 MMOL/L (ref 23–29)
CREAT SERPL-MCNC: 8.7 MG/DL (ref 0.5–1.4)
CREAT SERPL-MCNC: 8.8 MG/DL (ref 0.5–1.4)
CREAT SERPL-MCNC: 9 MG/DL (ref 0.5–1.4)
DELSYS: ABNORMAL
DIFFERENTIAL METHOD: ABNORMAL
EOSINOPHIL # BLD AUTO: 0 K/UL (ref 0–0.5)
EOSINOPHIL NFR BLD: 0 % (ref 0–8)
ERYTHROCYTE [DISTWIDTH] IN BLOOD BY AUTOMATED COUNT: 15.9 % (ref 11.5–14.5)
ERYTHROCYTE [SEDIMENTATION RATE] IN BLOOD BY WESTERGREN METHOD: 24 MM/H
EST. GFR  (AFRICAN AMERICAN): 5 ML/MIN/1.73 M^2
EST. GFR  (NON AFRICAN AMERICAN): 4 ML/MIN/1.73 M^2
FIO2: 40
GLUCOSE SERPL-MCNC: 174 MG/DL (ref 70–110)
GLUCOSE SERPL-MCNC: 216 MG/DL (ref 70–110)
GLUCOSE SERPL-MCNC: 240 MG/DL (ref 70–110)
GLUCOSE SERPL-MCNC: 245 MG/DL (ref 70–110)
HCO3 UR-SCNC: 21.8 MMOL/L (ref 24–28)
HCT VFR BLD AUTO: 28.9 % (ref 37–48.5)
HCT VFR BLD CALC: 29 %PCV (ref 36–54)
HGB BLD-MCNC: 9.4 G/DL (ref 12–16)
IMM GRANULOCYTES # BLD AUTO: 0.16 K/UL (ref 0–0.04)
IMM GRANULOCYTES NFR BLD AUTO: 0.7 % (ref 0–0.5)
LACTATE SERPL-SCNC: 1.4 MMOL/L (ref 0.5–2.2)
LYMPHOCYTES # BLD AUTO: 0.3 K/UL (ref 1–4.8)
LYMPHOCYTES NFR BLD: 1.2 % (ref 18–48)
MAGNESIUM SERPL-MCNC: 1.9 MG/DL (ref 1.6–2.6)
MCH RBC QN AUTO: 24.9 PG (ref 27–31)
MCHC RBC AUTO-ENTMCNC: 32.5 G/DL (ref 32–36)
MCV RBC AUTO: 77 FL (ref 82–98)
MODE: ABNORMAL
MONOCYTES # BLD AUTO: 1.6 K/UL (ref 0.3–1)
MONOCYTES NFR BLD: 6.3 % (ref 4–15)
NEUTROPHILS # BLD AUTO: 22.5 K/UL (ref 1.8–7.7)
NEUTROPHILS NFR BLD: 91.7 % (ref 38–73)
NRBC BLD-RTO: 0 /100 WBC
PCO2 BLDA: 44.6 MMHG (ref 35–45)
PEEP: 8
PH SMN: 7.3 [PH] (ref 7.35–7.45)
PHOSPHATE SERPL-MCNC: 5.6 MG/DL (ref 2.7–4.5)
PHOSPHATE SERPL-MCNC: 6 MG/DL (ref 2.7–4.5)
PHOSPHATE SERPL-MCNC: 6.9 MG/DL (ref 2.7–4.5)
PLATELET # BLD AUTO: 310 K/UL (ref 150–450)
PLATELET BLD QL SMEAR: ABNORMAL
PMV BLD AUTO: 9.7 FL (ref 9.2–12.9)
PO2 BLDA: 65 MMHG (ref 80–100)
POC BE: -5 MMOL/L
POC IONIZED CALCIUM: 0.92 MMOL/L (ref 1.06–1.42)
POC SATURATED O2: 90 % (ref 95–100)
POCT GLUCOSE: 151 MG/DL (ref 70–110)
POCT GLUCOSE: 235 MG/DL (ref 70–110)
POCT GLUCOSE: 244 MG/DL (ref 70–110)
POCT GLUCOSE: 282 MG/DL (ref 70–110)
POTASSIUM BLD-SCNC: 2.6 MMOL/L (ref 3.5–5.1)
POTASSIUM SERPL-SCNC: 2.9 MMOL/L (ref 3.5–5.1)
POTASSIUM SERPL-SCNC: 3.1 MMOL/L (ref 3.5–5.1)
POTASSIUM SERPL-SCNC: 3.9 MMOL/L (ref 3.5–5.1)
RBC # BLD AUTO: 3.78 M/UL (ref 4–5.4)
SAMPLE: ABNORMAL
SITE: ABNORMAL
SODIUM BLD-SCNC: 130 MMOL/L (ref 136–145)
SODIUM SERPL-SCNC: 128 MMOL/L (ref 136–145)
SODIUM SERPL-SCNC: 131 MMOL/L (ref 136–145)
SODIUM SERPL-SCNC: 131 MMOL/L (ref 136–145)
VT: 400
WBC # BLD AUTO: 24.53 K/UL (ref 3.9–12.7)

## 2022-03-31 PROCEDURE — 94640 AIRWAY INHALATION TREATMENT: CPT

## 2022-03-31 PROCEDURE — 25000003 PHARM REV CODE 250: Performed by: SURGERY

## 2022-03-31 PROCEDURE — 99900026 HC AIRWAY MAINTENANCE (STAT)

## 2022-03-31 PROCEDURE — 63600175 PHARM REV CODE 636 W HCPCS: Performed by: INTERNAL MEDICINE

## 2022-03-31 PROCEDURE — 63600175 PHARM REV CODE 636 W HCPCS: Performed by: FAMILY MEDICINE

## 2022-03-31 PROCEDURE — P9047 ALBUMIN (HUMAN), 25%, 50ML: HCPCS | Mod: JG | Performed by: SURGERY

## 2022-03-31 PROCEDURE — 83605 ASSAY OF LACTIC ACID: CPT | Performed by: INTERNAL MEDICINE

## 2022-03-31 PROCEDURE — 99233 SBSQ HOSP IP/OBS HIGH 50: CPT | Mod: ,,, | Performed by: INTERNAL MEDICINE

## 2022-03-31 PROCEDURE — 85025 COMPLETE CBC W/AUTO DIFF WBC: CPT | Performed by: SURGERY

## 2022-03-31 PROCEDURE — 83735 ASSAY OF MAGNESIUM: CPT | Performed by: SURGERY

## 2022-03-31 PROCEDURE — 25000003 PHARM REV CODE 250: Performed by: FAMILY MEDICINE

## 2022-03-31 PROCEDURE — 94003 VENT MGMT INPAT SUBQ DAY: CPT

## 2022-03-31 PROCEDURE — 20000000 HC ICU ROOM

## 2022-03-31 PROCEDURE — 63600175 PHARM REV CODE 636 W HCPCS: Performed by: SURGERY

## 2022-03-31 PROCEDURE — 94761 N-INVAS EAR/PLS OXIMETRY MLT: CPT

## 2022-03-31 PROCEDURE — 25000003 PHARM REV CODE 250: Performed by: INTERNAL MEDICINE

## 2022-03-31 PROCEDURE — 36569 INSJ PICC 5 YR+ W/O IMAGING: CPT

## 2022-03-31 PROCEDURE — 84132 ASSAY OF SERUM POTASSIUM: CPT

## 2022-03-31 PROCEDURE — 80069 RENAL FUNCTION PANEL: CPT | Performed by: SURGERY

## 2022-03-31 PROCEDURE — 84295 ASSAY OF SERUM SODIUM: CPT

## 2022-03-31 PROCEDURE — A4216 STERILE WATER/SALINE, 10 ML: HCPCS | Performed by: FAMILY MEDICINE

## 2022-03-31 PROCEDURE — 36415 COLL VENOUS BLD VENIPUNCTURE: CPT | Performed by: SURGERY

## 2022-03-31 PROCEDURE — 87040 BLOOD CULTURE FOR BACTERIA: CPT | Performed by: SURGERY

## 2022-03-31 PROCEDURE — 85014 HEMATOCRIT: CPT

## 2022-03-31 PROCEDURE — 82803 BLOOD GASES ANY COMBINATION: CPT

## 2022-03-31 PROCEDURE — 99233 PR SUBSEQUENT HOSPITAL CARE,LEVL III: ICD-10-PCS | Mod: ,,, | Performed by: INTERNAL MEDICINE

## 2022-03-31 PROCEDURE — 37799 UNLISTED PX VASCULAR SURGERY: CPT

## 2022-03-31 PROCEDURE — 99291 PR CRITICAL CARE, E/M 30-74 MINUTES: ICD-10-PCS | Mod: ,,, | Performed by: INTERNAL MEDICINE

## 2022-03-31 PROCEDURE — 99291 CRITICAL CARE FIRST HOUR: CPT | Mod: ,,, | Performed by: INTERNAL MEDICINE

## 2022-03-31 PROCEDURE — 27000221 HC OXYGEN, UP TO 24 HOURS

## 2022-03-31 PROCEDURE — 99900035 HC TECH TIME PER 15 MIN (STAT)

## 2022-03-31 PROCEDURE — 82330 ASSAY OF CALCIUM: CPT

## 2022-03-31 PROCEDURE — 25000242 PHARM REV CODE 250 ALT 637 W/ HCPCS: Performed by: SURGERY

## 2022-03-31 PROCEDURE — A4216 STERILE WATER/SALINE, 10 ML: HCPCS | Performed by: SURGERY

## 2022-03-31 RX ORDER — SODIUM CHLORIDE 0.9 % (FLUSH) 0.9 %
10 SYRINGE (ML) INJECTION EVERY 6 HOURS
Status: DISCONTINUED | OUTPATIENT
Start: 2022-03-31 | End: 2022-04-12 | Stop reason: HOSPADM

## 2022-03-31 RX ORDER — FUROSEMIDE 10 MG/ML
60 INJECTION INTRAMUSCULAR; INTRAVENOUS ONCE
Status: COMPLETED | OUTPATIENT
Start: 2022-03-31 | End: 2022-03-31

## 2022-03-31 RX ORDER — NOREPINEPHRINE BITARTRATE/D5W 4MG/250ML
0-3 PLASTIC BAG, INJECTION (ML) INTRAVENOUS CONTINUOUS
Status: DISCONTINUED | OUTPATIENT
Start: 2022-03-31 | End: 2022-04-02

## 2022-03-31 RX ORDER — POTASSIUM CHLORIDE 29.8 MG/ML
40 INJECTION INTRAVENOUS EVERY 4 HOURS
Status: COMPLETED | OUTPATIENT
Start: 2022-03-31 | End: 2022-03-31

## 2022-03-31 RX ORDER — SODIUM CHLORIDE 0.9 % (FLUSH) 0.9 %
10 SYRINGE (ML) INJECTION
Status: DISCONTINUED | OUTPATIENT
Start: 2022-03-31 | End: 2022-04-12 | Stop reason: HOSPADM

## 2022-03-31 RX ORDER — POTASSIUM CHLORIDE 7.45 MG/ML
10 INJECTION INTRAVENOUS
Status: COMPLETED | OUTPATIENT
Start: 2022-03-31 | End: 2022-03-31

## 2022-03-31 RX ORDER — CEFEPIME HYDROCHLORIDE 1 G/50ML
2 INJECTION, SOLUTION INTRAVENOUS
Status: DISCONTINUED | OUTPATIENT
Start: 2022-03-31 | End: 2022-04-03

## 2022-03-31 RX ORDER — FUROSEMIDE 10 MG/ML
60 INJECTION INTRAMUSCULAR; INTRAVENOUS EVERY 8 HOURS
Status: COMPLETED | OUTPATIENT
Start: 2022-03-31 | End: 2022-04-01

## 2022-03-31 RX ORDER — POTASSIUM CITRATE 10 MEQ/1
20 TABLET, EXTENDED RELEASE ORAL ONCE
Status: COMPLETED | OUTPATIENT
Start: 2022-03-31 | End: 2022-03-31

## 2022-03-31 RX ORDER — ALBUMIN HUMAN 250 G/1000ML
12.5 SOLUTION INTRAVENOUS ONCE
Status: COMPLETED | OUTPATIENT
Start: 2022-03-31 | End: 2022-03-31

## 2022-03-31 RX ORDER — FENTANYL CITRATE-0.9 % NACL/PF 10 MCG/ML
0-200 PLASTIC BAG, INJECTION (ML) INTRAVENOUS CONTINUOUS
Status: DISCONTINUED | OUTPATIENT
Start: 2022-03-31 | End: 2022-04-02

## 2022-03-31 RX ADMIN — CHLORHEXIDINE GLUCONATE 0.12% ORAL RINSE 15 ML: 1.2 LIQUID ORAL at 08:03

## 2022-03-31 RX ADMIN — ALBUMIN (HUMAN) 12.5 G: 12.5 SOLUTION INTRAVENOUS at 01:03

## 2022-03-31 RX ADMIN — FENTANYL CITRATE 100 MCG/HR: 50 INJECTION, SOLUTION INTRAMUSCULAR; INTRAVENOUS at 01:03

## 2022-03-31 RX ADMIN — POTASSIUM CITRATE 20 MEQ: 10 TABLET, EXTENDED RELEASE ORAL at 11:03

## 2022-03-31 RX ADMIN — Medication 10 ML: at 03:03

## 2022-03-31 RX ADMIN — Medication 0.05 MCG/KG/MIN: at 03:03

## 2022-03-31 RX ADMIN — METHYLPREDNISOLONE SODIUM SUCCINATE 60 MG: 40 INJECTION, POWDER, FOR SOLUTION INTRAMUSCULAR; INTRAVENOUS at 08:03

## 2022-03-31 RX ADMIN — POTASSIUM CHLORIDE 40 MEQ: 29.8 INJECTION, SOLUTION INTRAVENOUS at 10:03

## 2022-03-31 RX ADMIN — IPRATROPIUM BROMIDE AND ALBUTEROL SULFATE 3 ML: 2.5; .5 SOLUTION RESPIRATORY (INHALATION) at 11:03

## 2022-03-31 RX ADMIN — MINERAL OIL, PETROLATUM: 425; 573 OINTMENT OPHTHALMIC at 09:03

## 2022-03-31 RX ADMIN — CEFEPIME HYDROCHLORIDE 2 G: 2 INJECTION, SOLUTION INTRAVENOUS at 10:03

## 2022-03-31 RX ADMIN — POTASSIUM CHLORIDE 40 MEQ: 29.8 INJECTION, SOLUTION INTRAVENOUS at 06:03

## 2022-03-31 RX ADMIN — POTASSIUM BICARBONATE 25 MEQ: 978 TABLET, EFFERVESCENT ORAL at 08:03

## 2022-03-31 RX ADMIN — SODIUM BICARBONATE: 84 INJECTION, SOLUTION INTRAVENOUS at 02:03

## 2022-03-31 RX ADMIN — MIDAZOLAM HYDROCHLORIDE 3 MG/HR: 5 INJECTION, SOLUTION INTRAMUSCULAR; INTRAVENOUS at 04:03

## 2022-03-31 RX ADMIN — Medication 10 ML: at 06:03

## 2022-03-31 RX ADMIN — MUPIROCIN: 20 OINTMENT TOPICAL at 08:03

## 2022-03-31 RX ADMIN — HEPARIN SODIUM 7500 UNITS: 5000 INJECTION INTRAVENOUS; SUBCUTANEOUS at 06:03

## 2022-03-31 RX ADMIN — IPRATROPIUM BROMIDE AND ALBUTEROL SULFATE 3 ML: 2.5; .5 SOLUTION RESPIRATORY (INHALATION) at 03:03

## 2022-03-31 RX ADMIN — VANCOMYCIN HYDROCHLORIDE 2000 MG: 500 INJECTION, POWDER, LYOPHILIZED, FOR SOLUTION INTRAVENOUS at 11:03

## 2022-03-31 RX ADMIN — CALCIUM GLUCONATE 2 G: 98 INJECTION, SOLUTION INTRAVENOUS at 06:03

## 2022-03-31 RX ADMIN — Medication 0.06 MCG/KG/MIN: at 12:03

## 2022-03-31 RX ADMIN — SODIUM BICARBONATE: 84 INJECTION, SOLUTION INTRAVENOUS at 03:03

## 2022-03-31 RX ADMIN — CISATRACURIUM BESYLATE 2 MCG/KG/MIN: 200 INJECTION INTRAVENOUS at 12:03

## 2022-03-31 RX ADMIN — INSULIN ASPART 4 UNITS: 100 INJECTION, SOLUTION INTRAVENOUS; SUBCUTANEOUS at 06:03

## 2022-03-31 RX ADMIN — POTASSIUM CHLORIDE 10 MEQ: 7.46 INJECTION, SOLUTION INTRAVENOUS at 02:03

## 2022-03-31 RX ADMIN — SODIUM BICARBONATE: 84 INJECTION, SOLUTION INTRAVENOUS at 10:03

## 2022-03-31 RX ADMIN — SODIUM CHLORIDE, PRESERVATIVE FREE 10 ML: 5 INJECTION INTRAVENOUS at 12:03

## 2022-03-31 RX ADMIN — FUROSEMIDE 60 MG: 10 INJECTION, SOLUTION INTRAMUSCULAR; INTRAVENOUS at 11:03

## 2022-03-31 RX ADMIN — INSULIN ASPART 3 UNITS: 100 INJECTION, SOLUTION INTRAVENOUS; SUBCUTANEOUS at 12:03

## 2022-03-31 RX ADMIN — Medication 10 ML: at 09:03

## 2022-03-31 RX ADMIN — IPRATROPIUM BROMIDE AND ALBUTEROL SULFATE 3 ML: 2.5; .5 SOLUTION RESPIRATORY (INHALATION) at 07:03

## 2022-03-31 RX ADMIN — HEPARIN SODIUM 7500 UNITS: 5000 INJECTION INTRAVENOUS; SUBCUTANEOUS at 03:03

## 2022-03-31 RX ADMIN — INSULIN ASPART 2 UNITS: 100 INJECTION, SOLUTION INTRAVENOUS; SUBCUTANEOUS at 06:03

## 2022-03-31 RX ADMIN — MINERAL OIL, PETROLATUM: 425; 573 OINTMENT OPHTHALMIC at 06:03

## 2022-03-31 RX ADMIN — IPRATROPIUM BROMIDE AND ALBUTEROL SULFATE 3 ML: 2.5; .5 SOLUTION RESPIRATORY (INHALATION) at 08:03

## 2022-03-31 RX ADMIN — FUROSEMIDE 60 MG: 10 INJECTION, SOLUTION INTRAMUSCULAR; INTRAVENOUS at 10:03

## 2022-03-31 RX ADMIN — INSULIN ASPART 4 UNITS: 100 INJECTION, SOLUTION INTRAVENOUS; SUBCUTANEOUS at 12:03

## 2022-03-31 RX ADMIN — HEPARIN SODIUM 7500 UNITS: 5000 INJECTION INTRAVENOUS; SUBCUTANEOUS at 09:03

## 2022-03-31 RX ADMIN — METHYLPREDNISOLONE SODIUM SUCCINATE 60 MG: 40 INJECTION, POWDER, FOR SOLUTION INTRAMUSCULAR; INTRAVENOUS at 06:03

## 2022-03-31 RX ADMIN — POTASSIUM CHLORIDE 10 MEQ: 7.46 INJECTION, SOLUTION INTRAVENOUS at 03:03

## 2022-03-31 RX ADMIN — POTASSIUM BICARBONATE 25 MEQ: 978 TABLET, EFFERVESCENT ORAL at 01:03

## 2022-03-31 RX ADMIN — FAMOTIDINE 20 MG: 10 INJECTION, SOLUTION INTRAVENOUS at 08:03

## 2022-03-31 NOTE — EICU
eICU Physician Virtual/Remote Brief Evaluation Note      Telephone call from RN  A-line placed-mean BP still in 50s  Chart reviewed, patient observed, discussed with RN  Albumin 25% 12.5 g ordered      HALLE Bajwa MD  [facility] Attending  [phone]    This report has been created through the use of [employer] dictation software. Typographical and content errors may occur with this process. While efforts are made to detect and correct such errors, in some cases errors will persist. For this reason, wording in this document should be considered in the proper context and not strictly verbatim

## 2022-03-31 NOTE — ASSESSMENT & PLAN NOTE
On hydroxychloroquine as outpatient.  No recent change in treatment noted  3/30 continue same   3/31 now off DMD due to sepsis suspicion

## 2022-03-31 NOTE — ASSESSMENT & PLAN NOTE
--baseline BUN/Cr 20/1.4  --IVF's  --stop lisinopril  --avoid nephrotoxic agents    Nephrology following  Concomitant electrolyte imbalance  eval for dialysis

## 2022-03-31 NOTE — PROGRESS NOTES
[facility]ensive [facility])  Critical Care Medicine  Progress Note    Patient Name: Page Grissom  MRN: [id number]  Admission Date: 3/29/2022  Hospital Length of Stay: 2 days  Code Status: Full Code  Attending Provider: Bc Crocker MD  Primary Care Provider: NEELAM Roman Jr, MD   Principal Problem: Angioedema    Subjective:     HPI:  71 y.o. female patient PMHx of asthma, CKD, DM2, HTN, and immune deficiency disorder  brought to the emergency room for acute onset shortness of breath severe tongue swelling and respiratory distress.        Hospital/ICU Course:  Patient was hard to intubate , general surgery consulted, and emergency cricothyrotomy was performed with a tube size 5 mm in place.  FiO2 50% O2 sat 91%.  Sedated with propofol.  Chest x-ray ABG reviewed.    3/30 seen and examined, events overnight noted , now paralyzed for vent asynchrony , sedated, oliguric , awaiting OR today change cricothyroidotomy tube    to ETT versus replace trach   3/31 seen and examined , CXR , ABG reviewed, events overnight reviewed , sp 7.5 mm trach placement yesterday and removal of cricothyrotomy tube, on levophged gtt , versed, fentanyl gtt , nimbex gtt , oliguric, severe electrolytes disturbances       3/31 seen and examined , CXR , ABG reviewed, events overnight reviewed , sp 7.5 mm trach placement yesterday and removal of cricothyrotomy tube, on levophged gtt , versed, fentanyl gtt , nimbex gtt , oliguric, severe electrolytes disturbances   Review of Systems   Unable to perform ROS: Intubated   Objective:     Vital Signs (Most Recent):  Temp: 98.78 °F (37.1 °C) (03/31/22 0742)  Pulse: 75 (03/31/22 0742)  Resp: (!) 24 (03/31/22 0742)  BP: (!) 101/55 (03/31/22 0600)  SpO2: 100 % (03/31/22 0742)   Vital Signs (24h Range):  Temp:  [94.28 °F (34.6 °C)-98.78 °F (37.1 °C)] 98.78 °F (37.1 °C)  Pulse:  [49-75] 75  Resp:  [20-35] 24  SpO2:  [89 %-100 %] 100 %  BP: ()/(50-65) 101/55  Arterial Line BP:  ()/(40-60) 119/46     Weight: 118.4 kg (261 lb 0.4 oz)  Body mass index is 44.8 kg/m².      Intake/Output Summary (Last 24 hours) at 3/31/2022 0916  Last data filed at 3/31/2022 0700  Gross per 24 hour   Intake 4503.19 ml   Output 195 ml   Net 4308.19 ml         Physical Exam  Vitals and nursing note reviewed.   Constitutional:       General: She is not in acute distress.     Appearance: She is well-developed.   HENT:      Head: Normocephalic and atraumatic.      Mouth/Throat:      Comments: Large swollen tongue and lips but improved compared to yesterday   Neck:      Comments: Trach in place  Cardiovascular:      Rate and Rhythm: Normal rate.   Pulmonary:      Effort: Respiratory distress present.      Breath sounds: Rhonchi present.   Abdominal:      Palpations: Abdomen is soft.      Tenderness: There is no abdominal tenderness.   Genitourinary:     Comments: Ortega in place   Musculoskeletal:         General: No deformity.      Cervical back: Neck supple.   Skin:     General: Skin is warm.   Neurological:      Mental Status: She is alert.      Comments: Paralyzed        Vents:  Vent Mode: A/C (03/31/22 0742)  Ventilator Initiated: Yes (03/29/22 1120)  Set Rate: 24 BPM (03/31/22 0742)  Vt Set: 400 mL (03/31/22 0742)  Pressure Support: 0 cmH20 (03/31/22 0742)  PEEP/CPAP: 8 cmH20 (03/31/22 0742)  Oxygen Concentration (%): 40 (03/31/22 0742)  Peak Airway Pressure: 31 cmH2O (03/31/22 0742)  Plateau Pressure: 29 cmH20 (03/31/22 0742)  Total Ve: 9.9 mL (03/31/22 0742)  F/VT Ratio<105 (RSBI): (!) 58.39 (03/31/22 0742)    Lines/Drains/Airways       Peripherally Inserted Central Catheter Line  Duration             PICC Double Lumen 03/31/22 0240 right basilic <1 day              Drain  Duration                  NG/OG Tube 03/29/22 0725 [place] sump;orogastric 18 Fr. Right mouth 2 days         Urethral Catheter 03/29/22 0900 18 Fr. 2 days              Airway  Duration                  Surgical Airway 03/30/22 Other  (Comment) 1 day              Arterial Line  Duration             Arterial Line 03/31/22 0015 Left Radial <1 day              Peripheral Intravenous Line  Duration                  Peripheral IV - Single Lumen 03/29/22 0706 20 G Left Antecubital 2 days         Peripheral IV - Single Lumen 03/30/22 2045 20 G Anterior;Distal;Left Wrist <1 day                    Significant Labs:    CBC/Anemia Profile:  Recent Labs   Lab 03/30/22  0241 03/31/22  0419 03/31/22  0446   WBC 8.66 24.53*  --    HGB 10.1* 9.4*  --    HCT 31.8* 28.9* 29*    310  --    MCV 77* 77*  --    RDW 16.1* 15.9*  --       Latest Reference Range & Units 03/31/22 04:46   POC Sodium 136 - 145 mmol/L 130 (L)   POC Potassium 3.5 - 5.1 mmol/L 2.6 (LL)   POC Glucose 70 - 110 mg/dL 240 (H)   POC Ionized Calcium 1.06 - 1.42 mmol/L 0.92 (L)   POC Hematocrit 36 - 54 %PCV 29 (L)   POC PH 7.35 - 7.45  7.297 (L)   POC PCO2 35 - 45 mmHg 44.6   POC PO2 80 - 100 mmHg 65 (L)   POC BE -2 to 2 mmol/L -5   POC HCO3 24 - 28 mmol/L 21.8 (L)   POC SATURATED O2 95 - 100 % 90 (L)   FiO2  40   Vt  400   PEEP  8   Sample  ARTERIAL   DelSys  Adult Vent   Allens Test  N/A   Site  Bobo/UAC   Mode  AC/PRVC   Rate  24        EKG 03/29/2020  Normal sinus rhythm   Possible Left atrial enlargement   Prolonged QT   Abnormal ECG   When compared with ECG of 13-DEC-2006 09:37,   QRS duration has increased   QT has lengthened     Significant Imaging:     CXR 3/31/2022    EXAMINATION:  XR CHEST AP PORTABLE     CLINICAL HISTORY:  Trachesotomy revision;     TECHNIQUE:  Single frontal view of the chest was performed.     COMPARISON:  As above     FINDINGS:  Tracheostomy.  Enteric tube.  Perihilar haze.  Elevated right hemidiaphragm.  Left-sided hazy opacity slightly more prominent than prior.  Similar findings to prior exam with lower lung volumes achieved.                ABG  Recent Labs   Lab 03/31/22  0446   PH 7.297*   PO2 65*   PCO2 44.6   HCO3 21.8*   BE -5     Assessment/Plan:      ENT  Tracheostomy in place  Emergent cricothyrotomy severe angioedema trach in place, severe hypercapnia and respiratory acidosis repeat ABG nebs IV steroids antihistamine  3/30 check cuff leak , ETT versus larger trach today   3/31 check cuff leak , solumedrol , pepcid     Renal/  KYE (acute kidney injury)  Strict I&Os.  IV fluid.  Monitor BMP.  Avoid nephrotoxic drugs.  On lisinopril since June 2021  3/30 change to bicarb drip , strict I/O's , nephrology follow up   3/31 oliguric , on bicarb drip , not improving will discuss with nephrology HD for acidosis , will discuss with family     Orthopedic  Rheumatoid arthritis involving multiple sites with positive rheumatoid factor  On hydroxychloroquine as outpatient.  No recent change in treatment noted  3/30 continue same   3/31 now off DMD due to sepsis suspicion     Other  * Angioedema  Severe was hard to intubate for respiratory distress status post emergent cricothyroidotomy with size 5 trach.  Sedated with propofol.  On IV steroids and antihistamines.  Check tryptase level IgE.  No recent medicine introduction.  On lisinopril ordered in June 2021  3/30 check cuff leak , steroids antihistamines IV , Ige , C1 q esterase  Inh , Tryptase level pending   3/31 check cuff leak , decrease solumedrol to q 12 hrs , cont famotidine        Critical Care Daily Checklist:    A: Awake: RASS Goal/Actual Goal: RASS Goal: -5-->unarousable  Actual: [place] Agitation Sedation Scale (RASS): Unarousable   B: Spontaneous Breathing Trial Performed?     C: SAT & SBT Coordinated?  Y                      D: Delirium: CAM-ICU Overall CAM-ICU: Positive   E: Early Mobility Performed? Yes   F: Feeding Goal:    Status:     Current Diet Order   Procedures    Diet NPO      AS: Analgesia/Sedation Wean off Nimbex gtt , on fentanyl , versed gtt    T: Thromboembolic Prophylaxis HEPARIN SC   H: HOB > 300 Yes   U: Stress Ulcer Prophylaxis (if needed) Pepcid    G: Glucose Control FS prn    B:  Bowel Function     I: Indwelling Catheter (Lines & Ortega) Necessity Y   D: De-escalation of Antimicrobials/Pharmacotherapies Cefepime vanc empiric WBC 24k (On steroids) Infiltrate on CXR , check cx , lac acid , afebrile     Plan for the day/ETD Y    Code Status:  Family/Goals of Care: Full Code  Y     Critical Care Time: 35 minutes  Critical secondary to Patient has a condition that poses threat to life and bodily function: Acute Renal Failure complicating angioedema resp failure       Critical care was time spent personally by me on the following activities: development of treatment plan with patient or surrogate and bedside caregivers, discussions with consultants, evaluation of patient's response to treatment, examination of patient, ordering and performing treatments and interventions, ordering and review of laboratory studies, ordering and review of radiographic studies, pulse oximetry, re-evaluation of patient's condition. This critical care time did not overlap with that of any other provider or involve time for any procedures.     Laney Romero MD  Critical Care Medicine  [facility] - Intensive Care ([facility])

## 2022-03-31 NOTE — EICU
eICU Physician Virtual/Remote Brief Evaluation Note      KUB resulted  Enteral tube in good position with tip and side hole in body of stomach  Potassium bicarbonate 25 mEq b.i.d. x2, 1st dose now, ordered      HALLE Bajwa MD  eICU Attending  [phone]    This report has been created through the use of [employer] dictation software. Typographical and content errors may occur with this process. While efforts are made to detect and correct such errors, in some cases errors will persist. For this reason, wording in this document should be considered in the proper context and not strictly verbatim

## 2022-03-31 NOTE — SUBJECTIVE & OBJECTIVE
3/31 seen and examined , CXR , ABG reviewed, events overnight reviewed , sp 7.5 mm trach placement yesterday and removal of cricothyrotomy tube, on levophged gtt , versed, fentanyl gtt , nimbex gtt , oliguric, severe electrolytes disturbances   Review of Systems   Unable to perform ROS: Intubated   Objective:     Vital Signs (Most Recent):  Temp: 98.78 °F (37.1 °C) (03/31/22 0742)  Pulse: 75 (03/31/22 0742)  Resp: (!) 24 (03/31/22 0742)  BP: (!) 101/55 (03/31/22 0600)  SpO2: 100 % (03/31/22 0742)   Vital Signs (24h Range):  Temp:  [94.28 °F (34.6 °C)-98.78 °F (37.1 °C)] 98.78 °F (37.1 °C)  Pulse:  [49-75] 75  Resp:  [20-35] 24  SpO2:  [89 %-100 %] 100 %  BP: ()/(50-65) 101/55  Arterial Line BP: ()/(40-60) 119/46     Weight: 118.4 kg (261 lb 0.4 oz)  Body mass index is 44.8 kg/m².      Intake/Output Summary (Last 24 hours) at 3/31/2022 0916  Last data filed at 3/31/2022 0700  Gross per 24 hour   Intake 4503.19 ml   Output 195 ml   Net 4308.19 ml         Physical Exam  Vitals and nursing note reviewed.   Constitutional:       General: She is not in acute distress.     Appearance: She is well-developed.   HENT:      Head: Normocephalic and atraumatic.      Mouth/Throat:      Comments: Large swollen tongue and lips but improved compared to yesterday   Neck:      Comments: Trach in place  Cardiovascular:      Rate and Rhythm: Normal rate.   Pulmonary:      Effort: Respiratory distress present.      Breath sounds: Rhonchi present.   Abdominal:      Palpations: Abdomen is soft.      Tenderness: There is no abdominal tenderness.   Genitourinary:     Comments: Ortega in place   Musculoskeletal:         General: No deformity.      Cervical back: Neck supple.   Skin:     General: Skin is warm.   Neurological:      Mental Status: She is alert.      Comments: Paralyzed        Vents:  Vent Mode: A/C (03/31/22 0742)  Ventilator Initiated: Yes (03/29/22 1120)  Set Rate: 24 BPM (03/31/22 0742)  Vt Set: 400 mL (03/31/22  0742)  Pressure Support: 0 cmH20 (03/31/22 0742)  PEEP/CPAP: 8 cmH20 (03/31/22 0742)  Oxygen Concentration (%): 40 (03/31/22 0742)  Peak Airway Pressure: 31 cmH2O (03/31/22 0742)  Plateau Pressure: 29 cmH20 (03/31/22 0742)  Total Ve: 9.9 mL (03/31/22 0742)  F/VT Ratio<105 (RSBI): (!) 58.39 (03/31/22 0742)    Lines/Drains/Airways       Peripherally Inserted Central Catheter Line  Duration             PICC Double Lumen 03/31/22 0240 right basilic <1 day              Drain  Duration                  NG/OG Tube 03/29/22 0725 [place] sump;orogastric 18 Fr. Right mouth 2 days         Urethral Catheter 03/29/22 0900 18 Fr. 2 days              Airway  Duration                  Surgical Airway 03/30/22 Other (Comment) 1 day              Arterial Line  Duration             Arterial Line 03/31/22 0015 Left Radial <1 day              Peripheral Intravenous Line  Duration                  Peripheral IV - Single Lumen 03/29/22 0706 20 G Left Antecubital 2 days         Peripheral IV - Single Lumen 03/30/22 2045 20 G Anterior;Distal;Left Wrist <1 day                    Significant Labs:    CBC/Anemia Profile:  Recent Labs   Lab 03/30/22  0241 03/31/22  0419 03/31/22  0446   WBC 8.66 24.53*  --    HGB 10.1* 9.4*  --    HCT 31.8* 28.9* 29*    310  --    MCV 77* 77*  --    RDW 16.1* 15.9*  --       Latest Reference Range & Units 03/31/22 04:46   POC Sodium 136 - 145 mmol/L 130 (L)   POC Potassium 3.5 - 5.1 mmol/L 2.6 (LL)   POC Glucose 70 - 110 mg/dL 240 (H)   POC Ionized Calcium 1.06 - 1.42 mmol/L 0.92 (L)   POC Hematocrit 36 - 54 %PCV 29 (L)   POC PH 7.35 - 7.45  7.297 (L)   POC PCO2 35 - 45 mmHg 44.6   POC PO2 80 - 100 mmHg 65 (L)   POC BE -2 to 2 mmol/L -5   POC HCO3 24 - 28 mmol/L 21.8 (L)   POC SATURATED O2 95 - 100 % 90 (L)   FiO2  40   Vt  400   PEEP  8   Sample  ARTERIAL   DelSys  Adult Vent   Allens Test  N/A   Site  Bobo/UAC   Mode  AC/PRVC   Rate  24        EKG 03/29/2020  Normal sinus rhythm   Possible Left atrial  enlargement   Prolonged QT   Abnormal ECG   When compared with ECG of 13-DEC-2006 09:37,   QRS duration has increased   QT has lengthened     Significant Imaging:     CXR 3/31/2022    EXAMINATION:  XR CHEST AP PORTABLE     CLINICAL HISTORY:  Trachesotomy revision;     TECHNIQUE:  Single frontal view of the chest was performed.     COMPARISON:  As above     FINDINGS:  Tracheostomy.  Enteric tube.  Perihilar haze.  Elevated right hemidiaphragm.  Left-sided hazy opacity slightly more prominent than prior.  Similar findings to prior exam with lower lung volumes achieved.

## 2022-03-31 NOTE — EICU
eICU Physician Virtual/Remote Brief Evaluation Note      Picc line ordered      HALLE Bajwa MD  [facility] Attending  [phone]    This report has been created through the use of [employer] dictation software. Typographical and content errors may occur with this process. While efforts are made to detect and correct such errors, in some cases errors will persist. For this reason, wording in this document should be considered in the proper context and not strictly verbatim

## 2022-03-31 NOTE — SUBJECTIVE & OBJECTIVE
Interval History: converted to tracheostomy, site clean    Medications:  Continuous Infusions:   cisatracurium (NIMBEX) infusion 1.5 mcg/kg/min (03/31/22 0700)    fentanyl 200 mcg/hr (03/31/22 0700)    midazolam 7 mg/hr (03/31/22 0700)    NORepinephrine bitartrate-D5W 0.07 mcg/kg/min (03/31/22 0700)    sodium bicarbonate drip 150 mL/hr at 03/31/22 0700     Scheduled Meds:   albuterol-ipratropium  3 mL Nebulization Q4H    ceFEPime (MAXIPIME) IVPB  2 g Intravenous Q24H    chlorhexidine  15 mL Mouth/Throat BID    famotidine (PF)  20 mg Intravenous Daily    heparin (porcine)  7,500 Units Subcutaneous Q8H    methylPREDNISolone sodium succinate  60 mg Intravenous Q6H    mupirocin   Nasal BID    potassium chloride in water  40 mEq Intravenous Q4H    sodium chloride 0.9%  10 mL Intravenous Q8H    sodium chloride 0.9%  10 mL Intravenous Q6H    vancomycin (VANCOCIN) IVPB  2,000 mg Intravenous Once    white petrolatum-mineral oil 57.3-42.5%   Both Eyes Q8H     PRN Meds:sodium chloride, dextrose 10%, glucagon (human recombinant), insulin aspart U-100, lorazepam, naloxone, Flushing PICC Protocol **AND** sodium chloride 0.9% **AND** sodium chloride 0.9%, Pharmacy to dose Vancomycin consult **AND** vancomycin - pharmacy to dose     Review of patient's allergies indicates:   Allergen Reactions    Lisinopril Swelling     Objective:     Vital Signs (Most Recent):  Temp: 98.78 °F (37.1 °C) (03/31/22 0742)  Pulse: 75 (03/31/22 0742)  Resp: (!) 24 (03/31/22 0742)  BP: (!) 101/55 (03/31/22 0600)  SpO2: 100 % (03/31/22 0742)   Vital Signs (24h Range):  Temp:  [94.28 °F (34.6 °C)-98.78 °F (37.1 °C)] 98.78 °F (37.1 °C)  Pulse:  [49-75] 75  Resp:  [15-35] 24  SpO2:  [89 %-100 %] 100 %  BP: ()/(50-65) 101/55  Arterial Line BP: ()/(40-60) 119/46     Weight: 118.4 kg (261 lb 0.4 oz)  Body mass index is 44.8 kg/m².    Intake/Output - Last 3 Shifts         03/29 0700  03/30 0659 03/30 0700 03/31 0659 03/31 0700 04/01 0659    I.V.  (mL/kg) 2771.1 (23.9) 4052.8 (34.2) 210.3 (1.8)    Blood 143      IV Piggyback 730 587.3 115    Total Intake(mL/kg) 3644.1 (31.5) 4640 (39.2) 325.3 (2.7)    Urine (mL/kg/hr) 325 (0.1) 195 (0.1)     Total Output 325 195     Net +3319.1 +4445 +325.3                   Physical Exam  Vitals and nursing note reviewed.   Constitutional:       Appearance: She is obese. She is ill-appearing.      Comments: ventilated   HENT:      Head:        Comments: Site is clean     Mouth/Throat:      Comments: G tube    Less tongue edema  Cardiovascular:      Rate and Rhythm: Normal rate and regular rhythm.   Pulmonary:      Comments: Ventilated, course  Abdominal:      General: Bowel sounds are normal.      Palpations: Abdomen is soft.      Comments: obese   Musculoskeletal:      Right lower leg: No edema.      Left lower leg: No edema.   Neurological:      Comments: sedated       Significant Labs:  I have reviewed all pertinent lab results within the past 24 hours.  CBC:   Recent Labs   Lab 03/31/22 0419 03/31/22 0446   WBC 24.53*  --    RBC 3.78*  --    HGB 9.4*  --    HCT 28.9* 29*     --    MCV 77*  --    MCH 24.9*  --    MCHC 32.5  --      BMP:   Recent Labs   Lab 03/31/22 0419   *   *   K 2.9*   CL 94*   CO2 19*   BUN 65*   CREATININE 9.0*   CALCIUM 6.8*   MG 1.9     ABGs:   Recent Labs   Lab 03/31/22 0446   PH 7.297*   PCO2 44.6   PO2 65*   HCO3 21.8*   POCSATURATED 90*   BE -5       Significant Diagnostics:  I have reviewed all pertinent imaging results/findings within the past 24 hours.  CT: I have reviewed all pertinent results/findings within the past 24 hours and my personal findings are:  tracheostomy tube in position in trachea, infiltrates/effusions

## 2022-03-31 NOTE — PROGRESS NOTES
[facility] - [facility] Care ([facility])  General Surgery  Progress Note    Subjective:     History of Present Illness:  Patient presented to the emergency room with acute respiratory distress.  Indication was unsuccessful by the emergency room position in the anesthesia department.  Surgery was called emergency for a cricothyroidotomy.      Post-Op Info:  Procedure(s) (LRB):  CREATION, TRACHEOSTOMY (N/A)  Bronchoscopy (N/A)   1 Day Post-Op     Interval History: converted to tracheostomy, site clean    Medications:  Continuous Infusions:   cisatracurium (NIMBEX) infusion 1.5 mcg/kg/min (03/31/22 0700)    fentanyl 200 mcg/hr (03/31/22 0700)    midazolam 7 mg/hr (03/31/22 0700)    NORepinephrine bitartrate-D5W 0.07 mcg/kg/min (03/31/22 0700)    sodium bicarbonate drip 150 mL/hr at 03/31/22 0700     Scheduled Meds:   albuterol-ipratropium  3 mL Nebulization Q4H    ceFEPime (MAXIPIME) IVPB  2 g Intravenous Q24H    chlorhexidine  15 mL Mouth/Throat BID    famotidine (PF)  20 mg Intravenous Daily    heparin (porcine)  7,500 Units Subcutaneous Q8H    methylPREDNISolone sodium succinate  60 mg Intravenous Q6H    mupirocin   Nasal BID    potassium chloride in water  40 mEq Intravenous Q4H    sodium chloride 0.9%  10 mL Intravenous Q8H    sodium chloride 0.9%  10 mL Intravenous Q6H    vancomycin (VANCOCIN) IVPB  2,000 mg Intravenous Once    white petrolatum-mineral oil 57.3-42.5%   Both Eyes Q8H     PRN Meds:sodium chloride, dextrose 10%, glucagon (human recombinant), insulin aspart U-100, lorazepam, naloxone, Flushing PICC Protocol **AND** sodium chloride 0.9% **AND** sodium chloride 0.9%, Pharmacy to dose Vancomycin consult **AND** vancomycin - pharmacy to dose     Review of patient's allergies indicates:   Allergen Reactions    Lisinopril Swelling     Objective:     Vital Signs (Most Recent):  Temp: 98.78 °F (37.1 °C) (03/31/22 0742)  Pulse: 75 (03/31/22 0742)  Resp: (!) 24 (03/31/22 0742)  BP: (!) 101/55  (03/31/22 0600)  SpO2: 100 % (03/31/22 0742)   Vital Signs (24h Range):  Temp:  [94.28 °F (34.6 °C)-98.78 °F (37.1 °C)] 98.78 °F (37.1 °C)  Pulse:  [49-75] 75  Resp:  [15-35] 24  SpO2:  [89 %-100 %] 100 %  BP: ()/(50-65) 101/55  Arterial Line BP: ()/(40-60) 119/46     Weight: 118.4 kg (261 lb 0.4 oz)  Body mass index is 44.8 kg/m².    Intake/Output - Last 3 Shifts         03/29 0700 03/30 0659 03/30 0700 03/31 0659 03/31 0700 04/01 0659    I.V. (mL/kg) 2771.1 (23.9) 4052.8 (34.2) 210.3 (1.8)    Blood 143      IV Piggyback 730 587.3 115    Total Intake(mL/kg) 3644.1 (31.5) 4640 (39.2) 325.3 (2.7)    Urine (mL/kg/hr) 325 (0.1) 195 (0.1)     Total Output 325 195     Net +3319.1 +4445 +325.3                   Physical Exam  Vitals and nursing note reviewed.   Constitutional:       Appearance: She is obese. She is ill-appearing.      Comments: ventilated   HENT:      Head:        Comments: Site is clean     Mouth/Throat:      Comments: G tube    Less tongue edema  Cardiovascular:      Rate and Rhythm: Normal rate and regular rhythm.   Pulmonary:      Comments: Ventilated, course  Abdominal:      General: Bowel sounds are normal.      Palpations: Abdomen is soft.      Comments: obese   Musculoskeletal:      Right lower leg: No edema.      Left lower leg: No edema.   Neurological:      Comments: sedated       Significant Labs:  I have reviewed all pertinent lab results within the past 24 hours.  CBC:   Recent Labs   Lab 03/31/22  0419 03/31/22  0446   WBC 24.53*  --    RBC 3.78*  --    HGB 9.4*  --    HCT 28.9* 29*     --    MCV 77*  --    MCH 24.9*  --    MCHC 32.5  --      BMP:   Recent Labs   Lab 03/31/22  0419   *   *   K 2.9*   CL 94*   CO2 19*   BUN 65*   CREATININE 9.0*   CALCIUM 6.8*   MG 1.9     ABGs:   Recent Labs   Lab 03/31/22  0446   PH 7.297*   PCO2 44.6   PO2 65*   HCO3 21.8*   POCSATURATED 90*   BE -5       Significant Diagnostics:  I have reviewed all pertinent imaging  results/findings within the past 24 hours.  CT: I have reviewed all pertinent results/findings within the past 24 hours and my personal findings are:  tracheostomy tube in position in trachea, infiltrates/effusions      Assessment/Plan:     * Angioedema  Less tongue edema    Tracheostomy in place  7.5 tracheostomy tube place 3/30/22  Site clean  Sutures out 10 days 4/9/22          Isak Yadav MD  General Surgery  [facility] ([facility])

## 2022-03-31 NOTE — CONSULTS
Pharmacokinetic Initial Assessment: IV Vancomycin    Assessment/Plan:    Initiate intravenous vancomycin with loading dose of 2000 mg once with subsequent doses when random concentrations are less than 20 mcg/mL  Desired empiric serum trough concentration is 15 to 20 mcg/mL  Draw vancomycin random level on 4/1 at 1130.  Pharmacy will continue to follow and monitor vancomycin.      Please contact pharmacy at extension [phone] with any questions regarding this assessment.     Thank you for the consult,   Jocelyne Jay, PharmD       Patient brief summary:  Page Grissom is a 71 y.o. female initiated on antimicrobial therapy with IV Vancomycin for treatment of suspected sepsis    Drug Allergies:   Review of patient's allergies indicates:   Allergen Reactions    Lisinopril Swelling       Actual Body Weight:   118.4 kg    Renal Function:   Estimated Creatinine Clearance: 7.6 mL/min (A) (based on SCr of 8.7 mg/dL (H)).,     Dialysis Method (if applicable):  N/A    CBC (last 72 hours):  Recent Labs   Lab Result Units 03/29/22  0803 03/30/22  0241 03/31/22  0419   WBC K/uL 11.09 8.66 24.53*   Hemoglobin g/dL 10.9* 10.1* 9.4*   Hemoglobin A1C %  --  6.4*  --    Hematocrit % 34.6* 31.8* 28.9*   Platelets K/uL 349 293 310   Gran % % 76.9* 95.6* 91.7*   Lymph % % 13.4* 3.1* 1.2*   Mono % % 8.0 0.9* 6.3   Eosinophil % % 0.8 0.0 0.0   Basophil % % 0.4 0.1 0.1   Differential Method  Automated Automated Automated       Metabolic Panel (last 72 hours):  Recent Labs   Lab Result Units 03/29/22  0803 03/29/22  0808 03/29/22  1216 03/29/22  1454 03/30/22  0241 03/30/22  1610 03/30/22  2207 03/31/22  0419 03/31/22  0902   Sodium mmol/L 138  --  138  --  138 136 134* 131* 131*   Potassium mmol/L 2.5*  --  3.1* 3.4* 3.5 3.3* 2.6* 2.9* 3.1*   Chloride mmol/L 98  --  99  --  102 101 98 94* 93*   CO2 mmol/L 21*  --  22*  --  20* 15* 14* 19* 17*   Glucose mg/dL 167*  --  123*  --  118* 183* 222* 245* 216*   Glucose, UA   --  Negative  --    --   --   --   --   --   --    BUN mg/dL 55*  --  55*  --  56* 60* 60* 65* 61*   Creatinine mg/dL 8.8*  --  8.9*  --  8.7* 9.0* 8.9* 9.0* 8.7*   Albumin g/dL 2.8*  --   --   --   --   --   --  2.6* 2.5*   Total Bilirubin mg/dL 1.0  --   --   --   --   --   --   --   --    Alkaline Phosphatase U/L 101  --   --   --   --   --   --   --   --    AST U/L 21  --   --   --   --   --   --   --   --    ALT U/L 8*  --   --   --   --   --   --   --   --    Magnesium mg/dL 2.0  --   --   --  2.1  --   --  1.9  --    Phosphorus mg/dL 5.1*  --   --   --  7.6*  --   --  6.9* 6.0*       Drug levels (last 3 results):  No results for input(s): VANCOMYCINRA, VANCOMYCINPE, VANCOMYCINTR in the last 72 hours.    Microbiologic Results:  Microbiology Results (last 7 days)     Procedure Component Value Units Date/Time    Blood culture [[id number]] Collected: 03/31/22 1023    Order Status: Sent Specimen: Blood from Peripheral, Left Hand Updated: 03/31/22 1023    Blood culture [[id number]] Collected: 03/31/22 0902    Order Status: Sent Specimen: Blood from Peripheral, Left Hand Updated: 03/31/22 0902    Culture, Respiratory with Gram Stain [[id number]] Collected: 03/30/22 1100    Order Status: Sent Specimen: Respiratory from Tracheal Aspirate Updated: 03/30/22 1240

## 2022-03-31 NOTE — EICU
eICU Physician Virtual/Remote Brief Evaluation Note      Telephone call from RN  Unable to contact patient's family to obtain consent for PICC line  Chart reviewed, discussed with RN  Will proceed with PICC line as emergent procedure required to replete severe hypokalemia      HALLE Bajwa MD  eICU Attending  [phone]    This report has been created through the use of [employer] dictation software. Typographical and content errors may occur with this process. While efforts are made to detect and correct such errors, in some cases errors will persist. For this reason, wording in this document should be considered in the proper context and not strictly verbatim

## 2022-03-31 NOTE — ASSESSMENT & PLAN NOTE
Strict I&Os.  IV fluid.  Monitor BMP.  Avoid nephrotoxic drugs.  On lisinopril since June 2021  3/30 change to bicarb drip , strict I/O's , nephrology follow up   3/31 oliguric , on bicarb drip , not improving will discuss with nephrology HD for acidosis , will discuss with family

## 2022-03-31 NOTE — EICU
eICU Physician Virtual/Remote Brief Evaluation Note      Telephone call RN  Patient with probable bicarbonate drip extravasation to arm.  Arm is swollen but no significant skin changes or heat  She feels that it has been extravasated for some time  Chart reviewed, patient observed, discussed with RN and pharmacy  Arm is not tense with no blistering or erythema  No treatment other than dry cold compresses and elevation      HALLE Bajwa MD  [facility] Attending  [phone]    This report has been created through the use of [employer] dictation software. Typographical and content errors may occur with this process. While efforts are made to detect and correct such errors, in some cases errors will persist. For this reason, wording in this document should be considered in the proper context and not strictly verbatim    Kelly-Yoandy Polanco

## 2022-03-31 NOTE — PROGRESS NOTES
Progress Note  Nephrology    Admit Date: 3/29/2022   LOS: 1 day     SUBJECTIVE:     Follow-up For: KYE    Interval History:Anaphylactic shock,intubated via tracheostomy,oliganuric.    Active Ambulatory Problems     Diagnosis Date Noted    Rheumatoid arthritis involving multiple sites with positive rheumatoid factor 07/03/2013    High risk medication use 07/03/2013    Vitamin D deficiency disease 07/03/2013    Mild persistent asthma without complication 10/08/2013    Diabetes mellitus type 2 with complications 10/08/2013    GERD (gastroesophageal reflux disease) 10/08/2013    Essential hypertension 10/08/2013    Hyperlipidemia associated with type 2 diabetes mellitus 10/08/2013    Fibromyalgia 08/22/2014    Long-term use of immunosuppressant medication 03/12/2015    Cigarette smoker 11/05/2015    Class 3 severe obesity due to excess calories with serious comorbidity and body mass index (BMI) of 40.0 to 44.9 in adult 05/19/2016    Osteopenia of multiple sites 07/26/2017    CKD (chronic kidney disease) stage 3, GFR 30-59 ml/min 12/20/2020    Secondary hyperparathyroidism of renal origin 10/19/2021    Aortic atherosclerosis 10/19/2021    Age-related osteoporosis without current pathological fracture 02/21/2022    Acute airway obstruction      Resolved Ambulatory Problems     Diagnosis Date Noted    Multiple joint pain 07/03/2013    Hypopotassemia 01/31/2017    Colon cancer screening 02/14/2019    Tortuous aorta 09/03/2019     Past Medical History:   Diagnosis Date    Asthma     Back pain     Cataract     Diabetes mellitus     Gastroesophageal reflux disease     Hypercholesterolemia     Hypertension     Immune deficiency disorder     Obesity     Osteoporosis     Rheumatoid arthritis     Tobacco dependence     Trouble in sleeping     Type 2 diabetes mellitus       OBJECTIVE:     Vital Signs (Most Recent)  Temp: 97.8 °F (36.6 °C) (03/30/22 1515)  Pulse: (!) 57 (03/30/22 1928)  Resp: (!)  24 (03/30/22 1928)  BP: (!) 101/51 (03/30/22 1830)  SpO2: (!) 93 % (03/30/22 1928)    Vital Signs Range (Last 24H):  Temp:  [97.2 °F (36.2 °C)-98.1 °F (36.7 °C)]   Pulse:  [29-67]   Resp:  [12-35]   BP: ()/(49-83)   SpO2:  [89 %-100 %]       Intake/Output Summary (Last 24 hours) at 3/30/2022 1957  Last data filed at 3/30/2022 1824  Gross per 24 hour   Intake 3850.99 ml   Output 225 ml   Net 3625.99 ml        ROS unable to perform    Physical Exam  Constitutional:       Appearance: She is obese. She is ill-appearing.      Interventions: She is intubated.   Cardiovascular:      Heart sounds: S1 normal and S2 normal.   Pulmonary:      Effort: She is intubated.      Comments: 50% FiO2  Abdominal:      General: There is distension.   Musculoskeletal:      Right lower leg: No edema.      Left lower leg: No edema.   Skin:     General: Skin is warm.   Neurological:      Comments: sedated   Psychiatric:      Comments: N/A            Anesthesia/Surgery risks, benefits and alternative options discussed and understood by patient/family. ex      Current Facility-Administered Medications:     0.9%  NaCl infusion (for blood administration), , Intravenous, Q24H PRN, Arik Yao Jr., MD    albuterol-ipratropium 2.5 mg-0.5 mg/3 mL nebulizer solution 3 mL, 3 mL, Nebulization, Q4H, Laney Romero MD, 3 mL at 03/30/22 1928    chlorhexidine 0.12 % solution 15 mL, 15 mL, Mouth/Throat, BID, Laney Romero MD, 15 mL at 03/30/22 0931    cisatracurium (NIMBEX) 200 mg in dextrose 5 % 100 mL infusion, 0-10 mcg/kg/min, Intravenous, Continuous, CELI Chowdhury, Last Rate: 12.2 mL/hr at 03/30/22 1824, 3.5 mcg/kg/min at 03/30/22 1824    dextrose 10% bolus 125 mL, 12.5 g, Intravenous, PRN, Emma Antonio, CHICHOP-BC    famotidine (PF) injection 20 mg, 20 mg, Intravenous, Daily, Arik Yao Jr., MD, 20 mg at 03/30/22 0931    fentaNYL 2500 mcg in 0.9% sodium chloride 250 mL infusion premix (titrating), 0-200 mcg/hr,  Intravenous, Continuous, Laney Romero MD, Last Rate: 20 mL/hr at 03/30/22 1824, 200 mcg/hr at 03/30/22 1824    glucagon (human recombinant) injection 1 mg, 1 mg, Intramuscular, PRN, JOAO Armendariz    heparin (porcine) injection 7,500 Units, 7,500 Units, Subcutaneous, Q8H, JOAO Armendariz, 7,500 Units at 03/30/22 1440    insulin aspart U-100 pen 1-10 Units, 1-10 Units, Subcutaneous, Q6H PRN, CELI Chowdhury    lorazepam injection 4 mg, 4 mg, Intravenous, Q4H PRN, Laney Romero MD, 4 mg at 03/30/22 0200    methylPREDNISolone sodium succinate injection 60 mg, 60 mg, Intravenous, Q6H, Laney Romero MD, 60 mg at 03/30/22 1724    midazolam 100 mg/100 mL in dextrose 5% infusion, 0-5 mg/hr, Intravenous, Continuous, Laney Romero MD, Last Rate: 5 mL/hr at 03/30/22 1824, 5 mg/hr at 03/30/22 1824    mupirocin 2 % ointment, , Nasal, BID, Arik Yao Jr., MD, Given at 03/30/22 0941    naloxone 0.4 mg/mL injection 0.02 mg, 0.02 mg, Intravenous, PRN, JOAO Armendariz    sodium bicarbonate 75 mEq in dextrose 5 % 1,075 mL infusion, , Intravenous, Continuous, Laney Romero MD, Last Rate: 150 mL/hr at 03/30/22 1824, Rate Verify at 03/30/22 1824    sodium chloride 0.9% flush 10 mL, 10 mL, Intravenous, Q8H, JOAO Armendariz, 10 mL at 03/30/22 0557    white petrolatum-mineral oil 57.3-42.5% ophthalmic ointment, , Both Eyes, Q8H, CELI Chowdhury, Given at 03/30/22 1440     Laboratory:  Recent Labs   Lab 03/29/22  0803 03/29/22  1216 03/29/22  1454 03/30/22  0241 03/30/22  1610    138  --  138 136   K 2.5* 3.1* 3.4* 3.5 3.3*   CL 98 99  --  102 101   CO2 21* 22*  --  20* 15*   BUN 55* 55*  --  56* 60*   CREATININE 8.8* 8.9*  --  8.7* 9.0*   CALCIUM 7.2* 6.9*  --  7.0* 6.9*   PHOS 5.1*  --   --  7.6*  --      Recent Labs   Lab 03/29/22  0803 03/30/22  0241   WBC 11.09 8.66   HGB 10.9* 10.1*   HCT 34.6* 31.8*    293        Diagnostic  Results:      ASSESSMENT/PLAN:     Acute renal failure:Oligoanuric probably sec to ATN   From shock  Hypokalemia:to replete iv kcl cautiously     Pt scheduled for trach   Once pt is stablised will D/W ICU for further options        Thank you for allowing me to participate in the care of this patient.      Gloria Locke MD  Nephrology

## 2022-03-31 NOTE — NURSING
Spoke with Dr. Locke with Nephrology regarding PICC line placement and elevated creatinine, Dr. Locke approved placement of PICC line.

## 2022-03-31 NOTE — PROGRESS NOTES
[facility] - [facility]ensive Care ([facility])  [facility] Medicine  Progress Note    Patient Name: Page Grissom  MRN: [id number]  Patient Class: IP- Inpatient   Admission Date: 3/29/2022  Length of Stay: 2 days  Attending Physician: Bc Crocker MD  Primary Care Provider: NEELAM Roman Jr, MD        Subjective:     Principal Problem:Angioedema        HPI:  70 y/o female with PMHx of HTN, HLD, DM, asthma, CKD 3, and RA who presented to the ED with c/o angioedema that onset suddenly this morning. Pt was able to verbally communicate with EMS, was not able to communicate with ED staff. Staff was unable to intubate and called surgery, who performed emergency trach. Pt is currently on vent and sedated.  ED workup shows: H/H 10.9/34.6, K+ 2.9, CO2 22, Anion gap 17, BUN 55, Cr 8.8.  She will be admitted to ICU for angioedema under the care of [facility] medicine.  She is a full code and her SDM is her daughter, Cassy.        Overview/Hospital Course:  3/30 emergently trach'd in ED as difficulty intubating d/t angioedema. Going to OR for replacement of tube  3/31 hypotensive requiring pressor support. Leukocytosis with electrolyte abn noted. Concerns for sepsis in immunocompromised patient. Low UOP. Nephrology following for possible dialysis. Guarded prognosis. Consider palliative consult for GOC/family support      Interval History: See hospital course for today      Review of Systems   Unable to perform ROS: Intubated   Objective:     Vital Signs (Most Recent):  Temp: 98.42 °F (36.9 °C) (03/31/22 0931)  Pulse: 85 (03/31/22 0931)  Resp: (!) 24 (03/31/22 0931)  BP: (!) 101/55 (03/31/22 0600)  SpO2: 100 % (03/31/22 0931)   Vital Signs (24h Range):  Temp:  [94.28 °F (34.6 °C)-98.78 °F (37.1 °C)] 98.42 °F (36.9 °C)  Pulse:  [49-85] 85  Resp:  [20-35] 24  SpO2:  [89 %-100 %] 100 %  BP: ()/(50-65) 101/55  Arterial Line BP: ()/(40-60) 119/46     Weight: 118.4 kg (261 lb 0.4 oz)  Body mass index is 44.8  kg/m².    Intake/Output Summary (Last 24 hours) at 3/31/2022 1047  Last data filed at 3/31/2022 0700  Gross per 24 hour   Intake 4332.79 ml   Output 195 ml   Net 4137.79 ml      Physical Exam  Vitals and nursing note reviewed.   Constitutional:       General: She is not in acute distress.     Appearance: She is ill-appearing. She is not toxic-appearing.   HENT:      Head: Normocephalic and atraumatic.   Cardiovascular:      Rate and Rhythm: Normal rate.   Pulmonary:      Effort: Tachypnea and respiratory distress present.      Comments: Mechanical breath sounds  Abdominal:      Palpations: Abdomen is soft.   Genitourinary:     Comments: alatorre  Musculoskeletal:         General: Swelling present.      Right lower leg: Edema present.      Left lower leg: Edema present.   Skin:     General: Skin is warm.       Significant Labs: All pertinent labs within the past 24 hours have been reviewed.  CBC:   Recent Labs   Lab 03/30/22  0241 03/31/22  0419 03/31/22  0446   WBC 8.66 24.53*  --    HGB 10.1* 9.4*  --    HCT 31.8* 28.9* 29*    310  --      CMP:   Recent Labs   Lab 03/30/22  2207 03/31/22  0419 03/31/22  0902   * 131* 131*   K 2.6* 2.9* 3.1*   CL 98 94* 93*   CO2 14* 19* 17*   * 245* 216*   BUN 60* 65* 61*   CREATININE 8.9* 9.0* 8.7*   CALCIUM 6.8* 6.8* 7.4*   ALBUMIN  --  2.6* 2.5*   ANIONGAP 22* 18* 21*   EGFRNONAA 4* 4* 4*         Significant Imaging: I have reviewed all pertinent imaging results/findings within the past 24 hours.      Assessment/Plan:      * Angioedema  --likely 2/2 lisinopril  --admit to ICU  --had emergent trach in ED, on ventalitor  --IV steroids, IV antihistamiens  --Fresh Frozen Plasma ordered in ED  --monitor closely    3/30  Improving   Continue supportive care  Minimal improvement in appearance  Consider bradykinin inhibitor?  Vent Mode: A/C  Oxygen Concentration (%):  [40-70] 40  Resp Rate Total:  [20 br/min-33 br/min] 24 br/min  Vt Set:  [400 mL] 400 mL  PEEP/CPAP:  [8  cmH20] 8 cmH20  Pressure Support:  [0 cmH20] 0 cmH20  Mean Airway Pressure:  [18 [id number] cmH20] 18 cmH20     3/31  CC following for vent management  Surgery following after trach placement  Continue iv systemic steroids      Acidosis, metabolic, with respiratory acidosis  Continue bicarb gtt  neph following to eval for dialysis      Morbid obesity        KYE (acute kidney injury)  --baseline BUN/Cr 20/1.4  --IVF's  --stop lisinopril  --avoid nephrotoxic agents    Nephrology following  Concomitant electrolyte imbalance  eval for dialysis      Tracheostomy in place  --admit to ICU  --stop lisiniopril  --pulmonology following  --supportive care    OR for replacement of cricoid trach      Rheumatoid arthritis involving multiple sites with positive rheumatoid factor  --hold plaquenil  --f/u OP       VTE Risk Mitigation (From admission, onward)         Ordered     Apply sequential compression device to lower extremities (if no contraindications). Medical venous thromboembolus prophylaxis is preferred.  Until discontinued         03/30/22 0805     heparin (porcine) injection 7,500 Units  Every 8 hours         03/29/22 1003     IP VTE HIGH RISK PATIENT  Once         03/29/22 1003                Discharge Planning   CHALO:      Code Status: Full Code   Is the patient medically ready for discharge?:     Reason for patient still in hospital (select all that apply): Patient new problem, Patient trending condition, Laboratory test, Treatment, Imaging and Consult recommendations  Discharge Plan A: Home with family            Critical care time spent on the evaluation and treatment of severe organ dysfunction, review of pertinent labs and imaging studies, discussions with consulting providers and discussions with patient/family: 35 minutes.      Bc Crocker MD  Department of Hospital Medicine   [facility] - Intensive Care ([facility])

## 2022-03-31 NOTE — ASSESSMENT & PLAN NOTE
--likely 2/2 lisinopril  --admit to ICU  --had emergent trach in ED, on ventalitor  --IV steroids, IV antihistamiens  --Fresh Frozen Plasma ordered in ED  --monitor closely    3/30  Improving   Continue supportive care  Minimal improvement in appearance  Consider bradykinin inhibitor?  Vent Mode: A/C  Oxygen Concentration (%):  [40-70] 40  Resp Rate Total:  [20 br/min-33 br/min] 24 br/min  Vt Set:  [400 mL] 400 mL  PEEP/CPAP:  [8 cmH20] 8 cmH20  Pressure Support:  [0 cmH20] 0 cmH20  Mean Airway Pressure:  [18 zjP13-55 cmH20] 18 cmH20     3/31  CC following for vent management  Surgery following after trach placement  Continue iv systemic steroids

## 2022-03-31 NOTE — EICU
eICU Physician Virtual/Remote Brief Evaluation Note      Telephone call RN  Calcium 6.8, ical 0.92, potassium 2.9 on BMP and 2.6 on blood gas  Also WBC increased to 24 from 8.6  Chart reviewed, discussed with RN  KCl 40 mEq IV piggyback via central venous catheter x2  Calcium gluconate 2 g IV piggyback over 2 hours  Renal panel q.4 hours x3 starting at 10:00 a.m.  Repeat magnesium at 10:00 a.m.  To be seen by Nephrology for possible dialysis  Sepsis workup-blood cultures x2, urinalysis with reflex culture  Vancomycin and ceftriaxone started  Pharmacy consult for dosing      B. Josias Bajwa MD  eICU Attending  [phone]    This report has been created through the use of [employer] dictation software. Typographical and content errors may occur with this process. While efforts are made to detect and correct such errors, in some cases errors will persist. For this reason, wording in this document should be considered in the proper context and not strictly verbatim

## 2022-03-31 NOTE — EICU
eICU Physician Virtual/Remote Brief Evaluation Note      Telephone call RN  Critical values potassium 2.6, calcium 6.8  Patient has enteral tube to low intermittent suction with minimal output  No central line  Chart reviewed, discussed with RN  Chest x-rays with incomplete visualization of enteral tube  Potassium chloride 10 mEq over 1 hour x3  Calcium gluconate 2 g IVPB over 4 hours  KUB to check enteral tube position and will give additional potassium enterally if tube is in good position  A.m. labs ordered including magnesium and ionized calcium      HALLE Bajwa MD  [facility] Attending  [phone]    This report has been created through the use of [employer] ([employer]) dictation software. Typographical and content errors may occur with this process. While efforts are made to detect and correct such errors, in some cases errors will persist. For this reason, wording in this document should be considered in the proper context and not strictly verbatim

## 2022-03-31 NOTE — SUBJECTIVE & OBJECTIVE
Interval History: See hospital course for today      Review of Systems   Unable to perform ROS: Intubated   Objective:     Vital Signs (Most Recent):  Temp: 98.42 °F (36.9 °C) (03/31/22 0931)  Pulse: 85 (03/31/22 0931)  Resp: (!) 24 (03/31/22 0931)  BP: (!) 101/55 (03/31/22 0600)  SpO2: 100 % (03/31/22 0931)   Vital Signs (24h Range):  Temp:  [94.28 °F (34.6 °C)-98.78 °F (37.1 °C)] 98.42 °F (36.9 °C)  Pulse:  [49-85] 85  Resp:  [20-35] 24  SpO2:  [89 %-100 %] 100 %  BP: ()/(50-65) 101/55  Arterial Line BP: ()/(40-60) 119/46     Weight: 118.4 kg (261 lb 0.4 oz)  Body mass index is 44.8 kg/m².    Intake/Output Summary (Last 24 hours) at 3/31/2022 1047  Last data filed at 3/31/2022 0700  Gross per 24 hour   Intake 4332.79 ml   Output 195 ml   Net 4137.79 ml      Physical Exam  Vitals and nursing note reviewed.   Constitutional:       General: She is not in acute distress.     Appearance: She is ill-appearing. She is not toxic-appearing.   HENT:      Head: Normocephalic and atraumatic.   Cardiovascular:      Rate and Rhythm: Normal rate.   Pulmonary:      Effort: Tachypnea and respiratory distress present.      Comments: Mechanical breath sounds  Abdominal:      Palpations: Abdomen is soft.   Genitourinary:     Comments: alatorre  Musculoskeletal:         General: Swelling present.      Right lower leg: Edema present.      Left lower leg: Edema present.   Skin:     General: Skin is warm.       Significant Labs: All pertinent labs within the past 24 hours have been reviewed.  CBC:   Recent Labs   Lab 03/30/22  0241 03/31/22  0419 03/31/22  0446   WBC 8.66 24.53*  --    HGB 10.1* 9.4*  --    HCT 31.8* 28.9* 29*    310  --      CMP:   Recent Labs   Lab 03/30/22  2207 03/31/22  0419 03/31/22  0902   * 131* 131*   K 2.6* 2.9* 3.1*   CL 98 94* 93*   CO2 14* 19* 17*   * 245* 216*   BUN 60* 65* 61*   CREATININE 8.9* 9.0* 8.7*   CALCIUM 6.8* 6.8* 7.4*   ALBUMIN  --  2.6* 2.5*   ANIONGAP 22* 18* 21*    EGFRVALERIA 4* 4* 4*         Significant Imaging: I have reviewed all pertinent imaging results/findings within the past 24 hours.

## 2022-03-31 NOTE — ASSESSMENT & PLAN NOTE
Severe was hard to intubate for respiratory distress status post emergent cricothyroidotomy with size 5 trach.  Sedated with propofol.  On IV steroids and antihistamines.  Check tryptase level IgE.  No recent medicine introduction.  On lisinopril ordered in June 2021  3/30 check cuff leak , steroids antihistamines IV , Ige , C1 q esterase  Inh , Tryptase level pending   3/31 check cuff leak , decrease solumedrol to q 12 hrs , cont famotidine

## 2022-03-31 NOTE — PROCEDURES
"Page Grissom is a 71 y.o. female patient.    Temp: (!) 94.64 °F (34.8 °C) (03/31/22 0200)  Pulse: 71 (03/31/22 0200)  Resp: (!) 24 (03/31/22 0200)  BP: (!) 90/51 (03/31/22 0200)  SpO2: 100 % (03/31/22 0200)  Weight: 115.8 kg (255 lb 4.7 oz) (03/30/22 0600)  Height: 5' 4" (162.6 cm) (03/29/22 0751)    PICC  Performed by: Efren Sahu RN  Supervising provider: Efren Sahu RN  Consent Done: Emergent Situation  Time out: Immediately prior to procedure a time out was called to verify the correct patient, procedure, equipment, support staff and site/side marked as required  Indications: med administration  Anesthesia: local infiltration  Local anesthetic: lidocaine 1% without epinephrine  Anesthetic Total (mL): 3  Preparation: skin prepped with ChloraPrep  Skin prep agent dried: skin prep agent completely dried prior to procedure  Sterile barriers: all five maximum sterile barriers used - cap, mask, sterile gown, sterile gloves, and large sterile sheet  Hand hygiene: hand hygiene performed prior to central venous catheter insertion  Location details: right basilic  Catheter type: double lumen  Catheter size: 5 Fr  Catheter Length: 40cm    Ultrasound guidance: yes  Vessel Caliber: medium and patent, compressibility normal  Vascular Doppler: not done  Needle advanced into vessel with real time Ultrasound guidance.  Guidewire confirmed in vessel.  Sterile sheath used.  no esophageal manometryNumber of attempts: 1  Post-procedure: blood return through all ports, chlorhexidine patch and sterile dressing applied  Estimated blood loss (mL): 0  Specimens: No  Implants: No          Name CONNIE Schwartz  3/31/2022  "

## 2022-03-31 NOTE — PLAN OF CARE
Problem: Adult Inpatient Plan of Care  Goal: Plan of Care Review  Outcome: Ongoing, Not Progressing  Pt remains intubated/sedated/medically paralyzed. Hypothermic, anupama hugger  Applied. NSR on monitor. Hypotensive, PICC line placed and levophed started this shift. PIVs and PICC infusing Bicarb, Fentanyl, versed, Nimbex, and levophed. Due to hypotension multiple titrations were made to sedation/paralytics per EICU MD. Pt turned q2hrs with, heels floated with pillow. Tongue swelling decreasing but still present. 5-10ml/hr urine putput via alatorre cath. Awaiting blood cultures to be collected prior to starting ABX due to increase in WBC. Will continue with current POC and update as needed.

## 2022-03-31 NOTE — EICU
eICU Physician Virtual/Remote Brief Evaluation Note      Message from RN  BP down to 80s over 40s with map in 50s  Patient without central line or pressors  Sedated with fentanyl and Versed, paralyzed with Nimbex  Responded well to albumin last night  MAP improved to 69 with pausing Nimbex and sedation  Chart reviewed, patient observed, discussed with RN  /51, P 74, RR 24, O2 sat 99  Sedated on ventilator 520/20/6/30%  Nimbex restarted  Patient 5 L positive with minimal urine output and creatinine 9  Will hold off on volume for now  Will decrease rate on Nimbex as long as patient maintains reasonable ventilator synchrony  A line ordered  Would consider central venous catheter and echocardiogram in the morning        HALLE Bajwa MD  eICU Attending  [phone]    This report has been created through the use of M-Modal dictation software. Typographical and content errors may occur with this process. While efforts are made to detect and correct such errors, in some cases errors will persist. For this reason, wording in this document should be considered in the proper context and not strictly verbatim

## 2022-03-31 NOTE — NURSING
Called Cassy (daughter) x 3 NA to get a PICC consent.  Also called all numbers listed on patient's chart/facesheet with NA after multiple calls.

## 2022-03-31 NOTE — PROGRESS NOTES
Pharmacist Renal Dose Adjustment Note    Page Grissom is a 71 y.o. female being treated with the medication cefepime    Patient Data:    Vital Signs (Most Recent):  Temp: 96.26 °F (35.7 °C) (03/31/22 0329)  Pulse: 66 (03/31/22 0329)  Resp: (!) 24 (03/31/22 0329)  BP: (!) 90/51 (03/31/22 0200)  SpO2: 100 % (03/31/22 0329)   Vital Signs (72h Range):  Temp:  [94.28 °F (34.6 °C)-98.6 °F (37 °C)]   Pulse:  []   Resp:  [12-58]   BP: ()/(49-84)   SpO2:  [89 %-100 %]   Arterial Line BP: ()/(40-60)      Recent Labs   Lab 03/30/22  1610 03/30/22  2207 03/31/22 0419   CREATININE 9.0* 8.9* 9.0*     Serum creatinine: 9 mg/dL (H) 03/31/22 0419  Estimated creatinine clearance: 7.3 mL/min (A)    Cefepime 500 mg q24h will be changed to cefepime 2 g q24h for CrCl <30 mL/min and severe infection (indication: sepsis)    Pharmacist's Name: Rosalee Courtney  Pharmacist's Extension: [phone]

## 2022-04-01 PROBLEM — Z51.5 PALLIATIVE CARE ENCOUNTER: Status: ACTIVE | Noted: 2022-04-01

## 2022-04-01 PROBLEM — D72.829 LEUKOCYTOSIS: Status: ACTIVE | Noted: 2022-04-01

## 2022-04-01 LAB
ALBUMIN SERPL BCP-MCNC: 2.3 G/DL (ref 3.5–5.2)
ALBUMIN SERPL BCP-MCNC: 2.4 G/DL (ref 3.5–5.2)
ALLENS TEST: ABNORMAL
ALP SERPL-CCNC: 86 U/L (ref 55–135)
ALT SERPL W/O P-5'-P-CCNC: 10 U/L (ref 10–44)
AMM URATE CRY URNS QL MICRO: ABNORMAL
ANION GAP SERPL CALC-SCNC: 17 MMOL/L (ref 8–16)
ANION GAP SERPL CALC-SCNC: 18 MMOL/L (ref 8–16)
AST SERPL-CCNC: 13 U/L (ref 10–40)
BASOPHILS # BLD AUTO: 0.01 K/UL (ref 0–0.2)
BASOPHILS NFR BLD: 0.1 % (ref 0–1.9)
BILIRUB SERPL-MCNC: 0.4 MG/DL (ref 0.1–1)
BILIRUB UR QL STRIP: NEGATIVE
BUN SERPL-MCNC: 71 MG/DL (ref 8–23)
BUN SERPL-MCNC: 72 MG/DL (ref 8–23)
CALCIUM SERPL-MCNC: 6.7 MG/DL (ref 8.7–10.5)
CALCIUM SERPL-MCNC: 6.8 MG/DL (ref 8.7–10.5)
CHLORIDE SERPL-SCNC: 89 MMOL/L (ref 95–110)
CHLORIDE SERPL-SCNC: 89 MMOL/L (ref 95–110)
CLARITY UR: CLEAR
CO2 SERPL-SCNC: 22 MMOL/L (ref 23–29)
CO2 SERPL-SCNC: 22 MMOL/L (ref 23–29)
COLOR UR: YELLOW
CREAT SERPL-MCNC: 8.5 MG/DL (ref 0.5–1.4)
CREAT SERPL-MCNC: 8.5 MG/DL (ref 0.5–1.4)
DELSYS: ABNORMAL
DIFFERENTIAL METHOD: ABNORMAL
EOSINOPHIL # BLD AUTO: 0 K/UL (ref 0–0.5)
EOSINOPHIL NFR BLD: 0 % (ref 0–8)
ERYTHROCYTE [DISTWIDTH] IN BLOOD BY AUTOMATED COUNT: 15.9 % (ref 11.5–14.5)
ERYTHROCYTE [SEDIMENTATION RATE] IN BLOOD BY WESTERGREN METHOD: 24 MM/H
EST. GFR  (AFRICAN AMERICAN): 5 ML/MIN/1.73 M^2
EST. GFR  (AFRICAN AMERICAN): 5 ML/MIN/1.73 M^2
EST. GFR  (NON AFRICAN AMERICAN): 4 ML/MIN/1.73 M^2
EST. GFR  (NON AFRICAN AMERICAN): 4 ML/MIN/1.73 M^2
FIO2: 40
GLUCOSE SERPL-MCNC: 155 MG/DL (ref 70–110)
GLUCOSE SERPL-MCNC: 159 MG/DL (ref 70–110)
GLUCOSE UR QL STRIP: NEGATIVE
HCO3 UR-SCNC: 24.7 MMOL/L (ref 24–28)
HCT VFR BLD AUTO: 27.7 % (ref 37–48.5)
HGB BLD-MCNC: 9.2 G/DL (ref 12–16)
HGB UR QL STRIP: ABNORMAL
IMM GRANULOCYTES # BLD AUTO: 0.16 K/UL (ref 0–0.04)
IMM GRANULOCYTES NFR BLD AUTO: 0.8 % (ref 0–0.5)
KETONES UR QL STRIP: NEGATIVE
LEUKOCYTE ESTERASE UR QL STRIP: ABNORMAL
LYMPHOCYTES # BLD AUTO: 0.3 K/UL (ref 1–4.8)
LYMPHOCYTES NFR BLD: 1.3 % (ref 18–48)
MAGNESIUM SERPL-MCNC: 1.8 MG/DL (ref 1.6–2.6)
MCH RBC QN AUTO: 24.4 PG (ref 27–31)
MCHC RBC AUTO-ENTMCNC: 33.2 G/DL (ref 32–36)
MCV RBC AUTO: 74 FL (ref 82–98)
MICROSCOPIC COMMENT: ABNORMAL
MODE: ABNORMAL
MONOCYTES # BLD AUTO: 0.6 K/UL (ref 0.3–1)
MONOCYTES NFR BLD: 2.8 % (ref 4–15)
NEUTROPHILS # BLD AUTO: 18.7 K/UL (ref 1.8–7.7)
NEUTROPHILS NFR BLD: 95 % (ref 38–73)
NITRITE UR QL STRIP: NEGATIVE
NRBC BLD-RTO: 0 /100 WBC
PCO2 BLDA: 45.7 MMHG (ref 35–45)
PEEP: 8
PH SMN: 7.34 [PH] (ref 7.35–7.45)
PH UR STRIP: 5 [PH] (ref 5–8)
PHOSPHATE SERPL-MCNC: 5.4 MG/DL (ref 2.7–4.5)
PLATELET # BLD AUTO: 271 K/UL (ref 150–450)
PMV BLD AUTO: 9.9 FL (ref 9.2–12.9)
PO2 BLDA: 73 MMHG (ref 80–100)
POC BE: -1 MMOL/L
POC SATURATED O2: 93 % (ref 95–100)
POCT GLUCOSE: 156 MG/DL (ref 70–110)
POCT GLUCOSE: 162 MG/DL (ref 70–110)
POCT GLUCOSE: 165 MG/DL (ref 70–110)
POCT GLUCOSE: 171 MG/DL (ref 70–110)
POCT GLUCOSE: 178 MG/DL (ref 70–110)
POTASSIUM SERPL-SCNC: 3.8 MMOL/L (ref 3.5–5.1)
POTASSIUM SERPL-SCNC: 3.9 MMOL/L (ref 3.5–5.1)
PROCALCITONIN SERPL IA-MCNC: 0.46 NG/ML
PROT SERPL-MCNC: 6 G/DL (ref 6–8.4)
PROT UR QL STRIP: NEGATIVE
RBC # BLD AUTO: 3.77 M/UL (ref 4–5.4)
RBC #/AREA URNS HPF: 5 /HPF (ref 0–4)
SAMPLE: ABNORMAL
SITE: ABNORMAL
SODIUM SERPL-SCNC: 128 MMOL/L (ref 136–145)
SODIUM SERPL-SCNC: 129 MMOL/L (ref 136–145)
SP GR UR STRIP: 1.01 (ref 1–1.03)
SP02: 100
SQUAMOUS #/AREA URNS HPF: 10 /HPF
URN SPEC COLLECT METH UR: ABNORMAL
UROBILINOGEN UR STRIP-ACNC: NEGATIVE EU/DL
VANCOMYCIN SERPL-MCNC: 22.4 UG/ML
VT: 400
WBC # BLD AUTO: 19.69 K/UL (ref 3.9–12.7)
WBC #/AREA URNS HPF: 7 /HPF (ref 0–5)

## 2022-04-01 PROCEDURE — 25000003 PHARM REV CODE 250: Performed by: SURGERY

## 2022-04-01 PROCEDURE — 20000000 HC ICU ROOM

## 2022-04-01 PROCEDURE — 99497 ADVNCD CARE PLAN 30 MIN: CPT | Mod: 25,,, | Performed by: PHYSICIAN ASSISTANT

## 2022-04-01 PROCEDURE — 94640 AIRWAY INHALATION TREATMENT: CPT

## 2022-04-01 PROCEDURE — 94761 N-INVAS EAR/PLS OXIMETRY MLT: CPT

## 2022-04-01 PROCEDURE — 99291 CRITICAL CARE FIRST HOUR: CPT | Mod: ,,, | Performed by: INTERNAL MEDICINE

## 2022-04-01 PROCEDURE — 84145 PROCALCITONIN (PCT): CPT | Performed by: INTERNAL MEDICINE

## 2022-04-01 PROCEDURE — 81000 URINALYSIS NONAUTO W/SCOPE: CPT | Performed by: SURGERY

## 2022-04-01 PROCEDURE — 63600175 PHARM REV CODE 636 W HCPCS: Performed by: SURGERY

## 2022-04-01 PROCEDURE — 87070 CULTURE OTHR SPECIMN AEROBIC: CPT | Performed by: INTERNAL MEDICINE

## 2022-04-01 PROCEDURE — 27000221 HC OXYGEN, UP TO 24 HOURS

## 2022-04-01 PROCEDURE — 25000003 PHARM REV CODE 250: Performed by: INTERNAL MEDICINE

## 2022-04-01 PROCEDURE — 99497 PR ADVNCD CARE PLAN 30 MIN: ICD-10-PCS | Mod: 25,,, | Performed by: PHYSICIAN ASSISTANT

## 2022-04-01 PROCEDURE — 63600175 PHARM REV CODE 636 W HCPCS: Performed by: INTERNAL MEDICINE

## 2022-04-01 PROCEDURE — 99223 1ST HOSP IP/OBS HIGH 75: CPT | Mod: ,,, | Performed by: PHYSICIAN ASSISTANT

## 2022-04-01 PROCEDURE — 99900026 HC AIRWAY MAINTENANCE (STAT)

## 2022-04-01 PROCEDURE — 99900035 HC TECH TIME PER 15 MIN (STAT)

## 2022-04-01 PROCEDURE — 94760 N-INVAS EAR/PLS OXIMETRY 1: CPT

## 2022-04-01 PROCEDURE — 25000003 PHARM REV CODE 250: Performed by: FAMILY MEDICINE

## 2022-04-01 PROCEDURE — 80202 ASSAY OF VANCOMYCIN: CPT | Performed by: FAMILY MEDICINE

## 2022-04-01 PROCEDURE — 83735 ASSAY OF MAGNESIUM: CPT | Performed by: FAMILY MEDICINE

## 2022-04-01 PROCEDURE — A4216 STERILE WATER/SALINE, 10 ML: HCPCS | Performed by: SURGERY

## 2022-04-01 PROCEDURE — 99291 PR CRITICAL CARE, E/M 30-74 MINUTES: ICD-10-PCS | Mod: ,,, | Performed by: INTERNAL MEDICINE

## 2022-04-01 PROCEDURE — 25000242 PHARM REV CODE 250 ALT 637 W/ HCPCS: Performed by: SURGERY

## 2022-04-01 PROCEDURE — 94003 VENT MGMT INPAT SUBQ DAY: CPT

## 2022-04-01 PROCEDURE — 37799 UNLISTED PX VASCULAR SURGERY: CPT

## 2022-04-01 PROCEDURE — A4216 STERILE WATER/SALINE, 10 ML: HCPCS | Performed by: FAMILY MEDICINE

## 2022-04-01 PROCEDURE — 99291 CRITICAL CARE FIRST HOUR: CPT | Mod: 95,,, | Performed by: INTERNAL MEDICINE

## 2022-04-01 PROCEDURE — 87205 SMEAR GRAM STAIN: CPT | Performed by: INTERNAL MEDICINE

## 2022-04-01 PROCEDURE — 99223 PR INITIAL HOSPITAL CARE,LEVL III: ICD-10-PCS | Mod: ,,, | Performed by: PHYSICIAN ASSISTANT

## 2022-04-01 PROCEDURE — 85025 COMPLETE CBC W/AUTO DIFF WBC: CPT | Performed by: INTERNAL MEDICINE

## 2022-04-01 PROCEDURE — 43752 NASAL/OROGASTRIC W/TUBE PLMT: CPT

## 2022-04-01 PROCEDURE — 82803 BLOOD GASES ANY COMBINATION: CPT

## 2022-04-01 PROCEDURE — 80069 RENAL FUNCTION PANEL: CPT | Performed by: SURGERY

## 2022-04-01 PROCEDURE — 99291 PR CRITICAL CARE, E/M 30-74 MINUTES: ICD-10-PCS | Mod: 95,,, | Performed by: INTERNAL MEDICINE

## 2022-04-01 PROCEDURE — 80053 COMPREHEN METABOLIC PANEL: CPT | Performed by: FAMILY MEDICINE

## 2022-04-01 RX ADMIN — METHYLPREDNISOLONE SODIUM SUCCINATE 60 MG: 40 INJECTION, POWDER, FOR SOLUTION INTRAMUSCULAR; INTRAVENOUS at 08:04

## 2022-04-01 RX ADMIN — IPRATROPIUM BROMIDE AND ALBUTEROL SULFATE 3 ML: 2.5; .5 SOLUTION RESPIRATORY (INHALATION) at 12:04

## 2022-04-01 RX ADMIN — INSULIN ASPART 2 UNITS: 100 INJECTION, SOLUTION INTRAVENOUS; SUBCUTANEOUS at 12:04

## 2022-04-01 RX ADMIN — INSULIN ASPART 2 UNITS: 100 INJECTION, SOLUTION INTRAVENOUS; SUBCUTANEOUS at 06:04

## 2022-04-01 RX ADMIN — SODIUM CHLORIDE, PRESERVATIVE FREE 10 ML: 5 INJECTION INTRAVENOUS at 12:04

## 2022-04-01 RX ADMIN — FUROSEMIDE 60 MG: 10 INJECTION, SOLUTION INTRAMUSCULAR; INTRAVENOUS at 01:04

## 2022-04-01 RX ADMIN — METHYLPREDNISOLONE SODIUM SUCCINATE 60 MG: 40 INJECTION, POWDER, FOR SOLUTION INTRAMUSCULAR; INTRAVENOUS at 09:04

## 2022-04-01 RX ADMIN — MINERAL OIL, PETROLATUM: 425; 573 OINTMENT OPHTHALMIC at 06:04

## 2022-04-01 RX ADMIN — IPRATROPIUM BROMIDE AND ALBUTEROL SULFATE 3 ML: 2.5; .5 SOLUTION RESPIRATORY (INHALATION) at 11:04

## 2022-04-01 RX ADMIN — CEFEPIME HYDROCHLORIDE 2 G: 2 INJECTION, SOLUTION INTRAVENOUS at 06:04

## 2022-04-01 RX ADMIN — HEPARIN SODIUM 7500 UNITS: 5000 INJECTION INTRAVENOUS; SUBCUTANEOUS at 06:04

## 2022-04-01 RX ADMIN — MINERAL OIL, PETROLATUM: 425; 573 OINTMENT OPHTHALMIC at 09:04

## 2022-04-01 RX ADMIN — IPRATROPIUM BROMIDE AND ALBUTEROL SULFATE 3 ML: 2.5; .5 SOLUTION RESPIRATORY (INHALATION) at 04:04

## 2022-04-01 RX ADMIN — HEPARIN SODIUM 7500 UNITS: 5000 INJECTION INTRAVENOUS; SUBCUTANEOUS at 01:04

## 2022-04-01 RX ADMIN — FAMOTIDINE 20 MG: 10 INJECTION, SOLUTION INTRAVENOUS at 08:04

## 2022-04-01 RX ADMIN — INSULIN ASPART 1 UNITS: 100 INJECTION, SOLUTION INTRAVENOUS; SUBCUTANEOUS at 12:04

## 2022-04-01 RX ADMIN — SODIUM CHLORIDE, PRESERVATIVE FREE 10 ML: 5 INJECTION INTRAVENOUS at 11:04

## 2022-04-01 RX ADMIN — HEPARIN SODIUM 7500 UNITS: 5000 INJECTION INTRAVENOUS; SUBCUTANEOUS at 09:04

## 2022-04-01 RX ADMIN — Medication 10 ML: at 09:04

## 2022-04-01 RX ADMIN — FUROSEMIDE 60 MG: 10 INJECTION, SOLUTION INTRAMUSCULAR; INTRAVENOUS at 06:04

## 2022-04-01 RX ADMIN — IPRATROPIUM BROMIDE AND ALBUTEROL SULFATE 3 ML: 2.5; .5 SOLUTION RESPIRATORY (INHALATION) at 08:04

## 2022-04-01 RX ADMIN — SODIUM CHLORIDE, PRESERVATIVE FREE 10 ML: 5 INJECTION INTRAVENOUS at 06:04

## 2022-04-01 RX ADMIN — MUPIROCIN: 20 OINTMENT TOPICAL at 09:04

## 2022-04-01 RX ADMIN — Medication 0.04 MCG/KG/MIN: at 08:04

## 2022-04-01 RX ADMIN — CALCIUM GLUCONATE 1 G: 98 INJECTION, SOLUTION INTRAVENOUS at 03:04

## 2022-04-01 RX ADMIN — INSULIN ASPART 1 UNITS: 100 INJECTION, SOLUTION INTRAVENOUS; SUBCUTANEOUS at 11:04

## 2022-04-01 RX ADMIN — IPRATROPIUM BROMIDE AND ALBUTEROL SULFATE 3 ML: 2.5; .5 SOLUTION RESPIRATORY (INHALATION) at 07:04

## 2022-04-01 RX ADMIN — MUPIROCIN: 20 OINTMENT TOPICAL at 08:04

## 2022-04-01 RX ADMIN — SODIUM BICARBONATE: 84 INJECTION, SOLUTION INTRAVENOUS at 03:04

## 2022-04-01 NOTE — PLAN OF CARE
Pt able to follow simple commands such as nod head appropriated and squeeze hands on command. She can also lift hand almost to face level. Pt has thick cream colored secretions from trach/inline. She remains on fentanyl and versed but is easily arousal. PT turned q2 hours and oral care performed q4 hours. POC reviewed with pt and daughter. Bed alarm on.

## 2022-04-01 NOTE — PROGRESS NOTES
[facility])  Critical Care Medicine  Progress Note    Patient Name: Page Grissom  MRN: [id number]  Admission Date: 3/29/2022  Hospital Length of Stay: 3 days  Code Status: Full Code  Attending Provider: Batsheva Landaverde MD  Primary Care Provider: NEELAM Roman Jr, MD   Principal Problem: Angioedema    Subjective:     HPI:  71 y.o. female patient PMHx of asthma, CKD, DM2, HTN, and immune deficiency disorder  brought to the emergency room for acute onset shortness of breath severe tongue swelling and respiratory distress.        Hospital/ICU Course:  Patient was hard to intubate , general surgery consulted, and emergency cricothyrotomy was performed with a tube size 5 mm in place.  FiO2 50% O2 sat 91%.  Sedated with propofol.  Chest x-ray ABG reviewed.    3/30 seen and examined, events overnight noted , now paralyzed for vent asynchrony , sedated, oliguric , awaiting OR today change cricothyroidotomy tube    to ETT versus replace trach   3/31 seen and examined , CXR , ABG reviewed, events overnight reviewed , sp 7.5 mm trach placement yesterday and removal of cricothyrotomy tube, on levophged gtt , versed, fentanyl gtt , nimbex gtt , oliguric, severe electrolytes disturbances   4/1 Patient seen and examined.Day 2post tracheostomy Paralysis None Sedation None Vasopressors Levophed Urine output last 24 hrs 926 ml  Fluid balance since admit11 L + Tmax 98 Last BM 3/28 Chest X-ray and ABG reviewed         4/1 Patient seen and examined.Day 2post tracheostomy Paralysis None Sedation None Vasopressors Levophed Urine output last 24 hrs 926 ml  Fluid balance since admit11 L + Tmax 98 Last BM 3/28 Chest X-ray and ABG reviewed   Review of Systems   Unable to perform ROS: Intubated   Objective:     Vital Signs (Most Recent):  Temp: 98.1 °F (36.7 °C) (04/01/22 1101)  Pulse: 67 (04/01/22 1256)  Resp: (!) 24 (04/01/22 1256)  BP: (!) 101/55 (03/31/22 0600)  SpO2: 100 % (04/01/22 1256)   Vital Signs (24h  Range):  Temp:  [97.9 °F (36.6 °C)-98.6 °F (37 °C)] 98.1 °F (36.7 °C)  Pulse:  [61-80] 67  Resp:  [23-28] 24  SpO2:  [92 %-100 %] 100 %  Arterial Line BP: ()/(38-91) 142/55     Weight: 118.4 kg (261 lb 0.4 oz)  Body mass index is 44.8 kg/m².      Intake/Output Summary (Last 24 hours) at 4/1/2022 1317  Last data filed at 4/1/2022 1200  Gross per 24 hour   Intake 3493.9 ml   Output 1301 ml   Net 2192.9 ml         Physical Exam  Vitals and nursing note reviewed.   Constitutional:       General: She is not in acute distress.     Appearance: She is well-developed.   HENT:      Head: Normocephalic and atraumatic.      Mouth/Throat:      Comments: Large swollen tongue and lips but improved compared to yesterday   Neck:      Comments: Trach in place  Cardiovascular:      Rate and Rhythm: Normal rate.   Pulmonary:      Effort: Respiratory distress present.      Breath sounds: Rhonchi present.   Abdominal:      Palpations: Abdomen is soft.      Tenderness: There is no abdominal tenderness.   Genitourinary:     Comments: Ortega in place   Musculoskeletal:         General: No deformity.      Cervical back: Neck supple.   Skin:     General: Skin is warm.   Neurological:      General: No focal deficit present.      Mental Status: She is alert.      Comments: Follows commands moves 4 extremities       Vents:  Vent Mode: SIMV (04/01/22 1256)  Ventilator Initiated: (P) Yes (04/01/22 0900)  Set Rate: 24 BPM (04/01/22 1256)  Vt Set: 400 mL (04/01/22 1256)  Pressure Support: 10 cmH20 (04/01/22 1256)  PEEP/CPAP: 8 cmH20 (04/01/22 1256)  Oxygen Concentration (%): 40 (04/01/22 1256)  Peak Airway Pressure: 30 cmH2O (04/01/22 1256)  Plateau Pressure: 26 cmH20 (04/01/22 1256)  Total Ve: 9.83 mL (04/01/22 1256)  F/VT Ratio<105 (RSBI): (!) 62.99 (04/01/22 1256)    Lines/Drains/Airways       Peripherally Inserted Central Catheter Line  Duration             PICC Double Lumen 03/31/22 0240 right basilic 1 day              Drain  Duration                   Urethral Catheter 03/29/22 0900 18 Fr. 3 days         NG/OG Tube 04/01/22 1019 [place] sump 12 Fr. Right nostril <1 day              Airway  Duration                  Surgical Airway 03/30/22 Other (Comment) 2 days              Arterial Line  Duration             Arterial Line 03/31/22 0015 Left Radial 1 day              Peripheral Intravenous Line  Duration                  Peripheral IV - Single Lumen 03/29/22 0706 20 G Left Antecubital 3 days         Peripheral IV - Single Lumen 03/30/22 2045 20 G Anterior;Distal;Left Wrist 1 day                    Significant Labs:    CBC/Anemia Profile:   Latest Reference Range & Units 04/01/22 12:14   WBC 3.90 - 12.70 K/uL 19.69 (H)   RBC 4.00 - 5.40 M/uL 3.77 (L)   Hemoglobin 12.0 - 16.0 g/dL 9.2 (L)   Hematocrit 37.0 - 48.5 % 27.7 (L)   MCV 82 - 98 fL 74 (L)   MCH 27.0 - 31.0 pg 24.4 (L)   MCHC 32.0 - 36.0 g/dL 33.2   RDW 11.5 - 14.5 % 15.9 (H)   Platelets 150 - 450 K/uL 271   MPV 9.2 - 12.9 fL 9.9   Gran % 38.0 - 73.0 % 95.0 (H)   Lymph % 18.0 - 48.0 % 1.3 (L)   Mono % 4.0 - 15.0 % 2.8 (L)   Eosinophil % 0.0 - 8.0 % 0.0   Basophil % 0.0 - 1.9 % 0.1   Immature Granulocytes 0.0 - 0.5 % 0.8 (H)   Gran # (ANC) 1.8 - 7.7 K/uL 18.7 (H)   Lymph # 1.0 - 4.8 K/uL 0.3 (L)   Mono # 0.3 - 1.0 K/uL 0.6   Eos # 0.0 - 0.5 K/uL 0.0   Baso # 0.00 - 0.20 K/uL 0.01   Immature Grans (Abs) 0.00 - 0.04 K/uL 0.16 (H) [1]   NRBC 0 /100 WBC 0   Differential Method  Automated         Latest Reference Range & Units 04/01/22 05:04   POC PH 7.35 - 7.45  7.341 (L)   POC PCO2 35 - 45 mmHg 45.7 (H)   POC PO2 80 - 100 mmHg 73 (L)   POC BE -2 to 2 mmol/L -1   POC HCO3 24 - 28 mmol/L 24.7   POC SATURATED O2 95 - 100 % 93 (L)   FiO2  40   Vt  400   PEEP  8   Sample  ARTERIAL   DelSys  Adult Vent   Allens Test  N/A   Site  Bobo/UAC   Mode  AC/PRVC   Rate  24           EKG 03/29/2020  Normal sinus rhythm   Possible Left atrial enlargement   Prolonged QT   Abnormal ECG   When compared with ECG of  13-DEC-2006 09:37,   QRS duration has increased   QT has lengthened     Significant Imaging:     CXR 4/1/2022    EXAMINATION:  XR CHEST AP PORTABLE     CLINICAL HISTORY:  ng tube placement;     TECHNIQUE:  Single frontal view of the chest was performed.     COMPARISON:  Chest radiograph 03/31/2022 with priors     FINDINGS:  Cardiac leads project over the chest.  A tracheostomy cannula is again identified.  Stable positioning of a right PICC.  Nasogastric tube tip is not visualized, but appears to terminate appropriately in the upper abdomen.  Cardiomediastinal silhouette is unchanged in size.  Persistent small bilateral pleural effusions with adjacent compressive atelectasis or consolidation.  No definite new infiltrate.  No pneumothorax.     Impression:     NG tube appears to course appropriately into the upper abdomen.  No detrimental interval change.                  ABG  Recent Labs   Lab 04/01/22  0504   PH 7.341*   PO2 73*   PCO2 45.7*   HCO3 24.7   BE -1     Assessment/Plan:     ENT  Tracheostomy in place  Emergent cricothyrotomy severe angioedema trach in place, severe hypercapnia and respiratory acidosis repeat ABG nebs IV steroids antihistamine  3/30 check cuff leak , ETT versus larger trach today   3/31 check cuff leak , solumedrol , pepcid   4/1 puff leak present.  IV Solu-Medrol IV Pepcid.  Start CPAP trials.  O2/MV/pulmonary toilet and trach care.    Renal/  Acidosis, metabolic, with respiratory acidosis  4/1 discontinue bicarb drip.    KYE (acute kidney injury)  Strict I&Os.  IV fluid.  Monitor BMP.  Avoid nephrotoxic drugs.  On lisinopril since June 2021  3/30 change to bicarb drip , strict I/O's , nephrology follow up   3/31 oliguric , on bicarb drip , not improving will discuss with nephrology HD for acidosis , will discuss with family  4/1 discontinue bicarb drip.  Bicarb today 22 on BMP.  Urine output 900 mL last 24 hours.  Urine output 60-70 cc/hour currently.  Continue Lasix 60 mg q.8 hours  total of 3 doses now.      Oncology  Leukocytosis  4/1 most likely steroid related.  Check procalcitonin.  Blood culture negative so far.  Respiratory culture sent.      Orthopedic  Rheumatoid arthritis involving multiple sites with positive rheumatoid factor  On hydroxychloroquine as outpatient.  No recent change in treatment noted  3/30 continue same   3/31 now off DMD due to sepsis suspicion   4/1 off DMD due to sepsis indicated by elevated white count.  Abnormal chest x-ray.  Sending procalcitonin today.  Empirically on cefepime    Other  * Angioedema  Severe was hard to intubate for respiratory distress status post emergent cricothyroidotomy with size 5 trach.  Sedated with propofol.  On IV steroids and antihistamines.  Check tryptase level IgE.  No recent medicine introduction.  On lisinopril ordered in June 2021  3/30 check cuff leak , steroids antihistamines IV , Ige , C1 q esterase  Inh , Tryptase level pending   3/31 check cuff leak , decrease solumedrol to q 12 hrs , cont famotidine   4/1 cuff leak present.  Continue Solu-Medrol and famotidine IV.  Start spontaneous trial on ventilator via trach.       Critical Care Daily Checklist:    A: Awake: RASS Goal/Actual Goal: RASS Goal: 0-->alert and calm  Actual: [place] Agitation Sedation Scale (RASS): Light sedation   B: Spontaneous Breathing Trial Performed?     C: SAT & SBT Coordinated?  Y                      D: Delirium: CAM-ICU Overall CAM-ICU: Negative   E: Early Mobility Performed? Yes   F: Feeding Goal: Goals: 1. Patient will meet >60% of estimated energy needs by RD follow up.  Status: Nutrition Goal Status: new   Current Diet Order   Procedures    Diet NPO      AS: Analgesia/Sedation Off paralysis off pressors   T: Thromboembolic Prophylaxis HEPARIN SC   H: HOB > 300 Yes   U: Stress Ulcer Prophylaxis (if needed) Pepcid    G: Glucose Control FS prn    B: Bowel Function     I: Indwelling Catheter (Lines & Ortega) Necessity Y   D: De-escalation of  Antimicrobials/Pharmacotherapies Empiric cefepime.  Respiratory culture sent.  Check procalcitonin.  WBC 19 K today.      Plan for the day/ETD Y CPAP trial diuresis DC bicarb drip    Code Status:  Family/Goals of Care: Full Code  Y discussed with daughter Cassy      Critical Care Time: 35 minutes  Critical secondary to Patient has a condition that poses threat to life and bodily function: Acute Renal Failure complicating angioedema resp failure       Critical care was time spent personally by me on the following activities: development of treatment plan with patient or surrogate and bedside caregivers, discussions with consultants, evaluation of patient's response to treatment, examination of patient, ordering and performing treatments and interventions, ordering and review of laboratory studies, ordering and review of radiographic studies, pulse oximetry, re-evaluation of patient's condition. This critical care time did not overlap with that of any other provider or involve time for any procedures.     Laney Romero MD  Critical Care Medicine  [facility] - Intensive Care ([facility])

## 2022-04-01 NOTE — SUBJECTIVE & OBJECTIVE
4/1 Patient seen and examined.Day 2post tracheostomy Paralysis None Sedation None Vasopressors Levophed Urine output last 24 hrs 926 ml  Fluid balance since admit11 L + Tmax 98 Last BM 3/28 Chest X-ray and ABG reviewed   Review of Systems   Unable to perform ROS: Intubated   Objective:     Vital Signs (Most Recent):  Temp: 98.1 °F (36.7 °C) (04/01/22 1101)  Pulse: 67 (04/01/22 1256)  Resp: (!) 24 (04/01/22 1256)  BP: (!) 101/55 (03/31/22 0600)  SpO2: 100 % (04/01/22 1256)   Vital Signs (24h Range):  Temp:  [97.9 °F (36.6 °C)-98.6 °F (37 °C)] 98.1 °F (36.7 °C)  Pulse:  [61-80] 67  Resp:  [23-28] 24  SpO2:  [92 %-100 %] 100 %  Arterial Line BP: ()/(38-91) 142/55     Weight: 118.4 kg (261 lb 0.4 oz)  Body mass index is 44.8 kg/m².      Intake/Output Summary (Last 24 hours) at 4/1/2022 1317  Last data filed at 4/1/2022 1200  Gross per 24 hour   Intake 3493.9 ml   Output 1301 ml   Net 2192.9 ml         Physical Exam  Vitals and nursing note reviewed.   Constitutional:       General: She is not in acute distress.     Appearance: She is well-developed.   HENT:      Head: Normocephalic and atraumatic.      Mouth/Throat:      Comments: Large swollen tongue and lips but improved compared to yesterday   Neck:      Comments: Trach in place  Cardiovascular:      Rate and Rhythm: Normal rate.   Pulmonary:      Effort: Respiratory distress present.      Breath sounds: Rhonchi present.   Abdominal:      Palpations: Abdomen is soft.      Tenderness: There is no abdominal tenderness.   Genitourinary:     Comments: Ortega in place   Musculoskeletal:         General: No deformity.      Cervical back: Neck supple.   Skin:     General: Skin is warm.   Neurological:      General: No focal deficit present.      Mental Status: She is alert.      Comments: Follows commands moves 4 extremities       Vents:  Vent Mode: SIMV (04/01/22 1256)  Ventilator Initiated: (P) Yes (04/01/22 0900)  Set Rate: 24 BPM (04/01/22 1256)  Vt Set: 400 mL  (04/01/22 1256)  Pressure Support: 10 cmH20 (04/01/22 1256)  PEEP/CPAP: 8 cmH20 (04/01/22 1256)  Oxygen Concentration (%): 40 (04/01/22 1256)  Peak Airway Pressure: 30 cmH2O (04/01/22 1256)  Plateau Pressure: 26 cmH20 (04/01/22 1256)  Total Ve: 9.83 mL (04/01/22 1256)  F/VT Ratio<105 (RSBI): (!) 62.99 (04/01/22 1256)    Lines/Drains/Airways       Peripherally Inserted Central Catheter Line  Duration             PICC Double Lumen 03/31/22 0240 right basilic 1 day              Drain  Duration                  Urethral Catheter 03/29/22 0900 18 Fr. 3 days         NG/OG Tube 04/01/22 1019 [place] sump 12 Fr. Right nostril <1 day              Airway  Duration                  Surgical Airway 03/30/22 Other (Comment) 2 days              Arterial Line  Duration             Arterial Line 03/31/22 0015 Left Radial 1 day              Peripheral Intravenous Line  Duration                  Peripheral IV - Single Lumen 03/29/22 0706 20 G Left Antecubital 3 days         Peripheral IV - Single Lumen 03/30/22 2045 20 G Anterior;Distal;Left Wrist 1 day                    Significant Labs:    CBC/Anemia Profile:   Latest Reference Range & Units 04/01/22 12:14   WBC 3.90 - 12.70 K/uL 19.69 (H)   RBC 4.00 - 5.40 M/uL 3.77 (L)   Hemoglobin 12.0 - 16.0 g/dL 9.2 (L)   Hematocrit 37.0 - 48.5 % 27.7 (L)   MCV 82 - 98 fL 74 (L)   MCH 27.0 - 31.0 pg 24.4 (L)   MCHC 32.0 - 36.0 g/dL 33.2   RDW 11.5 - 14.5 % 15.9 (H)   Platelets 150 - 450 K/uL 271   MPV 9.2 - 12.9 fL 9.9   Gran % 38.0 - 73.0 % 95.0 (H)   Lymph % 18.0 - 48.0 % 1.3 (L)   Mono % 4.0 - 15.0 % 2.8 (L)   Eosinophil % 0.0 - 8.0 % 0.0   Basophil % 0.0 - 1.9 % 0.1   Immature Granulocytes 0.0 - 0.5 % 0.8 (H)   Gran # (ANC) 1.8 - 7.7 K/uL 18.7 (H)   Lymph # 1.0 - 4.8 K/uL 0.3 (L)   Mono # 0.3 - 1.0 K/uL 0.6   Eos # 0.0 - 0.5 K/uL 0.0   Baso # 0.00 - 0.20 K/uL 0.01   Immature Grans (Abs) 0.00 - 0.04 K/uL 0.16 (H) [1]   NRBC 0 /100 WBC 0   Differential Method  Automated         Latest  Reference Range & Units 04/01/22 05:04   POC PH 7.35 - 7.45  7.341 (L)   POC PCO2 35 - 45 mmHg 45.7 (H)   POC PO2 80 - 100 mmHg 73 (L)   POC BE -2 to 2 mmol/L -1   POC HCO3 24 - 28 mmol/L 24.7   POC SATURATED O2 95 - 100 % 93 (L)   FiO2  40   Vt  400   PEEP  8   Sample  ARTERIAL   DelSys  Adult Vent   Allens Test  N/A   Site  Bobo/UAC   Mode  AC/PRVC   Rate  24           EKG 03/29/2020  Normal sinus rhythm   Possible Left atrial enlargement   Prolonged QT   Abnormal ECG   When compared with ECG of 13-DEC-2006 09:37,   QRS duration has increased   QT has lengthened     Significant Imaging:     CXR 4/1/2022    EXAMINATION:  XR CHEST AP PORTABLE     CLINICAL HISTORY:  ng tube placement;     TECHNIQUE:  Single frontal view of the chest was performed.     COMPARISON:  Chest radiograph 03/31/2022 with priors     FINDINGS:  Cardiac leads project over the chest.  A tracheostomy cannula is again identified.  Stable positioning of a right PICC.  Nasogastric tube tip is not visualized, but appears to terminate appropriately in the upper abdomen.  Cardiomediastinal silhouette is unchanged in size.  Persistent small bilateral pleural effusions with adjacent compressive atelectasis or consolidation.  No definite new infiltrate.  No pneumothorax.     Impression:     NG tube appears to course appropriately into the upper abdomen.  No detrimental interval change.

## 2022-04-01 NOTE — ASSESSMENT & PLAN NOTE
Strict I&Os.  IV fluid.  Monitor BMP.  Avoid nephrotoxic drugs.  On lisinopril since June 2021  3/30 change to bicarb drip , strict I/O's , nephrology follow up   3/31 oliguric , on bicarb drip , not improving will discuss with nephrology HD for acidosis , will discuss with family  4/1 discontinue bicarb drip.  Bicarb today 22 on BMP.  Urine output 900 mL last 24 hours.  Urine output 60-70 cc/hour currently.  Continue Lasix 60 mg q.8 hours total of 3 doses now.

## 2022-04-01 NOTE — ASSESSMENT & PLAN NOTE
Severe was hard to intubate for respiratory distress status post emergent cricothyroidotomy with size 5 trach.  Sedated with propofol.  On IV steroids and antihistamines.  Check tryptase level IgE.  No recent medicine introduction.  On lisinopril ordered in June 2021  3/30 check cuff leak , steroids antihistamines IV , Ige , C1 q esterase  Inh , Tryptase level pending   3/31 check cuff leak , decrease solumedrol to q 12 hrs , cont famotidine   4/1 cuff leak present.  Continue Solu-Medrol and famotidine IV.  Start spontaneous trial on ventilator via trach.

## 2022-04-01 NOTE — PLAN OF CARE
Recommendation/Intervention: 4/1   1. Recommend to continue Novasource Renal as tolerated.   -Goal rate @ 25mL/hr.   -Provides 1200 calories, 54g protein, and 430mL H2O flush.   -100mL H2O flush q 4-6 hours.      2. Recommend to d/c beneprotein due to renal labs and KYE.      3. Weekly weights per RD follow up.    Adriana Jean RD,LDN

## 2022-04-01 NOTE — PLAN OF CARE
Patient remained safe and stable for the duration of the shift. See assessment flowsheets for more details. Paralytic stopped and sedation reduced by half. The patient is at RASS goal -2 and following simple commands. Plan of care discussed per ICU team and [facility]. Daughter, Cassy updated.

## 2022-04-01 NOTE — PROGRESS NOTES
Progress Note  Nephrology    Admit Date: 3/29/2022   LOS: 2 days     SUBJECTIVE:     Follow-up For: KYE    Interval History:Pt with H/O HTN,Rheumatoid arthritis on immunosuppression,presented with anaphylaxis &developed shochk,intubated,S/P trach.KYE was olig anuric,however urine out put is improving,startedon intermittant lasix,on NGT feeds.    Active Ambulatory Problems     Diagnosis Date Noted    Rheumatoid arthritis involving multiple sites with positive rheumatoid factor 07/03/2013    High risk medication use 07/03/2013    Vitamin D deficiency disease 07/03/2013    Mild persistent asthma without complication 10/08/2013    Diabetes mellitus type 2 with complications 10/08/2013    GERD (gastroesophageal reflux disease) 10/08/2013    Essential hypertension 10/08/2013    Hyperlipidemia associated with type 2 diabetes mellitus 10/08/2013    Fibromyalgia 08/22/2014    Long-term use of immunosuppressant medication 03/12/2015    Cigarette smoker 11/05/2015    Class 3 severe obesity due to excess calories with serious comorbidity and body mass index (BMI) of 40.0 to 44.9 in adult 05/19/2016    Osteopenia of multiple sites 07/26/2017    CKD (chronic kidney disease) stage 3, GFR 30-59 ml/min 12/20/2020    Secondary hyperparathyroidism of renal origin 10/19/2021    Aortic atherosclerosis 10/19/2021    Age-related osteoporosis without current pathological fracture 02/21/2022    Acute airway obstruction      Resolved Ambulatory Problems     Diagnosis Date Noted    Multiple joint pain 07/03/2013    Hypopotassemia 01/31/2017    Colon cancer screening 02/14/2019    Tortuous aorta 09/03/2019     Past Medical History:   Diagnosis Date    Asthma     Back pain     Cataract     Diabetes mellitus     Gastroesophageal reflux disease     Hypercholesterolemia     Hypertension     Immune deficiency disorder     Obesity     Osteoporosis     Rheumatoid arthritis     Tobacco dependence     Trouble in  sleeping     Type 2 diabetes mellitus       OBJECTIVE:     Vital Signs (Most Recent)  Temp: 98.4 °F (36.9 °C) (03/31/22 1601)  Pulse: 68 (03/31/22 1846)  Resp: (!) 24 (03/31/22 1846)  BP: (!) 101/55 (03/31/22 0600)  SpO2: 100 % (03/31/22 1846)    Vital Signs Range (Last 24H):  Temp:  [94.28 °F (34.6 °C)-98.78 °F (37.1 °C)]   Pulse:  []   Resp:  [24-28]   BP: ()/(50-65)   SpO2:  [92 %-100 %]   Arterial Line BP: ()/(40-91)       Intake/Output Summary (Last 24 hours) at 3/31/2022 1920  Last data filed at 3/31/2022 1800  Gross per 24 hour   Intake 5333.46 ml   Output 430 ml   Net 4903.46 ml        ROS unable to perform    Physical Exam  Constitutional:       Appearance: She is obese. She is ill-appearing and toxic-appearing.      Interventions: She is intubated.   Cardiovascular:      Heart sounds: S1 normal and S2 normal.   Pulmonary:      Effort: She is intubated.      Comments: 50% FiO2  Abdominal:      Comments: NGT feeds   Neurological:      Comments: sedated   Psychiatric:      Comments: N/A            Anesthesia/Surgery risks, benefits and alternative options discussed and understood by patient/family. ex      Current Facility-Administered Medications:     0.9%  NaCl infusion (for blood administration), , Intravenous, Q24H PRN, Isak Yadav MD    albuterol-ipratropium 2.5 mg-0.5 mg/3 mL nebulizer solution 3 mL, 3 mL, Nebulization, Q4H, Isak Yadav MD, 3 mL at 03/31/22 0742    cefepime in dextrose 5 % IVPB 2 g, 2 g, Intravenous, Q24H, Ruy Bajwa MD, Stopped at 03/31/22 1106    dextrose 10% bolus 125 mL, 12.5 g, Intravenous, PRN, Isak Yadav MD    famotidine (PF) injection 20 mg, 20 mg, Intravenous, Daily, Isak Yadav MD, 20 mg at 03/31/22 0823    fentaNYL 2500 mcg in 0.9% sodium chloride 250 mL infusion premix (titrating), 0-200 mcg/hr, Intravenous, Continuous, Laney Romero MD, Last Rate: 10 mL/hr at 03/31/22 1800, 100 mcg/hr at 03/31/22 1800     furosemide injection 60 mg, 60 mg, Intravenous, Q8H, Gloria Locke MD    glucagon (human recombinant) injection 1 mg, 1 mg, Intramuscular, PRN, Isak Yadav MD    heparin (porcine) injection 7,500 Units, 7,500 Units, Subcutaneous, Q8H, Isak Yadav MD, 7,500 Units at 03/31/22 1500    insulin aspart U-100 pen 1-10 Units, 1-10 Units, Subcutaneous, Q6H PRN, Isak Yadav MD, 2 Units at 03/31/22 1815    lorazepam injection 4 mg, 4 mg, Intravenous, Q4H PRN, Isak Yadav MD, 4 mg at 03/30/22 0200    methylPREDNISolone sodium succinate injection 60 mg, 60 mg, Intravenous, Q12H, Laney Romero MD    midazolam (VERSED) 1 mg/mL in dextrose 5 % 100 mL infusion (titrating), 0-5 mg/hr, Intravenous, Continuous, Laney Romero MD, Last Rate: 3 mL/hr at 03/31/22 1800, 3 mg/hr at 03/31/22 1800    mupirocin 2 % ointment, , Nasal, BID, Isak Yadav MD, Given at 03/31/22 0822    naloxone 0.4 mg/mL injection 0.02 mg, 0.02 mg, Intravenous, PRN, Isak Yadav MD    NORepinephrine 4 mg in dextrose 5% 250 mL infusion (premix) (titrating), 0-3 mcg/kg/min, Intravenous, Continuous, Ruy Bajwa MD, Last Rate: 17.4 mL/hr at 03/31/22 1800, 0.04 mcg/kg/min at 03/31/22 1800    sodium bicarbonate 100 mEq in sodium chloride 0.45% 1,100 mL infusion, , Intravenous, Continuous, Gloria Locke MD, Last Rate: 100 mL/hr at 03/31/22 1800, Rate Verify at 03/31/22 1800    sodium chloride 0.9% flush 10 mL, 10 mL, Intravenous, Q8H, Isak Yadav MD, 10 mL at 03/31/22 1522    Flushing PICC Protocol, , , Until Discontinued **AND** sodium chloride 0.9% flush 10 mL, 10 mL, Intravenous, Q6H, 10 mL at 03/31/22 1200 **AND** sodium chloride 0.9% flush 10 mL, 10 mL, Intravenous, PRN, Bc Crocker MD    Pharmacy to dose Vancomycin consult, , , Once **AND** vancomycin - pharmacy to dose, , Intravenous, pharmacy to manage frequency, Ruy Bajwa MD    white petrolatum-mineral oil 57.3-42.5% ophthalmic  ointment, , Both Eyes, Q8H, Isak Yadav MD, Given at 03/31/22 0606     Laboratory:  Recent Labs   Lab 03/31/22  0419 03/31/22  0902 03/31/22  1559   * 131* 128*   K 2.9* 3.1* 3.9   CL 94* 93* 91*   CO2 19* 17* 20*   BUN 65* 61* 64*   CREATININE 9.0* 8.7* 8.8*   CALCIUM 6.8* 7.4* 7.0*   PHOS 6.9* 6.0* 5.6*     Recent Labs   Lab 03/29/22  0803 03/30/22  0241 03/31/22  0419 03/31/22  0446   WBC 11.09 8.66 24.53*  --    HGB 10.9* 10.1* 9.4*  --    HCT 34.6* 31.8* 28.9* 29*    293 310  --         Diagnostic Results:      ASSESSMENT/PLAN:       KYE:Oligo anuric,probably sec to ATN  Anasarca:sec to anaphylaxis,on IV lasix  Hypokalemia;resolving  Hyponatremia:sec to KYE & Edema  Metabolic acidosiss:started on IV bicarbonate     Lasix 60 mgs q 8hrs  Change bicarbonate if Co2>22      Thank you for allowing me to participate in the care of this patient.      Gloria Locke MD  Nephrology

## 2022-04-01 NOTE — SUBJECTIVE & OBJECTIVE
Interval History: still ventilated, follow commands, trach site is anahi    Medications:  Continuous Infusions:   fentanyl Stopped (04/01/22 1015)    NORepinephrine bitartrate-D5W Stopped (04/01/22 1358)     Scheduled Meds:   albuterol-ipratropium  3 mL Nebulization Q4H    ceFEPime (MAXIPIME) IVPB  2 g Intravenous Q24H    famotidine (PF)  20 mg Intravenous Daily    heparin (porcine)  7,500 Units Subcutaneous Q8H    methylPREDNISolone sodium succinate  60 mg Intravenous Q12H    mupirocin   Nasal BID    sodium chloride 0.9%  10 mL Intravenous Q8H    sodium chloride 0.9%  10 mL Intravenous Q6H    white petrolatum-mineral oil 57.3-42.5%   Both Eyes Q8H     PRN Meds:sodium chloride, dextrose 10%, glucagon (human recombinant), insulin aspart U-100, lorazepam, naloxone, Flushing PICC Protocol **AND** sodium chloride 0.9% **AND** sodium chloride 0.9%     Review of patient's allergies indicates:   Allergen Reactions    Lisinopril Anaphylaxis     Angioedema requiring trach     Objective:     Vital Signs (Most Recent):  Temp: 98.1 °F (36.7 °C) (04/01/22 1101)  Pulse: 74 (04/01/22 1401)  Resp: 14 (04/01/22 1401)  BP: 138/65 (04/01/22 1401)  SpO2: 100 % (04/01/22 1401) Vital Signs (24h Range):  Temp:  [97.9 °F (36.6 °C)-98.6 °F (37 °C)] 98.1 °F (36.7 °C)  Pulse:  [61-80] 74  Resp:  [14-28] 14  SpO2:  [92 %-100 %] 100 %  BP: (138)/(65) 138/65  Arterial Line BP: ()/(38-91) 132/54     Weight: 118.4 kg (261 lb 0.4 oz)  Body mass index is 44.8 kg/m².    Intake/Output - Last 3 Shifts         03/30 0700  03/31 0659 03/31 0700  04/01 0659 04/01 0700  04/02 0659    I.V. (mL/kg) 4052.8 (34.2) 3479.6 (29.4) 921.1 (7.8)    Blood       NG/GT  315 30    IV Piggyback 587.3 837.3 49.2    Total Intake(mL/kg) 4640 (39.2) 4631.9 (39.1) 1000.3 (8.4)    Urine (mL/kg/hr) 195 (0.1) 851 (0.3) 675 (0.8)    Total Output 195 851 675    Net +4445 +3780.9 +325.3                   Physical Exam  Constitutional:       Comments: Ventilated, follows  commands   HENT:      Mouth/Throat:      Comments: Less tongue edema  Neck:      Comments: Trach site is clean  Cardiovascular:      Rate and Rhythm: Normal rate and regular rhythm.   Pulmonary:      Comments: ventilated  Musculoskeletal:      Right lower leg: Edema present.      Left lower leg: Edema present.   Neurological:      Comments: Follow commands       Significant Labs:  I have reviewed all pertinent lab results within the past 24 hours.  CBC:   Recent Labs   Lab 04/01/22  1214   WBC 19.69*   RBC 3.77*   HGB 9.2*   HCT 27.7*      MCV 74*   MCH 24.4*   MCHC 33.2     BMP:   Recent Labs   Lab 04/01/22  1017   *   *   K 3.9   CL 89*   CO2 22*   BUN 71*   CREATININE 8.5*   CALCIUM 6.7*   MG 1.8     ABGs:   Recent Labs   Lab 04/01/22  0504   PH 7.341*   PCO2 45.7*   PO2 73*   HCO3 24.7   POCSATURATED 93*   BE -1       Significant Diagnostics:  I have reviewed all pertinent imaging results/findings within the past 24 hours.

## 2022-04-01 NOTE — PROGRESS NOTES
[facility] - Intensive Care ([facility])  [facility]  Progress Note    Patient Name: Page Grissom  MRN: [id number]  Patient Class: IP- Inpatient   Admission Date: 3/29/2022  Length of Stay: 3 days  Attending Physician: Batsheva Landaverde MD  Primary Care Provider: NEELAM Roman Jr, MD        Subjective:     Principal Problem:Angioedema        HPI:  72 y/o female with PMHx of HTN, HLD, DM, asthma, CKD 3, and RA who presented to the ED with c/o angioedema that onset suddenly this morning. Pt was able to verbally communicate with EMS, was not able to communicate with ED staff. Staff was unable to intubate and called surgery, who performed emergency trach. Pt is currently on vent and sedated.  ED workup shows: H/H 10.9/34.6, K+ 2.9, CO2 22, Anion gap 17, BUN 55, Cr 8.8.  She will be admitted to ICU for angioedema under the care of [facility] medicine.  She is a full code and her SDM is her daughter, Cassy.        Overview/Hospital Course:  3/30 emergently trach'd in ED as difficulty intubating d/t angioedema. Going to OR for replacement of tube  3/31 hypotensive requiring pressor support. Leukocytosis with electrolyte abn noted. Concerns for sepsis in immunocompromised patient. Low UOP. Nephrology following for possible dialysis. Guarded prognosis. Consider palliative consult for GOC/family support  4/1 remains on ventilatory support via trach. NG placed for med admin          Review of Systems   Constitutional:  Negative for fever.   HENT:  Negative for congestion.    Respiratory:  Negative for cough.    Gastrointestinal:  Negative for abdominal pain.   Endocrine: Negative for polyuria.   Genitourinary:  Negative for flank pain.   Musculoskeletal:  Negative for back pain.   Skin:  Negative for rash.   Neurological:  Negative for syncope.   Psychiatric/Behavioral:  Negative for confusion.    Objective:     Vital Signs (Most Recent):  Temp: 98.1 °F (36.7 °C) (04/01/22 1101)  Pulse: 74 (04/01/22 1616)  Resp: 15  (04/01/22 1616)  BP: 131/62 (04/01/22 1601)  SpO2: 100 % (04/01/22 1616) Vital Signs (24h Range):  Temp:  [97.9 °F (36.6 °C)-98.6 °F (37 °C)] 98.1 °F (36.7 °C)  Pulse:  [63-80] 74  Resp:  [13-26] 15  SpO2:  [99 %-100 %] 100 %  BP: (119-138)/(57-65) 131/62  Arterial Line BP: ()/(38-61) 132/54     Weight: 118.4 kg (261 lb 0.4 oz)  Body mass index is 44.8 kg/m².    Intake/Output Summary (Last 24 hours) at 4/1/2022 1841  Last data filed at 4/1/2022 1600  Gross per 24 hour   Intake 2618.99 ml   Output 1401 ml   Net 1217.99 ml      Physical Exam  Vitals and nursing note reviewed.   Constitutional:       General: She is not in acute distress.     Appearance: She is ill-appearing. She is not toxic-appearing.   HENT:      Head: Normocephalic and atraumatic.   Cardiovascular:      Rate and Rhythm: Normal rate.   Pulmonary:      Effort: Tachypnea and respiratory distress present.      Comments: Mechanical breath sounds  Abdominal:      Palpations: Abdomen is soft.   Genitourinary:     Comments: alatorre  Musculoskeletal:         General: Swelling present.      Right lower leg: Edema present.      Left lower leg: Edema present.   Skin:     General: Skin is warm.       Significant Labs: All pertinent labs within the past 24 hours have been reviewed.    Significant Imaging: I have reviewed all pertinent imaging results/findings within the past 24 hours.      Assessment/Plan:      * Angioedema  --likely 2/2 lisinopril  --admit to ICU  --had emergent trach in ED, on ventalitor  --IV steroids, IV antihistamiens  --Fresh Frozen Plasma ordered in ED  --monitor closely    3/30  Improving   Continue supportive care  Minimal improvement in appearance  Consider bradykinin inhibitor?  Vent Mode: Spont  Oxygen Concentration (%):  [35-40] 35  Resp Rate Total:  [13 br/min-25 br/min] 15 br/min  Vt Set:  [400 mL] 400 mL  PEEP/CPAP:  [5 cmH20-8 cmH20] 5 cmH20  Pressure Support:  [0 cmH20-10 cmH20] 10 cmH20  Mean Airway Pressure:  [7.9  jbL04-31 cmH20] 7.9 cmH20     3/31  CC following for vent management  Surgery following after trach placement  Continue iv systemic steroids    4/1  Continue current care    Acidosis, metabolic, with respiratory acidosis  Continue bicarb gtt  neph following to eval for dialysis      Morbid obesity        KYE (acute kidney injury)  --baseline BUN/Cr 20/1.4  --IVF's  --stop lisinopril  --avoid nephrotoxic agents    Nephrology following  Concomitant electrolyte imbalance  eval for dialysis      Tracheostomy in place  --admit to ICU  --stop lisiniopril  --pulmonology following  --supportive care    OR for replacement of cricoid trach      Rheumatoid arthritis involving multiple sites with positive rheumatoid factor  --hold plaquenil  --f/u OP         VTE Risk Mitigation (From admission, onward)         Ordered     Apply sequential compression device to lower extremities (if no contraindications). Medical venous thromboembolus prophylaxis is preferred.  Until discontinued         03/30/22 0805     heparin (porcine) injection 7,500 Units  Every 8 hours         03/29/22 1003     IP VTE HIGH RISK PATIENT  Once         03/29/22 1003                Discharge Planning   CHALO:      Code Status: Full Code   Is the patient medically ready for discharge?:     Reason for patient still in hospital (select all that apply): Pending disposition  Discharge Plan A: Home with family            Critical care time spent on the evaluation and treatment of severe organ dysfunction, review of pertinent labs and imaging studies, discussions with consulting providers and discussions with patient/family: 30 minutes.      Batsheva Landaverde MD  Department of Hospital Medicine   [facility] - [facility] ([facility])

## 2022-04-01 NOTE — ASSESSMENT & PLAN NOTE
Emergent cricothyrotomy severe angioedema trach in place, severe hypercapnia and respiratory acidosis repeat ABG nebs IV steroids antihistamine  3/30 check cuff leak , ETT versus larger trach today   3/31 check cuff leak , solumedrol , pepcid   4/1 puff leak present.  IV Solu-Medrol IV Pepcid.  Start CPAP trials.  O2/MV/pulmonary toilet and trach care.

## 2022-04-01 NOTE — SUBJECTIVE & OBJECTIVE
Review of Systems   Constitutional:  Negative for fever.   HENT:  Negative for congestion.    Respiratory:  Negative for cough.    Gastrointestinal:  Negative for abdominal pain.   Endocrine: Negative for polyuria.   Genitourinary:  Negative for flank pain.   Musculoskeletal:  Negative for back pain.   Skin:  Negative for rash.   Neurological:  Negative for syncope.   Psychiatric/Behavioral:  Negative for confusion.    Objective:     Vital Signs (Most Recent):  Temp: 98.1 °F (36.7 °C) (04/01/22 1101)  Pulse: 74 (04/01/22 1616)  Resp: 15 (04/01/22 1616)  BP: 131/62 (04/01/22 1601)  SpO2: 100 % (04/01/22 1616) Vital Signs (24h Range):  Temp:  [97.9 °F (36.6 °C)-98.6 °F (37 °C)] 98.1 °F (36.7 °C)  Pulse:  [63-80] 74  Resp:  [13-26] 15  SpO2:  [99 %-100 %] 100 %  BP: (119-138)/(57-65) 131/62  Arterial Line BP: ()/(38-61) 132/54     Weight: 118.4 kg (261 lb 0.4 oz)  Body mass index is 44.8 kg/m².    Intake/Output Summary (Last 24 hours) at 4/1/2022 1841  Last data filed at 4/1/2022 1600  Gross per 24 hour   Intake 2618.99 ml   Output 1401 ml   Net 1217.99 ml      Physical Exam  Vitals and nursing note reviewed.   Constitutional:       General: She is not in acute distress.     Appearance: She is ill-appearing. She is not toxic-appearing.   HENT:      Head: Normocephalic and atraumatic.   Cardiovascular:      Rate and Rhythm: Normal rate.   Pulmonary:      Effort: Tachypnea and respiratory distress present.      Comments: Mechanical breath sounds  Abdominal:      Palpations: Abdomen is soft.   Genitourinary:     Comments: alatorre  Musculoskeletal:         General: Swelling present.      Right lower leg: Edema present.      Left lower leg: Edema present.   Skin:     General: Skin is warm.       Significant Labs: All pertinent labs within the past 24 hours have been reviewed.    Significant Imaging: I have reviewed all pertinent imaging results/findings within the past 24 hours.

## 2022-04-01 NOTE — CONSULTS
Advance Care Planning    Consult Note  Palliative Medicine      Consult Requested By: Dr. Crocker  Reason for Consult: Emotional support    SUBJECTIVE:     History of Present Illness:  Page Grissom is a 71 y.o. year old with a history of HTN, CKD stage III, RA, and morbid obesity who presented to the emergency department with angioedema. She could not protect her airway and after multiple failed attempts to intubate general surgery emergently placed a criccotracheostomy tube. She was admitted to the ICU for supportive care and as she was improving was able to upgrade to tracheostomy tube. Palliative Medicine was consulted to provide emotional support for difficult decisions. At the time of my visit Ms. Grissom was making improvements and the team was cautiously optimistic she would be able to wean from sedation and eventually from the ventilator. Her edema is clinically improving and no indication at this time to not expect a full recovery. I met her daughter Cassy at bedside who asked to meet in the consult room. She was aware of the role of PM as her father received hospice care years ago. She was perplexed that we were consulted to talk to her as it was her understanding that her mother was improving. I reassured her that I understood she was improving and that I am here to be an extra layer of support. She shared stories about leaning heavily on her mom for childcare and emotional support. She is a single mom and an only child with her mother as her only remaining relative. She understands that her mom will likely need time to recover before returning to her baseline functional status. She is trying to remain optimistic that she will do this sooner than later as it is hard being at home without her mom. I did introduce code status and since she is improving without prior heart disease I would support FULL code for now. She agrees with FULL code and says this would be her mother's election as well. She  thanked me for my time and had no other questions or concerns. She understands that I will follow peripherally for now but am available should she need anything in the future.       Past Medical History:   Diagnosis Date    Asthma     Back pain     Cataract     OD    CKD (chronic kidney disease) stage 3, GFR 30-59 ml/min 12/20/2020    Diabetes mellitus     Fibromyalgia     Gastroesophageal reflux disease     Hypercholesterolemia     Hypertension     Immune deficiency disorder     Obesity     Osteoporosis     Rheumatoid arthritis     Tobacco dependence     Trouble in sleeping     Type 2 diabetes mellitus      Past Surgical History:   Procedure Laterality Date    COLONOSCOPY N/A 2/14/2019    Procedure: COLONOSCOPY;  Surgeon: Yury Atwood MD;  Location[facility];  Service: Endoscopy;  Laterality: N/A;    HERNIA REPAIR      OOPHORECTOMY       Family History   Problem Relation Age of Onset    Hypertension Mother     Cancer Father     Colon cancer Father     Breast cancer Sister      Social History     Socioeconomic History    Marital status:    Tobacco Use    Smoking status: Current Every Day Smoker     Packs/day: 0.50     Years: 25.00     Pack years: 12.50     Types: Cigarettes    Smokeless tobacco: Never Used    Tobacco comment: trying to quit   Substance and Sexual Activity    Alcohol use: No    Drug use: No      Review of patient's allergies indicates:   Allergen Reactions    Lisinopril Swelling       Medications:    Current Facility-Administered Medications:     0.9%  NaCl infusion (for blood administration), , Intravenous, Q24H PRN, Isak Yadav MD    albuterol-ipratropium 2.5 mg-0.5 mg/3 mL nebulizer solution 3 mL, 3 mL, Nebulization, Q4H, Isak Yadav MD, 3 mL at 04/01/22 1256    cefepime in dextrose 5 % IVPB 2 g, 2 g, Intravenous, Q24H, Ruy Bajwa MD, Stopped at 04/01/22 0646    dextrose 10% bolus 125 mL, 12.5 g, Intravenous, PRN, Isak Yadav,  MD    famotidine (PF) injection 20 mg, 20 mg, Intravenous, Daily, Isak Yadav MD, 20 mg at 04/01/22 0804    fentaNYL 2500 mcg in 0.9% sodium chloride 250 mL infusion premix (titrating), 0-200 mcg/hr, Intravenous, Continuous, Laney Romero MD, Last Rate: 10 mL/hr at 04/01/22 0900, 100 mcg/hr at 04/01/22 0900    furosemide injection 60 mg, 60 mg, Intravenous, Q8H, Gloria Locke MD, 60 mg at 04/01/22 0607    glucagon (human recombinant) injection 1 mg, 1 mg, Intramuscular, PRN, Isak Yadav MD    heparin (porcine) injection 7,500 Units, 7,500 Units, Subcutaneous, Q8H, Isak Yadav MD, 7,500 Units at 04/01/22 0609    insulin aspart U-100 pen 1-10 Units, 1-10 Units, Subcutaneous, Q6H PRN, Isak Yadav MD, 2 Units at 04/01/22 1210    lorazepam injection 4 mg, 4 mg, Intravenous, Q4H PRN, Isak Yadav MD, 4 mg at 03/30/22 0200    methylPREDNISolone sodium succinate injection 60 mg, 60 mg, Intravenous, Q12H, Laney Romero MD, 60 mg at 04/01/22 0803    mupirocin 2 % ointment, , Nasal, BID, Isak Yadav MD, Given at 04/01/22 0804    naloxone 0.4 mg/mL injection 0.02 mg, 0.02 mg, Intravenous, PRN, Isak Yadav MD    NORepinephrine 4 mg in dextrose 5% 250 mL infusion (premix) (titrating), 0-3 mcg/kg/min, Intravenous, Continuous, Ruy Bajwa MD, Last Rate: 17.4 mL/hr at 04/01/22 0900, 0.04 mcg/kg/min at 04/01/22 0900    sodium bicarbonate 100 mEq in sodium chloride 0.45% 1,100 mL infusion, , Intravenous, Continuous, Gloria Locke MD, Last Rate: 100 mL/hr at 04/01/22 0900, Rate Verify at 04/01/22 0900    sodium chloride 0.9% flush 10 mL, 10 mL, Intravenous, Q8H, Isak Yadav MD, 10 mL at 03/31/22 2158    Flushing PICC Protocol, , , Until Discontinued **AND** sodium chloride 0.9% flush 10 mL, 10 mL, Intravenous, Q6H, 10 mL at 04/01/22 0609 **AND** sodium chloride 0.9% flush 10 mL, 10 mL, Intravenous, PRN, Bc Crocker MD    Pharmacy to dose Vancomycin  consult, , , Once **AND** vancomycin - pharmacy to dose, , Intravenous, pharmacy to manage frequency, Ruy Bajwa MD    white petrolatum-mineral oil 57.3-42.5% ophthalmic ointment, , Both Eyes, Q8H, Isak Yadav MD, Given at 04/01/22 0609    ROS:  Review of Systems   Unable to perform ROS: Intubated       OBJECTIVE:     Physical Exam:  Vitals: Temp: 98.1 °F (36.7 °C) (04/01/22 1101)  Pulse: 67 (04/01/22 1256)  Resp: (!) 24 (04/01/22 1256)  BP: (!) 101/55 (03/31/22 0600)  SpO2: 100 % (04/01/22 1256)    Physical Exam  Vitals reviewed.   Constitutional:       General: She is not in acute distress.     Appearance: Normal appearance. She is well-developed.      Interventions: She is sedated and intubated.      Comments: Via trach   HENT:      Head: Normocephalic and atraumatic.   Eyes:      Conjunctiva/sclera: Conjunctivae normal.   Cardiovascular:      Rate and Rhythm: Normal rate and regular rhythm.      Heart sounds: Normal heart sounds. No murmur heard.    No friction rub.   Pulmonary:      Effort: Pulmonary effort is normal. No respiratory distress. She is intubated.      Breath sounds: Normal breath sounds. No wheezing or rales.   Abdominal:      General: Bowel sounds are normal. There is no distension.      Palpations: Abdomen is soft.      Tenderness: There is no abdominal tenderness.   Skin:     General: Skin is warm and dry.      Findings: No rash.           Review of Symptoms    Symptom Assessment (ESAS 0-10 Scale)  Unable to complete assessment due to Intubated         Living Arrangements:  Lives with family      Advance Directives:   Living Will: No    LaPOST: No    Do Not Resuscitate Status: No    Medical Power of : No      Decision Making:  Family answered questions and Patient unable to communicate due to disease severity/cognitive impairment      Labs:  WBC   Date Value Ref Range Status   04/01/2022 19.69 (H) 3.90 - 12.70 K/uL Final     Hemoglobin   Date Value Ref Range Status    04/01/2022 9.2 (L) 12.0 - 16.0 g/dL Final     POC Hematocrit   Date Value Ref Range Status   03/31/2022 29 (L) 36 - 54 %PCV Final     Hematocrit   Date Value Ref Range Status   04/01/2022 27.7 (L) 37.0 - 48.5 % Final     MCV   Date Value Ref Range Status   04/01/2022 74 (L) 82 - 98 fL Final     Platelets   Date Value Ref Range Status   04/01/2022 271 150 - 450 K/uL Final       BMP  Lab Results   Component Value Date     (L) 04/01/2022    K 3.9 04/01/2022    CL 89 (L) 04/01/2022    CO2 22 (L) 04/01/2022    BUN 71 (H) 04/01/2022    CREATININE 8.5 (H) 04/01/2022    CALCIUM 6.7 (LL) 04/01/2022    ANIONGAP 18 (H) 04/01/2022    ESTGFRAFRICA 5 (A) 04/01/2022    EGFRNONAA 4 (A) 04/01/2022       Lab Results   Component Value Date    AST 13 04/01/2022    ALKPHOS 86 04/01/2022    BILITOT 0.4 04/01/2022       Albumin   Date Value Ref Range Status   04/01/2022 2.4 (L) 3.5 - 5.2 g/dL Final       Radiology:I have reviewed all pertinent imaging results/findings within the past 24 hours.  CXR 3/31/22 reviewed    ASSESSMENT   Page Grissom is a 71 y.o. year old with a history of HTN, CKD stage III, RA, and morbid obesity who presented to the emergency department with angioedema. She could not protect her airway and after multiple failed attempts to intubate general surgery emergently placed a criccotracheostomy tube. She was admitted to the ICU for supportive care and as she was improving was able to upgrade to tracheostomy tube. Palliative Medicine was consulted to provide emotional support for difficult decisions.    PLAN   1. Encounter for Palliative Care  - Code status: FULL code  - Surrogate: only child Cassy  - Details of meeting in [facility]  - Primary outcome of meeting was to introduce our service in the hospital and provide emotional support.  - Ms. Grissom is improving and expected to make a full recovery. At this time I will follow peripherally.    2. Angioedema  - s/p trach upgrade  - Lisinopril added to  allergies    3. CKD stage IIIa  - Baseline Cr 1.3-1.6, patient of Dr. Winkler    4. RA  - Holding Plaquenil while acutely ill      Discussed case and visit details with Joaquin Quintero NP     Thank you for allowing Palliative Medicine to be involved in the care of Page Grissom.         Medical decision making: management of more than one chronic illness in exacerbation or progression of disease    20 min ACP time spent discussing: code status, assessed patient specific goals and addressed the best way to achieve them, coordination of care and emotional support, formulating and communicating prognosis, exploring burden/ benefit of various approaches of treatment, inquired about existing advance directive documents. [id number]    Harper Mackey PA-C  Palliative Medicine

## 2022-04-01 NOTE — PROGRESS NOTES
Therapy with Vancomycin complete and/or consult discontinued by provider.  Pharmacy will sign off, please re-consult as needed.    Jocelyne Jay, MariliaD

## 2022-04-01 NOTE — ASSESSMENT & PLAN NOTE
--likely 2/2 lisinopril  --admit to ICU  --had emergent trach in ED, on ventalitor  --IV steroids, IV antihistamiens  --Fresh Frozen Plasma ordered in ED  --monitor closely    3/30  Improving   Continue supportive care  Minimal improvement in appearance  Consider bradykinin inhibitor?  Vent Mode: Spont  Oxygen Concentration (%):  [35-40] 35  Resp Rate Total:  [13 br/min-25 br/min] 15 br/min  Vt Set:  [400 mL] 400 mL  PEEP/CPAP:  [5 cmH20-8 cmH20] 5 cmH20  Pressure Support:  [0 cmH20-10 cmH20] 10 cmH20  Mean Airway Pressure:  [7.9 tzL57-81 cmH20] 7.9 cmH20     3/31  CC following for vent management  Surgery following after trach placement  Continue iv systemic steroids    4/1  Continue current care

## 2022-04-01 NOTE — ASSESSMENT & PLAN NOTE
7.5 tracheostomy tube place 3/30/22  Site clean  Sutures out 10 days 4/9/22    Will check periodically      Call if needed

## 2022-04-01 NOTE — PROGRESS NOTES
[facility] - Intensive [facility])  General Surgery  Progress Note    Subjective:     History of Present Illness:  Patient presented to the emergency room with acute respiratory distress.  Indication was unsuccessful by the emergency room position in the anesthesia department.  Surgery was called emergency for a cricothyroidotomy.      Post-Op Info:  Procedure(s) (LRB):  CREATION, TRACHEOSTOMY (N/A)  Bronchoscopy (N/A)   2 Days Post-Op     Interval History: still ventilated, follow commands, trach site is anahi    Medications:  Continuous Infusions:   fentanyl Stopped (04/01/22 1015)    NORepinephrine bitartrate-D5W Stopped (04/01/22 1358)     Scheduled Meds:   albuterol-ipratropium  3 mL Nebulization Q4H    ceFEPime (MAXIPIME) IVPB  2 g Intravenous Q24H    famotidine (PF)  20 mg Intravenous Daily    heparin (porcine)  7,500 Units Subcutaneous Q8H    methylPREDNISolone sodium succinate  60 mg Intravenous Q12H    mupirocin   Nasal BID    sodium chloride 0.9%  10 mL Intravenous Q8H    sodium chloride 0.9%  10 mL Intravenous Q6H    white petrolatum-mineral oil 57.3-42.5%   Both Eyes Q8H     PRN Meds:sodium chloride, dextrose 10%, glucagon (human recombinant), insulin aspart U-100, lorazepam, naloxone, Flushing PICC Protocol **AND** sodium chloride 0.9% **AND** sodium chloride 0.9%     Review of patient's allergies indicates:   Allergen Reactions    Lisinopril Anaphylaxis     Angioedema requiring trach     Objective:     Vital Signs (Most Recent):  Temp: 98.1 °F (36.7 °C) (04/01/22 1101)  Pulse: 74 (04/01/22 1401)  Resp: 14 (04/01/22 1401)  BP: 138/65 (04/01/22 1401)  SpO2: 100 % (04/01/22 1401) Vital Signs (24h Range):  Temp:  [97.9 °F (36.6 °C)-98.6 °F (37 °C)] 98.1 °F (36.7 °C)  Pulse:  [61-80] 74  Resp:  [14-28] 14  SpO2:  [92 %-100 %] 100 %  BP: (138)/(65) 138/65  Arterial Line BP: ()/(38-91) 132/54     Weight: 118.4 kg (261 lb 0.4 oz)  Body mass index is 44.8 kg/m².    Intake/Output - Last 3  Shifts         03/30 0700  03/31 0659 03/31 0700  04/01 0659 04/01 0700  04/02 0659    I.V. (mL/kg) 4052.8 (34.2) 3479.6 (29.4) 921.1 (7.8)    Blood       NG/GT  315 30    IV Piggyback 587.3 837.3 49.2    Total Intake(mL/kg) 4640 (39.2) 4631.9 (39.1) 1000.3 (8.4)    Urine (mL/kg/hr) 195 (0.1) 851 (0.3) 675 (0.8)    Total Output 195 851 675    Net +4445 +3780.9 +325.3                   Physical Exam  Constitutional:       Comments: Ventilated, follows commands   HENT:      Mouth/Throat:      Comments: Less tongue edema  Neck:      Comments: Trach site is clean  Cardiovascular:      Rate and Rhythm: Normal rate and regular rhythm.   Pulmonary:      Comments: ventilated  Musculoskeletal:      Right lower leg: Edema present.      Left lower leg: Edema present.   Neurological:      Comments: Follow commands       Significant Labs:  I have reviewed all pertinent lab results within the past 24 hours.  CBC:   Recent Labs   Lab 04/01/22  1214   WBC 19.69*   RBC 3.77*   HGB 9.2*   HCT 27.7*      MCV 74*   MCH 24.4*   MCHC 33.2     BMP:   Recent Labs   Lab 04/01/22  1017   *   *   K 3.9   CL 89*   CO2 22*   BUN 71*   CREATININE 8.5*   CALCIUM 6.7*   MG 1.8     ABGs:   Recent Labs   Lab 04/01/22  0504   PH 7.341*   PCO2 45.7*   PO2 73*   HCO3 24.7   POCSATURATED 93*   BE -1       Significant Diagnostics:  I have reviewed all pertinent imaging results/findings within the past 24 hours.       Assessment/Plan:     * Angioedema  Continues to improve    KYE (acute kidney injury)  management per icu and nephology    Tracheostomy in place  7.5 tracheostomy tube place 3/30/22  Site clean  Sutures out 10 days 4/9/22    Will check periodically      Call if needed          Isak Yadav MD  General Surgery  [facility] - Intensive Care ([facility])

## 2022-04-01 NOTE — ASSESSMENT & PLAN NOTE
4/1 most likely steroid related.  Check procalcitonin.  Blood culture negative so far.  Respiratory culture sent.

## 2022-04-01 NOTE — CONSULTS
[facility] - Intensive Care (Hospital)  Adult Nutrition  Consult Note    SUMMARY     Recommendations    Recommendation/Intervention:   1. Recommend to continue Novasource Renal as tolerated.   -Goal rate @ 25mL/hr.   -Provides 1200 calories, 54g protein, and 430mL H2O flush.   -100mL H2O flush q 4-6 hours.     2. Recommend to d/c beneprotein due to renal labs and KYE.     3. Weekly weights per RD follow up.    Goals:   1. Patient will meet >60% of estimated energy needs by RD follow up.    Nutrition Goal Status: new    Communication of RD Recs:  (Plan of care;Sticky Note)    Assessment and Plan    Nutrition Problem  Inadequate oral intake     Related to (etiology):   Decreased ability to consume sufficient energy     Signs and Symptoms (as evidenced by):   NPO, intubated     Interventions/Recommendations (treatment strategy):  Enteral Nutrition   Collaboration with Medical Providers   Weekly Weights     Nutrition Diagnosis Status:   New        Malnutrition Assessment    Pt does not meet at least 2 ASPEN criteria for malnutrition at this time.  Will continue to monitor.         Reason for Assessment    Reason For Assessment: consult  Diagnosis:  (angioedema)  Relevant Medical History: gerd, HTN, high cholesterol, DM, CKD3, fibromyalgia  General Information Comments:     4/1:RD consulted for intubation. Patient is currently NPO and intubated from 3/29. MD ordered TF and patient is tolerating @ 15mL/hr. Patient has 2+ mild edema. Patients last BM 3/28. Per patients last encounters she has weight gain. Will continue to monitor.    Nutrition Discharge Planning: pending medical course    Nutrition Risk Screen    Nutrition Risk Screen: tube feeding or parenteral nutrition    Nutrition/Diet History    Patient Reported Diet/Restrictions/Preferences:  (unknown)  Spiritual, Cultural Beliefs, Orthodox Practices, Values that Affect Care: no  Food Allergies: NKFA  Factors Affecting Nutritional Intake: NPO, on mechanical  "ventilation    Anthropometrics    Temp: 98.5 °F (36.9 °C)  Height: 5' 4" (162.6 cm)  Height (inches): 64 in  Weight Method: Bed Scale  Weight: 118.4 kg (261 lb 0.4 oz)  Weight (lb): 261.03 lb  Ideal Body Weight (IBW), Female: 120 lb  % Ideal Body Weight, Female (lb): 217.53 %  BMI (Calculated): 44.8  BMI Grade: greater than 40 - morbid obesity       Lab/Procedures/Meds  BMP  Lab Results   Component Value Date     (L) 03/31/2022    K 3.9 03/31/2022    CL 91 (L) 03/31/2022    CO2 20 (L) 03/31/2022    BUN 64 (H) 03/31/2022    CREATININE 8.8 (H) 03/31/2022    CALCIUM 7.0 (L) 03/31/2022    ANIONGAP 17 (H) 03/31/2022    ESTGFRAFRICA 5 (A) 03/31/2022    EGFRNONAA 4 (A) 03/31/2022     Lab Results   Component Value Date     (L) 03/31/2022    K 3.9 03/31/2022    CL 91 (L) 03/31/2022    CO2 20 (L) 03/31/2022     Lab Results   Component Value Date    ALBUMIN 2.6 (L) 03/31/2022     Lab Results   Component Value Date    CALCIUM 7.0 (L) 03/31/2022    PHOS 5.6 (H) 03/31/2022     Pertinent Labs Reviewed: reviewed  Pertinent Medications Reviewed: reviewed  Scheduled Meds:   albuterol-ipratropium  3 mL Nebulization Q4H    ceFEPime (MAXIPIME) IVPB  2 g Intravenous Q24H    famotidine (PF)  20 mg Intravenous Daily    furosemide (LASIX) injection  60 mg Intravenous Q8H    heparin (porcine)  7,500 Units Subcutaneous Q8H    methylPREDNISolone sodium succinate  60 mg Intravenous Q12H    mupirocin   Nasal BID    sodium chloride 0.9%  10 mL Intravenous Q8H    sodium chloride 0.9%  10 mL Intravenous Q6H    white petrolatum-mineral oil 57.3-42.5%   Both Eyes Q8H     Continuous Infusions:   fentanyl 100 mcg/hr (04/01/22 0600)    midazolam 4 mg/hr (04/01/22 0600)    NORepinephrine bitartrate-D5W 0.02 mcg/kg/min (04/01/22 0600)    sodium bicarbonate drip 100 mL/hr at 04/01/22 0600     PRN Meds:.sodium chloride, dextrose 10%, glucagon (human recombinant), insulin aspart U-100, lorazepam, naloxone, Flushing PICC Protocol " **AND** sodium chloride 0.9% **AND** sodium chloride 0.9%, Pharmacy to dose Vancomycin consult **AND** vancomycin - pharmacy to dose    Physical Findings/Assessment         Estimated/Assessed Needs    Weight Used For Calorie Calculations: 118.4 kg (261 lb 0.4 oz)  Energy Calorie Requirements (kcal): 3241-9458 (11-14kcal/kg, permissive underfeeding, BMI >40)  Energy Need Method: Kcal/kg  Protein Requirements: 32-43 (0.6-0.8g/kg, DM, CKD3)  Weight Used For Protein Calculations: 54.5 kg (120 lb 2.4 oz) (IBW)  Fluid Requirements (mL): 5833-4731  Estimated Fluid Requirement Method: RDA Method  RDA Method (mL): 1302  CHO Requirement: 162-207      Nutrition Prescription Ordered    Current Diet Order: NPO    Evaluation of Received Nutrient/Fluid Intake    Enteral Calories (kcal): 720  Enteral Protein (gm): 32  Enteral (Free Water) Fluid (mL): 258  Total Calories (kcal): 720  % Kcal Needs: 55%  % Protein Needs: 100%  Energy Calories Required: not meeting needs  Protein Required: meeting needs  Fluid Required: not meeting needs  Tolerance: tolerating  % Intake of Estimated Energy Needs: 50 - 75 %  % Meal Intake: NPO    Nutrition Risk    Level of Risk/Frequency of Follow-up: moderate - high       Monitor and Evaluation    Food and Nutrient Intake: energy intake, enteral nutrition intake  Food and Nutrient Adminstration: enteral and parenteral nutrition administration  Anthropometric Measurements: weight, weight change, body mass index  Biochemical Data, Medical Tests and Procedures: electrolyte and renal panel, gastrointestinal profile, glucose/endocrine profile, inflammatory profile, lipid profile  Nutrition-Focused Physical Findings: overall appearance       Nutrition Follow-Up    RD Follow-up?: Yes   Adriana Jean RD,LDN

## 2022-04-01 NOTE — ASSESSMENT & PLAN NOTE
On hydroxychloroquine as outpatient.  No recent change in treatment noted  3/30 continue same   3/31 now off DMD due to sepsis suspicion   4/1 off DMD due to sepsis indicated by elevated white count.  Abnormal chest x-ray.  Sending procalcitonin today.  Empirically on cefepime

## 2022-04-02 PROBLEM — N18.5 STAGE 5 CHRONIC KIDNEY DISEASE NOT ON CHRONIC DIALYSIS: Status: ACTIVE | Noted: 2022-03-29

## 2022-04-02 PROBLEM — E87.4 ACIDOSIS, METABOLIC, WITH RESPIRATORY ACIDOSIS: Status: RESOLVED | Noted: 2022-03-31 | Resolved: 2022-04-02

## 2022-04-02 PROBLEM — N18.9 ACUTE KIDNEY INJURY SUPERIMPOSED ON CHRONIC KIDNEY DISEASE: Status: ACTIVE | Noted: 2022-03-29

## 2022-04-02 LAB
ALBUMIN SERPL BCP-MCNC: 2.2 G/DL (ref 3.5–5.2)
ALP SERPL-CCNC: 85 U/L (ref 55–135)
ALT SERPL W/O P-5'-P-CCNC: 11 U/L (ref 10–44)
ANION GAP SERPL CALC-SCNC: 18 MMOL/L (ref 8–16)
ANION GAP SERPL CALC-SCNC: 19 MMOL/L (ref 8–16)
AST SERPL-CCNC: 19 U/L (ref 10–40)
BASOPHILS # BLD AUTO: 0.03 K/UL (ref 0–0.2)
BASOPHILS NFR BLD: 0.1 % (ref 0–1.9)
BILIRUB SERPL-MCNC: 0.4 MG/DL (ref 0.1–1)
BLD PROD TYP BPU: NORMAL
BLD PROD TYP BPU: NORMAL
BLOOD UNIT EXPIRATION DATE: NORMAL
BLOOD UNIT EXPIRATION DATE: NORMAL
BLOOD UNIT TYPE CODE: 6200
BLOOD UNIT TYPE CODE: 6200
BLOOD UNIT TYPE: NORMAL
BLOOD UNIT TYPE: NORMAL
BUN SERPL-MCNC: 86 MG/DL (ref 8–23)
BUN SERPL-MCNC: 90 MG/DL (ref 8–23)
CALCIUM SERPL-MCNC: 6.8 MG/DL (ref 8.7–10.5)
CALCIUM SERPL-MCNC: 7.5 MG/DL (ref 8.7–10.5)
CHLORIDE SERPL-SCNC: 89 MMOL/L (ref 95–110)
CHLORIDE SERPL-SCNC: 90 MMOL/L (ref 95–110)
CO2 SERPL-SCNC: 23 MMOL/L (ref 23–29)
CO2 SERPL-SCNC: 24 MMOL/L (ref 23–29)
CODING SYSTEM: NORMAL
CODING SYSTEM: NORMAL
CREAT SERPL-MCNC: 8.4 MG/DL (ref 0.5–1.4)
CREAT SERPL-MCNC: 8.7 MG/DL (ref 0.5–1.4)
DIFFERENTIAL METHOD: ABNORMAL
DISPENSE STATUS: NORMAL
DISPENSE STATUS: NORMAL
EOSINOPHIL # BLD AUTO: 0 K/UL (ref 0–0.5)
EOSINOPHIL NFR BLD: 0 % (ref 0–8)
ERYTHROCYTE [DISTWIDTH] IN BLOOD BY AUTOMATED COUNT: 16 % (ref 11.5–14.5)
EST. GFR  (AFRICAN AMERICAN): 5 ML/MIN/1.73 M^2
EST. GFR  (AFRICAN AMERICAN): 5 ML/MIN/1.73 M^2
EST. GFR  (NON AFRICAN AMERICAN): 4 ML/MIN/1.73 M^2
EST. GFR  (NON AFRICAN AMERICAN): 4 ML/MIN/1.73 M^2
GLUCOSE SERPL-MCNC: 125 MG/DL (ref 70–110)
GLUCOSE SERPL-MCNC: 127 MG/DL (ref 70–110)
HCT VFR BLD AUTO: 27 % (ref 37–48.5)
HGB BLD-MCNC: 9.1 G/DL (ref 12–16)
IMM GRANULOCYTES # BLD AUTO: 0.32 K/UL (ref 0–0.04)
IMM GRANULOCYTES NFR BLD AUTO: 1.5 % (ref 0–0.5)
LYMPHOCYTES # BLD AUTO: 0.4 K/UL (ref 1–4.8)
LYMPHOCYTES NFR BLD: 1.8 % (ref 18–48)
MAGNESIUM SERPL-MCNC: 1.9 MG/DL (ref 1.6–2.6)
MCH RBC QN AUTO: 24.6 PG (ref 27–31)
MCHC RBC AUTO-ENTMCNC: 33.7 G/DL (ref 32–36)
MCV RBC AUTO: 73 FL (ref 82–98)
MONOCYTES # BLD AUTO: 1 K/UL (ref 0.3–1)
MONOCYTES NFR BLD: 4.6 % (ref 4–15)
NEUTROPHILS # BLD AUTO: 19.4 K/UL (ref 1.8–7.7)
NEUTROPHILS NFR BLD: 92 % (ref 38–73)
NRBC BLD-RTO: 0 /100 WBC
NUM UNITS TRANS FFP: NORMAL
NUM UNITS TRANS FFP: NORMAL
PLATELET # BLD AUTO: 275 K/UL (ref 150–450)
PMV BLD AUTO: 10 FL (ref 9.2–12.9)
POCT GLUCOSE: 125 MG/DL (ref 70–110)
POCT GLUCOSE: 139 MG/DL (ref 70–110)
POCT GLUCOSE: 158 MG/DL (ref 70–110)
POCT GLUCOSE: 163 MG/DL (ref 70–110)
POTASSIUM SERPL-SCNC: 3.6 MMOL/L (ref 3.5–5.1)
POTASSIUM SERPL-SCNC: 3.7 MMOL/L (ref 3.5–5.1)
PROCALCITONIN SERPL IA-MCNC: 0.44 NG/ML
PROT SERPL-MCNC: 5.9 G/DL (ref 6–8.4)
RBC # BLD AUTO: 3.7 M/UL (ref 4–5.4)
SODIUM SERPL-SCNC: 131 MMOL/L (ref 136–145)
SODIUM SERPL-SCNC: 132 MMOL/L (ref 136–145)
WBC # BLD AUTO: 21.15 K/UL (ref 3.9–12.7)

## 2022-04-02 PROCEDURE — 94761 N-INVAS EAR/PLS OXIMETRY MLT: CPT

## 2022-04-02 PROCEDURE — 27200966 HC CLOSED SUCTION SYSTEM

## 2022-04-02 PROCEDURE — 63600175 PHARM REV CODE 636 W HCPCS: Performed by: FAMILY MEDICINE

## 2022-04-02 PROCEDURE — 99291 PR CRITICAL CARE, E/M 30-74 MINUTES: ICD-10-PCS | Mod: 95,,, | Performed by: INTERNAL MEDICINE

## 2022-04-02 PROCEDURE — 80053 COMPREHEN METABOLIC PANEL: CPT | Performed by: INTERNAL MEDICINE

## 2022-04-02 PROCEDURE — 99291 CRITICAL CARE FIRST HOUR: CPT | Mod: 95,,, | Performed by: INTERNAL MEDICINE

## 2022-04-02 PROCEDURE — 94003 VENT MGMT INPAT SUBQ DAY: CPT

## 2022-04-02 PROCEDURE — 85025 COMPLETE CBC W/AUTO DIFF WBC: CPT | Performed by: INTERNAL MEDICINE

## 2022-04-02 PROCEDURE — 63600175 PHARM REV CODE 636 W HCPCS: Performed by: INTERNAL MEDICINE

## 2022-04-02 PROCEDURE — 25000003 PHARM REV CODE 250: Performed by: SURGERY

## 2022-04-02 PROCEDURE — A4216 STERILE WATER/SALINE, 10 ML: HCPCS | Performed by: SURGERY

## 2022-04-02 PROCEDURE — 20000000 HC ICU ROOM

## 2022-04-02 PROCEDURE — 25000003 PHARM REV CODE 250: Performed by: FAMILY MEDICINE

## 2022-04-02 PROCEDURE — 99900026 HC AIRWAY MAINTENANCE (STAT)

## 2022-04-02 PROCEDURE — 63600175 PHARM REV CODE 636 W HCPCS: Performed by: SURGERY

## 2022-04-02 PROCEDURE — 27000221 HC OXYGEN, UP TO 24 HOURS

## 2022-04-02 PROCEDURE — 84145 PROCALCITONIN (PCT): CPT | Performed by: INTERNAL MEDICINE

## 2022-04-02 PROCEDURE — A4216 STERILE WATER/SALINE, 10 ML: HCPCS | Performed by: FAMILY MEDICINE

## 2022-04-02 PROCEDURE — 80048 BASIC METABOLIC PNL TOTAL CA: CPT | Performed by: INTERNAL MEDICINE

## 2022-04-02 PROCEDURE — 25000003 PHARM REV CODE 250: Performed by: INTERNAL MEDICINE

## 2022-04-02 PROCEDURE — 99291 PR CRITICAL CARE, E/M 30-74 MINUTES: ICD-10-PCS | Mod: ,,, | Performed by: INTERNAL MEDICINE

## 2022-04-02 PROCEDURE — 83735 ASSAY OF MAGNESIUM: CPT | Performed by: INTERNAL MEDICINE

## 2022-04-02 PROCEDURE — 99900035 HC TECH TIME PER 15 MIN (STAT)

## 2022-04-02 PROCEDURE — 94640 AIRWAY INHALATION TREATMENT: CPT

## 2022-04-02 PROCEDURE — 27100171 HC OXYGEN HIGH FLOW UP TO 24 HOURS

## 2022-04-02 PROCEDURE — 99291 CRITICAL CARE FIRST HOUR: CPT | Mod: ,,, | Performed by: INTERNAL MEDICINE

## 2022-04-02 PROCEDURE — 25000242 PHARM REV CODE 250 ALT 637 W/ HCPCS: Performed by: SURGERY

## 2022-04-02 RX ORDER — MAGNESIUM SULFATE 1 G/100ML
1 INJECTION INTRAVENOUS ONCE
Status: COMPLETED | OUTPATIENT
Start: 2022-04-02 | End: 2022-04-02

## 2022-04-02 RX ORDER — POTASSIUM CHLORIDE 7.45 MG/ML
10 INJECTION INTRAVENOUS ONCE
Status: COMPLETED | OUTPATIENT
Start: 2022-04-02 | End: 2022-04-02

## 2022-04-02 RX ORDER — METOPROLOL TARTRATE 1 MG/ML
2.5 INJECTION, SOLUTION INTRAVENOUS ONCE
Status: COMPLETED | OUTPATIENT
Start: 2022-04-02 | End: 2022-04-02

## 2022-04-02 RX ADMIN — INSULIN ASPART 2 UNITS: 100 INJECTION, SOLUTION INTRAVENOUS; SUBCUTANEOUS at 06:04

## 2022-04-02 RX ADMIN — INSULIN ASPART 2 UNITS: 100 INJECTION, SOLUTION INTRAVENOUS; SUBCUTANEOUS at 12:04

## 2022-04-02 RX ADMIN — CEFEPIME HYDROCHLORIDE 2 G: 2 INJECTION, SOLUTION INTRAVENOUS at 06:04

## 2022-04-02 RX ADMIN — MAGNESIUM SULFATE 1 G: 1 INJECTION INTRAVENOUS at 09:04

## 2022-04-02 RX ADMIN — MINERAL OIL, PETROLATUM: 425; 573 OINTMENT OPHTHALMIC at 06:04

## 2022-04-02 RX ADMIN — IPRATROPIUM BROMIDE AND ALBUTEROL SULFATE 3 ML: 2.5; .5 SOLUTION RESPIRATORY (INHALATION) at 11:04

## 2022-04-02 RX ADMIN — IPRATROPIUM BROMIDE AND ALBUTEROL SULFATE 3 ML: 2.5; .5 SOLUTION RESPIRATORY (INHALATION) at 07:04

## 2022-04-02 RX ADMIN — POTASSIUM CHLORIDE 10 MEQ: 7.46 INJECTION, SOLUTION INTRAVENOUS at 09:04

## 2022-04-02 RX ADMIN — HEPARIN SODIUM 7500 UNITS: 5000 INJECTION INTRAVENOUS; SUBCUTANEOUS at 09:04

## 2022-04-02 RX ADMIN — HEPARIN SODIUM 7500 UNITS: 5000 INJECTION INTRAVENOUS; SUBCUTANEOUS at 02:04

## 2022-04-02 RX ADMIN — METOPROLOL TARTRATE 2.5 MG: 1 INJECTION, SOLUTION INTRAVENOUS at 11:04

## 2022-04-02 RX ADMIN — Medication 10 ML: at 02:04

## 2022-04-02 RX ADMIN — Medication 10 ML: at 10:04

## 2022-04-02 RX ADMIN — HEPARIN SODIUM 7500 UNITS: 5000 INJECTION INTRAVENOUS; SUBCUTANEOUS at 06:04

## 2022-04-02 RX ADMIN — SODIUM CHLORIDE, PRESERVATIVE FREE 10 ML: 5 INJECTION INTRAVENOUS at 06:04

## 2022-04-02 RX ADMIN — MUPIROCIN: 20 OINTMENT TOPICAL at 09:04

## 2022-04-02 RX ADMIN — MUPIROCIN: 20 OINTMENT TOPICAL at 08:04

## 2022-04-02 RX ADMIN — IPRATROPIUM BROMIDE AND ALBUTEROL SULFATE 3 ML: 2.5; .5 SOLUTION RESPIRATORY (INHALATION) at 04:04

## 2022-04-02 RX ADMIN — METHYLPREDNISOLONE SODIUM SUCCINATE 60 MG: 40 INJECTION, POWDER, FOR SOLUTION INTRAMUSCULAR; INTRAVENOUS at 08:04

## 2022-04-02 RX ADMIN — Medication 10 ML: at 06:04

## 2022-04-02 RX ADMIN — CALCIUM GLUCONATE 1 G: 98 INJECTION, SOLUTION INTRAVENOUS at 12:04

## 2022-04-02 RX ADMIN — IPRATROPIUM BROMIDE AND ALBUTEROL SULFATE 3 ML: 2.5; .5 SOLUTION RESPIRATORY (INHALATION) at 03:04

## 2022-04-02 RX ADMIN — FAMOTIDINE 20 MG: 10 INJECTION, SOLUTION INTRAVENOUS at 08:04

## 2022-04-02 RX ADMIN — SODIUM CHLORIDE, PRESERVATIVE FREE 10 ML: 5 INJECTION INTRAVENOUS at 12:04

## 2022-04-02 NOTE — PROGRESS NOTES
[facility] ([facility])  [facility] Medicine  Progress Note    Patient Name: Page Grissom  MRN: [id number]  Patient Class: IP- Inpatient   Admission Date: 3/29/2022  Length of Stay: 4 days  Attending Physician: Batsheva Landaverde MD  Primary Care Provider: NEELAM Roman Jr, MD        Subjective:     Principal Problem:Angioedema        HPI:  72 y/o female with PMHx of HTN, HLD, DM, asthma, CKD 3, and RA who presented to the ED with c/o angioedema that onset suddenly this morning. Pt was able to verbally communicate with EMS, was not able to communicate with ED staff. Staff was unable to intubate and called surgery, who performed emergency trach. Pt is currently on vent and sedated.  ED workup shows: H/H 10.9/34.6, K+ 2.9, CO2 22, Anion gap 17, BUN 55, Cr 8.8.  She will be admitted to ICU for angioedema under the care of [facility] medicine.  She is a full code and her SDM is her daughter, Cassy.        Overview/Hospital Course:  3/30 emergently trach'd in ED as difficulty intubating d/t angioedema. Going to OR for replacement of tube  3/31 hypotensive requiring pressor support. Leukocytosis with electrolyte abn noted. Concerns for sepsis in immunocompromised patient. Low UOP. Nephrology following for possible dialysis. Guarded prognosis. Consider palliative consult for GOC/family support  4/1 remains on ventilatory support via trach. NG placed for med admin  4/2 somnolent but following commands, ESRD not currently receiving dialysis pt received versed for crich procedure slow metabolizer still somnolent          Review of Systems   Constitutional:  Negative for fever.   HENT:  Negative for congestion.    Respiratory:  Negative for cough.    Gastrointestinal:  Negative for abdominal pain.   Endocrine: Negative for polyuria.   Genitourinary:  Negative for flank pain.   Musculoskeletal:  Negative for back pain.   Skin:  Negative for rash.   Neurological:  Negative for syncope.   Psychiatric/Behavioral:   Negative for confusion.    Objective:     Vital Signs (Most Recent):  Temp: 98.3 °F (36.8 °C) (04/02/22 1501)  Pulse: 94 (04/02/22 1501)  Resp: (!) 21 (04/02/22 1501)  BP: 138/63 (04/02/22 1501)  SpO2: 98 % (04/02/22 1501) Vital Signs (24h Range):  Temp:  [97.8 °F (36.6 °C)-98.9 °F (37.2 °C)] 98.3 °F (36.8 °C)  Pulse:  [71-94] 94  Resp:  [13-28] 21  SpO2:  [98 %-100 %] 98 %  BP: (113-156)/(54-73) 138/63     Weight: 118.4 kg (261 lb 0.4 oz)  Body mass index is 44.8 kg/m².    Intake/Output Summary (Last 24 hours) at 4/2/2022 1609  Last data filed at 4/2/2022 1400  Gross per 24 hour   Intake 466.5 ml   Output 2155 ml   Net -1688.5 ml        Physical Exam  Vitals and nursing note reviewed.   Constitutional:       General: She is not in acute distress.     Appearance: She is ill-appearing. She is not toxic-appearing.   HENT:      Head: Normocephalic and atraumatic.   Cardiovascular:      Rate and Rhythm: Normal rate.   Pulmonary:      Effort: Tachypnea and respiratory distress present.      Comments: Mechanical breath sounds  Abdominal:      Palpations: Abdomen is soft.   Genitourinary:     Comments: alatorre  Musculoskeletal:         General: Swelling present.      Right lower leg: Edema present.      Left lower leg: Edema present.   Skin:     General: Skin is warm.       Significant Labs: All pertinent labs within the past 24 hours have been reviewed.    Significant Imaging: I have reviewed all pertinent imaging results/findings within the past 24 hours.      Assessment/Plan:      * Angioedema  --likely 2/2 lisinopril  --admit to ICU  --had emergent trach in ED, on ventalitor  --IV steroids, IV antihistamiens  --Fresh Frozen Plasma ordered in ED  --monitor closely    3/30  Improving   Continue supportive care  Minimal improvement in appearance  Consider bradykinin inhibitor?  Vent Mode: Spont  Oxygen Concentration (%):  [35-40] 35  Resp Rate Total:  [13 br/min-25 br/min] 15 br/min  Vt Set:  [400 mL] 400 mL  PEEP/CPAP:  [5  cmH20-8 cmH20] 5 cmH20  Pressure Support:  [0 cmH20-10 cmH20] 10 cmH20  Mean Airway Pressure:  [7.9 nqR66-11 cmH20] 7.9 cmH20     3/31  CC following for vent management  Surgery following after trach placement  Continue iv systemic steroids    4/1  Continue current care    Morbid obesity        Acute kidney injury superimposed on chronic kidney disease  --baseline BUN/Cr 20/1.4  --stop lisinopril  --avoid nephrotoxic agents  --Nephrology following  --Pt is not acidotic and no there is no hyperkalemia; per nephro service no current indication for dialysis  --Although pt is persistently somnolent may be uremic  --monitor renal fn and monitor clinically      Tracheostomy in place  --admit to ICU  --stop lisiniopril  --pulmonology following  --supportive care    OR for replacement of cricoid trach      Rheumatoid arthritis involving multiple sites with positive rheumatoid factor  --hold plaquenil  --f/u OP         VTE Risk Mitigation (From admission, onward)         Ordered     Apply sequential compression device to lower extremities (if no contraindications). Medical venous thromboembolus prophylaxis is preferred.  Until discontinued         03/30/22 0805     heparin (porcine) injection 7,500 Units  Every 8 hours         03/29/22 1003     IP VTE HIGH RISK PATIENT  Once         03/29/22 1003                Discharge Planning   CHALO:      Code Status: Full Code   Is the patient medically ready for discharge?:     Reason for patient still in hospital (select all that apply): Pending disposition  Discharge Plan A: Home with family            Critical care time spent on the evaluation and treatment of severe organ dysfunction, review of pertinent labs and imaging studies, discussions with consulting providers and discussions with patient/family: 30 minutes.      Batsheva Landaverde MD  Department of Hospital Medicine   [facility] ([facility])

## 2022-04-02 NOTE — PROGRESS NOTES
[facility] ([facility])  Critical Care Medicine  Progress Note    Patient Name: Page Grissom  MRN: [id number]  Admission Date: 3/29/2022  Hospital Length of Stay: 4 days  Code Status: Full Code  Attending Provider: Batsheva Landaverde MD  Primary Care Provider: NEELAM Roman Jr, MD   Principal Problem: Angioedema    Subjective:     HPI:  71 y.o. female patient PMHx of asthma, CKD, DM2, HTN, and immune deficiency disorder  brought to the emergency room for acute onset shortness of breath severe tongue swelling and respiratory distress.        Hospital/ICU Course:  Patient was hard to intubate , general surgery consulted, and emergency cricothyrotomy was performed with a tube size 5 mm in place.  FiO2 50% O2 sat 91%.  Sedated with propofol.  Chest x-ray ABG reviewed.    3/30 seen and examined, events overnight noted , now paralyzed for vent asynchrony , sedated, oliguric , awaiting OR today change cricothyroidotomy tube    to ETT versus replace trach   3/31 seen and examined , CXR , ABG reviewed, events overnight reviewed , sp 7.5 mm trach placement yesterday and removal of cricothyrotomy tube, on levophged gtt , versed, fentanyl gtt , nimbex gtt , oliguric, severe electrolytes disturbances   4/1 Patient seen and examined.Day 2post tracheostomy Paralysis None Sedation None Vasopressors Levophed Urine output last 24 hrs 926 ml  Fluid balance since admit11 L + Tmax 98 Last BM 3/28 Chest X-ray and ABG reviewed   4/2 Patient seen and examined.Day 3trachParalysis None Sedation None Vasopressors none Urine output last 24 hrs  2.2 L Fluid balance since admit  4 L positiveTmax  99 Last BM  4/2 Chest X-ray and ABG reviewed .  Following commands.  Started CPAP trial this a.m..  Good cuff leak noted yesterday.      4/2 Patient seen and examined.Day 3trachParalysis None Sedation None Vasopressors none Urine output last 24 hrs  2.2 L Fluid balance since admit  4 L positiveTmax  99 Last BM  4/2 Chest X-ray and ABG  reviewed .  Following commands.  Started CPAP trial this a.m..  Good cuff leak noted yesterday.  Review of Systems   Unable to perform ROS: Intubated   Objective:     Vital Signs (Most Recent):  Temp: 98.9 °F (37.2 °C) (04/02/22 0701)  Pulse: 85 (04/02/22 0901)  Resp: 19 (04/02/22 0901)  BP: 126/60 (04/02/22 0801)  SpO2: 99 % (04/02/22 0901)   Vital Signs (24h Range):  Temp:  [97.8 °F (36.6 °C)-98.9 °F (37.2 °C)] 98.9 °F (37.2 °C)  Pulse:  [67-90] 85  Resp:  [13-28] 19  SpO2:  [98 %-100 %] 99 %  BP: (113-149)/(54-73) 126/60  Arterial Line BP: (126-145)/(50-61) 132/54     Weight: 118.4 kg (261 lb 0.4 oz)  Body mass index is 44.8 kg/m².      Intake/Output Summary (Last 24 hours) at 4/2/2022 0935  Last data filed at 4/2/2022 0800  Gross per 24 hour   Intake 871.64 ml   Output 2080 ml   Net -1208.36 ml         Physical Exam  Vitals and nursing note reviewed.   Constitutional:       General: She is not in acute distress.     Appearance: She is well-developed.   HENT:      Head: Normocephalic and atraumatic.      Mouth/Throat:      Comments: Continuous improvement of swollen lips and tongue  Neck:      Comments: Trach in place  Cardiovascular:      Rate and Rhythm: Normal rate.   Pulmonary:      Effort: Respiratory distress present.      Breath sounds: Rhonchi present.   Abdominal:      Palpations: Abdomen is soft.      Tenderness: There is no abdominal tenderness.   Genitourinary:     Comments: Ortega in place   Musculoskeletal:         General: No deformity.      Cervical back: Neck supple.   Skin:     General: Skin is warm.   Neurological:      General: No focal deficit present.      Mental Status: She is alert.      Comments: Follows commands moves 4 extremities       Vents:  Vent Mode: Spont (04/02/22 0739)  Ventilator Initiated: Yes (04/01/22 0900)  Set Rate: 0 BPM (04/02/22 0739)  Vt Set: 400 mL (04/02/22 0739)  Pressure Support: 12 cmH20 (04/02/22 0739)  PEEP/CPAP: 8 cmH20 (04/02/22 0739)  Oxygen Concentration (%):  35 (04/02/22 0901)  Peak Airway Pressure: 21 cmH2O (04/02/22 0739)  Plateau Pressure: 26 cmH20 (04/02/22 0739)  Total Ve: 8.24 mL (04/02/22 0739)  F/VT Ratio<105 (RSBI): (!) 39.34 (04/02/22 0739)    Lines/Drains/Airways       Peripherally Inserted Central Catheter Line  Duration             PICC Double Lumen 03/31/22 0240 right basilic 2 days              Drain  Duration                  Urethral Catheter 03/29/22 0900 18 Fr. 4 days         NG/OG Tube 04/01/22 1019 [place] sump 12 Fr. Right nostril <1 day              Airway  Duration                  Surgical Airway 03/30/22 Other (Comment) 3 days              Peripheral Intravenous Line  Duration                  Peripheral IV - Single Lumen 03/29/22 0706 20 G Left Antecubital 4 days                   Latest Reference Range & Units 04/01/22 13:47   Sodium 136 - 145 mmol/L 128 (L)   Potassium 3.5 - 5.1 mmol/L 3.8   Chloride 95 - 110 mmol/L 89 (L)   CO2 23 - 29 mmol/L 22 (L)   Anion Gap 8 - 16 mmol/L 17 (H)   BUN 8 - 23 mg/dL 72 (H)   Creatinine 0.5 - 1.4 mg/dL 8.5 (H)   EGFR if non African American >60 mL/min/1.73 m^2 4 ! [1]   EGFR if African American >60 mL/min/1.73 m^2 5 !   Glucose 70 - 110 mg/dL 155 (H)   Calcium 8.7 - 10.5 mg/dL 6.8 (LL) [2]   Phosphorus 2.7 - 4.5 mg/dL 5.4 (H)   Albumin 3.5 - 5.2 g/dL 2.3 (L)   (L): Data is abnormally low  (H): Data is abnormally high  !: Data is abnormal  (LL): Data is critically low  [1] Calculation used to obtain the estimated glomerular filtration  rate (eGFR) is the CKD-EPI equation.     [2] CALCIUM critical result(s) called and verbal readback obtained from   BRITTNEY KELLER RN by KULWANT 04/01/2022 15:00      Significant Labs:    CBC/Anemia Profile:   Latest Reference Range & Units 04/01/22 12:14   WBC 3.90 - 12.70 K/uL 19.69 (H)   RBC 4.00 - 5.40 M/uL 3.77 (L)   Hemoglobin 12.0 - 16.0 g/dL 9.2 (L)   Hematocrit 37.0 - 48.5 % 27.7 (L)   MCV 82 - 98 fL 74 (L)   MCH 27.0 - 31.0 pg 24.4 (L)   MCHC 32.0 - 36.0 g/dL 33.2   RDW 11.5  - 14.5 % 15.9 (H)   Platelets 150 - 450 K/uL 271   MPV 9.2 - 12.9 fL 9.9   Gran % 38.0 - 73.0 % 95.0 (H)   Lymph % 18.0 - 48.0 % 1.3 (L)   Mono % 4.0 - 15.0 % 2.8 (L)   Eosinophil % 0.0 - 8.0 % 0.0   Basophil % 0.0 - 1.9 % 0.1   Immature Granulocytes 0.0 - 0.5 % 0.8 (H)   Gran # (ANC) 1.8 - 7.7 K/uL 18.7 (H)   Lymph # 1.0 - 4.8 K/uL 0.3 (L)   Mono # 0.3 - 1.0 K/uL 0.6   Eos # 0.0 - 0.5 K/uL 0.0   Baso # 0.00 - 0.20 K/uL 0.01   Immature Grans (Abs) 0.00 - 0.04 K/uL 0.16 (H) [1]   NRBC 0 /100 WBC 0   Differential Method  Automated         Latest Reference Range & Units 04/01/22 05:04   POC PH 7.35 - 7.45  7.341 (L)   POC PCO2 35 - 45 mmHg 45.7 (H)   POC PO2 80 - 100 mmHg 73 (L)   POC BE -2 to 2 mmol/L -1   POC HCO3 24 - 28 mmol/L 24.7   POC SATURATED O2 95 - 100 % 93 (L)   FiO2  40   Vt  400   PEEP  8   Sample  ARTERIAL   DelSys  Adult Vent   Allens Test  N/A   Site  Bobo/UAC   Mode  AC/PRVC   Rate  24           EKG 03/29/2020  Normal sinus rhythm   Possible Left atrial enlargement   Prolonged QT   Abnormal ECG   When compared with ECG of 13-DEC-2006 09:37,   QRS duration has increased   QT has lengthened     Significant Imaging:     CXR 4/1/2022    EXAMINATION:  XR CHEST AP PORTABLE     CLINICAL HISTORY:  ng tube placement;     TECHNIQUE:  Single frontal view of the chest was performed.     COMPARISON:  Chest radiograph 03/31/2022 with priors     FINDINGS:  Cardiac leads project over the chest.  A tracheostomy cannula is again identified.  Stable positioning of a right PICC.  Nasogastric tube tip is not visualized, but appears to terminate appropriately in the upper abdomen.  Cardiomediastinal silhouette is unchanged in size.  Persistent small bilateral pleural effusions with adjacent compressive atelectasis or consolidation.  No definite new infiltrate.  No pneumothorax.     Impression:     NG tube appears to course appropriately into the upper abdomen.  No detrimental interval change.              ABG  Recent Labs    Lab 04/01/22  0504   PH 7.341*   PO2 73*   PCO2 45.7*   HCO3 24.7   BE -1     Assessment/Plan:     ENT  Tracheostomy in place  Emergent cricothyrotomy severe angioedema trach in place, severe hypercapnia and respiratory acidosis repeat ABG nebs IV steroids antihistamine  3/30 check cuff leak , ETT versus larger trach today   3/31 check cuff leak , solumedrol , pepcid   4/1 puff leak present.  IV Solu-Medrol IV Pepcid.  Start CPAP trials.  O2/MV/pulmonary toilet and trach care.  4/2.  CPAP trial.  T-piece trial if tolerated CPAP trial.  Decrease Solu-Medrol to once daily 60 mg.  Continue IV famotidine.  Check for cuff leak.    Renal/  Acute kidney injury superimposed on chronic kidney disease  Strict I&Os.  IV fluid.  Monitor BMP.  Avoid nephrotoxic drugs.  On lisinopril since June 2021  3/30 change to bicarb drip , strict I/O's , nephrology follow up   3/31 oliguric , on bicarb drip , not improving will discuss with nephrology HD for acidosis , will discuss with family  4/1 discontinue bicarb drip.  Bicarb today 22 on BMP.  Urine output 900 mL last 24 hours.  Urine output 60-70 cc/hour currently.  Continue Lasix 60 mg q.8 hours total of 3 doses now.    4/2 non oliguric.  Sodium 128 hypochloremic.  Will hold Lasix.    Oncology  Leukocytosis  4/1 most likely steroid related.  Check procalcitonin.  Blood culture negative so far.  Respiratory culture sent.      Orthopedic  Rheumatoid arthritis involving multiple sites with positive rheumatoid factor  On hydroxychloroquine as outpatient.  No recent change in treatment noted  3/30 continue same   3/31 now off DMD due to sepsis suspicion   4/1 off DMD due to sepsis indicated by elevated white count.  Abnormal chest x-ray.  Sending procalcitonin today.  Empirically on cefepime    Other  * Angioedema  Severe was hard to intubate for respiratory distress status post emergent cricothyroidotomy with size 5 trach.  Sedated with propofol.  On IV steroids and antihistamines.   Check tryptase level IgE.  No recent medicine introduction.  On lisinopril ordered in June 2021  3/30 check cuff leak , steroids antihistamines IV , Ige , C1 q esterase  Inh , Tryptase level pending   3/31 check cuff leak , decrease solumedrol to q 12 hrs , cont famotidine   4/1 cuff leak present.  Continue Solu-Medrol and famotidine IV.  Start spontaneous trial on ventilator via trach.    4/2.  CPAP trial.  T-piece trial if tolerated CPAP trial.  Decrease Solu-Medrol to once daily 60 mg.  Continue IV famotidine.  Check for cuff leak.     Critical Care Daily Checklist:    A: Awake: RASS Goal/Actual Goal: RASS Goal: 0-->alert and calm  Actual: [place] Agitation Sedation Scale (RASS): Drowsy   B: Spontaneous Breathing Trial Performed?     C: SAT & SBT Coordinated?  Y                      D: Delirium: CAM-ICU Overall CAM-ICU: Negative   E: Early Mobility Performed? Yes   F: Feeding Goal: Goals: 1. Patient will meet >60% of estimated energy needs by RD follow up.  Status: Nutrition Goal Status: new   Current Diet Order   Procedures    Diet NPO      AS: Analgesia/Sedation Off paralysis off pressors   T: Thromboembolic Prophylaxis HEPARIN SC   H: HOB > 300 Yes   U: Stress Ulcer Prophylaxis (if needed) Pepcid    G: Glucose Control FS prn    B: Bowel Function     I: Indwelling Catheter (Lines & Ortega) Necessity Y   D: De-escalation of Antimicrobials/Pharmacotherapies Empiric cefepime.  Respiratory culture sent.    Rare Gram-positive cocci Gram-negative Check procalcitonin. Monitor temp WBC.    Plan for the day/ETD Y CPAP trial     Code Status:  Family/Goals of Care: Full Code  Y       Critical Care Time: 35 minutes  Critical secondary to Patient has a condition that poses threat to life and bodily function: Acute Renal Failure complicating angioedema resp failure       Critical care was time spent personally by me on the following activities: development of treatment plan with patient or surrogate and bedside caregivers,  discussions with consultants, evaluation of patient's response to treatment, examination of patient, ordering and performing treatments and interventions, ordering and review of laboratory studies, ordering and review of radiographic studies, pulse oximetry, re-evaluation of patient's condition. This critical care time did not overlap with that of any other provider or involve time for any procedures.     Laney Romero MD  Critical Care Medicine  [facility] - Intensive Care [facility])

## 2022-04-02 NOTE — SUBJECTIVE & OBJECTIVE
Interval History: Pt was seen and examined. Labs and meds reviewed. Discussed with other providers. Reviewed the chart further today. No new events, no overall change.    Review of patient's allergies indicates:   Allergen Reactions    Lisinopril Anaphylaxis     Angioedema requiring trach     Current Facility-Administered Medications   Medication Frequency    0.9%  NaCl infusion (for blood administration) Q24H PRN    albuterol-ipratropium 2.5 mg-0.5 mg/3 mL nebulizer solution 3 mL Q4H    cefepime in dextrose 5 % IVPB 2 g Q24H    dextrose 10% bolus 125 mL PRN    famotidine (PF) injection 20 mg Daily    glucagon (human recombinant) injection 1 mg PRN    heparin (porcine) injection 7,500 Units Q8H    insulin aspart U-100 pen 1-10 Units Q6H PRN    lorazepam injection 4 mg Q4H PRN    [START ON 4/3/2022] methylPREDNISolone sodium succinate injection 60 mg Daily    mupirocin 2 % ointment BID    naloxone 0.4 mg/mL injection 0.02 mg PRN    sodium chloride 0.9% flush 10 mL Q8H    sodium chloride 0.9% flush 10 mL Q6H    And    sodium chloride 0.9% flush 10 mL PRN       Objective:     Vital Signs (Most Recent):  Temp: 98.3 °F (36.8 °C) (04/02/22 1501)  Pulse: 97 (04/02/22 1701)  Resp: (!) 22 (04/02/22 1701)  BP: 136/62 (04/02/22 1701)  SpO2: 100 % (04/02/22 1701)  O2 Device (Oxygen Therapy): T-Piece Trial (04/02/22 1654)   Vital Signs (24h Range):  Temp:  [97.8 °F (36.6 °C)-98.9 °F (37.2 °C)] 98.3 °F (36.8 °C)  Pulse:  [72-97] 97  Resp:  [13-28] 22  SpO2:  [98 %-100 %] 100 %  BP: (113-156)/(54-73) 136/62     Weight: 118.4 kg (261 lb 0.4 oz) (04/01/22 0731)  Body mass index is 44.8 kg/m².  Body surface area is 2.31 meters squared.    I/O last 3 completed shifts:  In: 2757.4 [I.V.:2183.9; NG/GT:375; IV Piggyback:198.5]  Out: 2841 [Urine:2841]    Physical Exam  Vitals and nursing note reviewed.   Constitutional:       Comments: intubated   Cardiovascular:      Rate and Rhythm: Normal rate and regular rhythm.      Pulses: Normal  pulses.      Heart sounds: Normal heart sounds.   Pulmonary:      Effort: Pulmonary effort is normal.      Breath sounds: Normal breath sounds.   Abdominal:      General: Abdomen is flat.      Palpations: Abdomen is soft.   Musculoskeletal:      Right lower leg: Edema present.      Left lower leg: Edema present.   Skin:     General: Skin is warm and dry.       Significant Labs: reviewed  BMP  Lab Results   Component Value Date     (L) 04/02/2022    K 3.6 04/02/2022    CL 89 (L) 04/02/2022    CO2 24 04/02/2022    BUN 86 (H) 04/02/2022    CREATININE 8.7 (H) 04/02/2022    CALCIUM 6.8 (LL) 04/02/2022    ANIONGAP 18 (H) 04/02/2022    ESTGFRAFRICA 5 (A) 04/02/2022    EGFRNONAA 4 (A) 04/02/2022     Lab Results   Component Value Date    WBC 21.15 (H) 04/02/2022    HGB 9.1 (L) 04/02/2022    HCT 27.0 (L) 04/02/2022    MCV 73 (L) 04/02/2022     04/02/2022       ABG  Recent Labs   Lab 04/01/22  0504   PH 7.341*   PO2 73*   PCO2 45.7*   HCO3 24.7   BE -1       Significant Imaging: reviewed

## 2022-04-02 NOTE — ASSESSMENT & PLAN NOTE
Severe was hard to intubate for respiratory distress status post emergent cricothyroidotomy with size 5 trach.  Sedated with propofol.  On IV steroids and antihistamines.  Check tryptase level IgE.  No recent medicine introduction.  On lisinopril ordered in June 2021  3/30 check cuff leak , steroids antihistamines IV , Ige , C1 q esterase  Inh , Tryptase level pending   3/31 check cuff leak , decrease solumedrol to q 12 hrs , cont famotidine   4/1 cuff leak present.  Continue Solu-Medrol and famotidine IV.  Start spontaneous trial on ventilator via trach.    4/2.  CPAP trial.  T-piece trial if tolerated CPAP trial.  Decrease Solu-Medrol to once daily 60 mg.  Continue IV famotidine.  Check for cuff leak.

## 2022-04-02 NOTE — PROGRESS NOTES
[facility] - Intensive Care ([facility])  Nephrology  Progress Note    Patient Name: Page Grissom  MRN: [id number]  Admission Date: 3/29/2022  Hospital Length of Stay: 3 days  Attending Provider: Batsheva Landaverde MD   Primary Care Physician: NEELAM Roman Jr, MD  Principal Problem:Angioedema    Subjective:     HPI: Noted    Interval History: Pt was seen and examined. Labs and meds reviewed. Discussed with other providers. Chart was reviewed. No new events.    Review of patient's allergies indicates:   Allergen Reactions    Lisinopril Anaphylaxis     Angioedema requiring trach     Current Facility-Administered Medications   Medication Frequency    0.9%  NaCl infusion (for blood administration) Q24H PRN    albuterol-ipratropium 2.5 mg-0.5 mg/3 mL nebulizer solution 3 mL Q4H    cefepime in dextrose 5 % IVPB 2 g Q24H    dextrose 10% bolus 125 mL PRN    famotidine (PF) injection 20 mg Daily    fentaNYL 2500 mcg in 0.9% sodium chloride 250 mL infusion premix (titrating) Continuous    glucagon (human recombinant) injection 1 mg PRN    heparin (porcine) injection 7,500 Units Q8H    insulin aspart U-100 pen 1-10 Units Q6H PRN    lorazepam injection 4 mg Q4H PRN    methylPREDNISolone sodium succinate injection 60 mg Q12H    mupirocin 2 % ointment BID    naloxone 0.4 mg/mL injection 0.02 mg PRN    NORepinephrine 4 mg in dextrose 5% 250 mL infusion (premix) (titrating) Continuous    sodium chloride 0.9% flush 10 mL Q8H    sodium chloride 0.9% flush 10 mL Q6H    And    sodium chloride 0.9% flush 10 mL PRN    white petrolatum-mineral oil 57.3-42.5% ophthalmic ointment Q8H       Objective:     Vital Signs (Most Recent):  Temp: 98.1 °F (36.7 °C) (04/01/22 1501)  Pulse: (S) 76 (04/01/22 1846)  Resp: (S) 14 (04/01/22 1846)  BP: (S) (!) 143/65 (04/01/22 1831)  SpO2: (S) 100 % (04/01/22 1846)  O2 Device (Oxygen Therapy): ventilator (04/01/22 1612)   Vital Signs (24h Range):  Temp:  [97.9 °F (36.6 °C)-98.6 °F (37 °C)]  98.1 °F (36.7 °C)  Pulse:  [63-90] 76  Resp:  [13-26] 14  SpO2:  [99 %-100 %] 100 %  BP: (119-149)/(57-66) 143/65  Arterial Line BP: ()/(38-61) 132/54     Weight: 118.4 kg (261 lb 0.4 oz) (04/01/22 0731)  Body mass index is 44.8 kg/m².  Body surface area is 2.31 meters squared.    I/O last 3 completed shifts:  In: 5406.9 [I.V.:4190.5; NG/GT:345; IV Piggyback:871.5]  Out: 2026 [Urine:2026]    Physical Exam  Vitals and nursing note reviewed.   Constitutional:       Comments: intubated   HENT:      Head: Normocephalic and atraumatic.   Cardiovascular:      Rate and Rhythm: Normal rate and regular rhythm.      Pulses: Normal pulses.      Heart sounds: Normal heart sounds.   Pulmonary:      Effort: Pulmonary effort is normal.      Breath sounds: Rales present.   Musculoskeletal:      Right lower leg: No edema.      Left lower leg: No edema.       Significant Labs: reviewed  BMP  Lab Results   Component Value Date     (L) 04/01/2022    K 3.8 04/01/2022    CL 89 (L) 04/01/2022    CO2 22 (L) 04/01/2022    BUN 72 (H) 04/01/2022    CREATININE 8.5 (H) 04/01/2022    CALCIUM 6.8 (LL) 04/01/2022    ANIONGAP 17 (H) 04/01/2022    ESTGFRAFRICA 5 (A) 04/01/2022    EGFRNONAA 4 (A) 04/01/2022     Lab Results   Component Value Date    WBC 19.69 (H) 04/01/2022    HGB 9.2 (L) 04/01/2022    HCT 27.7 (L) 04/01/2022    MCV 74 (L) 04/01/2022     04/01/2022       ABG  Recent Labs   Lab 04/01/22  0504   PH 7.341*   PO2 73*   PCO2 45.7*   HCO3 24.7   BE -1         Significant Imaging: reviewed CXR    Assessment/Plan:     72 y/o female with KYE:    KYE (acute kidney injury)  Reason not entirely clear  No sign of recovery at this point, but noted more UOP today  K normal  Hyponatremia   Metabolic acidosis  O2 sat good  No acute indications for HD    * Angioedema  Chart was reviewed    Plans and recommendations:  As reviewed above  Total critical care time spent 40 minutes including time needed to review the records, the    patient evaluation, documentation, discussion with other providers  more than 50% of the time was spent on coordination of care       Lindsay Mane MD  Nephrology  [facility] - Intensive Care ([facility])

## 2022-04-02 NOTE — SUBJECTIVE & OBJECTIVE
4/2 Patient seen and examined.Day 3trachParalysis None Sedation None Vasopressors none Urine output last 24 hrs  2.2 L Fluid balance since admit  4 L positiveTmax  99 Last BM  4/2 Chest X-ray and ABG reviewed .  Following commands.  Started CPAP trial this a.m..  Good cuff leak noted yesterday.  Review of Systems   Unable to perform ROS: Intubated   Objective:     Vital Signs (Most Recent):  Temp: 98.9 °F (37.2 °C) (04/02/22 0701)  Pulse: 85 (04/02/22 0901)  Resp: 19 (04/02/22 0901)  BP: 126/60 (04/02/22 0801)  SpO2: 99 % (04/02/22 0901)   Vital Signs (24h Range):  Temp:  [97.8 °F (36.6 °C)-98.9 °F (37.2 °C)] 98.9 °F (37.2 °C)  Pulse:  [67-90] 85  Resp:  [13-28] 19  SpO2:  [98 %-100 %] 99 %  BP: (113-149)/(54-73) 126/60  Arterial Line BP: (126-145)/(50-61) 132/54     Weight: 118.4 kg (261 lb 0.4 oz)  Body mass index is 44.8 kg/m².      Intake/Output Summary (Last 24 hours) at 4/2/2022 0935  Last data filed at 4/2/2022 0800  Gross per 24 hour   Intake 871.64 ml   Output 2080 ml   Net -1208.36 ml         Physical Exam  Vitals and nursing note reviewed.   Constitutional:       General: She is not in acute distress.     Appearance: She is well-developed.   HENT:      Head: Normocephalic and atraumatic.      Mouth/Throat:      Comments: Continuous improvement of swollen lips and tongue  Neck:      Comments: Trach in place  Cardiovascular:      Rate and Rhythm: Normal rate.   Pulmonary:      Effort: Respiratory distress present.      Breath sounds: Rhonchi present.   Abdominal:      Palpations: Abdomen is soft.      Tenderness: There is no abdominal tenderness.   Genitourinary:     Comments: Ortega in place   Musculoskeletal:         General: No deformity.      Cervical back: Neck supple.   Skin:     General: Skin is warm.   Neurological:      General: No focal deficit present.      Mental Status: She is alert.      Comments: Follows commands moves 4 extremities       Vents:  Vent Mode: Spont (04/02/22 0739)  Ventilator  Initiated: Yes (04/01/22 0900)  Set Rate: 0 BPM (04/02/22 0739)  Vt Set: 400 mL (04/02/22 0739)  Pressure Support: 12 cmH20 (04/02/22 0739)  PEEP/CPAP: 8 cmH20 (04/02/22 0739)  Oxygen Concentration (%): 35 (04/02/22 0901)  Peak Airway Pressure: 21 cmH2O (04/02/22 0739)  Plateau Pressure: 26 cmH20 (04/02/22 0739)  Total Ve: 8.24 mL (04/02/22 0739)  F/VT Ratio<105 (RSBI): (!) 39.34 (04/02/22 0739)    Lines/Drains/Airways       Peripherally Inserted Central Catheter Line  Duration             PICC Double Lumen 03/31/22 0240 right basilic 2 days              Drain  Duration                  Urethral Catheter 03/29/22 0900 18 Fr. 4 days         NG/OG Tube 04/01/22 1019 [place] sump 12 Fr. Right nostril <1 day              Airway  Duration                  Surgical Airway 03/30/22 Other (Comment) 3 days              Peripheral Intravenous Line  Duration                  Peripheral IV - Single Lumen 03/29/22 0706 20 G Left Antecubital 4 days                   Latest Reference Range & Units 04/01/22 13:47   Sodium 136 - 145 mmol/L 128 (L)   Potassium 3.5 - 5.1 mmol/L 3.8   Chloride 95 - 110 mmol/L 89 (L)   CO2 23 - 29 mmol/L 22 (L)   Anion Gap 8 - 16 mmol/L 17 (H)   BUN 8 - 23 mg/dL 72 (H)   Creatinine 0.5 - 1.4 mg/dL 8.5 (H)   EGFR if non African American >60 mL/min/1.73 m^2 4 ! [1]   EGFR if African American >60 mL/min/1.73 m^2 5 !   Glucose 70 - 110 mg/dL 155 (H)   Calcium 8.7 - 10.5 mg/dL 6.8 (LL) [2]   Phosphorus 2.7 - 4.5 mg/dL 5.4 (H)   Albumin 3.5 - 5.2 g/dL 2.3 (L)   (L): Data is abnormally low  (H): Data is abnormally high  !: Data is abnormal  (LL): Data is critically low  [1] Calculation used to obtain the estimated glomerular filtration  rate (eGFR) is the CKD-EPI equation.     [2] CALCIUM critical result(s) called and verbal readback obtained from   BRITTNEY KELLER RN by KULWANT 04/01/2022 15:00      Significant Labs:    CBC/Anemia Profile:   Latest Reference Range & Units 04/01/22 12:14   WBC 3.90 - 12.70 K/uL 19.69  (H)   RBC 4.00 - 5.40 M/uL 3.77 (L)   Hemoglobin 12.0 - 16.0 g/dL 9.2 (L)   Hematocrit 37.0 - 48.5 % 27.7 (L)   MCV 82 - 98 fL 74 (L)   MCH 27.0 - 31.0 pg 24.4 (L)   MCHC 32.0 - 36.0 g/dL 33.2   RDW 11.5 - 14.5 % 15.9 (H)   Platelets 150 - 450 K/uL 271   MPV 9.2 - 12.9 fL 9.9   Gran % 38.0 - 73.0 % 95.0 (H)   Lymph % 18.0 - 48.0 % 1.3 (L)   Mono % 4.0 - 15.0 % 2.8 (L)   Eosinophil % 0.0 - 8.0 % 0.0   Basophil % 0.0 - 1.9 % 0.1   Immature Granulocytes 0.0 - 0.5 % 0.8 (H)   Gran # (ANC) 1.8 - 7.7 K/uL 18.7 (H)   Lymph # 1.0 - 4.8 K/uL 0.3 (L)   Mono # 0.3 - 1.0 K/uL 0.6   Eos # 0.0 - 0.5 K/uL 0.0   Baso # 0.00 - 0.20 K/uL 0.01   Immature Grans (Abs) 0.00 - 0.04 K/uL 0.16 (H) [1]   NRBC 0 /100 WBC 0   Differential Method  Automated         Latest Reference Range & Units 04/01/22 05:04   POC PH 7.35 - 7.45  7.341 (L)   POC PCO2 35 - 45 mmHg 45.7 (H)   POC PO2 80 - 100 mmHg 73 (L)   POC BE -2 to 2 mmol/L -1   POC HCO3 24 - 28 mmol/L 24.7   POC SATURATED O2 95 - 100 % 93 (L)   FiO2  40   Vt  400   PEEP  8   Sample  ARTERIAL   DelSys  Adult Vent   Allens Test  N/A   Site  Bobo/UAC   Mode  AC/PRVC   Rate  24           EKG 03/29/2020  Normal sinus rhythm   Possible Left atrial enlargement   Prolonged QT   Abnormal ECG   When compared with ECG of 13-DEC-2006 09:37,   QRS duration has increased   QT has lengthened     Significant Imaging:     CXR 4/1/2022    EXAMINATION:  XR CHEST AP PORTABLE     CLINICAL HISTORY:  ng tube placement;     TECHNIQUE:  Single frontal view of the chest was performed.     COMPARISON:  Chest radiograph 03/31/2022 with priors     FINDINGS:  Cardiac leads project over the chest.  A tracheostomy cannula is again identified.  Stable positioning of a right PICC.  Nasogastric tube tip is not visualized, but appears to terminate appropriately in the upper abdomen.  Cardiomediastinal silhouette is unchanged in size.  Persistent small bilateral pleural effusions with adjacent compressive atelectasis or  consolidation.  No definite new infiltrate.  No pneumothorax.     Impression:     NG tube appears to course appropriately into the upper abdomen.  No detrimental interval change.

## 2022-04-02 NOTE — PLAN OF CARE
Patient remained safe and stable for the duration of the shift. See assessment flowsheets for more details. Safety and fall prevention measures are in place. Tolerating T-Piece trial with no issues. Respiratory rates 20s current oxygen saturation 99 on 10L/40%. Remains drowsy but arouses to voice. Occasionally follows simple commands. Will monitor for needs/changes. Plan of care discussed with the patient and her daughter.

## 2022-04-02 NOTE — ASSESSMENT & PLAN NOTE
--baseline BUN/Cr 20/1.4  --stop lisinopril  --avoid nephrotoxic agents  --Nephrology following  --Pt is not acidotic and no there is no hyperkalemia; per nephro service no current indication for dialysis  --Although pt is persistently somnolent may be uremic  --monitor renal fn and monitor clinically

## 2022-04-02 NOTE — PROGRESS NOTES
[facility] - Intensive Care ([facility])  Nephrology  Progress Note    Patient Name: Page Grissom  MRN: [id number]  Admission Date: 3/29/2022  Hospital Length of Stay: 4 days  Attending Provider: Batsheva Landaverde MD   Primary Care Physician: NEELAM Roman Jr, MD  Principal Problem:Angioedema    Subjective:     HPI: Noted    Interval History: Pt was seen and examined. Labs and meds reviewed. Discussed with other providers. Reviewed the chart further today. No new events, no overall change.    Review of patient's allergies indicates:   Allergen Reactions    Lisinopril Anaphylaxis     Angioedema requiring trach     Current Facility-Administered Medications   Medication Frequency    0.9%  NaCl infusion (for blood administration) Q24H PRN    albuterol-ipratropium 2.5 mg-0.5 mg/3 mL nebulizer solution 3 mL Q4H    cefepime in dextrose 5 % IVPB 2 g Q24H    dextrose 10% bolus 125 mL PRN    famotidine (PF) injection 20 mg Daily    glucagon (human recombinant) injection 1 mg PRN    heparin (porcine) injection 7,500 Units Q8H    insulin aspart U-100 pen 1-10 Units Q6H PRN    lorazepam injection 4 mg Q4H PRN    [START ON 4/3/2022] methylPREDNISolone sodium succinate injection 60 mg Daily    mupirocin 2 % ointment BID    naloxone 0.4 mg/mL injection 0.02 mg PRN    sodium chloride 0.9% flush 10 mL Q8H    sodium chloride 0.9% flush 10 mL Q6H    And    sodium chloride 0.9% flush 10 mL PRN       Objective:     Vital Signs (Most Recent):  Temp: 98.3 °F (36.8 °C) (04/02/22 1501)  Pulse: 97 (04/02/22 1701)  Resp: (!) 22 (04/02/22 1701)  BP: 136/62 (04/02/22 1701)  SpO2: 100 % (04/02/22 1701)  O2 Device (Oxygen Therapy): T-Piece Trial (04/02/22 1654)   Vital Signs (24h Range):  Temp:  [97.8 °F (36.6 °C)-98.9 °F (37.2 °C)] 98.3 °F (36.8 °C)  Pulse:  [72-97] 97  Resp:  [13-28] 22  SpO2:  [98 %-100 %] 100 %  BP: (113-156)/(54-73) 136/62     Weight: 118.4 kg (261 lb 0.4 oz) (04/01/22 0731)  Body mass index is 44.8  kg/m².  Body surface area is 2.31 meters squared.    I/O last 3 completed shifts:  In: 2757.4 [I.V.:2183.9; NG/GT:375; IV Piggyback:198.5]  Out: 2841 [Urine:2841]    Physical Exam  Vitals and nursing note reviewed.   Constitutional:       Comments: intubated   Cardiovascular:      Rate and Rhythm: Normal rate and regular rhythm.      Pulses: Normal pulses.      Heart sounds: Normal heart sounds.   Pulmonary:      Effort: Pulmonary effort is normal.      Breath sounds: Normal breath sounds.   Abdominal:      General: Abdomen is flat.      Palpations: Abdomen is soft.   Musculoskeletal:      Right lower leg: Edema present.      Left lower leg: Edema present.   Skin:     General: Skin is warm and dry.       Significant Labs: reviewed  BMP  Lab Results   Component Value Date     (L) 04/02/2022    K 3.6 04/02/2022    CL 89 (L) 04/02/2022    CO2 24 04/02/2022    BUN 86 (H) 04/02/2022    CREATININE 8.7 (H) 04/02/2022    CALCIUM 6.8 (LL) 04/02/2022    ANIONGAP 18 (H) 04/02/2022    ESTGFRAFRICA 5 (A) 04/02/2022    EGFRNONAA 4 (A) 04/02/2022     Lab Results   Component Value Date    WBC 21.15 (H) 04/02/2022    HGB 9.1 (L) 04/02/2022    HCT 27.0 (L) 04/02/2022    MCV 73 (L) 04/02/2022     04/02/2022       ABG  Recent Labs   Lab 04/01/22  0504   PH 7.341*   PO2 73*   PCO2 45.7*   HCO3 24.7   BE -1       Significant Imaging: reviewed    Assessment/Plan:     72 y/o female with KYE:     KYE (acute kidney injury)  Reason not entirely clear.  Pt presented with hypotension after experiencing anaphylactic shock.  Suspect KYE due to sustained hypotension resulting in acute tubular necrosis (ATN)  No change in s Cr, no sign of renal recovery at this point  More UOP noted  K normal  Hyponatremia   Metabolic acidosis  O2 sat good  No acute indications for HD     * Angioedema  Chart was reviewed  ACE-I induced?     Plans and recommendations:  As reviewed above  Total critical care time spent 40 minutes including time needed to  review the records, the   patient evaluation, documentation, discussion with other providers  more than 50% of the time was spent on coordination of care            Lindsay Mane MD  Nephrology  [facility] - Intensive Care ([facility])

## 2022-04-02 NOTE — ASSESSMENT & PLAN NOTE
--baseline BUN/Cr 20/1.4  --stop lisinopril  --avoid nephrotoxic agents  --Nephrology following  --Pt is not acidotic and there is no hyperkalemia;   --d/w nephro service  current indication for dialysis is pt's inability to metabolize sedatives she is arousable but still extremely sedated  -- uremic  --IR consult for hemodialysis access per dR Mane in am

## 2022-04-02 NOTE — SUBJECTIVE & OBJECTIVE
Review of Systems   Constitutional:  Negative for fever.   HENT:  Negative for congestion.    Respiratory:  Negative for cough.    Gastrointestinal:  Negative for abdominal pain.   Endocrine: Negative for polyuria.   Genitourinary:  Negative for flank pain.   Musculoskeletal:  Negative for back pain.   Skin:  Negative for rash.   Neurological:  Negative for syncope.   Psychiatric/Behavioral:  Negative for confusion.    Objective:     Vital Signs (Most Recent):  Temp: 98.3 °F (36.8 °C) (04/02/22 1501)  Pulse: 94 (04/02/22 1501)  Resp: (!) 21 (04/02/22 1501)  BP: 138/63 (04/02/22 1501)  SpO2: 98 % (04/02/22 1501) Vital Signs (24h Range):  Temp:  [97.8 °F (36.6 °C)-98.9 °F (37.2 °C)] 98.3 °F (36.8 °C)  Pulse:  [71-94] 94  Resp:  [13-28] 21  SpO2:  [98 %-100 %] 98 %  BP: (113-156)/(54-73) 138/63     Weight: 118.4 kg (261 lb 0.4 oz)  Body mass index is 44.8 kg/m².    Intake/Output Summary (Last 24 hours) at 4/2/2022 1609  Last data filed at 4/2/2022 1400  Gross per 24 hour   Intake 466.5 ml   Output 2155 ml   Net -1688.5 ml        Physical Exam  Vitals and nursing note reviewed.   Constitutional:       General: She is not in acute distress.     Appearance: She is ill-appearing. She is not toxic-appearing.   HENT:      Head: Normocephalic and atraumatic.   Cardiovascular:      Rate and Rhythm: Normal rate.   Pulmonary:      Effort: Tachypnea and respiratory distress present.      Comments: Mechanical breath sounds  Abdominal:      Palpations: Abdomen is soft.   Genitourinary:     Comments: alatorre  Musculoskeletal:         General: Swelling present.      Right lower leg: Edema present.      Left lower leg: Edema present.   Skin:     General: Skin is warm.       Significant Labs: All pertinent labs within the past 24 hours have been reviewed.    Significant Imaging: I have reviewed all pertinent imaging results/findings within the past 24 hours.

## 2022-04-02 NOTE — ASSESSMENT & PLAN NOTE
Emergent cricothyrotomy severe angioedema trach in place, severe hypercapnia and respiratory acidosis repeat ABG nebs IV steroids antihistamine  3/30 check cuff leak , ETT versus larger trach today   3/31 check cuff leak , solumedrol , pepcid   4/1 puff leak present.  IV Solu-Medrol IV Pepcid.  Start CPAP trials.  O2/MV/pulmonary toilet and trach care.  4/2.  CPAP trial.  T-piece trial if tolerated CPAP trial.  Decrease Solu-Medrol to once daily 60 mg.  Continue IV famotidine.  Check for cuff leak.

## 2022-04-02 NOTE — ASSESSMENT & PLAN NOTE
72 y/o female with KYE:     KYE (acute kidney injury)  Reason not entirely clear  No sign of recovery at this point, but noted more UOP today  K normal  Hyponatremia   Metabolic acidosis  O2 sat good  No acute indications for HD     * Angioedema  Chart was reviewed     Plans and recommendations:  As reviewed above  Total critical care time spent 40 minutes including time needed to review the records, the   patient evaluation, documentation, discussion with other providers  more than 50% of the time was spent on coordination of care

## 2022-04-02 NOTE — ASSESSMENT & PLAN NOTE
Strict I&Os.  IV fluid.  Monitor BMP.  Avoid nephrotoxic drugs.  On lisinopril since June 2021  3/30 change to bicarb drip , strict I/O's , nephrology follow up   3/31 oliguric , on bicarb drip , not improving will discuss with nephrology HD for acidosis , will discuss with family  4/1 discontinue bicarb drip.  Bicarb today 22 on BMP.  Urine output 900 mL last 24 hours.  Urine output 60-70 cc/hour currently.  Continue Lasix 60 mg q.8 hours total of 3 doses now.    4/2 non oliguric.  Sodium 128 hypochloremic.  Will hold Lasix.

## 2022-04-02 NOTE — PLAN OF CARE
Patient remained safe and stable for the duration of the shift. See assessment flowsheets for more details. Resting quietly in bed. Safety and fall prevention measures are in place. Plan of care discussed with the patient's daughter. Pressors weaned. Sedation stopped. Patient drowsy and arouses to voice. Tolerated SBT well. Plan to resume mechanical ventilation overnight. Current vital signs are st able. Will monitor for needs/changes.

## 2022-04-02 NOTE — SUBJECTIVE & OBJECTIVE
Interval History: Pt was seen and examined. Labs and meds reviewed. Discussed with other providers. Chart was reviewed. No new events.    Review of patient's allergies indicates:   Allergen Reactions    Lisinopril Anaphylaxis     Angioedema requiring trach     Current Facility-Administered Medications   Medication Frequency    0.9%  NaCl infusion (for blood administration) Q24H PRN    albuterol-ipratropium 2.5 mg-0.5 mg/3 mL nebulizer solution 3 mL Q4H    cefepime in dextrose 5 % IVPB 2 g Q24H    dextrose 10% bolus 125 mL PRN    famotidine (PF) injection 20 mg Daily    fentaNYL 2500 mcg in 0.9% sodium chloride 250 mL infusion premix (titrating) Continuous    glucagon (human recombinant) injection 1 mg PRN    heparin (porcine) injection 7,500 Units Q8H    insulin aspart U-100 pen 1-10 Units Q6H PRN    lorazepam injection 4 mg Q4H PRN    methylPREDNISolone sodium succinate injection 60 mg Q12H    mupirocin 2 % ointment BID    naloxone 0.4 mg/mL injection 0.02 mg PRN    NORepinephrine 4 mg in dextrose 5% 250 mL infusion (premix) (titrating) Continuous    sodium chloride 0.9% flush 10 mL Q8H    sodium chloride 0.9% flush 10 mL Q6H    And    sodium chloride 0.9% flush 10 mL PRN    white petrolatum-mineral oil 57.3-42.5% ophthalmic ointment Q8H       Objective:     Vital Signs (Most Recent):  Temp: 98.1 °F (36.7 °C) (04/01/22 1501)  Pulse: (S) 76 (04/01/22 1846)  Resp: (S) 14 (04/01/22 1846)  BP: (S) (!) 143/65 (04/01/22 1831)  SpO2: (S) 100 % (04/01/22 1846)  O2 Device (Oxygen Therapy): ventilator (04/01/22 1612)   Vital Signs (24h Range):  Temp:  [97.9 °F (36.6 °C)-98.6 °F (37 °C)] 98.1 °F (36.7 °C)  Pulse:  [63-90] 76  Resp:  [13-26] 14  SpO2:  [99 %-100 %] 100 %  BP: (119-149)/(57-66) 143/65  Arterial Line BP: ()/(38-61) 132/54     Weight: 118.4 kg (261 lb 0.4 oz) (04/01/22 0731)  Body mass index is 44.8 kg/m².  Body surface area is 2.31 meters squared.    I/O last 3 completed shifts:  In: 5406.9 [I.V.:4190.5;  NG/GT:345; IV Piggyback:871.5]  Out: 2026 [Urine:2026]    Physical Exam  Vitals and nursing note reviewed.   Constitutional:       Comments: intubated   HENT:      Head: Normocephalic and atraumatic.   Cardiovascular:      Rate and Rhythm: Normal rate and regular rhythm.      Pulses: Normal pulses.      Heart sounds: Normal heart sounds.   Pulmonary:      Effort: Pulmonary effort is normal.      Breath sounds: Rales present.   Musculoskeletal:      Right lower leg: No edema.      Left lower leg: No edema.       Significant Labs: reviewed  BMP  Lab Results   Component Value Date     (L) 04/01/2022    K 3.8 04/01/2022    CL 89 (L) 04/01/2022    CO2 22 (L) 04/01/2022    BUN 72 (H) 04/01/2022    CREATININE 8.5 (H) 04/01/2022    CALCIUM 6.8 (LL) 04/01/2022    ANIONGAP 17 (H) 04/01/2022    ESTGFRAFRICA 5 (A) 04/01/2022    EGFRNONAA 4 (A) 04/01/2022     Lab Results   Component Value Date    WBC 19.69 (H) 04/01/2022    HGB 9.2 (L) 04/01/2022    HCT 27.7 (L) 04/01/2022    MCV 74 (L) 04/01/2022     04/01/2022       ABG  Recent Labs   Lab 04/01/22  0504   PH 7.341*   PO2 73*   PCO2 45.7*   HCO3 24.7   BE -1         Significant Imaging: reviewed CXR

## 2022-04-02 NOTE — ASSESSMENT & PLAN NOTE
Reason not entirely clear  No sign of recovery at this point, but noted more UOP today  K normal  Hyponatremia   Metabolic acidosis  O2 sat good  No acute indications for HD

## 2022-04-02 NOTE — PLAN OF CARE
Pt had uneventful shift. Remains lethargic despite being off sedation. Pt tolerated being turned q2 hours and oral care/suctioning q4 hours.Pt able to follow simple commands but requires frequent stimulations to remain alert and follow through with simple commands. Bed alarm is set

## 2022-04-03 LAB
ANION GAP SERPL CALC-SCNC: 15 MMOL/L (ref 8–16)
BACTERIA SPEC AEROBE CULT: NORMAL
BACTERIA SPEC AEROBE CULT: NORMAL
BASOPHILS # BLD AUTO: 0.02 K/UL (ref 0–0.2)
BASOPHILS NFR BLD: 0.1 % (ref 0–1.9)
BUN SERPL-MCNC: 95 MG/DL (ref 8–23)
CALCIUM SERPL-MCNC: 7.3 MG/DL (ref 8.7–10.5)
CHLORIDE SERPL-SCNC: 92 MMOL/L (ref 95–110)
CO2 SERPL-SCNC: 25 MMOL/L (ref 23–29)
CREAT SERPL-MCNC: 8.2 MG/DL (ref 0.5–1.4)
DIFFERENTIAL METHOD: ABNORMAL
EOSINOPHIL # BLD AUTO: 0 K/UL (ref 0–0.5)
EOSINOPHIL NFR BLD: 0.1 % (ref 0–8)
ERYTHROCYTE [DISTWIDTH] IN BLOOD BY AUTOMATED COUNT: 15.8 % (ref 11.5–14.5)
EST. GFR  (AFRICAN AMERICAN): 5 ML/MIN/1.73 M^2
EST. GFR  (NON AFRICAN AMERICAN): 4 ML/MIN/1.73 M^2
GLUCOSE SERPL-MCNC: 153 MG/DL (ref 70–110)
GRAM STN SPEC: NORMAL
HCT VFR BLD AUTO: 25.2 % (ref 37–48.5)
HGB BLD-MCNC: 8.4 G/DL (ref 12–16)
IMM GRANULOCYTES # BLD AUTO: 0.68 K/UL (ref 0–0.04)
IMM GRANULOCYTES NFR BLD AUTO: 3.5 % (ref 0–0.5)
LYMPHOCYTES # BLD AUTO: 0.8 K/UL (ref 1–4.8)
LYMPHOCYTES NFR BLD: 3.9 % (ref 18–48)
MCH RBC QN AUTO: 24.8 PG (ref 27–31)
MCHC RBC AUTO-ENTMCNC: 33.3 G/DL (ref 32–36)
MCV RBC AUTO: 74 FL (ref 82–98)
MONOCYTES # BLD AUTO: 1.3 K/UL (ref 0.3–1)
MONOCYTES NFR BLD: 6.5 % (ref 4–15)
NEUTROPHILS # BLD AUTO: 16.8 K/UL (ref 1.8–7.7)
NEUTROPHILS NFR BLD: 85.9 % (ref 38–73)
NRBC BLD-RTO: 0 /100 WBC
PLATELET # BLD AUTO: 245 K/UL (ref 150–450)
PMV BLD AUTO: 9.4 FL (ref 9.2–12.9)
POCT GLUCOSE: 164 MG/DL (ref 70–110)
POCT GLUCOSE: 170 MG/DL (ref 70–110)
POTASSIUM SERPL-SCNC: 3.5 MMOL/L (ref 3.5–5.1)
RBC # BLD AUTO: 3.39 M/UL (ref 4–5.4)
SODIUM SERPL-SCNC: 132 MMOL/L (ref 136–145)
WBC # BLD AUTO: 19.59 K/UL (ref 3.9–12.7)

## 2022-04-03 PROCEDURE — 94640 AIRWAY INHALATION TREATMENT: CPT

## 2022-04-03 PROCEDURE — 99291 PR CRITICAL CARE, E/M 30-74 MINUTES: ICD-10-PCS | Mod: 95,,, | Performed by: INTERNAL MEDICINE

## 2022-04-03 PROCEDURE — 99291 PR CRITICAL CARE, E/M 30-74 MINUTES: ICD-10-PCS | Mod: ,,, | Performed by: INTERNAL MEDICINE

## 2022-04-03 PROCEDURE — 80048 BASIC METABOLIC PNL TOTAL CA: CPT | Performed by: INTERNAL MEDICINE

## 2022-04-03 PROCEDURE — 25000003 PHARM REV CODE 250: Performed by: FAMILY MEDICINE

## 2022-04-03 PROCEDURE — G0425 INPT/ED TELECONSULT30: HCPCS | Mod: 95,,, | Performed by: STUDENT IN AN ORGANIZED HEALTH CARE EDUCATION/TRAINING PROGRAM

## 2022-04-03 PROCEDURE — 27000221 HC OXYGEN, UP TO 24 HOURS

## 2022-04-03 PROCEDURE — 99900026 HC AIRWAY MAINTENANCE (STAT)

## 2022-04-03 PROCEDURE — A4216 STERILE WATER/SALINE, 10 ML: HCPCS | Performed by: FAMILY MEDICINE

## 2022-04-03 PROCEDURE — 63600175 PHARM REV CODE 636 W HCPCS: Performed by: FAMILY MEDICINE

## 2022-04-03 PROCEDURE — A4216 STERILE WATER/SALINE, 10 ML: HCPCS | Performed by: SURGERY

## 2022-04-03 PROCEDURE — 25000003 PHARM REV CODE 250: Performed by: SURGERY

## 2022-04-03 PROCEDURE — 20000000 HC ICU ROOM

## 2022-04-03 PROCEDURE — 99900035 HC TECH TIME PER 15 MIN (STAT)

## 2022-04-03 PROCEDURE — 63600175 PHARM REV CODE 636 W HCPCS: Performed by: SURGERY

## 2022-04-03 PROCEDURE — 85025 COMPLETE CBC W/AUTO DIFF WBC: CPT | Performed by: INTERNAL MEDICINE

## 2022-04-03 PROCEDURE — 43752 NASAL/OROGASTRIC W/TUBE PLMT: CPT

## 2022-04-03 PROCEDURE — 99291 CRITICAL CARE FIRST HOUR: CPT | Mod: 95,,, | Performed by: INTERNAL MEDICINE

## 2022-04-03 PROCEDURE — G0425 PR INPT TELEHEALTH CONSULT 30M: ICD-10-PCS | Mod: 95,,, | Performed by: STUDENT IN AN ORGANIZED HEALTH CARE EDUCATION/TRAINING PROGRAM

## 2022-04-03 PROCEDURE — 94761 N-INVAS EAR/PLS OXIMETRY MLT: CPT

## 2022-04-03 PROCEDURE — 99291 CRITICAL CARE FIRST HOUR: CPT | Mod: ,,, | Performed by: INTERNAL MEDICINE

## 2022-04-03 PROCEDURE — 63600175 PHARM REV CODE 636 W HCPCS: Performed by: INTERNAL MEDICINE

## 2022-04-03 PROCEDURE — 25000242 PHARM REV CODE 250 ALT 637 W/ HCPCS: Performed by: SURGERY

## 2022-04-03 PROCEDURE — 94003 VENT MGMT INPAT SUBQ DAY: CPT

## 2022-04-03 RX ORDER — PREDNISONE 20 MG/1
20 TABLET ORAL DAILY
Status: DISCONTINUED | OUTPATIENT
Start: 2022-04-04 | End: 2022-04-04

## 2022-04-03 RX ORDER — MICONAZOLE NITRATE 2 %
POWDER (GRAM) TOPICAL 2 TIMES DAILY
Status: DISCONTINUED | OUTPATIENT
Start: 2022-04-03 | End: 2022-04-12 | Stop reason: HOSPADM

## 2022-04-03 RX ORDER — FLUCONAZOLE 2 MG/ML
200 INJECTION, SOLUTION INTRAVENOUS
Status: COMPLETED | OUTPATIENT
Start: 2022-04-03 | End: 2022-04-07

## 2022-04-03 RX ADMIN — SODIUM CHLORIDE, PRESERVATIVE FREE 10 ML: 5 INJECTION INTRAVENOUS at 12:04

## 2022-04-03 RX ADMIN — HEPARIN SODIUM 7500 UNITS: 5000 INJECTION INTRAVENOUS; SUBCUTANEOUS at 09:04

## 2022-04-03 RX ADMIN — IPRATROPIUM BROMIDE AND ALBUTEROL SULFATE 3 ML: 2.5; .5 SOLUTION RESPIRATORY (INHALATION) at 04:04

## 2022-04-03 RX ADMIN — ANTI-FUNGAL POWDER MICONAZOLE NITRATE TALC FREE: 1.42 POWDER TOPICAL at 08:04

## 2022-04-03 RX ADMIN — Medication 10 ML: at 06:04

## 2022-04-03 RX ADMIN — Medication 10 ML: at 10:04

## 2022-04-03 RX ADMIN — IPRATROPIUM BROMIDE AND ALBUTEROL SULFATE 3 ML: 2.5; .5 SOLUTION RESPIRATORY (INHALATION) at 12:04

## 2022-04-03 RX ADMIN — Medication 10 ML: at 02:04

## 2022-04-03 RX ADMIN — HEPARIN SODIUM 7500 UNITS: 5000 INJECTION INTRAVENOUS; SUBCUTANEOUS at 02:04

## 2022-04-03 RX ADMIN — FLUCONAZOLE 200 MG: 2 INJECTION, SOLUTION INTRAVENOUS at 05:04

## 2022-04-03 RX ADMIN — HEPARIN SODIUM 7500 UNITS: 5000 INJECTION INTRAVENOUS; SUBCUTANEOUS at 05:04

## 2022-04-03 RX ADMIN — SODIUM CHLORIDE, PRESERVATIVE FREE 10 ML: 5 INJECTION INTRAVENOUS at 06:04

## 2022-04-03 RX ADMIN — SODIUM CHLORIDE, PRESERVATIVE FREE 10 ML: 5 INJECTION INTRAVENOUS at 02:04

## 2022-04-03 RX ADMIN — INSULIN ASPART 2 UNITS: 100 INJECTION, SOLUTION INTRAVENOUS; SUBCUTANEOUS at 05:04

## 2022-04-03 RX ADMIN — METHYLPREDNISOLONE SODIUM SUCCINATE 60 MG: 40 INJECTION, POWDER, FOR SOLUTION INTRAMUSCULAR; INTRAVENOUS at 08:04

## 2022-04-03 RX ADMIN — IPRATROPIUM BROMIDE AND ALBUTEROL SULFATE 3 ML: 2.5; .5 SOLUTION RESPIRATORY (INHALATION) at 03:04

## 2022-04-03 RX ADMIN — CEFEPIME HYDROCHLORIDE 2 G: 2 INJECTION, SOLUTION INTRAVENOUS at 05:04

## 2022-04-03 RX ADMIN — FAMOTIDINE 20 MG: 10 INJECTION, SOLUTION INTRAVENOUS at 08:04

## 2022-04-03 RX ADMIN — IPRATROPIUM BROMIDE AND ALBUTEROL SULFATE 3 ML: 2.5; .5 SOLUTION RESPIRATORY (INHALATION) at 07:04

## 2022-04-03 RX ADMIN — IPRATROPIUM BROMIDE AND ALBUTEROL SULFATE 3 ML: 2.5; .5 SOLUTION RESPIRATORY (INHALATION) at 11:04

## 2022-04-03 RX ADMIN — IPRATROPIUM BROMIDE AND ALBUTEROL SULFATE 3 ML: 2.5; .5 SOLUTION RESPIRATORY (INHALATION) at 08:04

## 2022-04-03 NOTE — SUBJECTIVE & OBJECTIVE
4/3 seen and examined.  Placed back on controlled mode due to frequent PACs at night.  Arousable but lethargic.  Of sedation x2 days.  Placed on CPAP trial again.  Chest x-ray and ABG reviewed.  Urine output 2.3 L yesterday.  Positive 8 L since admission.  On enteral tube feeding  Review of Systems   Unable to perform ROS: Other (trach)   Objective:     Vital Signs (Most Recent):  Temp: 98.2 °F (36.8 °C) (04/03/22 0701)  Pulse: 79 (04/03/22 0930)  Resp: (!) 22 (04/03/22 0930)  BP: 134/63 (04/03/22 0801)  SpO2: (!) 94 % (04/03/22 0930)   Vital Signs (24h Range):  Temp:  [98.2 °F (36.8 °C)-99.6 °F (37.6 °C)] 98.2 °F (36.8 °C)  Pulse:  [] 79  Resp:  [17-27] 22  SpO2:  [91 %-100 %] 94 %  BP: ()/(44-71) 134/63     Weight: 118.4 kg (261 lb 0.4 oz)  Body mass index is 44.8 kg/m².      Intake/Output Summary (Last 24 hours) at 4/3/2022 1018  Last data filed at 4/3/2022 0800  Gross per 24 hour   Intake 549.4 ml   Output 2380 ml   Net -1830.6 ml         Physical Exam  Vitals and nursing note reviewed.   Constitutional:       General: She is not in acute distress.     Appearance: She is well-developed.   HENT:      Head: Normocephalic and atraumatic.      Mouth/Throat:      Comments: Mild residual tongue and lips swelling   Neck:      Comments: Trach in place  Cardiovascular:      Rate and Rhythm: Normal rate.   Pulmonary:      Effort: Respiratory distress present.      Breath sounds: Rhonchi present.   Abdominal:      Palpations: Abdomen is soft.      Tenderness: There is no abdominal tenderness.   Genitourinary:     Comments: Ortega in place   Musculoskeletal:         General: No deformity.      Cervical back: Neck supple.   Skin:     General: Skin is warm.   Neurological:      General: No focal deficit present.      Mental Status: She is alert.      Comments: Lethargic off sedation x2 days        Vents:  Vent Mode: Spont (04/03/22 0930)  Ventilator Initiated: Yes (04/01/22 0900)  Set Rate: 0 BPM (04/03/22  0930)  Vt Set: 400 mL (04/03/22 0930)  Pressure Support: 2 cmH20 (04/03/22 0930)  PEEP/CPAP: 8 cmH20 (04/03/22 0930)  Oxygen Concentration (%): 40 (04/03/22 0930)  Peak Airway Pressure: 12 cmH2O (04/03/22 0930)  Plateau Pressure: 0 cmH20 (04/03/22 0930)  Total Ve: 8.14 mL (04/03/22 0930)  F/VT Ratio<105 (RSBI): (!) 55.84 (04/03/22 0930)    Lines/Drains/Airways       Peripherally Inserted Central Catheter Line  Duration             PICC Double Lumen 03/31/22 0240 right basilic 3 days              Drain  Duration                  Urethral Catheter 03/29/22 0900 18 Fr. 5 days         NG/OG Tube 04/01/22 1019 Tyler sump 12 Fr. Right nostril 1 day              Airway  Duration                  Surgical Airway 03/30/22 Other (Comment) 4 days              Peripheral Intravenous Line  Duration                  Peripheral IV - Single Lumen 03/29/22 0706 20 G Left Antecubital 5 days                   Latest Reference Range & Units 04/01/22 13:47   Sodium 136 - 145 mmol/L 128 (L)   Potassium 3.5 - 5.1 mmol/L 3.8   Chloride 95 - 110 mmol/L 89 (L)   CO2 23 - 29 mmol/L 22 (L)   Anion Gap 8 - 16 mmol/L 17 (H)   BUN 8 - 23 mg/dL 72 (H)   Creatinine 0.5 - 1.4 mg/dL 8.5 (H)   EGFR if non African American >60 mL/min/1.73 m^2 4 ! [1]   EGFR if African American >60 mL/min/1.73 m^2 5 !   Glucose 70 - 110 mg/dL 155 (H)   Calcium 8.7 - 10.5 mg/dL 6.8 (LL) [2]   Phosphorus 2.7 - 4.5 mg/dL 5.4 (H)   Albumin 3.5 - 5.2 g/dL 2.3 (L)   (L): Data is abnormally low  (H): Data is abnormally high  !: Data is abnormal  (LL): Data is critically low  [1] Calculation used to obtain the estimated glomerular filtration  rate (eGFR) is the CKD-EPI equation.     [2] CALCIUM critical result(s) called and verbal readback obtained from   BRITTNEY KELLER RN by KULWANT 04/01/2022 15:00      Significant Labs:    CBC/Anemia Profile:   Latest Reference Range & Units 04/01/22 12:14   WBC 3.90 - 12.70 K/uL 19.69 (H)   RBC 4.00 - 5.40 M/uL 3.77 (L)   Hemoglobin 12.0 - 16.0  g/dL 9.2 (L)   Hematocrit 37.0 - 48.5 % 27.7 (L)   MCV 82 - 98 fL 74 (L)   MCH 27.0 - 31.0 pg 24.4 (L)   MCHC 32.0 - 36.0 g/dL 33.2   RDW 11.5 - 14.5 % 15.9 (H)   Platelets 150 - 450 K/uL 271   MPV 9.2 - 12.9 fL 9.9   Gran % 38.0 - 73.0 % 95.0 (H)   Lymph % 18.0 - 48.0 % 1.3 (L)   Mono % 4.0 - 15.0 % 2.8 (L)   Eosinophil % 0.0 - 8.0 % 0.0   Basophil % 0.0 - 1.9 % 0.1   Immature Granulocytes 0.0 - 0.5 % 0.8 (H)   Gran # (ANC) 1.8 - 7.7 K/uL 18.7 (H)   Lymph # 1.0 - 4.8 K/uL 0.3 (L)   Mono # 0.3 - 1.0 K/uL 0.6   Eos # 0.0 - 0.5 K/uL 0.0   Baso # 0.00 - 0.20 K/uL 0.01   Immature Grans (Abs) 0.00 - 0.04 K/uL 0.16 (H) [1]   NRBC 0 /100 WBC 0   Differential Method  Automated         Latest Reference Range & Units 04/01/22 05:04   POC PH 7.35 - 7.45  7.341 (L)   POC PCO2 35 - 45 mmHg 45.7 (H)   POC PO2 80 - 100 mmHg 73 (L)   POC BE -2 to 2 mmol/L -1   POC HCO3 24 - 28 mmol/L 24.7   POC SATURATED O2 95 - 100 % 93 (L)   FiO2  40   Vt  400   PEEP  8   Sample  ARTERIAL   DelSys  Adult Vent   Allens Test  N/A   Site  Bobo/UAC   Mode  AC/PRVC   Rate  24           EKG 03/29/2020  Normal sinus rhythm   Possible Left atrial enlargement   Prolonged QT   Abnormal ECG   When compared with ECG of 13-DEC-2006 09:37,   QRS duration has increased   QT has lengthened     Significant Imaging:     CXR 4/1/2022    EXAMINATION:  XR CHEST AP PORTABLE     CLINICAL HISTORY:  ng tube placement;     TECHNIQUE:  Single frontal view of the chest was performed.     COMPARISON:  Chest radiograph 03/31/2022 with priors     FINDINGS:  Cardiac leads project over the chest.  A tracheostomy cannula is again identified.  Stable positioning of a right PICC.  Nasogastric tube tip is not visualized, but appears to terminate appropriately in the upper abdomen.  Cardiomediastinal silhouette is unchanged in size.  Persistent small bilateral pleural effusions with adjacent compressive atelectasis or consolidation.  No definite new infiltrate.  No pneumothorax.      Impression:     NG tube appears to course appropriately into the upper abdomen.  No detrimental interval change.

## 2022-04-03 NOTE — SUBJECTIVE & OBJECTIVE
Interval History: Pt was seen and examined in ICU. Labs and meds reviewed. Discussed with other providers, ICU and hospitalist. No new events. Dialysis was discussed. Per report, pt is responding to command on strong stimulation.    Review of patient's allergies indicates:   Allergen Reactions    Lisinopril Anaphylaxis     Angioedema requiring trach     Current Facility-Administered Medications   Medication Frequency    0.9%  NaCl infusion (for blood administration) Q24H PRN    albuterol-ipratropium 2.5 mg-0.5 mg/3 mL nebulizer solution 3 mL Q4H    dextrose 10% bolus 125 mL PRN    famotidine (PF) injection 20 mg Daily    glucagon (human recombinant) injection 1 mg PRN    heparin (porcine) injection 7,500 Units Q8H    insulin aspart U-100 pen 1-10 Units Q6H PRN    lorazepam injection 4 mg Q4H PRN    naloxone 0.4 mg/mL injection 0.02 mg PRN    [START ON 4/4/2022] predniSONE tablet 20 mg Daily    sodium chloride 0.9% flush 10 mL Q8H    sodium chloride 0.9% flush 10 mL Q6H    And    sodium chloride 0.9% flush 10 mL PRN       Objective:     Vital Signs (Most Recent):  Temp: 98.2 °F (36.8 °C) (04/03/22 1101)  Pulse: 84 (04/03/22 1431)  Resp: (!) 24 (04/03/22 1432)  BP: (!) 155/67 (04/03/22 1432)  SpO2: 98 % (04/03/22 1401)  O2 Device (Oxygen Therapy): ventilator (04/03/22 1119)   Vital Signs (24h Range):  Temp:  [98.2 °F (36.8 °C)-99.6 °F (37.6 °C)] 98.2 °F (36.8 °C)  Pulse:  [] 84  Resp:  [21-32] 24  SpO2:  [91 %-100 %] 98 %  BP: ()/(44-71) 155/67     Weight: 118.4 kg (261 lb 0.4 oz) (04/01/22 0731)  Body mass index is 44.8 kg/m².  Body surface area is 2.31 meters squared.    I/O last 3 completed shifts:  In: 778.7 [NG/GT:480; IV Piggyback:298.7]  Out: 3415 [Urine:3415]    Physical Exam  Vitals and nursing note reviewed.   Cardiovascular:      Rate and Rhythm: Normal rate and regular rhythm.      Pulses: Normal pulses.      Heart sounds: Normal heart sounds.   Pulmonary:      Effort: Pulmonary effort is  normal.      Breath sounds: Normal breath sounds.   Abdominal:      General: Abdomen is flat.      Palpations: Abdomen is soft.   Musculoskeletal:      Right lower leg: Edema present.      Left lower leg: Edema present.   Skin:     General: Skin is warm and dry.       Significant Labs: reviewed  BMP  Lab Results   Component Value Date     (L) 04/03/2022    K 3.5 04/03/2022    CL 92 (L) 04/03/2022    CO2 25 04/03/2022    BUN 95 (H) 04/03/2022    CREATININE 8.2 (H) 04/03/2022    CALCIUM 7.3 (L) 04/03/2022    ANIONGAP 15 04/03/2022    ESTGFRAFRICA 5 (A) 04/03/2022    EGFRNONAA 4 (A) 04/03/2022     Lab Results   Component Value Date    WBC 19.59 (H) 04/03/2022    HGB 8.4 (L) 04/03/2022    HCT 25.2 (L) 04/03/2022    MCV 74 (L) 04/03/2022     04/03/2022       ABG  Recent Labs   Lab 04/01/22  0504   PH 7.341*   PO2 73*   PCO2 45.7*   HCO3 24.7   BE -1         Significant Imaging: reviewed CXR, mild pulm edema

## 2022-04-03 NOTE — EICU
EICU FOLLOWUP NOTE:    Patient has frequent PACs.  We will check stat potassium and magnesium level and follow-up    Esteban Can MD  EICU  [phone]    9:18 PM    Potassium 3.7, magnesium 1.9. Patient has acute renal failure. We will replace conservatively    11:04 PM    Still having frequent PACs.  Low-dose beta-blocker ordered

## 2022-04-03 NOTE — PROGRESS NOTES
[facility] - Intensive Care ([facility])  Nephrology  Progress Note    Patient Name: Page Grissom  MRN: [id number]  Admission Date: 3/29/2022  Hospital Length of Stay: 5 days  Attending Provider: Batsheva Landaverde MD   Primary Care Physician: NEELAM Roman Jr, MD  Principal Problem:Angioedema    Subjective:     HPI: Noted    Interval History: Pt was seen and examined in ICU. Labs and meds reviewed. Discussed with other providers, ICU and hospitalist. No new events. Dialysis was discussed. Per report, pt is responding to command on strong stimulation.    Review of patient's allergies indicates:   Allergen Reactions    Lisinopril Anaphylaxis     Angioedema requiring trach     Current Facility-Administered Medications   Medication Frequency    0.9%  NaCl infusion (for blood administration) Q24H PRN    albuterol-ipratropium 2.5 mg-0.5 mg/3 mL nebulizer solution 3 mL Q4H    dextrose 10% bolus 125 mL PRN    famotidine (PF) injection 20 mg Daily    glucagon (human recombinant) injection 1 mg PRN    heparin (porcine) injection 7,500 Units Q8H    insulin aspart U-100 pen 1-10 Units Q6H PRN    lorazepam injection 4 mg Q4H PRN    naloxone 0.4 mg/mL injection 0.02 mg PRN    [START ON 4/4/2022] predniSONE tablet 20 mg Daily    sodium chloride 0.9% flush 10 mL Q8H    sodium chloride 0.9% flush 10 mL Q6H    And    sodium chloride 0.9% flush 10 mL PRN       Objective:     Vital Signs (Most Recent):  Temp: 98.2 °F (36.8 °C) (04/03/22 1101)  Pulse: 84 (04/03/22 1431)  Resp: (!) 24 (04/03/22 1432)  BP: (!) 155/67 (04/03/22 1432)  SpO2: 98 % (04/03/22 1401)  O2 Device (Oxygen Therapy): ventilator (04/03/22 1119)   Vital Signs (24h Range):  Temp:  [98.2 °F (36.8 °C)-99.6 °F (37.6 °C)] 98.2 °F (36.8 °C)  Pulse:  [] 84  Resp:  [21-32] 24  SpO2:  [91 %-100 %] 98 %  BP: ()/(44-71) 155/67     Weight: 118.4 kg (261 lb 0.4 oz) (04/01/22 0731)  Body mass index is 44.8 kg/m².  Body surface area is 2.31 meters  squared.    I/O last 3 completed shifts:  In: 778.7 [NG/GT:480; IV Piggyback:298.7]  Out: 3415 [Urine:3415]    Physical Exam  Vitals and nursing note reviewed.   Cardiovascular:      Rate and Rhythm: Normal rate and regular rhythm.      Pulses: Normal pulses.      Heart sounds: Normal heart sounds.   Pulmonary:      Effort: Pulmonary effort is normal.      Breath sounds: Normal breath sounds.   Abdominal:      General: Abdomen is flat.      Palpations: Abdomen is soft.   Musculoskeletal:      Right lower leg: Edema present.      Left lower leg: Edema present.   Skin:     General: Skin is warm and dry.       Significant Labs: reviewed  BMP  Lab Results   Component Value Date     (L) 04/03/2022    K 3.5 04/03/2022    CL 92 (L) 04/03/2022    CO2 25 04/03/2022    BUN 95 (H) 04/03/2022    CREATININE 8.2 (H) 04/03/2022    CALCIUM 7.3 (L) 04/03/2022    ANIONGAP 15 04/03/2022    ESTGFRAFRICA 5 (A) 04/03/2022    EGFRNONAA 4 (A) 04/03/2022     Lab Results   Component Value Date    WBC 19.59 (H) 04/03/2022    HGB 8.4 (L) 04/03/2022    HCT 25.2 (L) 04/03/2022    MCV 74 (L) 04/03/2022     04/03/2022       ABG  Recent Labs   Lab 04/01/22  0504   PH 7.341*   PO2 73*   PCO2 45.7*   HCO3 24.7   BE -1         Significant Imaging: reviewed CXR, mild pulm edema    Assessment/Plan:     70 y/o female with KYE and initial presentation with angioedema and hypotension:     KYE (acute kidney injury)  Pt presented with hypotension after experiencing anaphylactic shock.  Suspect KYE due to sustained hypotension resulting in acute tubular necrosis (ATN)    s Cr stable, no change  No sign of renal recovery at this point  Increased daily UOP noted  K normal  Hyponatremia   Metabolic acidosis  O2 sat good    Will provide acute HD in am in an attempt to improved fluid balance and mental status  Discussed with ICU and primary team  Will consult IR for vas cath     * Angioedema  Chart was reviewed  ACE-I induced?     Plans and  recommendations:  As reviewed above  Total critical care time spent 40 minutes including time needed to review the records, the   patient evaluation, documentation, discussion with other providers  more than 50% of the time was spent on coordination of care               Lindsay Mane MD  Nephrology  [facility] - Intensive Care ([facility])

## 2022-04-03 NOTE — ASSESSMENT & PLAN NOTE
Severe was hard to intubate for respiratory distress status post emergent cricothyroidotomy with size 5 trach.  Sedated with propofol.  On IV steroids and antihistamines.  Check tryptase level IgE.  No recent medicine introduction.  On lisinopril ordered in June 2021  3/30 check cuff leak , steroids antihistamines IV , Ige , C1 q esterase  Inh , Tryptase level pending   3/31 check cuff leak , decrease solumedrol to q 12 hrs , cont famotidine   4/1 cuff leak present.  Continue Solu-Medrol and famotidine IV.  Start spontaneous trial on ventilator via trach.    4/2.  CPAP trial.  T-piece trial if tolerated CPAP trial.  Decrease Solu-Medrol to once daily 60 mg.  Continue IV famotidine.  Check for cuff leak.  4/3 discontinue IV Solu-Medrol.  Prednisone 20 mg x 3 days tomorrow.

## 2022-04-03 NOTE — SUBJECTIVE & OBJECTIVE
Review of Systems   Constitutional:  Negative for fever.   HENT:  Negative for congestion.    Respiratory:  Negative for cough.    Gastrointestinal:  Negative for abdominal pain.   Endocrine: Negative for polyuria.   Genitourinary:  Negative for flank pain.   Musculoskeletal:  Negative for back pain.   Skin:  Negative for rash.   Neurological:  Negative for syncope.   Psychiatric/Behavioral:  Negative for confusion.    Objective:     Vital Signs (Most Recent):  Temp: 98.2 °F (36.8 °C) (04/03/22 1101)  Pulse: 84 (04/03/22 1431)  Resp: (!) 24 (04/03/22 1432)  BP: (!) 155/67 (04/03/22 1432)  SpO2: 98 % (04/03/22 1401) Vital Signs (24h Range):  Temp:  [98.2 °F (36.8 °C)-99.6 °F (37.6 °C)] 98.2 °F (36.8 °C)  Pulse:  [] 84  Resp:  [21-32] 24  SpO2:  [91 %-100 %] 98 %  BP: ()/(44-71) 155/67     Weight: 118.4 kg (261 lb 0.4 oz)  Body mass index is 44.8 kg/m².    Intake/Output Summary (Last 24 hours) at 4/3/2022 1505  Last data filed at 4/3/2022 1400  Gross per 24 hour   Intake 485 ml   Output 2705 ml   Net -2220 ml        Physical Exam  Vitals and nursing note reviewed.   Constitutional:       General: She is not in acute distress.     Appearance: She is ill-appearing. She is not toxic-appearing.   HENT:      Head: Normocephalic and atraumatic.   Cardiovascular:      Rate and Rhythm: Normal rate.   Pulmonary:      Effort: Tachypnea and respiratory distress present.      Comments: Mechanical breath sounds  Abdominal:      Palpations: Abdomen is soft.   Genitourinary:     Comments: alatorre  Musculoskeletal:         General: Swelling present.      Right lower leg: Edema present.      Left lower leg: Edema present.   Skin:     General: Skin is warm.       Significant Labs: All pertinent labs within the past 24 hours have been reviewed.    Significant Imaging: I have reviewed all pertinent imaging results/findings within the past 24 hours.

## 2022-04-03 NOTE — PLAN OF CARE
Problem: Infection  Goal: Absence of Infection Signs and Symptoms  Outcome: Ongoing, Progressing     Problem: Adult Inpatient Plan of Care  Goal: Plan of Care Review  Outcome: Ongoing, Progressing  Goal: Patient-Specific Goal (Individualized)  Outcome: Ongoing, Progressing  Goal: Absence of Hospital-Acquired Illness or Injury  Outcome: Ongoing, Progressing  Goal: Optimal Comfort and Wellbeing  Outcome: Ongoing, Progressing  Goal: Readiness for Transition of Care  Outcome: Ongoing, Progressing     Problem: Bariatric Environmental Safety  Goal: Safety Maintained with Care  Outcome: Ongoing, Progressing     Problem: Communication Impairment (Mechanical Ventilation, Invasive)  Goal: Effective Communication  Outcome: Ongoing, Progressing     Problem: Device-Related Complication Risk (Mechanical Ventilation, Invasive)  Goal: Optimal Device Function  Outcome: Ongoing, Progressing     Problem: Inability to Wean (Mechanical Ventilation, Invasive)  Goal: Mechanical Ventilation Liberation  Outcome: Ongoing, Progressing     Problem: Nutrition Impairment (Mechanical Ventilation, Invasive)  Goal: Optimal Nutrition Delivery  Outcome: Ongoing, Progressing     Problem: Skin and Tissue Injury (Mechanical Ventilation, Invasive)  Goal: Absence of Device-Related Skin and Tissue Injury  Outcome: Ongoing, Progressing     Problem: Ventilator-Induced Lung Injury (Mechanical Ventilation, Invasive)  Goal: Absence of Ventilator-Induced Lung Injury  Outcome: Ongoing, Progressing     Problem: Communication Impairment (Artificial Airway)  Goal: Effective Communication  Outcome: Ongoing, Progressing     Problem: Device-Related Complication Risk (Artificial Airway)  Goal: Optimal Device Function  Outcome: Ongoing, Progressing     Problem: Skin and Tissue Injury (Artificial Airway)  Goal: Absence of Device-Related Skin or Tissue Injury  Outcome: Ongoing, Progressing     Problem: Noninvasive Ventilation Acute  Goal: Effective Unassisted Ventilation  and Oxygenation  Outcome: Ongoing, Progressing     Problem: Fall Injury Risk  Goal: Absence of Fall and Fall-Related Injury  Outcome: Ongoing, Progressing     Problem: Restraint, Nonbehavioral (Nonviolent)  Goal: Absence of Harm or Injury  Outcome: Ongoing, Progressing     Problem: Diabetes Comorbidity  Goal: Blood Glucose Level Within Targeted Range  Outcome: Ongoing, Progressing     Problem: Fluid and Electrolyte Imbalance (Acute Kidney Injury/Impairment)  Goal: Fluid and Electrolyte Balance  Outcome: Ongoing, Progressing     Problem: Oral Intake Inadequate (Acute Kidney Injury/Impairment)  Goal: Optimal Nutrition Intake  Outcome: Ongoing, Progressing     Problem: Renal Function Impairment (Acute Kidney Injury/Impairment)  Goal: Effective Renal Function  Outcome: Ongoing, Progressing     Problem: Skin Injury Risk Increased  Goal: Skin Health and Integrity  Outcome: Ongoing, Progressing     Problem: Coping Ineffective  Goal: Effective Coping  Outcome: Ongoing, Progressing

## 2022-04-03 NOTE — ASSESSMENT & PLAN NOTE
Strict I&Os.  IV fluid.  Monitor BMP.  Avoid nephrotoxic drugs.  On lisinopril since June 2021  3/30 change to bicarb drip , strict I/O's , nephrology follow up   3/31 oliguric , on bicarb drip , not improving will discuss with nephrology HD for acidosis , will discuss with family  4/1 discontinue bicarb drip.  Bicarb today 22 on BMP.  Urine output 900 mL last 24 hours.  Urine output 60-70 cc/hour currently.  Continue Lasix 60 mg q.8 hours total of 3 doses now.    4/2 non oliguric.  Sodium 128 hypochloremic.  Will hold Lasix.  4/3 non oliguric creatinine not improving.  Elevated BUN.  Adult mental status.  Will check CT head.  Will consult Neurology.  Discussed dialysis with Nephrology.  No acute indication at the moment.

## 2022-04-03 NOTE — ASSESSMENT & PLAN NOTE
4/1 most likely steroid related.  Check procalcitonin.  Blood culture negative so far.  Respiratory culture sent.  4/3 afebrile.  Cultures negative.  Will DC cefepime.  Probably leukocytosis steroid induced

## 2022-04-03 NOTE — PROGRESS NOTES
[facility] - Intensive Care ([facility])  [facility] Medicine  Progress Note    Patient Name: Page Grissom  MRN: [id number]  Patient Class: IP- Inpatient   Admission Date: 3/29/2022  Length of Stay: 5 days  Attending Physician: Batsheva Landaverde MD  Primary Care Provider: NEELAM Roman Jr, MD        Subjective:     Principal Problem:Acute kidney injury superimposed on chronic kidney disease        HPI:  70 y/o female with PMHx of HTN, HLD, DM, asthma, CKD 3, and RA who presented to the ED with c/o angioedema that onset suddenly this morning. Pt was able to verbally communicate with EMS, was not able to communicate with ED staff. Staff was unable to intubate and called surgery, who performed emergency trach. Pt is currently on vent and sedated.  ED workup shows: H/H 10.9/34.6, K+ 2.9, CO2 22, Anion gap 17, BUN 55, Cr 8.8.  She will be admitted to ICU for angioedema under the care of [facility] medicine.  She is a full code and her SDM is her daughter, Cassy.        Overview/Hospital Course:  3/30 emergently trach'd in ED as difficulty intubating d/t angioedema. Going to OR for replacement of tube  3/31 hypotensive requiring pressor support. Leukocytosis with electrolyte abn noted. Concerns for sepsis in immunocompromised patient. Low UOP. Nephrology following for possible dialysis. Guarded prognosis. Consider palliative consult for GOC/family support  4/1 remains on ventilatory support via trach. NG placed for med admin  4/2 somnolent but following commands, ESRD not currently receiving dialysis pt received versed for crich procedure slow metabolizer still somnolent  4/3- somnolent but following commands. D/w nephro dr. Mane trial of dialysis in am to metabolize sedatives pt does not seem to be metabolizing sedatives. Stable respiratory status          Review of Systems   Constitutional:  Negative for fever.   HENT:  Negative for congestion.    Respiratory:  Negative for cough.    Gastrointestinal:  Negative for  abdominal pain.   Endocrine: Negative for polyuria.   Genitourinary:  Negative for flank pain.   Musculoskeletal:  Negative for back pain.   Skin:  Negative for rash.   Neurological:  Negative for syncope.   Psychiatric/Behavioral:  Negative for confusion.    Objective:     Vital Signs (Most Recent):  Temp: 98.2 °F (36.8 °C) (04/03/22 1101)  Pulse: 84 (04/03/22 1431)  Resp: (!) 24 (04/03/22 1432)  BP: (!) 155/67 (04/03/22 1432)  SpO2: 98 % (04/03/22 1401) Vital Signs (24h Range):  Temp:  [98.2 °F (36.8 °C)-99.6 °F (37.6 °C)] 98.2 °F (36.8 °C)  Pulse:  [] 84  Resp:  [21-32] 24  SpO2:  [91 %-100 %] 98 %  BP: ()/(44-71) 155/67     Weight: 118.4 kg (261 lb 0.4 oz)  Body mass index is 44.8 kg/m².    Intake/Output Summary (Last 24 hours) at 4/3/2022 1505  Last data filed at 4/3/2022 1400  Gross per 24 hour   Intake 485 ml   Output 2705 ml   Net -2220 ml        Physical Exam  Vitals and nursing note reviewed.   Constitutional:       General: She is not in acute distress.     Appearance: She is ill-appearing. She is not toxic-appearing.   HENT:      Head: Normocephalic and atraumatic.   Cardiovascular:      Rate and Rhythm: Normal rate.   Pulmonary:      Effort: Tachypnea and respiratory distress present.      Comments: Mechanical breath sounds  Abdominal:      Palpations: Abdomen is soft.   Genitourinary:     Comments: alatorre  Musculoskeletal:         General: Swelling present.      Right lower leg: Edema present.      Left lower leg: Edema present.   Skin:     General: Skin is warm.       Significant Labs: All pertinent labs within the past 24 hours have been reviewed.    Significant Imaging: I have reviewed all pertinent imaging results/findings within the past 24 hours.      Assessment/Plan:      * Acute kidney injury superimposed on chronic kidney disease  --baseline BUN/Cr 20/1.4  --stop lisinopril  --avoid nephrotoxic agents  --Nephrology following  --Pt is not acidotic and there is no hyperkalemia;    --d/w nephro service  current indication for dialysis is pt's inability to metabolize sedatives she is arousable but still extremely sedated  -- uremic  --IR consult for hemodialysis access per dR Mane in am      Morbid obesity        Tracheostomy in place  --admit to ICU  --stop lisiniopril  --pulmonology following  --supportive care    OR for replacement of cricoid trach      Angioedema  --likely 2/2 lisinopril  --admit to ICU  --had emergent trach in ED, on ventalitor  --IV steroids, IV antihistamiens  --Fresh Frozen Plasma ordered in ED  --monitor closely    3/30  Improving   Continue supportive care  Minimal improvement in appearance  Consider bradykinin inhibitor?  Vent Mode: Spont  Oxygen Concentration (%):  [35-40] 35  Resp Rate Total:  [13 br/min-25 br/min] 15 br/min  Vt Set:  [400 mL] 400 mL  PEEP/CPAP:  [5 cmH20-8 cmH20] 5 cmH20  Pressure Support:  [0 cmH20-10 cmH20] 10 cmH20  Mean Airway Pressure:  [7.9 qwF64-08 cmH20] 7.9 cmH20     3/31  CC following for vent management  Surgery following after trach placement  Continue iv systemic steroids    4/1  Continue current care    Rheumatoid arthritis involving multiple sites with positive rheumatoid factor  --hold plaquenil  --f/u OP         VTE Risk Mitigation (From admission, onward)         Ordered     Apply sequential compression device to lower extremities (if no contraindications). Medical venous thromboembolus prophylaxis is preferred.  Until discontinued         03/30/22 0805     heparin (porcine) injection 7,500 Units  Every 8 hours         03/29/22 1003     IP VTE HIGH RISK PATIENT  Once         03/29/22 1003                Discharge Planning   CHALO:      Code Status: Full Code   Is the patient medically ready for discharge?:     Reason for patient still in hospital (select all that apply): Pending disposition  Discharge Plan A: Home with family            Critical care time spent on the evaluation and treatment of severe organ dysfunction, review  of pertinent labs and imaging studies, discussions with consulting providers and discussions with patient/family: 30 minutes.      Batsheva Landaverde MD  Department of Hospital Medicine   [facility] - Intensive Care ([facility])

## 2022-04-03 NOTE — ASSESSMENT & PLAN NOTE
Emergent cricothyrotomy severe angioedema trach in place, severe hypercapnia and respiratory acidosis repeat ABG nebs IV steroids antihistamine  3/30 check cuff leak , ETT versus larger trach today   3/31 check cuff leak , solumedrol , pepcid   4/1 puff leak present.  IV Solu-Medrol IV Pepcid.  Start CPAP trials.  O2/MV/pulmonary toilet and trach care.  4/2.  CPAP trial.  T-piece trial if tolerated CPAP trial.  Decrease Solu-Medrol to once daily 60 mg.  Continue IV famotidine.  Check for cuff leak.  4/3 CPAP trial again today.  Was doing well yesterday on CPAP and T-piece trial.  Lethargic.  Will send for CT head.  Neurology consult.

## 2022-04-03 NOTE — PLAN OF CARE
Patient remained safe and stable for the duration of the shift. See assessment flowsheets for more detals. LOC unchanged. Patient responds to repeated stimulation. Neurology consulted and head CT obtained. Plan of care to be discussed this evening with critical care. Tolerated breathing trial on pressure support. Did not transition to T collar today at patient became tachypneic during NGT tube insertion attempt (AEB decreased tidal volumes and hypoxia). Patient placed back on AC/VC ventilation. Will monitor for needs/changes.

## 2022-04-03 NOTE — PROGRESS NOTES
[facility] - Intensive Care ([facility])  Critical Care Medicine  Progress Note    Patient Name: Page Grissom  MRN: [id number]  Admission Date: 3/29/2022  Hospital Length of Stay: 5 days  Code Status: Full Code  Attending Provider: Batsheva Landaverde MD  Primary Care Provider: NEELAM Roman Jr, MD   Principal Problem: Angioedema    Subjective:     HPI:  71 y.o. female patient PMHx of asthma, CKD, DM2, HTN, and immune deficiency disorder  brought to the emergency room for acute onset shortness of breath severe tongue swelling and respiratory distress.          Hospital/ICU Course:  Patient was hard to intubate , general surgery consulted, and emergency cricothyrotomy was performed with a tube size 5 mm in place.  FiO2 50% O2 sat 91%.  Sedated with propofol.  Chest x-ray ABG reviewed.    3/30 seen and examined, events overnight noted , now paralyzed for vent asynchrony , sedated, oliguric , awaiting OR today change cricothyroidotomy tube    to ETT versus replace trach   3/31 seen and examined , CXR , ABG reviewed, events overnight reviewed , sp 7.5 mm trach placement yesterday and removal of cricothyrotomy tube, on levophged gtt , versed, fentanyl gtt , nimbex gtt , oliguric, severe electrolytes disturbances   4/1 Patient seen and examined.Day 2post tracheostomy Paralysis None Sedation None Vasopressors Levophed Urine output last 24 hrs 926 ml  Fluid balance since admit11 L + Tmax 98 Last BM 3/28 Chest X-ray and ABG reviewed   4/2 Patient seen and examined.Day 3trachParalysis None Sedation None Vasopressors none Urine output last 24 hrs  2.2 L Fluid balance since admit  4 L positiveTmax  99 Last BM  4/2 Chest X-ray and ABG reviewed .  Following commands.  Started CPAP trial this a.m..  Good cuff leak noted yesterday.  4/3 seen and examined.  Placed back on controlled mode due to frequent PACs at night.  Arousable but lethargic.  Of sedation x2 days.  Placed on CPAP trial again.  Chest x-ray and ABG reviewed.  Urine  output 2.3 L yesterday.  Positive 8 L since admission.  On enteral tube feeding        4/3 seen and examined.  Placed back on controlled mode due to frequent PACs at night.  Arousable but lethargic.  Of sedation x2 days.  Placed on CPAP trial again.  Chest x-ray and ABG reviewed.  Urine output 2.3 L yesterday.  Positive 8 L since admission.  On enteral tube feeding  Review of Systems   Unable to perform ROS: Other (trach)   Objective:     Vital Signs (Most Recent):  Temp: 98.2 °F (36.8 °C) (04/03/22 0701)  Pulse: 79 (04/03/22 0930)  Resp: (!) 22 (04/03/22 0930)  BP: 134/63 (04/03/22 0801)  SpO2: (!) 94 % (04/03/22 0930)   Vital Signs (24h Range):  Temp:  [98.2 °F (36.8 °C)-99.6 °F (37.6 °C)] 98.2 °F (36.8 °C)  Pulse:  [] 79  Resp:  [17-27] 22  SpO2:  [91 %-100 %] 94 %  BP: ()/(44-71) 134/63     Weight: 118.4 kg (261 lb 0.4 oz)  Body mass index is 44.8 kg/m².      Intake/Output Summary (Last 24 hours) at 4/3/2022 1018  Last data filed at 4/3/2022 0800  Gross per 24 hour   Intake 549.4 ml   Output 2380 ml   Net -1830.6 ml         Physical Exam  Vitals and nursing note reviewed.   Constitutional:       General: She is not in acute distress.     Appearance: She is well-developed.   HENT:      Head: Normocephalic and atraumatic.      Mouth/Throat:      Comments: Mild residual tongue and lips swelling   Neck:      Comments: Trach in place  Cardiovascular:      Rate and Rhythm: Normal rate.   Pulmonary:      Effort: Respiratory distress present.      Breath sounds: Rhonchi present.   Abdominal:      Palpations: Abdomen is soft.      Tenderness: There is no abdominal tenderness.   Genitourinary:     Comments: Ortega in place   Musculoskeletal:         General: No deformity.      Cervical back: Neck supple.   Skin:     General: Skin is warm.   Neurological:      General: No focal deficit present.      Mental Status: She is alert.      Comments: Lethargic off sedation x2 days        Vents:  Vent Mode: Spont  (04/03/22 0930)  Ventilator Initiated: Yes (04/01/22 0900)  Set Rate: 0 BPM (04/03/22 0930)  Vt Set: 400 mL (04/03/22 0930)  Pressure Support: 2 cmH20 (04/03/22 0930)  PEEP/CPAP: 8 cmH20 (04/03/22 0930)  Oxygen Concentration (%): 40 (04/03/22 0930)  Peak Airway Pressure: 12 cmH2O (04/03/22 0930)  Plateau Pressure: 0 cmH20 (04/03/22 0930)  Total Ve: 8.14 mL (04/03/22 0930)  F/VT Ratio<105 (RSBI): (!) 55.84 (04/03/22 0930)    Lines/Drains/Airways       Peripherally Inserted Central Catheter Line  Duration             PICC Double Lumen 03/31/22 0240 right basilic 3 days              Drain  Duration                  Urethral Catheter 03/29/22 0900 18 Fr. 5 days         NG/OG Tube 04/01/22 1019 [place] sump 12 Fr. Right nostril 1 day              Airway  Duration                  Surgical Airway 03/30/22 Other (Comment) 4 days              Peripheral Intravenous Line  Duration                  Peripheral IV - Single Lumen 03/29/22 0706 20 G Left Antecubital 5 days                   Latest Reference Range & Units 04/01/22 13:47   Sodium 136 - 145 mmol/L 128 (L)   Potassium 3.5 - 5.1 mmol/L 3.8   Chloride 95 - 110 mmol/L 89 (L)   CO2 23 - 29 mmol/L 22 (L)   Anion Gap 8 - 16 mmol/L 17 (H)   BUN 8 - 23 mg/dL 72 (H)   Creatinine 0.5 - 1.4 mg/dL 8.5 (H)   EGFR if non African American >60 mL/min/1.73 m^2 4 ! [1]   EGFR if African American >60 mL/min/1.73 m^2 5 !   Glucose 70 - 110 mg/dL 155 (H)   Calcium 8.7 - 10.5 mg/dL 6.8 (LL) [2]   Phosphorus 2.7 - 4.5 mg/dL 5.4 (H)   Albumin 3.5 - 5.2 g/dL 2.3 (L)   (L): Data is abnormally low  (H): Data is abnormally high  !: Data is abnormal  (LL): Data is critically low  [1] Calculation used to obtain the estimated glomerular filtration  rate (eGFR) is the CKD-EPI equation.     [2] CALCIUM critical result(s) called and verbal readback obtained from   BRITTNEY KELLER RN by KULWANT 04/01/2022 15:00      Significant Labs:    CBC/Anemia Profile:   Latest Reference Range & Units 04/01/22 12:14   WBC  3.90 - 12.70 K/uL 19.69 (H)   RBC 4.00 - 5.40 M/uL 3.77 (L)   Hemoglobin 12.0 - 16.0 g/dL 9.2 (L)   Hematocrit 37.0 - 48.5 % 27.7 (L)   MCV 82 - 98 fL 74 (L)   MCH 27.0 - 31.0 pg 24.4 (L)   MCHC 32.0 - 36.0 g/dL 33.2   RDW 11.5 - 14.5 % 15.9 (H)   Platelets 150 - 450 K/uL 271   MPV 9.2 - 12.9 fL 9.9   Gran % 38.0 - 73.0 % 95.0 (H)   Lymph % 18.0 - 48.0 % 1.3 (L)   Mono % 4.0 - 15.0 % 2.8 (L)   Eosinophil % 0.0 - 8.0 % 0.0   Basophil % 0.0 - 1.9 % 0.1   Immature Granulocytes 0.0 - 0.5 % 0.8 (H)   Gran # (ANC) 1.8 - 7.7 K/uL 18.7 (H)   Lymph # 1.0 - 4.8 K/uL 0.3 (L)   Mono # 0.3 - 1.0 K/uL 0.6   Eos # 0.0 - 0.5 K/uL 0.0   Baso # 0.00 - 0.20 K/uL 0.01   Immature Grans (Abs) 0.00 - 0.04 K/uL 0.16 (H) [1]   NRBC 0 /100 WBC 0   Differential Method  Automated         Latest Reference Range & Units 04/01/22 05:04   POC PH 7.35 - 7.45  7.341 (L)   POC PCO2 35 - 45 mmHg 45.7 (H)   POC PO2 80 - 100 mmHg 73 (L)   POC BE -2 to 2 mmol/L -1   POC HCO3 24 - 28 mmol/L 24.7   POC SATURATED O2 95 - 100 % 93 (L)   FiO2  40   Vt  400   PEEP  8   Sample  ARTERIAL   DelSys  Adult Vent   Allens Test  N/A   Site  Bobo/UAC   Mode  AC/PRVC   Rate  24           EKG 03/29/2020  Normal sinus rhythm   Possible Left atrial enlargement   Prolonged QT   Abnormal ECG   When compared with ECG of 13-DEC-2006 09:37,   QRS duration has increased   QT has lengthened     Significant Imaging:     CXR 4/1/2022    EXAMINATION:  XR CHEST AP PORTABLE     CLINICAL HISTORY:  ng tube placement;     TECHNIQUE:  Single frontal view of the chest was performed.     COMPARISON:  Chest radiograph 03/31/2022 with priors     FINDINGS:  Cardiac leads project over the chest.  A tracheostomy cannula is again identified.  Stable positioning of a right PICC.  Nasogastric tube tip is not visualized, but appears to terminate appropriately in the upper abdomen.  Cardiomediastinal silhouette is unchanged in size.  Persistent small bilateral pleural effusions with adjacent  compressive atelectasis or consolidation.  No definite new infiltrate.  No pneumothorax.     Impression:     NG tube appears to course appropriately into the upper abdomen.  No detrimental interval change.                  ABG  Recent Labs   Lab 04/01/22  0504   PH 7.341*   PO2 73*   PCO2 45.7*   HCO3 24.7   BE -1     Assessment/Plan:     ENT  Tracheostomy in place  Emergent cricothyrotomy severe angioedema trach in place, severe hypercapnia and respiratory acidosis repeat ABG nebs IV steroids antihistamine  3/30 check cuff leak , ETT versus larger trach today   3/31 check cuff leak , solumedrol , pepcid   4/1 puff leak present.  IV Solu-Medrol IV Pepcid.  Start CPAP trials.  O2/MV/pulmonary toilet and trach care.  4/2.  CPAP trial.  T-piece trial if tolerated CPAP trial.  Decrease Solu-Medrol to once daily 60 mg.  Continue IV famotidine.  Check for cuff leak.  4/3 CPAP trial again today.  Was doing well yesterday on CPAP and T-piece trial.  Lethargic.  Will send for CT head.  Neurology consult.    Renal/  Acute kidney injury superimposed on chronic kidney disease  Strict I&Os.  IV fluid.  Monitor BMP.  Avoid nephrotoxic drugs.  On lisinopril since June 2021  3/30 change to bicarb drip , strict I/O's , nephrology follow up   3/31 oliguric , on bicarb drip , not improving will discuss with nephrology HD for acidosis , will discuss with family  4/1 discontinue bicarb drip.  Bicarb today 22 on BMP.  Urine output 900 mL last 24 hours.  Urine output 60-70 cc/hour currently.  Continue Lasix 60 mg q.8 hours total of 3 doses now.    4/2 non oliguric.  Sodium 128 hypochloremic.  Will hold Lasix.  4/3 non oliguric creatinine not improving.  Elevated BUN.  Adult mental status.  Will check CT head.  Will consult Neurology.  Discussed dialysis with Nephrology.  No acute indication at the moment.    Oncology  Leukocytosis  4/1 most likely steroid related.  Check procalcitonin.  Blood culture negative so far.  Respiratory  culture sent.  4/3 afebrile.  Cultures negative.  Will DC cefepime.  Probably leukocytosis steroid induced      Orthopedic  Rheumatoid arthritis involving multiple sites with positive rheumatoid factor  On hydroxychloroquine as outpatient.  No recent change in treatment noted  3/30 continue same   3/31 now off DMD due to sepsis suspicion   4/1 off DMD due to sepsis indicated by elevated white count.  Abnormal chest x-ray.  Sending procalcitonin today.  Empirically on cefepime    Other  * Angioedema  Severe was hard to intubate for respiratory distress status post emergent cricothyroidotomy with size 5 trach.  Sedated with propofol.  On IV steroids and antihistamines.  Check tryptase level IgE.  No recent medicine introduction.  On lisinopril ordered in June 2021  3/30 check cuff leak , steroids antihistamines IV , Ige , C1 q esterase  Inh , Tryptase level pending   3/31 check cuff leak , decrease solumedrol to q 12 hrs , cont famotidine   4/1 cuff leak present.  Continue Solu-Medrol and famotidine IV.  Start spontaneous trial on ventilator via trach.    4/2.  CPAP trial.  T-piece trial if tolerated CPAP trial.  Decrease Solu-Medrol to once daily 60 mg.  Continue IV famotidine.  Check for cuff leak.  4/3 discontinue IV Solu-Medrol.  Prednisone 20 mg x 3 days tomorrow.     Critical Care Daily Checklist:    A: Awake: RASS Goal/Actual Goal: RASS Goal: 0-->alert and calm  Actual: [place] Agitation Sedation Scale (RASS): Drowsy   B: Spontaneous Breathing Trial Performed?     C: SAT & SBT Coordinated?  Y                      D: Delirium: CAM-ICU Overall CAM-ICU: Negative   E: Early Mobility Performed? Yes   F: Feeding Goal: Goals: 1. Patient will meet >60% of estimated energy needs by RD follow up.  Status: Nutrition Goal Status: new   Current Diet Order   Procedures    Diet NPO      AS: Analgesia/Sedation Off paralysis off pressors   T: Thromboembolic Prophylaxis HEPARIN SC   H: HOB > 300 Yes   U: Stress Ulcer  Prophylaxis (if needed) Pepcid    G: Glucose Control FS prn    B: Bowel Function     I: Indwelling Catheter (Lines & Ortgea) Necessity Y   D: De-escalation of Antimicrobials/Pharmacotherapies DC antibiotics    Plan for the day/ETD Y CT head neurology consult    Code Status:  Family/Goals of Care: Full Code  Y       Critical Care Time: 35 minutes  Critical secondary to Patient has a condition that poses threat to life and bodily function: Acute Renal Failure complicating angioedema resp failure  complicated by altered mental status     Critical care was time spent personally by me on the following activities: development of treatment plan with patient or surrogate and bedside caregivers, discussions with consultants, evaluation of patient's response to treatment, examination of patient, ordering and performing treatments and interventions, ordering and review of laboratory studies, ordering and review of radiographic studies, pulse oximetry, re-evaluation of patient's condition. This critical care time did not overlap with that of any other provider or involve time for any procedures.     Laney Romero MD  Critical Care Medicine  [facility] - Intensive Care ([facility])

## 2022-04-03 NOTE — ASSESSMENT & PLAN NOTE
72 y/o female with YKE:     KYE (acute kidney injury)  Reason not entirely clear.  Pt presented with hypotension after experiencing anaphylactic shock.  Suspect KYE due to sustained hypotension resulting in acute tubular necrosis (ATN)  No change in s Cr, no sign of renal recovery at this point  More UOP noted  K normal  Hyponatremia   Metabolic acidosis  O2 sat good  No acute indications for HD     * Angioedema  Chart was reviewed  ACE-I induced?     Plans and recommendations:  As reviewed above  Total critical care time spent 40 minutes including time needed to review the records, the   patient evaluation, documentation, discussion with other providers  more than 50% of the time was spent on coordination of care

## 2022-04-03 NOTE — PROGRESS NOTES
Placed patient on ventilator for the night. Patients work of breathing increased and patient became tachycardic. Patients work of breathing decreased and heart rate decreased. Respiratory to follow.

## 2022-04-04 LAB
ANION GAP SERPL CALC-SCNC: 18 MMOL/L (ref 8–16)
ANISOCYTOSIS BLD QL SMEAR: SLIGHT
BASOPHILS NFR BLD: 0 % (ref 0–1.9)
BUN SERPL-MCNC: 105 MG/DL (ref 8–23)
CALCIUM SERPL-MCNC: 7.9 MG/DL (ref 8.7–10.5)
CHLORIDE SERPL-SCNC: 94 MMOL/L (ref 95–110)
CO2 SERPL-SCNC: 25 MMOL/L (ref 23–29)
CREAT SERPL-MCNC: 7.2 MG/DL (ref 0.5–1.4)
DACRYOCYTES BLD QL SMEAR: ABNORMAL
DIFFERENTIAL METHOD: ABNORMAL
EOSINOPHIL NFR BLD: 0 % (ref 0–8)
ERYTHROCYTE [DISTWIDTH] IN BLOOD BY AUTOMATED COUNT: 15.9 % (ref 11.5–14.5)
EST. GFR  (AFRICAN AMERICAN): 6 ML/MIN/1.73 M^2
EST. GFR  (NON AFRICAN AMERICAN): 5 ML/MIN/1.73 M^2
GLUCOSE SERPL-MCNC: 108 MG/DL (ref 70–110)
HCT VFR BLD AUTO: 26.2 % (ref 37–48.5)
HGB BLD-MCNC: 8.6 G/DL (ref 12–16)
HYPOCHROMIA BLD QL SMEAR: ABNORMAL
IMM GRANULOCYTES # BLD AUTO: ABNORMAL K/UL (ref 0–0.04)
IMM GRANULOCYTES NFR BLD AUTO: ABNORMAL % (ref 0–0.5)
LYMPHOCYTES NFR BLD: 3 % (ref 18–48)
MCH RBC QN AUTO: 24.2 PG (ref 27–31)
MCHC RBC AUTO-ENTMCNC: 32.8 G/DL (ref 32–36)
MCV RBC AUTO: 74 FL (ref 82–98)
METAMYELOCYTES NFR BLD MANUAL: 3 %
MONOCYTES NFR BLD: 4 % (ref 4–15)
MYELOCYTES NFR BLD MANUAL: 2 %
NEUTROPHILS NFR BLD: 88 % (ref 38–73)
NRBC BLD-RTO: 0 /100 WBC
PLATELET # BLD AUTO: 271 K/UL (ref 150–450)
PLATELET BLD QL SMEAR: ABNORMAL
PMV BLD AUTO: 9.6 FL (ref 9.2–12.9)
POCT GLUCOSE: 114 MG/DL (ref 70–110)
POCT GLUCOSE: 132 MG/DL (ref 70–110)
POCT GLUCOSE: 137 MG/DL (ref 70–110)
POCT GLUCOSE: 147 MG/DL (ref 70–110)
POIKILOCYTOSIS BLD QL SMEAR: SLIGHT
POLYCHROMASIA BLD QL SMEAR: ABNORMAL
POTASSIUM SERPL-SCNC: 3.9 MMOL/L (ref 3.5–5.1)
RBC # BLD AUTO: 3.55 M/UL (ref 4–5.4)
SODIUM SERPL-SCNC: 137 MMOL/L (ref 136–145)
SPHEROCYTES BLD QL SMEAR: ABNORMAL
TARGETS BLD QL SMEAR: ABNORMAL
WBC # BLD AUTO: 21.03 K/UL (ref 3.9–12.7)

## 2022-04-04 PROCEDURE — 86706 HEP B SURFACE ANTIBODY: CPT | Mod: 91 | Performed by: FAMILY MEDICINE

## 2022-04-04 PROCEDURE — 63600175 PHARM REV CODE 636 W HCPCS: Performed by: SURGERY

## 2022-04-04 PROCEDURE — 99900026 HC AIRWAY MAINTENANCE (STAT)

## 2022-04-04 PROCEDURE — 99291 PR CRITICAL CARE, E/M 30-74 MINUTES: ICD-10-PCS | Mod: 25,,, | Performed by: INTERNAL MEDICINE

## 2022-04-04 PROCEDURE — 94761 N-INVAS EAR/PLS OXIMETRY MLT: CPT

## 2022-04-04 PROCEDURE — 90937 HEMODIALYSIS REPEATED EVAL: CPT | Mod: ,,, | Performed by: INTERNAL MEDICINE

## 2022-04-04 PROCEDURE — 63600175 PHARM REV CODE 636 W HCPCS: Performed by: FAMILY MEDICINE

## 2022-04-04 PROCEDURE — 63600175 PHARM REV CODE 636 W HCPCS: Performed by: INTERNAL MEDICINE

## 2022-04-04 PROCEDURE — 86704 HEP B CORE ANTIBODY TOTAL: CPT | Performed by: FAMILY MEDICINE

## 2022-04-04 PROCEDURE — 94640 AIRWAY INHALATION TREATMENT: CPT

## 2022-04-04 PROCEDURE — 25000003 PHARM REV CODE 250: Performed by: SURGERY

## 2022-04-04 PROCEDURE — 87340 HEPATITIS B SURFACE AG IA: CPT | Performed by: FAMILY MEDICINE

## 2022-04-04 PROCEDURE — A4216 STERILE WATER/SALINE, 10 ML: HCPCS | Performed by: FAMILY MEDICINE

## 2022-04-04 PROCEDURE — 25000242 PHARM REV CODE 250 ALT 637 W/ HCPCS: Performed by: SURGERY

## 2022-04-04 PROCEDURE — 90937 PR HEMODIALYSIS, REPEATED EVAL.: ICD-10-PCS | Mod: ,,, | Performed by: INTERNAL MEDICINE

## 2022-04-04 PROCEDURE — 25000003 PHARM REV CODE 250: Performed by: FAMILY MEDICINE

## 2022-04-04 PROCEDURE — 99291 CRITICAL CARE FIRST HOUR: CPT | Mod: 25,,, | Performed by: INTERNAL MEDICINE

## 2022-04-04 PROCEDURE — 20000000 HC ICU ROOM

## 2022-04-04 PROCEDURE — 95819 PR EEG,W/AWAKE & ASLEEP RECORD: ICD-10-PCS | Mod: 26,,, | Performed by: PSYCHIATRY & NEUROLOGY

## 2022-04-04 PROCEDURE — 87070 CULTURE OTHR SPECIMN AEROBIC: CPT | Performed by: INTERNAL MEDICINE

## 2022-04-04 PROCEDURE — 85027 COMPLETE CBC AUTOMATED: CPT | Performed by: INTERNAL MEDICINE

## 2022-04-04 PROCEDURE — A4216 STERILE WATER/SALINE, 10 ML: HCPCS | Performed by: SURGERY

## 2022-04-04 PROCEDURE — 27000221 HC OXYGEN, UP TO 24 HOURS

## 2022-04-04 PROCEDURE — 80048 BASIC METABOLIC PNL TOTAL CA: CPT | Performed by: INTERNAL MEDICINE

## 2022-04-04 PROCEDURE — 99291 CRITICAL CARE FIRST HOUR: CPT | Mod: ,,, | Performed by: INTERNAL MEDICINE

## 2022-04-04 PROCEDURE — 87205 SMEAR GRAM STAIN: CPT | Performed by: INTERNAL MEDICINE

## 2022-04-04 PROCEDURE — 95822 EEG COMA OR SLEEP ONLY: CPT

## 2022-04-04 PROCEDURE — 85007 BL SMEAR W/DIFF WBC COUNT: CPT | Performed by: INTERNAL MEDICINE

## 2022-04-04 PROCEDURE — 99291 PR CRITICAL CARE, E/M 30-74 MINUTES: ICD-10-PCS | Mod: ,,, | Performed by: INTERNAL MEDICINE

## 2022-04-04 PROCEDURE — 94003 VENT MGMT INPAT SUBQ DAY: CPT

## 2022-04-04 PROCEDURE — 80100014 HC HEMODIALYSIS 1:1

## 2022-04-04 PROCEDURE — 86706 HEP B SURFACE ANTIBODY: CPT | Performed by: FAMILY MEDICINE

## 2022-04-04 PROCEDURE — 99900035 HC TECH TIME PER 15 MIN (STAT)

## 2022-04-04 PROCEDURE — 95819 EEG AWAKE AND ASLEEP: CPT | Mod: 26,,, | Performed by: PSYCHIATRY & NEUROLOGY

## 2022-04-04 RX ORDER — SODIUM CHLORIDE 0.9 % (FLUSH) 0.9 %
10 SYRINGE (ML) INJECTION EVERY 8 HOURS
Status: DISCONTINUED | OUTPATIENT
Start: 2022-04-04 | End: 2022-04-09

## 2022-04-04 RX ORDER — IPRATROPIUM BROMIDE AND ALBUTEROL SULFATE 2.5; .5 MG/3ML; MG/3ML
3 SOLUTION RESPIRATORY (INHALATION) EVERY 8 HOURS
Status: DISCONTINUED | OUTPATIENT
Start: 2022-04-05 | End: 2022-04-12 | Stop reason: HOSPADM

## 2022-04-04 RX ORDER — FLUCONAZOLE 2 MG/ML
200 INJECTION, SOLUTION INTRAVENOUS ONCE
Status: DISCONTINUED | OUTPATIENT
Start: 2022-04-04 | End: 2022-04-05

## 2022-04-04 RX ORDER — HEPARIN SODIUM 1000 [USP'U]/ML
5200 INJECTION, SOLUTION INTRAVENOUS; SUBCUTANEOUS
Status: DISCONTINUED | OUTPATIENT
Start: 2022-04-04 | End: 2022-04-12 | Stop reason: HOSPADM

## 2022-04-04 RX ORDER — MANNITOL 250 MG/ML
25 INJECTION, SOLUTION INTRAVENOUS ONCE
Status: COMPLETED | OUTPATIENT
Start: 2022-04-04 | End: 2022-04-04

## 2022-04-04 RX ORDER — HEPARIN SODIUM 1000 [USP'U]/ML
3400 INJECTION, SOLUTION INTRAVENOUS; SUBCUTANEOUS
Status: DISCONTINUED | OUTPATIENT
Start: 2022-04-04 | End: 2022-04-04

## 2022-04-04 RX ORDER — HEPARIN SODIUM 1000 [USP'U]/ML
2500 INJECTION, SOLUTION INTRAVENOUS; SUBCUTANEOUS
Status: DISCONTINUED | OUTPATIENT
Start: 2022-04-04 | End: 2022-04-12 | Stop reason: HOSPADM

## 2022-04-04 RX ORDER — CHLORHEXIDINE GLUCONATE ORAL RINSE 1.2 MG/ML
15 SOLUTION DENTAL 2 TIMES DAILY
Status: DISCONTINUED | OUTPATIENT
Start: 2022-04-04 | End: 2022-04-12 | Stop reason: HOSPADM

## 2022-04-04 RX ORDER — CEFEPIME HYDROCHLORIDE 1 G/50ML
2 INJECTION, SOLUTION INTRAVENOUS
Status: DISCONTINUED | OUTPATIENT
Start: 2022-04-04 | End: 2022-04-08

## 2022-04-04 RX ORDER — FENTANYL CITRATE 50 UG/ML
50 INJECTION, SOLUTION INTRAMUSCULAR; INTRAVENOUS
Status: DISCONTINUED | OUTPATIENT
Start: 2022-04-04 | End: 2022-04-08 | Stop reason: HOSPADM

## 2022-04-04 RX ADMIN — ANTI-FUNGAL POWDER MICONAZOLE NITRATE TALC FREE: 1.42 POWDER TOPICAL at 08:04

## 2022-04-04 RX ADMIN — HEPARIN SODIUM 7500 UNITS: 5000 INJECTION INTRAVENOUS; SUBCUTANEOUS at 10:04

## 2022-04-04 RX ADMIN — IPRATROPIUM BROMIDE AND ALBUTEROL SULFATE 3 ML: 2.5; .5 SOLUTION RESPIRATORY (INHALATION) at 03:04

## 2022-04-04 RX ADMIN — IPRATROPIUM BROMIDE AND ALBUTEROL SULFATE 3 ML: 2.5; .5 SOLUTION RESPIRATORY (INHALATION) at 11:04

## 2022-04-04 RX ADMIN — SODIUM CHLORIDE, PRESERVATIVE FREE 10 ML: 5 INJECTION INTRAVENOUS at 06:04

## 2022-04-04 RX ADMIN — PREDNISONE 20 MG: 20 TABLET ORAL at 08:04

## 2022-04-04 RX ADMIN — CHLORHEXIDINE GLUCONATE 0.12% ORAL RINSE 15 ML: 1.2 LIQUID ORAL at 08:04

## 2022-04-04 RX ADMIN — SODIUM CHLORIDE, PRESERVATIVE FREE 10 ML: 5 INJECTION INTRAVENOUS at 12:04

## 2022-04-04 RX ADMIN — Medication 10 ML: at 02:04

## 2022-04-04 RX ADMIN — Medication 10 ML: at 10:04

## 2022-04-04 RX ADMIN — FAMOTIDINE 20 MG: 10 INJECTION, SOLUTION INTRAVENOUS at 08:04

## 2022-04-04 RX ADMIN — MANNITOL 25 G: 12.5 INJECTION, SOLUTION INTRAVENOUS at 01:04

## 2022-04-04 RX ADMIN — SODIUM CHLORIDE, PRESERVATIVE FREE 10 ML: 5 INJECTION INTRAVENOUS at 01:04

## 2022-04-04 RX ADMIN — HEPARIN SODIUM 7500 UNITS: 5000 INJECTION INTRAVENOUS; SUBCUTANEOUS at 03:04

## 2022-04-04 RX ADMIN — FLUCONAZOLE 200 MG: 2 INJECTION, SOLUTION INTRAVENOUS at 06:04

## 2022-04-04 RX ADMIN — VANCOMYCIN HYDROCHLORIDE 1750 MG: 500 INJECTION, POWDER, LYOPHILIZED, FOR SOLUTION INTRAVENOUS at 03:04

## 2022-04-04 RX ADMIN — CEFEPIME HYDROCHLORIDE 2 G: 2 INJECTION, SOLUTION INTRAVENOUS at 01:04

## 2022-04-04 RX ADMIN — IPRATROPIUM BROMIDE AND ALBUTEROL SULFATE 3 ML: 2.5; .5 SOLUTION RESPIRATORY (INHALATION) at 12:04

## 2022-04-04 RX ADMIN — IPRATROPIUM BROMIDE AND ALBUTEROL SULFATE 3 ML: 2.5; .5 SOLUTION RESPIRATORY (INHALATION) at 07:04

## 2022-04-04 RX ADMIN — HEPARIN SODIUM 7500 UNITS: 5000 INJECTION INTRAVENOUS; SUBCUTANEOUS at 05:04

## 2022-04-04 RX ADMIN — Medication 10 ML: at 06:04

## 2022-04-04 RX ADMIN — HEPARIN SODIUM 5200 UNITS: 1000 INJECTION, SOLUTION INTRAVENOUS; SUBCUTANEOUS at 04:04

## 2022-04-04 NOTE — ASSESSMENT & PLAN NOTE
70 y/o female with KYE and initial presentation with angioedema and hypotension:     KYE (acute kidney injury)  Pt presented with hypotension after experiencing anaphylactic shock.  Suspect KYE due to sustained hypotension resulting in acute tubular necrosis (ATN)     s Cr stable, no change  No sign of renal recovery at this point  Increased daily UOP noted  K normal  Hyponatremia   Metabolic acidosis  O2 sat good     Will provide acute HD in am in an attempt to improved fluid balance and mental status  Discussed with ICU and primary team  Will consult IR for vas cath     * Angioedema  Chart was reviewed  ACE-I induced?     Plans and recommendations:  As reviewed above  Total critical care time spent 40 minutes including time needed to review the records, the   patient evaluation, documentation, discussion with other providers  more than 50% of the time was spent on coordination of care

## 2022-04-04 NOTE — PROCEDURES
EEG INTERPRETATION  DATE OF PROCEDURE:  APRIL 4, 2022    HISTORY:  This is a 71-year-old patient who had a brief episode of hypoxia related to angioedema and difficult intubation requiring emergency tracheostomy.  The patient has been somnolent at times and difficult to arouse at times.  EEG was requested to rule out the possibility of subclinical seizure activity.    TECHNICAL PARAMETERS:  International 10-20 electrode placement system with EKG monitor.  Portable tracing done in the intensive care unit.  The patient was cooperative for the recording.    TECHNICAL SUMMARY:  During the waking portions recording there is low-voltage basic rhythmical activity at 6 cycles per second which is widespread throughout both cerebral hemispheres.  Against this background as seen frontally predominant rhythmical delta activity averaging 1-1/2-2 hertz with a duration averaging 1-3 seconds at a time.  No intermixed spike or spike and wave activity was seen.    Drowsiness and sleep did not occur on this recording    Activation procedures were not attempted on this recording.    IMPRESSION:  Abnormal adult EEG, intermittent frontally predominant delta activity    DISCUSSION:  The findings on this EEG are nonspecific and can be associated with diffuse pathologic process such as diffuse cerebral vascular disease or metabolic encephalopathy.  Clinical correlation is recommended.  No seizure activity was seen during the recording.

## 2022-04-04 NOTE — PLAN OF CARE
Problem: Infection  Goal: Absence of Infection Signs and Symptoms  Outcome: Ongoing, Progressing     Problem: Adult Inpatient Plan of Care  Goal: Plan of Care Review  Outcome: Ongoing, Progressing  Goal: Patient-Specific Goal (Individualized)  Outcome: Ongoing, Progressing  Goal: Absence of Hospital-Acquired Illness or Injury  Outcome: Ongoing, Progressing  Goal: Optimal Comfort and Wellbeing  Outcome: Ongoing, Progressing  Goal: Readiness for Transition of Care  Outcome: Ongoing, Progressing     Problem: Bariatric Environmental Safety  Goal: Safety Maintained with Care  Outcome: Ongoing, Progressing     Problem: Communication Impairment (Mechanical Ventilation, Invasive)  Goal: Effective Communication  Outcome: Ongoing, Progressing     Problem: Device-Related Complication Risk (Mechanical Ventilation, Invasive)  Goal: Optimal Device Function  Outcome: Ongoing, Progressing     Problem: Inability to Wean (Mechanical Ventilation, Invasive)  Goal: Mechanical Ventilation Liberation  Outcome: Ongoing, Progressing     Problem: Nutrition Impairment (Mechanical Ventilation, Invasive)  Goal: Optimal Nutrition Delivery  Outcome: Ongoing, Progressing     Problem: Skin and Tissue Injury (Mechanical Ventilation, Invasive)  Goal: Absence of Device-Related Skin and Tissue Injury  Outcome: Ongoing, Progressing     Problem: Ventilator-Induced Lung Injury (Mechanical Ventilation, Invasive)  Goal: Absence of Ventilator-Induced Lung Injury  Outcome: Ongoing, Progressing     Problem: Communication Impairment (Artificial Airway)  Goal: Effective Communication  Outcome: Ongoing, Progressing     Problem: Device-Related Complication Risk (Artificial Airway)  Goal: Optimal Device Function  Outcome: Ongoing, Progressing     Problem: Skin and Tissue Injury (Artificial Airway)  Goal: Absence of Device-Related Skin or Tissue Injury  Outcome: Ongoing, Progressing     Problem: Noninvasive Ventilation Acute  Goal: Effective Unassisted Ventilation  and Oxygenation  Outcome: Ongoing, Progressing     Problem: Fall Injury Risk  Goal: Absence of Fall and Fall-Related Injury  Outcome: Ongoing, Progressing     Problem: Restraint, Nonbehavioral (Nonviolent)  Goal: Absence of Harm or Injury  Outcome: Ongoing, Progressing     Problem: Diabetes Comorbidity  Goal: Blood Glucose Level Within Targeted Range  Outcome: Ongoing, Progressing     Problem: Fluid and Electrolyte Imbalance (Acute Kidney Injury/Impairment)  Goal: Fluid and Electrolyte Balance  Outcome: Ongoing, Progressing     Problem: Oral Intake Inadequate (Acute Kidney Injury/Impairment)  Goal: Optimal Nutrition Intake  Outcome: Ongoing, Progressing     Problem: Renal Function Impairment (Acute Kidney Injury/Impairment)  Goal: Effective Renal Function  Outcome: Ongoing, Progressing     Problem: Skin Injury Risk Increased  Goal: Skin Health and Integrity  Outcome: Ongoing, Progressing     Problem: Coping Ineffective  Goal: Effective Coping  Outcome: Ongoing, Progressing     Problem: Impaired Wound Healing  Goal: Optimal Wound Healing  Outcome: Ongoing, Progressing

## 2022-04-04 NOTE — SUBJECTIVE & OBJECTIVE
Review of Systems   Constitutional:  Negative for fever.   HENT:  Negative for congestion.    Respiratory:  Negative for cough.    Gastrointestinal:  Negative for abdominal pain.   Endocrine: Negative for polyuria.   Genitourinary:  Negative for flank pain.   Musculoskeletal:  Negative for back pain.   Skin:  Negative for rash.   Neurological:  Negative for syncope.   Psychiatric/Behavioral:  Negative for confusion.    Objective:     Vital Signs (Most Recent):  Temp: 98.9 °F (37.2 °C) (04/04/22 1100)  Pulse: 80 (04/04/22 1545)  Resp: (!) 25 (04/04/22 1545)  BP: 138/67 (04/04/22 1545)  SpO2: 97 % (04/04/22 1545) Vital Signs (24h Range):  Temp:  [98.9 °F (37.2 °C)-99.4 °F (37.4 °C)] 98.9 °F (37.2 °C)  Pulse:  [72-92] 80  Resp:  [24-27] 25  SpO2:  [93 %-100 %] 97 %  BP: ()/(45-69) 138/67     Weight: 116.4 kg (256 lb 9.9 oz)  Body mass index is 44.05 kg/m².    Intake/Output Summary (Last 24 hours) at 4/4/2022 1602  Last data filed at 4/4/2022 1200  Gross per 24 hour   Intake 215 ml   Output 2135 ml   Net -1920 ml        Physical Exam  Vitals and nursing note reviewed.   Constitutional:       General: She is not in acute distress.     Appearance: She is ill-appearing. She is not toxic-appearing.   HENT:      Head: Normocephalic and atraumatic.   Cardiovascular:      Rate and Rhythm: Normal rate.   Pulmonary:      Effort: Tachypnea and respiratory distress present.      Comments: Mechanical breath sounds  Abdominal:      Palpations: Abdomen is soft.   Genitourinary:     Comments: alatorre  Musculoskeletal:         General: Swelling present.      Right lower leg: Edema present.      Left lower leg: Edema present.   Skin:     General: Skin is warm.       Significant Labs: All pertinent labs within the past 24 hours have been reviewed.    Significant Imaging: I have reviewed all pertinent imaging results/findings within the past 24 hours.

## 2022-04-04 NOTE — ASSESSMENT & PLAN NOTE
--baseline BUN/Cr 20/1.4  --stop lisinopril  --avoid nephrotoxic agents  --Nephrology following  --Pt is not acidotic and there is no hyperkalemia;   --d/w nephro service  current indication for dialysis is pt's inability to metabolize sedatives she is arousable but still extremely sedated  -- uremic  --IR for hemodialysis access per dR Mane today

## 2022-04-04 NOTE — PLAN OF CARE
Duration: 4 hours    Stable/unstable: stable    Ultrafiltration volume: Net removal 3 liters    Notes: No access issues. Tolerated. Dr. Mane visited during hd.

## 2022-04-04 NOTE — SUBJECTIVE & OBJECTIVE
Interval History: ventilated, elevated wbc    Medications:  Continuous Infusions:  Scheduled Meds:   albuterol-ipratropium  3 mL Nebulization Q4H    chlorhexidine  15 mL Mouth/Throat BID    famotidine (PF)  20 mg Intravenous Daily    fluconazole (DIFLUCAN) IV (PEDS and ADULTS)  200 mg Intravenous Q24H    heparin (porcine)  7,500 Units Subcutaneous Q8H    miconazole NITRATE 2 %   Topical (Top) BID    predniSONE  20 mg Oral Daily    sodium chloride 0.9%  10 mL Intravenous Q8H    sodium chloride 0.9%  10 mL Intravenous Q6H     PRN Meds:sodium chloride, dextrose 10%, glucagon (human recombinant), insulin aspart U-100, lorazepam, naloxone, Flushing PICC Protocol **AND** sodium chloride 0.9% **AND** sodium chloride 0.9%     Review of patient's allergies indicates:   Allergen Reactions    Lisinopril Anaphylaxis     Angioedema requiring trach     Objective:     Vital Signs (Most Recent):  Temp: 99.1 °F (37.3 °C) (04/04/22 0400)  Pulse: 87 (04/04/22 0753)  Resp: (!) 24 (04/04/22 0753)  BP: (!) 115/56 (04/04/22 0700)  SpO2: (!) 94 % (04/04/22 0753)   Vital Signs (24h Range):  Temp:  [98.2 °F (36.8 °C)-99.4 °F (37.4 °C)] 99.1 °F (37.3 °C)  Pulse:  [73-95] 87  Resp:  [18-32] 24  SpO2:  [93 %-100 %] 94 %  BP: ()/(45-70) 115/56     Weight: 116.4 kg (256 lb 9.9 oz)  Body mass index is 44.05 kg/m².    Intake/Output - Last 3 Shifts         04/02 0700 04/03 0659 04/03 0700 04/04 0659 04/04 0700 04/05 0659    I.V. (mL/kg)       NG/ 300     IV Piggyback 298.7 100     Total Intake(mL/kg) 613.7 (5.2) 400 (3.4)     Urine (mL/kg/hr) 2355 (0.8) 2610 (0.9) 200 (1.3)    Emesis/NG output 0      Total Output 2355 2610 200    Net -1741.3 -2210 -200           Emesis Occurrence 1 x              Physical Exam  Vitals and nursing note reviewed.   Constitutional:       Appearance: She is obese.      Comments: ventilated   Neck:      Comments: Tracheostomy site is clean  Cardiovascular:      Rate and Rhythm: Normal rate and regular  rhythm.   Pulmonary:      Effort: Pulmonary effort is normal.      Breath sounds: Normal breath sounds.   Abdominal:      General: Bowel sounds are normal.      Palpations: Abdomen is soft.      Comments: obese   Musculoskeletal:      Right lower leg: Edema present.      Left lower leg: Edema present.   Skin:     General: Skin is warm.   Neurological:      Mental Status: She is alert.       Significant Labs:  I have reviewed all pertinent lab results within the past 24 hours.  CBC:   Recent Labs   Lab 04/04/22  0558   WBC 21.03*   RBC 3.55*   HGB 8.6*   HCT 26.2*      MCV 74*   MCH 24.2*   MCHC 32.8     BMP:   Recent Labs   Lab 04/02/22 2017 04/03/22  0516 04/04/22  0558   *   < > 108   *   < > 137   K 3.7   < > 3.9   CL 90*   < > 94*   CO2 23   < > 25   BUN 90*   < > 105*   CREATININE 8.4*   < > 7.2*   CALCIUM 7.5*   < > 7.9*   MG 1.9  --   --     < > = values in this interval not displayed.     ABGs:   Recent Labs   Lab 04/01/22  0504   PH 7.341*   PCO2 45.7*   PO2 73*   HCO3 24.7   POCSATURATED 93*   BE -1       Significant Diagnostics:  I have reviewed all pertinent imaging results/findings within the past 24 hours.  CT: I have reviewed all pertinent results/findings within the past 24 hours and my personal findings are:  EXAM:   XR CHEST AP PORTABLE

## 2022-04-04 NOTE — PROGRESS NOTES
[facility] ([facility])  Nephrology  Progress Note    Patient Name: Page Grissom  MRN: [id number]  Admission Date: 3/29/2022  Hospital Length of Stay: 6 days  Attending Provider: Batsheva Landaverde MD   Primary Care Physician: NEELAM Roman Jr, MD  Principal Problem:Acute kidney injury superimposed on chronic kidney disease    Subjective:     HPI: Noted    Interval History: Pt was seen and examined in ICU. Spoke with pt's daughter on the phone in details. Labs and meds reviewed. Discussed with other providers. No new events. Noted today pt is following some command.      Review of patient's allergies indicates:   Allergen Reactions    Lisinopril Anaphylaxis     Angioedema requiring trach     Current Facility-Administered Medications   Medication Frequency    0.9%  NaCl infusion (for blood administration) Q24H PRN    albuterol-ipratropium 2.5 mg-0.5 mg/3 mL nebulizer solution 3 mL Q4H    chlorhexidine 0.12 % solution 15 mL BID    dextrose 10% bolus 125 mL PRN    famotidine (PF) injection 20 mg Daily    fluconazole (DIFLUCAN) IVPB 200 mg Q24H    glucagon (human recombinant) injection 1 mg PRN    heparin (porcine) injection 2,500 Units PRN    heparin (porcine) injection 7,500 Units Q8H    insulin aspart U-100 pen 1-10 Units Q6H PRN    lorazepam injection 4 mg Q4H PRN    mannitol 25% injection 25 g Once    miconazole NITRATE 2 % top powder BID    naloxone 0.4 mg/mL injection 0.02 mg PRN    predniSONE tablet 20 mg Daily    sodium chloride 0.9% flush 10 mL Q8H    sodium chloride 0.9% flush 10 mL Q6H    And    sodium chloride 0.9% flush 10 mL PRN       Objective:     Vital Signs (Most Recent):  Temp: 99 °F (37.2 °C) (04/04/22 0700)  Pulse: 73 (04/04/22 1145)  Resp: (!) 24 (04/04/22 1145)  BP: 135/63 (04/04/22 1100)  SpO2: 100 % (04/04/22 1145)  O2 Device (Oxygen Therapy): ventilator (04/04/22 1145)   Vital Signs (24h Range):  Temp:  [99 °F (37.2 °C)-99.4 °F (37.4 °C)] 99 °F (37.2  °C)  Pulse:  [72-95] 73  Resp:  [18-32] 24  SpO2:  [93 %-100 %] 100 %  BP: ()/(45-67) 135/63     Weight: 116.4 kg (256 lb 9.9 oz) (04/04/22 0641)  Body mass index is 44.05 kg/m².  Body surface area is 2.29 meters squared.    I/O last 3 completed shifts:  In: 715 [NG/GT:465; IV Piggyback:250]  Out: 3940 [Urine:3940]    Physical Exam  Vitals and nursing note reviewed.   Constitutional:       Appearance: Normal appearance.      Comments: Intubated   Cardiovascular:      Rate and Rhythm: Normal rate and regular rhythm.      Pulses: Normal pulses.      Heart sounds: Normal heart sounds.   Pulmonary:      Breath sounds: Rales present.   Abdominal:      General: Abdomen is flat.      Palpations: Abdomen is soft.   Musculoskeletal:      Right lower leg: Edema present.      Left lower leg: Edema present.       Significant Labs: reviewed  BMP  Lab Results   Component Value Date     04/04/2022    K 3.9 04/04/2022    CL 94 (L) 04/04/2022    CO2 25 04/04/2022     (H) 04/04/2022    CREATININE 7.2 (H) 04/04/2022    CALCIUM 7.9 (L) 04/04/2022    ANIONGAP 18 (H) 04/04/2022    ESTGFRAFRICA 6 (A) 04/04/2022    EGFRNONAA 5 (A) 04/04/2022     Lab Results   Component Value Date    WBC 21.03 (H) 04/04/2022    HGB 8.6 (L) 04/04/2022    HCT 26.2 (L) 04/04/2022    MCV 74 (L) 04/04/2022     04/04/2022       ABG  Recent Labs   Lab 04/01/22  0504   PH 7.341*   PO2 73*   PCO2 45.7*   HCO3 24.7   BE -1         Significant Imaging: reviewed CXR, pulm edema, right base pulm infiltrate    Assessment/Plan:     72 y/o female with KYE and initial presentation with angioedema and hypotension:     KYE (acute kidney injury)  Pt presented with hypotension after experiencing anaphylactic shock.  Suspect KYE due to sustained hypotension resulting in acute tubular necrosis (ATN)     s Cr stable, noted slightly lower  Noted more UOP, suspect pt si slowly recovering  K normal  Hyponatremia   Metabolic acidosis  O2 sat good  Fluid gain,  edema     Pt will benefit from transient acute HD to improve fluid balance and mental status  S/p femoral vas cath today  Spoke with pt's daughter  Risks and benefits of hemodialysis were explained to the pt. Benefits include correction electrolyte disorders, maintanance of fluid balance, and improvement in oxygenation. Risks include changes in fluid status, heart failure, and death.  Agreed to HD  Providing HD today  Discussed with HD nurse  Discussed with ICU and primary team     * Angioedema  Chart was reviewed  ACE-I induced?     Plans and recommendations:  As reviewed above  Total critical care time spent 40 minutes including time needed to review the records, the   patient evaluation, documentation, discussion with other providers  more than 50% of the time was spent on coordination of care   Multiple medical issues were addressed, as documented. Medical care provided was in addition to providing dialysis. Pt received multiple visits and evaluations.          Lindsay Mane MD  Nephrology  [facility] ([facility])

## 2022-04-04 NOTE — NURSING
Made contact with Janet (EEG technician) in regards the order placed by Dr. Romero. The patient will be seen by Janet on tomorrow morning.

## 2022-04-04 NOTE — ASSESSMENT & PLAN NOTE
7.5 tracheostomy tube place 3/30/22  Site clean  Sutures out 10 days 4/9/22    decannulation per pulmonary/critical care      Call if needed

## 2022-04-04 NOTE — PLAN OF CARE
Recommendation/Intervention: 4/4  1. Recommend to continue Novasource Renal as tolerated.   -Goal rate @ 25mL/hr.   -Provides 1200 calories, 54g protein, and 430mL H2O flush.   -100mL H2O flush q 4-6 hours.      2. Recommend to d/c beneprotein due to renal labs and KYE.      3. Recommend to consider Shawn BID via NG tube when medically able.      4. Weekly weights per RD follow up.    Adriana Jean RD,LDN

## 2022-04-04 NOTE — EICU
Rounding (Video Assessment):  Yes    Intervention Initiated From:  Bedside    Felicia Communicated with Bedside Nurse regarding:  Other    Nurse Notified:  Yes    Doctor Notified:  Yes    Comments: Bedside nurse called requesting orders for am labs, Dr Meyer notified

## 2022-04-04 NOTE — CONSULTS
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     H& P from the day of admission     "   72 y/o female with PMHx of HTN, HLD, DM, asthma, CKD 3, and RA who presented to the ED with c/o angioedema that onset suddenly this morning. Pt was able to verbally communicate with EMS, was not able to communicate with ED staff. Staff was unable to intubate and called surgery, who performed emergency trach. Pt is currently on vent and sedated.  ED workup shows: H/H 10.9/34.6, K+ 2.9, CO2 22, Anion gap 17, BUN 55, Cr 8.8.  She will be admitted to ICU for angioedema under the care of hospital medicine.  She is a full code and her SDM is her daughter, Cassy."    Pt is on ESRD, She received Fentanyl and versed for the tracheostomy. Pt has been somnolent and not following commands, She withdraws to pain in all 4 extremities and grimaces. Does not open her eyes.     Review of systems:     Could not be completed as patient is confused       Past Medical History:   Diagnosis Date    Acidosis, metabolic, with respiratory acidosis 3/31/2022    Asthma     Back pain     Cataract     OD    CKD (chronic kidney disease) stage 3, GFR 30-59 ml/min 12/20/2020    Diabetes mellitus     Fibromyalgia     Gastroesophageal reflux disease     Hypercholesterolemia     Hypertension     Immune deficiency disorder     Obesity     Osteoporosis     Rheumatoid arthritis     Tobacco dependence     Trouble in sleeping "     Type 2 diabetes mellitus        Past Surgical History:   Procedure Laterality Date    COLONOSCOPY N/A 2/14/2019    Procedure: COLONOSCOPY;  Surgeon: Yury Atwood MD;  Location: [facility];  Service: Endoscopy;  Laterality: N/A;    HERNIA REPAIR      OOPHORECTOMY         Family History   Problem Relation Age of Onset    Hypertension Mother     Cancer Father     Colon cancer Father     Breast cancer Sister        Social History     Socioeconomic History    Marital status:    Tobacco Use    Smoking status: Current Every Day Smoker     Packs/day: 0.50     Years: 25.00     Pack years: 12.50     Types: Cigarettes    Smokeless tobacco: Never Used    Tobacco comment: trying to quit   Substance and Sexual Activity    Alcohol use: No    Drug use: No       Scheduled Meds:   albuterol-ipratropium  3 mL Nebulization Q4H    famotidine (PF)  20 mg Intravenous Daily    fluconazole (DIFLUCAN) IV (PEDS and ADULTS)  200 mg Intravenous Q24H    heparin (porcine)  7,500 Units Subcutaneous Q8H    miconazole NITRATE 2 %   Topical (Top) BID    [START ON 4/4/2022] predniSONE  20 mg Oral Daily    sodium chloride 0.9%  10 mL Intravenous Q8H    sodium chloride 0.9%  10 mL Intravenous Q6H     Continuous Infusions:  PRN Meds:.sodium chloride, dextrose 10%, glucagon (human recombinant), insulin aspart U-100, lorazepam, naloxone, Flushing PICC Protocol **AND** sodium chloride 0.9% **AND** sodium chloride 0.9%    Review of patient's allergies indicates:   Allergen Reactions    Lisinopril Anaphylaxis     Angioedema requiring trach         Physical Exam    Vitals:    04/03/22 2114 04/03/22 2200 04/03/22 2316 04/03/22 2317   BP:  (!) 101/47     BP Location:       Patient Position:       Pulse: 86 84 90 88   Resp: (!) 24  (!) 26 (!) 24   Temp:       TempSrc:       SpO2: (!) 93% (!) 94% 96% 95%   Weight:       Height:         MSE -     Grimaces and withdrawas to pain   Does not respond to voice commands     Moves all 4  ext to pain     IMAGING (personally reviewed):  Results for orders placed or performed during the hospital encounter of 03/29/22   CT Head Without Contrast    Narrative    EXAMINATION:  CT HEAD WITHOUT CONTRAST    CLINICAL HISTORY:  Mental status change, unknown cause;    TECHNIQUE:  Standard brain CT protocol without IV contrast was performed.    COMPARISON:  None    FINDINGS:  There are mild subacute to chronic appearing ischemic changes in the deep white matter of both cerebral hemispheres.  There is no intracranial hemorrhage.  There is no acute fracture visualized.  There is mild partial opacification of the ethmoidal sinuses bilaterally and the left maxillary sinus.  There is moderate partial opacification of the left sphenoidal sinus.  There is nearly complete opacification of the right sphenoidal sinus.  There is mild partial opacification of the mastoid air cells bilaterally.      Impression    1. There are mild subacute to chronic appearing ischemic changes in the deep white matter of both cerebral hemispheres.  2. There is no intracranial hemorrhage.  3. There is mild partial opacification of the ethmoidal sinuses bilaterally and the left maxillary sinus. There is moderate partial opacification of the left sphenoidal sinus. There is nearly complete opacification of the right sphenoidal sinus.  This is characteristic of sinusitis.  4. There is mild partial opacification of the mastoid air cells bilaterally.  All CT scans at this facility use dose modulation, iterative reconstruction, and/or weight base dosing when appropriate to reduce radiation dose when appropriate to reduce radiation dose to as low as reasonably achievable.      Electronically signed by: Kayode Mayfield MD  Date:    04/03/2022  Time:    11:21         LABS:  Lab Results   Component Value Date    WBC 19.59 (H) 04/03/2022    HGB 8.4 (L) 04/03/2022    HCT 25.2 (L) 04/03/2022    MCV 74 (L) 04/03/2022     04/03/2022   CMP  Sodium    Date Value Ref Range Status   04/03/2022 132 (L) 136 - 145 mmol/L Final     Potassium   Date Value Ref Range Status   04/03/2022 3.5 3.5 - 5.1 mmol/L Final     Chloride   Date Value Ref Range Status   04/03/2022 92 (L) 95 - 110 mmol/L Final     CO2   Date Value Ref Range Status   04/03/2022 25 23 - 29 mmol/L Final     Glucose   Date Value Ref Range Status   04/03/2022 153 (H) 70 - 110 mg/dL Final     BUN   Date Value Ref Range Status   04/03/2022 95 (H) 8 - 23 mg/dL Final     Creatinine   Date Value Ref Range Status   04/03/2022 8.2 (H) 0.5 - 1.4 mg/dL Final     Calcium   Date Value Ref Range Status   04/03/2022 7.3 (L) 8.7 - 10.5 mg/dL Final     Total Protein   Date Value Ref Range Status   04/02/2022 5.9 (L) 6.0 - 8.4 g/dL Final     Albumin   Date Value Ref Range Status   04/02/2022 2.2 (L) 3.5 - 5.2 g/dL Final     Total Bilirubin   Date Value Ref Range Status   04/02/2022 0.4 0.1 - 1.0 mg/dL Final     Comment:     For infants and newborns, interpretation of results should be based  on gestational age, weight and in agreement with clinical  observations.    Premature Infant recommended reference ranges:  Up to 24 hours.............<8.0 mg/dL  Up to 48 hours............<12.0 mg/dL  3-5 days..................<15.0 mg/dL  6-29 days.................<15.0 mg/dL       Alkaline Phosphatase   Date Value Ref Range Status   04/02/2022 85 55 - 135 U/L Final     AST   Date Value Ref Range Status   04/02/2022 19 10 - 40 U/L Final     ALT   Date Value Ref Range Status   04/02/2022 11 10 - 44 U/L Final     Anion Gap   Date Value Ref Range Status   04/03/2022 15 8 - 16 mmol/L Final     eGFR if    Date Value Ref Range Status   04/03/2022 5 (A) >60 mL/min/1.73 m^2 Final     eGFR if non    Date Value Ref Range Status   04/03/2022 4 (A) >60 mL/min/1.73 m^2 Final     Comment:     Calculation used to obtain the estimated glomerular filtration  rate (eGFR) is the CKD-EPI equation.                  ASSESSMENT  /  RECOMMENDATIONS    Stupor     - likely low clearance of sedative considering ESRD   - rec EEG to r/o subclinical seizures   - CTH  Is unremarkable for acute brain injury   - rec MRI brain wo if persistent stupor            Yousuf Nino MD  Tele - Neurology

## 2022-04-04 NOTE — SUBJECTIVE & OBJECTIVE
Interval History: Pt was seen and examined in ICU. Spoke with pt's daughter on the phone in details. Labs and meds reviewed. Discussed with other providers. No new events. Noted today pt is following some command.      Review of patient's allergies indicates:   Allergen Reactions    Lisinopril Anaphylaxis     Angioedema requiring trach     Current Facility-Administered Medications   Medication Frequency    0.9%  NaCl infusion (for blood administration) Q24H PRN    albuterol-ipratropium 2.5 mg-0.5 mg/3 mL nebulizer solution 3 mL Q4H    chlorhexidine 0.12 % solution 15 mL BID    dextrose 10% bolus 125 mL PRN    famotidine (PF) injection 20 mg Daily    fluconazole (DIFLUCAN) IVPB 200 mg Q24H    glucagon (human recombinant) injection 1 mg PRN    heparin (porcine) injection 2,500 Units PRN    heparin (porcine) injection 7,500 Units Q8H    insulin aspart U-100 pen 1-10 Units Q6H PRN    lorazepam injection 4 mg Q4H PRN    mannitol 25% injection 25 g Once    miconazole NITRATE 2 % top powder BID    naloxone 0.4 mg/mL injection 0.02 mg PRN    predniSONE tablet 20 mg Daily    sodium chloride 0.9% flush 10 mL Q8H    sodium chloride 0.9% flush 10 mL Q6H    And    sodium chloride 0.9% flush 10 mL PRN       Objective:     Vital Signs (Most Recent):  Temp: 99 °F (37.2 °C) (04/04/22 0700)  Pulse: 73 (04/04/22 1145)  Resp: (!) 24 (04/04/22 1145)  BP: 135/63 (04/04/22 1100)  SpO2: 100 % (04/04/22 1145)  O2 Device (Oxygen Therapy): ventilator (04/04/22 1145)   Vital Signs (24h Range):  Temp:  [99 °F (37.2 °C)-99.4 °F (37.4 °C)] 99 °F (37.2 °C)  Pulse:  [72-95] 73  Resp:  [18-32] 24  SpO2:  [93 %-100 %] 100 %  BP: ()/(45-67) 135/63     Weight: 116.4 kg (256 lb 9.9 oz) (04/04/22 0641)  Body mass index is 44.05 kg/m².  Body surface area is 2.29 meters squared.    I/O last 3 completed shifts:  In: 715 [NG/GT:465; IV Piggyback:250]  Out: 3940 [Urine:3940]    Physical Exam  Vitals and nursing note reviewed.   Constitutional:        Appearance: Normal appearance.      Comments: Intubated   Cardiovascular:      Rate and Rhythm: Normal rate and regular rhythm.      Pulses: Normal pulses.      Heart sounds: Normal heart sounds.   Pulmonary:      Breath sounds: Rales present.   Abdominal:      General: Abdomen is flat.      Palpations: Abdomen is soft.   Musculoskeletal:      Right lower leg: Edema present.      Left lower leg: Edema present.       Significant Labs: reviewed  BMP  Lab Results   Component Value Date     04/04/2022    K 3.9 04/04/2022    CL 94 (L) 04/04/2022    CO2 25 04/04/2022     (H) 04/04/2022    CREATININE 7.2 (H) 04/04/2022    CALCIUM 7.9 (L) 04/04/2022    ANIONGAP 18 (H) 04/04/2022    ESTGFRAFRICA 6 (A) 04/04/2022    EGFRNONAA 5 (A) 04/04/2022     Lab Results   Component Value Date    WBC 21.03 (H) 04/04/2022    HGB 8.6 (L) 04/04/2022    HCT 26.2 (L) 04/04/2022    MCV 74 (L) 04/04/2022     04/04/2022       ABG  Recent Labs   Lab 04/01/22  0504   PH 7.341*   PO2 73*   PCO2 45.7*   HCO3 24.7   BE -1         Significant Imaging: reviewed CXR, pulm edema, right base pulm infiltrate

## 2022-04-04 NOTE — CONSULTS
[facility] ([facility])  Wound Care    Patient Name:  Page Grissom   MRN:  [id number]  Date: 4/4/2022  Diagnosis: Acute kidney injury superimposed on chronic kidney disease    History:     Past Medical History:   Diagnosis Date    Acidosis, metabolic, with respiratory acidosis 3/31/2022    Asthma     Back pain     Cataract     OD    CKD (chronic kidney disease) stage 3, GFR 30-59 ml/min 12/20/2020    Diabetes mellitus     Fibromyalgia     Gastroesophageal reflux disease     Hypercholesterolemia     Hypertension     Immune deficiency disorder     Obesity     Osteoporosis     Rheumatoid arthritis     Tobacco dependence     Trouble in sleeping     Type 2 diabetes mellitus        Social History     Socioeconomic History    Marital status:    Tobacco Use    Smoking status: Current Every Day Smoker     Packs/day: 0.50     Years: 25.00     Pack years: 12.50     Types: Cigarettes    Smokeless tobacco: Never Used    Tobacco comment: trying to quit   Substance and Sexual Activity    Alcohol use: No    Drug use: No       Precautions:     Allergies as of 03/29/2022 - Reviewed 03/29/2022   Allergen Reaction Noted    Lisinopril Swelling 03/29/2022       WOC Assessment Details/Treatment     Consulted on this 72 y/o F patient due to skin breakdown. She was admitted 3/29 with angioedema. She is ventillated via trach. NGT to right nare noted. No visible skin breakdown from NGT present.  Intertrigo is noted to mid and right lower abdominal folds and the gluteal fold - all with linear partial thickness tissue loss in creases, moist red wound beds. Cleansed all with saline and patted dry. InterDry applied into abdominal fold and critic aid paste applied to gluteal cleft MASD. Recommend continued pressure injury prevention measures, moisture management.       04/04/22 1125   WOCN Assessment   WOCN Total Time (mins) 30   Visit Date 04/04/22   Visit Time 1125   Consult Type New   WOCN  Speciality Wound   Wound moisture   Number of Wounds 3   Intervention assessed;applied;chart review;coordination of care;team conference;orders        Altered Skin Integrity 04/03/22 1433 medial Gluteal cleft #1 Intertrigo   Date First Assessed/Time First Assessed: 04/03/22 1433   Altered Skin Integrity Present on Admission: suspected hospital acquired  Orientation: medial  Location: Gluteal cleft  Wound Number: #1  Is this injury device related?: No  Primary Wound Type: ...   Dressing Appearance Intact;Moist drainage   Drainage Amount Scant   Drainage Characteristics/Odor Serous   Appearance Red;Moist   Tissue loss description Partial thickness   Periwound Area Intact   Wound Edges Open   Wound Length (cm) 4 cm   Wound Width (cm) 0.3 cm   Wound Depth (cm) 0.1 cm   Wound Volume (cm^3) 0.12 cm^3   Wound Surface Area (cm^2) 1.2 cm^2   Care Cleansed with:;Sterile normal saline;Applied:;Skin Barrier  (critic aid paste)   Dressing Removed        Altered Skin Integrity 04/03/22 1433 lower Abdomen #2 Intertrigo   Date First Assessed/Time First Assessed: 04/03/22 1433   Altered Skin Integrity Present on Admission: suspected hospital acquired  Orientation: lower  Location: Abdomen  Wound Number: #2  Is this injury device related?: No  Primary Wound Type: Intertrigo   Dressing Appearance Open to air   Drainage Amount Scant   Drainage Characteristics/Odor Serosanguineous   Appearance Red;Moist   Tissue loss description Partial thickness   Wound Edges Open   Wound Length (cm) 0.3 cm   Wound Width (cm) 6 cm   Wound Depth (cm) 0.1 cm   Wound Volume (cm^3) 0.18 cm^3   Wound Surface Area (cm^2) 1.8 cm^2   Care Cleansed with:;Sterile normal saline   Dressing Applied  (InterDry)       04/04/2022

## 2022-04-04 NOTE — PROGRESS NOTES
Progress Note  [facility]    SUBJECTIVE:     Chief Complaint:   angioedema    History of Present Illness/Interval History:  The pt is a 71 yof who presented to the ED with angioedema. Airway was obstructed by her tongue.    Review of patient's allergies indicates:   Allergen Reactions    Lisinopril Anaphylaxis     Angioedema requiring trach       Past Medical History:   Diagnosis Date    Acidosis, metabolic, with respiratory acidosis 3/31/2022    Asthma     Back pain     Cataract     OD    CKD (chronic kidney disease) stage 3, GFR 30-59 ml/min 12/20/2020    Diabetes mellitus     Fibromyalgia     Gastroesophageal reflux disease     Hypercholesterolemia     Hypertension     Immune deficiency disorder     Obesity     Osteoporosis     Rheumatoid arthritis     Tobacco dependence     Trouble in sleeping     Type 2 diabetes mellitus      Past Surgical History:   Procedure Laterality Date    COLONOSCOPY N/A 2/14/2019    Procedure: COLONOSCOPY;  Surgeon: Yury Atwood MD;  Location: [facility];  Service: Endoscopy;  Laterality: N/A;    HERNIA REPAIR      OOPHORECTOMY       Family History   Problem Relation Age of Onset    Hypertension Mother     Cancer Father     Colon cancer Father     Breast cancer Sister      Social History     Socioeconomic History    Marital status:    Tobacco Use    Smoking status: Current Every Day Smoker     Packs/day: 0.50     Years: 25.00     Pack years: 12.50     Types: Cigarettes    Smokeless tobacco: Never Used    Tobacco comment: trying to quit   Substance and Sexual Activity    Alcohol use: No    Drug use: No       Review of Systems:  Unable to obtain due to patient's status- on a mechanical ventilator & unable to speak to me.      OBJECTIVE:     Vital Signs  Vitals:    04/04/22 1045 04/04/22 1100 04/04/22 1145 04/04/22 1211   BP:  135/63     Pulse: 76 79 73 72   Resp: (!) 25 (!) 25 (!) 24 (!) 24   Temp:       TempSrc:       SpO2: 99% 98% 100% 96%    Weight:       Height:         Body mass index is 44.05 kg/m².    Physical Exam:  General: no distress  Eyes:  conjunctivae/corneas clear  Nose: no discharge  Mouth: mild, non-obstructing tongue swelling today.  Neck: trachea midline with no masses appreciated, trach in place & secured with stay-sutures  Lungs:  normal respiratory effort, no wheezes, no rales  Heart: regular rate and rhythm and no murmur  Abdomen: non-distended  Extremities: no cyanosis, +mild dependent edema, no clubbing  Skin: No rashes or lesions. good skin turgor  Neurologic: eyes closed, opened to my voice, squeezed my hand on command though inconsistently, PERRL, cough intact    Laboratory:  Lab Results   Component Value Date    WBC 21.03 (H) 04/04/2022    HGB 8.6 (L) 04/04/2022    HCT 26.2 (L) 04/04/2022    MCV 74 (L) 04/04/2022     04/04/2022       Chest Imaging, My Impression:   CXR reviewed    Diagnostic Results:  CT: Reviewed    ASSESSMENT/PLAN:     1) Angioedema due to ace inhibitor.  pt on avoidance once she is recovered.  2) Acute hypoxemic respiratory failure. Supporting with mechanical ventilation.  3) RLL infiltrate on CXR. +purulent secretions on my exam. Send sputum for culture. Start empiric abx.  4) Airway compromise s/p tracheostomy.  5) Acute encephalopathy. Mental status appears a little improved on my exam today. Monitor.  6) KYE. Nephrology is following & supporting with RRT as needed.    Critical Care Time: 45 minutes  Critical care was time spent personally by me on the following activities: evaluating this patient's organ dysfunction, development of treatment plan, discussing treatment plan with patient or surrogate and bedside caregivers, discussions with consultants, evaluation of patient's response to treatment, examination of patient, ordering and performing treatments and interventions, ordering and review of laboratory studies, ordering and review of radiographic studies, re-evaluation of patient's  condition. This critical care time did not overlap with that of any other provider or involve time for any procedures.      Aryan Donnelly MD  [facility]

## 2022-04-04 NOTE — PLAN OF CARE
Pt intermittently follows commands. NSR on monitor. Oxygenating well on vent. NG in place. Dialysis initiated today, 3L removed. Pt evaluated by WOC today for skin fold breakdown. Right upper arm PICC, Right Femoral VasCath. Bed low, wheels locked. Alarms audible. POC reviewed with patient and daughter today.

## 2022-04-04 NOTE — PROGRESS NOTES
[facility] - Intensive Care \[facility]\"")  General Surgery  Progress Note    Subjective:     History of Present Illness:  Patient presented to the emergency room with acute respiratory distress.  Indication was unsuccessful by the emergency room position in the anesthesia department.  Surgery was called emergency for a cricothyroidotomy.      Post-Op Info:  Procedure(s) (LRB):  CREATION, TRACHEOSTOMY (N/A)  Bronchoscopy (N/A)   5 Days Post-Op     Interval History: ventilated, elevated wbc    Medications:  Continuous Infusions:  Scheduled Meds:   albuterol-ipratropium  3 mL Nebulization Q4H    chlorhexidine  15 mL Mouth/Throat BID    famotidine (PF)  20 mg Intravenous Daily    fluconazole (DIFLUCAN) IV (PEDS and ADULTS)  200 mg Intravenous Q24H    heparin (porcine)  7,500 Units Subcutaneous Q8H    miconazole NITRATE 2 %   Topical (Top) BID    predniSONE  20 mg Oral Daily    sodium chloride 0.9%  10 mL Intravenous Q8H    sodium chloride 0.9%  10 mL Intravenous Q6H     PRN Meds:sodium chloride, dextrose 10%, glucagon (human recombinant), insulin aspart U-100, lorazepam, naloxone, Flushing PICC Protocol **AND** sodium chloride 0.9% **AND** sodium chloride 0.9%     Review of patient's allergies indicates:   Allergen Reactions    Lisinopril Anaphylaxis     Angioedema requiring trach     Objective:     Vital Signs (Most Recent):  Temp: 99.1 °F (37.3 °C) (04/04/22 0400)  Pulse: 87 (04/04/22 0753)  Resp: (!) 24 (04/04/22 0753)  BP: (!) 115/56 (04/04/22 0700)  SpO2: (!) 94 % (04/04/22 0753)   Vital Signs (24h Range):  Temp:  [98.2 °F (36.8 °C)-99.4 °F (37.4 °C)] 99.1 °F (37.3 °C)  Pulse:  [73-95] 87  Resp:  [18-32] 24  SpO2:  [93 %-100 %] 94 %  BP: ()/(45-70) 115/56     Weight: 116.4 kg (256 lb 9.9 oz)  Body mass index is 44.05 kg/m².    Intake/Output - Last 3 Shifts         04/02 0700 04/03 0659 04/03 0700 04/04 0659 04/04 0700 04/05 0659    I.V. (mL/kg)       NG/ 300     IV Piggyback 298.7 100     Total  Intake(mL/kg) 613.7 (5.2) 400 (3.4)     Urine (mL/kg/hr) 2355 (0.8) 2610 (0.9) 200 (1.3)    Emesis/NG output 0      Total Output 2355 2610 200    Net -1741.3 -2210 -200           Emesis Occurrence 1 x              Physical Exam  Vitals and nursing note reviewed.   Constitutional:       Appearance: She is obese.      Comments: ventilated   Neck:      Comments: Tracheostomy site is clean  Cardiovascular:      Rate and Rhythm: Normal rate and regular rhythm.   Pulmonary:      Effort: Pulmonary effort is normal.      Breath sounds: Normal breath sounds.   Abdominal:      General: Bowel sounds are normal.      Palpations: Abdomen is soft.      Comments: obese   Musculoskeletal:      Right lower leg: Edema present.      Left lower leg: Edema present.   Skin:     General: Skin is warm.   Neurological:      Mental Status: She is alert.       Significant Labs:  I have reviewed all pertinent lab results within the past 24 hours.  CBC:   Recent Labs   Lab 04/04/22  0558   WBC 21.03*   RBC 3.55*   HGB 8.6*   HCT 26.2*      MCV 74*   MCH 24.2*   MCHC 32.8     BMP:   Recent Labs   Lab 04/02/22 2017 04/03/22  0516 04/04/22  0558   *   < > 108   *   < > 137   K 3.7   < > 3.9   CL 90*   < > 94*   CO2 23   < > 25   BUN 90*   < > 105*   CREATININE 8.4*   < > 7.2*   CALCIUM 7.5*   < > 7.9*   MG 1.9  --   --     < > = values in this interval not displayed.     ABGs:   Recent Labs   Lab 04/01/22  0504   PH 7.341*   PCO2 45.7*   PO2 73*   HCO3 24.7   POCSATURATED 93*   BE -1       Significant Diagnostics:  I have reviewed all pertinent imaging results/findings within the past 24 hours.  CT: I have reviewed all pertinent results/findings within the past 24 hours and my personal findings are:  EXAM:   XR CHEST AP PORTABLE    Assessment/Plan:     * Acute kidney injury superimposed on chronic kidney disease  management per icu and nephology    Tracheostomy in place  7.5 tracheostomy tube place 3/30/22  Site clean  Sutures  out 10 days 4/9/22    decannulation per pulmonary/critical care      Call if needed      Angioedema  Continues to improve        Isak Yadav MD  General Surgery  [facility] - [facility] ([facility])

## 2022-04-04 NOTE — PROGRESS NOTES
[facility])  [facility]  Progress Note    Patient Name: Page Grissom  MRN: [id number]  Patient Class: IP- Inpatient   Admission Date: 3/29/2022  Length of Stay: 6 days  Attending Physician: Batsheva Landaverde MD  Primary Care Provider: NEELAM Roman Jr, MD        Subjective:     Principal Problem:Acute kidney injury superimposed on chronic kidney disease        HPI:  72 y/o female with PMHx of HTN, HLD, DM, asthma, CKD 3, and RA who presented to the ED with c/o angioedema that onset suddenly this morning. Pt was able to verbally communicate with EMS, was not able to communicate with ED staff. Staff was unable to intubate and called surgery, who performed emergency trach. Pt is currently on vent and sedated.  ED workup shows: H/H 10.9/34.6, K+ 2.9, CO2 22, Anion gap 17, BUN 55, Cr 8.8.  She will be admitted to ICU for angioedema under the care of \[facility]\"" medicine.  She is a full code and her SDM is her daughter, Cassy.        Overview/Hospital Course:  3/30 emergently trach'd in ED as difficulty intubating d/t angioedema. Going to OR for replacement of tube  3/31 hypotensive requiring pressor support. Leukocytosis with electrolyte abn noted. Concerns for sepsis in immunocompromised patient. Low UOP. Nephrology following for possible dialysis. Guarded prognosis. Consider palliative consult for GOC/family support  4/1 remains on ventilatory support via trach. NG placed for med admin  4/2 somnolent but following commands, ESRD not currently receiving dialysis pt received versed for crich procedure slow metabolizer still somnolent  4/3- somnolent but following commands. D/w nephro dr. Mane trial of dialysis in am to metabolize sedatives pt does not seem to be metabolizing sedatives. Stable respiratory status  4/4- CT head 4/4 reveals mild subacute to chronic appearing ischemic changes to deep white matter both cerebral hemispheres. Pt remains somnolent EEG pending. Neuro following.  Trial of dialysis to address somnolence.          Review of Systems   Constitutional:  Negative for fever.   HENT:  Negative for congestion.    Respiratory:  Negative for cough.    Gastrointestinal:  Negative for abdominal pain.   Endocrine: Negative for polyuria.   Genitourinary:  Negative for flank pain.   Musculoskeletal:  Negative for back pain.   Skin:  Negative for rash.   Neurological:  Negative for syncope.   Psychiatric/Behavioral:  Negative for confusion.    Objective:     Vital Signs (Most Recent):  Temp: 98.9 °F (37.2 °C) (04/04/22 1100)  Pulse: 80 (04/04/22 1545)  Resp: (!) 25 (04/04/22 1545)  BP: 138/67 (04/04/22 1545)  SpO2: 97 % (04/04/22 1545) Vital Signs (24h Range):  Temp:  [98.9 °F (37.2 °C)-99.4 °F (37.4 °C)] 98.9 °F (37.2 °C)  Pulse:  [72-92] 80  Resp:  [24-27] 25  SpO2:  [93 %-100 %] 97 %  BP: ()/(45-69) 138/67     Weight: 116.4 kg (256 lb 9.9 oz)  Body mass index is 44.05 kg/m².    Intake/Output Summary (Last 24 hours) at 4/4/2022 1602  Last data filed at 4/4/2022 1200  Gross per 24 hour   Intake 215 ml   Output 2135 ml   Net -1920 ml        Physical Exam  Vitals and nursing note reviewed.   Constitutional:       General: She is not in acute distress.     Appearance: She is ill-appearing. She is not toxic-appearing.   HENT:      Head: Normocephalic and atraumatic.   Cardiovascular:      Rate and Rhythm: Normal rate.   Pulmonary:      Effort: Tachypnea and respiratory distress present.      Comments: Mechanical breath sounds  Abdominal:      Palpations: Abdomen is soft.   Genitourinary:     Comments: alatorre  Musculoskeletal:         General: Swelling present.      Right lower leg: Edema present.      Left lower leg: Edema present.   Skin:     General: Skin is warm.       Significant Labs: All pertinent labs within the past 24 hours have been reviewed.    Significant Imaging: I have reviewed all pertinent imaging results/findings within the past 24 hours.      Assessment/Plan:      * Acute  kidney injury superimposed on chronic kidney disease  --baseline BUN/Cr 20/1.4  --stop lisinopril  --avoid nephrotoxic agents  --Nephrology following  --Pt is not acidotic and there is no hyperkalemia;   --d/w nephro service  current indication for dialysis is pt's inability to metabolize sedatives she is arousable but still extremely sedated  -- uremic  --IR for hemodialysis access per dR Mane today      Morbid obesity        Tracheostomy in place  --admit to ICU  --stop lisiniopril  --pulmonology following  --supportive care    OR for replacement of cricoid trach      Angioedema  --likely 2/2 lisinopril  --admit to ICU  --had emergent trach in ED, on ventalitor  --IV steroids, IV antihistamiens  --Fresh Frozen Plasma ordered in ED  --monitor closely    3/30  Improving   Continue supportive care  Minimal improvement in appearance  Consider bradykinin inhibitor?  Vent Mode: Spont  Oxygen Concentration (%):  [35-40] 35  Resp Rate Total:  [13 br/min-25 br/min] 15 br/min  Vt Set:  [400 mL] 400 mL  PEEP/CPAP:  [5 cmH20-8 cmH20] 5 cmH20  Pressure Support:  [0 cmH20-10 cmH20] 10 cmH20  Mean Airway Pressure:  [7.9 zhF10-00 cmH20] 7.9 cmH20     3/31  CC following for vent management  Surgery following after trach placement  Continue iv systemic steroids    4/1  Continue current care    Rheumatoid arthritis involving multiple sites with positive rheumatoid factor  --hold plaquenil  --f/u OP         VTE Risk Mitigation (From admission, onward)         Ordered     heparin (porcine) injection 5,200 Units  As needed (PRN)         04/04/22 1342     heparin (porcine) injection 2,500 Units  As needed (PRN)         04/04/22 1115     Apply sequential compression device to lower extremities (if no contraindications). Medical venous thromboembolus prophylaxis is preferred.  Until discontinued         03/30/22 0805     heparin (porcine) injection 7,500 Units  Every 8 hours         03/29/22 1003     IP VTE HIGH RISK PATIENT  Once          03/29/22 1003                Discharge Planning   CHALO:      Code Status: Full Code   Is the patient medically ready for discharge?:     Reason for patient still in hospital (select all that apply): Pending disposition  Discharge Plan A: Home with family            Critical care time spent on the evaluation and treatment of severe organ dysfunction, review of pertinent labs and imaging studies, discussions with consulting providers and discussions with patient/family: 30 minutes.      Batsheva Landaverde MD  Department of Hospital Medicine   [facility] - Intensive Care ([facility])

## 2022-04-04 NOTE — CONSULTS
Chart reviewed by Dr. Stallings.       ASSESSMENT/PLAN:    KYE    The order for a IR tunneled catheter has been placed and the procedure will be performed asap.          Thank you for the consult.

## 2022-04-04 NOTE — CONSULTS
Pharmacokinetic Initial Assessment: IV Vancomycin    Assessment/Plan:    Initiate intravenous vancomycin with loading dose of 1750 mg once with subsequent doses when random concentrations are less than 25 mcg/mL  Desired empiric serum trough concentration is 15 to 20 mcg/mL  Draw vancomycin random level on 4/5 at 0430.  Pharmacy will continue to follow and monitor vancomycin.      Pt received 2g vanc with prior consult on 3/31 - will do 15mg/kg dose instead of re-loading pt    Please contact pharmacy at extension [phone] with any questions regarding this assessment.     Thank you for the consult,   Danita Torre       Patient brief summary:  Page Grissom is a 71 y.o. female initiated on antimicrobial therapy with IV Vancomycin for treatment of suspected lower respiratory infection    Drug Allergies:   Review of patient's allergies indicates:   Allergen Reactions    Lisinopril Anaphylaxis     Angioedema requiring trach       Actual Body Weight:   116.4kg    Renal Function:   Estimated Creatinine Clearance: 9.1 mL/min (A) (based on SCr of 7.2 mg/dL (H)).,     Dialysis Method (if applicable):  intermittent HD starts this afternoon    CBC (last 72 hours):  Recent Labs   Lab Result Units 04/02/22 0819 04/03/22  0516 04/04/22  0558   WBC K/uL 21.15* 19.59* 21.03*   Hemoglobin g/dL 9.1* 8.4* 8.6*   Hematocrit % 27.0* 25.2* 26.2*   Platelets K/uL 275 245 271   Gran % % 92.0* 85.9* 88.0*   Lymph % % 1.8* 3.9* 3.0*   Mono % % 4.6 6.5 4.0   Eosinophil % % 0.0 0.1 0.0   Basophil % % 0.1 0.1 0.0   Differential Method  Automated Automated Manual       Metabolic Panel (last 72 hours):  Recent Labs   Lab Result Units 04/01/22  1347 04/02/22  0819 04/02/22 2017 04/03/22  0516 04/04/22  0558   Sodium mmol/L 128* 131* 132* 132* 137   Potassium mmol/L 3.8 3.6 3.7 3.5 3.9   Chloride mmol/L 89* 89* 90* 92* 94*   CO2 mmol/L 22* 24 23 25 25   Glucose mg/dL 155* 127* 125* 153* 108   BUN mg/dL 72* 86* 90* 95* 105*   Creatinine  mg/dL 8.5* 8.7* 8.4* 8.2* 7.2*   Albumin g/dL 2.3* 2.2*  --   --   --    Total Bilirubin mg/dL  --  0.4  --   --   --    Alkaline Phosphatase U/L  --  85  --   --   --    AST U/L  --  19  --   --   --    ALT U/L  --  11  --   --   --    Magnesium mg/dL  --   --  1.9  --   --    Phosphorus mg/dL 5.4*  --   --   --   --        Drug levels (last 3 results):  No results for input(s): VANCOMYCINRA, VANCOMYCINPE, VANCOMYCINTR in the last 72 hours.    Microbiologic Results:  Microbiology Results (last 7 days)     Procedure Component Value Units Date/Time    Culture, Respiratory with Gram Stain [[id number]] Collected: 04/04/22 1256    Order Status: Sent Specimen: Respiratory from Tracheal Aspirate Updated: 04/04/22 1257    Culture, Respiratory with Gram Stain [[id number]]     Order Status: No result Specimen: Respiratory     Blood culture [[id number]] Collected: 03/31/22 0902    Order Status: Completed Specimen: Blood from Peripheral, Left Hand Updated: 04/03/22 1812     Blood Culture, Routine No Growth to date      No Growth to date      No Growth to date      No Growth to date    Blood culture [[id number]] Collected: 03/31/22 1023    Order Status: Completed Specimen: Blood from Peripheral, Left Hand Updated: 04/03/22 1812     Blood Culture, Routine No Growth to date      No Growth to date      No Growth to date      No Growth to date    Culture, Respiratory with Gram Stain [[id number]] Collected: 04/01/22 1255    Order Status: Completed Specimen: Respiratory from Endotracheal Aspirate Updated: 04/03/22 0932     Respiratory Culture Normal respiratory fabi      No S aureus or Pseudomonas isolated.     Gram Stain (Respiratory) Rare WBC's     Gram Stain (Respiratory) Rare Gram positive cocci     Gram Stain (Respiratory) Rare Gram negative rods    Culture, Respiratory with Gram Stain [[id number]] Collected: 03/30/22 1100    Order Status: Sent Specimen: Respiratory from Tracheal Aspirate Updated: 03/30/22 1240

## 2022-04-05 PROBLEM — G93.41 ACUTE METABOLIC ENCEPHALOPATHY: Status: ACTIVE | Noted: 2022-04-05

## 2022-04-05 LAB
ALBUMIN SERPL BCP-MCNC: 2 G/DL (ref 3.5–5.2)
ALLENS TEST: ABNORMAL
ANION GAP SERPL CALC-SCNC: 14 MMOL/L (ref 8–16)
ANISOCYTOSIS BLD QL SMEAR: SLIGHT
BACTERIA BLD CULT: NORMAL
BACTERIA BLD CULT: NORMAL
BASOPHILS # BLD AUTO: ABNORMAL K/UL (ref 0–0.2)
BASOPHILS NFR BLD: 0 % (ref 0–1.9)
BUN SERPL-MCNC: 46 MG/DL (ref 8–23)
CALCIUM SERPL-MCNC: 8.5 MG/DL (ref 8.7–10.5)
CHLORIDE SERPL-SCNC: 99 MMOL/L (ref 95–110)
CO2 SERPL-SCNC: 24 MMOL/L (ref 23–29)
CREAT SERPL-MCNC: 3.4 MG/DL (ref 0.5–1.4)
DACRYOCYTES BLD QL SMEAR: ABNORMAL
DELSYS: ABNORMAL
DIFFERENTIAL METHOD: ABNORMAL
EOSINOPHIL # BLD AUTO: ABNORMAL K/UL (ref 0–0.5)
EOSINOPHIL NFR BLD: 3 % (ref 0–8)
ERYTHROCYTE [DISTWIDTH] IN BLOOD BY AUTOMATED COUNT: 16 % (ref 11.5–14.5)
ERYTHROCYTE [SEDIMENTATION RATE] IN BLOOD BY WESTERGREN METHOD: 18 MM/H
EST. GFR  (AFRICAN AMERICAN): 15 ML/MIN/1.73 M^2
EST. GFR  (NON AFRICAN AMERICAN): 13 ML/MIN/1.73 M^2
FIO2: 40
GLUCOSE SERPL-MCNC: 103 MG/DL (ref 70–110)
GLUCOSE SERPL-MCNC: 106 MG/DL (ref 70–110)
HBV CORE AB SERPL QL IA: NEGATIVE
HBV SURFACE AB SER-ACNC: NEGATIVE M[IU]/ML
HBV SURFACE AG SERPL QL IA: NEGATIVE
HCO3 UR-SCNC: 27.3 MMOL/L (ref 24–28)
HCT VFR BLD AUTO: 27.7 % (ref 37–48.5)
HCT VFR BLD CALC: 27 %PCV (ref 36–54)
HGB BLD-MCNC: 8.9 G/DL (ref 12–16)
HYPOCHROMIA BLD QL SMEAR: ABNORMAL
IMM GRANULOCYTES # BLD AUTO: ABNORMAL K/UL (ref 0–0.04)
IMM GRANULOCYTES NFR BLD AUTO: ABNORMAL % (ref 0–0.5)
LYMPHOCYTES # BLD AUTO: ABNORMAL K/UL (ref 1–4.8)
LYMPHOCYTES NFR BLD: 6 % (ref 18–48)
MAGNESIUM SERPL-MCNC: 2.1 MG/DL (ref 1.6–2.6)
MCH RBC QN AUTO: 24.1 PG (ref 27–31)
MCHC RBC AUTO-ENTMCNC: 32.1 G/DL (ref 32–36)
MCV RBC AUTO: 75 FL (ref 82–98)
METAMYELOCYTES NFR BLD MANUAL: 1 %
MODE: ABNORMAL
MONOCYTES # BLD AUTO: ABNORMAL K/UL (ref 0.3–1)
MONOCYTES NFR BLD: 6 % (ref 4–15)
MYELOCYTES NFR BLD MANUAL: 1 %
NEUTROPHILS NFR BLD: 83 % (ref 38–73)
NRBC BLD-RTO: 0 /100 WBC
PCO2 BLDA: 44 MMHG (ref 35–45)
PEEP: 6
PH SMN: 7.4 [PH] (ref 7.35–7.45)
PHOSPHATE SERPL-MCNC: 3.5 MG/DL (ref 2.7–4.5)
PLATELET # BLD AUTO: 254 K/UL (ref 150–450)
PLATELET BLD QL SMEAR: ABNORMAL
PMV BLD AUTO: 9.7 FL (ref 9.2–12.9)
PO2 BLDA: 47 MMHG (ref 40–60)
POC BE: 3 MMOL/L
POC IONIZED CALCIUM: 1.17 MMOL/L (ref 1.06–1.42)
POC SATURATED O2: 82 % (ref 95–100)
POCT GLUCOSE: 101 MG/DL (ref 70–110)
POCT GLUCOSE: 116 MG/DL (ref 70–110)
POCT GLUCOSE: 130 MG/DL (ref 70–110)
POCT GLUCOSE: 157 MG/DL (ref 70–110)
POCT GLUCOSE: 157 MG/DL (ref 70–110)
POIKILOCYTOSIS BLD QL SMEAR: SLIGHT
POLYCHROMASIA BLD QL SMEAR: ABNORMAL
POTASSIUM BLD-SCNC: 3.6 MMOL/L (ref 3.5–5.1)
POTASSIUM SERPL-SCNC: 3.7 MMOL/L (ref 3.5–5.1)
RBC # BLD AUTO: 3.7 M/UL (ref 4–5.4)
SAMPLE: ABNORMAL
SITE: ABNORMAL
SODIUM BLD-SCNC: 135 MMOL/L (ref 136–145)
SODIUM SERPL-SCNC: 137 MMOL/L (ref 136–145)
SPHEROCYTES BLD QL SMEAR: ABNORMAL
TARGETS BLD QL SMEAR: ABNORMAL
VANCOMYCIN SERPL-MCNC: 29.5 UG/ML
VT: 400
WBC # BLD AUTO: 22.27 K/UL (ref 3.9–12.7)

## 2022-04-05 PROCEDURE — 80202 ASSAY OF VANCOMYCIN: CPT | Performed by: FAMILY MEDICINE

## 2022-04-05 PROCEDURE — 85014 HEMATOCRIT: CPT

## 2022-04-05 PROCEDURE — 27000221 HC OXYGEN, UP TO 24 HOURS

## 2022-04-05 PROCEDURE — 94640 AIRWAY INHALATION TREATMENT: CPT

## 2022-04-05 PROCEDURE — 84295 ASSAY OF SERUM SODIUM: CPT

## 2022-04-05 PROCEDURE — 63600175 PHARM REV CODE 636 W HCPCS: Performed by: FAMILY MEDICINE

## 2022-04-05 PROCEDURE — 99291 PR CRITICAL CARE, E/M 30-74 MINUTES: ICD-10-PCS | Mod: ,,, | Performed by: INTERNAL MEDICINE

## 2022-04-05 PROCEDURE — 85007 BL SMEAR W/DIFF WBC COUNT: CPT | Performed by: SURGERY

## 2022-04-05 PROCEDURE — 94760 N-INVAS EAR/PLS OXIMETRY 1: CPT

## 2022-04-05 PROCEDURE — 93010 EKG 12-LEAD: ICD-10-PCS | Mod: ,,, | Performed by: INTERNAL MEDICINE

## 2022-04-05 PROCEDURE — 25000242 PHARM REV CODE 250 ALT 637 W/ HCPCS: Performed by: INTERNAL MEDICINE

## 2022-04-05 PROCEDURE — 85027 COMPLETE CBC AUTOMATED: CPT | Performed by: SURGERY

## 2022-04-05 PROCEDURE — 93005 ELECTROCARDIOGRAM TRACING: CPT

## 2022-04-05 PROCEDURE — 20000000 HC ICU ROOM

## 2022-04-05 PROCEDURE — 99291 PR CRITICAL CARE, E/M 30-74 MINUTES: ICD-10-PCS | Mod: 95,,, | Performed by: INTERNAL MEDICINE

## 2022-04-05 PROCEDURE — 94003 VENT MGMT INPAT SUBQ DAY: CPT

## 2022-04-05 PROCEDURE — 80069 RENAL FUNCTION PANEL: CPT | Performed by: SURGERY

## 2022-04-05 PROCEDURE — 82803 BLOOD GASES ANY COMBINATION: CPT

## 2022-04-05 PROCEDURE — 63600175 PHARM REV CODE 636 W HCPCS: Performed by: SURGERY

## 2022-04-05 PROCEDURE — 99900026 HC AIRWAY MAINTENANCE (STAT)

## 2022-04-05 PROCEDURE — 25000003 PHARM REV CODE 250: Performed by: FAMILY MEDICINE

## 2022-04-05 PROCEDURE — 99291 CRITICAL CARE FIRST HOUR: CPT | Mod: 95,,, | Performed by: INTERNAL MEDICINE

## 2022-04-05 PROCEDURE — A4216 STERILE WATER/SALINE, 10 ML: HCPCS | Performed by: FAMILY MEDICINE

## 2022-04-05 PROCEDURE — 82330 ASSAY OF CALCIUM: CPT

## 2022-04-05 PROCEDURE — 99900035 HC TECH TIME PER 15 MIN (STAT)

## 2022-04-05 PROCEDURE — 84132 ASSAY OF SERUM POTASSIUM: CPT

## 2022-04-05 PROCEDURE — 63600175 PHARM REV CODE 636 W HCPCS: Performed by: INTERNAL MEDICINE

## 2022-04-05 PROCEDURE — 99291 CRITICAL CARE FIRST HOUR: CPT | Mod: ,,, | Performed by: INTERNAL MEDICINE

## 2022-04-05 PROCEDURE — 25000003 PHARM REV CODE 250: Performed by: SURGERY

## 2022-04-05 PROCEDURE — 83735 ASSAY OF MAGNESIUM: CPT | Performed by: SURGERY

## 2022-04-05 PROCEDURE — 93010 ELECTROCARDIOGRAM REPORT: CPT | Mod: ,,, | Performed by: INTERNAL MEDICINE

## 2022-04-05 RX ADMIN — INSULIN ASPART 2 UNITS: 100 INJECTION, SOLUTION INTRAVENOUS; SUBCUTANEOUS at 06:04

## 2022-04-05 RX ADMIN — HEPARIN SODIUM 7500 UNITS: 5000 INJECTION INTRAVENOUS; SUBCUTANEOUS at 09:04

## 2022-04-05 RX ADMIN — FLUCONAZOLE 200 MG: 2 INJECTION, SOLUTION INTRAVENOUS at 07:04

## 2022-04-05 RX ADMIN — ANTI-FUNGAL POWDER MICONAZOLE NITRATE TALC FREE: 1.42 POWDER TOPICAL at 08:04

## 2022-04-05 RX ADMIN — HEPARIN SODIUM 7500 UNITS: 5000 INJECTION INTRAVENOUS; SUBCUTANEOUS at 01:04

## 2022-04-05 RX ADMIN — SODIUM CHLORIDE, PRESERVATIVE FREE 10 ML: 5 INJECTION INTRAVENOUS at 06:04

## 2022-04-05 RX ADMIN — IPRATROPIUM BROMIDE AND ALBUTEROL SULFATE 3 ML: 2.5; .5 SOLUTION RESPIRATORY (INHALATION) at 07:04

## 2022-04-05 RX ADMIN — Medication 10 ML: at 06:04

## 2022-04-05 RX ADMIN — SODIUM CHLORIDE, PRESERVATIVE FREE 10 ML: 5 INJECTION INTRAVENOUS at 12:04

## 2022-04-05 RX ADMIN — HEPARIN SODIUM 7500 UNITS: 5000 INJECTION INTRAVENOUS; SUBCUTANEOUS at 05:04

## 2022-04-05 RX ADMIN — CHLORHEXIDINE GLUCONATE 0.12% ORAL RINSE 15 ML: 1.2 LIQUID ORAL at 08:04

## 2022-04-05 RX ADMIN — ANTI-FUNGAL POWDER MICONAZOLE NITRATE TALC FREE: 1.42 POWDER TOPICAL at 09:04

## 2022-04-05 RX ADMIN — FAMOTIDINE 20 MG: 10 INJECTION, SOLUTION INTRAVENOUS at 08:04

## 2022-04-05 RX ADMIN — INSULIN ASPART 1 UNITS: 100 INJECTION, SOLUTION INTRAVENOUS; SUBCUTANEOUS at 11:04

## 2022-04-05 RX ADMIN — Medication 10 ML: at 01:04

## 2022-04-05 RX ADMIN — CEFEPIME HYDROCHLORIDE 2 G: 2 INJECTION, SOLUTION INTRAVENOUS at 01:04

## 2022-04-05 RX ADMIN — CHLORHEXIDINE GLUCONATE 0.12% ORAL RINSE 15 ML: 1.2 LIQUID ORAL at 09:04

## 2022-04-05 RX ADMIN — IPRATROPIUM BROMIDE AND ALBUTEROL SULFATE 3 ML: 2.5; .5 SOLUTION RESPIRATORY (INHALATION) at 11:04

## 2022-04-05 RX ADMIN — Medication 10 ML: at 10:04

## 2022-04-05 NOTE — NURSING
Returned from MRI. No distress noted. Bedside monitoring resumed. Safety and fall prevention measures in place. Will monitor for needs/changes.

## 2022-04-05 NOTE — PROGRESS NOTES
[facility] ([facility])  [facility] Medicine  Progress Note    Patient Name: Page Grissom  MRN: [id number]  Patient Class: IP- Inpatient   Admission Date: 3/29/2022  Length of Stay: 7 days  Attending Physician: Batsheva Landaverde MD  Primary Care Provider: NEELAM Roman Jr, MD        Subjective:     Principal Problem:Acute metabolic encephalopathy        HPI:  70 y/o female with PMHx of HTN, HLD, DM, asthma, CKD 3, and RA who presented to the ED with c/o angioedema that onset suddenly this morning. Pt was able to verbally communicate with EMS, was not able to communicate with ED staff. Staff was unable to intubate and called surgery, who performed emergency trach. Pt is currently on vent and sedated.  ED workup shows: H/H 10.9/34.6, K+ 2.9, CO2 22, Anion gap 17, BUN 55, Cr 8.8.  She will be admitted to ICU for angioedema under the care of [facility] medicine.  She is a full code and her SDM is her daughter, Cassy.        Overview/Hospital Course:  3/30 emergently trach'd in ED as difficulty intubating d/t angioedema. Going to OR for replacement of tube  3/31 hypotensive requiring pressor support. Leukocytosis with electrolyte abn noted. Concerns for sepsis in immunocompromised patient. Low UOP. Nephrology following for possible dialysis. Guarded prognosis. Consider palliative consult for GOC/family support  4/1 remains on ventilatory support via trach. NG placed for med admin  4/2 somnolent but following commands, ESRD not currently receiving dialysis pt received versed for crich procedure slow metabolizer still somnolent  4/3- somnolent but following commands. D/w nephro dr. Mane trial of dialysis in am to metabolize sedatives pt does not seem to be metabolizing sedatives. Stable respiratory status  4/4- CT head 4/4 reveals mild subacute to chronic appearing ischemic changes to deep white matter both cerebral hemispheres. Pt remains somnolent EEG pending. Neuro following. Trial of dialysis to address  somnolence.  4/5- pt's mentation unchanged following HD. D/w neuro Dr. Ferrera; EEG shows metabolic encephalopathy. Neurological insult is likely multifactorial-prognosis still unknown.          Review of Systems   Constitutional:  Negative for fever.   HENT:  Negative for congestion.    Respiratory:  Negative for cough.    Gastrointestinal:  Negative for abdominal pain.   Endocrine: Negative for polyuria.   Genitourinary:  Negative for flank pain.   Musculoskeletal:  Negative for back pain.   Skin:  Negative for rash.   Neurological:  Negative for syncope.   Psychiatric/Behavioral:  Negative for confusion.    Objective:     Vital Signs (Most Recent):  Temp: 98.8 °F (37.1 °C) (04/05/22 1101)  Pulse: 86 (04/05/22 1231)  Resp: (!) 25 (04/05/22 1231)  BP: 125/63 (04/05/22 1131)  SpO2: (!) 94 % (04/05/22 1231) Vital Signs (24h Range):  Temp:  [98.7 °F (37.1 °C)-99.7 °F (37.6 °C)] 98.8 °F (37.1 °C)  Pulse:  [] 86  Resp:  [19-48] 25  SpO2:  [93 %-98 %] 94 %  BP: (116-152)/(55-69) 125/63     Weight: 114.6 kg (252 lb 10.4 oz) (bedscale)  Body mass index is 43.37 kg/m².    Intake/Output Summary (Last 24 hours) at 4/5/2022 1252  Last data filed at 4/5/2022 1200  Gross per 24 hour   Intake 985 ml   Output 1570 ml   Net -585 ml        Physical Exam  Vitals and nursing note reviewed.   Constitutional:       General: She is not in acute distress.     Appearance: She is ill-appearing. She is not toxic-appearing.   HENT:      Head: Normocephalic and atraumatic.   Cardiovascular:      Rate and Rhythm: Normal rate.   Pulmonary:      Effort: Tachypnea and respiratory distress present.      Comments: Mechanical breath sounds  Abdominal:      Palpations: Abdomen is soft.   Genitourinary:     Comments: alatorre  Musculoskeletal:         General: Swelling present.      Right lower leg: Edema present.      Left lower leg: Edema present.   Skin:     General: Skin is warm.       Significant Labs: All pertinent labs within the past 24 hours  have been reviewed.    Significant Imaging: I have reviewed all pertinent imaging results/findings within the past 24 hours.      Assessment/Plan:      * Acute metabolic encephalopathy  --neurological insult is likely multifactorial following anoxic event  --trial of dialysis was not productive of any change in neurological status  --d/w neurology Dr. Ferrera prognosis is unclear at this time  --CT head shows deep white matter changes will check MRI  --continue all other current care      Morbid obesity        Acute kidney injury superimposed on chronic kidney disease  --baseline BUN/Cr 20/1.4  --stop lisinopril  --avoid nephrotoxic agents  --Nephrology following  --Pt is not acidotic and there is no hyperkalemia;   --d/w nephro service  current indication for dialysis is pt's inability to metabolize sedatives she is arousable but still extremely sedated  -- uremic  --IR for hemodialysis access per dR Mane today      Tracheostomy in place  --admit to ICU  --stop lisiniopril  --pulmonology following  --supportive care    OR for replacement of cricoid trach      Angioedema  --likely 2/2 lisinopril  --admit to ICU  --had emergent trach in ED, on ventalitor  --IV steroids, IV antihistamiens  --Fresh Frozen Plasma ordered in ED  --monitor closely    3/30  Improving   Continue supportive care  Minimal improvement in appearance  Consider bradykinin inhibitor?  Vent Mode: Spont  Oxygen Concentration (%):  [35-40] 35  Resp Rate Total:  [13 br/min-25 br/min] 15 br/min  Vt Set:  [400 mL] 400 mL  PEEP/CPAP:  [5 cmH20-8 cmH20] 5 cmH20  Pressure Support:  [0 cmH20-10 cmH20] 10 cmH20  Mean Airway Pressure:  [7.9 qlX09-29 cmH20] 7.9 cmH20     3/31  CC following for vent management  Surgery following after trach placement  Continue iv systemic steroids    4/1  Continue current care    Rheumatoid arthritis involving multiple sites with positive rheumatoid factor  --hold plaquenil  --f/u OP         VTE Risk Mitigation (From  admission, onward)         Ordered     heparin (porcine) injection 5,200 Units  As needed (PRN)         04/04/22 1342     heparin (porcine) injection 2,500 Units  As needed (PRN)         04/04/22 1115     Apply sequential compression device to lower extremities (if no contraindications). Medical venous thromboembolus prophylaxis is preferred.  Until discontinued         03/30/22 0805     heparin (porcine) injection 7,500 Units  Every 8 hours         03/29/22 1003     IP VTE HIGH RISK PATIENT  Once         03/29/22 1003                Discharge Planning   CHALO:      Code Status: Full Code   Is the patient medically ready for discharge?:     Reason for patient still in hospital (select all that apply): Pending disposition  Discharge Plan A: Home with family            Critical care time spent on the evaluation and treatment of severe organ dysfunction, review of pertinent labs and imaging studies, discussions with consulting providers and discussions with patient/family: 30 minutes.      Batsheva Landaverde MD  Department of Hospital Medicine   [facility] ([facility])

## 2022-04-05 NOTE — PLAN OF CARE
Problem: Infection  Goal: Absence of Infection Signs and Symptoms  Outcome: Ongoing, Progressing     Problem: Adult Inpatient Plan of Care  Goal: Plan of Care Review  Outcome: Ongoing, Progressing  Goal: Patient-Specific Goal (Individualized)  Outcome: Ongoing, Progressing  Goal: Absence of Hospital-Acquired Illness or Injury  Outcome: Ongoing, Progressing  Goal: Optimal Comfort and Wellbeing  Outcome: Ongoing, Progressing  Goal: Readiness for Transition of Care  Outcome: Ongoing, Progressing     Problem: Bariatric Environmental Safety  Goal: Safety Maintained with Care  Outcome: Ongoing, Progressing     Problem: Communication Impairment (Mechanical Ventilation, Invasive)  Goal: Effective Communication  Outcome: Ongoing, Progressing     Problem: Device-Related Complication Risk (Mechanical Ventilation, Invasive)  Goal: Optimal Device Function  Outcome: Ongoing, Progressing     Problem: Inability to Wean (Mechanical Ventilation, Invasive)  Goal: Mechanical Ventilation Liberation  Outcome: Ongoing, Progressing     Problem: Nutrition Impairment (Mechanical Ventilation, Invasive)  Goal: Optimal Nutrition Delivery  Outcome: Ongoing, Progressing     Problem: Skin and Tissue Injury (Mechanical Ventilation, Invasive)  Goal: Absence of Device-Related Skin and Tissue Injury  Outcome: Ongoing, Progressing     Problem: Ventilator-Induced Lung Injury (Mechanical Ventilation, Invasive)  Goal: Absence of Ventilator-Induced Lung Injury  Outcome: Ongoing, Progressing     Problem: Communication Impairment (Artificial Airway)  Goal: Effective Communication  Outcome: Ongoing, Progressing     Problem: Device-Related Complication Risk (Artificial Airway)  Goal: Optimal Device Function  Outcome: Ongoing, Progressing     Problem: Skin and Tissue Injury (Artificial Airway)  Goal: Absence of Device-Related Skin or Tissue Injury  Outcome: Ongoing, Progressing     Problem: Noninvasive Ventilation Acute  Goal: Effective Unassisted Ventilation  and Oxygenation  Outcome: Ongoing, Progressing     Problem: Fall Injury Risk  Goal: Absence of Fall and Fall-Related Injury  Outcome: Ongoing, Progressing     Problem: Restraint, Nonbehavioral (Nonviolent)  Goal: Absence of Harm or Injury  Outcome: Ongoing, Progressing     Problem: Diabetes Comorbidity  Goal: Blood Glucose Level Within Targeted Range  Outcome: Ongoing, Progressing     Problem: Fluid and Electrolyte Imbalance (Acute Kidney Injury/Impairment)  Goal: Fluid and Electrolyte Balance  Outcome: Ongoing, Progressing     Problem: Oral Intake Inadequate (Acute Kidney Injury/Impairment)  Goal: Optimal Nutrition Intake  Outcome: Ongoing, Progressing     Problem: Renal Function Impairment (Acute Kidney Injury/Impairment)  Goal: Effective Renal Function  Outcome: Ongoing, Progressing     Problem: Skin Injury Risk Increased  Goal: Skin Health and Integrity  Outcome: Ongoing, Progressing     Problem: Coping Ineffective  Goal: Effective Coping  Outcome: Ongoing, Progressing     Problem: Impaired Wound Healing  Goal: Optimal Wound Healing  Outcome: Ongoing, Progressing     Problem: Device-Related Complication Risk (Hemodialysis)  Goal: Safe, Effective Therapy Delivery  Outcome: Ongoing, Progressing     Problem: Hemodynamic Instability (Hemodialysis)  Goal: Effective Tissue Perfusion  Outcome: Ongoing, Progressing     Problem: Infection (Hemodialysis)  Goal: Absence of Infection Signs and Symptoms  Outcome: Ongoing, Progressing

## 2022-04-05 NOTE — SUBJECTIVE & OBJECTIVE
Interval History: Pt was seen and examined in ICU this am. Labs and meds reviewed. Discussed with other providers. S/p acute HD yesterday, tolerated Hd well. No new events.    Review of patient's allergies indicates:   Allergen Reactions    Lisinopril Anaphylaxis     Angioedema requiring trach     Current Facility-Administered Medications   Medication Frequency    0.9%  NaCl infusion (for blood administration) Q24H PRN    albuterol-ipratropium 2.5 mg-0.5 mg/3 mL nebulizer solution 3 mL Q8H    cefepime in dextrose 5 % IVPB 2 g Q24H    chlorhexidine 0.12 % solution 15 mL BID    dextrose 10% bolus 125 mL PRN    famotidine (PF) injection 20 mg Daily    fentaNYL 50 mcg/mL injection 50 mcg Q1H PRN    fluconazole (DIFLUCAN) IVPB 200 mg Q24H    glucagon (human recombinant) injection 1 mg PRN    heparin (porcine) injection 2,500 Units PRN    heparin (porcine) injection 5,200 Units PRN    heparin (porcine) injection 7,500 Units Q8H    insulin aspart U-100 pen 1-10 Units Q6H PRN    miconazole NITRATE 2 % top powder BID    naloxone 0.4 mg/mL injection 0.02 mg PRN    sodium chloride 0.9% flush 10 mL Q6H    And    sodium chloride 0.9% flush 10 mL PRN    sodium chloride 0.9% flush 10 mL Q8H    vancomycin - pharmacy to dose pharmacy to manage frequency       Objective:     Vital Signs (Most Recent):  Temp: 98.8 °F (37.1 °C) (04/05/22 1101)  Pulse: 89 (04/05/22 1108)  Resp: (!) 27 (04/05/22 1108)  BP: 116/64 (04/05/22 1101)  SpO2: 96 % (04/05/22 1108)  O2 Device (Oxygen Therapy): ventilator (04/05/22 1108)   Vital Signs (24h Range):  Temp:  [98.7 °F (37.1 °C)-99.7 °F (37.6 °C)] 98.8 °F (37.1 °C)  Pulse:  [] 89  Resp:  [19-48] 27  SpO2:  [93 %-98 %] 96 %  BP: (112-152)/(53-69) 116/64     Weight: 114.6 kg (252 lb 10.4 oz) ([facility]) (04/05/22 0705)  Body mass index is 43.37 kg/m².  Body surface area is 2.27 meters squared.    I/O last 3 completed shifts:  In: 980 [NG/GT:330; IV Piggyback:650]  Out: 3080 [Urine:3080]    Physical  Exam  Vitals and nursing note reviewed.   Constitutional:       Appearance: Normal appearance.   Cardiovascular:      Rate and Rhythm: Normal rate and regular rhythm.      Pulses: Normal pulses.      Heart sounds: Normal heart sounds.   Pulmonary:      Breath sounds: Normal breath sounds. No rales.   Abdominal:      General: Abdomen is flat.      Palpations: Abdomen is soft.   Musculoskeletal:      Right lower leg: No edema.      Left lower leg: No edema.   Neurological:      Mental Status: She is alert.       Significant Labs: reviewed  BMP  Lab Results   Component Value Date     04/05/2022    K 3.7 04/05/2022    CL 99 04/05/2022    CO2 24 04/05/2022    BUN 46 (H) 04/05/2022    CREATININE 3.4 (H) 04/05/2022    CALCIUM 8.5 (L) 04/05/2022    ANIONGAP 14 04/05/2022    ESTGFRAFRICA 15 (A) 04/05/2022    EGFRNONAA 13 (A) 04/05/2022     Lab Results   Component Value Date    WBC 22.27 (H) 04/05/2022    HGB 8.9 (L) 04/05/2022    HCT 27 (L) 04/05/2022    MCV 75 (L) 04/05/2022     04/05/2022       ABG  Recent Labs   Lab 04/05/22  0338   PH 7.401   PO2 47   PCO2 44.0   HCO3 27.3   BE 3         Significant Imaging: reviewed

## 2022-04-05 NOTE — PROGRESS NOTES
Progress Note  [facility]    SUBJECTIVE:     Chief Complaint:   angioedema    History of Present Illness/Interval History:  The pt is a 71 yof who presented to the ED with angioedema. Airway was obstructed by her tongue.    Review of patient's allergies indicates:   Allergen Reactions    Lisinopril Anaphylaxis     Angioedema requiring trach       Past Medical History:   Diagnosis Date    Acidosis, metabolic, with respiratory acidosis 3/31/2022    Asthma     Back pain     Cataract     OD    CKD (chronic kidney disease) stage 3, GFR 30-59 ml/min 12/20/2020    Diabetes mellitus     Fibromyalgia     Gastroesophageal reflux disease     Hypercholesterolemia     Hypertension     Immune deficiency disorder     Obesity     Osteoporosis     Rheumatoid arthritis     Tobacco dependence     Trouble in sleeping     Type 2 diabetes mellitus      Past Surgical History:   Procedure Laterality Date    COLONOSCOPY N/A 2/14/2019    Procedure: COLONOSCOPY;  Surgeon: Yury Atwood MD;  Location: [facility];  Service: Endoscopy;  Laterality: N/A;    HERNIA REPAIR      OOPHORECTOMY       Family History   Problem Relation Age of Onset    Hypertension Mother     Cancer Father     Colon cancer Father     Breast cancer Sister      Social History     Socioeconomic History    Marital status:    Tobacco Use    Smoking status: Current Every Day Smoker     Packs/day: 0.50     Years: 25.00     Pack years: 12.50     Types: Cigarettes    Smokeless tobacco: Never Used    Tobacco comment: trying to quit   Substance and Sexual Activity    Alcohol use: No    Drug use: No     Review of Systems:  Unable to obtain due to patient's status- on a mechanical ventilator & unable to speak to me.    OBJECTIVE:     Vital Signs  Vitals:    04/05/22 1108 04/05/22 1131 04/05/22 1201 04/05/22 1231   BP:  125/63     BP Location:       Patient Position:       Pulse: 89 94 89 86   Resp: (!) 27 (!) 26 (!) 24 (!) 25   Temp:        TempSrc:       SpO2: 96% 96% 95% (!) 94%   Weight:       Height:         Body mass index is 43.37 kg/m².    Physical Exam:  General: no distress  Eyes:  conjunctivae/corneas clear  Nose: no discharge  Mouth: mild, non-obstructing tongue swelling today.  Neck: trachea midline with no masses appreciated, trach in place & secured with stay-sutures  Lungs:  normal respiratory effort, no wheezes, no rales  Heart: regular rate and rhythm and no murmur  Abdomen: non-distended  Extremities: no cyanosis, +mild dependent edema, no clubbing  Skin: No rashes or lesions. good skin turgor  Neurologic: eyes closed, opened to my voice, nods head on command but did not squeeze my hand on command today, PERRL, cough intact    Laboratory:  Lab Results   Component Value Date    WBC 22.27 (H) 04/05/2022    HGB 8.9 (L) 04/05/2022    HCT 27 (L) 04/05/2022    MCV 75 (L) 04/05/2022     04/05/2022       Chest Imaging, My Impression:   CXR reviewed    Diagnostic Results:  CT: Reviewed    ASSESSMENT/PLAN:     1) Angioedema due to ace inhibitor.  pt on avoidance once she is recovered.  2) Acute hypoxemic respiratory failure. Supporting with mechanical ventilation. Mental status is the main obstacle to ventilator weaning.  3) RLL infiltrate on CXR. +purulent secretions on my exam. GNR on sputum culture; continue empiric abx for now.  4) Airway compromise s/p tracheostomy.  5) Acute encephalopathy. No improvement today. Obtain MRI.  6) KYE. Nephrology is following & supporting with RRT as needed.    Critical Care Time: 35 minutes  Critical care was time spent personally by me on the following activities: evaluating this patient's organ dysfunction, development of treatment plan, discussing treatment plan with patient or surrogate and bedside caregivers, discussions with consultants, evaluation of patient's response to treatment, examination of patient, ordering and performing treatments and interventions, ordering and review of  laboratory studies, ordering and review of radiographic studies, re-evaluation of patient's condition. This critical care time did not overlap with that of any other provider or involve time for any procedures.      Aryan Donnelly MD  [facility]

## 2022-04-05 NOTE — ASSESSMENT & PLAN NOTE
--neurological insult is likely multifactorial following anoxic event  --trial of dialysis was not productive of any change in neurological status  --d/w neurology Dr. Ferrera prognosis is unclear at this time  --CT head shows deep white matter changes will check MRI  --continue all other current care

## 2022-04-05 NOTE — NURSING
Patient transported to MRI. Transport monitor in use. Accompanied by MRI team and respiratory care. Transport ventilator in use. Vital signs are stable. Will monitor for changes.

## 2022-04-05 NOTE — EICU
eICU Physician Virtual/Remote Brief Evaluation Note      Telephone call RN  Patient with 15 beat run of V-tach  Had 1st hemodialysis yesterday  Had previous isolated ectopy but no previous runs  Chart reviewed, patient observed, discussed with RN  /58, P 82-NSR with no ectopy, RR 24, O2 sat 94  Sedated on ventilator via trach 400/18/6/40%.  Peak pressure 24  VBG pH 7.40, pCO2 44, bicarb 27, sodium 135, potassium 3.6, glucose 106, ionized calcium 1.17, hematocrit 27  EKG with normal sinus rhythm rate 80.  No ectopy.  No significant ischemic changes.   decreased from 538  Stat electrolytes, magnesium, CBC ordered  Continue cardiac monitoring      HALLE Bajwa MD  eICU Attending  [phone]    This report has been created through the use of M-Modal dictation software. Typographical and content errors may occur with this process. While efforts are made to detect and correct such errors, in some cases errors will persist. For this reason, wording in this document should be considered in the proper context and not strictly verbatim

## 2022-04-05 NOTE — CONSULTS
Pharmacokinetic Assessment Follow Up: IV Vancomycin    Vancomycin serum concentration assessment(s):    The random level was drawn correctly and can be used to guide therapy at this time. The measurement is above the desired definitive target range of 15 to 20 mcg/mL.    Vancomycin Regimen Plan:    Re-dose when the random level is less than 20 mcg/mL, next level to be drawn at 0430 on 4/6    Drug levels (last 3 results):  Recent Labs   Lab Result Units 04/05/22  0338   Vancomycin, Random ug/mL 29.5       Pharmacy will continue to follow and monitor vancomycin.    Please contact pharmacy at extension [phone] for questions regarding this assessment.    Thank you for the consult,   Danita Torre       Patient brief summary:  Page Grissom is a 71 y.o. female initiated on antimicrobial therapy with IV Vancomycin for treatment of lower respiratory infection    The patient's current regimen is pulse dosing    Drug Allergies:   Review of patient's allergies indicates:   Allergen Reactions    Lisinopril Anaphylaxis     Angioedema requiring trach       Actual Body Weight:   114.6kg    Renal Function:   Estimated Creatinine Clearance: 19.1 mL/min (A) (based on SCr of 3.4 mg/dL (H)).,     Dialysis Method (if applicable):  intermittent HD - no HD today - only yesterday to pull fluid off    CBC (last 72 hours):  Recent Labs   Lab Result Units 04/03/22 0516 04/04/22 0558 04/05/22 0338   WBC K/uL 19.59* 21.03* 22.27*   Hemoglobin g/dL 8.4* 8.6* 8.9*   Hematocrit % 25.2* 26.2* 27.7*   Platelets K/uL 245 271 254   Gran % % 85.9* 88.0* 83.0*   Lymph % % 3.9* 3.0* 6.0*   Mono % % 6.5 4.0 6.0   Eosinophil % % 0.1 0.0 3.0   Basophil % % 0.1 0.0 0.0   Differential Method  Automated Manual Manual       Metabolic Panel (last 72 hours):  Recent Labs   Lab Result Units 04/02/22 2017 04/03/22 0516 04/04/22 0558 04/05/22 0338   Sodium mmol/L 132* 132* 137 137   Potassium mmol/L 3.7 3.5 3.9 3.7   Chloride mmol/L 90* 92* 94*  99   CO2 mmol/L 23 25 25 24   Glucose mg/dL 125* 153* 108 103   BUN mg/dL 90* 95* 105* 46*   Creatinine mg/dL 8.4* 8.2* 7.2* 3.4*   Albumin g/dL  --   --   --  2.0*   Magnesium mg/dL 1.9  --   --  2.1   Phosphorus mg/dL  --   --   --  3.5       Vancomycin Administrations:  vancomycin given in the last 96 hours                   vancomycin 1.75 g in 5 % dextrose 500 mL IVPB (mg) 1,750 mg New Bag 04/04/22 1527                Microbiologic Results:  Microbiology Results (last 7 days)     Procedure Component Value Units Date/Time    Culture, Respiratory with Gram Stain [[id number]] Collected: 04/04/22 1256    Order Status: Completed Specimen: Respiratory from Tracheal Aspirate Updated: 04/04/22 2332     Gram Stain (Respiratory) <10 epithelial cells per low power field.     Gram Stain (Respiratory) Rare WBC's     Gram Stain (Respiratory) Rare Gram negative rods    Blood culture [[id number]] Collected: 03/31/22 1023    Order Status: Completed Specimen: Blood from Peripheral, Left Hand Updated: 04/04/22 1812     Blood Culture, Routine No Growth to date      No Growth to date      No Growth to date      No Growth to date      No Growth to date    Blood culture [[id number]] Collected: 03/31/22 0902    Order Status: Completed Specimen: Blood from Peripheral, Left Hand Updated: 04/04/22 1812     Blood Culture, Routine No Growth to date      No Growth to date      No Growth to date      No Growth to date      No Growth to date    Culture, Respiratory with Gram Stain [[id number]]     Order Status: Canceled Specimen: Respiratory     Culture, Respiratory with Gram Stain [[id number]] Collected: 04/01/22 1255    Order Status: Completed Specimen: Respiratory from Endotracheal Aspirate Updated: 04/03/22 0932     Respiratory Culture Normal respiratory fabi      No S aureus or Pseudomonas isolated.     Gram Stain (Respiratory) Rare WBC's     Gram Stain (Respiratory) Rare Gram positive cocci     Gram Stain (Respiratory) Rare Gram negative  rods    Culture, Respiratory with Gram Stain [[id number]] Collected: 03/30/22 1100    Order Status: Sent Specimen: Respiratory from Tracheal Aspirate Updated: 03/30/22 1247

## 2022-04-05 NOTE — PROGRESS NOTES
[facility] ([facility])  Nephrology  Progress Note    Patient Name: Page Grissom  MRN: [id number]  Admission Date: 3/29/2022  Hospital Length of Stay: 7 days  Attending Provider: Batsheva Landaverde MD   Primary Care Physician: NEELAM Roman Jr, MD  Principal Problem:Acute kidney injury superimposed on chronic kidney disease    Subjective:     HPI: Noted    Interval History: Pt was seen and examined in ICU this am. Labs and meds reviewed. Discussed with other providers. S/p acute HD yesterday, tolerated Hd well. No new events.    Review of patient's allergies indicates:   Allergen Reactions    Lisinopril Anaphylaxis     Angioedema requiring trach     Current Facility-Administered Medications   Medication Frequency    0.9%  NaCl infusion (for blood administration) Q24H PRN    albuterol-ipratropium 2.5 mg-0.5 mg/3 mL nebulizer solution 3 mL Q8H    cefepime in dextrose 5 % IVPB 2 g Q24H    chlorhexidine 0.12 % solution 15 mL BID    dextrose 10% bolus 125 mL PRN    famotidine (PF) injection 20 mg Daily    fentaNYL 50 mcg/mL injection 50 mcg Q1H PRN    fluconazole (DIFLUCAN) IVPB 200 mg Q24H    glucagon (human recombinant) injection 1 mg PRN    heparin (porcine) injection 2,500 Units PRN    heparin (porcine) injection 5,200 Units PRN    heparin (porcine) injection 7,500 Units Q8H    insulin aspart U-100 pen 1-10 Units Q6H PRN    miconazole NITRATE 2 % top powder BID    naloxone 0.4 mg/mL injection 0.02 mg PRN    sodium chloride 0.9% flush 10 mL Q6H    And    sodium chloride 0.9% flush 10 mL PRN    sodium chloride 0.9% flush 10 mL Q8H    vancomycin - pharmacy to dose pharmacy to manage frequency       Objective:     Vital Signs (Most Recent):  Temp: 98.8 °F (37.1 °C) (04/05/22 1101)  Pulse: 89 (04/05/22 1108)  Resp: (!) 27 (04/05/22 1108)  BP: 116/64 (04/05/22 1101)  SpO2: 96 % (04/05/22 1108)  O2 Device (Oxygen Therapy): ventilator (04/05/22 1108)   Vital Signs (24h Range):  Temp:  [98.7  °F (37.1 °C)-99.7 °F (37.6 °C)] 98.8 °F (37.1 °C)  Pulse:  [] 89  Resp:  [19-48] 27  SpO2:  [93 %-98 %] 96 %  BP: (112-152)/(53-69) 116/64     Weight: 114.6 kg (252 lb 10.4 oz) ([facility]) (04/05/22 0705)  Body mass index is 43.37 kg/m².  Body surface area is 2.27 meters squared.    I/O last 3 completed shifts:  In: 980 [NG/GT:330; IV Piggyback:650]  Out: 3080 [Urine:3080]    Physical Exam  Vitals and nursing note reviewed.   Constitutional:       Appearance: Normal appearance.   Cardiovascular:      Rate and Rhythm: Normal rate and regular rhythm.      Pulses: Normal pulses.      Heart sounds: Normal heart sounds.   Pulmonary:      Breath sounds: Normal breath sounds. No rales.   Abdominal:      General: Abdomen is flat.      Palpations: Abdomen is soft.   Musculoskeletal:      Right lower leg: No edema.      Left lower leg: No edema.   Neurological:      Mental Status: She is alert.       Significant Labs: reviewed  BMP  Lab Results   Component Value Date     04/05/2022    K 3.7 04/05/2022    CL 99 04/05/2022    CO2 24 04/05/2022    BUN 46 (H) 04/05/2022    CREATININE 3.4 (H) 04/05/2022    CALCIUM 8.5 (L) 04/05/2022    ANIONGAP 14 04/05/2022    ESTGFRAFRICA 15 (A) 04/05/2022    EGFRNONAA 13 (A) 04/05/2022     Lab Results   Component Value Date    WBC 22.27 (H) 04/05/2022    HGB 8.9 (L) 04/05/2022    HCT 27 (L) 04/05/2022    MCV 75 (L) 04/05/2022     04/05/2022       ABG  Recent Labs   Lab 04/05/22  0338   PH 7.401   PO2 47   PCO2 44.0   HCO3 27.3   BE 3         Significant Imaging: reviewed    Assessment/Plan:     72 y/o female with KYE and initial presentation with angioedema and hypotension:     KYE (acute kidney injury)  Pt presented with hypotension after experiencing anaphylactic shock.  Suspect KYE due to sustained hypotension resulting in acute tubular necrosis (ATN)     S/p acute HD yesterday, tolerated HD well  s Cr stable  Good UOP, suspect pt is slowly recovering  K  normal  Hyponatremia, corrected with HD  Metabolic acidosis, improved with HD  O2 sat good  Fluid gain, edema, improved with HD    Will hold HD today  Will monitor closely  Discussed with ICU and primary team     * Angioedema  Chart was reviewed  ACE-I induced?     Plans and recommendations:  As reviewed above  Total critical care time spent 40 minutes including time needed to review the records, the   patient evaluation, documentation, discussion with other providers  more than 50% of the time was spent on coordination of care          Lindsay Mane MD  Nephrology  [facility] ([facility])

## 2022-04-05 NOTE — ASSESSMENT & PLAN NOTE
70 y/o female with KYE and initial presentation with angioedema and hypotension:     KYE (acute kidney injury)  Pt presented with hypotension after experiencing anaphylactic shock.  Suspect KYE due to sustained hypotension resulting in acute tubular necrosis (ATN)     s Cr stable, noted slightly lower  Noted more UOP, suspect pt si slowly recovering  K normal  Hyponatremia   Metabolic acidosis  O2 sat good  Fluid gain, edema     Pt will benefit from transient acute HD to improve fluid balance and mental status  S/p femoral vas cath today  Spoke with pt's daughter  Risks and benefits of hemodialysis were explained to the pt. Benefits include correction electrolyte disorders, maintanance of fluid balance, and improvement in oxygenation. Risks include changes in fluid status, heart failure, and death.  Agreed to HD  Providing HD today  Discussed with HD nurse  Discussed with ICU and primary team     * Angioedema  Chart was reviewed  ACE-I induced?     Plans and recommendations:  As reviewed above  Total critical care time spent 40 minutes including time needed to review the records, the   patient evaluation, documentation, discussion with other providers  more than 50% of the time was spent on coordination of care

## 2022-04-05 NOTE — SUBJECTIVE & OBJECTIVE
Review of Systems   Constitutional:  Negative for fever.   HENT:  Negative for congestion.    Respiratory:  Negative for cough.    Gastrointestinal:  Negative for abdominal pain.   Endocrine: Negative for polyuria.   Genitourinary:  Negative for flank pain.   Musculoskeletal:  Negative for back pain.   Skin:  Negative for rash.   Neurological:  Negative for syncope.   Psychiatric/Behavioral:  Negative for confusion.    Objective:     Vital Signs (Most Recent):  Temp: 98.8 °F (37.1 °C) (04/05/22 1101)  Pulse: 86 (04/05/22 1231)  Resp: (!) 25 (04/05/22 1231)  BP: 125/63 (04/05/22 1131)  SpO2: (!) 94 % (04/05/22 1231) Vital Signs (24h Range):  Temp:  [98.7 °F (37.1 °C)-99.7 °F (37.6 °C)] 98.8 °F (37.1 °C)  Pulse:  [] 86  Resp:  [19-48] 25  SpO2:  [93 %-98 %] 94 %  BP: (116-152)/(55-69) 125/63     Weight: 114.6 kg (252 lb 10.4 oz) (bedscale)  Body mass index is 43.37 kg/m².    Intake/Output Summary (Last 24 hours) at 4/5/2022 1252  Last data filed at 4/5/2022 1200  Gross per 24 hour   Intake 985 ml   Output 1570 ml   Net -585 ml        Physical Exam  Vitals and nursing note reviewed.   Constitutional:       General: She is not in acute distress.     Appearance: She is ill-appearing. She is not toxic-appearing.   HENT:      Head: Normocephalic and atraumatic.   Cardiovascular:      Rate and Rhythm: Normal rate.   Pulmonary:      Effort: Tachypnea and respiratory distress present.      Comments: Mechanical breath sounds  Abdominal:      Palpations: Abdomen is soft.   Genitourinary:     Comments: alatorre  Musculoskeletal:         General: Swelling present.      Right lower leg: Edema present.      Left lower leg: Edema present.   Skin:     General: Skin is warm.       Significant Labs: All pertinent labs within the past 24 hours have been reviewed.    Significant Imaging: I have reviewed all pertinent imaging results/findings within the past 24 hours.

## 2022-04-05 NOTE — PLAN OF CARE
Patient  resting quietly in bed with safety and fall prevention measures in place. Current vital signs are stable. Patient remains lethargic and arousable to pain. Occasionally responds to voice and will follow simple commands. MRI this afternoon to further assess LOC reasons. Will monitor for needs/changes.

## 2022-04-06 ENCOUNTER — TELEPHONE (OUTPATIENT)
Dept: PHARMACY | Facility: HOSPITAL | Age: 72
End: 2022-04-06

## 2022-04-06 LAB
ALLENS TEST: ABNORMAL
AMMONIA PLAS-SCNC: 22 UMOL/L (ref 10–50)
ANION GAP SERPL CALC-SCNC: 14 MMOL/L (ref 8–16)
ANISOCYTOSIS BLD QL SMEAR: SLIGHT
BASOPHILS NFR BLD: 0 % (ref 0–1.9)
BNP SERPL-MCNC: 96 PG/ML (ref 0–99)
BUN SERPL-MCNC: 64 MG/DL (ref 8–23)
CALCIUM SERPL-MCNC: 8.5 MG/DL (ref 8.7–10.5)
CHLORIDE SERPL-SCNC: 100 MMOL/L (ref 95–110)
CO2 SERPL-SCNC: 25 MMOL/L (ref 23–29)
CREAT SERPL-MCNC: 3.7 MG/DL (ref 0.5–1.4)
DACRYOCYTES BLD QL SMEAR: ABNORMAL
DELSYS: ABNORMAL
DIFFERENTIAL METHOD: ABNORMAL
EOSINOPHIL NFR BLD: 2 % (ref 0–8)
ERYTHROCYTE [DISTWIDTH] IN BLOOD BY AUTOMATED COUNT: 15.9 % (ref 11.5–14.5)
ERYTHROCYTE [SEDIMENTATION RATE] IN BLOOD BY WESTERGREN METHOD: 18 MM/H
EST. GFR  (AFRICAN AMERICAN): 14 ML/MIN/1.73 M^2
EST. GFR  (NON AFRICAN AMERICAN): 12 ML/MIN/1.73 M^2
FIO2: 40
GLUCOSE SERPL-MCNC: 149 MG/DL (ref 70–110)
HCO3 UR-SCNC: 28.3 MMOL/L (ref 24–28)
HCT VFR BLD AUTO: 28 % (ref 37–48.5)
HGB BLD-MCNC: 8.8 G/DL (ref 12–16)
HYPOCHROMIA BLD QL SMEAR: ABNORMAL
IMM GRANULOCYTES # BLD AUTO: ABNORMAL K/UL (ref 0–0.04)
IMM GRANULOCYTES NFR BLD AUTO: ABNORMAL % (ref 0–0.5)
LYMPHOCYTES NFR BLD: 5 % (ref 18–48)
MAGNESIUM SERPL-MCNC: 2.1 MG/DL (ref 1.6–2.6)
MCH RBC QN AUTO: 24.3 PG (ref 27–31)
MCHC RBC AUTO-ENTMCNC: 31.4 G/DL (ref 32–36)
MCV RBC AUTO: 77 FL (ref 82–98)
METAMYELOCYTES NFR BLD MANUAL: 2 %
MODE: ABNORMAL
MONOCYTES NFR BLD: 7 % (ref 4–15)
MYELOCYTES NFR BLD MANUAL: 1 %
NEUTROPHILS NFR BLD: 81 % (ref 38–73)
NEUTS BAND NFR BLD MANUAL: 2 %
NRBC BLD-RTO: 0 /100 WBC
PCO2 BLDA: 42.1 MMHG (ref 35–45)
PEEP: 6
PH SMN: 7.44 [PH] (ref 7.35–7.45)
PHOSPHATE SERPL-MCNC: 3.5 MG/DL (ref 2.7–4.5)
PLATELET # BLD AUTO: 235 K/UL (ref 150–450)
PLATELET BLD QL SMEAR: ABNORMAL
PMV BLD AUTO: 10.1 FL (ref 9.2–12.9)
PO2 BLDA: 77 MMHG (ref 80–100)
POC BE: 4 MMOL/L
POC SATURATED O2: 96 % (ref 95–100)
POCT GLUCOSE: 155 MG/DL (ref 70–110)
POCT GLUCOSE: 156 MG/DL (ref 70–110)
POCT GLUCOSE: 157 MG/DL (ref 70–110)
POCT GLUCOSE: 160 MG/DL (ref 70–110)
POIKILOCYTOSIS BLD QL SMEAR: SLIGHT
POLYCHROMASIA BLD QL SMEAR: ABNORMAL
POTASSIUM SERPL-SCNC: 3.3 MMOL/L (ref 3.5–5.1)
RBC # BLD AUTO: 3.62 M/UL (ref 4–5.4)
SAMPLE: ABNORMAL
SITE: ABNORMAL
SODIUM SERPL-SCNC: 139 MMOL/L (ref 136–145)
SPHEROCYTES BLD QL SMEAR: ABNORMAL
T4 FREE SERPL-MCNC: 1.03 NG/DL (ref 0.71–1.51)
TARGETS BLD QL SMEAR: ABNORMAL
TSH SERPL DL<=0.005 MIU/L-ACNC: 0.21 UIU/ML (ref 0.4–4)
VANCOMYCIN SERPL-MCNC: 23.3 UG/ML
VT: 400
WBC # BLD AUTO: 22.39 K/UL (ref 3.9–12.7)

## 2022-04-06 PROCEDURE — 85007 BL SMEAR W/DIFF WBC COUNT: CPT | Performed by: INTERNAL MEDICINE

## 2022-04-06 PROCEDURE — 80202 ASSAY OF VANCOMYCIN: CPT | Performed by: FAMILY MEDICINE

## 2022-04-06 PROCEDURE — 27100171 HC OXYGEN HIGH FLOW UP TO 24 HOURS

## 2022-04-06 PROCEDURE — 25000003 PHARM REV CODE 250: Performed by: STUDENT IN AN ORGANIZED HEALTH CARE EDUCATION/TRAINING PROGRAM

## 2022-04-06 PROCEDURE — 25000003 PHARM REV CODE 250: Performed by: NURSE PRACTITIONER

## 2022-04-06 PROCEDURE — 84100 ASSAY OF PHOSPHORUS: CPT | Performed by: INTERNAL MEDICINE

## 2022-04-06 PROCEDURE — 63600175 PHARM REV CODE 636 W HCPCS: Performed by: INTERNAL MEDICINE

## 2022-04-06 PROCEDURE — 99291 CRITICAL CARE FIRST HOUR: CPT | Mod: ,,, | Performed by: INTERNAL MEDICINE

## 2022-04-06 PROCEDURE — 25000003 PHARM REV CODE 250: Performed by: FAMILY MEDICINE

## 2022-04-06 PROCEDURE — 94761 N-INVAS EAR/PLS OXIMETRY MLT: CPT

## 2022-04-06 PROCEDURE — 99900026 HC AIRWAY MAINTENANCE (STAT)

## 2022-04-06 PROCEDURE — 84439 ASSAY OF FREE THYROXINE: CPT | Performed by: INTERNAL MEDICINE

## 2022-04-06 PROCEDURE — 80048 BASIC METABOLIC PNL TOTAL CA: CPT | Performed by: INTERNAL MEDICINE

## 2022-04-06 PROCEDURE — 27000221 HC OXYGEN, UP TO 24 HOURS

## 2022-04-06 PROCEDURE — 20000000 HC ICU ROOM

## 2022-04-06 PROCEDURE — 99291 PR CRITICAL CARE, E/M 30-74 MINUTES: ICD-10-PCS | Mod: 95,,, | Performed by: INTERNAL MEDICINE

## 2022-04-06 PROCEDURE — 63600175 PHARM REV CODE 636 W HCPCS: Performed by: SURGERY

## 2022-04-06 PROCEDURE — 94003 VENT MGMT INPAT SUBQ DAY: CPT

## 2022-04-06 PROCEDURE — 94760 N-INVAS EAR/PLS OXIMETRY 1: CPT

## 2022-04-06 PROCEDURE — 85027 COMPLETE CBC AUTOMATED: CPT | Performed by: INTERNAL MEDICINE

## 2022-04-06 PROCEDURE — A4216 STERILE WATER/SALINE, 10 ML: HCPCS | Performed by: FAMILY MEDICINE

## 2022-04-06 PROCEDURE — 94640 AIRWAY INHALATION TREATMENT: CPT

## 2022-04-06 PROCEDURE — 83735 ASSAY OF MAGNESIUM: CPT | Performed by: INTERNAL MEDICINE

## 2022-04-06 PROCEDURE — 84443 ASSAY THYROID STIM HORMONE: CPT | Performed by: INTERNAL MEDICINE

## 2022-04-06 PROCEDURE — 99900035 HC TECH TIME PER 15 MIN (STAT)

## 2022-04-06 PROCEDURE — 82140 ASSAY OF AMMONIA: CPT | Performed by: INTERNAL MEDICINE

## 2022-04-06 PROCEDURE — 25000242 PHARM REV CODE 250 ALT 637 W/ HCPCS: Performed by: INTERNAL MEDICINE

## 2022-04-06 PROCEDURE — 63600175 PHARM REV CODE 636 W HCPCS: Performed by: FAMILY MEDICINE

## 2022-04-06 PROCEDURE — 99291 CRITICAL CARE FIRST HOUR: CPT | Mod: 95,,, | Performed by: INTERNAL MEDICINE

## 2022-04-06 PROCEDURE — 25000003 PHARM REV CODE 250: Performed by: SURGERY

## 2022-04-06 PROCEDURE — 99291 PR CRITICAL CARE, E/M 30-74 MINUTES: ICD-10-PCS | Mod: ,,, | Performed by: INTERNAL MEDICINE

## 2022-04-06 PROCEDURE — 82803 BLOOD GASES ANY COMBINATION: CPT

## 2022-04-06 PROCEDURE — 36600 WITHDRAWAL OF ARTERIAL BLOOD: CPT

## 2022-04-06 PROCEDURE — 83880 ASSAY OF NATRIURETIC PEPTIDE: CPT | Performed by: INTERNAL MEDICINE

## 2022-04-06 RX ORDER — HYDRALAZINE HYDROCHLORIDE 20 MG/ML
10 INJECTION INTRAMUSCULAR; INTRAVENOUS EVERY 6 HOURS PRN
Status: DISCONTINUED | OUTPATIENT
Start: 2022-04-06 | End: 2022-04-12 | Stop reason: HOSPADM

## 2022-04-06 RX ADMIN — IPRATROPIUM BROMIDE AND ALBUTEROL SULFATE 3 ML: 2.5; .5 SOLUTION RESPIRATORY (INHALATION) at 03:04

## 2022-04-06 RX ADMIN — INSULIN ASPART 2 UNITS: 100 INJECTION, SOLUTION INTRAVENOUS; SUBCUTANEOUS at 05:04

## 2022-04-06 RX ADMIN — ALTEPLASE 2 MG: 2.2 INJECTION, POWDER, LYOPHILIZED, FOR SOLUTION INTRAVENOUS at 04:04

## 2022-04-06 RX ADMIN — ANTI-FUNGAL POWDER MICONAZOLE NITRATE TALC FREE: 1.42 POWDER TOPICAL at 09:04

## 2022-04-06 RX ADMIN — IPRATROPIUM BROMIDE AND ALBUTEROL SULFATE 3 ML: 2.5; .5 SOLUTION RESPIRATORY (INHALATION) at 11:04

## 2022-04-06 RX ADMIN — IPRATROPIUM BROMIDE AND ALBUTEROL SULFATE 3 ML: 2.5; .5 SOLUTION RESPIRATORY (INHALATION) at 07:04

## 2022-04-06 RX ADMIN — ANTI-FUNGAL POWDER MICONAZOLE NITRATE TALC FREE: 1.42 POWDER TOPICAL at 08:04

## 2022-04-06 RX ADMIN — FAMOTIDINE 20 MG: 10 INJECTION, SOLUTION INTRAVENOUS at 08:04

## 2022-04-06 RX ADMIN — SODIUM CHLORIDE, PRESERVATIVE FREE 10 ML: 5 INJECTION INTRAVENOUS at 05:04

## 2022-04-06 RX ADMIN — POTASSIUM BICARBONATE 20 MEQ: 391 TABLET, EFFERVESCENT ORAL at 11:04

## 2022-04-06 RX ADMIN — Medication 10 ML: at 02:04

## 2022-04-06 RX ADMIN — INSULIN ASPART 2 UNITS: 100 INJECTION, SOLUTION INTRAVENOUS; SUBCUTANEOUS at 06:04

## 2022-04-06 RX ADMIN — Medication 10 ML: at 10:04

## 2022-04-06 RX ADMIN — FLUCONAZOLE 200 MG: 2 INJECTION, SOLUTION INTRAVENOUS at 06:04

## 2022-04-06 RX ADMIN — POTASSIUM BICARBONATE 50 MEQ: 977.5 TABLET, EFFERVESCENT ORAL at 10:04

## 2022-04-06 RX ADMIN — HEPARIN SODIUM 7500 UNITS: 5000 INJECTION INTRAVENOUS; SUBCUTANEOUS at 09:04

## 2022-04-06 RX ADMIN — HEPARIN SODIUM 7500 UNITS: 5000 INJECTION INTRAVENOUS; SUBCUTANEOUS at 01:04

## 2022-04-06 RX ADMIN — Medication 10 ML: at 06:04

## 2022-04-06 RX ADMIN — INSULIN ASPART 2 UNITS: 100 INJECTION, SOLUTION INTRAVENOUS; SUBCUTANEOUS at 11:04

## 2022-04-06 RX ADMIN — SODIUM CHLORIDE, PRESERVATIVE FREE 10 ML: 5 INJECTION INTRAVENOUS at 12:04

## 2022-04-06 RX ADMIN — HEPARIN SODIUM 7500 UNITS: 5000 INJECTION INTRAVENOUS; SUBCUTANEOUS at 05:04

## 2022-04-06 RX ADMIN — CHLORHEXIDINE GLUCONATE 0.12% ORAL RINSE 15 ML: 1.2 LIQUID ORAL at 08:04

## 2022-04-06 RX ADMIN — SODIUM CHLORIDE, PRESERVATIVE FREE 10 ML: 5 INJECTION INTRAVENOUS at 06:04

## 2022-04-06 RX ADMIN — CEFEPIME HYDROCHLORIDE 2 G: 2 INJECTION, SOLUTION INTRAVENOUS at 01:04

## 2022-04-06 NOTE — PROGRESS NOTES
[facility] ([facility])  Nephrology  Progress Note    Patient Name: Page Grissom  MRN: [id number]  Admission Date: 3/29/2022  Hospital Length of Stay: 8 days  Attending Provider: Batsheva Landaverde MD   Primary Care Physician: NEELAM Roman Jr, MD  Principal Problem:Acute metabolic encephalopathy    Subjective:     HPI: Noted    Interval History: Pt was seen and examined in ICU. Labs and meds reviewed. Discussed with other providers. No new events. S/p HD 2 days ago.      Review of patient's allergies indicates:   Allergen Reactions    Lisinopril Anaphylaxis     Angioedema requiring trach     Current Facility-Administered Medications   Medication Frequency    0.9%  NaCl infusion (for blood administration) Q24H PRN    albuterol-ipratropium 2.5 mg-0.5 mg/3 mL nebulizer solution 3 mL Q8H    cefepime in dextrose 5 % IVPB 2 g Q24H    chlorhexidine 0.12 % solution 15 mL BID    dextrose 10% bolus 125 mL PRN    famotidine (PF) injection 20 mg Daily    fentaNYL 50 mcg/mL injection 50 mcg Q1H PRN    fluconazole (DIFLUCAN) IVPB 200 mg Q24H    glucagon (human recombinant) injection 1 mg PRN    heparin (porcine) injection 2,500 Units PRN    heparin (porcine) injection 5,200 Units PRN    heparin (porcine) injection 7,500 Units Q8H    hydrALAZINE injection 10 mg Q6H PRN    insulin aspart U-100 pen 1-10 Units Q6H PRN    miconazole NITRATE 2 % top powder BID    naloxone 0.4 mg/mL injection 0.02 mg PRN    sodium chloride 0.9% flush 10 mL Q6H    And    sodium chloride 0.9% flush 10 mL PRN    sodium chloride 0.9% flush 10 mL Q8H    vancomycin - pharmacy to dose pharmacy to manage frequency       Objective:     Vital Signs (Most Recent):  Temp: 98.5 °F (36.9 °C) (04/06/22 0705)  Pulse: (!) 59 (04/06/22 1106)  Resp: (!) 24 (04/06/22 1106)  BP: (!) 173/74 (04/06/22 1104)  SpO2: 100 % (04/06/22 1106)  O2 Device (Oxygen Therapy): ventilator (04/06/22 1106)   Vital Signs (24h Range):  Temp:  [98.5 °F (36.9  °C)-99.7 °F (37.6 °C)] 98.5 °F (36.9 °C)  Pulse:  [59-95] 59  Resp:  [19-36] 24  SpO2:  [94 %-100 %] 100 %  BP: (105-173)/(50-77) 173/74     Weight: 113.4 kg (250 lb) (04/06/22 0617)  Body mass index is 42.91 kg/m².  Body surface area is 2.26 meters squared.    I/O last 3 completed shifts:  In: 1080 [NG/GT:880; IV Piggyback:200]  Out: 2315 [Urine:2315]    Physical Exam  Vitals and nursing note reviewed.   Constitutional:       Appearance: Normal appearance.   Cardiovascular:      Rate and Rhythm: Normal rate and regular rhythm.      Pulses: Normal pulses.      Heart sounds: Normal heart sounds.   Pulmonary:      Effort: Pulmonary effort is normal.      Breath sounds: Rales present.   Abdominal:      General: Abdomen is flat.      Palpations: Abdomen is soft.   Musculoskeletal:      Right lower leg: Edema present.      Left lower leg: Edema present.       Significant Labs: reviewed  BMP  Lab Results   Component Value Date     04/06/2022    K 3.3 (L) 04/06/2022     04/06/2022    CO2 25 04/06/2022    BUN 64 (H) 04/06/2022    CREATININE 3.7 (H) 04/06/2022    CALCIUM 8.5 (L) 04/06/2022    ANIONGAP 14 04/06/2022    ESTGFRAFRICA 14 (A) 04/06/2022    EGFRNONAA 12 (A) 04/06/2022     Lab Results   Component Value Date    WBC 22.39 (H) 04/06/2022    HGB 8.8 (L) 04/06/2022    HCT 28.0 (L) 04/06/2022    MCV 77 (L) 04/06/2022     04/06/2022     ABG  Recent Labs   Lab 04/06/22  0331   PH 7.436   PO2 77*   PCO2 42.1   HCO3 28.3*   BE 4         Significant Imaging: reviewed CXR    Assessment/Plan:     72 y/o female with KYE and initial presentation with angioedema and hypotension:     KYE (acute kidney injury)  Pt presented with hypotension after experiencing anaphylactic shock.  Suspect KYE due to sustained hypotension resulting in acute tubular necrosis (ATN)     S/p acute HD on 4/4/22, tolerated HD well  s Cr stable  Good UOP, appears slowly recovering  K normal to slightly low, likely obdulio of beginning renal  recovery  Hyponatremia, corrected with HD  Metabolic acidosis, improved with HD  O2 sat good  Fluid gain, edema, improved with HD     Will hold HD today as well  Will monitor closely  Discussed with ICU and primary team     * Angioedema  Chart was reviewed  ACE-I induced?     Plans and recommendations:  As reviewed above  Total critical care time spent 40 minutes including time needed to review the records, the   patient evaluation, documentation, discussion with other providers  more than 50% of the time was spent on coordination of care          Lindsay Mane MD  Nephrology  [facility] ([facility])

## 2022-04-06 NOTE — SUBJECTIVE & OBJECTIVE
Interval History: Pt was seen and examined in ICU. Labs and meds reviewed. Discussed with other providers. No new events. S/p HD 2 days ago.      Review of patient's allergies indicates:   Allergen Reactions    Lisinopril Anaphylaxis     Angioedema requiring trach     Current Facility-Administered Medications   Medication Frequency    0.9%  NaCl infusion (for blood administration) Q24H PRN    albuterol-ipratropium 2.5 mg-0.5 mg/3 mL nebulizer solution 3 mL Q8H    cefepime in dextrose 5 % IVPB 2 g Q24H    chlorhexidine 0.12 % solution 15 mL BID    dextrose 10% bolus 125 mL PRN    famotidine (PF) injection 20 mg Daily    fentaNYL 50 mcg/mL injection 50 mcg Q1H PRN    fluconazole (DIFLUCAN) IVPB 200 mg Q24H    glucagon (human recombinant) injection 1 mg PRN    heparin (porcine) injection 2,500 Units PRN    heparin (porcine) injection 5,200 Units PRN    heparin (porcine) injection 7,500 Units Q8H    hydrALAZINE injection 10 mg Q6H PRN    insulin aspart U-100 pen 1-10 Units Q6H PRN    miconazole NITRATE 2 % top powder BID    naloxone 0.4 mg/mL injection 0.02 mg PRN    sodium chloride 0.9% flush 10 mL Q6H    And    sodium chloride 0.9% flush 10 mL PRN    sodium chloride 0.9% flush 10 mL Q8H    vancomycin - pharmacy to dose pharmacy to manage frequency       Objective:     Vital Signs (Most Recent):  Temp: 98.5 °F (36.9 °C) (04/06/22 0705)  Pulse: (!) 59 (04/06/22 1106)  Resp: (!) 24 (04/06/22 1106)  BP: (!) 173/74 (04/06/22 1104)  SpO2: 100 % (04/06/22 1106)  O2 Device (Oxygen Therapy): ventilator (04/06/22 1106)   Vital Signs (24h Range):  Temp:  [98.5 °F (36.9 °C)-99.7 °F (37.6 °C)] 98.5 °F (36.9 °C)  Pulse:  [59-95] 59  Resp:  [19-36] 24  SpO2:  [94 %-100 %] 100 %  BP: (105-173)/(50-77) 173/74     Weight: 113.4 kg (250 lb) (04/06/22 0617)  Body mass index is 42.91 kg/m².  Body surface area is 2.26 meters squared.    I/O last 3 completed shifts:  In: 1080 [NG/GT:880; IV Piggyback:200]  Out: 2315 [Urine:2315]    Physical  Exam  Vitals and nursing note reviewed.   Constitutional:       Appearance: Normal appearance.   Cardiovascular:      Rate and Rhythm: Normal rate and regular rhythm.      Pulses: Normal pulses.      Heart sounds: Normal heart sounds.   Pulmonary:      Effort: Pulmonary effort is normal.      Breath sounds: Rales present.   Abdominal:      General: Abdomen is flat.      Palpations: Abdomen is soft.   Musculoskeletal:      Right lower leg: Edema present.      Left lower leg: Edema present.       Significant Labs: reviewed  BMP  Lab Results   Component Value Date     04/06/2022    K 3.3 (L) 04/06/2022     04/06/2022    CO2 25 04/06/2022    BUN 64 (H) 04/06/2022    CREATININE 3.7 (H) 04/06/2022    CALCIUM 8.5 (L) 04/06/2022    ANIONGAP 14 04/06/2022    ESTGFRAFRICA 14 (A) 04/06/2022    EGFRNONAA 12 (A) 04/06/2022     Lab Results   Component Value Date    WBC 22.39 (H) 04/06/2022    HGB 8.8 (L) 04/06/2022    HCT 28.0 (L) 04/06/2022    MCV 77 (L) 04/06/2022     04/06/2022     ABG  Recent Labs   Lab 04/06/22  0331   PH 7.436   PO2 77*   PCO2 42.1   HCO3 28.3*   BE 4         Significant Imaging: reviewed CXR

## 2022-04-06 NOTE — SUBJECTIVE & OBJECTIVE
Review of Systems   Constitutional:  Negative for fever.   HENT:  Negative for congestion.    Respiratory:  Negative for cough.    Gastrointestinal:  Negative for abdominal pain.   Endocrine: Negative for polyuria.   Genitourinary:  Negative for flank pain.   Musculoskeletal:  Negative for back pain.   Skin:  Negative for rash.   Neurological:  Negative for syncope.   Psychiatric/Behavioral:  Negative for confusion.    Objective:     Vital Signs (Most Recent):  Temp: 99 °F (37.2 °C) (04/06/22 1505)  Pulse: 65 (04/06/22 1505)  Resp: (!) 23 (04/06/22 1505)  BP: (!) 147/66 (04/06/22 1505)  SpO2: 100 % (04/06/22 1505) Vital Signs (24h Range):  Temp:  [98.5 °F (36.9 °C)-99.7 °F (37.6 °C)] 99 °F (37.2 °C)  Pulse:  [57-95] 65  Resp:  [19-37] 23  SpO2:  [94 %-100 %] 100 %  BP: (105-195)/(50-81) 147/66     Weight: 113.4 kg (250 lb)  Body mass index is 42.91 kg/m².    Intake/Output Summary (Last 24 hours) at 4/6/2022 1714  Last data filed at 4/6/2022 1500  Gross per 24 hour   Intake 815 ml   Output 1785 ml   Net -970 ml        Physical Exam  Vitals and nursing note reviewed.   Constitutional:       General: She is not in acute distress.     Appearance: She is ill-appearing. She is not toxic-appearing.   HENT:      Head: Normocephalic and atraumatic.   Cardiovascular:      Rate and Rhythm: Normal rate.   Pulmonary:      Effort: Tachypnea and respiratory distress present.      Comments: Mechanical breath sounds  Abdominal:      Palpations: Abdomen is soft.   Genitourinary:     Comments: alatorre  Musculoskeletal:         General: Swelling present.      Right lower leg: Edema present.      Left lower leg: Edema present.   Skin:     General: Skin is warm.       Significant Labs: All pertinent labs within the past 24 hours have been reviewed.    Significant Imaging: I have reviewed all pertinent imaging results/findings within the past 24 hours.

## 2022-04-06 NOTE — PLAN OF CARE
Problem: Infection  Goal: Absence of Infection Signs and Symptoms  Outcome: Ongoing, Progressing     Problem: Skin and Tissue Injury (Mechanical Ventilation, Invasive)  Goal: Absence of Device-Related Skin and Tissue Injury  Outcome: Ongoing, Progressing     Problem: Renal Function Impairment (Acute Kidney Injury/Impairment)  Goal: Effective Renal Function  Outcome: Ongoing, Progressing

## 2022-04-06 NOTE — PROGRESS NOTES
[facility] ([facility])  Adult Nutrition  Progress Note    SUMMARY       Recommendations    Recommendation/Intervention:   1. Recommend to continue Novasource Renal as tolerated.   -Goal rate @30 ml/hr.   -Provides 1440 kcals, 65g protein, and 516 ml H2O flush.   -100 ml H2O flush q 4-6 hours.   2. Recommend to d/c beneprotein due to renal labs and KYE.   3. RD to follow and make rec's accordingly.    Goals: 1. Patient will meet >60% of estimated energy needs by RD follow up.  Nutrition Goal Status: goal met  Communication of RD Recs: other (comment) (POC)    Assessment and Plan    Nutrition Problem  Inadequate oral intake    Related to (etiology):   Inability to consume sufficient energy     Signs and Symptoms (as evidenced by):   Current NPO status    Interventions/Recommendations (treatment strategy):  1. Recommend to continue Novasource Renal as tolerated.   -Goal rate @30 ml/hr.   -Provides 1440 kcals, 65g protein, and 516 ml H2O flush.   -100 ml H2O flush q 4-6 hours.   2. Recommend to d/c beneprotein due to renal labs and KYE.   3. RD to follow and make rec's accordingly.      Nutrition Diagnosis Status:   Continues        Malnutrition Assessment                                       Reason for Assessment    Reason For Assessment: RD follow-up  Diagnosis: other (see comments) (acute metabolic encephalopathy)  Relevant Medical History: gerd, HTN, high cholesterol, DM, CKD3, fibromyalgia  Interdisciplinary Rounds: did not attend  General Information Comments: RD reviewed for follow up visit.  Pt is currently NPO d/t intubation status.  TF is Novasource Renal @ 30ml/hr.  Pt tolerating well. Pt LBM 4/6 and I/O: -525.  Nutrition Discharge Planning: TBD as care progresses    Nutrition Risk Screen    Nutrition Risk Screen: dysphagia or difficulty swallowing, tube feeding or parenteral nutrition    Nutrition/Diet History    Patient Reported Diet/Restrictions/Preferences:  (unknown)  Spiritual, Cultural  "Beliefs, Rastafari Practices, Values that Affect Care: no  Food Allergies: NKFA  Factors Affecting Nutritional Intake: NPO, on mechanical ventilation    Anthropometrics    Temp: 98.5 °F (36.9 °C)  Height: 5' 4" (162.6 cm)  Height (inches): 64 in  Weight Method: Bed Scale  Weight: 113.4 kg (250 lb)  Weight (lb): 250 lb  Ideal Body Weight (IBW), Female: 120 lb  % Ideal Body Weight, Female (lb): 217.53 %  BMI (Calculated): 42.9  BMI Grade: greater than 40 - morbid obesity       Lab/Procedures/Meds    Pertinent Labs Reviewed: reviewed  Pertinent Labs Comments: Glu: 149, BUN: 64, K+: 3.3, Ca: 8.5, TSH: 0.210, Creatinine: 3.7, GFR: 14, MCH: 24.3, H/H: 8.8/28, MCV: 77, MCHC: 31.4  Pertinent Medications Reviewed: reviewed  Pertinent Medications Comments: Cefepime, famotidine, aspart, NaCl 0.9%    Physical Findings/Assessment         Estimated/Assessed Needs    Weight Used For Calorie Calculations: 118.4 kg (261 lb 0.4 oz)  Energy Calorie Requirements (kcal): 0358-4949 (11-14kcal/kg, permissive underfeeding, BMI >40)  Energy Need Method: Kcal/kg  Protein Requirements: 55-66g (1-1.2 g/kg per CKD on HD)  Weight Used For Protein Calculations: 54.5 kg (120 lb 2.4 oz) (ideal bw)  Fluid Requirements (mL): 7488-7531  Estimated Fluid Requirement Method: RDA Method  RDA Method (mL): 1302  CHO Requirement: 162-207      Nutrition Prescription Ordered    Current Diet Order: NPO  Current Nutrition Support Formula Ordered: [facility] Renal  Current Nutrition Support Rate Ordered: 30 (ml)  Current Nutrition Support Frequency Ordered: Continuous    Evaluation of Received Nutrient/Fluid Intake    Enteral Calories (kcal): 1440  Enteral Protein (gm): 65  Enteral (Free Water) Fluid (mL): 516  Total Calories (kcal): 720  % Kcal Needs: 100%  % Protein Needs: 100%  Energy Calories Required: meeting needs  Protein Required: meeting needs  Fluid Required: not meeting needs  Tolerance: tolerating  % Intake of Estimated Energy Needs: 75 - 100 %  % " Meal Intake: NPO    Nutrition Risk    Level of Risk/Frequency of Follow-up: moderate     Monitor and Evaluation    Food and Nutrient Intake: energy intake, enteral nutrition intake  Food and Nutrient Adminstration: enteral and parenteral nutrition administration  Knowledge/Beliefs/Attitudes: food and nutrition knowledge/skill, beliefs and attitudes  Physical Activity and Function: nutrition-related ADLs and IADLs  Anthropometric Measurements: weight, weight change, body mass index  Biochemical Data, Medical Tests and Procedures: electrolyte and renal panel, gastrointestinal profile, glucose/endocrine profile, inflammatory profile, lipid profile  Nutrition-Focused Physical Findings: overall appearance     Nutrition Follow-Up    RD Follow-up?: Yes    Avani Granado, Student Dietitian

## 2022-04-06 NOTE — CONSULTS
Pharmacokinetic Assessment Follow Up: IV Vancomycin    Vancomycin serum concentration assessment(s):    The random level was drawn correctly and can be used to guide therapy at this time. The measurement is above the desired definitive target range of 15 to 20 mcg/mL.    Vancomycin Regimen Plan:    Re-dose when the random level is less than 20 mcg/mL, next level to be drawn at 1530 on 4/6    Drug levels (last 3 results):  Recent Labs   Lab Result Units 04/05/22 0338 04/06/22 0437   Vancomycin, Random ug/mL 29.5 23.3       Pharmacy will continue to follow and monitor vancomycin.    Please contact pharmacy at extension [phone] for questions regarding this assessment.    Thank you for the consult,   Danita Torre       Patient brief summary:  Page Grissom is a 71 y.o. female initiated on antimicrobial therapy with IV Vancomycin for treatment of lower respiratory infection    The patient's current regimen is pulse dosing    Drug Allergies:   Review of patient's allergies indicates:   Allergen Reactions    Lisinopril Anaphylaxis     Angioedema requiring trach       Actual Body Weight:   113.4kg    Renal Function:   Estimated Creatinine Clearance: 17.5 mL/min (A) (based on SCr of 3.7 mg/dL (H)).,     Dialysis Method (if applicable):  intermittent HD no HD today- last HD 2 days ago    CBC (last 72 hours):  Recent Labs   Lab Result Units 04/04/22 0558 04/05/22 0338 04/06/22 0437   WBC K/uL 21.03* 22.27* 22.39*   Hemoglobin g/dL 8.6* 8.9* 8.8*   Hematocrit % 26.2* 27.7* 28.0*   Platelets K/uL 271 254 235   Gran % % 88.0* 83.0* 81.0*   Lymph % % 3.0* 6.0* 5.0*   Mono % % 4.0 6.0 7.0   Eosinophil % % 0.0 3.0 2.0   Basophil % % 0.0 0.0 0.0   Differential Method  Manual Manual Manual       Metabolic Panel (last 72 hours):  Recent Labs   Lab Result Units 04/04/22 0558 04/05/22 0338 04/06/22 0436 04/06/22 0437   Sodium mmol/L 137 137  --  139   Potassium mmol/L 3.9 3.7  --  3.3*   Chloride mmol/L 94* 99  --   100   CO2 mmol/L 25 24  --  25   Glucose mg/dL 108 103  --  149*   BUN mg/dL 105* 46*  --  64*   Creatinine mg/dL 7.2* 3.4*  --  3.7*   Albumin g/dL  --  2.0*  --   --    Magnesium mg/dL  --  2.1 2.1  --    Phosphorus mg/dL  --  3.5 3.5  --        Vancomycin Administrations:  vancomycin given in the last 96 hours                   vancomycin 1.75 g in 5 % dextrose 500 mL IVPB (mg) 1,750 mg New Bag 04/04/22 1527                Microbiologic Results:  Microbiology Results (last 7 days)     Procedure Component Value Units Date/Time    Culture, Respiratory with Gram Stain [[id number]] Collected: 04/04/22 1256    Order Status: Completed Specimen: Respiratory from Tracheal Aspirate Updated: 04/06/22 0739     Respiratory Culture Normal respiratory fabi     Gram Stain (Respiratory) <10 epithelial cells per low power field.     Gram Stain (Respiratory) Rare WBC's     Gram Stain (Respiratory) Rare Gram negative rods    Blood culture [[id number]] Collected: 03/31/22 1023    Order Status: Completed Specimen: Blood from Peripheral, Left Hand Updated: 04/05/22 1812     Blood Culture, Routine No growth after 5 days.    Blood culture [[id number]] Collected: 03/31/22 0902    Order Status: Completed Specimen: Blood from Peripheral, Left Hand Updated: 04/05/22 1812     Blood Culture, Routine No growth after 5 days.    Culture, Respiratory with Gram Stain [[id number]]     Order Status: Canceled Specimen: Respiratory     Culture, Respiratory with Gram Stain [[id number]] Collected: 04/01/22 1255    Order Status: Completed Specimen: Respiratory from Endotracheal Aspirate Updated: 04/03/22 0932     Respiratory Culture Normal respiratory fabi      No S aureus or Pseudomonas isolated.     Gram Stain (Respiratory) Rare WBC's     Gram Stain (Respiratory) Rare Gram positive cocci     Gram Stain (Respiratory) Rare Gram negative rods    Culture, Respiratory with Gram Stain [[id number]] Collected: 03/30/22 1100    Order Status: Sent Specimen:  Respiratory from Tracheal Aspirate Updated: 03/30/22 0289

## 2022-04-06 NOTE — ASSESSMENT & PLAN NOTE
--neurological insult is likely multifactorial following anoxic event  --two days post dialysis there is improvement  in neurological status  --MRI brain 4/5 neg for infarct or bleed  --add PT/OT  --f/u further recs from ICU service regarding trach  --continue all other current care

## 2022-04-06 NOTE — PLAN OF CARE
Recommendations    Recommendation/Intervention:   1. Recommend to continue Novasource Renal as tolerated.   -Goal rate @30 ml/hr.   -Provides 1440 kcals, 65g protein, and 516 ml H2O flush.   -100 ml H2O flush q 4-6 hours.   2. Recommend to d/c beneprotein due to renal labs and KYE.   3. RD to follow and make rec's accordingly.    Goals: 1. Patient will meet >60% of estimated energy needs by RD follow up.  Nutrition Goal Status: goal met    Avani Granado, Student Dietitian

## 2022-04-06 NOTE — NURSING
Pt with uneventful shift. Responding to voice and following simple commands. VS stable. TMAX 99.7 tonight. SR on monitor. Xavier TF. CBGs WNL. good UOP per alatorre. Fall precautions in place. POC reviewed w/ daughter at bedside yesterday evening, MARY. Requests MD to call around midday today.    No adenopathy or splenomegaly. No cervical or inguinal lymphadenopathy.

## 2022-04-06 NOTE — PROGRESS NOTES
[facility] - [facility] ([facility])  [facility]  Progress Note    Patient Name: Page Grissom  MRN: [id number]  Patient Class: IP- Inpatient   Admission Date: 3/29/2022  Length of Stay: 8 days  Attending Physician: Batsheva Landaverde MD  Primary Care Provider: NEELAM Roman Jr, MD        Subjective:     Principal Problem:Acute metabolic encephalopathy        HPI:  72 y/o female with PMHx of HTN, HLD, DM, asthma, CKD 3, and RA who presented to the ED with c/o angioedema that onset suddenly this morning. Pt was able to verbally communicate with EMS, was not able to communicate with ED staff. Staff was unable to intubate and called surgery, who performed emergency trach. Pt is currently on vent and sedated.  ED workup shows: H/H 10.9/34.6, K+ 2.9, CO2 22, Anion gap 17, BUN 55, Cr 8.8.  She will be admitted to ICU for angioedema under the care of [facility] medicine.  She is a full code and her SDM is her daughter, Cassy.        Overview/Hospital Course:  3/30 emergently trach'd in ED as difficulty intubating d/t angioedema. Going to OR for replacement of tube  3/31 hypotensive requiring pressor support. Leukocytosis with electrolyte abn noted. Concerns for sepsis in immunocompromised patient. Low UOP. Nephrology following for possible dialysis. Guarded prognosis. Consider palliative consult for GOC/family support  4/1 remains on ventilatory support via trach. NG placed for med admin  4/2 somnolent but following commands, ESRD not currently receiving dialysis pt received versed for crich procedure slow metabolizer still somnolent  4/3- somnolent but following commands. D/w nephro dr. Mane trial of dialysis in am to metabolize sedatives pt does not seem to be metabolizing sedatives. Stable respiratory status  4/4- CT head 4/4 reveals mild subacute to chronic appearing ischemic changes to deep white matter both cerebral hemispheres. Pt remains somnolent EEG pending. Neuro following. Trial of dialysis to address  somnolence.  4/5- pt's mentation unchanged following HD. D/w neuro Dr. Ferrera; EEG shows metabolic encephalopathy. Neurological insult is likely multifactorial-prognosis still unknown.  4/6- pt's mentation has improved she now has her eyes open spontaneously, following commands. Creatinine has improved following dialysis down to 3.7 from 8.9. MRI Brain was negative for infarct or bleed          Review of Systems   Constitutional:  Negative for fever.   HENT:  Negative for congestion.    Respiratory:  Negative for cough.    Gastrointestinal:  Negative for abdominal pain.   Endocrine: Negative for polyuria.   Genitourinary:  Negative for flank pain.   Musculoskeletal:  Negative for back pain.   Skin:  Negative for rash.   Neurological:  Negative for syncope.   Psychiatric/Behavioral:  Negative for confusion.    Objective:     Vital Signs (Most Recent):  Temp: 99 °F (37.2 °C) (04/06/22 1505)  Pulse: 65 (04/06/22 1505)  Resp: (!) 23 (04/06/22 1505)  BP: (!) 147/66 (04/06/22 1505)  SpO2: 100 % (04/06/22 1505) Vital Signs (24h Range):  Temp:  [98.5 °F (36.9 °C)-99.7 °F (37.6 °C)] 99 °F (37.2 °C)  Pulse:  [57-95] 65  Resp:  [19-37] 23  SpO2:  [94 %-100 %] 100 %  BP: (105-195)/(50-81) 147/66     Weight: 113.4 kg (250 lb)  Body mass index is 42.91 kg/m².    Intake/Output Summary (Last 24 hours) at 4/6/2022 1714  Last data filed at 4/6/2022 1500  Gross per 24 hour   Intake 815 ml   Output 1785 ml   Net -970 ml        Physical Exam  Vitals and nursing note reviewed.   Constitutional:       General: She is not in acute distress.     Appearance: She is ill-appearing. She is not toxic-appearing.   HENT:      Head: Normocephalic and atraumatic.   Cardiovascular:      Rate and Rhythm: Normal rate.   Pulmonary:      Effort: Tachypnea and respiratory distress present.      Comments: Mechanical breath sounds  Abdominal:      Palpations: Abdomen is soft.   Genitourinary:     Comments: alatorre  Musculoskeletal:         General: Swelling  present.      Right lower leg: Edema present.      Left lower leg: Edema present.   Skin:     General: Skin is warm.       Significant Labs: All pertinent labs within the past 24 hours have been reviewed.    Significant Imaging: I have reviewed all pertinent imaging results/findings within the past 24 hours.      Assessment/Plan:      * Acute metabolic encephalopathy  --neurological insult is likely multifactorial following anoxic event  --two days post dialysis there is improvement  in neurological status  --MRI brain 4/5 neg for infarct or bleed  --add PT/OT  --f/u further recs from ICU service regarding trach  --continue all other current care      Morbid obesity        Acute kidney injury superimposed on chronic kidney disease  --baseline BUN/Cr 20/1.4  --stop lisinopril  --avoid nephrotoxic agents  --Nephrology following  --Pt is not acidotic and there is no hyperkalemia;   --d/w nephro service  current indication for dialysis is pt's inability to metabolize sedatives she is arousable but still extremely sedated  -- uremic  --IR for hemodialysis access per dR Mane today      Tracheostomy in place  --admit to ICU  --stop lisiniopril  --pulmonology following  --supportive care    OR for replacement of cricoid trach      Angioedema  --likely 2/2 lisinopril  --admit to ICU  --had emergent trach in ED, on ventalitor  --IV steroids, IV antihistamiens  --Fresh Frozen Plasma ordered in ED  --monitor closely    3/30  Improving   Continue supportive care  Minimal improvement in appearance  Consider bradykinin inhibitor?  Vent Mode: Spont  Oxygen Concentration (%):  [35-40] 35  Resp Rate Total:  [13 br/min-25 br/min] 15 br/min  Vt Set:  [400 mL] 400 mL  PEEP/CPAP:  [5 cmH20-8 cmH20] 5 cmH20  Pressure Support:  [0 cmH20-10 cmH20] 10 cmH20  Mean Airway Pressure:  [7.9 kkR18-83 cmH20] 7.9 cmH20     3/31  CC following for vent management  Surgery following after trach placement  Continue iv systemic  steroids    4/1  Continue current care    Rheumatoid arthritis involving multiple sites with positive rheumatoid factor  --hold plaquenil  --f/u OP         VTE Risk Mitigation (From admission, onward)         Ordered     heparin (porcine) injection 5,200 Units  As needed (PRN)         04/04/22 1342     heparin (porcine) injection 2,500 Units  As needed (PRN)         04/04/22 1115     Apply sequential compression device to lower extremities (if no contraindications). Medical venous thromboembolus prophylaxis is preferred.  Until discontinued         03/30/22 0805     heparin (porcine) injection 7,500 Units  Every 8 hours         03/29/22 1003     IP VTE HIGH RISK PATIENT  Once         03/29/22 1003                Discharge Planning   CHALO:      Code Status: Full Code   Is the patient medically ready for discharge?:     Reason for patient still in hospital (select all that apply): Pending disposition  Discharge Plan A: Home with family            Critical care time spent on the evaluation and treatment of severe organ dysfunction, review of pertinent labs and imaging studies, discussions with consulting providers and discussions with patient/family: 30 minutes.      Batsheva Landaverde MD  Department of Hospital Medicine   [facility] ([facility])

## 2022-04-06 NOTE — PROGRESS NOTES
Progress Note  [facility]    SUBJECTIVE:     Chief Complaint:   angioedema    History of Present Illness/Interval History:  The pt is a 71 yof who presented to the ED with angioedema. Airway was obstructed by her tongue.    Review of patient's allergies indicates:   Allergen Reactions    Lisinopril Anaphylaxis     Angioedema requiring trach       Past Medical History:   Diagnosis Date    Acidosis, metabolic, with respiratory acidosis 3/31/2022    Asthma     Back pain     Cataract     OD    CKD (chronic kidney disease) stage 3, GFR 30-59 ml/min 12/20/2020    Diabetes mellitus     Fibromyalgia     Gastroesophageal reflux disease     Hypercholesterolemia     Hypertension     Immune deficiency disorder     Obesity     Osteoporosis     Rheumatoid arthritis     Tobacco dependence     Trouble in sleeping     Type 2 diabetes mellitus      Past Surgical History:   Procedure Laterality Date    BRONCHOSCOPY N/A 3/30/2022    Procedure: Bronchoscopy;  Surgeon: Isak Yadav MD;  Location: [facility] OR;  Service: General;  Laterality: N/A;    COLONOSCOPY N/A 2/14/2019    Procedure: COLONOSCOPY;  Surgeon: Yury Atwood MD;  Location: [facility] ENDO;  Service: Endoscopy;  Laterality: N/A;    HERNIA REPAIR      OOPHORECTOMY      TRACHEOSTOMY N/A 3/30/2022    Procedure: CREATION, TRACHEOSTOMY;  Surgeon: Isak Yadav MD;  Location: [facility] OR;  Service: General;  Laterality: N/A;     Family History   Problem Relation Age of Onset    Hypertension Mother     Cancer Father     Colon cancer Father     Breast cancer Sister      Social History     Socioeconomic History    Marital status:    Tobacco Use    Smoking status: Current Every Day Smoker     Packs/day: 0.50     Years: 25.00     Pack years: 12.50     Types: Cigarettes    Smokeless tobacco: Never Used    Tobacco comment: trying to quit   Substance and Sexual Activity    Alcohol use: No    Drug use: No     Review of Systems:  Unable to  obtain due to patient's status- on a mechanical ventilator & unable to speak to me.    OBJECTIVE:     Vital Signs  Vitals:    04/06/22 1400 04/06/22 1500 04/06/22 1505 04/06/22 1723   BP: (!) 146/66 (!) 147/66 (!) 147/66    Pulse: 76 79 65 67   Resp: (!) 26 (!) 26 (!) 23 (!) 25   Temp:   99 °F (37.2 °C)    TempSrc:   Oral    SpO2: 100% 100% 100% 99%   Weight:       Height:         Body mass index is 42.91 kg/m².    Physical Exam:  General: no distress  Eyes:  conjunctivae/corneas clear  Nose: no discharge  Mouth: tongue swelling appears to be resolved though I did not examine the posterior pharynx.  Neck: trachea midline with no masses appreciated, trach in place & secured with stay-sutures  Lungs:  normal respiratory effort, no wheezes, no rales  Heart: regular rate and rhythm and no murmur  Abdomen: non-distended  Extremities: no cyanosis, +mild dependent edema, no clubbing  Skin: No rashes or lesions. good skin turgor  Neurologic: eyes open to my voice, follows commands with thumbs up on command today, PERRL, cough intact    Laboratory:  Lab Results   Component Value Date    WBC 22.39 (H) 04/06/2022    HGB 8.8 (L) 04/06/2022    HCT 28.0 (L) 04/06/2022    MCV 77 (L) 04/06/2022     04/06/2022       Chest Imaging, My Impression:   CXR reviewed    Diagnostic Results:  CT: Reviewed    ASSESSMENT/PLAN:     1) Angioedema due to ace inhibitor.  pt on avoidance once she is recovered.  2) Acute hypoxemic respiratory failure. Supporting with mechanical ventilation. Mental status is the main obstacle to ventilator weaning. Transition to trach collar tomorrow if secretions remain improved.  3) RLL infiltrate on CXR. +purulent secretions improved. Continue cefepime for now. Discontinue vanc.   4) Airway compromise s/p tracheostomy.  5) Acute encephalopathy. A little improved today. MRI reviewed.  6) KYE. Nephrology is following & supporting with RRT as needed.    Spoke with pt's daughter; updated her on the  patient's condition.    Critical Care Time: 30 minutes  Critical care was time spent personally by me on the following activities: evaluating this patient's organ dysfunction, development of treatment plan, discussing treatment plan with patient or surrogate and bedside caregivers, discussions with consultants, evaluation of patient's response to treatment, examination of patient, ordering and performing treatments and interventions, ordering and review of laboratory studies, ordering and review of radiographic studies, re-evaluation of patient's condition. This critical care time did not overlap with that of any other provider or involve time for any procedures.      Aryan Donnelly MD  [facility]

## 2022-04-06 NOTE — ASSESSMENT & PLAN NOTE
72 y/o female with KYE and initial presentation with angioedema and hypotension:     KYE (acute kidney injury)  Pt presented with hypotension after experiencing anaphylactic shock.  Suspect KYE due to sustained hypotension resulting in acute tubular necrosis (ATN)     S/p acute HD yesterday, tolerated HD well  s Cr stable  Good UOP, suspect pt is slowly recovering  K normal  Hyponatremia, corrected with HD  Metabolic acidosis, improved with HD  O2 sat good  Fluid gain, edema, improved with HD     Will hold HD today  Will monitor closely  Discussed with ICU and primary team     * Angioedema  Chart was reviewed  ACE-I induced?     Plans and recommendations:  As reviewed above  Total critical care time spent 40 minutes including time needed to review the records, the   patient evaluation, documentation, discussion with other providers  more than 50% of the time was spent on coordination of care

## 2022-04-07 DIAGNOSIS — I10 ESSENTIAL HYPERTENSION: ICD-10-CM

## 2022-04-07 LAB
ALLENS TEST: ABNORMAL
ANION GAP SERPL CALC-SCNC: 12 MMOL/L (ref 8–16)
ANISOCYTOSIS BLD QL SMEAR: SLIGHT
BACTERIA SPEC AEROBE CULT: NORMAL
BACTERIA SPEC AEROBE CULT: NORMAL
BASOPHILS NFR BLD: 0 % (ref 0–1.9)
BUN SERPL-MCNC: 70 MG/DL (ref 8–23)
CALCIUM SERPL-MCNC: 8.6 MG/DL (ref 8.7–10.5)
CHLORIDE SERPL-SCNC: 101 MMOL/L (ref 95–110)
CO2 SERPL-SCNC: 28 MMOL/L (ref 23–29)
CREAT SERPL-MCNC: 3.4 MG/DL (ref 0.5–1.4)
DELSYS: ABNORMAL
DIFFERENTIAL METHOD: ABNORMAL
EOSINOPHIL NFR BLD: 1 % (ref 0–8)
ERYTHROCYTE [DISTWIDTH] IN BLOOD BY AUTOMATED COUNT: 16.4 % (ref 11.5–14.5)
ERYTHROCYTE [SEDIMENTATION RATE] IN BLOOD BY WESTERGREN METHOD: 18 MM/H
EST. GFR  (AFRICAN AMERICAN): 15 ML/MIN/1.73 M^2
EST. GFR  (NON AFRICAN AMERICAN): 13 ML/MIN/1.73 M^2
FIO2: 40
GLUCOSE SERPL-MCNC: 148 MG/DL (ref 70–110)
GLUCOSE SERPL-MCNC: 155 MG/DL (ref 70–110)
GRAM STN SPEC: NORMAL
HBV SURFACE AB SER QL IA: NEGATIVE
HBV SURFACE AB SERPL IA-ACNC: <3 MIU/ML
HCO3 UR-SCNC: 31.4 MMOL/L (ref 24–28)
HCT VFR BLD AUTO: 26 % (ref 37–48.5)
HCT VFR BLD CALC: 25 %PCV (ref 36–54)
HGB BLD-MCNC: 8.1 G/DL (ref 12–16)
IMM GRANULOCYTES # BLD AUTO: ABNORMAL K/UL (ref 0–0.04)
IMM GRANULOCYTES NFR BLD AUTO: ABNORMAL % (ref 0–0.5)
LYMPHOCYTES NFR BLD: 3 % (ref 18–48)
MCH RBC QN AUTO: 23.9 PG (ref 27–31)
MCHC RBC AUTO-ENTMCNC: 31.2 G/DL (ref 32–36)
MCV RBC AUTO: 77 FL (ref 82–98)
METAMYELOCYTES NFR BLD MANUAL: 2 %
MODE: ABNORMAL
MONOCYTES NFR BLD: 8 % (ref 4–15)
NEUTROPHILS NFR BLD: 83 % (ref 38–73)
NEUTS BAND NFR BLD MANUAL: 3 %
NRBC BLD-RTO: 0 /100 WBC
PCO2 BLDA: 45.5 MMHG (ref 35–45)
PEEP: 6
PH SMN: 7.45 [PH] (ref 7.35–7.45)
PHOSPHATE SERPL-MCNC: 3 MG/DL (ref 2.7–4.5)
PLATELET # BLD AUTO: 210 K/UL (ref 150–450)
PLATELET BLD QL SMEAR: ABNORMAL
PMV BLD AUTO: 10 FL (ref 9.2–12.9)
PO2 BLDA: 81 MMHG (ref 80–100)
POC BE: 7 MMOL/L
POC IONIZED CALCIUM: 1.19 MMOL/L (ref 1.06–1.42)
POC SATURATED O2: 96 % (ref 95–100)
POCT GLUCOSE: 109 MG/DL (ref 70–110)
POCT GLUCOSE: 130 MG/DL (ref 70–110)
POCT GLUCOSE: 157 MG/DL (ref 70–110)
POCT GLUCOSE: 160 MG/DL (ref 70–110)
POIKILOCYTOSIS BLD QL SMEAR: SLIGHT
POLYCHROMASIA BLD QL SMEAR: ABNORMAL
POTASSIUM BLD-SCNC: 3.4 MMOL/L (ref 3.5–5.1)
POTASSIUM SERPL-SCNC: 3.6 MMOL/L (ref 3.5–5.1)
RBC # BLD AUTO: 3.39 M/UL (ref 4–5.4)
SAMPLE: ABNORMAL
SITE: ABNORMAL
SODIUM BLD-SCNC: 138 MMOL/L (ref 136–145)
SODIUM SERPL-SCNC: 141 MMOL/L (ref 136–145)
VT: 400
WBC # BLD AUTO: 17.43 K/UL (ref 3.9–12.7)

## 2022-04-07 PROCEDURE — 25000003 PHARM REV CODE 250: Performed by: FAMILY MEDICINE

## 2022-04-07 PROCEDURE — 99291 PR CRITICAL CARE, E/M 30-74 MINUTES: ICD-10-PCS | Mod: 95,,, | Performed by: INTERNAL MEDICINE

## 2022-04-07 PROCEDURE — 25000003 PHARM REV CODE 250: Performed by: SURGERY

## 2022-04-07 PROCEDURE — 99291 PR CRITICAL CARE, E/M 30-74 MINUTES: ICD-10-PCS | Mod: ,,, | Performed by: INTERNAL MEDICINE

## 2022-04-07 PROCEDURE — 80048 BASIC METABOLIC PNL TOTAL CA: CPT | Performed by: STUDENT IN AN ORGANIZED HEALTH CARE EDUCATION/TRAINING PROGRAM

## 2022-04-07 PROCEDURE — 97530 THERAPEUTIC ACTIVITIES: CPT

## 2022-04-07 PROCEDURE — 27200966 HC CLOSED SUCTION SYSTEM

## 2022-04-07 PROCEDURE — 27100171 HC OXYGEN HIGH FLOW UP TO 24 HOURS

## 2022-04-07 PROCEDURE — 85007 BL SMEAR W/DIFF WBC COUNT: CPT | Performed by: STUDENT IN AN ORGANIZED HEALTH CARE EDUCATION/TRAINING PROGRAM

## 2022-04-07 PROCEDURE — 94003 VENT MGMT INPAT SUBQ DAY: CPT

## 2022-04-07 PROCEDURE — 82803 BLOOD GASES ANY COMBINATION: CPT

## 2022-04-07 PROCEDURE — A4216 STERILE WATER/SALINE, 10 ML: HCPCS | Performed by: FAMILY MEDICINE

## 2022-04-07 PROCEDURE — 20000000 HC ICU ROOM

## 2022-04-07 PROCEDURE — 36600 WITHDRAWAL OF ARTERIAL BLOOD: CPT

## 2022-04-07 PROCEDURE — 94761 N-INVAS EAR/PLS OXIMETRY MLT: CPT

## 2022-04-07 PROCEDURE — 63600175 PHARM REV CODE 636 W HCPCS: Performed by: SURGERY

## 2022-04-07 PROCEDURE — 63600175 PHARM REV CODE 636 W HCPCS: Performed by: FAMILY MEDICINE

## 2022-04-07 PROCEDURE — 99900026 HC AIRWAY MAINTENANCE (STAT)

## 2022-04-07 PROCEDURE — 99900035 HC TECH TIME PER 15 MIN (STAT)

## 2022-04-07 PROCEDURE — 97166 OT EVAL MOD COMPLEX 45 MIN: CPT

## 2022-04-07 PROCEDURE — 94640 AIRWAY INHALATION TREATMENT: CPT

## 2022-04-07 PROCEDURE — 94760 N-INVAS EAR/PLS OXIMETRY 1: CPT

## 2022-04-07 PROCEDURE — 92610 EVALUATE SWALLOWING FUNCTION: CPT

## 2022-04-07 PROCEDURE — 27000221 HC OXYGEN, UP TO 24 HOURS

## 2022-04-07 PROCEDURE — 63600175 PHARM REV CODE 636 W HCPCS: Performed by: INTERNAL MEDICINE

## 2022-04-07 PROCEDURE — 99291 CRITICAL CARE FIRST HOUR: CPT | Mod: 95,,, | Performed by: INTERNAL MEDICINE

## 2022-04-07 PROCEDURE — 25000242 PHARM REV CODE 250 ALT 637 W/ HCPCS: Performed by: INTERNAL MEDICINE

## 2022-04-07 PROCEDURE — 84100 ASSAY OF PHOSPHORUS: CPT | Performed by: INTERNAL MEDICINE

## 2022-04-07 PROCEDURE — 85027 COMPLETE CBC AUTOMATED: CPT | Performed by: STUDENT IN AN ORGANIZED HEALTH CARE EDUCATION/TRAINING PROGRAM

## 2022-04-07 PROCEDURE — 99291 CRITICAL CARE FIRST HOUR: CPT | Mod: ,,, | Performed by: INTERNAL MEDICINE

## 2022-04-07 PROCEDURE — 97163 PT EVAL HIGH COMPLEX 45 MIN: CPT

## 2022-04-07 RX ADMIN — HEPARIN SODIUM 7500 UNITS: 5000 INJECTION INTRAVENOUS; SUBCUTANEOUS at 06:04

## 2022-04-07 RX ADMIN — ANTI-FUNGAL POWDER MICONAZOLE NITRATE TALC FREE: 1.42 POWDER TOPICAL at 09:04

## 2022-04-07 RX ADMIN — SODIUM CHLORIDE, PRESERVATIVE FREE 10 ML: 5 INJECTION INTRAVENOUS at 06:04

## 2022-04-07 RX ADMIN — CHLORHEXIDINE GLUCONATE 0.12% ORAL RINSE 15 ML: 1.2 LIQUID ORAL at 08:04

## 2022-04-07 RX ADMIN — SODIUM CHLORIDE, PRESERVATIVE FREE 10 ML: 5 INJECTION INTRAVENOUS at 12:04

## 2022-04-07 RX ADMIN — IPRATROPIUM BROMIDE AND ALBUTEROL SULFATE 3 ML: 2.5; .5 SOLUTION RESPIRATORY (INHALATION) at 05:04

## 2022-04-07 RX ADMIN — INSULIN ASPART 2 UNITS: 100 INJECTION, SOLUTION INTRAVENOUS; SUBCUTANEOUS at 01:04

## 2022-04-07 RX ADMIN — HEPARIN SODIUM 7500 UNITS: 5000 INJECTION INTRAVENOUS; SUBCUTANEOUS at 10:04

## 2022-04-07 RX ADMIN — ANTI-FUNGAL POWDER MICONAZOLE NITRATE TALC FREE: 1.42 POWDER TOPICAL at 08:04

## 2022-04-07 RX ADMIN — HEPARIN SODIUM 7500 UNITS: 5000 INJECTION INTRAVENOUS; SUBCUTANEOUS at 01:04

## 2022-04-07 RX ADMIN — Medication 10 ML: at 10:04

## 2022-04-07 RX ADMIN — Medication 10 ML: at 02:04

## 2022-04-07 RX ADMIN — Medication 10 ML: at 06:04

## 2022-04-07 RX ADMIN — FLUCONAZOLE 200 MG: 2 INJECTION, SOLUTION INTRAVENOUS at 06:04

## 2022-04-07 RX ADMIN — HYDRALAZINE HYDROCHLORIDE 10 MG: 20 INJECTION, SOLUTION INTRAMUSCULAR; INTRAVENOUS at 05:04

## 2022-04-07 RX ADMIN — IPRATROPIUM BROMIDE AND ALBUTEROL SULFATE 3 ML: 2.5; .5 SOLUTION RESPIRATORY (INHALATION) at 08:04

## 2022-04-07 RX ADMIN — INSULIN ASPART 2 UNITS: 100 INJECTION, SOLUTION INTRAVENOUS; SUBCUTANEOUS at 06:04

## 2022-04-07 RX ADMIN — CEFEPIME HYDROCHLORIDE 2 G: 2 INJECTION, SOLUTION INTRAVENOUS at 01:04

## 2022-04-07 RX ADMIN — FAMOTIDINE 20 MG: 10 INJECTION, SOLUTION INTRAVENOUS at 08:04

## 2022-04-07 NOTE — PROGRESS NOTES
[facility] ([facility])  [facility] Medicine  Progress Note    Patient Name: Page Grissom  MRN: [id number]  Patient Class: IP- Inpatient   Admission Date: 3/29/2022  Length of Stay: 9 days  Attending Physician: Batsheva Landaverde MD  Primary Care Provider: NEELAM Roman Jr, MD        Subjective:     Principal Problem:<principal problem not specified>        HPI:  72 y/o female with PMHx of HTN, HLD, DM, asthma, CKD 3, and RA who presented to the ED with c/o angioedema that onset suddenly this morning. Pt was able to verbally communicate with EMS, was not able to communicate with ED staff. Staff was unable to intubate and called surgery, who performed emergency trach. Pt is currently on vent and sedated.  ED workup shows: H/H 10.9/34.6, K+ 2.9, CO2 22, Anion gap 17, BUN 55, Cr 8.8.  She will be admitted to ICU for angioedema under the care of [facility] medicine.  She is a full code and her SDM is her daughter, Cassy.        Overview/Hospital Course:  3/30 emergently trach'd in ED as difficulty intubating d/t angioedema. Going to OR for replacement of tube  3/31 hypotensive requiring pressor support. Leukocytosis with electrolyte abn noted. Concerns for sepsis in immunocompromised patient. Low UOP. Nephrology following for possible dialysis. Guarded prognosis. Consider palliative consult for GOC/family support  4/1 remains on ventilatory support via trach. NG placed for med admin  4/2 somnolent but following commands, ESRD not currently receiving dialysis pt received versed for crich procedure slow metabolizer still somnolent  4/3- somnolent but following commands. D/w nephro dr. Mane trial of dialysis in am to metabolize sedatives pt does not seem to be metabolizing sedatives. Stable respiratory status  4/4- CT head 4/4 reveals mild subacute to chronic appearing ischemic changes to deep white matter both cerebral hemispheres. Pt remains somnolent EEG pending. Neuro following. Trial of dialysis to  address somnolence.  4/5- pt's mentation unchanged following HD. D/w neuro Dr. Ferrera; EEG shows metabolic encephalopathy. Neurological insult is likely multifactorial-prognosis still unknown.  4/6- pt's mentation has improved she now has her eyes open spontaneously, following commands. Creatinine has improved following dialysis down to 3.7 from 8.9. MRI Brain was negative for infarct or bleed  4/7- acidosis and volume status improved with dialysis. Reviewed nephrology note most likely patient will no longer need HD creatinine stable at roughly 3.4 for 3 days. Mentation continues to improve patient talking (silently, no passy russell valve available yet) and writing down questions.          Review of Systems   Constitutional:  Negative for fever.   HENT:  Negative for congestion.    Respiratory:  Negative for cough.    Gastrointestinal:  Negative for abdominal pain.   Endocrine: Negative for polyuria.   Genitourinary:  Negative for flank pain.   Musculoskeletal:  Negative for back pain.   Skin:  Negative for rash.   Neurological:  Negative for syncope.   Psychiatric/Behavioral:  Negative for confusion.    Objective:     Vital Signs (Most Recent):  Temp: 99 °F (37.2 °C) (04/07/22 0705)  Pulse: 69 (04/07/22 1710)  Resp: (!) 25 (04/07/22 1710)  BP: (!) 167/80 (04/07/22 1500)  SpO2: 98 % (04/07/22 1710) Vital Signs (24h Range):  Temp:  [98.9 °F (37.2 °C)-99.7 °F (37.6 °C)] 99 °F (37.2 °C)  Pulse:  [] 69  Resp:  [19-37] 25  SpO2:  [85 %-100 %] 98 %  BP: (102-167)/(44-80) 167/80     Weight: 110.3 kg (243 lb 2.7 oz)  Body mass index is 41.74 kg/m².    Intake/Output Summary (Last 24 hours) at 4/7/2022 1714  Last data filed at 4/7/2022 1400  Gross per 24 hour   Intake 908.64 ml   Output 1600 ml   Net -691.36 ml        Physical Exam  Vitals and nursing note reviewed.   Constitutional:       General: She is not in acute distress.     Appearance: She is ill-appearing. She is not toxic-appearing.   HENT:      Head:  Normocephalic and atraumatic.   Cardiovascular:      Rate and Rhythm: Normal rate.   Pulmonary:      Effort: Tachypnea and respiratory distress present.      Comments: Mechanical breath sounds  Abdominal:      Palpations: Abdomen is soft.   Genitourinary:     Comments: landry  Musculoskeletal:         General: Swelling present.      Right lower leg: Edema present.      Left lower leg: Edema present.   Skin:     General: Skin is warm.       Significant Labs: All pertinent labs within the past 24 hours have been reviewed.    Significant Imaging: I have reviewed all pertinent imaging results/findings within the past 24 hours.      Assessment/Plan:      Acute metabolic encephalopathy  --neurological insult is likely multifactorial following anoxic event  --two days post dialysis there is improvement  in neurological status pt back to baseline neurologically  --MRI brain 4/5 neg for infarct or bleed  --cont PT/OT  --f/u further recs from ICU service regarding trach  --continue all other current care      Morbid obesity        Acute kidney injury superimposed on chronic kidney disease  --baseline BUN/Cr 20/1.4  --stop lisinopril  --avoid nephrotoxic agents  --Nephrology following  --Pt is not acidotic and there is no hyperkalemia;   --d/w nephro service  current indication for dialysis is pt's inability to metabolize sedatives she is arousable but still extremely sedated  -- uremic  --IR for hemodialysis access per dR Mane today      Tracheostomy in place  --admit to ICU  --stop lisiniopril  --pulmonology following  --supportive care    OR for replacement of cricoid trach      Angioedema  --likely 2/2 lisinopril  --admit to ICU  --had emergent trach in ED, on ventalitor  --IV steroids, IV antihistamiens  --Fresh Frozen Plasma ordered in ED  --monitor closely    3/30  Improving   Continue supportive care  Minimal improvement in appearance  Consider bradykinin inhibitor?  Vent Mode: A/C  Oxygen Concentration (%):  [40]  40  Resp Rate Total:  [18 br/min-27 br/min] 18 br/min  Vt Set:  [400 mL] 400 mL  PEEP/CPAP:  [6 cmH20] 6 cmH20  Pressure Support:  [0 cmH20] 0 cmH20  Mean Airway Pressure:  [11 [id number] cmH20] 11 cmH20     3/31  CC following for vent management  Surgery following after trach placement  Continue iv systemic steroids    4/1  Continue current trach care    4/4  Improved neurological status with dialysis    4/7  --pt is back to baseline neurologically  --OG tube for tube feeds has been dc'd  --barium swallow in am to eval possibility of starting diet  --passy russell valve for trach    Rheumatoid arthritis involving multiple sites with positive rheumatoid factor  --hold plaquenil  --f/u OP         VTE Risk Mitigation (From admission, onward)         Ordered     heparin (porcine) injection 5,200 Units  As needed (PRN)         04/04/22 1342     heparin (porcine) injection 2,500 Units  As needed (PRN)         04/04/22 1115     Apply sequential compression device to lower extremities (if no contraindications). Medical venous thromboembolus prophylaxis is preferred.  Until discontinued         03/30/22 0805     heparin (porcine) injection 7,500 Units  Every 8 hours         03/29/22 1003     IP VTE HIGH RISK PATIENT  Once         03/29/22 1003                Discharge Planning   CHALO:      Code Status: Full Code   Is the patient medically ready for discharge?:     Reason for patient still in hospital (select all that apply): Pending disposition  Discharge Plan A: Home with family            Critical care time spent on the evaluation and treatment of severe organ dysfunction, review of pertinent labs and imaging studies, discussions with consulting providers and discussions with patient/family: 30 minutes.      Batsheva Landaverde MD  Department of Hospital Medicine   [facility] ([facility])

## 2022-04-07 NOTE — ASSESSMENT & PLAN NOTE
--likely 2/2 lisinopril  --admit to ICU  --had emergent trach in ED, on ventalitor  --IV steroids, IV antihistamiens  --Fresh Frozen Plasma ordered in ED  --monitor closely    3/30  Improving   Continue supportive care  Minimal improvement in appearance  Consider bradykinin inhibitor?  Vent Mode: A/C  Oxygen Concentration (%):  [40] 40  Resp Rate Total:  [18 br/min-27 br/min] 18 br/min  Vt Set:  [400 mL] 400 mL  PEEP/CPAP:  [6 cmH20] 6 cmH20  Pressure Support:  [0 cmH20] 0 cmH20  Mean Airway Pressure:  [11 xaD38-28 cmH20] 11 cmH20     3/31  CC following for vent management  Surgery following after trach placement  Continue iv systemic steroids    4/1  Continue current trach care    4/4  Improved neurological status with dialysis    4/7  --pt is back to baseline neurologically  --OG tube for tube feeds has been dc'd  --barium swallow in am to eval possibility of starting diet  --passy russell valve for trach

## 2022-04-07 NOTE — SUBJECTIVE & OBJECTIVE
Interval History: Pt was seen and examined in ICU. Labs and meds reviewed. Discussed with other providers. No new events. Stable last pm.    Review of patient's allergies indicates:   Allergen Reactions    Lisinopril Anaphylaxis     Angioedema requiring trach     Current Facility-Administered Medications   Medication Frequency    0.9%  NaCl infusion (for blood administration) Q24H PRN    albuterol-ipratropium 2.5 mg-0.5 mg/3 mL nebulizer solution 3 mL Q8H    cefepime in dextrose 5 % IVPB 2 g Q24H    chlorhexidine 0.12 % solution 15 mL BID    dextrose 10% bolus 125 mL PRN    famotidine (PF) injection 20 mg Daily    fentaNYL 50 mcg/mL injection 50 mcg Q1H PRN    fluconazole (DIFLUCAN) IVPB 200 mg Q24H    glucagon (human recombinant) injection 1 mg PRN    heparin (porcine) injection 2,500 Units PRN    heparin (porcine) injection 5,200 Units PRN    heparin (porcine) injection 7,500 Units Q8H    hydrALAZINE injection 10 mg Q6H PRN    insulin aspart U-100 pen 1-10 Units Q6H PRN    miconazole NITRATE 2 % top powder BID    naloxone 0.4 mg/mL injection 0.02 mg PRN    sodium chloride 0.9% flush 10 mL Q6H    And    sodium chloride 0.9% flush 10 mL PRN    sodium chloride 0.9% flush 10 mL Q8H       Objective:     Vital Signs (Most Recent):  Temp: 98.9 °F (37.2 °C) (04/07/22 0400)  Pulse: 62 (04/07/22 0914)  Resp: (!) 21 (04/07/22 0914)  BP: (!) 104/51 (04/07/22 0700)  SpO2: 99 % (04/07/22 0914)  O2 Device (Oxygen Therapy): ventilator (04/07/22 0829)   Vital Signs (24h Range):  Temp:  [98.9 °F (37.2 °C)-99.7 °F (37.6 °C)] 98.9 °F (37.2 °C)  Pulse:  [] 62  Resp:  [19-37] 21  SpO2:  [97 %-100 %] 99 %  BP: (102-195)/(44-81) 104/51     Weight: 110.3 kg (243 lb 2.7 oz) (04/07/22 0710)  Body mass index is 41.74 kg/m².  Body surface area is 2.23 meters squared.    I/O last 3 completed shifts:  In: 1570 [NG/GT:1270; IV Piggyback:300]  Out: 3035 [Urine:3035]    Physical Exam  Vitals and nursing note reviewed.   Constitutional:        Appearance: Normal appearance.   Cardiovascular:      Rate and Rhythm: Normal rate and regular rhythm.      Pulses: Normal pulses.      Heart sounds: Normal heart sounds.   Pulmonary:      Effort: Pulmonary effort is normal.      Breath sounds: Normal breath sounds.   Abdominal:      General: Abdomen is flat.      Palpations: Abdomen is soft.   Skin:     General: Skin is warm and dry.       Significant Labs: reviewed  [facility]  Lab Results   Component Value Date     04/07/2022    K 3.6 04/07/2022     04/07/2022    CO2 28 04/07/2022    BUN 70 (H) 04/07/2022    CREATININE 3.4 (H) 04/07/2022    CALCIUM 8.6 (L) 04/07/2022    ANIONGAP 12 04/07/2022    ESTGFRAFRICA 15 (A) 04/07/2022    EGFRNONAA 13 (A) 04/07/2022     Lab Results   Component Value Date    WBC 17.43 (H) 04/07/2022    HGB 8.1 (L) 04/07/2022    HCT 26.0 (L) 04/07/2022    MCV 77 (L) 04/07/2022     04/07/2022       ABG  Recent Labs   Lab 04/07/22  0335   PH 7.447   PO2 81   PCO2 45.5*   HCO3 31.4*   BE 7         Significant Imaging: CXR reviewed

## 2022-04-07 NOTE — EICU
Notified of patient going into afib HR 80s to 90s    K 3.3 this morning, creatinine 3.7, Mg 2.1, phos 3.5   cc/hr    · Ordered potassium total 50 meqs per NGT x 1  · Repeat labs in AM to include ionized calcium  · Refer if with increased HR  · Will defer to primary team if anticoagulation appropriate

## 2022-04-07 NOTE — PLAN OF CARE
OT elaina completed. Sup>sit max A of 2, sit>stand max A of 2, step>pivot to bedside chair with max A of 2. Recommending inpatient rehab at d/c.

## 2022-04-07 NOTE — ASSESSMENT & PLAN NOTE
--neurological insult is likely multifactorial following anoxic event  --two days post dialysis there is improvement  in neurological status pt back to baseline neurologically  --MRI brain 4/5 neg for infarct or bleed  --cont PT/OT  --f/u further recs from ICU service regarding trach  --continue all other current care

## 2022-04-07 NOTE — ASSESSMENT & PLAN NOTE
72 y/o female with KYE and initial presentation with angioedema and hypotension:     KYE (acute kidney injury)  Pt presented with hypotension after experiencing anaphylactic shock.  Suspect KYE due to sustained hypotension resulting in acute tubular necrosis (ATN)     S/p acute HD on 4/4/22, tolerated HD well  s Cr stable  Good UOP, appears slowly recovering  K normal to slightly low, likely obdulio of beginning renal recovery  Hyponatremia, corrected with HD  Metabolic acidosis, improved with HD  O2 sat good  Fluid gain, edema, improved with HD     Will hold HD today as well  Will monitor closely  Discussed with ICU and primary team     * Angioedema  Chart was reviewed  ACE-I induced?     Plans and recommendations:  As reviewed above  Total critical care time spent 40 minutes including time needed to review the records, the   patient evaluation, documentation, discussion with other providers  more than 50% of the time was spent on coordination of care

## 2022-04-07 NOTE — TELEPHONE ENCOUNTER
No new care gaps identified.  Powered by [employer]. Reference number: [id number].   4/07/2022 9:06:06 AM [id number]

## 2022-04-07 NOTE — PLAN OF CARE
P.T. EVAL COMPLETE, PT CURRENTLY REQUIRES MAXA X 2 FOR BED MOBILITY AND BED<>CHAIR TF, P.T. RECOMMENDS INPATIENT REHAB

## 2022-04-07 NOTE — PROGRESS NOTES
[facility] ([facility])  Nephrology  Progress Note    Patient Name: Page Grissom  MRN: [id number]  Admission Date: 3/29/2022  Hospital Length of Stay: 9 days  Attending Provider: Batsheva Landaverde MD   Primary Care Physician: NEELAM Roman Jr, MD  Principal Problem:Acute metabolic encephalopathy    Subjective:     HPI: Noted    Interval History: Pt was seen and examined in ICU. Labs and meds reviewed. Discussed with other providers. No new events. Stable last pm.    Review of patient's allergies indicates:   Allergen Reactions    Lisinopril Anaphylaxis     Angioedema requiring trach     Current Facility-Administered Medications   Medication Frequency    0.9%  NaCl infusion (for blood administration) Q24H PRN    albuterol-ipratropium 2.5 mg-0.5 mg/3 mL nebulizer solution 3 mL Q8H    cefepime in dextrose 5 % IVPB 2 g Q24H    chlorhexidine 0.12 % solution 15 mL BID    dextrose 10% bolus 125 mL PRN    famotidine (PF) injection 20 mg Daily    fentaNYL 50 mcg/mL injection 50 mcg Q1H PRN    fluconazole (DIFLUCAN) IVPB 200 mg Q24H    glucagon (human recombinant) injection 1 mg PRN    heparin (porcine) injection 2,500 Units PRN    heparin (porcine) injection 5,200 Units PRN    heparin (porcine) injection 7,500 Units Q8H    hydrALAZINE injection 10 mg Q6H PRN    insulin aspart U-100 pen 1-10 Units Q6H PRN    miconazole NITRATE 2 % top powder BID    naloxone 0.4 mg/mL injection 0.02 mg PRN    sodium chloride 0.9% flush 10 mL Q6H    And    sodium chloride 0.9% flush 10 mL PRN    sodium chloride 0.9% flush 10 mL Q8H       Objective:     Vital Signs (Most Recent):  Temp: 98.9 °F (37.2 °C) (04/07/22 0400)  Pulse: 62 (04/07/22 0914)  Resp: (!) 21 (04/07/22 0914)  BP: (!) 104/51 (04/07/22 0700)  SpO2: 99 % (04/07/22 0914)  O2 Device (Oxygen Therapy): ventilator (04/07/22 0829)   Vital Signs (24h Range):  Temp:  [98.9 °F (37.2 °C)-99.7 °F (37.6 °C)] 98.9 °F (37.2 °C)  Pulse:  [] 62  Resp:   [19-37] 21  SpO2:  [97 %-100 %] 99 %  BP: (102-195)/(44-81) 104/51     Weight: 110.3 kg (243 lb 2.7 oz) (04/07/22 0710)  Body mass index is 41.74 kg/m².  Body surface area is 2.23 meters squared.    I/O last 3 completed shifts:  In: 1570 [NG/GT:1270; IV Piggyback:300]  Out: 3035 [Urine:3035]    Physical Exam  Vitals and nursing note reviewed.   Constitutional:       Appearance: Normal appearance.   Cardiovascular:      Rate and Rhythm: Normal rate and regular rhythm.      Pulses: Normal pulses.      Heart sounds: Normal heart sounds.   Pulmonary:      Effort: Pulmonary effort is normal.      Breath sounds: Normal breath sounds.   Abdominal:      General: Abdomen is flat.      Palpations: Abdomen is soft.   Skin:     General: Skin is warm and dry.       Significant Labs: reviewed  BMP  Lab Results   Component Value Date     04/07/2022    K 3.6 04/07/2022     04/07/2022    CO2 28 04/07/2022    BUN 70 (H) 04/07/2022    CREATININE 3.4 (H) 04/07/2022    CALCIUM 8.6 (L) 04/07/2022    ANIONGAP 12 04/07/2022    ESTGFRAFRICA 15 (A) 04/07/2022    EGFRNONAA 13 (A) 04/07/2022     Lab Results   Component Value Date    WBC 17.43 (H) 04/07/2022    HGB 8.1 (L) 04/07/2022    HCT 26.0 (L) 04/07/2022    MCV 77 (L) 04/07/2022     04/07/2022       ABG  Recent Labs   Lab 04/07/22  0335   PH 7.447   PO2 81   PCO2 45.5*   HCO3 31.4*   BE 7         Significant Imaging: CXR reviewed    Assessment/Plan:     72 y/o female with KYE and initial presentation with angioedema and hypotension:     KYE (acute kidney injury)  Pt presented with hypotension after experiencing anaphylactic shock.  Suspect KYE due to sustained hypotension resulting in acute tubular necrosis (ATN)     S/p acute HD on 4/4/22, tolerated HD x 1 well  s Cr stable and is improving by itself  Higher UOP noted  KYE appears resolving now  K normal   Hyponatremia, corrected with HD.  Metabolic acidosis, improved with HD  O2 sat good  Fluid gain, edema, improved  with HD     Will hold HD today as well  Expect will no longer need HD  The femoral line vas cath can be removed in the next few days if s Cr continues to improve  Discussed with ICU and primary team     * Angioedema  Chart was reviewed  ACE-I induced?     Plans and recommendations:  As reviewed above  Total critical care time spent 35 minutes including time needed to review the records, the   patient evaluation, documentation, discussion with other providers  more than 50% of the time was spent on coordination of care          Lindsay Mane MD  Nephrology  [facility] ([facility])

## 2022-04-07 NOTE — SUBJECTIVE & OBJECTIVE
Review of Systems   Constitutional:  Negative for fever.   HENT:  Negative for congestion.    Respiratory:  Negative for cough.    Gastrointestinal:  Negative for abdominal pain.   Endocrine: Negative for polyuria.   Genitourinary:  Negative for flank pain.   Musculoskeletal:  Negative for back pain.   Skin:  Negative for rash.   Neurological:  Negative for syncope.   Psychiatric/Behavioral:  Negative for confusion.    Objective:     Vital Signs (Most Recent):  Temp: 99 °F (37.2 °C) (04/07/22 0705)  Pulse: 69 (04/07/22 1710)  Resp: (!) 25 (04/07/22 1710)  BP: (!) 167/80 (04/07/22 1500)  SpO2: 98 % (04/07/22 1710) Vital Signs (24h Range):  Temp:  [98.9 °F (37.2 °C)-99.7 °F (37.6 °C)] 99 °F (37.2 °C)  Pulse:  [] 69  Resp:  [19-37] 25  SpO2:  [85 %-100 %] 98 %  BP: (102-167)/(44-80) 167/80     Weight: 110.3 kg (243 lb 2.7 oz)  Body mass index is 41.74 kg/m².    Intake/Output Summary (Last 24 hours) at 4/7/2022 1714  Last data filed at 4/7/2022 1400  Gross per 24 hour   Intake 908.64 ml   Output 1600 ml   Net -691.36 ml        Physical Exam  Vitals and nursing note reviewed.   Constitutional:       General: She is not in acute distress.     Appearance: She is ill-appearing. She is not toxic-appearing.   HENT:      Head: Normocephalic and atraumatic.   Cardiovascular:      Rate and Rhythm: Normal rate.   Pulmonary:      Effort: Tachypnea and respiratory distress present.      Comments: Mechanical breath sounds  Abdominal:      Palpations: Abdomen is soft.   Genitourinary:     Comments: alatorre  Musculoskeletal:         General: Swelling present.      Right lower leg: Edema present.      Left lower leg: Edema present.   Skin:     General: Skin is warm.       Significant Labs: All pertinent labs within the past 24 hours have been reviewed.    Significant Imaging: I have reviewed all pertinent imaging results/findings within the past 24 hours.

## 2022-04-07 NOTE — NURSING
Pt. On vent via trach. VS stable. Episode of afib over night lasting 10 to 15 minutes. K replaced. TMAX 99.7. Sinus rhythm on monitor. Xavier TF. CBGs WNL. Good UOP per alatorre. Fall precautions in place. POC reviewed w/ pt familyMARY.

## 2022-04-07 NOTE — PT/OT/SLP EVAL
Speech Language Pathology Evaluation  Bedside Swallow    Patient Name:  Page Grissom   MRN:  [id number]  Admitting Diagnosis: Acute metabolic encephalopathy    Recommendations:                 General Recommendations:  Cognitive-linguistic evaluation, Modified barium swallow study and Speaking valve evaluation  Diet recommendations:  NPO, NPO (recommended MBSS as comprehensive swallowing assessment.)   Aspiration Precautions: Strict aspiration precautions   General Precautions: Standard, NPO, aspiration, ice chips sparingly for QOL  Communication strategies:  communication board, One way speaking valve and provide increased time to answer    History:     Past Medical History:   Diagnosis Date    Acidosis, metabolic, with respiratory acidosis 3/31/2022    Asthma     Back pain     Cataract     OD    CKD (chronic kidney disease) stage 3, GFR 30-59 ml/min 12/20/2020    Diabetes mellitus     Fibromyalgia     Gastroesophageal reflux disease     Hypercholesterolemia     Hypertension     Immune deficiency disorder     Obesity     Osteoporosis     Rheumatoid arthritis     Tobacco dependence     Trouble in sleeping     Type 2 diabetes mellitus        Past Surgical History:   Procedure Laterality Date    BRONCHOSCOPY N/A 3/30/2022    Procedure: Bronchoscopy;  Surgeon: Isak Yadav MD;  Location: [facility];  Service: General;  Laterality: N/A;    COLONOSCOPY N/A 2/14/2019    Procedure: COLONOSCOPY;  Surgeon: Yury Atwood MD;  Location: [facility];  Service: Endoscopy;  Laterality: N/A;    HERNIA REPAIR      OOPHORECTOMY      TRACHEOSTOMY N/A 3/30/2022    Procedure: CREATION, TRACHEOSTOMY;  Surgeon: Isak Yadav MD;  Location: [facility];  Service: General;  Laterality: N/A;       Social History: Patient lives at home.    Prior Intubation HX:  Pt s/p emergent tracheostomy/vent d/t angioedema    Prior diet: Pt consumes regular consistency diet at home.  Denied dysphagia hx.      Subjective     Pt  seen bedside for ST  Patient goals: To improve communication and swallowing    Pain/Comfort:  · Pain Rating 1: 0/10  · Pain Rating Post-Intervention 1: 0/10  · Pain Rating 2: 0/10  · Pain Rating Post-Intervention 2: 0/10    Respiratory Status: Tracheostomy with O2 around tube    Objective:     Oral Musculature Evaluation  · Oral Musculature: WFL  · Secretion Management: coughing on secretions (minimal tracheal secretions.  Productive cough)  · Mucosal Quality: good  · Mandibular Strength and Mobility: WFL  · Oral Labial Strength and Mobility: WFL  · Lingual Strength and Mobility: WFL  · Velar Elevation: WFL  · Buccal Strength and Mobility: WFL  · Volitional Cough: present, productive  · Volitional Swallow: present  · Voice Prior to PO Intake: VIC s/p tracheostomy.  Awaiting PMSV for trials and vocal assessment.  Voicing achieved x1 via digital occlusion (cuff deflated prior to arrival)--breathy quality.  Impaired breathing-speech coordination.  Continued assessment with application of PMSV.  *Added info: PMSV brought into session by RT.  Pt tolerating PMSV application with adequate phonation.  Productive cough/sputum production noted via trach tube.  Functional voicing present.    Bedside Swallow Eval:   Consistencies Assessed:  · Ice chips trialed following oral care.  Pt would benefit from MBSS for further visualization of oropharyngeal swallows s/p tracheostomy.    Oral Phase:   · Slow oral transit time    Pharyngeal Phase:   · decreased hyolaryngeal excursion to palpation  · delayed swallow initation    Compensatory Strategies  · None    Treatment: Pt recommended for MBSS to assess oropharyngeal swallow.  Diet/recs to follow.    Assessment:     Page Grissom is a 71 y.o. female with an SLP diagnosis of dysphagia and communicative impairment s/p tracheostomy.  Pt tolerated PMSV with adequate phonation and breath support.  Productive cough present with presence of tracheal secretions following removal of  PMSV.  Ice chips given with reduced laryngeal excursion warranting MBSS.    Goals:   Multidisciplinary Problems     SLP Goals        Problem: SLP    Goal Priority Disciplines Outcome   SLP Goal     SLP    Description: 1.  Pt will consume least restrictive PO diet without s/s of dysphagia  2.  MBSS for comprehensive swallowing assessment s/p tracheostomy  3.  Recommended PMSV trials/communication evaluation                   Plan:     · Patient to be seen:  2 x/week   · Plan of Care expires:  04/14/22  · Plan of Care reviewed with:  patient   · SLP Follow-Up:  Yes       Discharge recommendations:   (pending acute progress.)   Barriers to Discharge:  None    Time Tracking:     SLP Treatment Date:   04/07/22  Speech Start Time:  1330  Speech Stop Time:  1345     Speech Total Time (min):  15 min    Billable Minutes: Eval Swallow and Oral Function 15 minutes    04/07/2022

## 2022-04-07 NOTE — PT/OT/SLP EVAL
Occupational Therapy   Evaluation    Name: Page Grissom  MRN: [id number]  Admitting Diagnosis:  Acute metabolic encephalopathy  Recent Surgery: Procedure(s) (LRB):  CREATION, TRACHEOSTOMY (N/A)  Bronchoscopy (N/A) 8 Days Post-Op    Recommendations:     Discharge Recommendations: rehabilitation facility  Discharge Equipment Recommendations:   (TBD)  Barriers to discharge:  None    Assessment:     Page Grissom is a 71 y.o. female with a medical diagnosis of Acute metabolic encephalopathy.  She presents with the following performance deficits affecting function: weakness, impaired endurance, impaired self care skills, impaired functional mobilty, impaired balance, decreased safety awareness, impaired fine motor, impaired coordination, edema, impaired cardiopulmonary response to activity.      Rehab Prognosis: Good; patient would benefit from acute skilled OT services to address these deficits and reach maximum level of function.       Plan:     Patient to be seen 2 x/week to address the above listed problems via self-care/home management, therapeutic activities, therapeutic exercises  · Plan of Care Expires: 04/21/22  · Plan of Care Reviewed with: patient    Subjective     Chief Complaint: Mouthing words throughout.  Patient/Family Comments/goals: get stronger    Occupational Profile:  Living Environment: Patient resides in a one story home with no steps to enter with her daughter.  Previous level of function: Patient was independent with ADLs and ambulation prior to admission. She was able to ambulate community distances and was an active . She assisted in the care of her grandchildren.  Roles and Routines: n/a  Equipment Used at Home:  none  Assistance upon Discharge: daughter    Pain/Comfort:  Pain Rating 1: 0/10    Objective:     Communicated with: NurseKeagan, prior to session.  Patient found supine with NG tube, PICC line, Tracheostomy, alatorre catheter, SCD, blood pressure cuff, pulse ox  (continuous), telemetry (trach collar) upon OT entry to room.    General Precautions: Standard, fall, respiratory   Orthopedic Precautions:N/A   Braces: N/A  Respiratory Status: trach collar    Bed Mobility:    · Patient completed Supine to Sit with maximal assistance, 2 persons and with side rail   · Seated scooting with max A  · Initially with posterior instability that improved with cueing and repositioning     Functional Mobility/Transfers:  · Patient completed Sit <> Stand Transfer with maximal assistance and of 2 persons  with  no assistive device   · Patient completed Bed <> Chair Transfer using Step Transfer technique with maximal assistance and of 2 persons with no assistive device    Activities of Daily Living:  · Grooming: maximal assistance .  · Upper Body Dressing: maximal assistance .    Cognitive/Visual Perceptual:  Cognitive/Psychosocial Skills:     -       Oriented to: Person, Place and Situation   -       Follows Commands/attention:Follows one-step commands    Physical Exam:  Balance:    -       sitting: poor +, static standing: poor, dynamic standing: poor  Edema:  Moderate B UE  Dominant hand:    -       right  Upper Extremity Range of Motion:     -       Right Upper Extremity: PROM WFL  -       Left Upper Extremity: PROM WFL  Upper Extremity Strength:    -       Right Upper Extremity: Deficits: grossly 2+/5 to 3/5  -       Left Upper Extremity: Deficits: grossly 2+/5 to 3/5   Strength:    -       Right Upper Extremity: Deficits: poor  -       Left Upper Extremity: Deficits: poor  Fine Motor Coordination:    -       Impaired  Left hand thumb/finger opposition skills poor and Right hand thumb/finger opposition skills poor    AMPAC 6 Click ADL:  AMPAC Total Score: 8    Treatment & Education:  Patient educated on role of OT in acute setting and benefits of participation. Educated on safe mobility techniques to use to increase active engagement and decrease fall risk. Patient transitioned to  sitting EOB and NG slipped out of nose. Nurse made aware. Completed sit>stand from bedside chair and A level decreased to mod A of 2. Motivated and cooperative throughout. In agreement to call for A to transfer back to bed.  Education:    Patient left up in chair with call button in reach, chair alarm on and nurse notified     GOALS:   Multidisciplinary Problems     Occupational Therapy Goals        Problem: Occupational Therapy    Goal Priority Disciplines Outcome Interventions   Occupational Therapy Goal     OT, PT/OT     Description: Goals to be met by: 4/21/22     Patient will increase functional independence with ADLs by performing:    UE Dressing with Minimal Assistance.  Supine to sit with Minimal Assistance.  Toilet transfer to bedside commode with Moderate Assistance.  Increased functional strength in B UE grossly by 1/2 MM grade.                     History:     Past Medical History:   Diagnosis Date    Acidosis, metabolic, with respiratory acidosis 3/31/2022    Asthma     Back pain     Cataract     OD    CKD (chronic kidney disease) stage 3, GFR 30-59 ml/min 12/20/2020    Diabetes mellitus     Fibromyalgia     Gastroesophageal reflux disease     Hypercholesterolemia     Hypertension     Immune deficiency disorder     Obesity     Osteoporosis     Rheumatoid arthritis     Tobacco dependence     Trouble in sleeping     Type 2 diabetes mellitus          Past Surgical History:   Procedure Laterality Date    BRONCHOSCOPY N/A 3/30/2022    Procedure: Bronchoscopy;  Surgeon: Isak Yadav MD;  Location: [facility] OR;  Service: General;  Laterality: N/A;    COLONOSCOPY N/A 2/14/2019    Procedure: COLONOSCOPY;  Surgeon: Yury Atwood MD;  Location: [facility];  Service: Endoscopy;  Laterality: N/A;    HERNIA REPAIR      OOPHORECTOMY      TRACHEOSTOMY N/A 3/30/2022    Procedure: CREATION, TRACHEOSTOMY;  Surgeon: Isak Yadav MD;  Location: [facility] OR;  Service: General;  Laterality: N/A;        Time Tracking:     OT Date of Treatment: 04/07/22  OT Start Time: 1045  OT Stop Time: 1110  OT Total Time (min): 25 min    Billable Minutes:Evaluation 15  Therapeutic Activity 10    4/7/2022

## 2022-04-07 NOTE — PROGRESS NOTES
Progress Note  [facility]    SUBJECTIVE:     Chief Complaint:   angioedema    History of Present Illness/Interval History:  The pt is a 71 yof who presented to the ED with angioedema. Airway was obstructed by her tongue.    Review of patient's allergies indicates:   Allergen Reactions    Lisinopril Anaphylaxis     Angioedema requiring trach       Past Medical History:   Diagnosis Date    Acidosis, metabolic, with respiratory acidosis 3/31/2022    Asthma     Back pain     Cataract     OD    CKD (chronic kidney disease) stage 3, GFR 30-59 ml/min 12/20/2020    Diabetes mellitus     Fibromyalgia     Gastroesophageal reflux disease     Hypercholesterolemia     Hypertension     Immune deficiency disorder     Obesity     Osteoporosis     Rheumatoid arthritis     Tobacco dependence     Trouble in sleeping     Type 2 diabetes mellitus      Past Surgical History:   Procedure Laterality Date    BRONCHOSCOPY N/A 3/30/2022    Procedure: Bronchoscopy;  Surgeon: Isak Yadav MD;  Location: [facility] OR;  Service: General;  Laterality: N/A;    COLONOSCOPY N/A 2/14/2019    Procedure: COLONOSCOPY;  Surgeon: Yury Atwood MD;  Location: [facility] END[facility];  Service: Endoscopy;  Laterality: N/A;    HERNIA REPAIR      OOPHORECTOMY      TRACHEOSTOMY N/A 3/30/2022    Procedure: CREATION, TRACHEOSTOMY;  Surgeon: Isak Yadav MD;  Location: [facility] OR;  Service: General;  Laterality: N/A;     Family History   Problem Relation Age of Onset    Hypertension Mother     Cancer Father     Colon cancer Father     Breast cancer Sister      Social History     Socioeconomic History    Marital status:    Tobacco Use    Smoking status: Current Every Day Smoker     Packs/day: 0.50     Years: 25.00     Pack years: 12.50     Types: Cigarettes    Smokeless tobacco: Never Used    Tobacco comment: trying to quit   Substance and Sexual Activity    Alcohol use: No    Drug use: No     Review of Systems:  Unable to  obtain due to patient's status- unable to speak to me.    OBJECTIVE:     Vital Signs  Vitals:    04/07/22 0900 04/07/22 0914 04/07/22 1000 04/07/22 1100   BP: (!) 125/58  138/63 (!) 157/67   Pulse: 67 62 64 82   Resp: (!) 23 (!) 21 20 (!) 37   Temp:       TempSrc:       SpO2: 98% 99% 100% 97%   Weight:       Height:         Body mass index is 41.74 kg/m².    Physical Exam:  General: no distress  Eyes:  conjunctivae/corneas clear  Nose: no discharge  Mouth: tongue swelling appears to be resolved though I did not examine the posterior pharynx.  Neck: trachea midline with no masses appreciated, trach in place & secured with stay-sutures  Lungs:  normal respiratory effort, no wheezes, no rales  Heart: regular rate and rhythm and no murmur  Abdomen: non-distended  Extremities: no cyanosis, +mild dependent edema, no clubbing  Skin: No rashes or lesions. good skin turgor  Neurologic: PERRL, follows commands, mouthing words with us today & communicating    Laboratory:  Lab Results   Component Value Date    WBC 17.43 (H) 04/07/2022    HGB 8.1 (L) 04/07/2022    HCT 26.0 (L) 04/07/2022    MCV 77 (L) 04/07/2022     04/07/2022       Chest Imaging, My Impression:   CXR reviewed    Diagnostic Results:  CT: Reviewed    ASSESSMENT/PLAN:     1) Angioedema due to ace inhibitor.  pt on avoidance once she is recovered.  2) Acute hypoxemic respiratory failure. Transitioned to trach collar today. She looks comfortable on trach collar. Start PMV trials.  3) RLL infiltrate on CXR. +purulent secretions improved. Continue cefepime for now.  4) Airway compromise s/p tracheostomy.  5) Acute encephalopathy. Improving. Continue to monitor.  6) KYE. Nephrology is following & supporting with RRT as needed.    Critical Care Time: 30 minutes  Critical care was time spent personally by me on the following activities: evaluating this patient's organ dysfunction, development of treatment plan, discussing treatment plan with patient or  surrogate and bedside caregivers, discussions with consultants, evaluation of patient's response to treatment, examination of patient, ordering and performing treatments and interventions, ordering and review of laboratory studies, ordering and review of radiographic studies, re-evaluation of patient's condition. This critical care time did not overlap with that of any other provider or involve time for any procedures.      Aryan Donnelly MD  [facility]

## 2022-04-07 NOTE — PT/OT/SLP EVAL
Physical Therapy Evaluation    Patient Name:  Page Grissom   MRN:  [id number]    Recommendations:     Discharge Recommendations:  rehabilitation facility   Discharge Equipment Recommendations: bedside commode, walker, rolling, bath bench   Barriers to discharge: None    Assessment:     Page Grissom is a 71 y.o. female admitted with a medical diagnosis of Acute metabolic encephalopathy.  She presents with the following impairments/functional limitations:  weakness, impaired endurance, impaired functional mobilty, impaired cardiopulmonary response to activity, impaired balance, gait instability, decreased safety awareness, decreased coordination.    Rehab Prognosis: Good; patient would benefit from acute skilled PT services to address these deficits and reach maximum level of function.    Recent Surgery: Procedure(s) (LRB):  CREATION, TRACHEOSTOMY (N/A)  Bronchoscopy (N/A) 8 Days Post-Op    Plan:     During this hospitalization, patient to be seen 3 x/week to address the identified rehab impairments via gait training, therapeutic activities, therapeutic exercises and progress toward the following goals:    · Plan of Care Expires:  04/21/22    Subjective     Chief Complaint:   Patient/Family Comments/goals:   Pain/Comfort:  · Pain Rating 1: 0/10    Patients cultural, spiritual, Shinto conflicts given the current situation:      Living Environment:  PT LIVES WITH DAUGHTER WHO WORKS DURING THE DAY, 1 STORY HOUSE NO STEPS, AMB LIMITED COMMUNITY DISTANCES, DRIVES, INDEP WITH ADL'S  Prior to admission, patients level of function was INDEP.  Equipment used at home: cane, straight (PT HAS SPC BUT WAS NOT USING IT PTA).  DME owned (not currently used): none.  Upon discharge, patient will have assistance from ?.    Objective:     Communicated with NURSE RIVERO prior to session.  Patient found supine with peripheral IV, PICC line, NG tube, oxygen, Tracheostomy, telemetry, pulse ox (continuous), alatorre  catheter, SCD, blood pressure cuff  upon PT entry to room.    General Precautions: Standard, fall, respiratory   Orthopedic Precautions:N/A   Braces: N/A  Respiratory Status: O2 VIA TRACH    Exams:  · Cognitive Exam:  Patient is oriented to Person, Place, Time and Situation  · Postural Exam:  Patient presented with the following abnormalities:    · -       Rounded shoulders  · -       Forward head  · Sensation:    · -       Intact  BLE  · RLE ROM: WFL  · RLE Strength: GROSSLY 3+/5  · LLE ROM: WFL  · LLE Strength: GROSSLY 3+/5    Functional Mobility:  · Bed Mobility:     · Rolling Left:  maximal assistance and of 2 persons  · Scooting: maximal assistance  · Supine to Sit: maximal assistance and of 2 persons  · Transfers:     · Sit to Stand:  maximal assistance and of 2 persons with no AD  · Bed to Chair: maximal assistance and of 2 persons with  no AD  using  Step Transfer  · Gait: UNABLE TO PROGRESS TO GAIT AT THIS TIME, GOOD EFFORT IN TAKING APPROX. 5 SMALL STEPS WITH MAXA X 2 TO TF FROM BED TO CHAIR, ALL LINES IN TOW  · Balance: POOR    Therapeutic Activities and Exercises:   PT EDUCATED IN ROLE OF P.T. AND POC, PT SLOW MOVING WITH ALL MOBILITY BUT GOOD EFFORT, VERY MOTIVATED, POSTERIOR LEAN INITIALLY IN SITTING THAT DID IMPROVE OVER TIME WITH CUES, REST BREAKS AS NEEDED, O2 SATS STABLE DURING BED MOBILITY AND TF, PT ENCOURAGED TO INCREASE TIME OOB IN CHAIR, PT EDUCATED IN BLE THEREX TO PERFORM WHILE SEATED IN CHAIR: HIP FLEX/EXT, LAQ, AP'S, HEEL SLIDES.  PT WITH NG TUBE IN NOSE FOR FEEDING THAT DID FALL OUT TO GRAVITY ONCE PT SEATED EOB, NOTIFIED NURSE.  PT REQUIRED MAXA X 2 FOR SIT<>STAND TF FROM BED, PT REQUIRED MODA X 2 FOR SIT<>STAND TF FROM BEDSIDE CHAIR TO PLACE PAD UNDER BOTTOM    AM-PAC 6 CLICK MOBILITY  Total Score:10     Patient left up in chair with all lines intact, call button in reach, chair alarm on, NURSE notified and NURSE present.    GOALS:   Multidisciplinary Problems     Physical Therapy Goals         Problem: Physical Therapy    Goal Priority Disciplines Outcome Goal Variances Interventions   Physical Therapy Goal     PT, PT/OT      Description: LTG'S TO BE MET IN 14 DAYS (4-21-22)  1. PT WILL REQUIRE MODA FOR BED MOBILITY  2. PT WILL REQUIRE MODA FOR SIT<>STAND TF  3. PT WILL REQUIRE MODA FOR BED<>CHAIR TF  4. PT WILL AMB 50' WITH RW AND MODA                   History:     Past Medical History:   Diagnosis Date    Acidosis, metabolic, with respiratory acidosis 3/31/2022    Asthma     Back pain     Cataract     OD    CKD (chronic kidney disease) stage 3, GFR 30-59 ml/min 12/20/2020    Diabetes mellitus     Fibromyalgia     Gastroesophageal reflux disease     Hypercholesterolemia     Hypertension     Immune deficiency disorder     Obesity     Osteoporosis     Rheumatoid arthritis     Tobacco dependence     Trouble in sleeping     Type 2 diabetes mellitus        Past Surgical History:   Procedure Laterality Date    BRONCHOSCOPY N/A 3/30/2022    Procedure: Bronchoscopy;  Surgeon: Isak Yadav MD;  Location: [facility] OR;  Service: General;  Laterality: N/A;    COLONOSCOPY N/A 2/14/2019    Procedure: COLONOSCOPY;  Surgeon: Yury Atwood MD;  Location: [facility] ENDO;  Service: Endoscopy;  Laterality: N/A;    HERNIA REPAIR      OOPHORECTOMY      TRACHEOSTOMY N/A 3/30/2022    Procedure: CREATION, TRACHEOSTOMY;  Surgeon: Isak Yadav MD;  Location: [facility] OR;  Service: General;  Laterality: N/A;       Time Tracking:     PT Received On: 04/07/22  PT Start Time: 1030     PT Stop Time: 1100  PT Total Time (min): 30 min     Billable Minutes: Evaluation 15 and Therapeutic Activity 15    04/07/2022

## 2022-04-08 LAB
ANION GAP SERPL CALC-SCNC: 15 MMOL/L (ref 8–16)
ANISOCYTOSIS BLD QL SMEAR: SLIGHT
BASOPHILS NFR BLD: 0 % (ref 0–1.9)
BUN SERPL-MCNC: 70 MG/DL (ref 8–23)
CALCIUM SERPL-MCNC: 9 MG/DL (ref 8.7–10.5)
CHLORIDE SERPL-SCNC: 103 MMOL/L (ref 95–110)
CO2 SERPL-SCNC: 28 MMOL/L (ref 23–29)
CREAT SERPL-MCNC: 3.4 MG/DL (ref 0.5–1.4)
DIFFERENTIAL METHOD: ABNORMAL
EOSINOPHIL NFR BLD: 0 % (ref 0–8)
ERYTHROCYTE [DISTWIDTH] IN BLOOD BY AUTOMATED COUNT: 16.3 % (ref 11.5–14.5)
EST. GFR  (AFRICAN AMERICAN): 15 ML/MIN/1.73 M^2
EST. GFR  (NON AFRICAN AMERICAN): 13 ML/MIN/1.73 M^2
GLUCOSE SERPL-MCNC: 104 MG/DL (ref 70–110)
HCT VFR BLD AUTO: 26.3 % (ref 37–48.5)
HGB BLD-MCNC: 8.2 G/DL (ref 12–16)
HYPOCHROMIA BLD QL SMEAR: ABNORMAL
IMM GRANULOCYTES # BLD AUTO: ABNORMAL K/UL (ref 0–0.04)
IMM GRANULOCYTES NFR BLD AUTO: ABNORMAL % (ref 0–0.5)
LYMPHOCYTES NFR BLD: 11 % (ref 18–48)
MAGNESIUM SERPL-MCNC: 1.7 MG/DL (ref 1.6–2.6)
MCH RBC QN AUTO: 24.3 PG (ref 27–31)
MCHC RBC AUTO-ENTMCNC: 31.2 G/DL (ref 32–36)
MCV RBC AUTO: 78 FL (ref 82–98)
MONOCYTES NFR BLD: 3 % (ref 4–15)
NEUTROPHILS NFR BLD: 81 % (ref 38–73)
NEUTS BAND NFR BLD MANUAL: 5 %
NRBC BLD-RTO: 0 /100 WBC
PHOSPHATE SERPL-MCNC: 2.8 MG/DL (ref 2.7–4.5)
PLATELET # BLD AUTO: 184 K/UL (ref 150–450)
PMV BLD AUTO: 10.1 FL (ref 9.2–12.9)
POCT GLUCOSE: 101 MG/DL (ref 70–110)
POCT GLUCOSE: 114 MG/DL (ref 70–110)
POCT GLUCOSE: 146 MG/DL (ref 70–110)
POIKILOCYTOSIS BLD QL SMEAR: SLIGHT
POLYCHROMASIA BLD QL SMEAR: ABNORMAL
POTASSIUM SERPL-SCNC: 3.1 MMOL/L (ref 3.5–5.1)
RBC # BLD AUTO: 3.37 M/UL (ref 4–5.4)
SODIUM SERPL-SCNC: 146 MMOL/L (ref 136–145)
WBC # BLD AUTO: 15.14 K/UL (ref 3.9–12.7)

## 2022-04-08 PROCEDURE — A4216 STERILE WATER/SALINE, 10 ML: HCPCS | Performed by: FAMILY MEDICINE

## 2022-04-08 PROCEDURE — 94761 N-INVAS EAR/PLS OXIMETRY MLT: CPT

## 2022-04-08 PROCEDURE — 99900035 HC TECH TIME PER 15 MIN (STAT)

## 2022-04-08 PROCEDURE — 99233 SBSQ HOSP IP/OBS HIGH 50: CPT | Mod: ,,, | Performed by: INTERNAL MEDICINE

## 2022-04-08 PROCEDURE — 63600175 PHARM REV CODE 636 W HCPCS: Performed by: SURGERY

## 2022-04-08 PROCEDURE — 21400001 HC TELEMETRY ROOM

## 2022-04-08 PROCEDURE — 25000003 PHARM REV CODE 250: Performed by: SURGERY

## 2022-04-08 PROCEDURE — 99900026 HC AIRWAY MAINTENANCE (STAT)

## 2022-04-08 PROCEDURE — 84100 ASSAY OF PHOSPHORUS: CPT | Performed by: INTERNAL MEDICINE

## 2022-04-08 PROCEDURE — 63600175 PHARM REV CODE 636 W HCPCS: Performed by: FAMILY MEDICINE

## 2022-04-08 PROCEDURE — 85027 COMPLETE CBC AUTOMATED: CPT | Performed by: FAMILY MEDICINE

## 2022-04-08 PROCEDURE — 92597 ORAL SPEECH DEVICE EVAL: CPT

## 2022-04-08 PROCEDURE — 25000242 PHARM REV CODE 250 ALT 637 W/ HCPCS: Performed by: INTERNAL MEDICINE

## 2022-04-08 PROCEDURE — 85007 BL SMEAR W/DIFF WBC COUNT: CPT | Performed by: FAMILY MEDICINE

## 2022-04-08 PROCEDURE — 99233 PR SUBSEQUENT HOSPITAL CARE,LEVL III: ICD-10-PCS | Mod: ,,, | Performed by: INTERNAL MEDICINE

## 2022-04-08 PROCEDURE — 27000221 HC OXYGEN, UP TO 24 HOURS

## 2022-04-08 PROCEDURE — 94640 AIRWAY INHALATION TREATMENT: CPT

## 2022-04-08 PROCEDURE — 83735 ASSAY OF MAGNESIUM: CPT | Performed by: FAMILY MEDICINE

## 2022-04-08 PROCEDURE — 25000003 PHARM REV CODE 250: Performed by: FAMILY MEDICINE

## 2022-04-08 PROCEDURE — 80048 BASIC METABOLIC PNL TOTAL CA: CPT | Performed by: FAMILY MEDICINE

## 2022-04-08 PROCEDURE — 51798 US URINE CAPACITY MEASURE: CPT

## 2022-04-08 PROCEDURE — 99900022

## 2022-04-08 PROCEDURE — 92611 MOTION FLUOROSCOPY/SWALLOW: CPT

## 2022-04-08 PROCEDURE — 63600175 PHARM REV CODE 636 W HCPCS: Performed by: INTERNAL MEDICINE

## 2022-04-08 PROCEDURE — 25500020 PHARM REV CODE 255: Performed by: FAMILY MEDICINE

## 2022-04-08 PROCEDURE — A9698 NON-RAD CONTRAST MATERIALNOC: HCPCS | Performed by: FAMILY MEDICINE

## 2022-04-08 RX ORDER — HYDROCHLOROTHIAZIDE 25 MG/1
TABLET ORAL
Qty: 90 TABLET | Refills: 3 | OUTPATIENT
Start: 2022-04-08 | End: 2022-04-11

## 2022-04-08 RX ORDER — MAGNESIUM SULFATE 1 G/100ML
1 INJECTION INTRAVENOUS ONCE
Status: COMPLETED | OUTPATIENT
Start: 2022-04-08 | End: 2022-04-08

## 2022-04-08 RX ORDER — POTASSIUM CHLORIDE 14.9 MG/ML
20 INJECTION INTRAVENOUS
Status: COMPLETED | OUTPATIENT
Start: 2022-04-08 | End: 2022-04-08

## 2022-04-08 RX ORDER — AMLODIPINE BESYLATE 10 MG/1
TABLET ORAL
Qty: 90 TABLET | Refills: 3 | OUTPATIENT
Start: 2022-04-08 | End: 2022-04-11

## 2022-04-08 RX ADMIN — HYDRALAZINE HYDROCHLORIDE 10 MG: 20 INJECTION, SOLUTION INTRAMUSCULAR; INTRAVENOUS at 03:04

## 2022-04-08 RX ADMIN — Medication 10 ML: at 06:04

## 2022-04-08 RX ADMIN — IPRATROPIUM BROMIDE AND ALBUTEROL SULFATE 3 ML: 2.5; .5 SOLUTION RESPIRATORY (INHALATION) at 03:04

## 2022-04-08 RX ADMIN — ANTI-FUNGAL POWDER MICONAZOLE NITRATE TALC FREE: 1.42 POWDER TOPICAL at 09:04

## 2022-04-08 RX ADMIN — HEPARIN SODIUM 7500 UNITS: 5000 INJECTION INTRAVENOUS; SUBCUTANEOUS at 09:04

## 2022-04-08 RX ADMIN — IPRATROPIUM BROMIDE AND ALBUTEROL SULFATE 3 ML: 2.5; .5 SOLUTION RESPIRATORY (INHALATION) at 07:04

## 2022-04-08 RX ADMIN — POTASSIUM CHLORIDE 20 MEQ: 14.9 INJECTION, SOLUTION INTRAVENOUS at 06:04

## 2022-04-08 RX ADMIN — SODIUM CHLORIDE, PRESERVATIVE FREE 10 ML: 5 INJECTION INTRAVENOUS at 06:04

## 2022-04-08 RX ADMIN — HEPARIN SODIUM 7500 UNITS: 5000 INJECTION INTRAVENOUS; SUBCUTANEOUS at 06:04

## 2022-04-08 RX ADMIN — POTASSIUM CHLORIDE 20 MEQ: 14.9 INJECTION, SOLUTION INTRAVENOUS at 08:04

## 2022-04-08 RX ADMIN — SODIUM CHLORIDE, PRESERVATIVE FREE 10 ML: 5 INJECTION INTRAVENOUS at 12:04

## 2022-04-08 RX ADMIN — FAMOTIDINE 20 MG: 10 INJECTION, SOLUTION INTRAVENOUS at 08:04

## 2022-04-08 RX ADMIN — CHLORHEXIDINE GLUCONATE 0.12% ORAL RINSE 15 ML: 1.2 LIQUID ORAL at 08:04

## 2022-04-08 RX ADMIN — Medication 10 ML: at 02:04

## 2022-04-08 RX ADMIN — ANTI-FUNGAL POWDER MICONAZOLE NITRATE TALC FREE: 1.42 POWDER TOPICAL at 08:04

## 2022-04-08 RX ADMIN — BARIUM SULFATE 75 ML: 0.81 POWDER, FOR SUSPENSION ORAL at 12:04

## 2022-04-08 RX ADMIN — HEPARIN SODIUM 7500 UNITS: 5000 INJECTION INTRAVENOUS; SUBCUTANEOUS at 03:04

## 2022-04-08 RX ADMIN — IPRATROPIUM BROMIDE AND ALBUTEROL SULFATE 3 ML: 2.5; .5 SOLUTION RESPIRATORY (INHALATION) at 12:04

## 2022-04-08 RX ADMIN — CHLORHEXIDINE GLUCONATE 0.12% ORAL RINSE 15 ML: 1.2 LIQUID ORAL at 09:04

## 2022-04-08 RX ADMIN — Medication 10 ML: at 10:04

## 2022-04-08 RX ADMIN — MAGNESIUM SULFATE 1 G: 1 INJECTION INTRAVENOUS at 06:04

## 2022-04-08 NOTE — PLAN OF CARE
No acute changes overnight, pt on Trach collar during night at 5L, 28%, tolerated well, pt used Passy russell valve for aprox 20 minutes with daughter at bedside. NSR, SBP in 150's. Ortega to gravity with good UO. No bm. Noted morning potassium to be 5.1, and magnesium 1.7, notified , she verbalized she would put in replacement orders.

## 2022-04-08 NOTE — TELEPHONE ENCOUNTER
Refill Routing Note   Medication(s) are not appropriate for processing by [facility] for the following reason(s):      - Patient has been seen in the ED/Hospital since the last PCP visit    [facility] action(s):  Defer          Medication reconciliation completed: No     Appointments  past 12m or future 3m with PCP    Date Provider   Last Visit   12/17/2021 THEA Roman Jr., MD   Next Visit   Visit date not found THEA Roman Jr., MD   ED visits in past 90 days: 0        Note composed:12:30 PM 04/08/2022

## 2022-04-08 NOTE — ASSESSMENT & PLAN NOTE
--baseline BUN/Cr 20/1.4  --stop lisinopril  --avoid nephrotoxic agents  --Nephrology following  --Pt is not acidotic and there is no hyperkalemia;   --d/w nephro service  current indication for dialysis is pt's inability to metabolize sedatives she is arousable but still extremely sedated  -- uremic  --IR for hemodialysis access per dR Mane today    4/8  neph following  Cr stable at 3.4 after RRT

## 2022-04-08 NOTE — ASSESSMENT & PLAN NOTE
--likely 2/2 lisinopril  --admit to ICU  --had emergent trach in ED, on ventalitor  --IV steroids, IV antihistamiens  --Fresh Frozen Plasma ordered in ED  --monitor closely    3/30  Improving   Continue supportive care  Minimal improvement in appearance  Consider bradykinin inhibitor?  Oxygen Concentration (%):  [28] 28     3/31  CC following for vent management  Surgery following after trach placement  Continue iv systemic steroids    4/1  Continue current trach care    4/4  Improved neurological status with dialysis    4/7  --pt is back to baseline neurologically  --OG tube for tube feeds has been dc'd  --barium swallow in am to eval possibility of starting diet  --passy russell valve for trach    4/8  S/p urgent trach  Passed barium swallow  Ok for regular diet  Awaiting rehab placement, Tad, sofiya off ventilator  PMV trials

## 2022-04-08 NOTE — PROGRESS NOTES
[facility] - Intensive Care ([facility])  [facility] Medicine  Progress Note    Patient Name: Page Grissom  MRN: [id number]  Patient Class: IP- Inpatient   Admission Date: 3/29/2022  Length of Stay: 10 days  Attending Physician: Bc Crocker MD  Primary Care Provider: NEELAM Roman Jr, MD        Subjective:     Principal Problem:Angioedema        HPI:  72 y/o female with PMHx of HTN, HLD, DM, asthma, CKD 3, and RA who presented to the ED with c/o angioedema that onset suddenly this morning. Pt was able to verbally communicate with EMS, was not able to communicate with ED staff. Staff was unable to intubate and called surgery, who performed emergency trach. Pt is currently on vent and sedated.  ED workup shows: H/H 10.9/34.6, K+ 2.9, CO2 22, Anion gap 17, BUN 55, Cr 8.8.  She will be admitted to ICU for angioedema under the care of [facility] medicine.  She is a full code and her SDM is her daughter, Cassy.        Overview/Hospital Course:  3/30 emergently trach'd in ED as difficulty intubating d/t angioedema. Going to OR for replacement of tube  3/31 hypotensive requiring pressor support. Leukocytosis with electrolyte abn noted. Concerns for sepsis in immunocompromised patient. Low UOP. Nephrology following for possible dialysis. Guarded prognosis. Consider palliative consult for GOC/family support  4/1 remains on ventilatory support via trach. NG placed for med admin  4/2 somnolent but following commands, ESRD not currently receiving dialysis pt received versed for crich procedure slow metabolizer still somnolent  4/3- somnolent but following commands. D/w nephro dr. Mane trial of dialysis in am to metabolize sedatives pt does not seem to be metabolizing sedatives. Stable respiratory status  4/4- CT head 4/4 reveals mild subacute to chronic appearing ischemic changes to deep white matter both cerebral hemispheres. Pt remains somnolent EEG pending. Neuro following. Trial of dialysis to address somnolence.  4/5-  pt's mentation unchanged following HD. D/w neuro Dr. Ferrera; EEG shows metabolic encephalopathy. Neurological insult is likely multifactorial-prognosis still unknown.  4/6- pt's mentation has improved she now has her eyes open spontaneously, following commands. Creatinine has improved following dialysis down to 3.7 from 8.9. MRI Brain was negative for infarct or bleed  4/7- acidosis and volume status improved with dialysis. Reviewed nephrology note most likely patient will no longer need HD creatinine stable at roughly 3.4 for 3 days. Mentation continues to improve patient talking (silently, no passy russell valve available yet) and writing down questions.  4/8 passed barium swallow. Ok for regular diet      Interval History: See hospital course for today      Review of Systems   Unable to perform ROS: Other (trach)   Objective:     Vital Signs (Most Recent):  Temp: 98.7 °F (37.1 °C) (04/08/22 0705)  Pulse: 82 (04/08/22 1125)  Resp: (!) 32 (04/08/22 1125)  BP: (!) 151/67 (04/08/22 0800)  SpO2: 95 % (04/08/22 1125)   Vital Signs (24h Range):  Temp:  [98.2 °F (36.8 °C)-99.6 °F (37.6 °C)] 98.7 °F (37.1 °C)  Pulse:  [59-82] 82  Resp:  [24-35] 32  SpO2:  [85 %-100 %] 95 %  BP: (137-191)/(64-83) 151/67     Weight: 109.1 kg (240 lb 8.4 oz)  Body mass index is 41.29 kg/m².    Intake/Output Summary (Last 24 hours) at 4/8/2022 1138  Last data filed at 4/8/2022 0800  Gross per 24 hour   Intake 148.64 ml   Output 1825 ml   Net -1676.36 ml      Physical Exam  Vitals and nursing note reviewed. Exam conducted with a chaperone present (nursing).   Constitutional:       General: She is not in acute distress.     Appearance: She is morbidly obese. She is ill-appearing. She is not toxic-appearing.   HENT:      Head: Normocephalic and atraumatic.   Neck:      Comments: trach  Cardiovascular:      Rate and Rhythm: Normal rate.   Pulmonary:      Effort: Pulmonary effort is normal. Tachypnea present. No respiratory distress.   Abdominal:       Palpations: Abdomen is soft.      Tenderness: There is no abdominal tenderness.   Genitourinary:     Comments: alatorre  Musculoskeletal:      Right lower leg: No edema.      Left lower leg: No edema.   Skin:     General: Skin is warm.   Neurological:      Mental Status: She is alert.      Motor: Weakness present.   Psychiatric:         Behavior: Behavior is cooperative.       Significant Labs: All pertinent labs within the past 24 hours have been reviewed.  CBC:   Recent Labs   Lab 04/07/22  0335 04/07/22  0403 04/08/22  0436   WBC  --  17.43* 15.14*   HGB  --  8.1* 8.2*   HCT 25* 26.0* 26.3*   PLT  --  210 184     CMP:   Recent Labs   Lab 04/07/22  0403 04/08/22  0436    146*   K 3.6 3.1*    103   CO2 28 28   * 104   BUN 70* 70*   CREATININE 3.4* 3.4*   CALCIUM 8.6* 9.0   ANIONGAP 12 15   EGFRNONAA 13* 13*       Significant Imaging: I have reviewed all pertinent imaging results/findings within the past 24 hours.      Assessment/Plan:      * Angioedema  --likely 2/2 lisinopril  --admit to ICU  --had emergent trach in ED, on ventalitor  --IV steroids, IV antihistamiens  --Fresh Frozen Plasma ordered in ED  --monitor closely    3/30  Improving   Continue supportive care  Minimal improvement in appearance  Consider bradykinin inhibitor?  Oxygen Concentration (%):  [28] 28     3/31  CC following for vent management  Surgery following after trach placement  Continue iv systemic steroids    4/1  Continue current trach care    4/4  Improved neurological status with dialysis    4/7  --pt is back to baseline neurologically  --OG tube for tube feeds has been dc'd  --barium swallow in am to eval possibility of starting diet  --passy russell valve for trach    4/8  S/p urgent trach  Passed barium swallow  Ok for regular diet  Awaiting rehab placement, Tad, once off ventilator  PMV trials    Acute metabolic encephalopathy  --neurological insult is likely multifactorial following anoxic event  --two days post  dialysis there is improvement  in neurological status pt back to baseline neurologically  --MRI brain 4/5 neg for infarct or bleed  --cont PT/OT  --f/u further recs from ICU service regarding trach  --continue all other current care    4/8  Improving  MRI brain with no infarct, chronic ischemic findings  Physical/occupational therapy       Leukocytosis  Improving  Afebrile  Has been transitioned off systemic steroids for angioedema  Bcx ngtd  resp cx with normal resp fabi  D/c tunneled cath when able    Palliative care encounter        Morbid obesity        Acute kidney injury superimposed on chronic kidney disease  --baseline BUN/Cr 20/1.4  --stop lisinopril  --avoid nephrotoxic agents  --Nephrology following  --Pt is not acidotic and there is no hyperkalemia;   --d/w nephro service  current indication for dialysis is pt's inability to metabolize sedatives she is arousable but still extremely sedated  -- uremic  --IR for hemodialysis access per dR Mane today    4/8  neph following  Cr stable at 3.4 after RRT        Tracheostomy in place  --admit to ICU  --stop lisiniopril  --pulmonology following  --supportive care    OR for replacement of cricoid trach      Rheumatoid arthritis involving multiple sites with positive rheumatoid factor  --hold plaquenil  --f/u OP         VTE Risk Mitigation (From admission, onward)         Ordered     heparin (porcine) injection 5,200 Units  As needed (PRN)         04/04/22 1342     heparin (porcine) injection 2,500 Units  As needed (PRN)         04/04/22 1115     Apply sequential compression device to lower extremities (if no contraindications). Medical venous thromboembolus prophylaxis is preferred.  Until discontinued         03/30/22 0805     heparin (porcine) injection 7,500 Units  Every 8 hours         03/29/22 1003     IP VTE HIGH RISK PATIENT  Once         03/29/22 1003                Discharge Planning   CHALO:      Code Status: Full Code   Is the patient medically ready  for discharge?:     Reason for patient still in hospital (select all that apply): Patient trending condition, Laboratory test, Treatment, Imaging, Consult recommendations, PT / OT recommendations and Pending disposition  Discharge Plan A: (P) Rehab            Critical care time spent on the evaluation and treatment of severe organ dysfunction, review of pertinent labs and imaging studies, discussions with consulting providers and discussions with patient/family: 25 minutes.      Bc Crocker MD  Department of Hospital Medicine   [facility] - Intensive Care ([facility]

## 2022-04-08 NOTE — PT/OT/SLP EVAL
Speech Language Pathology Evaluation  One Way Speaking Valve Evaluation    Patient Name:  Page Grissom   MRN:  [id number]  Admitting Diagnosis: Angioedema    IMPORTANT  CAUTION: CUFF MUST ALWAYS BE DEFLATED WHEN VALVE IN USE    Recommendations:                 General Recommendations:  One Way Speaking Valve  Diet recommendations:  Regular (See MBSS report, 4/8/22.), Thin (See MBSS report.)   Aspiration Precautions: Standard aspiration precautions   General Precautions: Standard, aspiration  Communication strategies:  One way speaking valve    History:     Past Medical History:   Diagnosis Date    Acidosis, metabolic, with respiratory acidosis 3/31/2022    Asthma     Back pain     Cataract     OD    CKD (chronic kidney disease) stage 3, GFR 30-59 ml/min 12/20/2020    Diabetes mellitus     Fibromyalgia     Gastroesophageal reflux disease     Hypercholesterolemia     Hypertension     Immune deficiency disorder     Obesity     Osteoporosis     Rheumatoid arthritis     Tobacco dependence     Trouble in sleeping     Type 2 diabetes mellitus        Past Surgical History:   Procedure Laterality Date    BRONCHOSCOPY N/A 3/30/2022    Procedure: Bronchoscopy;  Surgeon: Isak Yadav MD;  Location: [facility] OR;  Service: General;  Laterality: N/A;    COLONOSCOPY N/A 2/14/2019    Procedure: COLONOSCOPY;  Surgeon: Yury Atwood MD;  Location: [facility] ENDO;  Service: Endoscopy;  Laterality: N/A;    HERNIA REPAIR      OOPHORECTOMY      TRACHEOSTOMY N/A 3/30/2022    Procedure: CREATION, TRACHEOSTOMY;  Surgeon: Isak Yadav MD;  Location: [facility] OR;  Service: General;  Laterality: N/A;       Social History: Patient lives at home.  Independent with ADL's.    Modified Barium Swallow completed 4/8/22.  See report for details.      Subjective     Pt seen bedside for ST PMSV evaluation.  No c/o pain.  No family present.    Patient goals: To go home.    Objective:     Level of  Alertness:  · Alert  · Cooperative    Secretion Management:  ·  Patient was suctioned prior to placement of Passy Mary Valve (PMV)    Pain/Comfort:  · Pain Rating 1: 0/10  · Pain Rating Post-Intervention 1: 0/10  · Pain Rating 2: 0/10  · Pain Rating Post-Intervention 2: 0/10    Respiratory Status: 3 L O2 around trach tube    Barriers to Learning: N/A    Oral Musculature Evaluation  · Oral Musculature: WFL  · Dentition: present and adequate  · Secretion Management: adequate  · Mucosal Quality: good  · Mandibular Strength and Mobility: WFL  · Oral Labial Strength and Mobility: WFL  · Lingual Strength and Mobility: WFL  · Velar Elevation: WFL  · Buccal Strength and Mobility: WFL  · Volitional Cough: productive  · Volitional Swallow: WFL  · Voice Prior to PO Intake: PMSV placed onto tracheostomy tube--adequate phonation achieved.    Trach/Vent:  · Tracheostomy Type  · Cuffed  · Tolerates cuff deflated: Yes    Pre Treatment Measures  Trach Collar    · O2 3 L    One Way Speaking Valve Placement:  Type of speaking valve: One Way Speaking Valve  Purple    Time on valve: 1 hour  Phonation on exhalation: Pt achieved adequate phonation with functional breathing-speech coordination during conversational speech tasks with SLP.  Overall speech intelligibility: excellent    Patient reaction: Pleased    Laryngeal function:    Excursion:  · WNL    Voluntary Cough  · WNL    Voluntary Throat Clear:   · WNL    Voice Quality:  · WNL    Education provided: PMSV educated prior to pt.  Nursing also notified of removing speaking valve during sleep.    Post Treatment Measures:  Trach Collar    Pt's PMSV removed by SLP, per pt request to nap.  Cuff remained deflated.    Assessment:     Page Grissom is a 71 y.o. female with an SLP diagnosis of cognitive-communicative impairment s/p tracheostomy, requiring PMSV to verbalize needs.  She presents with functional voicing, breath support and tolerance of PMSV.  Pt exhibited decreased  cognition related to orientation and memory recall.  See MBSS 4/8/22 regarding swallowing and diet recs.  Pt recommended for continued SLP intervention to improve cognition/communication to PLOF.    Goals:   Multidisciplinary Problems     SLP Goals        Problem: SLP    Goal Priority Disciplines Outcome   SLP Goal     SLP Ongoing, Progressing   Description: 1.  Pt will consume least restrictive PO diet without s/s of dysphagia--goal met.  Pt recommended regular consistency diet/thin liquids (MBSS completed 4/8/22).    *Change goal:  Pt will consume regular consistency diet without s/s of dysphagia.  2.  MBSS for comprehensive swallowing assessment s/p tracheostomy--goal met.  MBSS completed 4/8/22.  3.  Recommended PMSV trials/communication evaluation--Goal met.  Completed 4/8/22.  *See new goals below.  4.  Pt will improve cognitive-communicative ability to meet complex needs/ADL's.  5.  Pt will utilize PMSV with adequate breath support upon all waking hours.                   Plan:     · Patient to be seen:  2 x/week   · Plan of Care expires:  04/14/22  · Plan of Care reviewed with:  patient   · SLP Follow-Up:  Yes       Discharge recommendations:  rehabilitation facility   Barriers to Discharge:  None    Time Tracking:     SLP Treatment Date:   04/08/22  Speech Start Time:  1120  Speech Stop Time:  1150     Speech Total Time (min):  30 min    Billable Minutes: Therapeutic Speech Device 30 minutes    04/08/2022

## 2022-04-08 NOTE — NURSING
Patient transferred from ICU.  Ortega removed and pur wick placed  She is AAOx3 and she is able to make her needs know

## 2022-04-08 NOTE — ASSESSMENT & PLAN NOTE
Improving  Afebrile  Has been transitioned off systemic steroids for angioedema  Bcx ngtd  resp cx with normal resp fabi  D/c tunneled cath when able

## 2022-04-08 NOTE — PROGRESS NOTES
Progress Note  [facility]    SUBJECTIVE:     Chief Complaint:   angioedema    History of Present Illness/Interval History:  The pt is a 71 yof who presented to the ED with angioedema. Airway was obstructed by her tongue.    Review of patient's allergies indicates:   Allergen Reactions    Lisinopril Anaphylaxis     Angioedema requiring trach       Past Medical History:   Diagnosis Date    Acidosis, metabolic, with respiratory acidosis 3/31/2022    Asthma     Back pain     Cataract     OD    CKD (chronic kidney disease) stage 3, GFR 30-59 ml/min 12/20/2020    Diabetes mellitus     Fibromyalgia     Gastroesophageal reflux disease     Hypercholesterolemia     Hypertension     Immune deficiency disorder     Obesity     Osteoporosis     Rheumatoid arthritis     Tobacco dependence     Trouble in sleeping     Type 2 diabetes mellitus      Past Surgical History:   Procedure Laterality Date    BRONCHOSCOPY N/A 3/30/2022    Procedure: Bronchoscopy;  Surgeon: Isak Yadav MD;  Location: [facility] OR;  Service: General;  Laterality: N/A;    COLONOSCOPY N/A 2/14/2019    Procedure: COLONOSCOPY;  Surgeon: Yury Atwood MD;  Location: [facility] ENDO;  Service: Endoscopy;  Laterality: N/A;    HERNIA REPAIR      OOPHORECTOMY      TRACHEOSTOMY N/A 3/30/2022    Procedure: CREATION, TRACHEOSTOMY;  Surgeon: Isak Yadav MD;  Location: [facility] OR;  Service: General;  Laterality: N/A;     Family History   Problem Relation Age of Onset    Hypertension Mother     Cancer Father     Colon cancer Father     Breast cancer Sister      Social History     Socioeconomic History    Marital status:    Tobacco Use    Smoking status: Current Every Day Smoker     Packs/day: 0.50     Years: 25.00     Pack years: 12.50     Types: Cigarettes    Smokeless tobacco: Never Used    Tobacco comment: trying to quit   Substance and Sexual Activity    Alcohol use: No    Drug use: No     Review of Systems:  Unable to  obtain due to patient's status- unable to speak to me.    OBJECTIVE:     Vital Signs  Vitals:    04/08/22 1115 04/08/22 1125 04/08/22 1200 04/08/22 1300   BP: (!) 171/81  (!) 199/82 (!) 164/74   BP Location:       Patient Position:       Pulse: 89 82 69 65   Resp: (!) 35 (!) 32 (!) 36 (!) 28   Temp: 98.4 °F (36.9 °C)      TempSrc: Oral      SpO2: 95% 95% 96% 97%   Weight:       Height:         Body mass index is 41.29 kg/m².    Physical Exam:  General: no distress  Eyes:  conjunctivae/corneas clear  Nose: no discharge  Mouth: tongue swelling is resolved  Neck: trachea midline with no masses appreciated, trach in place & secured with stay-sutures  Lungs:  normal respiratory effort, no wheezes, no rales  Heart: regular rate and rhythm and no murmur  Abdomen: non-distended  Extremities: no cyanosis, +mild dependent edema, no clubbing  Skin: No rashes or lesions. good skin turgor  Neurologic: PERRL, follows commands, mouthing words with us today & communicating    Laboratory:  Lab Results   Component Value Date    WBC 15.14 (H) 04/08/2022    HGB 8.2 (L) 04/08/2022    HCT 26.3 (L) 04/08/2022    MCV 78 (L) 04/08/2022     04/08/2022       Chest Imaging, My Impression:   CXR reviewed    Diagnostic Results:  CT: Reviewed    ASSESSMENT/PLAN:     1) Angioedema due to ace inhibitor.  pt on avoidance.  2) Acute hypoxemic respiratory failure. Transitioned to trach collar today. She looks comfortable on trach collar. Continue PMV trials.  3) RLL infiltrate on CXR. +purulent secretions improved. Completed abx.  4) Airway compromise s/p tracheostomy.  5) Acute encephalopathy. Improving. Continue to monitor.  6) KYE. Nephrology is following & supporting with RRT as needed.    Pt is doing great on 28% FiO2 via trach collar. She is ready for transfer out of the ICU.    Aryan Donnelly MD  [facility]

## 2022-04-08 NOTE — PLAN OF CARE
04/08/22 1135   Post-Acute Status   Post-Acute Authorization Placement   Post-Acute Placement Status Referrals Sent   Discharge Plan   Discharge Plan A Rehab     Discussed recommendation with patient at bedside about discharging to inpatient rehab. Patient would like [facility] in [place] as first choice. Contacted liaison at [facility]. They have open beds and can accept a patient with a trach collar but no oxygen requirements due to not having pulmonology on staff. Referral sent to [facility] and choice form obtained.

## 2022-04-08 NOTE — EICU
Potassium 3.1  Creatinine 3.3  Urine output 1700ml in 24 hours    Magnesium 1.7    Plan:  Potassium chloride 20 meq over 1 hour for 2 doses via central line  Magnesium sulfate 1 gm IVPB once over 4 hours via central line once

## 2022-04-08 NOTE — PT/OT/SLP PROGRESS
Occupational Therapy      Patient Name:  Page Grissom   MRN:  [id number]    Patient not seen today secondary to awaiting MBSS. Will continue efforts.    Marah Chapman, OT    4/8/2022   1000

## 2022-04-08 NOTE — SUBJECTIVE & OBJECTIVE
Interval History: See hospital course for today      Review of Systems   Unable to perform ROS: Other (trach)   Objective:     Vital Signs (Most Recent):  Temp: 98.7 °F (37.1 °C) (04/08/22 0705)  Pulse: 82 (04/08/22 1125)  Resp: (!) 32 (04/08/22 1125)  BP: (!) 151/67 (04/08/22 0800)  SpO2: 95 % (04/08/22 1125)   Vital Signs (24h Range):  Temp:  [98.2 °F (36.8 °C)-99.6 °F (37.6 °C)] 98.7 °F (37.1 °C)  Pulse:  [59-82] 82  Resp:  [24-35] 32  SpO2:  [85 %-100 %] 95 %  BP: (137-191)/(64-83) 151/67     Weight: 109.1 kg (240 lb 8.4 oz)  Body mass index is 41.29 kg/m².    Intake/Output Summary (Last 24 hours) at 4/8/2022 1138  Last data filed at 4/8/2022 0800  Gross per 24 hour   Intake 148.64 ml   Output 1825 ml   Net -1676.36 ml      Physical Exam  Vitals and nursing note reviewed. Exam conducted with a chaperone present (nursing).   Constitutional:       General: She is not in acute distress.     Appearance: She is morbidly obese. She is ill-appearing. She is not toxic-appearing.   HENT:      Head: Normocephalic and atraumatic.   Neck:      Comments: trach  Cardiovascular:      Rate and Rhythm: Normal rate.   Pulmonary:      Effort: Pulmonary effort is normal. Tachypnea present. No respiratory distress.   Abdominal:      Palpations: Abdomen is soft.      Tenderness: There is no abdominal tenderness.   Genitourinary:     Comments: landry  Musculoskeletal:      Right lower leg: No edema.      Left lower leg: No edema.   Skin:     General: Skin is warm.   Neurological:      Mental Status: She is alert.      Motor: Weakness present.   Psychiatric:         Behavior: Behavior is cooperative.       Significant Labs: All pertinent labs within the past 24 hours have been reviewed.  CBC:   Recent Labs   Lab 04/07/22  0335 04/07/22  0403 04/08/22  0436   WBC  --  17.43* 15.14*   HGB  --  8.1* 8.2*   HCT 25* 26.0* 26.3*   PLT  --  210 184     CMP:   Recent Labs   Lab 04/07/22  0403 04/08/22  0436    146*   K 3.6 3.1*     103   CO2 28 28   * 104   BUN 70* 70*   CREATININE 3.4* 3.4*   CALCIUM 8.6* 9.0   ANIONGAP 12 15   EGFRNONAA 13* 13*       Significant Imaging: I have reviewed all pertinent imaging results/findings within the past 24 hours.

## 2022-04-08 NOTE — PLAN OF CARE
Ms. Grissom is currently on trach collar using a passy russell valve for communication. Today a SLP Video swallow test was performed and Ms. Grissom was started on a cardiac diet. No restraints are in use. PAYNE with generalized weakness. Occassional HTN treated with PRN. Patient transferred to Tele with belongings and family notified. Report given to RN to resume care      Problem: Communication Impairment (Mechanical Ventilation, Invasive)  Goal: Effective Communication  Outcome: Met       Problem: Nutrition Impairment (Mechanical Ventilation, Invasive)  Goal: Optimal Nutrition Delivery  Outcome: Met       Problem: Restraint, Nonbehavioral (Nonviolent)  Goal: Absence of Harm or Injury  Outcome: Met

## 2022-04-08 NOTE — EICU
Rounding (Video Assessment):  N/A    Intervention Initiated From:  Bedside    Felicia Communicated with Bedside Nurse regarding:  Labs    Doctor Notified:  Yes    Comments: RN called to report K+ of 3.1, inquiring about replacement.  Message sent to Dr Cuevas, orders placed

## 2022-04-08 NOTE — ASSESSMENT & PLAN NOTE
--neurological insult is likely multifactorial following anoxic event  --two days post dialysis there is improvement  in neurological status pt back to baseline neurologically  --MRI brain 4/5 neg for infarct or bleed  --cont PT/OT  --f/u further recs from ICU service regarding trach  --continue all other current care    4/8  Improving  MRI brain with no infarct, chronic ischemic findings  Physical/occupational therapy

## 2022-04-08 NOTE — PROCEDURES
Modified Barium Swallow    Patient Name:  Page Grissom   MRN:  [id number]      Recommendations:     Recommendations:     Diet recommendations:  Regular, Thin   Aspiration Precautions: Speaking Valve on during meals , 1 bite/sip at a time, HOB to 90 degrees, Remain upright 30 minutes post meal and Small bites/sips   General Precautions: Standard, fall, respiratory  Communication strategies:  One way speaking valve    Referral     Reason for Referral  Patient was referred for a Modified Barium Swallow Study to assess the efficiency of his/her swallow function, rule out aspiration and make recommendations regarding safe dietary consistencies, effective compensatory strategies, and safe eating environment.     Diagnosis: Angioedema       History:     Past Medical History:   Diagnosis Date    Acidosis, metabolic, with respiratory acidosis 3/31/2022    Asthma     Back pain     Cataract     OD    CKD (chronic kidney disease) stage 3, GFR 30-59 ml/min 12/20/2020    Diabetes mellitus     Fibromyalgia     Gastroesophageal reflux disease     Hypercholesterolemia     Hypertension     Immune deficiency disorder     Obesity     Osteoporosis     Rheumatoid arthritis     Tobacco dependence     Trouble in sleeping     Type 2 diabetes mellitus        Objective:     Current Respiratory Status: T-collar 3 liters O2    Alert: yes    Cooperative: yes    Follows Directions: yes    Visualization  · Patient was seen in the lateral view  · Tracheostomy tube visualized in place/ One Way Speaking Valve present    Oral Peripheral Examination  · Oral Musculature: WFL  · Dentition: present and adequate  · Secretion Management: coughing on secretions (minimal tracheal secretions.  Productive cough)  · Mucosal Quality: good  · Mandibular Strength and Mobility: WFL  · Oral Labial Strength and Mobility: WFL  · Lingual Strength and Mobility: WFL  · Velar Elevation: WFL  · Buccal Strength and Mobility: WFL  · Volitional Cough:  present, productive  · Volitional Swallow: present  · Voice Prior to PO Intake: VIC s/p tracheostomy.  Awaiting PMSV for trials and vocal assessment.  Voicing achieved x1 via digital occlusion (cuff deflated prior to arrival)--breathy quality.  Impaired breathing-speech coordination.  Continued assessment with application of PMSV.    Consistencies Assessed  · Thin liquids, puree, and solids with barium     Oral Preparation/Oral Phase  · WFL- Pt with adequate bolus acceptance, containment, control and timely A-P transfer across consistencies     Pharyngeal Phase   · WFL - Pt with adequate bolus propulsion, bolus clearance, laryngeal elevation, and airway protection across consistencies     Cervical Esophageal Phase  · UES appeared to accommodate all bolus types without stasis or retrograde movement observed     Assessment:     Impressions  ·  Patient with oral and pharyngeal phases of swallowing deemed WFL    Education  Results were discussed with patient.    Goals:   Multidisciplinary Problems     SLP Goals        Problem: SLP    Goal Priority Disciplines Outcome   SLP Goal     SLP    Description: 1.  Pt will consume least restrictive PO diet without s/s of dysphagia  2.  MBSS for comprehensive swallowing assessment s/p tracheostomy  3.  Recommended PMSV trials/communication evaluation                   Plan:   · Patient to be seen:  Therapy Frequency: 2 x/week   · Plan of Care expires:  04/14/22  · Plan of Care reviewed with:  patient       Time Tracking:   SLP Treatment Date:   04/08/22  Speech Start Time:  1030  Speech Stop Time:  1100     Speech Total Time (min):  30 min    04/08/2022

## 2022-04-08 NOTE — PROGRESS NOTES
[facility] - Intensive Care ([facility])  Nephrology  Progress Note    Patient Name: Page Grissom  MRN: [id number]  Admission Date: 3/29/2022  Hospital Length of Stay: 10 days  Attending Provider: Bc Crocker MD   Primary Care Physician: NEELAM Roman Jr, MD  Principal Problem:Angioedema      Subjective:   72 yo female with obstructive respiratory failure from tongue edema (angioedema) and s/p trach and KYE. HD 4/4 4/8  - speaking today, feels well. Recounting intial episode of tongue swelling   - last HD    Review of patient's allergies indicates:   Allergen Reactions    Lisinopril Anaphylaxis     Angioedema requiring trach     Current Facility-Administered Medications   Medication Frequency    0.9%  NaCl infusion (for blood administration) Q24H PRN    albuterol-ipratropium 2.5 mg-0.5 mg/3 mL nebulizer solution 3 mL Q8H    chlorhexidine 0.12 % solution 15 mL BID    dextrose 10% bolus 125 mL PRN    famotidine (PF) injection 20 mg Daily    fentaNYL 50 mcg/mL injection 50 mcg Q1H PRN    glucagon (human recombinant) injection 1 mg PRN    heparin (porcine) injection 2,500 Units PRN    heparin (porcine) injection 5,200 Units PRN    heparin (porcine) injection 7,500 Units Q8H    hydrALAZINE injection 10 mg Q6H PRN    insulin aspart U-100 pen 1-10 Units Q6H PRN    miconazole NITRATE 2 % top powder BID    naloxone 0.4 mg/mL injection 0.02 mg PRN    sodium chloride 0.9% flush 10 mL Q6H    And    sodium chloride 0.9% flush 10 mL PRN    sodium chloride 0.9% flush 10 mL Q8H       Objective:     Vital Signs (Most Recent):  Temp: 98.4 °F (36.9 °C) (04/08/22 1115)  Pulse: 65 (04/08/22 1300)  Resp: (!) 28 (04/08/22 1300)  BP: (!) 179/79 (04/08/22 1532)  SpO2: 97 % (04/08/22 1300)  O2 Device (Oxygen Therapy): Trach Collar (04/08/22 0715) Vital Signs (24h Range):  Temp:  [98.2 °F (36.8 °C)-98.7 °F (37.1 °C)] 98.4 °F (36.9 °C)  Pulse:  [59-89] 65  Resp:  [24-36] 28  SpO2:  [95 %-100 %] 97 %  BP:  (137-199)/(64-83) 179/79     Weight: 109.1 kg (240 lb 8.4 oz) (04/08/22 0600)  Body mass index is 41.29 kg/m².  Body surface area is 2.22 meters squared.    I/O last 3 completed shifts:  In: 848.6 [NG/GT:600; IV Piggyback:248.6]  Out: 2825 [Urine:2825]    Physical Exam  Vitals and nursing note reviewed.   Constitutional:       General: She is not in acute distress.     Appearance: She is obese.   HENT:      Head: Atraumatic.   Eyes:      General: No scleral icterus.  Cardiovascular:      Rate and Rhythm: Normal rate.   Pulmonary:      Effort: Pulmonary effort is normal.      Breath sounds: No wheezing or rales.   Abdominal:      Palpations: Abdomen is soft.      Tenderness: There is no abdominal tenderness.   Musculoskeletal:         General: No swelling.      Right lower leg: No edema.      Left lower leg: No edema.   Skin:     General: Skin is warm and dry.   Neurological:      Mental Status: She is alert and oriented to person, place, and time.   Psychiatric:         Mood and Affect: Mood normal.         Behavior: Behavior normal.         Thought Content: Thought content normal.         Judgment: Judgment normal.         Significant Labs: reviewed       Assessment/Plan:     Active Diagnoses:    Diagnosis Date Noted POA    PRINCIPAL PROBLEM:  Angioedema [T78.3XXA]  Yes    Acute metabolic encephalopathy [G93.41] 04/05/2022 Unknown    Palliative care encounter [Z51.5] 04/01/2022 Not Applicable    Leukocytosis [D72.829] 04/01/2022 Unknown    Tracheostomy in place [Z93.0] 03/29/2022 Not Applicable    Acute kidney injury superimposed on chronic kidney disease [N17.9, N18.9] 03/29/2022 Unknown    Morbid obesity [E66.01] 03/29/2022 Yes    Rheumatoid arthritis involving multiple sites with positive rheumatoid factor [M05.79] 07/03/2013 Yes      Problems Resolved During this Admission:    Diagnosis Date Noted Date Resolved POA    Acidosis, metabolic, with respiratory acidosis [E87.4] 03/31/2022 04/02/2022 Unknown        KYE/ATN from sustained hypotension (anaphylaxis)   - UOP stable at 2L and Cr has improved.   - first and only HD 4/4 (femoral tunneled cath)  - no indications for RRT today  - continue to monitor for further renal recovery       Frank Winkler MD  Nephrology

## 2022-04-08 NOTE — PT/OT/SLP PROGRESS
Physical Therapy      Patient Name:  Page Grissom   MRN:  [id number]    10:00 a.m.  Unable to see patient at this time as she was being prepped for swallow study.   Will follow up during next scheduled PT session.

## 2022-04-09 PROBLEM — Z51.5 PALLIATIVE CARE ENCOUNTER: Status: RESOLVED | Noted: 2022-04-01 | Resolved: 2022-04-09

## 2022-04-09 PROBLEM — G93.41 ACUTE METABOLIC ENCEPHALOPATHY: Status: RESOLVED | Noted: 2022-04-05 | Resolved: 2022-04-09

## 2022-04-09 LAB
ANION GAP SERPL CALC-SCNC: 18 MMOL/L (ref 8–16)
BUN SERPL-MCNC: 62 MG/DL (ref 8–23)
CALCIUM SERPL-MCNC: 9 MG/DL (ref 8.7–10.5)
CHLORIDE SERPL-SCNC: 103 MMOL/L (ref 95–110)
CO2 SERPL-SCNC: 24 MMOL/L (ref 23–29)
CREAT SERPL-MCNC: 3.2 MG/DL (ref 0.5–1.4)
EST. GFR  (AFRICAN AMERICAN): 16 ML/MIN/1.73 M^2
EST. GFR  (NON AFRICAN AMERICAN): 14 ML/MIN/1.73 M^2
GLUCOSE SERPL-MCNC: 108 MG/DL (ref 70–110)
PHOSPHATE SERPL-MCNC: 2.9 MG/DL (ref 2.7–4.5)
POCT GLUCOSE: 100 MG/DL (ref 70–110)
POCT GLUCOSE: 103 MG/DL (ref 70–110)
POCT GLUCOSE: 107 MG/DL (ref 70–110)
POCT GLUCOSE: 89 MG/DL (ref 70–110)
POCT GLUCOSE: 98 MG/DL (ref 70–110)
POTASSIUM SERPL-SCNC: 3.4 MMOL/L (ref 3.5–5.1)
SODIUM SERPL-SCNC: 145 MMOL/L (ref 136–145)

## 2022-04-09 PROCEDURE — 63600175 PHARM REV CODE 636 W HCPCS: Performed by: SURGERY

## 2022-04-09 PROCEDURE — 99232 SBSQ HOSP IP/OBS MODERATE 35: CPT | Mod: CR,,, | Performed by: INTERNAL MEDICINE

## 2022-04-09 PROCEDURE — 25000003 PHARM REV CODE 250: Performed by: FAMILY MEDICINE

## 2022-04-09 PROCEDURE — 99233 SBSQ HOSP IP/OBS HIGH 50: CPT | Mod: ,,, | Performed by: INTERNAL MEDICINE

## 2022-04-09 PROCEDURE — 99232 PR SUBSEQUENT HOSPITAL CARE,LEVL II: ICD-10-PCS | Mod: CR,,, | Performed by: INTERNAL MEDICINE

## 2022-04-09 PROCEDURE — 63600175 PHARM REV CODE 636 W HCPCS: Performed by: FAMILY MEDICINE

## 2022-04-09 PROCEDURE — 51798 US URINE CAPACITY MEASURE: CPT

## 2022-04-09 PROCEDURE — 80048 BASIC METABOLIC PNL TOTAL CA: CPT | Performed by: INTERNAL MEDICINE

## 2022-04-09 PROCEDURE — 36415 COLL VENOUS BLD VENIPUNCTURE: CPT | Performed by: INTERNAL MEDICINE

## 2022-04-09 PROCEDURE — 94640 AIRWAY INHALATION TREATMENT: CPT

## 2022-04-09 PROCEDURE — 21400001 HC TELEMETRY ROOM

## 2022-04-09 PROCEDURE — 31720 CLEARANCE OF AIRWAYS: CPT

## 2022-04-09 PROCEDURE — 99233 PR SUBSEQUENT HOSPITAL CARE,LEVL III: ICD-10-PCS | Mod: ,,, | Performed by: INTERNAL MEDICINE

## 2022-04-09 PROCEDURE — A4216 STERILE WATER/SALINE, 10 ML: HCPCS | Performed by: FAMILY MEDICINE

## 2022-04-09 PROCEDURE — 99900035 HC TECH TIME PER 15 MIN (STAT)

## 2022-04-09 PROCEDURE — 84100 ASSAY OF PHOSPHORUS: CPT | Performed by: INTERNAL MEDICINE

## 2022-04-09 PROCEDURE — 25000242 PHARM REV CODE 250 ALT 637 W/ HCPCS: Performed by: INTERNAL MEDICINE

## 2022-04-09 PROCEDURE — 99900026 HC AIRWAY MAINTENANCE (STAT)

## 2022-04-09 PROCEDURE — 25000003 PHARM REV CODE 250: Performed by: SURGERY

## 2022-04-09 PROCEDURE — 27000221 HC OXYGEN, UP TO 24 HOURS

## 2022-04-09 PROCEDURE — 94761 N-INVAS EAR/PLS OXIMETRY MLT: CPT

## 2022-04-09 RX ORDER — POTASSIUM CHLORIDE 20 MEQ/1
40 TABLET, EXTENDED RELEASE ORAL DAILY
Status: DISCONTINUED | OUTPATIENT
Start: 2022-04-09 | End: 2022-04-09

## 2022-04-09 RX ORDER — LANOLIN ALCOHOL/MO/W.PET/CERES
400 CREAM (GRAM) TOPICAL ONCE
Status: DISCONTINUED | OUTPATIENT
Start: 2022-04-09 | End: 2022-04-09

## 2022-04-09 RX ORDER — POTASSIUM CHLORIDE 20 MEQ/1
40 TABLET, EXTENDED RELEASE ORAL DAILY
Status: COMPLETED | OUTPATIENT
Start: 2022-04-09 | End: 2022-04-11

## 2022-04-09 RX ORDER — LANOLIN ALCOHOL/MO/W.PET/CERES
400 CREAM (GRAM) TOPICAL DAILY
Status: COMPLETED | OUTPATIENT
Start: 2022-04-09 | End: 2022-04-11

## 2022-04-09 RX ORDER — HYDRALAZINE HYDROCHLORIDE 25 MG/1
25 TABLET, FILM COATED ORAL EVERY 12 HOURS
Status: DISCONTINUED | OUTPATIENT
Start: 2022-04-09 | End: 2022-04-10

## 2022-04-09 RX ADMIN — SODIUM CHLORIDE, PRESERVATIVE FREE 10 ML: 5 INJECTION INTRAVENOUS at 05:04

## 2022-04-09 RX ADMIN — ANTI-FUNGAL POWDER MICONAZOLE NITRATE TALC FREE: 1.42 POWDER TOPICAL at 10:04

## 2022-04-09 RX ADMIN — HYDRALAZINE HYDROCHLORIDE 10 MG: 20 INJECTION, SOLUTION INTRAMUSCULAR; INTRAVENOUS at 08:04

## 2022-04-09 RX ADMIN — IPRATROPIUM BROMIDE AND ALBUTEROL SULFATE 3 ML: 2.5; .5 SOLUTION RESPIRATORY (INHALATION) at 03:04

## 2022-04-09 RX ADMIN — SODIUM CHLORIDE, PRESERVATIVE FREE 10 ML: 5 INJECTION INTRAVENOUS at 11:04

## 2022-04-09 RX ADMIN — HEPARIN SODIUM 7500 UNITS: 5000 INJECTION INTRAVENOUS; SUBCUTANEOUS at 02:04

## 2022-04-09 RX ADMIN — FAMOTIDINE 20 MG: 10 INJECTION, SOLUTION INTRAVENOUS at 08:04

## 2022-04-09 RX ADMIN — IPRATROPIUM BROMIDE AND ALBUTEROL SULFATE 3 ML: 2.5; .5 SOLUTION RESPIRATORY (INHALATION) at 09:04

## 2022-04-09 RX ADMIN — CHLORHEXIDINE GLUCONATE 0.12% ORAL RINSE 15 ML: 1.2 LIQUID ORAL at 08:04

## 2022-04-09 RX ADMIN — SODIUM CHLORIDE, PRESERVATIVE FREE 10 ML: 5 INJECTION INTRAVENOUS at 12:04

## 2022-04-09 RX ADMIN — HYDRALAZINE HYDROCHLORIDE 25 MG: 25 TABLET, FILM COATED ORAL at 09:04

## 2022-04-09 RX ADMIN — CHLORHEXIDINE GLUCONATE 0.12% ORAL RINSE 15 ML: 1.2 LIQUID ORAL at 09:04

## 2022-04-09 RX ADMIN — HYDRALAZINE HYDROCHLORIDE 25 MG: 25 TABLET, FILM COATED ORAL at 11:04

## 2022-04-09 RX ADMIN — HYDRALAZINE HYDROCHLORIDE 10 MG: 20 INJECTION, SOLUTION INTRAMUSCULAR; INTRAVENOUS at 04:04

## 2022-04-09 RX ADMIN — IPRATROPIUM BROMIDE AND ALBUTEROL SULFATE 3 ML: 2.5; .5 SOLUTION RESPIRATORY (INHALATION) at 12:04

## 2022-04-09 RX ADMIN — HEPARIN SODIUM 7500 UNITS: 5000 INJECTION INTRAVENOUS; SUBCUTANEOUS at 09:04

## 2022-04-09 RX ADMIN — ANTI-FUNGAL POWDER MICONAZOLE NITRATE TALC FREE: 1.42 POWDER TOPICAL at 08:04

## 2022-04-09 RX ADMIN — MAGNESIUM OXIDE TAB 400 MG (241.3 MG ELEMENTAL MG) 400 MG: 400 (241.3 MG) TAB at 10:04

## 2022-04-09 RX ADMIN — HEPARIN SODIUM 7500 UNITS: 5000 INJECTION INTRAVENOUS; SUBCUTANEOUS at 05:04

## 2022-04-09 RX ADMIN — POTASSIUM CHLORIDE 40 MEQ: 1500 TABLET, EXTENDED RELEASE ORAL at 10:04

## 2022-04-09 RX ADMIN — IPRATROPIUM BROMIDE AND ALBUTEROL SULFATE 3 ML: 2.5; .5 SOLUTION RESPIRATORY (INHALATION) at 11:04

## 2022-04-09 NOTE — NURSING
Aaltorre was pulled around 1700. Pt attempted to urinate and only got out about 50 cc. bladder scanned to check for volume due to only having 49 cc in bladder around 2200. I encouraged more fluid intake at that time. Result was 330cc now. I was wondering should I in and out per protocol or just give her some time because she had that alatorre for around 10 days in ICU and I'm unsure if she was bladder trained before pulling alatorre this evening when transferred to the floor. Notified on call FELIPE. Per GEORGETTE Lawrence give her 12 hrs from alatorre pull. No distress noted to pt.

## 2022-04-09 NOTE — SUBJECTIVE & OBJECTIVE
Interval History: See hospital course for today      Review of Systems   Constitutional:  Positive for activity change (improved), appetite change (improved) and fatigue. Negative for fever.   HENT:  Positive for trouble swallowing.    Respiratory:  Positive for cough and shortness of breath.    Cardiovascular:  Negative for leg swelling.   Gastrointestinal:  Positive for nausea and vomiting. Negative for abdominal pain.   Neurological:  Positive for weakness.   Psychiatric/Behavioral:  Negative for dysphoric mood.    Objective:     Vital Signs (Most Recent):  Temp: 97.9 °F (36.6 °C) (04/09/22 0727)  Pulse: 78 (04/09/22 0909)  Resp: 18 (04/09/22 0909)  BP: (!) 174/75 (04/09/22 0727)  SpO2: 95 % (04/09/22 0909)   Vital Signs (24h Range):  Temp:  [97.9 °F (36.6 °C)-98.8 °F (37.1 °C)] 97.9 °F (36.6 °C)  Pulse:  [61-89] 78  Resp:  [17-36] 18  SpO2:  [95 %-99 %] 95 %  BP: (144-199)/(65-89) 174/75     Weight: 107.5 kg (236 lb 15.9 oz)  Body mass index is 40.68 kg/m².    Intake/Output Summary (Last 24 hours) at 4/9/2022 0950  Last data filed at 4/9/2022 0454  Gross per 24 hour   Intake --   Output 740 ml   Net -740 ml      Physical Exam  Vitals and nursing note reviewed.   Constitutional:       General: She is not in acute distress.     Appearance: She is ill-appearing. She is not toxic-appearing.      Interventions: Nasal cannula in place.   HENT:      Head: Normocephalic and atraumatic.   Neck:      Comments: trach  Cardiovascular:      Rate and Rhythm: Normal rate.   Pulmonary:      Effort: Tachypnea and respiratory distress present.      Breath sounds: Decreased air movement present.   Abdominal:      Palpations: Abdomen is soft.   Genitourinary:     Comments: Ortega, external  Musculoskeletal:      Right lower leg: No edema.      Left lower leg: No edema.   Skin:     General: Skin is warm.   Neurological:      Mental Status: She is alert and oriented to person, place, and time.       Significant Labs: All pertinent  labs within the past 24 hours have been reviewed.  CBC:   Recent Labs   Lab 04/08/22  0436   WBC 15.14*   HGB 8.2*   HCT 26.3*        CMP:   Recent Labs   Lab 04/08/22 0436   *   K 3.1*      CO2 28      BUN 70*   CREATININE 3.4*   CALCIUM 9.0   ANIONGAP 15   EGFRNONAA 13*       Significant Imaging: I have reviewed all pertinent imaging results/findings within the past 24 hours.

## 2022-04-09 NOTE — SUBJECTIVE & OBJECTIVE
Interval History:  04/09: seen and examined, has PMV, speaks well, awaits placement, T max 98.8F, 28% FiO2,     Respiratory ROS: no cough, shortness of breath, or wheezing     Objective:     Vital Signs (Most Recent):  Temp: 98.4 °F (36.9 °C) (04/09/22 1153)  Pulse: 81 (04/09/22 1153)  Resp: 18 (04/09/22 1153)  BP: (!) 145/65 (04/09/22 1153)  SpO2: (!) 92 % (04/09/22 1153)   Vital Signs (24h Range):  Temp:  [97.9 °F (36.6 °C)-98.8 °F (37.1 °C)] 98.4 °F (36.9 °C)  Pulse:  [61-81] 81  Resp:  [17-31] 18  SpO2:  [92 %-99 %] 92 %  BP: (144-180)/(65-89) 145/65     Weight: 107.5 kg (236 lb 15.9 oz)  Body mass index is 40.68 kg/m².      Intake/Output Summary (Last 24 hours) at 4/9/2022 1411  Last data filed at 4/9/2022 1200  Gross per 24 hour   Intake 200 ml   Output 350 ml   Net -150 ml       Physical Exam  Vitals and nursing note reviewed.   Constitutional:       Appearance: She is ill-appearing.       HENT:      Head: Normocephalic.   Eyes:      Pupils: Pupils are equal, round, and reactive to light.   Cardiovascular:      Rate and Rhythm: Normal rate and regular rhythm.      Pulses: Normal pulses.      Heart sounds: Normal heart sounds.   Pulmonary:      Effort: Pulmonary effort is normal.      Breath sounds: Normal breath sounds.   Abdominal:      General: Bowel sounds are normal.   Musculoskeletal:         General: Normal range of motion.   Neurological:      Mental Status: She is alert.       Vents:  Vent Mode: A/C (04/07/22 0914)  Ventilator Initiated: Yes (04/01/22 0900)  Set Rate: 18 BPM (04/07/22 0914)  Vt Set: 400 mL (04/07/22 0914)  Pressure Support: 0 cmH20 (04/07/22 0914)  PEEP/CPAP: 6 cmH20 (04/07/22 0914)  Oxygen Concentration (%): 28 (04/09/22 0909)  Peak Airway Pressure: 23 cmH2O (04/07/22 0914)  Plateau Pressure: 18 cmH20 (04/07/22 0914)  Total Ve: 8.58 mL (04/07/22 0914)  F/VT Ratio<105 (RSBI): (!) 52.63 (04/07/22 0914)    Lines/Drains/Airways       Peripherally Inserted Central Catheter Line  Duration              PICC Double Lumen 03/31/22 0240 right basilic 9 days              Central Venous Catheter Line  Duration                  Hemodialysis Catheter 04/04/22 1130 right femoral 5 days              Airway  Duration                  Surgical Airway 03/30/22 Other (Comment) 10 days                    Significant Labs:    CBC/Anemia Profile:  Recent Labs   Lab 04/08/22 0436   WBC 15.14*   HGB 8.2*   HCT 26.3*      MCV 78*   RDW 16.3*        Chemistries:  Recent Labs   Lab 04/08/22 0436 04/09/22  0518 04/09/22  1321   *  --  145   K 3.1*  --  3.4*     --  103   CO2 28  --  24   BUN 70*  --  62*   CREATININE 3.4*  --  3.2*   CALCIUM 9.0  --  9.0   MG 1.7  --   --    PHOS 2.8 2.9  --        All pertinent labs within the past 24 hours have been reviewed.    Significant Imaging:  I have reviewed all pertinent imaging results/findings within the past 24 hours.

## 2022-04-09 NOTE — ASSESSMENT & PLAN NOTE
--baseline BUN/Cr 20/1.4  --stop lisinopril  --avoid nephrotoxic agents  --Nephrology following  --Pt is not acidotic and there is no hyperkalemia;   --d/w nephro service  current indication for dialysis is pt's inability to metabolize sedatives she is arousable but still extremely sedated  -- uremic  --IR for hemodialysis access per dR Mane today    4/8  neph following  Cr stable at 3.4 after RRT    Improved  Cr stable

## 2022-04-09 NOTE — ASSESSMENT & PLAN NOTE
Status post emergent cricothyroidotomy with size 5 trach.    Converted to Trach  Lisinopril Allergy

## 2022-04-09 NOTE — PROGRESS NOTES
[facility] - Intensive Care ([facility])  Nephrology  Progress Note    Patient Name: Page Grissom  MRN: [id number]  Admission Date: 3/29/2022  Hospital Length of Stay: 11 days  Attending Provider: Bc Crocker MD   Primary Care Physician: NEELAM Roman Jr, MD  Principal Problem:Angioedema      Subjective:   70 yo female with obstructive respiratory failure from tongue edema (angioedema) and s/p trach and KYE. HD 4/4 4/8  - speaking today, feels well. Recounting intial episode of tongue swelling   - last HD 4/4 4/9  - some mild confusion  - UOP stable, about 1L     Review of patient's allergies indicates:   Allergen Reactions    Lisinopril Anaphylaxis     Angioedema requiring trach     Current Facility-Administered Medications   Medication Frequency    0.9%  NaCl infusion (for blood administration) Q24H PRN    albuterol-ipratropium 2.5 mg-0.5 mg/3 mL nebulizer solution 3 mL Q8H    chlorhexidine 0.12 % solution 15 mL BID    dextrose 10% bolus 125 mL PRN    famotidine (PF) injection 20 mg Daily    glucagon (human recombinant) injection 1 mg PRN    heparin (porcine) injection 2,500 Units PRN    heparin (porcine) injection 5,200 Units PRN    heparin (porcine) injection 7,500 Units Q8H    hydrALAZINE injection 10 mg Q6H PRN    hydrALAZINE tablet 25 mg Q12H    insulin aspart U-100 pen 1-10 Units Q6H PRN    magnesium oxide tablet 400 mg Daily    miconazole NITRATE 2 % top powder BID    naloxone 0.4 mg/mL injection 0.02 mg PRN    potassium chloride SA CR tablet 40 mEq Daily    sodium chloride 0.9% flush 10 mL Q6H    And    sodium chloride 0.9% flush 10 mL PRN       Objective:     Vital Signs (Most Recent):  Temp: 98.4 °F (36.9 °C) (04/09/22 1153)  Pulse: 81 (04/09/22 1153)  Resp: 18 (04/09/22 1153)  BP: (!) 145/65 (04/09/22 1153)  SpO2: (!) 92 % (04/09/22 1153)  O2 Device (Oxygen Therapy): Trach Collar (04/09/22 0909) Vital Signs (24h Range):  Temp:  [97.9 °F (36.6 °C)-98.8 °F (37.1 °C)] 98.4 °F  (36.9 °C)  Pulse:  [61-81] 81  Resp:  [17-31] 18  SpO2:  [92 %-99 %] 92 %  BP: (144-180)/(65-89) 145/65     Weight: 107.5 kg (236 lb 15.9 oz) (04/09/22 0023)  Body mass index is 40.68 kg/m².  Body surface area is 2.2 meters squared.    I/O last 3 completed shifts:  In: 100 [IV Piggyback:100]  Out: 1985 [Urine:1985]    Physical Exam  Vitals and nursing note reviewed.   Constitutional:       General: She is not in acute distress.     Appearance: She is obese.   HENT:      Head: Atraumatic.   Eyes:      General: No scleral icterus.  Cardiovascular:      Rate and Rhythm: Normal rate.   Pulmonary:      Effort: Pulmonary effort is normal.      Breath sounds: No wheezing or rales.   Abdominal:      Palpations: Abdomen is soft.      Tenderness: There is no abdominal tenderness.   Musculoskeletal:         General: No swelling.      Right lower leg: No edema.      Left lower leg: No edema.   Skin:     General: Skin is warm and dry.   Neurological:      Mental Status: She is alert and oriented to person, place, and time.   Psychiatric:         Mood and Affect: Mood normal.         Behavior: Behavior normal.         Thought Content: Thought content normal.         Judgment: Judgment normal.         Significant Labs: reviewed       Assessment/Plan:     Active Diagnoses:    Diagnosis Date Noted POA    PRINCIPAL PROBLEM:  Angioedema [T78.3XXA]  Yes    Chest pain [R07.9]  No    Hypokalemia [E87.6]  Yes    Nonsustained ventricular tachycardia [I47.2]  No    Leukocytosis [D72.829] 04/01/2022 Unknown    Tracheostomy in place [Z93.0] 03/29/2022 Not Applicable    Acute kidney injury superimposed on chronic kidney disease [N17.9, N18.9] 03/29/2022 Unknown    Morbid obesity [E66.01] 03/29/2022 Yes    Rheumatoid arthritis involving multiple sites with positive rheumatoid factor [M05.79] 07/03/2013 Yes      Problems Resolved During this Admission:    Diagnosis Date Noted Date Resolved POA    Acute metabolic encephalopathy [G93.41]  04/05/2022 04/09/2022 Unknown    Palliative care encounter [Z51.5] 04/01/2022 04/09/2022 Not Applicable    Acidosis, metabolic, with respiratory acidosis [E87.4] 03/31/2022 04/02/2022 Unknown       KYE/ATN from sustained hypotension (anaphylaxis)   - UOP stable and Cr is improving   - first and only HD 4/4 (femoral tunneled cath)  - no indications for RRT today  - continue to monitor for further renal recovery     Replete potassium as doing    Discussed with daughter       OK to remove tunneled catheter       Frank Winkler MD  Nephrology

## 2022-04-09 NOTE — PROGRESS NOTES
[facility] - Telemetry [facility])  Pulmonology  Progress Note    Patient Name: Page Grissom  MRN: [id number]  Admission Date: 3/29/2022  Hospital Length of Stay: 11 days  Code Status: Full Code  Attending Provider: Bc Crocker MD  Primary Care Provider: NEELAM Roman Jr, MD   Principal Problem: Angioedema    Subjective:     Interval History:  04/09: seen and examined, has PMV, speaks well, awaits placement, T max 98.8F, 28% FiO2,     Respiratory ROS: no cough, shortness of breath, or wheezing     Objective:     Vital Signs (Most Recent):  Temp: 98.4 °F (36.9 °C) (04/09/22 1153)  Pulse: 81 (04/09/22 1153)  Resp: 18 (04/09/22 1153)  BP: (!) 145/65 (04/09/22 1153)  SpO2: (!) 92 % (04/09/22 1153)   Vital Signs (24h Range):  Temp:  [97.9 °F (36.6 °C)-98.8 °F (37.1 °C)] 98.4 °F (36.9 °C)  Pulse:  [61-81] 81  Resp:  [17-31] 18  SpO2:  [92 %-99 %] 92 %  BP: (144-180)/(65-89) 145/65     Weight: 107.5 kg (236 lb 15.9 oz)  Body mass index is 40.68 kg/m².      Intake/Output Summary (Last 24 hours) at 4/9/2022 1411  Last data filed at 4/9/2022 1200  Gross per 24 hour   Intake 200 ml   Output 350 ml   Net -150 ml       Physical Exam  Vitals and nursing note reviewed.   Constitutional:       Appearance: She is ill-appearing.       HENT:      Head: Normocephalic.   Eyes:      Pupils: Pupils are equal, round, and reactive to light.   Cardiovascular:      Rate and Rhythm: Normal rate and regular rhythm.      Pulses: Normal pulses.      Heart sounds: Normal heart sounds.   Pulmonary:      Effort: Pulmonary effort is normal.      Breath sounds: Normal breath sounds.   Abdominal:      General: Bowel sounds are normal.   Musculoskeletal:         General: Normal range of motion.   Neurological:      Mental Status: She is alert.       Vents:  Vent Mode: A/C (04/07/22 0914)  Ventilator Initiated: Yes (04/01/22 0900)  Set Rate: 18 BPM (04/07/22 0914)  Vt Set: 400 mL (04/07/22 0914)  Pressure Support: 0 cmH20 (04/07/22 0914)  PEEP/CPAP:  6 cmH20 (04/07/22 0914)  Oxygen Concentration (%): 28 (04/09/22 0909)  Peak Airway Pressure: 23 cmH2O (04/07/22 0914)  Plateau Pressure: 18 cmH20 (04/07/22 0914)  Total Ve: 8.58 mL (04/07/22 0914)  F/VT Ratio<105 (RSBI): (!) 52.63 (04/07/22 0914)    Lines/Drains/Airways       Peripherally Inserted Central Catheter Line  Duration             PICC Double Lumen 03/31/22 0240 right basilic 9 days              Central Venous Catheter Line  Duration                  Hemodialysis Catheter 04/04/22 1130 right femoral 5 days              Airway  Duration                  Surgical Airway 03/30/22 Other (Comment) 10 days                    Significant Labs:    CBC/Anemia Profile:  Recent Labs   Lab 04/08/22  0436   WBC 15.14*   HGB 8.2*   HCT 26.3*      MCV 78*   RDW 16.3*        Chemistries:  Recent Labs   Lab 04/08/22  0436 04/09/22  0518 04/09/22  1321   *  --  145   K 3.1*  --  3.4*     --  103   CO2 28  --  24   BUN 70*  --  62*   CREATININE 3.4*  --  3.2*   CALCIUM 9.0  --  9.0   MG 1.7  --   --    PHOS 2.8 2.9  --        All pertinent labs within the past 24 hours have been reviewed.    Significant Imaging:  I have reviewed all pertinent imaging results/findings within the past 24 hours.      ABG  Recent Labs   Lab 04/07/22  0335   PH 7.447   PO2 81   PCO2 45.5*   HCO3 31.4*   BE 7     Assessment/Plan:     * Angioedema  Status post emergent cricothyroidotomy with size 5 trach.    Converted to Trach  Lisinopril Allergy    Acute kidney injury superimposed on chronic kidney disease  Strict I&Os.    Nephrology following  UO 900mls last 24 hrs  following      Tracheostomy in place  Emergent cricothyrotomy severe angioedema trach in place  Has PMV   Routine care         I have reviewed all labs and imaging studies and compared to previous results. I have also discussed labs with all the teams in the medical care of the patient and my plan is outlined below         Franklyn Cha MD  Pulmonology  [facility]  - Telemetry ([facility])

## 2022-04-09 NOTE — PROGRESS NOTES
[facility] - Telemetry ([facility])  [facility] Medicine  Progress Note    Patient Name: Page Grissom  MRN: [id number]  Patient Class: IP- Inpatient   Admission Date: 3/29/2022  Length of Stay: 11 days  Attending Physician: Bc Crocker MD  Primary Care Provider: NEELAM Roman Jr, MD        Subjective:     Principal Problem:Angioedema        HPI:  70 y/o female with PMHx of HTN, HLD, DM, asthma, CKD 3, and RA who presented to the ED with c/o angioedema that onset suddenly this morning. Pt was able to verbally communicate with EMS, was not able to communicate with ED staff. Staff was unable to intubate and called surgery, who performed emergency trach. Pt is currently on vent and sedated.  ED workup shows: H/H 10.9/34.6, K+ 2.9, CO2 22, Anion gap 17, BUN 55, Cr 8.8.  She will be admitted to ICU for angioedema under the care of hospitals medicine.  She is a full code and her SDM is her daughter, Cassy.        Overview/Hospital Course:  3/30 emergently trach'd in ED as difficulty intubating d/t angioedema. Going to OR for replacement of tube  3/31 hypotensive requiring pressor support. Leukocytosis with electrolyte abn noted. Concerns for sepsis in immunocompromised patient. Low UOP. Nephrology following for possible dialysis. Guarded prognosis. Consider palliative consult for GOC/family support  4/1 remains on ventilatory support via trach. NG placed for med admin  4/2 somnolent but following commands, ESRD not currently receiving dialysis pt received versed for crich procedure slow metabolizer still somnolent  4/3- somnolent but following commands. D/w nephro dr. Mane trial of dialysis in am to metabolize sedatives pt does not seem to be metabolizing sedatives. Stable respiratory status  4/4- CT head 4/4 reveals mild subacute to chronic appearing ischemic changes to deep white matter both cerebral hemispheres. Pt remains somnolent EEG pending. Neuro following. Trial of dialysis to address somnolence.  4/5- pt's  mentation unchanged following HD. D/w neuro Dr. Ferrera; EEG shows metabolic encephalopathy. Neurological insult is likely multifactorial-prognosis still unknown.  4/6- pt's mentation has improved she now has her eyes open spontaneously, following commands. Creatinine has improved following dialysis down to 3.7 from 8.9. MRI Brain was negative for infarct or bleed  4/7- acidosis and volume status improved with dialysis. Reviewed nephrology note most likely patient will no longer need HD creatinine stable at roughly 3.4 for 3 days. Mentation continues to improve patient talking (silently, no passy russell valve available yet) and writing down questions.  4/8 passed barium swallow. Ok for regular diet  4/9 +cough, n/v after eating breakfast. Physical/occupational therapy consulted, recommending rehab. On 2L. Using PMV and speaking. RT allowing rest from use and suctioning.      Interval History: See hospital course for today      Review of Systems   Constitutional:  Positive for activity change (improved), appetite change (improved) and fatigue. Negative for fever.   HENT:  Positive for trouble swallowing.    Respiratory:  Positive for cough and shortness of breath.    Cardiovascular:  Negative for leg swelling.   Gastrointestinal:  Positive for nausea and vomiting. Negative for abdominal pain.   Neurological:  Positive for weakness.   Psychiatric/Behavioral:  Negative for dysphoric mood.    Objective:     Vital Signs (Most Recent):  Temp: 97.9 °F (36.6 °C) (04/09/22 0727)  Pulse: 78 (04/09/22 0909)  Resp: 18 (04/09/22 0909)  BP: (!) 174/75 (04/09/22 0727)  SpO2: 95 % (04/09/22 0909)   Vital Signs (24h Range):  Temp:  [97.9 °F (36.6 °C)-98.8 °F (37.1 °C)] 97.9 °F (36.6 °C)  Pulse:  [61-89] 78  Resp:  [17-36] 18  SpO2:  [95 %-99 %] 95 %  BP: (144-199)/(65-89) 174/75     Weight: 107.5 kg (236 lb 15.9 oz)  Body mass index is 40.68 kg/m².    Intake/Output Summary (Last 24 hours) at 4/9/2022 0950  Last data filed at 4/9/2022  0454  Gross per 24 hour   Intake --   Output 740 ml   Net -740 ml      Physical Exam  Vitals and nursing note reviewed.   Constitutional:       General: She is not in acute distress.     Appearance: She is ill-appearing. She is not toxic-appearing.      Interventions: Nasal cannula in place.   HENT:      Head: Normocephalic and atraumatic.   Neck:      Comments: trach  Cardiovascular:      Rate and Rhythm: Normal rate.   Pulmonary:      Effort: Tachypnea and respiratory distress present.      Breath sounds: Decreased air movement present.   Abdominal:      Palpations: Abdomen is soft.   Genitourinary:     Comments: Ortega, external  Musculoskeletal:      Right lower leg: No edema.      Left lower leg: No edema.   Skin:     General: Skin is warm.   Neurological:      Mental Status: She is alert and oriented to person, place, and time.       Significant Labs: All pertinent labs within the past 24 hours have been reviewed.  CBC:   Recent Labs   Lab 04/08/22  0436   WBC 15.14*   HGB 8.2*   HCT 26.3*        CMP:   Recent Labs   Lab 04/08/22  0436   *   K 3.1*      CO2 28      BUN 70*   CREATININE 3.4*   CALCIUM 9.0   ANIONGAP 15   EGFRNONAA 13*       Significant Imaging: I have reviewed all pertinent imaging results/findings within the past 24 hours.      Assessment/Plan:      * Angioedema  --likely 2/2 lisinopril  --admit to ICU  --had emergent trach in ED, on ventalitor  --IV steroids, IV antihistamiens  --Fresh Frozen Plasma ordered in ED  --monitor closely    3/30  Improving   Continue supportive care  Minimal improvement in appearance  Consider bradykinin inhibitor?  Oxygen Concentration (%):  [28] 28     3/31  CC following for vent management  Surgery following after trach placement  Continue iv systemic steroids    4/1  Continue current trach care    4/4  Improved neurological status with dialysis    4/7  --pt is back to baseline neurologically  --OG tube for tube feeds has been  dc'd  --barium swallow in am to eval possibility of starting diet  --passy russell valve for trach    4/8  S/p urgent trach  Passed barium swallow  Ok for regular diet  Awaiting rehab placement, Tad, once off ventilator  PMV trials    Resolved  Awaiting placement, rehab  Allow PMV rests     Nonsustained ventricular tachycardia        Hypokalemia  Replete     Leukocytosis  Improving  Afebrile  Has been transitioned off systemic steroids for angioedema  Bcx ngtd  resp cx with normal resp fabi  D/c tunneled cath when able    Morbid obesity  Physical/occupational therapy       Acute kidney injury superimposed on chronic kidney disease  --baseline BUN/Cr 20/1.4  --stop lisinopril  --avoid nephrotoxic agents  --Nephrology following  --Pt is not acidotic and there is no hyperkalemia;   --d/w nephro service  current indication for dialysis is pt's inability to metabolize sedatives she is arousable but still extremely sedated  -- uremic  --IR for hemodialysis access per dR Mane today    4/8  neph following  Cr stable at 3.4 after RRT    Improved  Cr stable    Tracheostomy in place  --admit to ICU  --stop lisiniopril  --pulmonology following  --supportive care    OR for replacement of cricoid trach      Rheumatoid arthritis involving multiple sites with positive rheumatoid factor  --hold plaquenil  --f/u OP       VTE Risk Mitigation (From admission, onward)         Ordered     Place sequential compression device  Until discontinued         04/08/22 2016     heparin (porcine) injection 5,200 Units  As needed (PRN)         04/04/22 1342     heparin (porcine) injection 2,500 Units  As needed (PRN)         04/04/22 1115     Apply sequential compression device to lower extremities (if no contraindications). Medical venous thromboembolus prophylaxis is preferred.  Until discontinued         03/30/22 0805     heparin (porcine) injection 7,500 Units  Every 8 hours         03/29/22 1003     IP VTE HIGH RISK PATIENT  Once          03/29/22 1003                Discharge Planning   CHALO:      Code Status: Full Code   Is the patient medically ready for discharge?:     Reason for patient still in hospital (select all that apply): Patient trending condition, Laboratory test, Treatment, Imaging, Consult recommendations, PT / OT recommendations and Pending disposition  Discharge Plan A: (P) Rehab                  Bc Crocker MD  Department of Hospital Medicine   [facility])

## 2022-04-09 NOTE — ASSESSMENT & PLAN NOTE
--likely 2/2 lisinopril  --admit to ICU  --had emergent trach in ED, on ventalitor  --IV steroids, IV antihistamiens  --Fresh Frozen Plasma ordered in ED  --monitor closely    3/30  Improving   Continue supportive care  Minimal improvement in appearance  Consider bradykinin inhibitor?  Oxygen Concentration (%):  [28] 28     3/31  CC following for vent management  Surgery following after trach placement  Continue iv systemic steroids    4/1  Continue current trach care    4/4  Improved neurological status with dialysis    4/7  --pt is back to baseline neurologically  --OG tube for tube feeds has been dc'd  --barium swallow in am to eval possibility of starting diet  --passy russell valve for trach    4/8  S/p urgent trach  Passed barium swallow  Ok for regular diet  Awaiting rehab placement, Tad, once off ventilator  PMV trials    Resolved  Awaiting placement, rehab  Allow PMV rests

## 2022-04-09 NOTE — PLAN OF CARE
Pt remains fall free this shift.  Pt AAOx4, verbal, clear speech when valve is in place on trachea.   Trachae valve at bedside  Suction at bedside  Skin warm and dry. No new skin issues.  Telemonitoring in progress, 70s-80s SR  5L trachea collar  Purwick  Assist x2 with transfers.  CBG q 6 hrs with PRN SS insulin.   Bed low, side rails up x 2, wheels locked, call light in reach.   Bed alarm maintained for safety.   Patient instructed to call for assistance.   Hourly rounding completed.   24 hour chart check completed  POC updated and reviewed with pt. Will continue POC.

## 2022-04-10 LAB
ALBUMIN SERPL BCP-MCNC: 2.4 G/DL (ref 3.5–5.2)
ALBUMIN SERPL BCP-MCNC: 2.4 G/DL (ref 3.5–5.2)
ALP SERPL-CCNC: 82 U/L (ref 55–135)
ALT SERPL W/O P-5'-P-CCNC: 24 U/L (ref 10–44)
ANION GAP SERPL CALC-SCNC: 20 MMOL/L (ref 8–16)
AST SERPL-CCNC: 17 U/L (ref 10–40)
BASOPHILS # BLD AUTO: 0.06 K/UL (ref 0–0.2)
BASOPHILS NFR BLD: 0.3 % (ref 0–1.9)
BILIRUB DIRECT SERPL-MCNC: 0.2 MG/DL (ref 0.1–0.3)
BILIRUB SERPL-MCNC: 0.4 MG/DL (ref 0.1–1)
BUN SERPL-MCNC: 56 MG/DL (ref 8–23)
CALCIUM SERPL-MCNC: 8.7 MG/DL (ref 8.7–10.5)
CHLORIDE SERPL-SCNC: 106 MMOL/L (ref 95–110)
CO2 SERPL-SCNC: 21 MMOL/L (ref 23–29)
CREAT SERPL-MCNC: 3.1 MG/DL (ref 0.5–1.4)
DIFFERENTIAL METHOD: ABNORMAL
EOSINOPHIL # BLD AUTO: 0.2 K/UL (ref 0–0.5)
EOSINOPHIL NFR BLD: 0.8 % (ref 0–8)
ERYTHROCYTE [DISTWIDTH] IN BLOOD BY AUTOMATED COUNT: 16.9 % (ref 11.5–14.5)
EST. GFR  (AFRICAN AMERICAN): 17 ML/MIN/1.73 M^2
EST. GFR  (NON AFRICAN AMERICAN): 15 ML/MIN/1.73 M^2
GLUCOSE SERPL-MCNC: 109 MG/DL (ref 70–110)
HCT VFR BLD AUTO: 28 % (ref 37–48.5)
HGB BLD-MCNC: 8.6 G/DL (ref 12–16)
IMM GRANULOCYTES # BLD AUTO: 0.36 K/UL (ref 0–0.04)
IMM GRANULOCYTES NFR BLD AUTO: 1.9 % (ref 0–0.5)
LYMPHOCYTES # BLD AUTO: 0.8 K/UL (ref 1–4.8)
LYMPHOCYTES NFR BLD: 4.2 % (ref 18–48)
MCH RBC QN AUTO: 24 PG (ref 27–31)
MCHC RBC AUTO-ENTMCNC: 30.7 G/DL (ref 32–36)
MCV RBC AUTO: 78 FL (ref 82–98)
MONOCYTES # BLD AUTO: 1.2 K/UL (ref 0.3–1)
MONOCYTES NFR BLD: 6.3 % (ref 4–15)
NEUTROPHILS # BLD AUTO: 16.2 K/UL (ref 1.8–7.7)
NEUTROPHILS NFR BLD: 86.5 % (ref 38–73)
NRBC BLD-RTO: 0 /100 WBC
PHOSPHATE SERPL-MCNC: 3.2 MG/DL (ref 2.7–4.5)
PHOSPHATE SERPL-MCNC: 3.8 MG/DL (ref 2.7–4.5)
PLATELET # BLD AUTO: 188 K/UL (ref 150–450)
PMV BLD AUTO: 10.1 FL (ref 9.2–12.9)
POCT GLUCOSE: 115 MG/DL (ref 70–110)
POCT GLUCOSE: 119 MG/DL (ref 70–110)
POCT GLUCOSE: 121 MG/DL (ref 70–110)
POCT GLUCOSE: 136 MG/DL (ref 70–110)
POTASSIUM SERPL-SCNC: 3.7 MMOL/L (ref 3.5–5.1)
PROT SERPL-MCNC: 7.4 G/DL (ref 6–8.4)
RBC # BLD AUTO: 3.58 M/UL (ref 4–5.4)
SODIUM SERPL-SCNC: 147 MMOL/L (ref 136–145)
WBC # BLD AUTO: 18.73 K/UL (ref 3.9–12.7)

## 2022-04-10 PROCEDURE — 99233 PR SUBSEQUENT HOSPITAL CARE,LEVL III: ICD-10-PCS | Mod: ,,, | Performed by: INTERNAL MEDICINE

## 2022-04-10 PROCEDURE — 99900035 HC TECH TIME PER 15 MIN (STAT)

## 2022-04-10 PROCEDURE — 94640 AIRWAY INHALATION TREATMENT: CPT

## 2022-04-10 PROCEDURE — 85025 COMPLETE CBC W/AUTO DIFF WBC: CPT | Performed by: INTERNAL MEDICINE

## 2022-04-10 PROCEDURE — 25000003 PHARM REV CODE 250: Performed by: FAMILY MEDICINE

## 2022-04-10 PROCEDURE — 99232 SBSQ HOSP IP/OBS MODERATE 35: CPT | Mod: CR,,, | Performed by: INTERNAL MEDICINE

## 2022-04-10 PROCEDURE — 63600175 PHARM REV CODE 636 W HCPCS: Performed by: SURGERY

## 2022-04-10 PROCEDURE — 36415 COLL VENOUS BLD VENIPUNCTURE: CPT | Performed by: INTERNAL MEDICINE

## 2022-04-10 PROCEDURE — 84100 ASSAY OF PHOSPHORUS: CPT | Performed by: INTERNAL MEDICINE

## 2022-04-10 PROCEDURE — A4216 STERILE WATER/SALINE, 10 ML: HCPCS | Performed by: FAMILY MEDICINE

## 2022-04-10 PROCEDURE — 27000221 HC OXYGEN, UP TO 24 HOURS

## 2022-04-10 PROCEDURE — 25000003 PHARM REV CODE 250: Performed by: SURGERY

## 2022-04-10 PROCEDURE — 25000003 PHARM REV CODE 250: Performed by: INTERNAL MEDICINE

## 2022-04-10 PROCEDURE — 25000242 PHARM REV CODE 250 ALT 637 W/ HCPCS: Performed by: INTERNAL MEDICINE

## 2022-04-10 PROCEDURE — 94761 N-INVAS EAR/PLS OXIMETRY MLT: CPT

## 2022-04-10 PROCEDURE — 99233 SBSQ HOSP IP/OBS HIGH 50: CPT | Mod: ,,, | Performed by: INTERNAL MEDICINE

## 2022-04-10 PROCEDURE — 99232 PR SUBSEQUENT HOSPITAL CARE,LEVL II: ICD-10-PCS | Mod: CR,,, | Performed by: INTERNAL MEDICINE

## 2022-04-10 PROCEDURE — 63600175 PHARM REV CODE 636 W HCPCS: Performed by: FAMILY MEDICINE

## 2022-04-10 PROCEDURE — 99900026 HC AIRWAY MAINTENANCE (STAT)

## 2022-04-10 PROCEDURE — 21400001 HC TELEMETRY ROOM

## 2022-04-10 PROCEDURE — 84075 ASSAY ALKALINE PHOSPHATASE: CPT | Performed by: INTERNAL MEDICINE

## 2022-04-10 PROCEDURE — 80069 RENAL FUNCTION PANEL: CPT | Performed by: INTERNAL MEDICINE

## 2022-04-10 RX ORDER — DEXTROSE MONOHYDRATE 50 MG/ML
INJECTION, SOLUTION INTRAVENOUS CONTINUOUS
Status: ACTIVE | OUTPATIENT
Start: 2022-04-10 | End: 2022-04-11

## 2022-04-10 RX ORDER — HYDRALAZINE HYDROCHLORIDE 25 MG/1
25 TABLET, FILM COATED ORAL EVERY 8 HOURS
Status: DISCONTINUED | OUTPATIENT
Start: 2022-04-10 | End: 2022-04-12 | Stop reason: HOSPADM

## 2022-04-10 RX ADMIN — HYDRALAZINE HYDROCHLORIDE 25 MG: 25 TABLET, FILM COATED ORAL at 01:04

## 2022-04-10 RX ADMIN — IPRATROPIUM BROMIDE AND ALBUTEROL SULFATE 3 ML: 2.5; .5 SOLUTION RESPIRATORY (INHALATION) at 04:04

## 2022-04-10 RX ADMIN — HEPARIN SODIUM 7500 UNITS: 5000 INJECTION INTRAVENOUS; SUBCUTANEOUS at 06:04

## 2022-04-10 RX ADMIN — MAGNESIUM OXIDE TAB 400 MG (241.3 MG ELEMENTAL MG) 400 MG: 400 (241.3 MG) TAB at 09:04

## 2022-04-10 RX ADMIN — ANTI-FUNGAL POWDER MICONAZOLE NITRATE TALC FREE: 1.42 POWDER TOPICAL at 09:04

## 2022-04-10 RX ADMIN — FAMOTIDINE 20 MG: 10 INJECTION, SOLUTION INTRAVENOUS at 09:04

## 2022-04-10 RX ADMIN — CHLORHEXIDINE GLUCONATE 0.12% ORAL RINSE 15 ML: 1.2 LIQUID ORAL at 09:04

## 2022-04-10 RX ADMIN — HYDRALAZINE HYDROCHLORIDE 25 MG: 25 TABLET, FILM COATED ORAL at 09:04

## 2022-04-10 RX ADMIN — HEPARIN SODIUM 7500 UNITS: 5000 INJECTION INTRAVENOUS; SUBCUTANEOUS at 09:04

## 2022-04-10 RX ADMIN — DEXTROSE: 5 SOLUTION INTRAVENOUS at 03:04

## 2022-04-10 RX ADMIN — HEPARIN SODIUM 7500 UNITS: 5000 INJECTION INTRAVENOUS; SUBCUTANEOUS at 01:04

## 2022-04-10 RX ADMIN — SODIUM CHLORIDE, PRESERVATIVE FREE 10 ML: 5 INJECTION INTRAVENOUS at 06:04

## 2022-04-10 RX ADMIN — POTASSIUM CHLORIDE 40 MEQ: 1500 TABLET, EXTENDED RELEASE ORAL at 09:04

## 2022-04-10 RX ADMIN — HYDRALAZINE HYDROCHLORIDE 10 MG: 20 INJECTION, SOLUTION INTRAMUSCULAR; INTRAVENOUS at 05:04

## 2022-04-10 RX ADMIN — IPRATROPIUM BROMIDE AND ALBUTEROL SULFATE 3 ML: 2.5; .5 SOLUTION RESPIRATORY (INHALATION) at 08:04

## 2022-04-10 RX ADMIN — SODIUM CHLORIDE, PRESERVATIVE FREE 10 ML: 5 INJECTION INTRAVENOUS at 12:04

## 2022-04-10 NOTE — SUBJECTIVE & OBJECTIVE
Interval History:  04/10: seen and examined, has PMV, speaks well, ate break fast, awaits placement, T max 99.4F, 28% FiO2,     Oxygen Concentration (%):  [28] 28     Review of Systems   All other systems reviewed and are negative.     Objective:     Vital Signs (Most Recent):  Temp: 99.1 °F (37.3 °C) (04/10/22 0410)  Pulse: 83 (04/10/22 0816)  Resp: 18 (04/10/22 0816)  BP: (!) 176/75 (04/10/22 0410)  SpO2: 97 % (04/10/22 0816)   Vital Signs (24h Range):  Temp:  [98.4 °F (36.9 °C)-99.4 °F (37.4 °C)] 99.1 °F (37.3 °C)  Pulse:  [] 83  Resp:  [17-20] 18  SpO2:  [92 %-99 %] 97 %  BP: (144-178)/(65-75) 176/75     Weight: 108 kg (238 lb 1.6 oz)  Body mass index is 40.87 kg/m².      Intake/Output Summary (Last 24 hours) at 4/10/2022 0920  Last data filed at 4/10/2022 0443  Gross per 24 hour   Intake 340 ml   Output 850 ml   Net -510 ml       Physical Exam  Vitals and nursing note reviewed.   Constitutional:       Appearance: She is ill-appearing.       HENT:      Head: Normocephalic.   Eyes:      Pupils: Pupils are equal, round, and reactive to light.   Cardiovascular:      Rate and Rhythm: Normal rate and regular rhythm.      Pulses: Normal pulses.      Heart sounds: Normal heart sounds.   Pulmonary:      Effort: Pulmonary effort is normal.      Breath sounds: Normal breath sounds.   Abdominal:      General: Bowel sounds are normal.   Musculoskeletal:         General: Normal range of motion.   Neurological:      Mental Status: She is alert.       Vents:  Vent Mode: A/C (04/07/22 0914)  Ventilator Initiated: Yes (04/01/22 0900)  Set Rate: 18 BPM (04/07/22 0914)  Vt Set: 400 mL (04/07/22 0914)  Pressure Support: 0 cmH20 (04/07/22 0914)  PEEP/CPAP: 6 cmH20 (04/07/22 0914)  Oxygen Concentration (%): 28 (04/10/22 0816)  Peak Airway Pressure: 23 cmH2O (04/07/22 0914)  Plateau Pressure: 18 cmH20 (04/07/22 0914)  Total Ve: 8.58 mL (04/07/22 0914)  F/VT Ratio<105 (RSBI): (!) 52.63 (04/07/22 0914)    Lines/Drains/Airways        Peripherally Inserted Central Catheter Line  Duration             PICC Double Lumen 03/31/22 0240 right basilic 10 days              Central Venous Catheter Line  Duration                  Hemodialysis Catheter 04/04/22 1130 right femoral 5 days              Airway  Duration                  Surgical Airway 03/30/22 Other (Comment) 11 days                    Significant Labs:    CBC/Anemia Profile:  No results for input(s): WBC, HGB, HCT, PLT, MCV, RDW, IRON, FERRITIN, RETIC, FOLATE, [id number], OCCULTBLOOD in the last 48 hours.     Chemistries:  Recent Labs   Lab 04/09/22  0518 04/09/22  1321 04/10/22  0312   NA  --  145  --    K  --  3.4*  --    CL  --  103  --    CO2  --  24  --    BUN  --  62*  --    CREATININE  --  3.2*  --    CALCIUM  --  9.0  --    PHOS 2.9  --  3.2       All pertinent labs within the past 24 hours have been reviewed.    Significant Imaging:  I have reviewed all pertinent imaging results/findings within the past 24 hours.

## 2022-04-10 NOTE — PLAN OF CARE
Pt remains free from falls/injuries this shift. Safety precautions maintained. No s/s of acute distress noted. Will continue to monitor. Chart check completed.

## 2022-04-10 NOTE — PROGRESS NOTES
[facility] - Telemetry ([facility])  Pulmonology  Progress Note    Patient Name: Page Grissom  MRN: [id number]  Admission Date: 3/29/2022  Hospital Length of Stay: 12 days  Code Status: Full Code  Attending Provider: Bc Crocker MD  Primary Care Provider: NEELAM Roman Jr, MD   Principal Problem: Angioedema    Subjective:     Interval History:  04/10: seen and examined, has PMV, speaks well, ate break fast, awaits placement, T max 99.4F, 28% FiO2,     Oxygen Concentration (%):  [28] 28     Review of Systems   All other systems reviewed and are negative.     Objective:     Vital Signs (Most Recent):  Temp: 99.1 °F (37.3 °C) (04/10/22 0410)  Pulse: 83 (04/10/22 0816)  Resp: 18 (04/10/22 0816)  BP: (!) 176/75 (04/10/22 0410)  SpO2: 97 % (04/10/22 0816)   Vital Signs (24h Range):  Temp:  [98.4 °F (36.9 °C)-99.4 °F (37.4 °C)] 99.1 °F (37.3 °C)  Pulse:  [] 83  Resp:  [17-20] 18  SpO2:  [92 %-99 %] 97 %  BP: (144-178)/(65-75) 176/75     Weight: 108 kg (238 lb 1.6 oz)  Body mass index is 40.87 kg/m².      Intake/Output Summary (Last 24 hours) at 4/10/2022 0920  Last data filed at 4/10/2022 0443  Gross per 24 hour   Intake 340 ml   Output 850 ml   Net -510 ml       Physical Exam  Vitals and nursing note reviewed.   Constitutional:       Appearance: She is ill-appearing.       HENT:      Head: Normocephalic.   Eyes:      Pupils: Pupils are equal, round, and reactive to light.   Cardiovascular:      Rate and Rhythm: Normal rate and regular rhythm.      Pulses: Normal pulses.      Heart sounds: Normal heart sounds.   Pulmonary:      Effort: Pulmonary effort is normal.      Breath sounds: Normal breath sounds.   Abdominal:      General: Bowel sounds are normal.   Musculoskeletal:         General: Normal range of motion.   Neurological:      Mental Status: She is alert.       Vents:  Vent Mode: A/C (04/07/22 0914)  Ventilator Initiated: Yes (04/01/22 0900)  Set Rate: 18 BPM (04/07/22 0914)  Vt Set: 400 mL (04/07/22  0914)  Pressure Support: 0 cmH20 (04/07/22 0914)  PEEP/CPAP: 6 cmH20 (04/07/22 0914)  Oxygen Concentration (%): 28 (04/10/22 0816)  Peak Airway Pressure: 23 cmH2O (04/07/22 0914)  Plateau Pressure: 18 cmH20 (04/07/22 0914)  Total Ve: 8.58 mL (04/07/22 0914)  F/VT Ratio<105 (RSBI): (!) 52.63 (04/07/22 0914)    Lines/Drains/Airways       Peripherally Inserted Central Catheter Line  Duration             PICC Double Lumen 03/31/22 0240 right basilic 10 days              Central Venous Catheter Line  Duration                  Hemodialysis Catheter 04/04/22 1130 right femoral 5 days              Airway  Duration                  Surgical Airway 03/30/22 Other (Comment) 11 days                    Significant Labs:    CBC/Anemia Profile:  No results for input(s): WBC, HGB, HCT, PLT, MCV, RDW, IRON, FERRITIN, RETIC, FOLATE, [id number], OCCULTBLOOD in the last 48 hours.     Chemistries:  Recent Labs   Lab 04/09/22  0518 04/09/22  1321 04/10/22  0312   NA  --  145  --    K  --  3.4*  --    CL  --  103  --    CO2  --  24  --    BUN  --  62*  --    CREATININE  --  3.2*  --    CALCIUM  --  9.0  --    PHOS 2.9  --  3.2       All pertinent labs within the past 24 hours have been reviewed.    Significant Imaging:  I have reviewed all pertinent imaging results/findings within the past 24 hours.      ABG  Recent Labs   Lab 04/07/22  0335   PH 7.447   PO2 81   PCO2 45.5*   HCO3 31.4*   BE 7     Assessment/Plan:     * Angioedema  Status post emergent cricothyroidotomy with size 5 trach.    Converted to Trach  Lisinopril Allergy    Acute kidney injury superimposed on chronic kidney disease  Strict I&Os.    Nephrology following   mls last 24 hrs  following      Tracheostomy in place  Emergent cricothyrotomy severe angioedema trach in place  Has PMV   Routine care        No additional inputs from Pul  Call for issues  Will need f/u in clinic  Sign off     Franklyn Cha MD  Pulmonology  [facility] - Telemetry ([facility])

## 2022-04-10 NOTE — ASSESSMENT & PLAN NOTE
--admit to ICU  --stop lisiniopril  --pulmonology following  --supportive care    OR for replacement of cricoid trach    4/10  [facility] care  Reports dysphagia  Speech consulted

## 2022-04-10 NOTE — PROGRESS NOTES
[facility] Tele[facility] ([facility])  [facility] Medicine  Progress Note    Patient Name: Page Grissom  MRN: [id number]  Patient Class: IP- Inpatient   Admission Date: 3/29/2022  Length of Stay: 12 days  Attending Physician: Bc Crocker MD  Primary Care Provider: NEELAM Roman Jr, MD        Subjective:     Principal Problem:Angioedema        HPI:  70 y/o female with PMHx of HTN, HLD, DM, asthma, CKD 3, and RA who presented to the ED with c/o angioedema that onset suddenly this morning. Pt was able to verbally communicate with EMS, was not able to communicate with ED staff. Staff was unable to intubate and called surgery, who performed emergency trach. Pt is currently on vent and sedated.  ED workup shows: H/H 10.9/34.6, K+ 2.9, CO2 22, Anion gap 17, BUN 55, Cr 8.8.  She will be admitted to ICU for angioedema under the care of [facility].  She is a full code and her SDM is her daughter, Cassy.        Overview/Hospital Course:  3/30 emergently trach'd in ED as difficulty intubating d/t angioedema. Going to OR for replacement of tube  3/31 hypotensive requiring pressor support. Leukocytosis with electrolyte abn noted. Concerns for sepsis in immunocompromised patient. Low UOP. Nephrology following for possible dialysis. Guarded prognosis. Consider palliative consult for GOC/family support  4/1 remains on ventilatory support via trach. NG placed for med admin  4/2 somnolent but following commands, ESRD not currently receiving dialysis pt received versed for crich procedure slow metabolizer still somnolent  4/3- somnolent but following commands. D/w nephro dr. Mane trial of dialysis in am to metabolize sedatives pt does not seem to be metabolizing sedatives. Stable respiratory status  4/4- CT head 4/4 reveals mild subacute to chronic appearing ischemic changes to deep white matter both cerebral hemispheres. Pt remains somnolent EEG pending. Neuro following. Trial of dialysis to address somnolence.  4/5- pt's  mentation unchanged following HD. D/w neuro Dr. Ferrera; EEG shows metabolic encephalopathy. Neurological insult is likely multifactorial-prognosis still unknown.  4/6- pt's mentation has improved she now has her eyes open spontaneously, following commands. Creatinine has improved following dialysis down to 3.7 from 8.9. MRI Brain was negative for infarct or bleed  4/7- acidosis and volume status improved with dialysis. Reviewed nephrology note most likely patient will no longer need HD creatinine stable at roughly 3.4 for 3 days. Mentation continues to improve patient talking (silently, no passy russell valve available yet) and writing down questions.  4/8 passed barium swallow. Ok for regular diet  4/9 +cough, n/v after eating breakfast. Physical/occupational therapy consulted, recommending rehab. On 2L. Using PMV and speaking. RT allowing rest from use and suctioning.  4/10 +cough, dyspnea. Mildly elevated bp, not able to take lisinopril. Hydralazine added      Interval History: See hospital course for today      Review of Systems   Constitutional:  Positive for activity change, appetite change and fatigue. Negative for fever.   HENT:  Positive for trouble swallowing.    Respiratory:  Positive for cough and shortness of breath.    Gastrointestinal:  Positive for nausea. Negative for abdominal pain and vomiting.   Neurological:  Positive for weakness.   Psychiatric/Behavioral:  Negative for confusion and dysphoric mood.    Objective:     Vital Signs (Most Recent):  Temp: 99.1 °F (37.3 °C) (04/10/22 0410)  Pulse: 83 (04/10/22 0816)  Resp: 18 (04/10/22 0816)  BP: (!) 176/75 (04/10/22 0410)  SpO2: 97 % (04/10/22 0816)   Vital Signs (24h Range):  Temp:  [98.4 °F (36.9 °C)-99.4 °F (37.4 °C)] 99.1 °F (37.3 °C)  Pulse:  [] 83  Resp:  [17-20] 18  SpO2:  [92 %-99 %] 97 %  BP: (144-178)/(65-75) 176/75     Weight: 108 kg (238 lb 1.6 oz)  Body mass index is 40.87 kg/m².    Intake/Output Summary (Last 24 hours) at 4/10/2022  0911  Last data filed at 4/10/2022 0443  Gross per 24 hour   Intake 340 ml   Output 850 ml   Net -510 ml      Physical Exam  Vitals and nursing note reviewed.   Constitutional:       General: She is not in acute distress.     Appearance: She is morbidly obese. She is ill-appearing. She is not toxic-appearing.      Comments: Trach collar   HENT:      Head: Normocephalic and atraumatic.   Neck:      Comments: trach  Cardiovascular:      Rate and Rhythm: Normal rate.   Pulmonary:      Effort: Pulmonary effort is normal. No respiratory distress.   Abdominal:      Palpations: Abdomen is soft.      Tenderness: There is no abdominal tenderness.   Musculoskeletal:      Right lower leg: No edema.      Left lower leg: No edema.   Skin:     General: Skin is warm.   Neurological:      Mental Status: She is alert.      Motor: Weakness present.     Significant Labs: All pertinent labs within the past 24 hours have been reviewed.  CBC: No results for input(s): WBC, HGB, HCT, PLT in the last 48 hours.  CMP:   Recent Labs   Lab 04/09/22  1321      K 3.4*      CO2 24      BUN 62*   CREATININE 3.2*   CALCIUM 9.0   ANIONGAP 18*   EGFRNONAA 14*       Significant Imaging: I have reviewed all pertinent imaging results/findings within the past 24 hours.      Assessment/Plan:      * Angioedema  --likely 2/2 lisinopril  --admit to ICU  --had emergent trach in ED, on ventalitor  --IV steroids, IV antihistamiens  --Fresh Frozen Plasma ordered in ED  --monitor closely    3/30  Improving   Continue supportive care  Minimal improvement in appearance  Consider bradykinin inhibitor?  Oxygen Concentration (%):  [28] 28     3/31  CC following for vent management  Surgery following after trach placement  Continue iv systemic steroids    4/1  Continue current trach care    Resolved    4/4  Improved neurological status with dialysis    4/7  --pt is back to baseline neurologically  --OG tube for tube feeds has been dc'd  --barium  swallow in am to eval possibility of starting diet  --passy russell valve for trach    4/8  S/p urgent trach  Passed barium swallow  Ok for regular diet  Awaiting rehab placement, [facility], once off ventilator  PMV trials    Resolved  Awaiting placement, rehab  Allow PMV rests     Nonsustained ventricular tachycardia        Hypokalemia  Replete     Leukocytosis  Improving  Afebrile  Has been transitioned off systemic steroids for angioedema  Bcx ngtd  resp cx with normal resp fabi  D/c tunneled cath when able    Morbid obesity  Physical/occupational therapy       Acute kidney injury superimposed on chronic kidney disease  --baseline BUN/Cr 20/1.4  --stop lisinopril  --avoid nephrotoxic agents  --Nephrology following  --Pt is not acidotic and there is no hyperkalemia;   --d/w nephro service  current indication for dialysis is pt's inability to metabolize sedatives she is arousable but still extremely sedated  -- uremic  --IR for hemodialysis access per dR Mane today    4/8  neph following  Cr stable at 3.4 after RRT    Improved  Cr stable  Remove cath on Monday per IR    Tracheostomy in place  --admit to ICU  --stop lisiniopril  --pulmonology following  --supportive care    OR for replacement of cricoid trach    4/10  Trach care  Reports dysphagia  Speech consulted      Rheumatoid arthritis involving multiple sites with positive rheumatoid factor  --hold plaquenil  --f/u OP         VTE Risk Mitigation (From admission, onward)         Ordered     Place sequential compression device  Until discontinued         04/08/22 2016     heparin (porcine) injection 5,200 Units  As needed (PRN)         04/04/22 1342     heparin (porcine) injection 2,500 Units  As needed (PRN)         04/04/22 1115     Apply sequential compression device to lower extremities (if no contraindications). Medical venous thromboembolus prophylaxis is preferred.  Until discontinued         03/30/22 0805     heparin (porcine) injection 7,500 Units  Every 8  hours         03/29/22 1003     IP VTE HIGH RISK PATIENT  Once         03/29/22 1003                Discharge Planning   CHALO:      Code Status: Full Code   Is the patient medically ready for discharge?:     Reason for patient still in hospital (select all that apply): Patient trending condition, Laboratory test, Treatment, Consult recommendations, PT / OT recommendations and Pending disposition  Discharge Plan A: (P) Rehab                  Bc Crocker MD  Department of Hospital Medicine   [facility] ([facility])

## 2022-04-10 NOTE — PROGRESS NOTES
[facility] - Intensive Care ([facility])  Nephrology  Progress Note    Patient Name: Page Grissom  MRN: [id number]  Admission Date: 3/29/2022  Hospital Length of Stay: 12 days  Attending Provider: Bc Crocker MD   Primary Care Physician: NEELAM Roman Jr, MD  Principal Problem:Angioedema      Subjective:   70 yo female with obstructive respiratory failure from tongue edema (angioedema) and s/p trach and KYE. HD 4/4 4/8  - speaking today, feels well. Recounting intial episode of tongue swelling   - last HD 4/4 4/9  - some mild confusion  - UOP stable, about 1L     4/10  - intermittent confusion  - stable UOP    Review of patient's allergies indicates:   Allergen Reactions    Lisinopril Anaphylaxis     Angioedema requiring trach     Current Facility-Administered Medications   Medication Frequency    0.9%  NaCl infusion (for blood administration) Q24H PRN    albuterol-ipratropium 2.5 mg-0.5 mg/3 mL nebulizer solution 3 mL Q8H    chlorhexidine 0.12 % solution 15 mL BID    dextrose 10% bolus 125 mL PRN    famotidine (PF) injection 20 mg Daily    glucagon (human recombinant) injection 1 mg PRN    heparin (porcine) injection 2,500 Units PRN    heparin (porcine) injection 5,200 Units PRN    heparin (porcine) injection 7,500 Units Q8H    hydrALAZINE injection 10 mg Q6H PRN    hydrALAZINE tablet 25 mg Q8H    insulin aspart U-100 pen 1-10 Units Q6H PRN    magnesium oxide tablet 400 mg Daily    miconazole NITRATE 2 % top powder BID    naloxone 0.4 mg/mL injection 0.02 mg PRN    potassium chloride SA CR tablet 40 mEq Daily    sodium chloride 0.9% flush 10 mL Q6H    And    sodium chloride 0.9% flush 10 mL PRN       Objective:     Vital Signs (Most Recent):  Temp: 99.4 °F (37.4 °C) (04/10/22 1111)  Pulse: 105 (04/10/22 1122)  Resp: (!) 26 (04/10/22 1111)  BP: 137/76 (04/10/22 1122)  SpO2: 98 % (04/10/22 1120)  O2 Device (Oxygen Therapy): Trach Collar (04/10/22 0816) Vital Signs (24h Range):  Temp:   [98.4 °F (36.9 °C)-99.4 °F (37.4 °C)] 99.4 °F (37.4 °C)  Pulse:  [] 105  Resp:  [17-26] 26  SpO2:  [94 %-99 %] 98 %  BP: (137-189)/(65-77) 137/76     Weight: 108 kg (238 lb 1.6 oz) (04/09/22 2354)  Body mass index is 40.87 kg/m².  Body surface area is 2.21 meters squared.    I/O last 3 completed shifts:  In: 440 [P.O.:440]  Out: 1100 [Urine:1100]    Physical Exam  Vitals and nursing note reviewed.   Constitutional:       General: She is not in acute distress.     Appearance: She is obese.   HENT:      Head: Atraumatic.   Eyes:      General: No scleral icterus.  Cardiovascular:      Rate and Rhythm: Normal rate.   Pulmonary:      Effort: Pulmonary effort is normal.      Breath sounds: No wheezing or rales.   Abdominal:      Palpations: Abdomen is soft.      Tenderness: There is no abdominal tenderness.   Musculoskeletal:         General: No swelling.      Right lower leg: No edema.      Left lower leg: No edema.   Skin:     General: Skin is warm and dry.   Neurological:      Mental Status: She is alert and oriented to person, place, and time.   Psychiatric:         Mood and Affect: Mood normal.         Behavior: Behavior normal.         Thought Content: Thought content normal.         Judgment: Judgment normal.         Significant Labs: reviewed       Assessment/Plan:     Active Diagnoses:    Diagnosis Date Noted POA    PRINCIPAL PROBLEM:  Angioedema [T78.3XXA]  Yes    Chest pain [R07.9]  No    Hypokalemia [E87.6]  Yes    Nonsustained ventricular tachycardia [I47.2]  No    Leukocytosis [D72.829] 04/01/2022 Unknown    Tracheostomy in place [Z93.0] 03/29/2022 Not Applicable    Acute kidney injury superimposed on chronic kidney disease [N17.9, N18.9] 03/29/2022 Unknown    Morbid obesity [E66.01] 03/29/2022 Yes    Rheumatoid arthritis involving multiple sites with positive rheumatoid factor [M05.79] 07/03/2013 Yes      Problems Resolved During this Admission:    Diagnosis Date Noted Date Resolved POA     Acute metabolic encephalopathy [G93.41] 04/05/2022 04/09/2022 Unknown    Palliative care encounter [Z51.5] 04/01/2022 04/09/2022 Not Applicable    Acidosis, metabolic, with respiratory acidosis [E87.4] 03/31/2022 04/02/2022 Unknown       KYE/ATN from sustained hypotension (anaphylaxis)   - UOP stable and Cr is improving   - first and only HD 4/4 (femoral tunneled cath)  - no indications for RRT  - continue to monitor for further renal recovery     Hypernatremia  - has thirst drive but not easy access to fluids  - give IV free water     HTN  - monitor on current meds    OK to remove tunneled catheter       Frank Winkler MD  Nephrology

## 2022-04-10 NOTE — ASSESSMENT & PLAN NOTE
--likely 2/2 lisinopril  --admit to ICU  --had emergent trach in ED, on ventalitor  --IV steroids, IV antihistamiens  --Fresh Frozen Plasma ordered in ED  --monitor closely    3/30  Improving   Continue supportive care  Minimal improvement in appearance  Consider bradykinin inhibitor?  Oxygen Concentration (%):  [28] 28     3/31  CC following for vent management  Surgery following after trach placement  Continue iv systemic steroids    4/1  Continue current trach care    Resolved    4/4  Improved neurological status with dialysis    4/7  --pt is back to baseline neurologically  --OG tube for tube feeds has been dc'd  --barium swallow in am to eval possibility of starting diet  --passy russell valve for trach    4/8  S/p urgent trach  Passed barium swallow  Ok for regular diet  Awaiting rehab placement, [facility], once off ventilator  PMV trials    Resolved  Awaiting placement, rehab  Allow PMV rests

## 2022-04-10 NOTE — ASSESSMENT & PLAN NOTE
--baseline BUN/Cr 20/1.4  --stop lisinopril  --avoid nephrotoxic agents  --Nephrology following  --Pt is not acidotic and there is no hyperkalemia;   --d/w nephro service  current indication for dialysis is pt's inability to metabolize sedatives she is arousable but still extremely sedated  -- uremic  --IR for hemodialysis access per dR Mane today    4/8  neph following  Cr stable at 3.4 after RRT    Improved  Cr stable  Remove cath on Monday per IR

## 2022-04-10 NOTE — SUBJECTIVE & OBJECTIVE
Interval History: See hospital course for today      Review of Systems   Constitutional:  Positive for activity change, appetite change and fatigue. Negative for fever.   HENT:  Positive for trouble swallowing.    Respiratory:  Positive for cough and shortness of breath.    Gastrointestinal:  Positive for nausea. Negative for abdominal pain and vomiting.   Neurological:  Positive for weakness.   Psychiatric/Behavioral:  Negative for confusion and dysphoric mood.    Objective:     Vital Signs (Most Recent):  Temp: 99.1 °F (37.3 °C) (04/10/22 0410)  Pulse: 83 (04/10/22 0816)  Resp: 18 (04/10/22 0816)  BP: (!) 176/75 (04/10/22 0410)  SpO2: 97 % (04/10/22 0816)   Vital Signs (24h Range):  Temp:  [98.4 °F (36.9 °C)-99.4 °F (37.4 °C)] 99.1 °F (37.3 °C)  Pulse:  [] 83  Resp:  [17-20] 18  SpO2:  [92 %-99 %] 97 %  BP: (144-178)/(65-75) 176/75     Weight: 108 kg (238 lb 1.6 oz)  Body mass index is 40.87 kg/m².    Intake/Output Summary (Last 24 hours) at 4/10/2022 0911  Last data filed at 4/10/2022 0443  Gross per 24 hour   Intake 340 ml   Output 850 ml   Net -510 ml      Physical Exam  Vitals and nursing note reviewed.   Constitutional:       General: She is not in acute distress.     Appearance: She is morbidly obese. She is ill-appearing. She is not toxic-appearing.      Comments: Trach collar   HENT:      Head: Normocephalic and atraumatic.   Neck:      Comments: trach  Cardiovascular:      Rate and Rhythm: Normal rate.   Pulmonary:      Effort: Pulmonary effort is normal. No respiratory distress.   Abdominal:      Palpations: Abdomen is soft.      Tenderness: There is no abdominal tenderness.   Musculoskeletal:      Right lower leg: No edema.      Left lower leg: No edema.   Skin:     General: Skin is warm.   Neurological:      Mental Status: She is alert.      Motor: Weakness present.     Significant Labs: All pertinent labs within the past 24 hours have been reviewed.  CBC: No results for input(s): WBC, HGB, HCT,  PLT in the last 48 hours.  CMP:   Recent Labs   Lab 04/09/22  1321      K 3.4*      CO2 24      BUN 62*   CREATININE 3.2*   CALCIUM 9.0   ANIONGAP 18*   EGFRNONAA 14*       Significant Imaging: I have reviewed all pertinent imaging results/findings within the past 24 hours.

## 2022-04-10 NOTE — PLAN OF CARE
Pt remains fall free this shift.  Pt AAOx4, verbal, clear speech.  Skin warm and dry. No new skin issues.  Telemonitoring in progress, (SR mid 70s to 80s)  Tracheostomy with speech valve   O2 5L via trach collar   Purewick   Hemodialysis cath R femoral vein   Assist x1 with transfers.  CBG Q6 with PRN SS insulin.  Awaiting placement   Bed low, side rails up x 2, wheels locked, call light in reach.  Bed alarm maintained for safety.  Patient instructed to call for assistance.  Hourly rounding completed.  24 hour chart check completed  POC updated and reviewed with pt. Will continue POC.

## 2022-04-11 VITALS
BODY MASS INDEX: 39.6 KG/M2 | SYSTOLIC BLOOD PRESSURE: 132 MMHG | DIASTOLIC BLOOD PRESSURE: 60 MMHG | OXYGEN SATURATION: 96 % | WEIGHT: 231.94 LBS | HEIGHT: 64 IN | HEART RATE: 72 BPM | TEMPERATURE: 98 F | RESPIRATION RATE: 18 BRPM

## 2022-04-11 PROBLEM — D72.829 LEUKOCYTOSIS: Status: RESOLVED | Noted: 2022-04-01 | Resolved: 2022-04-11

## 2022-04-11 LAB
ALBUMIN SERPL BCP-MCNC: 2.4 G/DL (ref 3.5–5.2)
ALP SERPL-CCNC: 83 U/L (ref 55–135)
ALT SERPL W/O P-5'-P-CCNC: 23 U/L (ref 10–44)
ANION GAP SERPL CALC-SCNC: 15 MMOL/L (ref 8–16)
AST SERPL-CCNC: 19 U/L (ref 10–40)
BASOPHILS # BLD AUTO: 0.06 K/UL (ref 0–0.2)
BASOPHILS NFR BLD: 0.5 % (ref 0–1.9)
BILIRUB SERPL-MCNC: 0.4 MG/DL (ref 0.1–1)
BUN SERPL-MCNC: 51 MG/DL (ref 8–23)
CALCIUM SERPL-MCNC: 8.4 MG/DL (ref 8.7–10.5)
CHLORIDE SERPL-SCNC: 105 MMOL/L (ref 95–110)
CO2 SERPL-SCNC: 24 MMOL/L (ref 23–29)
CREAT SERPL-MCNC: 2.8 MG/DL (ref 0.5–1.4)
DIFFERENTIAL METHOD: ABNORMAL
EOSINOPHIL # BLD AUTO: 0.3 K/UL (ref 0–0.5)
EOSINOPHIL NFR BLD: 2.4 % (ref 0–8)
ERYTHROCYTE [DISTWIDTH] IN BLOOD BY AUTOMATED COUNT: 17.2 % (ref 11.5–14.5)
EST. GFR  (AFRICAN AMERICAN): 19 ML/MIN/1.73 M^2
EST. GFR  (NON AFRICAN AMERICAN): 16 ML/MIN/1.73 M^2
GLUCOSE SERPL-MCNC: 98 MG/DL (ref 70–110)
HCT VFR BLD AUTO: 26.5 % (ref 37–48.5)
HGB BLD-MCNC: 8 G/DL (ref 12–16)
IMM GRANULOCYTES # BLD AUTO: 0.22 K/UL (ref 0–0.04)
IMM GRANULOCYTES NFR BLD AUTO: 1.8 % (ref 0–0.5)
LYMPHOCYTES # BLD AUTO: 1.4 K/UL (ref 1–4.8)
LYMPHOCYTES NFR BLD: 11.6 % (ref 18–48)
MCH RBC QN AUTO: 24.2 PG (ref 27–31)
MCHC RBC AUTO-ENTMCNC: 30.2 G/DL (ref 32–36)
MCV RBC AUTO: 80 FL (ref 82–98)
MONOCYTES # BLD AUTO: 1.2 K/UL (ref 0.3–1)
MONOCYTES NFR BLD: 10.2 % (ref 4–15)
NEUTROPHILS # BLD AUTO: 8.9 K/UL (ref 1.8–7.7)
NEUTROPHILS NFR BLD: 73.5 % (ref 38–73)
NRBC BLD-RTO: 0 /100 WBC
PLATELET # BLD AUTO: 191 K/UL (ref 150–450)
PMV BLD AUTO: 10.7 FL (ref 9.2–12.9)
POCT GLUCOSE: 106 MG/DL (ref 70–110)
POCT GLUCOSE: 109 MG/DL (ref 70–110)
POCT GLUCOSE: 111 MG/DL (ref 70–110)
POCT GLUCOSE: 119 MG/DL (ref 70–110)
POCT GLUCOSE: 120 MG/DL (ref 70–110)
POTASSIUM SERPL-SCNC: 3.4 MMOL/L (ref 3.5–5.1)
PROT SERPL-MCNC: 7 G/DL (ref 6–8.4)
RBC # BLD AUTO: 3.31 M/UL (ref 4–5.4)
SODIUM SERPL-SCNC: 144 MMOL/L (ref 136–145)
WBC # BLD AUTO: 12.11 K/UL (ref 3.9–12.7)

## 2022-04-11 PROCEDURE — 63600175 PHARM REV CODE 636 W HCPCS: Performed by: SURGERY

## 2022-04-11 PROCEDURE — 25000003 PHARM REV CODE 250: Performed by: FAMILY MEDICINE

## 2022-04-11 PROCEDURE — 99900026 HC AIRWAY MAINTENANCE (STAT)

## 2022-04-11 PROCEDURE — 85025 COMPLETE CBC W/AUTO DIFF WBC: CPT | Performed by: FAMILY MEDICINE

## 2022-04-11 PROCEDURE — 94761 N-INVAS EAR/PLS OXIMETRY MLT: CPT

## 2022-04-11 PROCEDURE — 80053 COMPREHEN METABOLIC PANEL: CPT | Performed by: FAMILY MEDICINE

## 2022-04-11 PROCEDURE — 94640 AIRWAY INHALATION TREATMENT: CPT

## 2022-04-11 PROCEDURE — 99233 PR SUBSEQUENT HOSPITAL CARE,LEVL III: ICD-10-PCS | Mod: ,,, | Performed by: INTERNAL MEDICINE

## 2022-04-11 PROCEDURE — 97530 THERAPEUTIC ACTIVITIES: CPT

## 2022-04-11 PROCEDURE — 25000003 PHARM REV CODE 250: Performed by: SURGERY

## 2022-04-11 PROCEDURE — 92526 ORAL FUNCTION THERAPY: CPT

## 2022-04-11 PROCEDURE — 99233 SBSQ HOSP IP/OBS HIGH 50: CPT | Mod: ,,, | Performed by: INTERNAL MEDICINE

## 2022-04-11 PROCEDURE — 99900035 HC TECH TIME PER 15 MIN (STAT)

## 2022-04-11 PROCEDURE — 36415 COLL VENOUS BLD VENIPUNCTURE: CPT | Performed by: FAMILY MEDICINE

## 2022-04-11 PROCEDURE — A4216 STERILE WATER/SALINE, 10 ML: HCPCS | Performed by: FAMILY MEDICINE

## 2022-04-11 PROCEDURE — 97116 GAIT TRAINING THERAPY: CPT | Mod: CQ

## 2022-04-11 PROCEDURE — 92507 TX SP LANG VOICE COMM INDIV: CPT

## 2022-04-11 PROCEDURE — 97110 THERAPEUTIC EXERCISES: CPT

## 2022-04-11 PROCEDURE — 27000221 HC OXYGEN, UP TO 24 HOURS

## 2022-04-11 PROCEDURE — 99232 PR SUBSEQUENT HOSPITAL CARE,LEVL II: ICD-10-PCS | Mod: ,,, | Performed by: INTERNAL MEDICINE

## 2022-04-11 PROCEDURE — 25000242 PHARM REV CODE 250 ALT 637 W/ HCPCS: Performed by: INTERNAL MEDICINE

## 2022-04-11 PROCEDURE — 97530 THERAPEUTIC ACTIVITIES: CPT | Mod: CQ

## 2022-04-11 PROCEDURE — 99232 SBSQ HOSP IP/OBS MODERATE 35: CPT | Mod: ,,, | Performed by: INTERNAL MEDICINE

## 2022-04-11 RX ORDER — GUAIFENESIN 100 MG/5ML
200 SOLUTION ORAL EVERY 4 HOURS PRN
Status: DISCONTINUED | OUTPATIENT
Start: 2022-04-11 | End: 2022-04-12 | Stop reason: HOSPADM

## 2022-04-11 RX ORDER — ACETAMINOPHEN 325 MG/1
650 TABLET ORAL EVERY 8 HOURS PRN
Status: DISCONTINUED | OUTPATIENT
Start: 2022-04-11 | End: 2022-04-12 | Stop reason: HOSPADM

## 2022-04-11 RX ORDER — HYDRALAZINE HYDROCHLORIDE 25 MG/1
25 TABLET, FILM COATED ORAL EVERY 8 HOURS
Qty: 90 TABLET | Refills: 0
Start: 2022-04-11 | End: 2022-08-15 | Stop reason: SDUPTHER

## 2022-04-11 RX ADMIN — SODIUM CHLORIDE, PRESERVATIVE FREE 10 ML: 5 INJECTION INTRAVENOUS at 12:04

## 2022-04-11 RX ADMIN — HEPARIN SODIUM 7500 UNITS: 5000 INJECTION INTRAVENOUS; SUBCUTANEOUS at 05:04

## 2022-04-11 RX ADMIN — IPRATROPIUM BROMIDE AND ALBUTEROL SULFATE 3 ML: 2.5; .5 SOLUTION RESPIRATORY (INHALATION) at 04:04

## 2022-04-11 RX ADMIN — HYDRALAZINE HYDROCHLORIDE 25 MG: 25 TABLET, FILM COATED ORAL at 05:04

## 2022-04-11 RX ADMIN — POTASSIUM CHLORIDE 40 MEQ: 1500 TABLET, EXTENDED RELEASE ORAL at 09:04

## 2022-04-11 RX ADMIN — ANTI-FUNGAL POWDER MICONAZOLE NITRATE TALC FREE: 1.42 POWDER TOPICAL at 09:04

## 2022-04-11 RX ADMIN — CHLORHEXIDINE GLUCONATE 0.12% ORAL RINSE 15 ML: 1.2 LIQUID ORAL at 09:04

## 2022-04-11 RX ADMIN — IPRATROPIUM BROMIDE AND ALBUTEROL SULFATE 3 ML: 2.5; .5 SOLUTION RESPIRATORY (INHALATION) at 12:04

## 2022-04-11 RX ADMIN — FAMOTIDINE 20 MG: 10 INJECTION, SOLUTION INTRAVENOUS at 09:04

## 2022-04-11 RX ADMIN — MAGNESIUM OXIDE TAB 400 MG (241.3 MG ELEMENTAL MG) 400 MG: 400 (241.3 MG) TAB at 09:04

## 2022-04-11 RX ADMIN — SODIUM CHLORIDE, PRESERVATIVE FREE 10 ML: 5 INJECTION INTRAVENOUS at 06:04

## 2022-04-11 RX ADMIN — HYDRALAZINE HYDROCHLORIDE 25 MG: 25 TABLET, FILM COATED ORAL at 04:04

## 2022-04-11 RX ADMIN — SODIUM CHLORIDE, PRESERVATIVE FREE 10 ML: 5 INJECTION INTRAVENOUS at 05:04

## 2022-04-11 RX ADMIN — ACETAMINOPHEN 650 MG: 325 TABLET ORAL at 11:04

## 2022-04-11 RX ADMIN — GUAIFENESIN 200 MG: 100 SOLUTION ORAL at 11:04

## 2022-04-11 RX ADMIN — IPRATROPIUM BROMIDE AND ALBUTEROL SULFATE 3 ML: 2.5; .5 SOLUTION RESPIRATORY (INHALATION) at 08:04

## 2022-04-11 NOTE — PROGRESS NOTES
[facility] - Telemetry ([facility])  Pulmonology  Progress Note    Patient Name: Page Grissom  MRN: [id number]  Admission Date: 3/29/2022  Hospital Length of Stay: 13 days  Code Status: Full Code  Attending Provider: Bc Crocker MD  Primary Care Provider: NEELAM Roman Jr, MD   Principal Problem: Angioedema    Subjective:     Interval History: 4/11 seen and examined.  Trach in place.  Afebrile.  100% O2 sat on 5 liter/minute.  Using Passy Mary valve to vocalize.    Review of Systems   Constitutional:  Positive for malaise/fatigue. Negative for chills and fever.   HENT:  Negative for hearing loss and nosebleeds.         Trach in place.   Eyes:  Negative for discharge.   Respiratory:  Positive for shortness of breath. Negative for cough.    Cardiovascular:  Negative for chest pain and leg swelling.   Gastrointestinal:  Negative for abdominal pain.   Genitourinary:  Negative for hematuria.   Musculoskeletal:  Negative for falls.   Skin:  Negative for itching.   Neurological:  Positive for weakness. Negative for speech change, seizures and loss of consciousness.   Endo/Heme/Allergies:  Negative for polydipsia. Does not bruise/bleed easily.   Psychiatric/Behavioral:  Negative for hallucinations.        Objective:     Vital Signs (Most Recent):  Temp: 98.3 °F (36.8 °C) (04/11/22 1622)  Pulse: 60 (04/11/22 1659)  Resp: 18 (04/11/22 1659)  BP: (!) 167/71 (04/11/22 1622)  SpO2: 100 % (04/11/22 1700)   Vital Signs (24h Range):  Temp:  [96.8 °F (36 °C)-98.8 °F (37.1 °C)] 98.3 °F (36.8 °C)  Pulse:  [57-92] 60  Resp:  [17-20] 18  SpO2:  [96 %-100 %] 100 %  BP: (132-178)/(60-83) 167/71     Weight: 105.2 kg (231 lb 14.8 oz)  Body mass index is 39.81 kg/m².      Intake/Output Summary (Last 24 hours) at 4/11/2022 1718  Last data filed at 4/11/2022 0547  Gross per 24 hour   Intake 1491.9 ml   Output 1500 ml   Net -8.1 ml       Physical Exam  Vitals and nursing note reviewed.   Constitutional:       General: She is not in acute  distress.     Appearance: She is well-developed. She is obese. She is ill-appearing.   HENT:      Head: Normocephalic and atraumatic.   Neck:      Comments: Trach in place.  Cardiovascular:      Rate and Rhythm: Normal rate and regular rhythm.   Pulmonary:      Effort: Pulmonary effort is normal. No respiratory distress.      Breath sounds: No stridor.   Abdominal:      Palpations: Abdomen is soft.      Tenderness: There is no abdominal tenderness.   Musculoskeletal:      Cervical back: Neck supple.   Neurological:      Mental Status: She is alert and oriented to person, place, and time.       Vents:  Vent Mode: A/C (04/07/22 0914)  Ventilator Initiated: Yes (04/01/22 0900)  Set Rate: 18 BPM (04/07/22 0914)  Vt Set: 400 mL (04/07/22 0914)  Pressure Support: 0 cmH20 (04/07/22 0914)  PEEP/CPAP: 6 cmH20 (04/07/22 0914)  Oxygen Concentration (%): 28 (04/11/22 1700)  Peak Airway Pressure: 23 cmH2O (04/07/22 0914)  Plateau Pressure: 18 cmH20 (04/07/22 0914)  Total Ve: 8.58 mL (04/07/22 0914)  F/VT Ratio<105 (RSBI): (!) 52.63 (04/07/22 0914)    Lines/Drains/Airways       Peripherally Inserted Central Catheter Line  Duration             PICC Double Lumen 03/31/22 0240 right basilic 11 days              Central Venous Catheter Line  Duration                  Hemodialysis Catheter 04/04/22 1130 right femoral 7 days              Airway  Duration                  Surgical Airway 03/30/22 Other (Comment) 12 days                    Significant Labs:    CBC/Anemia Profile:  Recent Labs   Lab 04/10/22  1303 04/11/22  0348   WBC 18.73* 12.11   HGB 8.6* 8.0*   HCT 28.0* 26.5*    191   MCV 78* 80*   RDW 16.9* 17.2*        Chemistries:  Recent Labs   Lab 04/10/22  0312 04/10/22  1303 04/11/22  0348   NA  --  147* 144   K  --  3.7 3.4*   CL  --  106 105   CO2  --  21* 24   BUN  --  56* 51*   CREATININE  --  3.1* 2.8*   CALCIUM  --  8.7 8.4*   ALBUMIN  --  2.4*  2.4* 2.4*   PROT  --  7.4 7.0   BILITOT  --  0.4 0.4   ALKPHOS  --   82 83   ALT  --  24 23   AST  --  17 19   PHOS 3.2 3.8  --            Significant Imaging:      Chest x-ray 04/11/2022       CLINICAL HISTORY:  SOB;. Shortness of breath     TECHNIQUE:  Single frontal portable view of the chest was performed.     COMPARISON:  04/06/2022     FINDINGS:  Support devices: Unchanged right-sided PICC line.  Satisfactory position of tracheostomy tube.  Interval removal of the NG tube.     Unchanged mild cardiomegaly.  No airspace disease or pleural effusion.  No pneumothorax.     Bones are intact.     Impression:     No acute abnormality.         ABG  Recent Labs   Lab 04/07/22  0335   PH 7.447   PO2 81   PCO2 45.5*   HCO3 31.4*   BE 7     Assessment/Plan:     Morbid obesity  General weight loss/lifestyle modification strategies discussed (elicit support from others; identify saboteurs; non-food rewards).  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  Outpatient pulmonary follow-up.    Rheumatoid arthritis involving multiple sites with positive rheumatoid factor  On hydroxychloroquine as outpatient.  No recent change in treatment noted  3/30 continue same   3/31 now off DMD due to sepsis suspicion   4/1 off DMD due to sepsis indicated by elevated white count.  Abnormal chest x-ray.  Sending procalcitonin today.  Empirically on cefepime  4/11 resume home regimen on discharge.    Angioedema     Complicated by respiratory failure status post trach      I will arrange for pulmonary follow-up in 4-8 weeks.     Laney Romero MD  Pulmonology  [facility] - Telemetry ([facility])

## 2022-04-11 NOTE — PLAN OF CARE
CM met with patient and friend at bedside, no return call from daughter at this time. CM informed patient that [facility] declined due to no RT staff. CM discussed [facility] options ([facility], [facility] and [facility]). Patient choices are as follows:    1. [facility]  2. [facility]    [facility]. Patient states her daughter works at the bank and will likely return CM call this evening.

## 2022-04-11 NOTE — DISCHARGE SUMMARY
[facility] - Telemetry ([facility])  [facility] Medicine  Discharge Summary      Patient Name: Page Grissom  MRN: [id number]  Patient Class: IP- Inpatient  Admission Date: 3/29/2022  Hospital Length of Stay: 13 days  Discharge Date and Time:  04/11/2022 4:12 PM  Attending Physician: Bc Crocker MD   Discharging Provider: Bc Crocker MD  Primary Care Provider: NEELAM Roman Jr, MD      HPI:   70 y/o female with PMHx of HTN, HLD, DM, asthma, CKD 3, and RA who presented to the ED with c/o angioedema that onset suddenly this morning. Pt was able to verbally communicate with EMS, was not able to communicate with ED staff. Staff was unable to intubate and called surgery, who performed emergency trach. Pt is currently on vent and sedated.  ED workup shows: H/H 10.9/34.6, K+ 2.9, CO2 22, Anion gap 17, BUN 55, Cr 8.8.  She will be admitted to ICU for angioedema under the care of [facility] medicine.  She is a full code and her SDM is her daughter, Cassy.        Procedure(s) (LRB):  CREATION, TRACHEOSTOMY (N/A)  Bronchoscopy (N/A)      Hospital Course:   3/30 emergently trach'd in ED as difficulty intubating d/t angioedema. Going to OR for replacement of tube  3/31 hypotensive requiring pressor support. Leukocytosis with electrolyte abn noted. Concerns for sepsis in immunocompromised patient. Low UOP. Nephrology following for possible dialysis. Guarded prognosis. Consider palliative consult for GOC/family support  4/1 remains on ventilatory support via trach. NG placed for med admin  4/2 somnolent but following commands, ESRD not currently receiving dialysis pt received versed for crich procedure slow metabolizer still somnolent  4/3- somnolent but following commands. D/w nephro dr. Mane trial of dialysis in am to metabolize sedatives pt does not seem to be metabolizing sedatives. Stable respiratory status  4/4- CT head 4/4 reveals mild subacute to chronic appearing ischemic changes to deep white matter both cerebral  hemispheres. Pt remains somnolent EEG pending. Neuro following. Trial of dialysis to address somnolence.  4/5- pt's mentation unchanged following HD. D/w neuro Dr. Ferrera; EEG shows metabolic encephalopathy. Neurological insult is likely multifactorial-prognosis still unknown.  4/6- pt's mentation has improved she now has her eyes open spontaneously, following commands. Creatinine has improved following dialysis down to 3.7 from 8.9. MRI Brain was negative for infarct or bleed  4/7- acidosis and volume status improved with dialysis. Reviewed nephrology note most likely patient will no longer need HD creatinine stable at roughly 3.4 for 3 days. Mentation continues to improve patient talking (silently, no passy russell valve available yet) and writing down questions.  4/8 passed barium swallow. Ok for regular diet  4/9 +cough, n/v after eating breakfast. Physical/occupational therapy consulted, recommending rehab. On 2L. Using PMV and speaking. RT allowing rest from use and suctioning.  4/10 +cough, dyspnea. Mildly elevated bp, not able to take lisinopril. Hydralazine added  4/11 appears more comfortable; breakfast brought closer to patient's side. Plans for suture removal around trach. Awaiting placement, rehab    Patient has been accepted to rehab. Speech evaluated for concerns of dysphagia. Patient will benefit from continued speech treatment.     Patient seen and evaluated by me. Patient was determined to be suitable for d/c. Patient deemed stable for discharge to rehab.    IR removed femoral HD cath     Goals of Care Treatment Preferences:  Code Status: Full Code      Consults:   Consults (From admission, onward)        Status Ordering Provider     Inpatient consult to Interventional Radiology  Once        Provider:  Shanelle Quevedo MD    Completed LUIS MIGUEL CHOWDARY     Inpatient consult to Interventional Radiology  Once        Provider:  Douglas Stallings MD    Completed HELEN GUADALUPE     Inpatient consult to  Neurology  Once        Provider:  Yousuf Nino MD    Completed LANEY ROMERO     Inpatient consult to Registered Dietitian/Nutritionist  Once        Provider:  (Not yet assigned)    Completed LUIS MIGUEL CHOWDARY     Inpatient consult to Palliative Care  Once        Provider:  Harper Mackey PA-C    Completed LUIS MIGUEL CHOWDARY     Hospital Medicine PharmD Consult  Once        Provider:  (Not yet assigned)    Acknowledged YO FERNANDEZ     [facility]  Once        Provider:  (Not yet assigned)    Acknowledged YO FERNANDEZ     Inpatient consult to Pulmonology  Once        Provider:  Laney Romero MD    Completed NIYAH LORENZ     Inpatient consult to Nephrology  Once        Provider:  Gloria Locke MD    Completed LANEY ROMERO     Inpatient consult to Pulmonology  Once        Provider:  Laney Romero MD    Completed LANEY ROMERO     Inpatient consult to General surgery  Once        Provider:  Niyah Lorenz MD    Completed ROSEMARIE COATES JR          No new Assessment & Plan notes have been filed under this hospital service since the last note was generated.  Service: Hospital Medicine    Final Active Diagnoses:    Diagnosis Date Noted POA    Hypokalemia [E87.6]  Yes    Nonsustained ventricular tachycardia [I47.2]  No    Tracheostomy in place [Z93.0] 03/29/2022 Not Applicable    Acute kidney injury superimposed on chronic kidney disease [N17.9, N18.9] 03/29/2022 Unknown    Morbid obesity [E66.01] 03/29/2022 Yes    Rheumatoid arthritis involving multiple sites with positive rheumatoid factor [M05.79] 07/03/2013 Yes      Problems Resolved During this Admission:    Diagnosis Date Noted Date Resolved POA    PRINCIPAL PROBLEM:  Angioedema [T78.3XXA]  04/11/2022 Yes    Chest pain [R07.9]  04/11/2022 No    Acute metabolic encephalopathy [G93.41] 04/05/2022 04/09/2022 Unknown    Palliative care encounter [Z51.5] 04/01/2022 04/09/2022  Not Applicable    Leukocytosis [D72.829] 04/01/2022 04/11/2022 Unknown    Acidosis, metabolic, with respiratory acidosis [E87.4] 03/31/2022 04/02/2022 Unknown       Discharged Condition: stable    Disposition:     Follow Up:   Contact information for after-discharge care     Destination     [facility] .    Service: Long Term Acute Care  Contact information:  [address], [facility] [address]  [phone]                           Patient Instructions:      Ambulatory referral/consult to Outpatient Case Management   Referral Priority: Routine Referral Type: Consultation   Referral Reason: Specialty Services Required   Number of Visits Requested: 1       Significant Diagnostic Studies: Labs:   CMP   Recent Labs   Lab 04/10/22  1303 04/11/22  0348   * 144   K 3.7 3.4*    105   CO2 21* 24    98   BUN 56* 51*   CREATININE 3.1* 2.8*   CALCIUM 8.7 8.4*   PROT 7.4 7.0   ALBUMIN 2.4*  2.4* 2.4*   BILITOT 0.4 0.4   ALKPHOS 82 83   AST 17 19   ALT 24 23   ANIONGAP 20* 15   ESTGFRAFRICA 17* 19*   EGFRNONAA 15* 16*   , CBC   Recent Labs   Lab 04/10/22  1303 04/11/22  0348   WBC 18.73* 12.11   HGB 8.6* 8.0*   HCT 28.0* 26.5*    191   , INR No results found for: INR, PROTIME, Lipid Panel   Lab Results   Component Value Date    CHOL 125 12/10/2021    HDL 55 12/10/2021    LDLCALC 53.8 (L) 12/10/2021    TRIG 81 12/10/2021    CHOLHDL 44.0 12/10/2021   , Troponin No results for input(s): TROPONINI in the last 168 hours., A1C:   Recent Labs   Lab 12/10/21  0913 03/30/22  0241   HGBA1C 5.7* 6.4*    and All labs within the past 24 hours have been reviewed  Microbiology:   Blood Culture   Lab Results   Component Value Date    LABBLOO No growth after 5 days. 03/31/2022    and Sputum Culture   Lab Results   Component Value Date    GSRESP <10 epithelial cells per low power field. 04/04/2022    GSRESP Rare WBC's 04/04/2022    GSRESP Rare Gram negative rods 04/04/2022     RESPIRATORYC Normal respiratory fabi 04/04/2022    RESPIRATORYC No S aureus or Pseudomonas isolated. 04/04/2022     Radiology: X-Ray: CXR: X-Ray Chest 1 View (CXR):   Results for orders placed or performed during the hospital encounter of 03/29/22   X-Ray Chest 1 View    Narrative    EXAMINATION:  XR CHEST 1 VIEW    CLINICAL HISTORY:  SOB;. Shortness of breath    TECHNIQUE:  Single frontal portable view of the chest was performed.    COMPARISON:  04/06/2022    FINDINGS:  Support devices: Unchanged right-sided PICC line.  Satisfactory position of tracheostomy tube.  Interval removal of the NG tube.    Unchanged mild cardiomegaly.  No airspace disease or pleural effusion.  No pneumothorax.    Bones are intact.      Impression    No acute abnormality.      Electronically signed by: Jean Carlos Sinha  Date:    04/11/2022  Time:    13:27    and portable and KUB: X-Ray Abdomen AP 1 View (KUB):   Results for orders placed or performed during the hospital encounter of 03/29/22   X-Ray Abdomen AP 1 View    Narrative    EXAMINATION:  XR ABDOMEN AP 1 VIEW    CLINICAL HISTORY:  Enteral tube position;    TECHNIQUE:  AP View(s) of the abdomen was performed.    COMPARISON:  None    FINDINGS:  Nonspecific bowel gas pattern with paucity bowel gas.  Enteric tube in the projection of the stomach.      Impression    As above      Electronically signed by: Dony Diaz  Date:    03/30/2022  Time:    23:21     MRI: brain without contrast  CT scan: CT head w/o contrast  Cardiac Graphics: ECG: nsr  Modified barium swallow    Pending Diagnostic Studies:     Procedure Component Value Units Date/Time    C1 esterase inhibitor panel [[id number]] Collected: 03/29/22 1112    Order Status: Sent Lab Status: In process Updated: 03/29/22 1113    Specimen: Blood     IgE [[id number]] Collected: 03/29/22 1112    Order Status: Sent Lab Status: In process Updated: 03/29/22 1113    Specimen: Blood     Magnesium [[id number]]     Order Status: Sent Lab Status: No  result     Specimen: Blood     Tryptase [[id number]] Collected: 03/29/22 1112    Order Status: Sent Lab Status: In process Updated: 03/29/22 1113    Specimen: Blood          Medications:  Reconciled Home Medications:      Medication List      ASK your doctor about these medications    albuterol 90 mcg/actuation inhaler  Commonly known as: PROVENTIL/VENTOLIN HFA  INHALE 2 PUFFS INTO THE LUNGS EVERY 6 HOURS AS NEEDED FOR WHEEZING. DISPENSE WITH SPACER.     amLODIPine 10 MG tablet  Commonly known as: NORVASC  TAKE 1 TABLET(10 MG) BY MOUTH EVERY DAY  Ask about: Which instructions should I use?     ammonium lactate 12 % Crea  Apply 1 Act topically 2 (two) times daily.     cholecalciferol (vitamin D3) 50 mcg (2,000 unit) Tab  Commonly known as: VITAMIN D3  Take 2,000 Units by mouth once daily.     diphenhydrAMINE 25 mg tablet  Commonly known as: SOMINEX  Take 25 mg by mouth nightly as needed for Allergies.     fish oil-omega-3 fatty acids 300-1,000 mg capsule  Take 2 g by mouth once daily.     hydrALAZINE 25 MG tablet  Commonly known as: APRESOLINE  TAKE 1 TABLET BY MOUTH EVERY 12 HOURS     hydroCHLOROthiazide 25 MG tablet  Commonly known as: HYDRODIURIL  TAKE 1 TABLET(25 MG) BY MOUTH EVERY DAY  Ask about: Which instructions should I use?     hydrOXYchloroQUINE 200 mg tablet  Commonly known as: PLAQUENIL  Take 1 tablet (200 mg total) by mouth once daily.     metFORMIN 500 MG tablet  Commonly known as: GLUCOPHAGE  TAKE 1 TABLET(500 MG) BY MOUTH EVERY DAY     multivitamin per tablet  Commonly known as: THERAGRAN  Take 1 tablet by mouth once daily.     rosuvastatin 40 MG Tab  Commonly known as: CRESTOR  Take 1 tablet (40 mg total) by mouth every evening.            Indwelling Lines/Drains at time of discharge:   Lines/Drains/Airways     Peripherally Inserted Central Catheter Line  Duration           PICC Double Lumen 03/31/22 0240 right basilic 11 days          Central Venous Catheter Line  Duration                 Hemodialysis Catheter 04/04/22 1130 right femoral 7 days          Airway  Duration                Surgical Airway 03/30/22 Other (Comment) 12 days                Time spent on the discharge of patient: 30 minutes         Bc Crocker MD  Department of Hospital Medicine  [facility] - Telemetry ([facility])

## 2022-04-11 NOTE — PROGRESS NOTES
[facility] - Intensive Care ([facility])  Nephrology  Progress Note    Patient Name: Page Grissom  MRN: [id number]  Admission Date: 3/29/2022  Hospital Length of Stay: 13 days  Attending Provider: Bc Crocker MD   Primary Care Physician: NEELAM Roman Jr, MD  Principal Problem:Angioedema      Subjective:   72 yo female with obstructive respiratory failure from tongue edema (angioedema) and s/p trach and KYE. HD 4/4 4/8  - speaking today, feels well. Recounting intial episode of tongue swelling   - last HD 4/4 4/9  - some mild confusion  - UOP stable, about 1L     4/10  - intermittent confusion  - stable UOP    4/11  - seems a little breathless today  - po improving   - she's OK with [facility]       Review of patient's allergies indicates:   Allergen Reactions    Lisinopril Anaphylaxis     Angioedema requiring trach     Current Facility-Administered Medications   Medication Frequency    0.9%  NaCl infusion (for blood administration) Q24H PRN    acetaminophen tablet 650 mg Q8H PRN    albuterol-ipratropium 2.5 mg-0.5 mg/3 mL nebulizer solution 3 mL Q8H    chlorhexidine 0.12 % solution 15 mL BID    dextrose 10% bolus 125 mL PRN    famotidine (PF) injection 20 mg Daily    glucagon (human recombinant) injection 1 mg PRN    guaiFENesin 100 mg/5 ml syrup 200 mg Q4H PRN    heparin (porcine) injection 2,500 Units PRN    heparin (porcine) injection 5,200 Units PRN    heparin (porcine) injection 7,500 Units Q8H    hydrALAZINE injection 10 mg Q6H PRN    hydrALAZINE tablet 25 mg Q8H    insulin aspart U-100 pen 1-10 Units Q6H PRN    miconazole NITRATE 2 % top powder BID    naloxone 0.4 mg/mL injection 0.02 mg PRN    sodium chloride 0.9% flush 10 mL Q6H    And    sodium chloride 0.9% flush 10 mL PRN       Objective:     Vital Signs (Most Recent):  Temp: 98.2 °F (36.8 °C) (04/11/22 1212)  Pulse: 79 (04/11/22 1212)  Resp: 18 (04/11/22 1212)  BP: 136/83 (04/11/22 1212)  SpO2: 97 % (04/11/22 1212)  O2  Device (Oxygen Therapy): tracheostomy collar (04/11/22 0840) Vital Signs (24h Range):  Temp:  [96.8 °F (36 °C)-99 °F (37.2 °C)] 98.2 °F (36.8 °C)  Pulse:  [57-92] 79  Resp:  [17-20] 18  SpO2:  [94 %-99 %] 97 %  BP: (132-178)/(60-83) 136/83     Weight: 105.2 kg (231 lb 14.8 oz) (04/11/22 0416)  Body mass index is 39.81 kg/m².  Body surface area is 2.18 meters squared.    I/O last 3 completed shifts:  In: 1791.9 [P.O.:645; I.V.:1146.9]  Out: 1650 [Urine:1650]    Physical Exam  Vitals and nursing note reviewed.   Constitutional:       General: She is not in acute distress.     Appearance: She is obese.   HENT:      Head: Atraumatic.   Eyes:      General: No scleral icterus.  Cardiovascular:      Rate and Rhythm: Normal rate.   Pulmonary:      Effort: Pulmonary effort is normal.      Breath sounds: No wheezing or rales.   Abdominal:      Palpations: Abdomen is soft.      Tenderness: There is no abdominal tenderness.   Musculoskeletal:         General: No swelling.      Right lower leg: No edema.      Left lower leg: No edema.   Skin:     General: Skin is warm and dry.   Neurological:      Mental Status: She is alert and oriented to person, place, and time.   Psychiatric:         Mood and Affect: Mood normal.         Behavior: Behavior normal.         Thought Content: Thought content normal.         Judgment: Judgment normal.         Significant Labs: reviewed       Assessment/Plan:     Active Diagnoses:    Diagnosis Date Noted POA    PRINCIPAL PROBLEM:  Angioedema [T78.3XXA]  Yes    Chest pain [R07.9]  No    Hypokalemia [E87.6]  Yes    Nonsustained ventricular tachycardia [I47.2]  No    Tracheostomy in place [Z93.0] 03/29/2022 Not Applicable    Acute kidney injury superimposed on chronic kidney disease [N17.9, N18.9] 03/29/2022 Unknown    Morbid obesity [E66.01] 03/29/2022 Yes    Rheumatoid arthritis involving multiple sites with positive rheumatoid factor [M05.79] 07/03/2013 Yes      Problems Resolved During  this Admission:    Diagnosis Date Noted Date Resolved POA    Acute metabolic encephalopathy [G93.41] 04/05/2022 04/09/2022 Unknown    Palliative care encounter [Z51.5] 04/01/2022 04/09/2022 Not Applicable    Leukocytosis [D72.829] 04/01/2022 04/11/2022 Unknown    Acidosis, metabolic, with respiratory acidosis [E87.4] 03/31/2022 04/02/2022 Unknown       KYE/ATN from sustained hypotension (anaphylaxis)   - UOP stable and Cr is improving daily  - first and only HD 4/4 (femoral tunneled cath)  - no indications for further RRT  - continue to monitor for further renal recovery     Hypernatremia  - improved    HTN  - monitor on current meds    Pulmonary  - check CXR      OK to remove tunneled catheter       Frank Winkler MD  Nephrology

## 2022-04-11 NOTE — PLAN OF CARE
[facility] declined, unable to accept trach. MARLA left message with Kirti from [facility] requesting facility review.

## 2022-04-11 NOTE — PLAN OF CARE
Pt remains fall free this shift.  Pt AAOx3 verbal, clear speech.  Skin warm and dry. No new skin issues.  Telemonitoring in progress, (SR mid 70s to 80s)  Tracheostomy with speech valve   O2 5L via trach collar   Purewick   Hemodialysis cath R femoral vein   Assist x1 with transfers.  CBG Q6 with PRN SS insulin.  Awaiting placement   Bed low, side rails up x 2, wheels locked, call light in reach.  Bed alarm maintained for safety.  Patient instructed to call for assistance.  Hourly rounding completed.  24 hour chart check completed  POC updated and reviewed with pt. Will continue POC.

## 2022-04-11 NOTE — PROGRESS NOTES
[facility] - Tele[facility] ([facility])  [facility] Medicine  Progress Note    Patient Name: Page Grissom  MRN: [id number]  Patient Class: IP- Inpatient   Admission Date: 3/29/2022  Length of Stay: 13 days  Attending Physician: Bc Crocker MD  Primary Care Provider: NEELAM Roman Jr, MD        Subjective:     Principal Problem:Angioedema        HPI:  72 y/o female with PMHx of HTN, HLD, DM, asthma, CKD 3, and RA who presented to the ED with c/o angioedema that onset suddenly this morning. Pt was able to verbally communicate with EMS, was not able to communicate with ED staff. Staff was unable to intubate and called surgery, who performed emergency trach. Pt is currently on vent and sedated.  ED workup shows: H/H 10.9/34.6, K+ 2.9, CO2 22, Anion gap 17, BUN 55, Cr 8.8.  She will be admitted to ICU for angioedema under the care of [facility] medicine.  She is a full code and her SDM is her daughter, Cassy.        Overview/Hospital Course:  3/30 emergently trach'd in ED as difficulty intubating d/t angioedema. Going to OR for replacement of tube  3/31 hypotensive requiring pressor support. Leukocytosis with electrolyte abn noted. Concerns for sepsis in immunocompromised patient. Low UOP. Nephrology following for possible dialysis. Guarded prognosis. Consider palliative consult for GOC/family support  4/1 remains on ventilatory support via trach. NG placed for med admin  4/2 somnolent but following commands, ESRD not currently receiving dialysis pt received versed for crich procedure slow metabolizer still somnolent  4/3- somnolent but following commands. D/w nephro dr. Mane trial of dialysis in am to metabolize sedatives pt does not seem to be metabolizing sedatives. Stable respiratory status  4/4- CT head 4/4 reveals mild subacute to chronic appearing ischemic changes to deep white matter both cerebral hemispheres. Pt remains somnolent EEG pending. Neuro following. Trial of dialysis to address somnolence.  4/5- pt's  mentation unchanged following HD. D/w neuro Dr. Ferrera; EEG shows metabolic encephalopathy. Neurological insult is likely multifactorial-prognosis still unknown.  4/6- pt's mentation has improved she now has her eyes open spontaneously, following commands. Creatinine has improved following dialysis down to 3.7 from 8.9. MRI Brain was negative for infarct or bleed  4/7- acidosis and volume status improved with dialysis. Reviewed nephrology note most likely patient will no longer need HD creatinine stable at roughly 3.4 for 3 days. Mentation continues to improve patient talking (silently, no passy russell valve available yet) and writing down questions.  4/8 passed barium swallow. Ok for regular diet  4/9 +cough, n/v after eating breakfast. Physical/occupational therapy consulted, recommending rehab. On 2L. Using PMV and speaking. RT allowing rest from use and suctioning.  4/10 +cough, dyspnea. Mildly elevated bp, not able to take lisinopril. Hydralazine added  4/11 appears more comfortable; breakfast brought closer to patient's side. Plans for suture removal around trach. Awaiting placement, rehab      Interval History: See hospital course for today      Review of Systems   Constitutional:  Positive for activity change, appetite change (improved) and fatigue. Negative for fever.   HENT:  Positive for trouble swallowing.    Respiratory:  Positive for cough and shortness of breath.    Gastrointestinal:  Positive for nausea (improved). Negative for abdominal pain and vomiting.   Neurological:  Positive for tremors.   Objective:     Vital Signs (Most Recent):  Temp: 96.8 °F (36 °C) (04/11/22 0727)  Pulse: 85 (04/11/22 0840)  Resp: 18 (04/11/22 0840)  BP: 137/63 (04/11/22 0727)  SpO2: 98 % (04/11/22 0840) Vital Signs (24h Range):  Temp:  [96.8 °F (36 °C)-99.4 °F (37.4 °C)] 96.8 °F (36 °C)  Pulse:  [] 85  Resp:  [17-26] 18  SpO2:  [94 %-99 %] 98 %  BP: (132-189)/(60-81) 137/63     Weight: 105.2 kg (231 lb 14.8 oz)  Body  mass index is 39.81 kg/m².    Intake/Output Summary (Last 24 hours) at 4/11/2022 0901  Last data filed at 4/11/2022 0547  Gross per 24 hour   Intake 1691.9 ml   Output 1500 ml   Net 191.9 ml      Physical Exam  Vitals and nursing note reviewed.   Constitutional:       General: She is not in acute distress.     Appearance: She is ill-appearing. She is not toxic-appearing.   HENT:      Head: Normocephalic and atraumatic.   Neck:      Comments: trach  Cardiovascular:      Rate and Rhythm: Normal rate.   Pulmonary:      Effort: Pulmonary effort is normal. No respiratory distress.   Abdominal:      Palpations: Abdomen is soft.      Tenderness: There is no abdominal tenderness.   Musculoskeletal:      Right lower leg: No edema.      Left lower leg: No edema.      Comments: Picc  femoral cath   Skin:     General: Skin is warm.   Neurological:      Mental Status: She is alert.      Motor: Weakness present.       Significant Labs: All pertinent labs within the past 24 hours have been reviewed.  CBC:   Recent Labs   Lab 04/10/22  1303 04/11/22  0348   WBC 18.73* 12.11   HGB 8.6* 8.0*   HCT 28.0* 26.5*    191     CMP:   Recent Labs   Lab 04/09/22  1321 04/10/22  1303 04/11/22  0348    147* 144   K 3.4* 3.7 3.4*    106 105   CO2 24 21* 24    109 98   BUN 62* 56* 51*   CREATININE 3.2* 3.1* 2.8*   CALCIUM 9.0 8.7 8.4*   PROT  --  7.4 7.0   ALBUMIN  --  2.4*  2.4* 2.4*   BILITOT  --  0.4 0.4   ALKPHOS  --  82 83   AST  --  17 19   ALT  --  24 23   ANIONGAP 18* 20* 15   EGFRNONAA 14* 15* 16*       Significant Imaging: I have reviewed all pertinent imaging results/findings within the past 24 hours.      Assessment/Plan:      * Angioedema  --likely 2/2 lisinopril  --admit to ICU  --had emergent trach in ED, on ventalitor  --IV steroids, IV antihistamiens  --Fresh Frozen Plasma ordered in ED  --monitor closely    3/30  Improving   Continue supportive care  Minimal improvement in appearance  Consider  bradykinin inhibitor?  Oxygen Concentration (%):  [28] 28     3/31  CC following for vent management  Surgery following after trach placement  Continue iv systemic steroids    4/1  Continue current trach care    Resolved    4/4  Improved neurological status with dialysis    4/7  --pt is back to baseline neurologically  --OG tube for tube feeds has been dc'd  --barium swallow in am to eval possibility of starting diet  --passy russell valve for trach    4/8  S/p urgent trach  Passed barium swallow  Ok for regular diet  Awaiting rehab placement, [facility], once off ventilator  PMV trials    Resolved  Awaiting placement, rehab  Allow PMV rests     Nonsustained ventricular tachycardia        Hypokalemia  Replete     Morbid obesity  Physical/occupational therapy       Acute kidney injury superimposed on chronic kidney disease  --baseline BUN/Cr 20/1.4  --stop lisinopril  --avoid nephrotoxic agents  --Nephrology following  --Pt is not acidotic and there is no hyperkalemia;   --d/w nephro service  current indication for dialysis is pt's inability to metabolize sedatives she is arousable but still extremely sedated  -- uremic  --IR for hemodialysis access per dR Mane today    4/8  neph following  Cr stable at 3.4 after RRT    Cr improving  Remove cath on Monday per IR    Tracheostomy in place  --admit to ICU  --stop lisiniopril  --pulmonology following  --supportive care    OR for replacement of cricoid trach    4/10  Trach care  Reports dysphagia  Speech consulted    4/11  Dysphagia improved  Speech evaluation pending   Suture removal      Rheumatoid arthritis involving multiple sites with positive rheumatoid factor  --hold plaquenil  --f/u OP         VTE Risk Mitigation (From admission, onward)         Ordered     Place sequential compression device  Until discontinued         04/08/22 2016     heparin (porcine) injection 5,200 Units  As needed (PRN)         04/04/22 1342     heparin (porcine) injection 2,500 Units  As needed  (PRN)         04/04/22 1115     Apply sequential compression device to lower extremities (if no contraindications). Medical venous thromboembolus prophylaxis is preferred.  Until discontinued         03/30/22 0805     heparin (porcine) injection 7,500 Units  Every 8 hours         03/29/22 1003     IP VTE HIGH RISK PATIENT  Once         03/29/22 1003                Discharge Planning   CHALO:      Code Status: Full Code   Is the patient medically ready for discharge?:     Reason for patient still in hospital (select all that apply): Patient trending condition, Laboratory test, Treatment, Consult recommendations, PT / OT recommendations and Pending disposition  Discharge Plan A: (P) Rehab                  Bc Crocker MD  Department of Hospital Medicine   [facility] ([facility])

## 2022-04-11 NOTE — PT/OT/SLP PROGRESS
Speech Language Pathology Treatment    Patient Name:  Page Grissom   MRN:  [id number]  Admitting Diagnosis: Angioedema    Recommendations:                 General Recommendations:  Dysphagia therapy and One Way Speaking Valve  Diet recommendations:  Regular, Liquid Diet Level: Thin   Aspiration Precautions: 1 bite/sip at a time, HOB to 90 degrees, Remain upright 30 minutes post meal and Small bites/sips   General Precautions: Standard, aspiration  Communication strategies:  PMV    Subjective   Pt was seen sitting in a chair at bedside with a family friend present.  She was beginning to eat her lunch with no PMV in place.  ST received a new order for a swallow evaluation 2/2 pt coughing over the weekend.   Patient goals: none stated      Pain/Comfort:  · Pain Rating 1: 0/10    Respiratory Status: O2 support via a T-collar, Trach cuff deflated and PMV put on while ST was in the room     Objective:     Has the patient been evaluated by SLP for swallowing?   Yes  Keep patient NPO? No     ST educated pt on the need for the PMV to be in place with meals, how to place the PMV, and her current diet restrictions of diabetic and cardiac.  She reported not liking the taste/texture of food.  Her swallowing was assessed with soft solids (fish, broccoli) and thin liquids.  No overt sings of swallow difficulties observed as pt presents with adequate mastication skills, no oral residue, no coughing/choking, no change in respiratory rate, and no facial changes.  Pt's swallow appears safe with intake of all consistencies with the PMV in place.  Her vocal quality is hoarse which is new from from when this ST saw her on Friday.  Pt reports that she became hoarse with a runny nose starting yesterday.      Assessment:     Page Grissom is a 71 y.o. female with an SLP diagnosis of Cognitive-Linguistic Impairment and One Way Speaking Valve Training.  She presents with no observable signs of aspiration at bedside.   She will  benefit from ongoing ST for stated deficits per POC.     Goals:   Multidisciplinary Problems     SLP Goals        Problem: SLP    Goal Priority Disciplines Outcome   SLP Goal     SLP Ongoing, Progressing   Description: 1.  Pt will consume least restrictive PO diet without s/s of dysphagia--goal met.  Pt recommended regular consistency diet/thin liquids (MBSS completed 4/8/22).    *Change goal:  Pt will consume regular consistency diet without s/s of dysphagia.  2.  MBSS for comprehensive swallowing assessment s/p tracheostomy--goal met.  MBSS completed 4/8/22.  3.  Recommended PMSV trials/communication evaluation--Goal met.  Completed 4/8/22.  *See new goals below.  4.  Pt will improve cognitive-communicative ability to meet complex needs/ADL's.  5.  Pt will utilize PMSV with adequate breath support upon all waking hours.                   Plan:     · Patient to be seen:  2 x/week   · Plan of Care expires:  04/14/22  · Plan of Care reviewed with:  patient, friend   · SLP Follow-Up:  Yes       Discharge recommendations:  rehabilitation facility   Barriers to Discharge:  None    Time Tracking:     SLP Treatment Date:   04/11/22  Speech Start Time:  1150  Speech Stop Time:  1213     Speech Total Time (min):  23 min    Billable Minutes: Speech Therapy Individual 13 and Treatment Swallowing Dysfunction 10    04/11/2022

## 2022-04-11 NOTE — PROGRESS NOTES
F/U visit with ms. Grissom. She is awake and alert, sitting up in chair at bedside. Intertrigo again noted to lower abdominal fold and gluteal cleft. Moisture barrier paste in use. Recommend continued care per orders.

## 2022-04-11 NOTE — ASSESSMENT & PLAN NOTE
--baseline BUN/Cr 20/1.4  --stop lisinopril  --avoid nephrotoxic agents  --Nephrology following  --Pt is not acidotic and there is no hyperkalemia;   --d/w nephro service  current indication for dialysis is pt's inability to metabolize sedatives she is arousable but still extremely sedated  -- uremic  --IR for hemodialysis access per dR Mane today    4/8  neph following  Cr stable at 3.4 after RRT    Cr improving  Remove cath on Monday per IR

## 2022-04-11 NOTE — SUBJECTIVE & OBJECTIVE
Interval History: 4/11 seen and examined.  Trach in place.  Afebrile.  100% O2 sat on 5 liter/minute.  Using Passy Mary valve to vocalize.    Review of Systems   Constitutional:  Positive for malaise/fatigue. Negative for chills and fever.   HENT:  Negative for hearing loss and nosebleeds.         Trach in place.   Eyes:  Negative for discharge.   Respiratory:  Positive for shortness of breath. Negative for cough.    Cardiovascular:  Negative for chest pain and leg swelling.   Gastrointestinal:  Negative for abdominal pain.   Genitourinary:  Negative for hematuria.   Musculoskeletal:  Negative for falls.   Skin:  Negative for itching.   Neurological:  Positive for weakness. Negative for speech change, seizures and loss of consciousness.   Endo/Heme/Allergies:  Negative for polydipsia. Does not bruise/bleed easily.   Psychiatric/Behavioral:  Negative for hallucinations.        Objective:     Vital Signs (Most Recent):  Temp: 98.3 °F (36.8 °C) (04/11/22 1622)  Pulse: 60 (04/11/22 1659)  Resp: 18 (04/11/22 1659)  BP: (!) 167/71 (04/11/22 1622)  SpO2: 100 % (04/11/22 1700)   Vital Signs (24h Range):  Temp:  [96.8 °F (36 °C)-98.8 °F (37.1 °C)] 98.3 °F (36.8 °C)  Pulse:  [57-92] 60  Resp:  [17-20] 18  SpO2:  [96 %-100 %] 100 %  BP: (132-178)/(60-83) 167/71     Weight: 105.2 kg (231 lb 14.8 oz)  Body mass index is 39.81 kg/m².      Intake/Output Summary (Last 24 hours) at 4/11/2022 1718  Last data filed at 4/11/2022 0547  Gross per 24 hour   Intake 1491.9 ml   Output 1500 ml   Net -8.1 ml       Physical Exam  Vitals and nursing note reviewed.   Constitutional:       General: She is not in acute distress.     Appearance: She is well-developed. She is obese. She is ill-appearing.   HENT:      Head: Normocephalic and atraumatic.   Neck:      Comments: Trach in place.  Cardiovascular:      Rate and Rhythm: Normal rate and regular rhythm.   Pulmonary:      Effort: Pulmonary effort is normal. No respiratory distress.      Breath  sounds: No stridor.   Abdominal:      Palpations: Abdomen is soft.      Tenderness: There is no abdominal tenderness.   Musculoskeletal:      Cervical back: Neck supple.   Neurological:      Mental Status: She is alert and oriented to person, place, and time.       Vents:  Vent Mode: A/C (04/07/22 0914)  Ventilator Initiated: Yes (04/01/22 0900)  Set Rate: 18 BPM (04/07/22 0914)  Vt Set: 400 mL (04/07/22 0914)  Pressure Support: 0 cmH20 (04/07/22 0914)  PEEP/CPAP: 6 cmH20 (04/07/22 0914)  Oxygen Concentration (%): 28 (04/11/22 1700)  Peak Airway Pressure: 23 cmH2O (04/07/22 0914)  Plateau Pressure: 18 cmH20 (04/07/22 0914)  Total Ve: 8.58 mL (04/07/22 0914)  F/VT Ratio<105 (RSBI): (!) 52.63 (04/07/22 0914)    Lines/Drains/Airways       Peripherally Inserted Central Catheter Line  Duration             PICC Double Lumen 03/31/22 0240 right basilic 11 days              Central Venous Catheter Line  Duration                  Hemodialysis Catheter 04/04/22 1130 right femoral 7 days              Airway  Duration                  Surgical Airway 03/30/22 Other (Comment) 12 days                    Significant Labs:    CBC/Anemia Profile:  Recent Labs   Lab 04/10/22  1303 04/11/22  0348   WBC 18.73* 12.11   HGB 8.6* 8.0*   HCT 28.0* 26.5*    191   MCV 78* 80*   RDW 16.9* 17.2*        Chemistries:  Recent Labs   Lab 04/10/22  0312 04/10/22  1303 04/11/22  0348   NA  --  147* 144   K  --  3.7 3.4*   CL  --  106 105   CO2  --  21* 24   BUN  --  56* 51*   CREATININE  --  3.1* 2.8*   CALCIUM  --  8.7 8.4*   ALBUMIN  --  2.4*  2.4* 2.4*   PROT  --  7.4 7.0   BILITOT  --  0.4 0.4   ALKPHOS  --  82 83   ALT  --  24 23   AST  --  17 19   PHOS 3.2 3.8  --            Significant Imaging:      Chest x-ray 04/11/2022       CLINICAL HISTORY:  SOB;. Shortness of breath     TECHNIQUE:  Single frontal portable view of the chest was performed.     COMPARISON:  04/06/2022     FINDINGS:  Support devices: Unchanged right-sided PICC line.   Satisfactory position of tracheostomy tube.  Interval removal of the NG tube.     Unchanged mild cardiomegaly.  No airspace disease or pleural effusion.  No pneumothorax.     Bones are intact.     Impression:     No acute abnormality.

## 2022-04-11 NOTE — NURSING
Notified NP Melinda Edeg of patient having urge to urinate but nothing was coming. Bladder scanned patient received >528 got verbal order to straight cath and output was 700   Well child hearing and vision screening    HEARING FREQUENCY:    Initial test of hearing  Right ear: 40db at 1000Hz: present  Left ear: 40db at 1000Hz: present    Right Ear:    20db at 1000Hz: present  20db at 2000Hz: present  20db at 4000Hz: present  20db at 6000Hz (11 years and older): present    Left Ear:    20db at 6000Hz (11 years and older): present  20db at 4000Hz: present  20db at 2000Hz: present  20db at 1000Hz: present    Hearing Screen:  Pass-- [place] all tones    VISION:  Far vision: Right eye 10/8, Left eye 10/8, with no corrective lens  Plus lens (5 years and older who pass distance screening and do not have corrective lens):  Pass - blurred vision    Breanne Lorenzo, CMA

## 2022-04-11 NOTE — ASSESSMENT & PLAN NOTE
--admit to ICU  --stop lisiniopril  --pulmonology following  --supportive care    OR for replacement of cricoid trach    4/10  Trach care  Reports dysphagia  Speech consulted    4/11  Dysphagia improved  Speech evaluation pending   Suture removal

## 2022-04-11 NOTE — PLAN OF CARE
CM met with patient at bedside and informed that [facility] has declined patient due to tracheostomy at this time. [facility] is max'd out on Trach patients at this time and cannot accept.    CM discussed alternate levels of care including LTAC. Patient states her spouse was at [facility] and had good care, declines LTAC list. Patient is in agreement with CM sending clinicals to [facility] for LTAC placement. CM called and updated daughter who is also in agreement with plan. Verbalizes that her father had good care at [facility].    [facility] with [facility] is reviewing referral at this time.

## 2022-04-11 NOTE — CONSULTS
Chart reviewed by Dr. Sinha.       ASSESSMENT/PLAN:    Right fem cath removal      The catheter has been removed in its entirety and pressure held at site for 15 minutes.        Thank you for the consult.

## 2022-04-11 NOTE — SUBJECTIVE & OBJECTIVE
Interval History: See hospital course for today      Review of Systems   Constitutional:  Positive for activity change, appetite change (improved) and fatigue. Negative for fever.   HENT:  Positive for trouble swallowing.    Respiratory:  Positive for cough and shortness of breath.    Gastrointestinal:  Positive for nausea (improved). Negative for abdominal pain and vomiting.   Neurological:  Positive for tremors.   Objective:     Vital Signs (Most Recent):  Temp: 96.8 °F (36 °C) (04/11/22 0727)  Pulse: 85 (04/11/22 0840)  Resp: 18 (04/11/22 0840)  BP: 137/63 (04/11/22 0727)  SpO2: 98 % (04/11/22 0840) Vital Signs (24h Range):  Temp:  [96.8 °F (36 °C)-99.4 °F (37.4 °C)] 96.8 °F (36 °C)  Pulse:  [] 85  Resp:  [17-26] 18  SpO2:  [94 %-99 %] 98 %  BP: (132-189)/(60-81) 137/63     Weight: 105.2 kg (231 lb 14.8 oz)  Body mass index is 39.81 kg/m².    Intake/Output Summary (Last 24 hours) at 4/11/2022 0901  Last data filed at 4/11/2022 0547  Gross per 24 hour   Intake 1691.9 ml   Output 1500 ml   Net 191.9 ml      Physical Exam  Vitals and nursing note reviewed.   Constitutional:       General: She is not in acute distress.     Appearance: She is ill-appearing. She is not toxic-appearing.   HENT:      Head: Normocephalic and atraumatic.   Neck:      Comments: trach  Cardiovascular:      Rate and Rhythm: Normal rate.   Pulmonary:      Effort: Pulmonary effort is normal. No respiratory distress.   Abdominal:      Palpations: Abdomen is soft.      Tenderness: There is no abdominal tenderness.   Musculoskeletal:      Right lower leg: No edema.      Left lower leg: No edema.      Comments: Picc  femoral cath   Skin:     General: Skin is warm.   Neurological:      Mental Status: She is alert.      Motor: Weakness present.       Significant Labs: All pertinent labs within the past 24 hours have been reviewed.  CBC:   Recent Labs   Lab 04/10/22  1303 04/11/22  0348   WBC 18.73* 12.11   HGB 8.6* 8.0*   HCT 28.0* 26.5*   PLT  188 191     CMP:   Recent Labs   Lab 04/09/22  1321 04/10/22  1303 04/11/22  0348    147* 144   K 3.4* 3.7 3.4*    106 105   CO2 24 21* 24    109 98   BUN 62* 56* 51*   CREATININE 3.2* 3.1* 2.8*   CALCIUM 9.0 8.7 8.4*   PROT  --  7.4 7.0   ALBUMIN  --  2.4*  2.4* 2.4*   BILITOT  --  0.4 0.4   ALKPHOS  --  82 83   AST  --  17 19   ALT  --  24 23   ANIONGAP 18* 20* 15   EGFRNONAA 14* 15* 16*       Significant Imaging: I have reviewed all pertinent imaging results/findings within the past 24 hours.

## 2022-04-11 NOTE — PLAN OF CARE
[facility] - Telemetry (Hospital)  Discharge Final Note    Primary Care Provider: NEELAM Roman Jr, MD    Expected Discharge Date: 4/11/2022    Final Discharge Note (most recent)     Final Note - 04/11/22 1626        Final Note    Assessment Type Final Discharge Note     Anticipated Discharge Disposition [facility] Resources/Appts/Education Provided Post-Acute resouces added to AVS        Post-Acute Status    Post-Acute Authorization Placement     Post-Acute Placement Status Set-up Complete/Auth obtained                 Important Message from Medicare  Important Message from Medicare regarding Discharge Appeal Rights: Given to patient/caregiver, Explained to patient/caregiver, Signed/date by patient/caregiver     Date IMM was signed: 04/11/22  Time IMM was signed: 0916    After-discharge care            Destination     [facility]   Service: Long Term Acute Care    [address] [place] [address]  [facility] [id number]   Phone: [phone]                 DC DISPO: RADHA GIVENS  TRANSPORT: LIAISON TO SET UP [facility]  FAMILY NOTIFIED: CM CALLED AND NOTIFIED DAUGHTERDINORA  ALL ORDERS UPLOADED TO [facility] AND NUMBER FOR NURSE REPORT PROVIDED.

## 2022-04-11 NOTE — ASSESSMENT & PLAN NOTE
On hydroxychloroquine as outpatient.  No recent change in treatment noted  3/30 continue same   3/31 now off DMD due to sepsis suspicion   4/1 off DMD due to sepsis indicated by elevated white count.  Abnormal chest x-ray.  Sending procalcitonin today.  Empirically on cefepime  4/11 resume home regimen on discharge.

## 2022-04-11 NOTE — ASSESSMENT & PLAN NOTE
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  Outpatient pulmonary follow-up.

## 2022-04-11 NOTE — PLAN OF CARE
CM spoke with [facility] admissions regarding patient with Trach and O2 @ 5 liters. Facility cannot accommodate patient with O2 needs at this time, no RT on staff. Facility has declined.    CM left voicemail with daughter to discuss alternate placement options. Provided [facility] via voicemail ([facility] and [facility]). Awaiting callback.

## 2022-04-12 ENCOUNTER — OUTPATIENT CASE MANAGEMENT (OUTPATIENT)
Dept: ADMINISTRATIVE | Facility: OTHER | Age: 72
End: 2022-04-12
Payer: MEDICARE

## 2022-04-12 NOTE — PROGRESS NOTES
Chart review completed 4/12/2022. Patient transferred to [facility] [place] following inpatient stay at [facility]. Low/mod risk referral closed.

## 2022-04-12 NOTE — PT/OT/SLP PROGRESS
Occupational Therapy   Treatment    Name: Page Grissom  MRN: [id number]  Admitting Diagnosis:  Angioedema  12 Days Post-Op    Recommendations:     Discharge Recommendations: rehabilitation facility  Discharge Equipment Recommendations:  bedside commode, bath bench, walker, rolling  Barriers to discharge:  None    Assessment:     Page Grissom is a 71 y.o. female with a medical diagnosis of Angioedema.  She presents with the following performance deficits affecting function are weakness, impaired endurance, impaired self care skills, impaired functional mobilty, impaired balance, decreased safety awareness, edema, impaired cardiopulmonary response to activity.     Rehab Prognosis:  Good; patient would benefit from acute skilled OT services to address these deficits and reach maximum level of function.       Plan:     Patient to be seen 2 x/week to address the above listed problems via self-care/home management, therapeutic activities, therapeutic exercises  · Plan of Care Expires: 04/21/22  · Plan of Care Reviewed with: patient    Subjective     Pain/Comfort:  · Pain Rating 1: 0/10    Objective:     Communicated with: Nurse, Justino, prior to session.  Patient found up in chair with PICC line, Tracheostomy, telemetry, pulse ox (continuous), alatorre catheter (trach collar, PMV), yoan sys and chair alarm upon OT entry to room.    General Precautions: Standard, fall, respiratory   Orthopedic Precautions:N/A   Braces: N/A  Respiratory Status: trach collar    Bed Mobility:    · OOB in chair at start and end of treatment session    Functional Mobility/Transfers:  · Patient completed Sit <> Stand Transfer with minimum assistance and of 2 persons  with  rolling walker   · Functional Mobility: Patient completed x8ft x2 trials with RW and min A of 2 to increase dynamic standing balance and activity tolerance needed for ADL completion.    Activities of Daily Living:  · Grooming: setup brushing hair with item setup  while seated in bedside chair    [facility] 6 Click ADL: 14    Treatment & Education:  Patient provided with education on and cues for safe transfer techniques to increase independence with completion and to decrease fall risk. Moderate dyspnea on exertion that improves with rest breaks. Completed B UE AROM therex while seated in bedside chair x10 reps x3 planes of motion to increase functional strength and activity tolerance. Requires max cueing to engage actively engaged and for appropriate technique/pacing. Encouraged to complete via HEP. In agreement to complete and stated understanding for education re: calling for A with all transfers.    PMV in place allowing patient to verbalize throughout.    Patient left up in chair with all lines intact, call button in reach and chair alarm onEducation:      GOALS:   Multidisciplinary Problems     Occupational Therapy Goals        Problem: Occupational Therapy    Goal Priority Disciplines Outcome Interventions   Occupational Therapy Goal     OT, PT/OT Ongoing, Progressing    Description: Goals to be met by: 4/21/22     Patient will increase functional independence with ADLs by performing:    UE Dressing with Minimal Assistance.  Supine to sit with Minimal Assistance.  Toilet transfer to bedside commode with Moderate Assistance.  Increased functional strength in B UE grossly by 1/2 MM grade.                     Time Tracking:     OT Date of Treatment: 04/11/22  OT Start Time: 1220  OT Stop Time: 1245  OT Total Time (min): 25 min    Billable Minutes:Therapeutic Activity 10  Therapeutic Exercise 15    4/11/2022

## 2022-04-12 NOTE — PLAN OF CARE
Tolerated treatment well this date. Sit>stand from bedside chair with min A of 2. Initiated forward functional mobility this date. Recommending inpatient rehab at d/c.

## 2022-04-22 ENCOUNTER — TELEPHONE (OUTPATIENT)
Dept: PULMONOLOGY | Facility: CLINIC | Age: 72
End: 2022-04-22
Payer: MEDICARE

## 2022-04-22 NOTE — TELEPHONE ENCOUNTER
----- Message from Laney Romero MD sent at 4/11/2022  5:24 PM CDT -----  Please call patient daughter Cassy this week and arrange for a follow-up with me in 1-2 months.  Patient has a trach.  Thank you

## 2022-05-11 ENCOUNTER — TELEPHONE (OUTPATIENT)
Dept: NEPHROLOGY | Facility: CLINIC | Age: 72
End: 2022-05-11
Payer: MEDICARE

## 2022-05-11 NOTE — TELEPHONE ENCOUNTER
----- Message from Neto Hayes sent at 5/11/2022  3:10 PM CDT -----  Contact: Pt [phone]  Pt called in regards to speaking with a nurse she has a question regarding her lab please advise

## 2022-05-11 NOTE — TELEPHONE ENCOUNTER
Patient called to report having a recent hospital stay with labs. She is scheduled for a follow up in September and will see her PCP next week.

## 2022-05-11 NOTE — TELEPHONE ENCOUNTER
----- Message from Benita Rai sent at 5/11/2022 11:03 AM CDT -----  Pt would like a call back in regards to blood work. Please call her at .[phone]

## 2022-05-18 ENCOUNTER — PATIENT OUTREACH (OUTPATIENT)
Dept: ADMINISTRATIVE | Facility: HOSPITAL | Age: 72
End: 2022-05-18
Payer: MEDICARE

## 2022-05-19 ENCOUNTER — HOSPITAL ENCOUNTER (OUTPATIENT)
Dept: RADIOLOGY | Facility: HOSPITAL | Age: 72
Discharge: HOME | End: 2022-05-19
Attending: PEDIATRICS
Payer: MEDICARE

## 2022-05-19 ENCOUNTER — TELEPHONE (OUTPATIENT)
Dept: SLEEP MEDICINE | Facility: CLINIC | Age: 72
End: 2022-05-19
Payer: MEDICARE

## 2022-05-19 ENCOUNTER — OFFICE VISIT (OUTPATIENT)
Dept: INTERNAL MEDICINE | Facility: CLINIC | Age: 72
End: 2022-05-19
Payer: MEDICARE

## 2022-05-19 VITALS
BODY MASS INDEX: 39.03 KG/M2 | RESPIRATION RATE: 20 BRPM | DIASTOLIC BLOOD PRESSURE: 60 MMHG | HEART RATE: 72 BPM | WEIGHT: 228.63 LBS | OXYGEN SATURATION: 95 % | HEIGHT: 64 IN | TEMPERATURE: 98 F | SYSTOLIC BLOOD PRESSURE: 128 MMHG

## 2022-05-19 DIAGNOSIS — M05.79 RHEUMATOID ARTHRITIS INVOLVING MULTIPLE SITES WITH POSITIVE RHEUMATOID FACTOR: ICD-10-CM

## 2022-05-19 DIAGNOSIS — K21.9 GASTROESOPHAGEAL REFLUX DISEASE WITHOUT ESOPHAGITIS: ICD-10-CM

## 2022-05-19 DIAGNOSIS — T78.3XXA ANGIOEDEMA, INITIAL ENCOUNTER: Primary | ICD-10-CM

## 2022-05-19 DIAGNOSIS — E11.8 DIABETES MELLITUS TYPE 2 WITH COMPLICATIONS: ICD-10-CM

## 2022-05-19 DIAGNOSIS — E66.01 CLASS 3 SEVERE OBESITY DUE TO EXCESS CALORIES WITH SERIOUS COMORBIDITY AND BODY MASS INDEX (BMI) OF 40.0 TO 44.9 IN ADULT: ICD-10-CM

## 2022-05-19 DIAGNOSIS — Z93.0 TRACHEOSTOMY IN PLACE: ICD-10-CM

## 2022-05-19 DIAGNOSIS — J45.31 MILD PERSISTENT ASTHMA WITH ACUTE EXACERBATION: ICD-10-CM

## 2022-05-19 DIAGNOSIS — E66.01 MORBID OBESITY: ICD-10-CM

## 2022-05-19 DIAGNOSIS — N18.31 STAGE 3A CHRONIC KIDNEY DISEASE: Primary | ICD-10-CM

## 2022-05-19 DIAGNOSIS — E11.69 HYPERLIPIDEMIA ASSOCIATED WITH TYPE 2 DIABETES MELLITUS: ICD-10-CM

## 2022-05-19 DIAGNOSIS — I10 ESSENTIAL HYPERTENSION: ICD-10-CM

## 2022-05-19 DIAGNOSIS — I47.29 NONSUSTAINED VENTRICULAR TACHYCARDIA: ICD-10-CM

## 2022-05-19 DIAGNOSIS — J45.30 MILD PERSISTENT ASTHMA WITHOUT COMPLICATION: ICD-10-CM

## 2022-05-19 DIAGNOSIS — N18.31 STAGE 3A CHRONIC KIDNEY DISEASE: ICD-10-CM

## 2022-05-19 DIAGNOSIS — E78.5 HYPERLIPIDEMIA ASSOCIATED WITH TYPE 2 DIABETES MELLITUS: ICD-10-CM

## 2022-05-19 PROCEDURE — 99215 OFFICE O/P EST HI 40 MIN: CPT | Mod: S$PBB,,, | Performed by: PEDIATRICS

## 2022-05-19 PROCEDURE — 71046 XR CHEST PA AND LATERAL: ICD-10-PCS | Mod: 26,,, | Performed by: RADIOLOGY

## 2022-05-19 PROCEDURE — 99999 PR PBB SHADOW E&M-EST. PATIENT-LVL V: ICD-10-PCS | Mod: PBBFAC,,, | Performed by: PEDIATRICS

## 2022-05-19 PROCEDURE — 99999 PR PBB SHADOW E&M-EST. PATIENT-LVL V: CPT | Mod: PBBFAC,,, | Performed by: PEDIATRICS

## 2022-05-19 PROCEDURE — 71046 X-RAY EXAM CHEST 2 VIEWS: CPT | Mod: TC

## 2022-05-19 PROCEDURE — 99215 PR OFFICE/OUTPT VISIT, EST, LEVL V, 40-54 MIN: ICD-10-PCS | Mod: S$PBB,,, | Performed by: PEDIATRICS

## 2022-05-19 PROCEDURE — 71046 X-RAY EXAM CHEST 2 VIEWS: CPT | Mod: 26,,, | Performed by: RADIOLOGY

## 2022-05-19 PROCEDURE — 99215 OFFICE O/P EST HI 40 MIN: CPT | Mod: PBBFAC,25 | Performed by: PEDIATRICS

## 2022-05-19 RX ORDER — MAGNESIUM L-LACTATE 84 MG
84 TABLET, EXTENDED RELEASE ORAL DAILY
COMMUNITY
Start: 2022-05-10 | End: 2022-10-24

## 2022-05-19 RX ORDER — DEXTROMETHORPHAN HYDROBROMIDE, GUAIFENESIN 10; 100 MG/5ML; MG/5ML
10 LIQUID ORAL EVERY 6 HOURS PRN
COMMUNITY
Start: 2022-05-10 | End: 2022-10-24

## 2022-05-19 RX ORDER — LANCETS
EACH MISCELLANEOUS
Qty: 100 EACH | Refills: 11 | Status: SHIPPED | OUTPATIENT
Start: 2022-05-19

## 2022-05-19 RX ORDER — POTASSIUM CHLORIDE 20 MEQ/1
20 TABLET, EXTENDED RELEASE ORAL DAILY
COMMUNITY
Start: 2022-05-10 | End: 2022-08-16 | Stop reason: SDUPTHER

## 2022-05-19 RX ORDER — HYDROXYCHLOROQUINE SULFATE 200 MG/1
1 TABLET, FILM COATED ORAL
COMMUNITY
Start: 2022-05-06 | End: 2022-06-07 | Stop reason: SDUPTHER

## 2022-05-19 RX ORDER — INSULIN PUMP SYRINGE, 3 ML
EACH MISCELLANEOUS
Qty: 1 EACH | Refills: 0 | Status: SHIPPED | OUTPATIENT
Start: 2022-05-19

## 2022-05-19 RX ORDER — ALBUTEROL SULFATE 90 UG/1
AEROSOL, METERED RESPIRATORY (INHALATION)
Qty: 18 G | Refills: 0 | Status: SHIPPED | OUTPATIENT
Start: 2022-05-19 | End: 2022-06-13

## 2022-05-19 RX ORDER — ACETAMINOPHEN 500 MG
500 TABLET ORAL
COMMUNITY

## 2022-05-19 NOTE — PROGRESS NOTES
Subjective:       Patient ID: Page Grissom is a 71 y.o. female.    Chief Complaint: Hospital Follow Up    Hospital f/u from 4/11/22 d/c from [facility] for angioedema requiring intubation and emergency tach and multiple organ system failure. Just D/C from rehab. Patient and daughter state mobility and ADLs improving about 50% from baseline. Still has trach, does have SOB and mucous production chronically. No further angioedema, cause never IDed.    1) DM: no hyper/hypoglycemic symptoms. no self monitoring, currently on no meds.   2) HTN: no BP checks, no HTNive symptoms, only on apressoline  3) LIPIDS:  tolerating and compliant with crestor  4) ASTHMA: stopped smoking . Asthma quiet no albuterol or advair currently. Having some sob at rest and on exertion  5) GERD: Quiet.   6) Rheum/osteoporosis: No current symptoms. Cant take NSAIDS due to CKD, on plaquenil  7) CKD: seeing renal  8)nonsustained Vtach: occurred in hospital, on no meds currently.    Review of Systems   Constitutional: Negative for fever and unexpected weight change.   HENT: Negative for congestion and rhinorrhea.    Eyes: Negative for discharge and redness.   Respiratory: Positive for cough and shortness of breath. Negative for wheezing.    Cardiovascular: Negative for chest pain, palpitations and leg swelling.   Gastrointestinal: Negative for abdominal pain, constipation, diarrhea and vomiting.   Endocrine: Negative for polydipsia, polyphagia and polyuria.   Genitourinary: Negative for decreased urine volume, difficulty urinating, dysuria and menstrual problem.   Musculoskeletal: Negative for arthralgias and joint swelling.   Skin: Negative for rash and wound.   Neurological: Negative for syncope and headaches.   Psychiatric/Behavioral: Negative for behavioral problems, dysphoric mood and sleep disturbance. The patient is not nervous/anxious.        Objective:      Physical Exam  Vitals and nursing note reviewed.   Constitutional:       General:  She is not in acute distress.     Appearance: She is well-developed.   Neck:      Thyroid: No thyromegaly.      Vascular: No JVD.   Cardiovascular:      Rate and Rhythm: Normal rate and regular rhythm.      Heart sounds: Normal heart sounds. No murmur heard.  Pulmonary:      Effort: Pulmonary effort is normal. No respiratory distress.      Breath sounds: Normal breath sounds. No wheezing or rales.   Abdominal:      General: There is no distension.      Palpations: Abdomen is soft. There is no mass.      Tenderness: There is no abdominal tenderness. There is no guarding.   Musculoskeletal:      Right lower leg: No edema.      Left lower leg: No edema.   Lymphadenopathy:      Cervical: No cervical adenopathy.   Skin:     Capillary Refill: Capillary refill takes less than 2 seconds.      Findings: No rash.   Neurological:      General: No focal deficit present.      Mental Status: She is alert and oriented to person, place, and time.      Cranial Nerves: No cranial nerve deficit.      Coordination: Coordination normal.   Psychiatric:         Mood and Affect: Mood normal.         Behavior: Behavior normal.         Thought Content: Thought content normal.         Judgment: Judgment normal.         Assessment:       1. Angioedema, initial encounter    2. Class 3 severe obesity due to excess calories with serious comorbidity and body mass index (BMI) of 40.0 to 44.9 in adult    3. Diabetes mellitus type 2 with complications    4. Essential hypertension    5. Gastroesophageal reflux disease without esophagitis    6. Hyperlipidemia associated with type 2 diabetes mellitus    7. Mild persistent asthma without complication    8. Morbid obesity    9. Rheumatoid arthritis involving multiple sites with positive rheumatoid factor    10. Tracheostomy in place    11. Stage 3a chronic kidney disease    12. Nonsustained ventricular tachycardia    13. Mild persistent asthma with acute exacerbation        Plan:       Angioedema, initial  encounter  -     CBC Auto Differential; Future; Expected date: 05/19/2022  -     Ambulatory referral/consult to Allergy; Future; Expected date: 05/26/2022    Class 3 severe obesity due to excess calories with serious comorbidity and body mass index (BMI) of 40.0 to 44.9 in adult    Diabetes mellitus type 2 with complications  -     Hemoglobin A1C; Future; Expected date: 05/19/2022  -     Microalbumin/Creatinine Ratio, Urine; Future; Expected date: 05/19/2022  -     Hemoglobin A1C; Future; Expected date: 05/19/2022  -     Lipid Panel; Future; Expected date: 05/19/2022  -     Comprehensive Metabolic Panel; Future; Expected date: 05/19/2022    Essential hypertension    Gastroesophageal reflux disease without esophagitis    Hyperlipidemia associated with type 2 diabetes mellitus  -     Lipid Panel; Future; Expected date: 05/19/2022  -     Comprehensive Metabolic Panel; Future; Expected date: 05/19/2022    Mild persistent asthma without complication  -     X-Ray Chest PA And Lateral; Future; Expected date: 05/19/2022    Morbid obesity    Rheumatoid arthritis involving multiple sites with positive rheumatoid factor  -     Ambulatory referral/consult to Rheumatology; Future; Expected date: 05/26/2022    Tracheostomy in place  -     Ambulatory referral/consult to Pulmonology; Future; Expected date: 05/26/2022  -     X-Ray Chest PA And Lateral; Future; Expected date: 05/19/2022    Stage 3a chronic kidney disease    Nonsustained ventricular tachycardia  -     Ambulatory referral/consult to Cardiology; Future; Expected date: 05/26/2022    Mild persistent asthma with acute exacerbation  -     albuterol (PROVENTIL/VENTOLIN HFA) 90 mcg/actuation inhaler; INHALE 2 PUFFS INTO THE LUNGS EVERY 6 HOURS AS NEEDED FOR WHEEZING. DISPENSE WITH SPACER.  Dispense: 18 g; Refill: 0    Other orders  -     blood-glucose meter kit; To check BG 1 times daily, to use with insurance preferred meter  Dispense: 1 each; Refill: 0  -     lancets Misc;  To check BG 1 times daily, to use with insurance preferred meter  Dispense: 100 each; Refill: 11  -     blood sugar diagnostic Strp; To check BG 1 times daily, to use with insurance preferred meter  Dispense: 100 strip; Refill: 11    Await labs to reassess control. Renal, rheum, card, allergy, pulmonary consults needed. F/U w/i 3 months to reassess.

## 2022-05-19 NOTE — TELEPHONE ENCOUNTER
----- Message from Franchesca Caba MA sent at 5/19/2022  9:42 AM CDT -----  Regarding: See referral.  See hosp record, see referral. Pt has trach in place, prefers Dr. Cha. Please contact pt to sched appt available asap per Dr. Roman. Thank you/trevor

## 2022-05-20 ENCOUNTER — TELEPHONE (OUTPATIENT)
Dept: INTERNAL MEDICINE | Facility: CLINIC | Age: 72
End: 2022-05-20
Payer: MEDICARE

## 2022-05-20 ENCOUNTER — OFFICE VISIT (OUTPATIENT)
Dept: CARDIOLOGY | Facility: CLINIC | Age: 72
End: 2022-05-20
Payer: MEDICARE

## 2022-05-20 VITALS
BODY MASS INDEX: 39.11 KG/M2 | DIASTOLIC BLOOD PRESSURE: 80 MMHG | HEIGHT: 64 IN | SYSTOLIC BLOOD PRESSURE: 134 MMHG | WEIGHT: 229.06 LBS | HEART RATE: 67 BPM

## 2022-05-20 DIAGNOSIS — T78.3XXD ANGIOEDEMA, SUBSEQUENT ENCOUNTER: ICD-10-CM

## 2022-05-20 DIAGNOSIS — I47.29 NONSUSTAINED VENTRICULAR TACHYCARDIA: Primary | ICD-10-CM

## 2022-05-20 DIAGNOSIS — I10 ESSENTIAL HYPERTENSION: ICD-10-CM

## 2022-05-20 DIAGNOSIS — K21.9 GASTROESOPHAGEAL REFLUX DISEASE WITHOUT ESOPHAGITIS: ICD-10-CM

## 2022-05-20 DIAGNOSIS — N17.9 ACUTE KIDNEY INJURY SUPERIMPOSED ON CHRONIC KIDNEY DISEASE: ICD-10-CM

## 2022-05-20 DIAGNOSIS — N18.9 ACUTE KIDNEY INJURY SUPERIMPOSED ON CHRONIC KIDNEY DISEASE: ICD-10-CM

## 2022-05-20 DIAGNOSIS — Z79.60 LONG-TERM USE OF IMMUNOSUPPRESSANT MEDICATION: ICD-10-CM

## 2022-05-20 DIAGNOSIS — M05.79 RHEUMATOID ARTHRITIS INVOLVING MULTIPLE SITES WITH POSITIVE RHEUMATOID FACTOR: ICD-10-CM

## 2022-05-20 DIAGNOSIS — E11.8 DIABETES MELLITUS TYPE 2 WITH COMPLICATIONS: ICD-10-CM

## 2022-05-20 DIAGNOSIS — F17.210 CIGARETTE SMOKER: ICD-10-CM

## 2022-05-20 DIAGNOSIS — E66.01 MORBID OBESITY: ICD-10-CM

## 2022-05-20 DIAGNOSIS — N25.81 SECONDARY HYPERPARATHYROIDISM OF RENAL ORIGIN: ICD-10-CM

## 2022-05-20 PROCEDURE — 99215 OFFICE O/P EST HI 40 MIN: CPT | Mod: PBBFAC | Performed by: INTERNAL MEDICINE

## 2022-05-20 PROCEDURE — 99205 PR OFFICE/OUTPT VISIT, NEW, LEVL V, 60-74 MIN: ICD-10-PCS | Mod: S$PBB,,, | Performed by: INTERNAL MEDICINE

## 2022-05-20 PROCEDURE — 99999 PR PBB SHADOW E&M-EST. PATIENT-LVL V: ICD-10-PCS | Mod: PBBFAC,,, | Performed by: INTERNAL MEDICINE

## 2022-05-20 PROCEDURE — 99205 OFFICE O/P NEW HI 60 MIN: CPT | Mod: S$PBB,,, | Performed by: INTERNAL MEDICINE

## 2022-05-20 PROCEDURE — 99999 PR PBB SHADOW E&M-EST. PATIENT-LVL V: CPT | Mod: PBBFAC,,, | Performed by: INTERNAL MEDICINE

## 2022-05-20 RX ORDER — EPINEPHRINE 0.3 MG/.3ML
1 INJECTION SUBCUTANEOUS ONCE
Qty: 1 EACH | Refills: 11 | Status: SHIPPED | OUTPATIENT
Start: 2022-05-20 | End: 2023-09-26

## 2022-05-20 NOTE — TELEPHONE ENCOUNTER
----- Message from THEA Roman Jr., MD sent at 5/20/2022 12:26 PM CDT -----  More than ok with me, I will send in and have my staff let patient/family know it is sent to her [facility] pharmacy.  ----- Message -----  From: Jossue Saravia MD  Sent: 5/20/2022  10:49 AM CDT  To: THEA Roman Jr., MD, Laney Romero MD, #    [place], FYI   I think she should have epi pens available at home.  I have not prescribed him and told her to get in touch with you if that is okay with you.  Thank you

## 2022-05-20 NOTE — PROGRESS NOTES
"  Subjective:   Patient ID:  Page Grissom is a 71 y.o. female     Chief complaint:ventricular tachycardia (New pt referral dr severino pt has a anaphylaxis to lisinopril was in icu 10 days.)      HPI  New patient to me.  Referred by Dr aLney Romero for evaluation and management of nonsustained ventricular tachycardia for   --   Background as gleaned from patient's records and today's interview :  71 woman with HTN, HLD, DM, asthma, CKD 3, and RA who presented to the ED on 3/29/22 with c/o c/w angioedema of sudden onset that same am.  Pt was able to verbally communicate with EMS, was not able to communicate with ED staff. Staff was unable to intubate and called surgery, who performed emergency trach. She was admitted to ICU for angioedema under the care of hospital medicine. Stayed in hospital till 4/11 before DC to SNF.  On 4/5 had a 14 beat run of irregular MVTNS rate @ 130. "ASxc" .  Concommitant eval on that day as summarized in DC summary   >>  4/5- pt's mentation unchanged following HD. D/w neuro Dr. Ferrera; EEG shows metabolic encephalopathy. Neurological insult is likely multifactorial-prognosis still unknown    She now feels better, her renal function has improved significantly and the at last check was with a GFR estimated around 38 and a creatinine of 1.6.  Her tracheostomy has been sealed.  She does not have symptoms of heart failure and is the not terribly short of breath.  Current Outpatient Medications   Medication Sig    acetaminophen (TYLENOL) 500 MG tablet Take 500 mg by mouth as needed for Pain.    albuterol (PROVENTIL/VENTOLIN HFA) 90 mcg/actuation inhaler INHALE 2 PUFFS INTO THE LUNGS EVERY 6 HOURS AS NEEDED FOR WHEEZING. DISPENSE WITH SPACER.    blood sugar diagnostic Strp To check BG 1 times daily, to use with insurance preferred meter    blood-glucose meter kit To check BG 1 times daily, to use with insurance preferred meter    CHEST CONGESTION RELIEF DM  mg/5 mL Liqd Take " 10 mLs by mouth every 6 (six) hours as needed.    cholecalciferol, vitamin D3, 2,000 unit Tab Take 2,000 Units by mouth once daily.    fish oil-omega-3 fatty acids 300-1,000 mg capsule Take 2 g by mouth once daily.    hydrALAZINE (APRESOLINE) 25 MG tablet Take 1 tablet (25 mg total) by mouth every 8 (eight) hours.    hydrOXYchloroQUINE (PLAQUENIL) 200 mg tablet Take 1 tablet by mouth before meals, at bedtime and at 0200.    lancets Misc To check BG 1 times daily, to use with insurance preferred meter    MAGTAB 84 mg TbSR Take 84 mg by mouth once daily at 6am.    multivitamin (THERAGRAN) per tablet Take 1 tablet by mouth once daily.    potassium chloride SA (K-DUR,KLOR-CON) 20 MEQ tablet Take 20 mEq by mouth once daily.    rosuvastatin (CRESTOR) 40 MG Tab Take 1 tablet (40 mg total) by mouth every evening.     No current facility-administered medications for this visit.     Review of Systems   Constitutional: Negative for decreased appetite, weight gain and weight loss.   HENT: Negative for nosebleeds.    Eyes: Negative for blurred vision and visual disturbance.   Cardiovascular: Positive for dyspnea on exertion. Negative for chest pain, claudication, cyanosis, irregular heartbeat, leg swelling, near-syncope, orthopnea, palpitations, paroxysmal nocturnal dyspnea and syncope.   Respiratory: Negative for cough, shortness of breath and wheezing.    Endocrine: Negative for heat intolerance.   Skin: Negative for rash.   Musculoskeletal: Negative for muscle weakness and myalgias.   Gastrointestinal: Negative for abdominal pain, anorexia, melena, nausea and vomiting.   Genitourinary: Negative for menorrhagia.   Neurological: Negative for excessive daytime sleepiness, dizziness, headaches, loss of balance, seizures, vertigo and weakness.   Psychiatric/Behavioral: Negative for altered mental status and depression. The patient is not nervous/anxious.      Social History     Tobacco Use   Smoking Status Former Smoker     Packs/day: 0.50    Years: 25.00    Pack years: 12.50    Types: Cigarettes    Quit date: 2022    Years since quittin.0   Smokeless Tobacco Never Used       reports no history of alcohol use.   Past Medical History:   Diagnosis Date    Acidosis, metabolic, with respiratory acidosis 3/31/2022    Asthma     Back pain     Cataract     OD    CKD (chronic kidney disease) stage 3, GFR 30-59 ml/min 2020    Diabetes mellitus     Fibromyalgia     Gastroesophageal reflux disease     Hypercholesterolemia     Hypertension     Immune deficiency disorder     Obesity     Osteoporosis     Rheumatoid arthritis     Tobacco dependence     Trouble in sleeping     Type 2 diabetes mellitus      Family History   Problem Relation Age of Onset    Hypertension Mother     Cancer Father     Colon cancer Father     Breast cancer Sister      Social History     Socioeconomic History    Marital status:    Tobacco Use    Smoking status: Former Smoker     Packs/day: 0.50     Years: 25.00     Pack years: 12.50     Types: Cigarettes     Quit date: 2022     Years since quittin.0    Smokeless tobacco: Never Used   Substance and Sexual Activity    Alcohol use: No    Drug use: No   Social History Narrative    1 dog, no smokers in household; No HH as of 2022, has daytime sitter 2022.     Past Surgical History:   Procedure Laterality Date    BRONCHOSCOPY N/A 3/30/2022    Procedure: Bronchoscopy;  Surgeon: Isak Yadav MD;  Location: [facility] OR;  Service: General;  Laterality: N/A;    COLONOSCOPY N/A 2019    Procedure: COLONOSCOPY;  Surgeon: Yury Atwood MD;  Location: [facility] ENDO;  Service: Endoscopy;  Laterality: N/A;    HERNIA REPAIR      OOPHORECTOMY      TRACHEOSTOMY N/A 3/30/2022    Procedure: CREATION, TRACHEOSTOMY;  Surgeon: Isak Yadav MD;  Location: [facility] OR;  Service: General;  Laterality: N/A;       Objective:   Physical Exam  Neck:        Comments: She has a  "clean bandage over the area of the previous      /80   Pulse 67   Ht 5' 4" (1.626 m)   Wt 103.9 kg (229 lb 0.9 oz)   BMI 39.32 kg/m²      Assessment:    The nonsustained VT is of no clinical consequence.  I am pretty certain she has a normal left ventricular function and this DVT happened in the midst of a very severe illness.  Even if it were and with, with the normal LVEF, we would discount the ventricular tachycardia.  Just for completion, we will obtain an echocardiogram.  Clinically she does not have left ventricular dysfunction.  As to what caused her angioedema, it is not entirely clear whether was the ACE-inhibitor, the calcium treatment infusion or the flu shot.  She is due for patch testing.  Nevertheless, I recommended that she threw away her lisinopril so that she can completely avoid mistakes.  Furthermore, she should have EpiPens available at home.  1. Nonsustained ventricular tachycardia    2. Essential hypertension    3. Acute kidney injury superimposed on chronic kidney disease    4. Rheumatoid arthritis involving multiple sites with positive rheumatoid factor    5. Diabetes mellitus type 2 with complications    6. Secondary hyperparathyroidism of renal origin    7. Morbid obesity    8. Gastroesophageal reflux disease without esophagitis    9. Long-term use of immunosuppressant medication    10. Cigarette smoker    11. Angioedema, subsequent encounter        Plan:      No orders of the defined types were placed in this encounter.    Follow up if symptoms worsen or fail to improve.  There are no discontinued medications.  Outpatient Encounter Medications as of 5/20/2022   Medication Sig Dispense Refill    acetaminophen (TYLENOL) 500 MG tablet Take 500 mg by mouth as needed for Pain.      albuterol (PROVENTIL/VENTOLIN HFA) 90 mcg/actuation inhaler INHALE 2 PUFFS INTO THE LUNGS EVERY 6 HOURS AS NEEDED FOR WHEEZING. DISPENSE WITH SPACER. 18 g 0    blood sugar diagnostic Strp To check BG 1 " times daily, to use with insurance preferred meter 100 strip 11    blood-glucose meter kit To check BG 1 times daily, to use with insurance preferred meter 1 each 0    CHEST CONGESTION RELIEF DM  mg/5 mL Liqd Take 10 mLs by mouth every 6 (six) hours as needed.      cholecalciferol, vitamin D3, 2,000 unit Tab Take 2,000 Units by mouth once daily.      fish oil-omega-3 fatty acids 300-1,000 mg capsule Take 2 g by mouth once daily.      hydrALAZINE (APRESOLINE) 25 MG tablet Take 1 tablet (25 mg total) by mouth every 8 (eight) hours. 90 tablet 0    hydrOXYchloroQUINE (PLAQUENIL) 200 mg tablet Take 1 tablet by mouth before meals, at bedtime and at 0200.      lancets Misc To check BG 1 times daily, to use with insurance preferred meter 100 each 11    MAGTAB 84 mg TbSR Take 84 mg by mouth once daily at 6am.      multivitamin (THERAGRAN) per tablet Take 1 tablet by mouth once daily.      potassium chloride SA (K-DUR,KLOR-CON) 20 MEQ tablet Take 20 mEq by mouth once daily.      rosuvastatin (CRESTOR) 40 MG Tab Take 1 tablet (40 mg total) by mouth every evening. 30 tablet 11    [DISCONTINUED] amLODIPine (NORVASC) 10 MG tablet TAKE 1 TABLET(10 MG) BY MOUTH EVERY DAY 90 tablet 3    [DISCONTINUED] hydroCHLOROthiazide (HYDRODIURIL) 25 MG tablet TAKE 1 TABLET(25 MG) BY MOUTH EVERY DAY 90 tablet 3    [DISCONTINUED] metFORMIN (GLUCOPHAGE) 500 MG tablet TAKE 1 TABLET(500 MG) BY MOUTH EVERY DAY 90 tablet 3     No facility-administered encounter medications on file as of 5/20/2022.     Medication List with Changes/Refills   Current Medications    ACETAMINOPHEN (TYLENOL) 500 MG TABLET    Take 500 mg by mouth as needed for Pain.    ALBUTEROL (PROVENTIL/VENTOLIN HFA) 90 MCG/ACTUATION INHALER    INHALE 2 PUFFS INTO THE LUNGS EVERY 6 HOURS AS NEEDED FOR WHEEZING. DISPENSE WITH SPACER.    BLOOD SUGAR DIAGNOSTIC STRP    To check BG 1 times daily, to use with insurance preferred meter    BLOOD-GLUCOSE METER KIT    To check  BG 1 times daily, to use with insurance preferred meter    CHEST CONGESTION RELIEF DM  MG/5 ML LIQD    Take 10 mLs by mouth every 6 (six) hours as needed.    CHOLECALCIFEROL, VITAMIN D3, 2,000 UNIT TAB    Take 2,000 Units by mouth once daily.    FISH OIL-OMEGA-3 FATTY ACIDS 300-1,000 MG CAPSULE    Take 2 g by mouth once daily.    HYDRALAZINE (APRESOLINE) 25 MG TABLET    Take 1 tablet (25 mg total) by mouth every 8 (eight) hours.    HYDROXYCHLOROQUINE (PLAQUENIL) 200 MG TABLET    Take 1 tablet by mouth before meals, at bedtime and at 0200.    LANCETS MISC    To check BG 1 times daily, to use with insurance preferred meter    MAGTAB 84 MG TBSR    Take 84 mg by mouth once daily at 6am.    MULTIVITAMIN (THERAGRAN) PER TABLET    Take 1 tablet by mouth once daily.    POTASSIUM CHLORIDE SA (K-DUR,KLOR-CON) 20 MEQ TABLET    Take 20 mEq by mouth once daily.    ROSUVASTATIN (CRESTOR) 40 MG TAB    Take 1 tablet (40 mg total) by mouth every evening.

## 2022-05-20 NOTE — Clinical Note
MODE Cole  I think she should have epi pens available at home.  I have not prescribed him and told her to get in touch with you if that is okay with you.  Thank you

## 2022-05-21 DIAGNOSIS — F17.210 CIGARETTE SMOKER: Primary | ICD-10-CM

## 2022-05-21 DIAGNOSIS — T78.3XXD ANGIOEDEMA, SUBSEQUENT ENCOUNTER: ICD-10-CM

## 2022-05-26 ENCOUNTER — OFFICE VISIT (OUTPATIENT)
Dept: NEPHROLOGY | Facility: CLINIC | Age: 72
End: 2022-05-26
Payer: MEDICARE

## 2022-05-26 VITALS
BODY MASS INDEX: 39.08 KG/M2 | SYSTOLIC BLOOD PRESSURE: 132 MMHG | HEART RATE: 78 BPM | WEIGHT: 228.88 LBS | DIASTOLIC BLOOD PRESSURE: 60 MMHG | HEIGHT: 64 IN | OXYGEN SATURATION: 95 %

## 2022-05-26 DIAGNOSIS — D50.8 OTHER IRON DEFICIENCY ANEMIA: ICD-10-CM

## 2022-05-26 DIAGNOSIS — I10 ESSENTIAL HYPERTENSION: ICD-10-CM

## 2022-05-26 DIAGNOSIS — E11.8 DIABETES MELLITUS TYPE 2 WITH COMPLICATIONS: ICD-10-CM

## 2022-05-26 DIAGNOSIS — I77.1 TORTUOUS AORTA: ICD-10-CM

## 2022-05-26 DIAGNOSIS — M79.7 FIBROMYALGIA: ICD-10-CM

## 2022-05-26 DIAGNOSIS — T78.3XXD ANGIOEDEMA, SUBSEQUENT ENCOUNTER: ICD-10-CM

## 2022-05-26 DIAGNOSIS — F17.210 CIGARETTE SMOKER: ICD-10-CM

## 2022-05-26 DIAGNOSIS — N18.31 STAGE 3A CHRONIC KIDNEY DISEASE: Primary | ICD-10-CM

## 2022-05-26 DIAGNOSIS — E55.9 VITAMIN D DEFICIENCY: ICD-10-CM

## 2022-05-26 DIAGNOSIS — Z79.60 LONG-TERM USE OF IMMUNOSUPPRESSANT MEDICATION: ICD-10-CM

## 2022-05-26 DIAGNOSIS — J45.30 MILD PERSISTENT ASTHMA WITHOUT COMPLICATION: ICD-10-CM

## 2022-05-26 DIAGNOSIS — K21.9 GASTROESOPHAGEAL REFLUX DISEASE WITHOUT ESOPHAGITIS: ICD-10-CM

## 2022-05-26 DIAGNOSIS — M85.89 OSTEOPENIA OF MULTIPLE SITES: ICD-10-CM

## 2022-05-26 DIAGNOSIS — E55.9 VITAMIN D DEFICIENCY DISEASE: ICD-10-CM

## 2022-05-26 DIAGNOSIS — N25.81 SECONDARY HYPERPARATHYROIDISM OF RENAL ORIGIN: ICD-10-CM

## 2022-05-26 DIAGNOSIS — N18.9 ACUTE KIDNEY INJURY SUPERIMPOSED ON CHRONIC KIDNEY DISEASE: ICD-10-CM

## 2022-05-26 DIAGNOSIS — E78.5 HYPERLIPIDEMIA ASSOCIATED WITH TYPE 2 DIABETES MELLITUS: ICD-10-CM

## 2022-05-26 DIAGNOSIS — E11.69 HYPERLIPIDEMIA ASSOCIATED WITH TYPE 2 DIABETES MELLITUS: ICD-10-CM

## 2022-05-26 DIAGNOSIS — N17.9 ACUTE KIDNEY INJURY SUPERIMPOSED ON CHRONIC KIDNEY DISEASE: ICD-10-CM

## 2022-05-26 DIAGNOSIS — M05.79 RHEUMATOID ARTHRITIS INVOLVING MULTIPLE SITES WITH POSITIVE RHEUMATOID FACTOR: ICD-10-CM

## 2022-05-26 DIAGNOSIS — N18.32 STAGE 3B CHRONIC KIDNEY DISEASE: ICD-10-CM

## 2022-05-26 PROCEDURE — 99215 PR OFFICE/OUTPT VISIT, EST, LEVL V, 40-54 MIN: ICD-10-PCS | Mod: S$PBB,,, | Performed by: INTERNAL MEDICINE

## 2022-05-26 PROCEDURE — 99999 PR PBB SHADOW E&M-EST. PATIENT-LVL IV: ICD-10-PCS | Mod: PBBFAC,,, | Performed by: INTERNAL MEDICINE

## 2022-05-26 PROCEDURE — 99214 OFFICE O/P EST MOD 30 MIN: CPT | Mod: PBBFAC,PO | Performed by: INTERNAL MEDICINE

## 2022-05-26 PROCEDURE — 99999 PR PBB SHADOW E&M-EST. PATIENT-LVL IV: CPT | Mod: PBBFAC,,, | Performed by: INTERNAL MEDICINE

## 2022-05-26 PROCEDURE — 99215 OFFICE O/P EST HI 40 MIN: CPT | Mod: S$PBB,,, | Performed by: INTERNAL MEDICINE

## 2022-05-26 NOTE — PROGRESS NOTES
Subjective:       Patient ID: Page Grissom is a 71 y.o. Black or  female who presents for follow up evaluation of No chief complaint on file.    HPI     She is referred by her PCP for KYE on CKD with baseline creatinine ranging 1.3-1.6mg/dL. She was in her usual state of health until she injured herself and was given tramadol and prednisone, developed severe constipation. This has nor sorted itself out and she feels better. No NSAIDs recently and no ABX.  No IV contrast. No LUTS. She admits to too much soda. She has used an unknown cough medicine? In lieu of Mucinex  Peak Cr was 2.8 with repeat 2.5mg/dL    Interval history June 2021: Tylenol for OA knee pain, walks in [employer] for exercise, no new medications.     Interval History Feb 2022: Doing well, still smoking. Increased water intake, two jarek a day. Will quit smoking when dtr gets . Seeing new Rheumatologist    Interval History May 2022: prolonged hospital stay after severe anaphylaxis, required intubation. She is s/p KYE which required dialysis. Recovered enough kidney function and last HD was during hospital stay. She is feeling well. dtr accompanies pt. She has pulmonary follow up soon     Review of Systems   Constitutional: Negative for activity change, appetite change, fatigue and unexpected weight change.   HENT: Negative for facial swelling.    Respiratory: Positive for cough and wheezing. Negative for shortness of breath.    Cardiovascular: Negative for leg swelling.   Gastrointestinal: Negative for constipation and diarrhea.   Genitourinary: Negative for difficulty urinating.   Musculoskeletal: Positive for arthralgias and back pain.   Allergic/Immunologic: Positive for immunocompromised state.   Neurological: Negative for weakness.   Psychiatric/Behavioral: Negative for confusion and decreased concentration.       Objective:      Physical Exam  Vitals and nursing note reviewed.   Constitutional:       General: She is  not in acute distress.     Appearance: Normal appearance. She is not ill-appearing.   HENT:      Head: Atraumatic.   Eyes:      General: No scleral icterus.  Neck:      Vascular: No JVD.   Cardiovascular:      Rate and Rhythm: Normal rate.      Heart sounds: S1 normal and S2 normal.     No friction rub.   Pulmonary:      Effort: Pulmonary effort is normal.      Breath sounds: Wheezing present. No rales.   Abdominal:      General: There is no distension.   Musculoskeletal:      Cervical back: Neck supple.      Right lower leg: No edema.      Left lower leg: No edema.   Skin:     General: Skin is warm and dry.      Coloration: Skin is not jaundiced.   Neurological:      Mental Status: She is alert and oriented to person, place, and time. Mental status is at baseline.      Gait: Gait abnormal.   Psychiatric:         Mood and Affect: Mood normal.         Behavior: Behavior normal.         Thought Content: Thought content normal.         Judgment: Judgment normal.         Assessment:       1. Stage 3a chronic kidney disease    2. Acute kidney injury superimposed on chronic kidney disease    3. Essential hypertension    4. Angioedema, subsequent encounter    5. Cigarette smoker    6. Secondary hyperparathyroidism of renal origin    7. Vitamin D deficiency disease    8. Hyperlipidemia associated with type 2 diabetes mellitus    9. Diabetes mellitus type 2 with complications    10. Long-term use of immunosuppressant medication    11. Fibromyalgia    12. Rheumatoid arthritis involving multiple sites with positive rheumatoid factor    13. Gastroesophageal reflux disease without esophagitis    14. Stage 3b chronic kidney disease    15. Mild persistent asthma without complication    16. Osteopenia of multiple sites    17. Other iron deficiency anemia    18. Tortuous aorta        Plan:           CKD Stage 3 s/p KYE  - kidney function did not return to baseline but much improved. Give more time   - Continue RAAS blockade for  renal preservation  - Avoid NSAIDs, continue a low sodium diet, and ensure hydration    HTN  - typically controlled, monitor    Mineral and Bone Disease  - trend PTH    DM  - well controlled, no retinopathy     RTC 6mo  60 minutes of total time spent on the encounter, which includes face to face time and non-face to face time preparing to see the patient (eg, review of tests), Obtaining and/or reviewing separately obtained history, Documenting clinical information in the electronic or other health record, Independently interpreting results (not separately reported) and communicating results to the patient/family/caregiver, or Care coordination (not separately reported).

## 2022-06-01 NOTE — PROGRESS NOTES
Pulmonary Outpatient   Visit     Subjective:       Patient ID: Page Grissom is a 71 y.o. female.    Chief Complaint: Asthma      Page Grissom is 71 y.o.  Follow up post hospitalisation  Anaid edema needed emergent trach  Has been since decannulation  Smoker, quit during acute crisis  Has epipen  Has inhaler using  Trach wound healed bandage removed  Was at [facility] then [facility]  No oxygen  Still has some dysphonia           [facility] Tele[facility] ([facility])  Hospital Medicine  Discharge Summary        Patient Name: Page Grissom  MRN: [id number]  Patient Class: IP- Inpatient  Admission Date: 3/29/2022  Hospital Length of Stay: 13 days  Discharge Date and Time:  04/11/2022 4:12 PM  Attending Physician: Bc Crocker MD   Discharging Provider: Bc Crocker MD  Primary Care Provider: NEELAM Roman Jr, MD        HPI:   70 y/o female with PMHx of HTN, HLD, DM, asthma, CKD 3, and RA who presented to the ED with c/o angioedema that onset suddenly this morning. Pt was able to verbally communicate with EMS, was not able to communicate with ED staff. Staff was unable to intubate and called surgery, who performed emergency trach. Pt is currently on vent and sedated.  ED workup shows: H/H 10.9/34.6, K+ 2.9, CO2 22, Anion gap 17, BUN 55, Cr 8.8.  She will be admitted to ICU for angioedema under the care of [facility] medicine.  She is a full code and her SDM is her daughter, Cassy.           Procedure(s) (LRB):  CREATION, TRACHEOSTOMY (N/A)  Bronchoscopy (N/A)       Hospital Course:   3/30 emergently trach'd in ED as difficulty intubating d/t angioedema. Going to OR for replacement of tube  3/31 hypotensive requiring pressor support. Leukocytosis with electrolyte abn noted. Concerns for sepsis in immunocompromised patient. Low UOP. Nephrology following for possible dialysis. Guarded prognosis. Consider palliative consult for GOC/family support  4/1 remains on  ventilatory support via trach. NG placed for med admin  4/2 somnolent but following commands, ESRD not currently receiving dialysis pt received versed for crich procedure slow metabolizer still somnolent  4/3- somnolent but following commands. D/w nephro dr. Mane trial of dialysis in am to metabolize sedatives pt does not seem to be metabolizing sedatives. Stable respiratory status  4/4- CT head 4/4 reveals mild subacute to chronic appearing ischemic changes to deep white matter both cerebral hemispheres. Pt remains somnolent EEG pending. Neuro following. Trial of dialysis to address somnolence.  4/5- pt's mentation unchanged following HD. D/w neuro Dr. Ferrera; EEG shows metabolic encephalopathy. Neurological insult is likely multifactorial-prognosis still unknown.  4/6- pt's mentation has improved she now has her eyes open spontaneously, following commands. Creatinine has improved following dialysis down to 3.7 from 8.9. MRI Brain was negative for infarct or bleed  4/7- acidosis and volume status improved with dialysis. Reviewed nephrology note most likely patient will no longer need HD creatinine stable at roughly 3.4 for 3 days. Mentation continues to improve patient talking (silently, no passy russell valve available yet) and writing down questions.  4/8 passed barium swallow. Ok for regular diet  4/9 +cough, n/v after eating breakfast. Physical/occupational therapy consulted, recommending rehab. On 2L. Using PMV and speaking. RT allowing rest from use and suctioning.  4/10 +cough, dyspnea. Mildly elevated bp, not able to take lisinopril. Hydralazine added  4/11 appears more comfortable; breakfast brought closer to patient's side. Plans for suture removal around trach. Awaiting placement, rehab     Patient has been accepted to rehab. Speech evaluated for concerns of dysphagia. Patient will benefit from continued speech treatment.      Patient seen and evaluated by me. Patient was determined to be suitable for  d/c. Patient deemed stable for discharge to rehab.     IR removed femoral HD cath       The following portions of the patient's history were reviewed and updated as appropriate:   She  has a past medical history of Acidosis, metabolic, with respiratory acidosis (3/31/2022), Asthma, Back pain, Cataract, CKD (chronic kidney disease) stage 3, GFR 30-59 ml/min (12/20/2020), Diabetes mellitus, Fibromyalgia, Gastroesophageal reflux disease, Hypercholesterolemia, Hypertension, Immune deficiency disorder, Obesity, Osteoporosis, Rheumatoid arthritis, Tobacco dependence, Trouble in sleeping, and Type 2 diabetes mellitus.  She does not have any pertinent problems on file.  She  has a past surgical history that includes Oophorectomy; Hernia repair; Colonoscopy (N/A, 2/14/2019); Tracheostomy (N/A, 3/30/2022); and Bronchoscopy (N/A, 3/30/2022).  Her family history includes Breast cancer in her sister; Cancer in her father; Colon cancer in her father; Hypertension in her mother.  She  reports that she quit smoking about 4 weeks ago. Her smoking use included cigarettes. She has a 12.50 pack-year smoking history. She has never used smokeless tobacco. She reports that she does not drink alcohol and does not use drugs.  She has a current medication list which includes the following prescription(s): acetaminophen, albuterol, blood sugar diagnostic, blood-glucose meter, chest congestion relief dm, cholecalciferol (vitamin d3), fish oil-omega-3 fatty acids, hydroxychloroquine, lancets, magtab, multivitamin, potassium chloride sa, rosuvastatin, epinephrine, hydralazine, [DISCONTINUED] amlodipine, [DISCONTINUED] hydrochlorothiazide, and [DISCONTINUED] metformin.  Current Outpatient Medications on File Prior to Visit   Medication Sig Dispense Refill    acetaminophen (TYLENOL) 500 MG tablet Take 500 mg by mouth as needed for Pain.      albuterol (PROVENTIL/VENTOLIN HFA) 90 mcg/actuation inhaler INHALE 2 PUFFS INTO THE LUNGS EVERY 6 HOURS  AS NEEDED FOR WHEEZING. DISPENSE WITH SPACER. 18 g 0    blood sugar diagnostic Strp To check BG 1 times daily, to use with insurance preferred meter 100 strip 11    blood-glucose meter kit To check BG 1 times daily, to use with insurance preferred meter 1 each 0    CHEST CONGESTION RELIEF DM  mg/5 mL Liqd Take 10 mLs by mouth every 6 (six) hours as needed.      cholecalciferol, vitamin D3, 2,000 unit Tab Take 2,000 Units by mouth once daily.      fish oil-omega-3 fatty acids 300-1,000 mg capsule Take 2 g by mouth once daily.      hydrOXYchloroQUINE (PLAQUENIL) 200 mg tablet Take 1 tablet by mouth before meals, at bedtime and at 0200.      lancets Misc To check BG 1 times daily, to use with insurance preferred meter 100 each 11    MAGTAB 84 mg TbSR Take 84 mg by mouth once daily at 6am.      multivitamin (THERAGRAN) per tablet Take 1 tablet by mouth once daily.      potassium chloride SA (K-DUR,KLOR-CON) 20 MEQ tablet Take 20 mEq by mouth once daily.      rosuvastatin (CRESTOR) 40 MG Tab Take 1 tablet (40 mg total) by mouth every evening. 30 tablet 11    EPINEPHrine (EPIPEN 2-DANA) 0.3 mg/0.3 mL AtIn Inject 0.3 mLs (0.3 mg total) into the muscle once. for 1 dose 1 each 11    hydrALAZINE (APRESOLINE) 25 MG tablet Take 1 tablet (25 mg total) by mouth every 8 (eight) hours. 90 tablet 0    [DISCONTINUED] amLODIPine (NORVASC) 10 MG tablet TAKE 1 TABLET(10 MG) BY MOUTH EVERY DAY 90 tablet 3    [DISCONTINUED] hydroCHLOROthiazide (HYDRODIURIL) 25 MG tablet TAKE 1 TABLET(25 MG) BY MOUTH EVERY DAY 90 tablet 3    [DISCONTINUED] metFORMIN (GLUCOPHAGE) 500 MG tablet TAKE 1 TABLET(500 MG) BY MOUTH EVERY DAY 90 tablet 3     No current facility-administered medications on file prior to visit.     She is allergic to lisinopril..      Review of Systems   Constitutional: Negative.    HENT: Positive for voice change.    Eyes: Negative.    Respiratory: Negative for cough, sputum production, choking, chest tightness,  wheezing, dyspnea on extertion and somnolence.    Genitourinary: Negative.    Endocrine: endocrine negative   Musculoskeletal: Negative.    Skin: Negative.    Gastrointestinal: Negative.    Neurological: Negative.    Psychiatric/Behavioral: Negative.        Outpatient Encounter Medications as of 6/2/2022   Medication Sig Dispense Refill    acetaminophen (TYLENOL) 500 MG tablet Take 500 mg by mouth as needed for Pain.      albuterol (PROVENTIL/VENTOLIN HFA) 90 mcg/actuation inhaler INHALE 2 PUFFS INTO THE LUNGS EVERY 6 HOURS AS NEEDED FOR WHEEZING. DISPENSE WITH SPACER. 18 g 0    blood sugar diagnostic Strp To check BG 1 times daily, to use with insurance preferred meter 100 strip 11    blood-glucose meter kit To check BG 1 times daily, to use with insurance preferred meter 1 each 0    CHEST CONGESTION RELIEF DM  mg/5 mL Liqd Take 10 mLs by mouth every 6 (six) hours as needed.      cholecalciferol, vitamin D3, 2,000 unit Tab Take 2,000 Units by mouth once daily.      fish oil-omega-3 fatty acids 300-1,000 mg capsule Take 2 g by mouth once daily.      hydrOXYchloroQUINE (PLAQUENIL) 200 mg tablet Take 1 tablet by mouth before meals, at bedtime and at 0200.      lancets Misc To check BG 1 times daily, to use with insurance preferred meter 100 each 11    MAGTAB 84 mg TbSR Take 84 mg by mouth once daily at 6am.      multivitamin (THERAGRAN) per tablet Take 1 tablet by mouth once daily.      potassium chloride SA (K-DUR,KLOR-CON) 20 MEQ tablet Take 20 mEq by mouth once daily.      rosuvastatin (CRESTOR) 40 MG Tab Take 1 tablet (40 mg total) by mouth every evening. 30 tablet 11    EPINEPHrine (EPIPEN 2-DANA) 0.3 mg/0.3 mL AtIn Inject 0.3 mLs (0.3 mg total) into the muscle once. for 1 dose 1 each 11    hydrALAZINE (APRESOLINE) 25 MG tablet Take 1 tablet (25 mg total) by mouth every 8 (eight) hours. 90 tablet 0    [DISCONTINUED] amLODIPine (NORVASC) 10 MG tablet TAKE 1 TABLET(10 MG) BY MOUTH EVERY DAY 90  "tablet 3    [DISCONTINUED] hydroCHLOROthiazide (HYDRODIURIL) 25 MG tablet TAKE 1 TABLET(25 MG) BY MOUTH EVERY DAY 90 tablet 3    [DISCONTINUED] metFORMIN (GLUCOPHAGE) 500 MG tablet TAKE 1 TABLET(500 MG) BY MOUTH EVERY DAY 90 tablet 3     No facility-administered encounter medications on file as of 6/2/2022.       Objective:     Vital Signs (Most Recent)  Vital Signs  Pulse: 85  Resp: 18  SpO2: (!) 93 %  BP: 126/80  Height and Weight  Height: 5' 4" (162.6 cm)  Weight: 106.2 kg (234 lb 2.1 oz)  BSA (Calculated - sq m): 2.19 sq meters  BMI (Calculated): 40.2  Weight in (lb) to have BMI = 25: 145.3]  Wt Readings from Last 2 Encounters:   06/02/22 106.2 kg (234 lb 2.1 oz)   05/26/22 103.8 kg (228 lb 14.4 oz)       Physical Exam   Constitutional: She is oriented to person, place, and time. She appears well-developed and well-nourished.   HENT:   Head: Normocephalic.   Mouth/Throat: Mallampati Score: II.   Neck: No JVD present.   Cardiovascular: Normal rate and intact distal pulses.   No murmur heard.  Pulmonary/Chest: Normal expansion, effort normal and breath sounds normal.   Abdominal: Soft. Bowel sounds are normal.   Musculoskeletal:         General: No edema. Normal range of motion.      Cervical back: Normal range of motion and neck supple.   Lymphadenopathy:     She has no axillary adenopathy.   Neurological: She is alert and oriented to person, place, and time.   Skin: Skin is warm and dry.   Psychiatric: She has a normal mood and affect.   Nursing note and vitals reviewed.      Laboratory  Lab Results   Component Value Date    WBC 9.40 05/19/2022    RBC 3.61 (L) 05/19/2022    HGB 8.9 (L) 05/19/2022    HCT 29.4 (L) 05/19/2022    MCV 81 (L) 05/19/2022    MCH 24.7 (L) 05/19/2022    MCHC 30.3 (L) 05/19/2022    RDW 20.2 (H) 05/19/2022     05/19/2022    MPV 10.9 05/19/2022    GRAN 5.5 05/19/2022    GRAN 58.1 05/19/2022    LYMPH 2.8 05/19/2022    LYMPH 29.5 05/19/2022    MONO 0.8 05/19/2022    MONO 8.2 " 05/19/2022    EOS 0.3 05/19/2022    BASO 0.04 05/19/2022    EOSINOPHIL 3.3 05/19/2022    BASOPHIL 0.4 05/19/2022       BMP  Lab Results   Component Value Date     05/19/2022    K 4.3 05/19/2022     05/19/2022    CO2 26 05/19/2022    BUN 17 05/19/2022    CREATININE 1.6 (H) 05/19/2022    CALCIUM 9.7 05/19/2022    ANIONGAP 13 05/19/2022    ESTGFRAFRICA 37.4 (A) 05/19/2022    EGFRNONAA 32.4 (A) 05/19/2022    AST 13 05/19/2022    ALT 9 (L) 05/19/2022    PROT 7.2 05/19/2022       Lab Results   Component Value Date    BNP 96 04/06/2022    BNP 13 06/17/2021       Lab Results   Component Value Date    TSH 0.210 (L) 04/06/2022       Lab Results   Component Value Date    SEDRATE 103 (H) 02/21/2022       Lab Results   Component Value Date    CRP 54.6 (H) 03/29/2022     No results found for: IGE     No results found for: ASPERGILLUS  No results found for: AFUMIGATUSCL     No results found for: ACE     Diagnostic Results:  I have personally reviewed today the following studies:    X-Ray Chest PA And Lateral  Narrative: EXAMINATION:  XR CHEST PA AND LATERAL    CLINICAL HISTORY:  Mild persistent asthma, uncomplicated    TECHNIQUE:  PA and lateral views of the chest were performed.    COMPARISON:  04/11/2022    FINDINGS:  There has been interval removal of the tracheostomy tube.  There is some minimal stranding change within the right lower lung zone which may be representative of atelectasis with edema and infiltrate thought less likely.  The heart is enlarged.  There is tortuosity of the descending thoracic aorta.  The right-sided PICC line catheter has been removed in the interval.  Impression: As above    Electronically signed by: Kuldeep Flores DO  Date:    05/19/2022  Time:    10:04     Assessment/Plan:     Problem List Items Addressed This Visit     Acute airway obstruction    Rheumatoid arthritis involving multiple sites with positive rheumatoid factor     On Plaquenil           Mild persistent asthma without  complication - Primary     Asthma ROS:   She is taking medications regularly as instructed, no medication side effects noted, no significant ongoing wheezing or shortness of breath.   ACT score 14  New concerns: None.     Exam: appears well, vitals normal, no respiratory distress, acyanotic, normal RR, chest clear, no wheezing, crepitations, rhonchi, normal symmetric air entry.     Assessment: Asthma   controlled.     Plan: . CONTINUE ALBUTEROL. Orders as documented in EMR.Re evaluate in 3 month            Relevant Orders    Spirometry with/without bronchodilator    X-Ray Chest PA And Lateral    Stress test, pulmonary    PULSE OXIMETRY OVERNIGHT    Fraction of  Nitric Oxide    IgE    Diabetes mellitus type 2 with complications     monitor           Essential hypertension     Stable on hydralazine           Hyperlipidemia associated with type 2 diabetes mellitus     On crestor           Cigarette smoker     Has quit smoking since hospitalisation           Class 3 severe obesity due to excess calories with serious comorbidity and body mass index (BMI) of 40.0 to 44.9 in adult     General weight loss/lifestyle modification strategies discussed (elicit support from others; identify saboteurs; non-food rewards).  Diet interventions: low calorie (1000 kCal/d) deficit diet            Aortic atherosclerosis     monitor           Tracheostomy in place     Hx Emergent Cricothyrotomy  SP Decanulation             Relevant Orders    Spirometry with/without bronchodilator    X-Ray Chest PA And Lateral    Stress test, pulmonary    PULSE OXIMETRY OVERNIGHT    Ambulatory referral/consult to ENT    Dysphonia     See ENT for vocal cord exam           Relevant Orders    Ambulatory referral/consult to ENT          Follow up in about 3 months (around 2022), or ref ENT, ONSAT, PFT, 6MWD,CXR, FeNO, IGE.    This note was prepared using voice recognition system and is likely to have sound alike errors that may have been overlooked  even after proof reading.  Please call me with any questions    Discussed diagnosis, its evaluation, treatment and usual course. All questions answered.      Franklyn Cha MD

## 2022-06-02 ENCOUNTER — OFFICE VISIT (OUTPATIENT)
Dept: SLEEP MEDICINE | Facility: CLINIC | Age: 72
End: 2022-06-02
Payer: MEDICARE

## 2022-06-02 ENCOUNTER — LAB VISIT (OUTPATIENT)
Dept: LAB | Facility: HOSPITAL | Age: 72
End: 2022-06-02
Attending: INTERNAL MEDICINE
Payer: MEDICARE

## 2022-06-02 VITALS
HEART RATE: 85 BPM | DIASTOLIC BLOOD PRESSURE: 80 MMHG | HEIGHT: 64 IN | SYSTOLIC BLOOD PRESSURE: 126 MMHG | OXYGEN SATURATION: 93 % | BODY MASS INDEX: 39.97 KG/M2 | RESPIRATION RATE: 18 BRPM | WEIGHT: 234.13 LBS

## 2022-06-02 DIAGNOSIS — E66.01 CLASS 3 SEVERE OBESITY DUE TO EXCESS CALORIES WITH SERIOUS COMORBIDITY AND BODY MASS INDEX (BMI) OF 40.0 TO 44.9 IN ADULT: ICD-10-CM

## 2022-06-02 DIAGNOSIS — J45.30 MILD PERSISTENT ASTHMA WITHOUT COMPLICATION: ICD-10-CM

## 2022-06-02 DIAGNOSIS — I70.0 AORTIC ATHEROSCLEROSIS: ICD-10-CM

## 2022-06-02 DIAGNOSIS — E78.5 HYPERLIPIDEMIA ASSOCIATED WITH TYPE 2 DIABETES MELLITUS: ICD-10-CM

## 2022-06-02 DIAGNOSIS — M05.79 RHEUMATOID ARTHRITIS INVOLVING MULTIPLE SITES WITH POSITIVE RHEUMATOID FACTOR: ICD-10-CM

## 2022-06-02 DIAGNOSIS — R49.0 DYSPHONIA: ICD-10-CM

## 2022-06-02 DIAGNOSIS — F17.210 CIGARETTE SMOKER: ICD-10-CM

## 2022-06-02 DIAGNOSIS — Z93.0 TRACHEOSTOMY IN PLACE: ICD-10-CM

## 2022-06-02 DIAGNOSIS — J45.30 MILD PERSISTENT ASTHMA WITHOUT COMPLICATION: Primary | ICD-10-CM

## 2022-06-02 DIAGNOSIS — E11.69 HYPERLIPIDEMIA ASSOCIATED WITH TYPE 2 DIABETES MELLITUS: ICD-10-CM

## 2022-06-02 DIAGNOSIS — J98.8 ACUTE AIRWAY OBSTRUCTION: ICD-10-CM

## 2022-06-02 DIAGNOSIS — E11.8 DIABETES MELLITUS TYPE 2 WITH COMPLICATIONS: ICD-10-CM

## 2022-06-02 DIAGNOSIS — I10 ESSENTIAL HYPERTENSION: ICD-10-CM

## 2022-06-02 LAB — IGE SERPL-ACNC: 752 IU/ML (ref 0–100)

## 2022-06-02 PROCEDURE — 99215 OFFICE O/P EST HI 40 MIN: CPT | Mod: PBBFAC | Performed by: INTERNAL MEDICINE

## 2022-06-02 PROCEDURE — 99999 PR PBB SHADOW E&M-EST. PATIENT-LVL V: CPT | Mod: PBBFAC,,, | Performed by: INTERNAL MEDICINE

## 2022-06-02 PROCEDURE — 99214 PR OFFICE/OUTPT VISIT, EST, LEVL IV, 30-39 MIN: ICD-10-PCS | Mod: S$PBB,,, | Performed by: INTERNAL MEDICINE

## 2022-06-02 PROCEDURE — 99999 PR PBB SHADOW E&M-EST. PATIENT-LVL V: ICD-10-PCS | Mod: PBBFAC,,, | Performed by: INTERNAL MEDICINE

## 2022-06-02 PROCEDURE — 36415 COLL VENOUS BLD VENIPUNCTURE: CPT | Performed by: INTERNAL MEDICINE

## 2022-06-02 PROCEDURE — 82785 ASSAY OF IGE: CPT | Performed by: INTERNAL MEDICINE

## 2022-06-02 PROCEDURE — 99214 OFFICE O/P EST MOD 30 MIN: CPT | Mod: S$PBB,,, | Performed by: INTERNAL MEDICINE

## 2022-06-02 NOTE — ASSESSMENT & PLAN NOTE
Asthma ROS:   She is taking medications regularly as instructed, no medication side effects noted, no significant ongoing wheezing or shortness of breath.   ACT score 14  New concerns: None.     Exam: appears well, vitals normal, no respiratory distress, acyanotic, normal RR, chest clear, no wheezing, crepitations, rhonchi, normal symmetric air entry.     Assessment: Asthma   controlled.     Plan: . CONTINUE ALBUTEROL. Orders as documented in EMR.Re evaluate in 3 month

## 2022-06-03 ENCOUNTER — HOSPITAL ENCOUNTER (OUTPATIENT)
Dept: CARDIOLOGY | Facility: HOSPITAL | Age: 72
Discharge: HOME | End: 2022-06-03
Attending: INTERNAL MEDICINE
Payer: MEDICARE

## 2022-06-03 ENCOUNTER — OFFICE VISIT (OUTPATIENT)
Dept: OTOLARYNGOLOGY | Facility: CLINIC | Age: 72
End: 2022-06-03
Payer: MEDICARE

## 2022-06-03 VITALS — BODY MASS INDEX: 39.78 KG/M2 | HEIGHT: 64 IN | WEIGHT: 233 LBS

## 2022-06-03 VITALS — WEIGHT: 233.44 LBS | TEMPERATURE: 98 F | BODY MASS INDEX: 40.07 KG/M2

## 2022-06-03 DIAGNOSIS — R49.0 DYSPHONIA: ICD-10-CM

## 2022-06-03 DIAGNOSIS — J38.6 SGS (SUBGLOTTIC STENOSIS): Primary | ICD-10-CM

## 2022-06-03 DIAGNOSIS — I47.29 NONSUSTAINED VENTRICULAR TACHYCARDIA: ICD-10-CM

## 2022-06-03 DIAGNOSIS — Z93.0 TRACHEOSTOMY IN PLACE: ICD-10-CM

## 2022-06-03 LAB
AV INDEX (PROSTH): 0.72
AV MEAN GRADIENT: 6 MMHG
AV PEAK GRADIENT: 14 MMHG
AV VALVE AREA: 2.3 CM2
AV VELOCITY RATIO: 0.71
BSA FOR ECHO PROCEDURE: 2.18 M2
CV ECHO LV RWT: 0.33 CM
DOP CALC AO PEAK VEL: 1.84 M/S
DOP CALC AO VTI: 43.6 CM
DOP CALC LVOT AREA: 3.2 CM2
DOP CALC LVOT DIAMETER: 2.01 CM
DOP CALC LVOT PEAK VEL: 1.3 M/S
DOP CALC LVOT STROKE VOLUME: 100.22 CM3
DOP CALC RVOT PEAK VEL: 0.95 M/S
DOP CALC RVOT VTI: 24.4 CM
DOP CALCLVOT PEAK VEL VTI: 31.6 CM
E WAVE DECELERATION TIME: 293.43 MSEC
E/A RATIO: 0.8
E/E' RATIO: 11.11 M/S
ECHO LV POSTERIOR WALL: 0.83 CM (ref 0.6–1.1)
EJECTION FRACTION: 60 %
FRACTIONAL SHORTENING: 39 % (ref 28–44)
INTERVENTRICULAR SEPTUM: 0.95 CM (ref 0.6–1.1)
IVRT: 94.2 MSEC
LA MAJOR: 4.78 CM
LA MINOR: 6.01 CM
LA WIDTH: 4 CM
LEFT ATRIUM SIZE: 4.65 CM
LEFT ATRIUM VOLUME INDEX MOD: 26.7 ML/M2
LEFT ATRIUM VOLUME INDEX: 40.3 ML/M2
LEFT ATRIUM VOLUME MOD: 55.73 CM3
LEFT ATRIUM VOLUME: 84.19 CM3
LEFT INTERNAL DIMENSION IN SYSTOLE: 3.06 CM (ref 2.1–4)
LEFT VENTRICLE DIASTOLIC VOLUME INDEX: 56.97 ML/M2
LEFT VENTRICLE DIASTOLIC VOLUME: 119.06 ML
LEFT VENTRICLE MASS INDEX: 75 G/M2
LEFT VENTRICLE SYSTOLIC VOLUME INDEX: 17.6 ML/M2
LEFT VENTRICLE SYSTOLIC VOLUME: 36.82 ML
LEFT VENTRICULAR INTERNAL DIMENSION IN DIASTOLE: 5.01 CM (ref 3.5–6)
LEFT VENTRICULAR MASS: 156.43 G
LV LATERAL E/E' RATIO: 11.11 M/S
LV SEPTAL E/E' RATIO: 11.11 M/S
LVOT MG: 3.46 MMHG
LVOT MV: 0.88 CM/S
MV PEAK A VEL: 1.25 M/S
MV PEAK E VEL: 1 M/S
PISA TR MAX VEL: 2.64 M/S
PULM VEIN S/D RATIO: 1.59
PV MEAN GRADIENT: 2.14 MMHG
PV PEAK D VEL: 0.65 M/S
PV PEAK S VEL: 1.03 M/S
PV PEAK VELOCITY: 1.54 CM/S
RA MAJOR: 4.43 CM
RA PRESSURE: 8 MMHG
RA WIDTH: 3 CM
RIGHT VENTRICULAR END-DIASTOLIC DIMENSION: 3.4 CM
SINUS: 2.68 CM
STJ: 2.34 CM
TDI LATERAL: 0.09 M/S
TDI SEPTAL: 0.09 M/S
TDI: 0.09 M/S
TR MAX PG: 28 MMHG
TRICUSPID ANNULAR PLANE SYSTOLIC EXCURSION: 2.7 CM
TV REST PULMONARY ARTERY PRESSURE: 36 MMHG

## 2022-06-03 PROCEDURE — 99999 PR PBB SHADOW E&M-EST. PATIENT-LVL V: ICD-10-PCS | Mod: PBBFAC,,, | Performed by: STUDENT IN AN ORGANIZED HEALTH CARE EDUCATION/TRAINING PROGRAM

## 2022-06-03 PROCEDURE — 31575 DIAGNOSTIC LARYNGOSCOPY: CPT | Mod: S$PBB,,, | Performed by: STUDENT IN AN ORGANIZED HEALTH CARE EDUCATION/TRAINING PROGRAM

## 2022-06-03 PROCEDURE — 99204 OFFICE O/P NEW MOD 45 MIN: CPT | Mod: 25,S$PBB,, | Performed by: STUDENT IN AN ORGANIZED HEALTH CARE EDUCATION/TRAINING PROGRAM

## 2022-06-03 PROCEDURE — 99215 OFFICE O/P EST HI 40 MIN: CPT | Mod: PBBFAC,25 | Performed by: STUDENT IN AN ORGANIZED HEALTH CARE EDUCATION/TRAINING PROGRAM

## 2022-06-03 PROCEDURE — 99204 PR OFFICE/OUTPT VISIT, NEW, LEVL IV, 45-59 MIN: ICD-10-PCS | Mod: 25,S$PBB,, | Performed by: STUDENT IN AN ORGANIZED HEALTH CARE EDUCATION/TRAINING PROGRAM

## 2022-06-03 PROCEDURE — 93306 TTE W/DOPPLER COMPLETE: CPT | Mod: 26,,, | Performed by: INTERNAL MEDICINE

## 2022-06-03 PROCEDURE — 93306 ECHO (CUPID ONLY): ICD-10-PCS | Mod: 26,,, | Performed by: INTERNAL MEDICINE

## 2022-06-03 PROCEDURE — 93306 TTE W/DOPPLER COMPLETE: CPT

## 2022-06-03 PROCEDURE — 99999 PR PBB SHADOW E&M-EST. PATIENT-LVL V: CPT | Mod: PBBFAC,,, | Performed by: STUDENT IN AN ORGANIZED HEALTH CARE EDUCATION/TRAINING PROGRAM

## 2022-06-03 PROCEDURE — 31575 PR LARYNGOSCOPY, FLEXIBLE; DIAGNOSTIC: ICD-10-PCS | Mod: S$PBB,,, | Performed by: STUDENT IN AN ORGANIZED HEALTH CARE EDUCATION/TRAINING PROGRAM

## 2022-06-03 PROCEDURE — 31575 DIAGNOSTIC LARYNGOSCOPY: CPT | Mod: PBBFAC | Performed by: STUDENT IN AN ORGANIZED HEALTH CARE EDUCATION/TRAINING PROGRAM

## 2022-06-03 NOTE — PROGRESS NOTES
Chief complaint:  No chief complaint on file.          Referring Provider:  Franklyn Cha [place]  [address] [facility] [place] [address]      History of present illness:     Ms. Grissom is a 71 y.o. presenting for evaluation of hoarseness.     She  has been referred by Dr. Cha.        History angioedema requiring cricothyrotomy, then formalization of trach on 3/30, now decannulated. Here with post op breathing/voice concerns.     Since discharge, she endorses noisy breathing (particulary when she breaths in), worse with exertion. Not having significant trouble breathing walking. Voice is stable to her, but having to pause more frequently.     Continues to have some productive cough.    History      Past Medical History:   Past Medical History:   Diagnosis Date    Acidosis, metabolic, with respiratory acidosis 3/31/2022    Asthma     Back pain     Cataract     OD    CKD (chronic kidney disease) stage 3, GFR 30-59 ml/min 12/20/2020    Diabetes mellitus     Fibromyalgia     Gastroesophageal reflux disease     Hypercholesterolemia     Hypertension     Immune deficiency disorder     Obesity     Osteoporosis     Rheumatoid arthritis     Tobacco dependence     Trouble in sleeping     Type 2 diabetes mellitus          Past Surgical History:  Past Surgical History:   Procedure Laterality Date    BRONCHOSCOPY N/A 3/30/2022    Procedure: Bronchoscopy;  Surgeon: Isak Yadav MD;  Location: [facility] OR;  Service: General;  Laterality: N/A;    COLONOSCOPY N/A 2/14/2019    Procedure: COLONOSCOPY;  Surgeon: Yury Atwood MD;  Location: [facility] ENDO;  Service: Endoscopy;  Laterality: N/A;    HERNIA REPAIR      OOPHORECTOMY      TRACHEOSTOMY N/A 3/30/2022    Procedure: CREATION, TRACHEOSTOMY;  Surgeon: Isak Yadav MD;  Location: [facility] OR;  Service: General;  Laterality: N/A;         Medications: Medication list reviewed. She  has a current medication list which includes the following  prescription(s): acetaminophen, albuterol, blood sugar diagnostic, blood-glucose meter, chest congestion relief dm, cholecalciferol (vitamin d3), epinephrine, fish oil-omega-3 fatty acids, hydralazine, hydroxychloroquine, lancets, magtab, multivitamin, potassium chloride sa, rosuvastatin, [DISCONTINUED] amlodipine, [DISCONTINUED] hydrochlorothiazide, and [DISCONTINUED] metformin.     Allergies:   Review of patient's allergies indicates:   Allergen Reactions    Lisinopril Anaphylaxis     Angioedema requiring trach         Family history: family history includes Breast cancer in her sister; Cancer in her father; Colon cancer in her father; Hypertension in her mother.         Social History          Alcohol use:  reports no history of alcohol use.            Tobacco:  reports that she quit smoking about 5 weeks ago. Her smoking use included cigarettes. She has a 12.50 pack-year smoking history. She has never used smokeless tobacco.         Physical Examination      Vitals: There were no vitals taken for this visit.      General: Well developed, well nourished, well hydrated. Intermittent biphasic, mostly inspiratory stridor    Voice: mild dysphonia, no dysarthria      Head/Face: Normocephalic, atraumatic. No scars or lesions. Facial musculature equal.     Eyes: No scleral icterus or conjunctival hemorrhage. EOMI. PERRLA.     Ears:     · Right ear: No gross deformity. EAC is clear of debris and erythema. TM are intact with a pneumatized middle ear. No signs of retraction, fluid or infection.      · Left ear: No gross deformity. EAC is clear of debris and erythema. TM are intact with a pneumatized middle ear. No signs of retraction, fluid or infection.      Nose: No gross deformity or lesions. No purulent discharge. No significant NSD.      Mouth/Oropharynx: Lips without any lesions. No mucosal lesions within the oropharynx. No tonsillar exudate or lesions. Pharyngeal walls symmetrical. Uvula midline. Tongue midline  without lesions.     Neck: Trachea midline. No masses. No thyromegaly or nodules palpated.     Lymphatic: No lymphadenopathy in the neck.     Extremities: No cyanosis. Warm and well-perfused.     Skin: No scars or lesions on face or neck.      Neurologic: Moving all extremities without gross abnormality.CN II-XII grossly intact. House-Brackmann 1/6. No signs of nystagmus.          Data reviewed      Review of records:      I reviewed records from the referring provider's office visits.  These describe the history, workup, and/or treatment of this problem thus far.    Laboratory:      IgE elevated      Imaging:      I have independently reviewed the following imaging with the findings noted below:     XR, 5/19/22  There has been interval removal of the tracheostomy tube.  There is some minimal stranding change within the right lower lung zone which may be representative of atelectasis with edema and infiltrate thought less likely.  The heart is enlarged.  There is tortuosity of the descending thoracic aorta.  The right-sided PICC line catheter has been removed in the interval.         Procedures:    Procedure -Transnasal fiberoptic laryngoscopy     Surgeon: Masoud Lebron M.D. .      Anesthesia: topical 0.05% oxymetazoline with 4% lidocaine      Complications: None.     Description of Procedure: With the patient in the sitting position, topical lidocaine and oxymetazoline was applied to the nose. The scope was passed through the nose. Examination was carried out of the nose, nasopharynx, oropharynx, hypopharynx, and larynx with findings as noted above. Scope was removed. The patient tolerated the procedure well.      Findings: No masses or lesions in the nose, nasopharynx, oropharynx, hypopharynx, or supraglottis or glottis. Subglottic stenosis/A-frame deformity obstructing 70+%. Vocal fold abduction and adduction is intact, slightly reduced bilateral abduction. No pooling of secretions in the piriform sinuses,  penetration, or aspiration.               Assessment/Plan:    Subglottic stenosis/A-frame deformity    Recommend Laryngology evaluation. Due to her inability to travel, we have her meet with Dr. Eugene at [facility]. We discussed possible dilation vs resection depending on findings  Stressed importance of presenting to ED if she develops worsening SOB          Masoud Lebron MD  [facility]   [facility]  [id number] [place] [address].  [place] [address]  P: [phone]  F: [phone]

## 2022-06-07 ENCOUNTER — TELEPHONE (OUTPATIENT)
Dept: OTOLARYNGOLOGY | Facility: CLINIC | Age: 72
End: 2022-06-07
Payer: MEDICARE

## 2022-06-07 DIAGNOSIS — J38.6 SGS (SUBGLOTTIC STENOSIS): Primary | ICD-10-CM

## 2022-06-07 RX ORDER — HYDROXYCHLOROQUINE SULFATE 200 MG/1
200 TABLET, FILM COATED ORAL DAILY
Qty: 90 TABLET | Refills: 1 | Status: SHIPPED | OUTPATIENT
Start: 2022-06-07 | End: 2022-10-06 | Stop reason: SDUPTHER

## 2022-06-07 NOTE — TELEPHONE ENCOUNTER
Spoke with patient, she wishes to cancel the external referral and have placed internally at the main campus ENT. Replaced referral to Dr. Newberry.

## 2022-06-07 NOTE — TELEPHONE ENCOUNTER
----- Message from Lottie Romero sent at 6/7/2022 11:22 AM CDT -----  Contact: pt  The pt request a return call concerning her surgery appt, no additional info given and can be reached at [phone]///[id number]

## 2022-06-09 ENCOUNTER — OFFICE VISIT (OUTPATIENT)
Dept: RHEUMATOLOGY | Facility: CLINIC | Age: 72
End: 2022-06-09
Payer: MEDICARE

## 2022-06-09 ENCOUNTER — LAB VISIT (OUTPATIENT)
Dept: LAB | Facility: HOSPITAL | Age: 72
End: 2022-06-09
Attending: PHYSICIAN ASSISTANT
Payer: MEDICARE

## 2022-06-09 VITALS
DIASTOLIC BLOOD PRESSURE: 67 MMHG | BODY MASS INDEX: 39.85 KG/M2 | SYSTOLIC BLOOD PRESSURE: 130 MMHG | WEIGHT: 233.44 LBS | HEART RATE: 70 BPM | HEIGHT: 64 IN

## 2022-06-09 DIAGNOSIS — M05.79 RHEUMATOID ARTHRITIS INVOLVING MULTIPLE SITES WITH POSITIVE RHEUMATOID FACTOR: ICD-10-CM

## 2022-06-09 DIAGNOSIS — Z79.899 HIGH RISK MEDICATION USE: ICD-10-CM

## 2022-06-09 DIAGNOSIS — M81.0 AGE-RELATED OSTEOPOROSIS WITHOUT CURRENT PATHOLOGICAL FRACTURE: Primary | ICD-10-CM

## 2022-06-09 LAB
ALBUMIN SERPL BCP-MCNC: 3.3 G/DL (ref 3.5–5.2)
ALP SERPL-CCNC: 103 U/L (ref 55–135)
ALT SERPL W/O P-5'-P-CCNC: 10 U/L (ref 10–44)
ANION GAP SERPL CALC-SCNC: 10 MMOL/L (ref 8–16)
AST SERPL-CCNC: 13 U/L (ref 10–40)
BASOPHILS # BLD AUTO: 0.04 K/UL (ref 0–0.2)
BASOPHILS NFR BLD: 0.5 % (ref 0–1.9)
BILIRUB SERPL-MCNC: 0.3 MG/DL (ref 0.1–1)
BUN SERPL-MCNC: 18 MG/DL (ref 8–23)
CALCIUM SERPL-MCNC: 9.3 MG/DL (ref 8.7–10.5)
CHLORIDE SERPL-SCNC: 108 MMOL/L (ref 95–110)
CO2 SERPL-SCNC: 26 MMOL/L (ref 23–29)
CREAT SERPL-MCNC: 1.6 MG/DL (ref 0.5–1.4)
CRP SERPL-MCNC: 21 MG/L (ref 0–8.2)
DIFFERENTIAL METHOD: ABNORMAL
EOSINOPHIL # BLD AUTO: 0.2 K/UL (ref 0–0.5)
EOSINOPHIL NFR BLD: 2.3 % (ref 0–8)
ERYTHROCYTE [DISTWIDTH] IN BLOOD BY AUTOMATED COUNT: 18.3 % (ref 11.5–14.5)
ERYTHROCYTE [SEDIMENTATION RATE] IN BLOOD BY WESTERGREN METHOD: 60 MM/HR (ref 0–36)
EST. GFR  (AFRICAN AMERICAN): 37 ML/MIN/1.73 M^2
EST. GFR  (NON AFRICAN AMERICAN): 32 ML/MIN/1.73 M^2
GLUCOSE SERPL-MCNC: 82 MG/DL (ref 70–110)
HCT VFR BLD AUTO: 31.4 % (ref 37–48.5)
HGB BLD-MCNC: 9.4 G/DL (ref 12–16)
IMM GRANULOCYTES # BLD AUTO: 0.03 K/UL (ref 0–0.04)
IMM GRANULOCYTES NFR BLD AUTO: 0.4 % (ref 0–0.5)
LYMPHOCYTES # BLD AUTO: 2.2 K/UL (ref 1–4.8)
LYMPHOCYTES NFR BLD: 27.3 % (ref 18–48)
MCH RBC QN AUTO: 26 PG (ref 27–31)
MCHC RBC AUTO-ENTMCNC: 29.9 G/DL (ref 32–36)
MCV RBC AUTO: 87 FL (ref 82–98)
MONOCYTES # BLD AUTO: 0.8 K/UL (ref 0.3–1)
MONOCYTES NFR BLD: 9.1 % (ref 4–15)
NEUTROPHILS # BLD AUTO: 5 K/UL (ref 1.8–7.7)
NEUTROPHILS NFR BLD: 60.4 % (ref 38–73)
NRBC BLD-RTO: 0 /100 WBC
PLATELET # BLD AUTO: 295 K/UL (ref 150–450)
PMV BLD AUTO: 10.7 FL (ref 9.2–12.9)
POTASSIUM SERPL-SCNC: 4.4 MMOL/L (ref 3.5–5.1)
PROT SERPL-MCNC: 7 G/DL (ref 6–8.4)
RBC # BLD AUTO: 3.62 M/UL (ref 4–5.4)
SODIUM SERPL-SCNC: 144 MMOL/L (ref 136–145)
WBC # BLD AUTO: 8.22 K/UL (ref 3.9–12.7)

## 2022-06-09 PROCEDURE — 99215 PR OFFICE/OUTPT VISIT, EST, LEVL V, 40-54 MIN: ICD-10-PCS | Mod: S$PBB,,, | Performed by: PHYSICIAN ASSISTANT

## 2022-06-09 PROCEDURE — 99215 OFFICE O/P EST HI 40 MIN: CPT | Mod: S$PBB,,, | Performed by: PHYSICIAN ASSISTANT

## 2022-06-09 PROCEDURE — 99999 PR PBB SHADOW E&M-EST. PATIENT-LVL V: CPT | Mod: PBBFAC,,, | Performed by: PHYSICIAN ASSISTANT

## 2022-06-09 PROCEDURE — 85652 RBC SED RATE AUTOMATED: CPT | Performed by: PHYSICIAN ASSISTANT

## 2022-06-09 PROCEDURE — 85025 COMPLETE CBC W/AUTO DIFF WBC: CPT | Performed by: PHYSICIAN ASSISTANT

## 2022-06-09 PROCEDURE — 99999 PR PBB SHADOW E&M-EST. PATIENT-LVL V: ICD-10-PCS | Mod: PBBFAC,,, | Performed by: PHYSICIAN ASSISTANT

## 2022-06-09 PROCEDURE — 36415 COLL VENOUS BLD VENIPUNCTURE: CPT | Performed by: PHYSICIAN ASSISTANT

## 2022-06-09 PROCEDURE — 99215 OFFICE O/P EST HI 40 MIN: CPT | Mod: PBBFAC | Performed by: PHYSICIAN ASSISTANT

## 2022-06-09 PROCEDURE — 80053 COMPREHEN METABOLIC PANEL: CPT | Performed by: PHYSICIAN ASSISTANT

## 2022-06-09 PROCEDURE — 86140 C-REACTIVE PROTEIN: CPT | Performed by: PHYSICIAN ASSISTANT

## 2022-06-09 ASSESSMENT — ROUTINE ASSESSMENT OF PATIENT INDEX DATA (RAPID3): MDHAQ FUNCTION SCORE: 0.4

## 2022-06-09 NOTE — PROGRESS NOTES
Subjective:      Patient ID: Page Grissom is a 71 y.o. female.    Chief Complaint: Osteoporosis, Rheumatoid Arthritis, and Fibromyalgia      HPI   Page Grissom  is a 71 y.o. female  Who is an established patient in the department, but she is a new patient to my clinic. She has seropositive rheumatoid arthritis as well as osteoporosis.  From a RA perspective she is doing quite well.  She has no complaints of pain.  No tender swollen joints.  She currently is on Plaquenil 200 mg daily.  Previously she has been on methotrexate in the past but was able to wean down and off several years ago.    She denies prolonged morning stiffness.      Also with a history of osteoporosis.  In 2017 she was placed on Fosamax.  However she had problems remembering taking it.  At that time she did not want add more oral medications to her regimen.  She moved to IV Reclast 10/09/2018.  She did for little bleed well at that.  She had some mild arthralgias which improved within about 2 days.  No falls or fracture since last visit.  She is on vitamin-D supplementation over-the-counter.  Repeat DEXA scan in 10/10/2019 showed improvement.  At that time Reclast was discontinued.  She had another bone density scan done this year.  It showed progression of osteoporosis.  The decision was made to move her back to Reclast.  She had 1 dose on 03/04/2022.  States it did not go well.  She has a very hard stick and had a stick her 3 or 4 times.  Additionally she just overall felt crummy for about 3 or 4 days following the infusion.  Wasn't able to function due to s/e      She has CKD.  She prefers to use ibuprofen as Tylenol arthritis not work for her.  She has been counseled in the past to avoid NSAIDs.    Patient denies fevers, chills, photophobia, eye pain, shortness of breath, chest pain, hematuria, blood in the stool, rash, sicca symptoms, raynauds, finger ulcerations.  Rheumatologic systems otherwise negative.    MHAQ:  0.4  Serologies/Labs:  · (+) RF, CCP  · Neg NAYELY  Current Treatment:  · Plaquenil 200mg daily  · Reclast 3/4/22  Previous Treatment:   · Fosamax - compliance issues  · MTX - weaned down and off x 2 yrs      Current Outpatient Medications:     albuterol (PROVENTIL/VENTOLIN HFA) 90 mcg/actuation inhaler, INHALE 2 PUFFS INTO THE LUNGS EVERY 6 HOURS AS NEEDED FOR WHEEZING. DISPENSE WITH SPACER., Disp: 18 g, Rfl: 0    blood sugar diagnostic Strp, To check BG 1 times daily, to use with insurance preferred meter, Disp: 100 strip, Rfl: 11    blood-glucose meter kit, To check BG 1 times daily, to use with insurance preferred meter, Disp: 1 each, Rfl: 0    CHEST CONGESTION RELIEF DM  mg/5 mL Liqd, Take 10 mLs by mouth every 6 (six) hours as needed., Disp: , Rfl:     cholecalciferol, vitamin D3, 2,000 unit Tab, Take 2,000 Units by mouth once daily., Disp: , Rfl:     EPINEPHrine (EPIPEN 2-DANA) 0.3 mg/0.3 mL AtIn, Inject 0.3 mLs (0.3 mg total) into the muscle once. for 1 dose, Disp: 1 each, Rfl: 11    fish oil-omega-3 fatty acids 300-1,000 mg capsule, Take 2 g by mouth once daily., Disp: , Rfl:     hydrALAZINE (APRESOLINE) 25 MG tablet, Take 1 tablet (25 mg total) by mouth every 8 (eight) hours., Disp: 90 tablet, Rfl: 0    hydrOXYchloroQUINE (PLAQUENIL) 200 mg tablet, Take 1 tablet (200 mg total) by mouth once daily., Disp: 90 tablet, Rfl: 1    lancets Misc, To check BG 1 times daily, to use with insurance preferred meter, Disp: 100 each, Rfl: 11    MAGTAB 84 mg TbSR, Take 84 mg by mouth once daily at 6am., Disp: , Rfl:     multivitamin (THERAGRAN) per tablet, Take 1 tablet by mouth once daily., Disp: , Rfl:     potassium chloride SA (K-DUR,KLOR-CON) 20 MEQ tablet, Take 20 mEq by mouth once daily., Disp: , Rfl:     rosuvastatin (CRESTOR) 40 MG Tab, Take 1 tablet (40 mg total) by mouth every evening., Disp: 30 tablet, Rfl: 11    acetaminophen (TYLENOL) 500 MG tablet, Take 500 mg by mouth as needed for Pain.,  "Disp: , Rfl:     Past Medical History:   Diagnosis Date    Acidosis, metabolic, with respiratory acidosis 3/31/2022    Asthma     Back pain     Cataract     OD    CKD (chronic kidney disease) stage 3, GFR 30-59 ml/min 2020    Diabetes mellitus     Fibromyalgia     Gastroesophageal reflux disease     Hypercholesterolemia     Hypertension     Immune deficiency disorder     Obesity     Osteoporosis     Rheumatoid arthritis     Tobacco dependence     Trouble in sleeping     Type 2 diabetes mellitus      Family History   Problem Relation Age of Onset    Hypertension Mother     Cancer Father     Colon cancer Father     Breast cancer Sister      Social History     Socioeconomic History    Marital status:    Tobacco Use    Smoking status: Former Smoker     Packs/day: 0.50     Years: 25.00     Pack years: 12.50     Types: Cigarettes     Quit date: 2022     Years since quittin.1    Smokeless tobacco: Never Used   Substance and Sexual Activity    Alcohol use: No    Drug use: No   Social History Narrative    1 dog, no smokers in household; No HH as of 2022, has daytime sitter 2022.     Review of patient's allergies indicates:   Allergen Reactions    Lisinopril Anaphylaxis     Angioedema requiring trach       Objective:   /67 (BP Location: Right arm, Patient Position: Sitting, BP Method: Medium (Automatic))   Pulse 70   Ht 5' 4" (1.626 m)   Wt 105.9 kg (233 lb 7.5 oz)   BMI 40.07 kg/m²   Immunization History   Administered Date(s) Administered    COVID-19, MRNA, LN-S, PF (Pfizer) (Purple Cap) 2021, 2021    Influenza 10/10/2006, 2007, 10/08/2009, 12/15/2010, 2011, 10/16/2012    Influenza (FLUAD) - Quadrivalent - Adjuvanted - PF *Preferred* (65+) 12/10/2020, 2022    Influenza - High Dose - PF (65 years and older) 2017, 2018, 2019    Influenza - Quadrivalent 10/30/2014    Influenza - Trivalent (ADULT) 10/08/2013 "    Influenza Split 10/10/2006, 12/19/2007, 10/08/2009, 12/15/2010, 11/09/2011, 10/16/2012, 10/08/2013    PPD Test 04/05/2010, 04/07/2010    Pneumococcal Conjugate - 13 Valent 04/30/2015    Pneumococcal Polysaccharide - 23 Valent 02/12/2007, 12/13/2018    Tdap 10/16/2012    Zoster 04/30/2015       Physical Exam   Constitutional: She is oriented to person, place, and time. No distress.   HENT:   Head: Normocephalic and atraumatic.   Pulmonary/Chest: Effort normal.   Abdominal: She exhibits no distension.   Musculoskeletal:         General: No swelling or tenderness. Normal range of motion.      Cervical back: Normal range of motion.      Right knee: No effusion.      Left knee: No effusion.   Lymphadenopathy:     She has no cervical adenopathy.   Neurological: She is alert and oriented to person, place, and time.   Skin: Skin is warm and dry. No rash noted. No pallor.   Psychiatric: Mood normal.   Nursing note and vitals reviewed.      Right Side Rheumatological Exam     Examination finds the 1st PIP, 1st MCP, 2nd PIP, 2nd MCP, 3rd PIP, 3rd MCP, 4th PIP, 4th MCP, 5th PIP and 5th MCP normal.    Shoulder Exam   Tenderness Location: no tenderness    Range of Motion   The patient has normal right shoulder ROM.    Knee Exam   Patellofemoral Crepitus: negative  Effusion: negative  Warmth: negative    Elbow/Wrist Exam   Tenderness Location: no elbow tenderness and no wrist tenderness    Range of Motion   Elbow   The patient has normal right elbow ROM.    Range of Motion   Wrist   The patient has normal right wrist ROM.    Left Side Rheumatological Exam     Examination finds the 1st PIP, 1st MCP, 2nd PIP, 2nd MCP, 3rd PIP, 3rd MCP, 4th PIP, 4th MCP, 5th PIP and 5th MCP normal.    Shoulder Exam   Tenderness Location: no tenderness    Range of Motion   The patient has normal left shoulder ROM.    Knee Exam     Patellofemoral Crepitus: negative  Effusion: negative  Warmth: negative    Elbow/Wrist Exam   Tenderness  Location: no elbow tenderness and no wrist tenderness    Range of Motion   Elbow   The patient has normal left elbow ROM.    Range of Motion   Wrist   The patient has normal left wrist ROM.         no active synovitis   100% full fist formation bilaterally   negative squeeze test   multiple tender points on exam      Recent Results (from the past 672 hour(s))   Microalbumin/Creatinine Ratio, Urine    Collection Time: 05/19/22  9:54 AM   Result Value Ref Range    Microalbumin, Urine 31.0 ug/mL    Creatinine, Urine 50.0 15.0 - 325.0 mg/dL    Microalb/Creat Ratio 62.0 (H) 0.0 - 30.0 ug/mg   Protein/Creatinine Ratio, Urine    Collection Time: 05/19/22  9:54 AM   Result Value Ref Range    Protein, Urine Random 18 (H) 0 - 15 mg/dL    Creatinine, Urine 50.0 15.0 - 325.0 mg/dL    Prot/Creat Ratio, Urine 0.36 (H) 0.00 - 0.20   CBC Auto Differential    Collection Time: 05/19/22 10:00 AM   Result Value Ref Range    WBC 9.40 3.90 - 12.70 K/uL    RBC 3.61 (L) 4.00 - 5.40 M/uL    Hemoglobin 8.9 (L) 12.0 - 16.0 g/dL    Hematocrit 29.4 (L) 37.0 - 48.5 %    MCV 81 (L) 82 - 98 fL    MCH 24.7 (L) 27.0 - 31.0 pg    MCHC 30.3 (L) 32.0 - 36.0 g/dL    RDW 20.2 (H) 11.5 - 14.5 %    Platelets 278 150 - 450 K/uL    MPV 10.9 9.2 - 12.9 fL    Immature Granulocytes 0.5 0.0 - 0.5 %    Gran # (ANC) 5.5 1.8 - 7.7 K/uL    Immature Grans (Abs) 0.05 (H) 0.00 - 0.04 K/uL    Lymph # 2.8 1.0 - 4.8 K/uL    Mono # 0.8 0.3 - 1.0 K/uL    Eos # 0.3 0.0 - 0.5 K/uL    Baso # 0.04 0.00 - 0.20 K/uL    nRBC 0 0 /100 WBC    Gran % 58.1 38.0 - 73.0 %    Lymph % 29.5 18.0 - 48.0 %    Mono % 8.2 4.0 - 15.0 %    Eosinophil % 3.3 0.0 - 8.0 %    Basophil % 0.4 0.0 - 1.9 %    Differential Method Automated    Hemoglobin A1C    Collection Time: 05/19/22 10:00 AM   Result Value Ref Range    Hemoglobin A1C 5.3 4.0 - 5.6 %    Estimated Avg Glucose 105 68 - 131 mg/dL   Lipid Panel    Collection Time: 05/19/22 10:00 AM   Result Value Ref Range    Cholesterol 166 120 - 199 mg/dL     Triglycerides 109 30 - 150 mg/dL    HDL 61 40 - 75 mg/dL    LDL Cholesterol 83.2 63.0 - 159.0 mg/dL    HDL/Cholesterol Ratio 36.7 20.0 - 50.0 %    Total Cholesterol/HDL Ratio 2.7 2.0 - 5.0    Non-HDL Cholesterol 105 mg/dL   Comprehensive Metabolic Panel    Collection Time: 05/19/22 10:00 AM   Result Value Ref Range    Sodium 141 136 - 145 mmol/L    Potassium 4.3 3.5 - 5.1 mmol/L    Chloride 102 95 - 110 mmol/L    CO2 26 23 - 29 mmol/L    Glucose 100 70 - 110 mg/dL    BUN 17 8 - 23 mg/dL    Creatinine 1.6 (H) 0.5 - 1.4 mg/dL    Calcium 9.7 8.7 - 10.5 mg/dL    Total Protein 7.2 6.0 - 8.4 g/dL    Albumin 3.3 (L) 3.5 - 5.2 g/dL    Total Bilirubin 0.3 0.1 - 1.0 mg/dL    Alkaline Phosphatase 106 55 - 135 U/L    AST 13 10 - 40 U/L    ALT 9 (L) 10 - 44 U/L    Anion Gap 13 8 - 16 mmol/L    eGFR if African American 37.4 (A) >60 mL/min/1.73 m^2    eGFR if non  32.4 (A) >60 mL/min/1.73 m^2   IgE    Collection Time: 06/02/22 12:06 PM   Result Value Ref Range    IgE 752 (H) 0 - 100 IU/mL   Echo Saline Bubble? No    Collection Time: 06/03/22  1:07 PM   Result Value Ref Range    BSA 2.18 m2    TDI SEPTAL 0.09 m/s    LV LATERAL E/E' RATIO 11.11 m/s    LV SEPTAL E/E' RATIO 11.11 m/s    LA WIDTH 4.00 cm    Left Ventricular Outflow Tract Mean Velocity 0.561492598179411 cm/s    Left Ventricular Outflow Tract Mean Gradient 3.46 mmHg    TDI LATERAL 0.09 m/s    PV PEAK VELOCITY 1.54 cm/s    LVIDd 5.01 3.5 - 6.0 cm    IVS 0.95 0.6 - 1.1 cm    Posterior Wall 0.83 0.6 - 1.1 cm    LVIDs 3.06 2.1 - 4.0 cm    FS 39 28 - 44 %    LA volume 84.19 cm3    Sinus 2.68 cm    STJ 2.34 cm    LV mass 156.43 g    LA size 4.65 cm    RVDD 3.40 cm    TAPSE 2.70 cm    Left Ventricle Relative Wall Thickness 0.33 cm    AV mean gradient 6 mmHg    AV valve area 2.30 cm2    AV Velocity Ratio 0.71     AV index (prosthetic) 0.72     E/A ratio 0.80     Mean e' 0.09 m/s    E wave deceleration time [id number] msec    IVRT [id number]  msec    Pulm vein S/D ratio 1.59     LVOT diameter 2.01 cm    LVOT area 3.2 cm2    LVOT peak chuckie 1.30 m/s    LVOT peak VTI 31.60 cm    Ao peak chuckie 1.84 m/s    Ao VTI 43.6 cm    RVOT peak chuckie 0.95 m/s    RVOT peak VTI 24.4 cm    LVOT stroke volume 100.22 cm3    AV peak gradient 14 mmHg    PV mean gradient 2.14 mmHg    E/E' ratio 11.11 m/s    MV Peak E Chuckie 1.00 m/s    TR Max Chuckie 2.64 m/s    MV Peak A Chuckie 1.25 m/s    PV Peak S Chuckie 1.713010797670141 m/s    PV Peak D Chuckie 0.65 m/s    LV Systolic Volume 36.82 mL    LV Systolic Volume Index 17.6 mL/m2    LV Diastolic Volume 119.06 mL    LV Diastolic Volume Index 56.97 mL/m2    LA Volume Index 40.3 mL/m2    LV Mass Index 75 g/m2    RA Major Axis 4.43 cm    Left Atrium Minor Axis 6.01 cm    Left Atrium Major Axis 4.78 cm    Triscuspid Valve Regurgitation Peak Gradient 28 mmHg    LA Volume Index (Mod) 26.7 mL/m2    LA volume (mod) 55.73 cm3    RA Width 3.00 cm    Right Atrial Pressure (from IVC) 8 mmHg    EF 60 %    TV rest pulmonary artery pressure 36 mmHg         No results found for: TBGOLDPLUS   Lab Results   Component Value Date    HEPAIGM Negative 03/08/2010    HEPBIGM Negative 03/08/2010    HEPBCAB Negative 04/04/2022    HEPCAB Negative 10/23/2014        Imaging  I have personally reviewed images and report of the dexa from 2/21/22.  I agree with the interpretation.  FINDINGS:  The L1 to L4 vertebral bone mineral density is equal to 1.112 g/cm squared with a T score of -0.7.  There has been no significant change relative to the prior study.     The left femoral neck bone mineral density is equal to 0.680 g/cm squared with a T score of -2.6.  There has been  a -12.3% statistically significant change relative to the prior study.     Impression:  Osteoporosis    Assessment:     1. Age-related osteoporosis without current pathological fracture    2. Rheumatoid arthritis involving multiple sites with positive rheumatoid factor    3. High risk medication use            Plan:      Page was seen today for osteoporosis, rheumatoid arthritis and fibromyalgia.    Diagnoses and all orders for this visit:    Age-related osteoporosis without current pathological fracture    Rheumatoid arthritis involving multiple sites with positive rheumatoid factor  -     Ambulatory referral/consult to Rheumatology  -     CBC Auto Differential; Standing  -     Comprehensive Metabolic Panel; Standing  -     Sedimentation rate; Standing  -     C-Reactive Protein; Standing    High risk medication use        · Seropositive rheumatoid arthritis   · CRP improved but still high  · Sxs stable  ·  continue Plaquenil 200 mg daily   · Continue Plaquenil monitoring with Ophthalmology  ·  get updated labs today  · F/u sooner if sxs worsen  · osteoporosis in the setting of CKD  · Creatinine clearance > 35  · DC Reclast due to intolerance - last dose 3/2022  · Plan for Prolia in future  ·  discussed risks and benefits  · DEXA due 2/2024  · Avoid NSAIDs d/t CKD  · Drug therapy requiring intensive monitoring for toxicity  · High Risk Medication Monitoring encounter  · No current medication related issues, no evidence of toxicity  · I ordered labs for toxicity monitoring, have personally reviewed the findings, and discussed them with the patient.  Pending labs will be sent via the portal  · Compromised immune system secondary to autoimmune disease and/or use of immunosuppressive drugs.  Monitor carefully for infections.  Advised patient to get immediate medical care if any infection arises.  Also advised strict adherence age-appropriate vaccinations and cancer screenings with PCP.  · Patient advised to hold DMARD and/or biologic therapy for signs of infection or for surgery. If you are unsure what to do please call our office for instruction.[facility] [phone]  · Return to clinic: 6 mos w Reg 4 labs    The patient understands, chooses and consents to this plan and accepts all   the risks which include  but are not limited to the risks mentioned above.     Disclaimer: This note was prepared using a voice recognition system and is likely to have sound alike errors within the text.

## 2022-06-10 ENCOUNTER — TELEPHONE (OUTPATIENT)
Dept: CARDIOLOGY | Facility: CLINIC | Age: 72
End: 2022-06-10
Payer: MEDICARE

## 2022-06-10 NOTE — TELEPHONE ENCOUNTER
Pt notified and verbalized understanding pb    ----- Message from Jossue Saravia MD sent at 6/9/2022 11:46 PM CDT -----  Reviewed results. All OK. Please open telephone encounter, call patient and inform him/ her of results,then document in encounter.  Please do not route back to me.   Thanks.  Normal cardiac function/structure as expected.  No need for further f/up with me   RTC PRN

## 2022-06-10 NOTE — PROGRESS NOTES
Reviewed results. All OK. Please open telephone encounter, call patient and inform him/ her of results,then document in encounter.  Please do not route back to me.   Thanks.  Normal cardiac function/structure as expected.  No need for further f/up with me   RTC PRN

## 2022-06-12 DIAGNOSIS — J45.31 MILD PERSISTENT ASTHMA WITH ACUTE EXACERBATION: ICD-10-CM

## 2022-06-12 NOTE — TELEPHONE ENCOUNTER
No new care gaps identified.  [facility] Embedded Care Gaps. Reference number: [id number]. 6/12/2022   11:24:11 AM [id number]

## 2022-06-13 RX ORDER — ALBUTEROL SULFATE 90 UG/1
AEROSOL, METERED RESPIRATORY (INHALATION)
Qty: 25.5 G | Refills: 3 | Status: SHIPPED | OUTPATIENT
Start: 2022-06-13 | End: 2022-11-17 | Stop reason: SDUPTHER

## 2022-06-13 NOTE — TELEPHONE ENCOUNTER
Refill Authorization Note   Page Grissom  is requesting a refill authorization.  Brief Assessment and Rationale for Refill:  Approve     Medication Therapy Plan:  Previous order matches    Medication Reconciliation Completed: No   Comments:     No Care Gaps recommended.     Note composed:2:21 PM 06/13/2022

## 2022-06-17 ENCOUNTER — LAB VISIT (OUTPATIENT)
Dept: LAB | Facility: HOSPITAL | Age: 72
End: 2022-06-17
Attending: PEDIATRICS
Payer: MEDICARE

## 2022-06-17 DIAGNOSIS — E11.8 DIABETES MELLITUS TYPE 2 WITH COMPLICATIONS: ICD-10-CM

## 2022-06-17 LAB
ALBUMIN SERPL BCP-MCNC: 3 G/DL (ref 3.5–5.2)
ALP SERPL-CCNC: 87 U/L (ref 55–135)
ALT SERPL W/O P-5'-P-CCNC: <5 U/L (ref 10–44)
ANION GAP SERPL CALC-SCNC: 11 MMOL/L (ref 8–16)
AST SERPL-CCNC: 10 U/L (ref 10–40)
BILIRUB SERPL-MCNC: 0.3 MG/DL (ref 0.1–1)
BUN SERPL-MCNC: 19 MG/DL (ref 8–23)
CALCIUM SERPL-MCNC: 9.3 MG/DL (ref 8.7–10.5)
CHLORIDE SERPL-SCNC: 106 MMOL/L (ref 95–110)
CHOLEST SERPL-MCNC: 182 MG/DL (ref 120–199)
CHOLEST/HDLC SERPL: 3.7 {RATIO} (ref 2–5)
CO2 SERPL-SCNC: 25 MMOL/L (ref 23–29)
CREAT SERPL-MCNC: 1.3 MG/DL (ref 0.5–1.4)
EST. GFR  (AFRICAN AMERICAN): 48 ML/MIN/1.73 M^2
EST. GFR  (NON AFRICAN AMERICAN): 41.7 ML/MIN/1.73 M^2
ESTIMATED AVG GLUCOSE: 103 MG/DL (ref 68–131)
GLUCOSE SERPL-MCNC: 99 MG/DL (ref 70–110)
HBA1C MFR BLD: 5.2 % (ref 4–5.6)
HDLC SERPL-MCNC: 49 MG/DL (ref 40–75)
HDLC SERPL: 26.9 % (ref 20–50)
LDLC SERPL CALC-MCNC: 114.2 MG/DL (ref 63–159)
NONHDLC SERPL-MCNC: 133 MG/DL
POTASSIUM SERPL-SCNC: 3.8 MMOL/L (ref 3.5–5.1)
PROT SERPL-MCNC: 6.5 G/DL (ref 6–8.4)
SODIUM SERPL-SCNC: 142 MMOL/L (ref 136–145)
TRIGL SERPL-MCNC: 94 MG/DL (ref 30–150)

## 2022-06-17 PROCEDURE — 83036 HEMOGLOBIN GLYCOSYLATED A1C: CPT | Performed by: PEDIATRICS

## 2022-06-17 PROCEDURE — 36415 COLL VENOUS BLD VENIPUNCTURE: CPT | Performed by: PEDIATRICS

## 2022-06-17 PROCEDURE — 80061 LIPID PANEL: CPT | Performed by: PEDIATRICS

## 2022-06-17 PROCEDURE — 80053 COMPREHEN METABOLIC PANEL: CPT | Performed by: PEDIATRICS

## 2022-06-20 ENCOUNTER — TELEPHONE (OUTPATIENT)
Dept: SLEEP MEDICINE | Facility: CLINIC | Age: 72
End: 2022-06-20
Payer: MEDICARE

## 2022-06-20 DIAGNOSIS — R76.8 ELEVATED IGE LEVEL: Primary | ICD-10-CM

## 2022-06-20 NOTE — TELEPHONE ENCOUNTER
Orders Placed This Encounter   Procedures    Ambulatory referral/consult to Allergy     Standing Status:   Future     Standing Expiration Date:   7/20/2023     Referral Priority:   Routine     Referral Type:   Allergy Testing     Referral Reason:   Specialty Services Required     Referred to Provider:   Екатерина Martinez MD     Requested Specialty:   Allergy     Number of Visits Requested:   1

## 2022-06-24 ENCOUNTER — OFFICE VISIT (OUTPATIENT)
Dept: INTERNAL MEDICINE | Facility: CLINIC | Age: 72
End: 2022-06-24
Payer: MEDICARE

## 2022-06-24 VITALS
WEIGHT: 239.19 LBS | RESPIRATION RATE: 24 BRPM | BODY MASS INDEX: 40.83 KG/M2 | HEART RATE: 80 BPM | TEMPERATURE: 98 F | HEIGHT: 64 IN | SYSTOLIC BLOOD PRESSURE: 158 MMHG | DIASTOLIC BLOOD PRESSURE: 70 MMHG | OXYGEN SATURATION: 90 %

## 2022-06-24 DIAGNOSIS — E78.5 HYPERLIPIDEMIA ASSOCIATED WITH TYPE 2 DIABETES MELLITUS: ICD-10-CM

## 2022-06-24 DIAGNOSIS — M79.7 FIBROMYALGIA: ICD-10-CM

## 2022-06-24 DIAGNOSIS — J45.30 MILD PERSISTENT ASTHMA WITHOUT COMPLICATION: ICD-10-CM

## 2022-06-24 DIAGNOSIS — N18.31 STAGE 3A CHRONIC KIDNEY DISEASE: ICD-10-CM

## 2022-06-24 DIAGNOSIS — I10 ESSENTIAL HYPERTENSION: Primary | ICD-10-CM

## 2022-06-24 DIAGNOSIS — I47.29 NONSUSTAINED VENTRICULAR TACHYCARDIA: ICD-10-CM

## 2022-06-24 DIAGNOSIS — E55.9 VITAMIN D DEFICIENCY DISEASE: ICD-10-CM

## 2022-06-24 DIAGNOSIS — K21.9 GASTROESOPHAGEAL REFLUX DISEASE WITHOUT ESOPHAGITIS: ICD-10-CM

## 2022-06-24 DIAGNOSIS — E11.69 HYPERLIPIDEMIA ASSOCIATED WITH TYPE 2 DIABETES MELLITUS: ICD-10-CM

## 2022-06-24 DIAGNOSIS — F17.210 CIGARETTE SMOKER: ICD-10-CM

## 2022-06-24 DIAGNOSIS — E11.8 DIABETES MELLITUS TYPE 2 WITH COMPLICATIONS: ICD-10-CM

## 2022-06-24 DIAGNOSIS — M05.79 RHEUMATOID ARTHRITIS INVOLVING MULTIPLE SITES WITH POSITIVE RHEUMATOID FACTOR: ICD-10-CM

## 2022-06-24 DIAGNOSIS — M81.0 AGE-RELATED OSTEOPOROSIS WITHOUT CURRENT PATHOLOGICAL FRACTURE: ICD-10-CM

## 2022-06-24 DIAGNOSIS — E66.01 MORBID OBESITY: ICD-10-CM

## 2022-06-24 DIAGNOSIS — Z93.0 TRACHEOSTOMY IN PLACE: ICD-10-CM

## 2022-06-24 DIAGNOSIS — E66.01 CLASS 3 SEVERE OBESITY DUE TO EXCESS CALORIES WITH SERIOUS COMORBIDITY AND BODY MASS INDEX (BMI) OF 40.0 TO 44.9 IN ADULT: ICD-10-CM

## 2022-06-24 DIAGNOSIS — N25.81 SECONDARY HYPERPARATHYROIDISM OF RENAL ORIGIN: ICD-10-CM

## 2022-06-24 DIAGNOSIS — I70.0 AORTIC ATHEROSCLEROSIS: ICD-10-CM

## 2022-06-24 PROBLEM — M85.89 OSTEOPENIA OF MULTIPLE SITES: Status: RESOLVED | Noted: 2017-07-26 | Resolved: 2022-06-24

## 2022-06-24 PROCEDURE — 99215 OFFICE O/P EST HI 40 MIN: CPT | Mod: PBBFAC | Performed by: NURSE PRACTITIONER

## 2022-06-24 PROCEDURE — 99214 OFFICE O/P EST MOD 30 MIN: CPT | Mod: S$PBB,,, | Performed by: NURSE PRACTITIONER

## 2022-06-24 PROCEDURE — 99214 PR OFFICE/OUTPT VISIT, EST, LEVL IV, 30-39 MIN: ICD-10-PCS | Mod: S$PBB,,, | Performed by: NURSE PRACTITIONER

## 2022-06-24 PROCEDURE — 99999 PR PBB SHADOW E&M-EST. PATIENT-LVL V: ICD-10-PCS | Mod: PBBFAC,,, | Performed by: NURSE PRACTITIONER

## 2022-06-24 PROCEDURE — 99999 PR PBB SHADOW E&M-EST. PATIENT-LVL V: CPT | Mod: PBBFAC,,, | Performed by: NURSE PRACTITIONER

## 2022-06-24 RX ORDER — PANTOPRAZOLE SODIUM 40 MG/1
40 TABLET, DELAYED RELEASE ORAL DAILY
COMMUNITY
Start: 2022-05-10 | End: 2022-08-16 | Stop reason: SDUPTHER

## 2022-06-24 NOTE — PROGRESS NOTES
Subjective:       Patient ID: Page Grissom is a 71 y.o. female.    Chief Complaint: Follow-up    HPI    ) DM: no hyper/hypoglycemic symptoms. no self monitoring, currently on no meds.   2) HTN: elevated today but stable at home no HTNive symptoms, only on apresoline  3) LIPIDS:  tolerating and compliant with crestor  4) ASTHMA: stopped smoking . Asthma quiet no albuterol or advair currently. Having some sob at rest and on exertion  5) GERD: Quiet.   6) Rheum/osteoporosis: No current symptoms. Cant take NSAIDS due to CKD, on plaquenil  7) CKD: seeing renal  8)nonsustained Vtach: saw cards    Saw pulm/renal/ENT/rheum              Specialty recommendations reviewed. Pt is still due to see allergy and [facility] for surgery. She is still recovering from major hospitalization    CARD  The nonsustained VT is of no clinical consequence.  I am pretty certain she has a normal left ventricular function and this DVT happened in the midst of a very severe illness.  Even if it were and with, with the normal LVEF, we would discount the ventricular tachycardia.  Just for completion, we will obtain an echocardiogram.  Clinically she does not have left ventricular dysfunction.  As to what caused her angioedema, it is not entirely clear whether was the ACE-inhibitor, the calcium treatment infusion or the flu shot.  She is due for patch testing.  Nevertheless, I recommended that she threw away her lisinopril so that she can completely avoid mistakes.  Furthermore, she should have EpiPens available at home.       RHEUM  ? DC Reclast due to intolerance - last dose 3/2022  ? Plan for Prolia in future  §  discussed risks and benefits    PULM  Tracheostomy in place        Hx Emergent Cricothyrotomy  SP Decanulation                 Relevant Orders     Spirometry with/without bronchodilator     X-Ray Chest PA And Lateral     Stress test, pulmonary     PULSE OXIMETRY OVERNIGHT     Ambulatory referral/consult to ENT      Dysphonia       See ENT for vocal cord exam              Relevant Orders     Ambulatory referral/consult to ENT         ENT     Subglottic stenosis/A-frame deformity     Recommend Laryngology evaluation. Due to her inability to travel, we have her meet with Dr. Eugene at [facility] (will be doing [facility] [place] instead). We discussed possible dilation vs resection depending on findings  Stressed importance of presenting to ED if she develops worsening SOB        Review of Systems   Constitutional: Negative for activity change, appetite change, chills, diaphoresis, fatigue, fever and unexpected weight change.   HENT: Negative for congestion, ear pain, postnasal drip, rhinorrhea, sinus pressure, sinus pain, sneezing, sore throat, tinnitus, trouble swallowing and voice change.    Eyes: Negative for photophobia, pain and visual disturbance.   Respiratory: Positive for shortness of breath. Negative for cough, chest tightness and wheezing.    Cardiovascular: Negative for chest pain, palpitations and leg swelling.   Gastrointestinal: Negative for abdominal distention, abdominal pain, constipation, diarrhea, nausea and vomiting.   Genitourinary: Negative for decreased urine volume, difficulty urinating, dysuria, flank pain, frequency, hematuria and urgency.   Musculoskeletal: Negative for arthralgias, back pain, joint swelling, neck pain and neck stiffness.   Allergic/Immunologic: Negative for immunocompromised state.   Neurological: Negative for dizziness, tremors, seizures, syncope, facial asymmetry, speech difficulty, weakness, light-headedness, numbness and headaches.   Hematological: Negative for adenopathy. Does not bruise/bleed easily.   Psychiatric/Behavioral: Negative for confusion and sleep disturbance.       Objective:      Physical Exam  Vitals reviewed.   HENT:      Head: Normocephalic and atraumatic.      Right Ear: Tympanic membrane normal.      Left Ear: Tympanic membrane normal.   Eyes:       Conjunctiva/sclera: Conjunctivae normal.   Cardiovascular:      Rate and Rhythm: Normal rate and regular rhythm.      Heart sounds: Normal heart sounds.   Pulmonary:      Effort: Pulmonary effort is normal.      Breath sounds: Normal breath sounds.      Comments: Healed tracheostomy.   Abdominal:      General: Bowel sounds are normal.      Palpations: Abdomen is soft.   Musculoskeletal:         General: Normal range of motion.      Cervical back: Normal range of motion and neck supple.   Skin:     General: Skin is warm and dry.   Neurological:      Mental Status: She is alert and oriented to person, place, and time.         Assessment:     Vitals:    06/24/22 1013   BP: (!) 158/70   Pulse: 80   Resp: (!) 24   Temp: 98.2 °F (36.8 °C)         1. Essential hypertension    2. Hyperlipidemia associated with type 2 diabetes mellitus    3. Aortic atherosclerosis    4. Nonsustained ventricular tachycardia    5. Stage 3a chronic kidney disease    6. Rheumatoid arthritis involving multiple sites with positive rheumatoid factor    7. Vitamin D deficiency disease    8. Diabetes mellitus type 2 with complications    9. Class 3 severe obesity due to excess calories with serious comorbidity and body mass index (BMI) of 40.0 to 44.9 in adult    10. Secondary hyperparathyroidism of renal origin    11. Morbid obesity    12. Gastroesophageal reflux disease without esophagitis    13. Fibromyalgia    14. Age-related osteoporosis without current pathological fracture    15. Cigarette smoker    16. Mild persistent asthma without complication    17. Tracheostomy in place        Plan:   Essential hypertension    Hyperlipidemia associated with type 2 diabetes mellitus    Aortic atherosclerosis    Nonsustained ventricular tachycardia    Stage 3a chronic kidney disease    Rheumatoid arthritis involving multiple sites with positive rheumatoid factor    Vitamin D deficiency disease    Diabetes mellitus type 2 with complications    Class 3 severe  obesity due to excess calories with serious comorbidity and body mass index (BMI) of 40.0 to 44.9 in adult    Secondary hyperparathyroidism of renal origin    Morbid obesity    Gastroesophageal reflux disease without esophagitis    Fibromyalgia    Age-related osteoporosis without current pathological fracture    Cigarette smoker    Mild persistent asthma without complication    Tracheostomy in place  -     HOSPITAL BED FOR HOME USE      hosp bed ordered  Pending ENT [place] visit for surgery  Pending allergy visit  Multiple specialty follow up  Monitor BP-states it runs fine at home  Return as scheduled unless sooner appt needed

## 2022-07-14 ENCOUNTER — TELEPHONE (OUTPATIENT)
Dept: INTERNAL MEDICINE | Facility: CLINIC | Age: 72
End: 2022-07-14
Payer: MEDICARE

## 2022-07-14 NOTE — TELEPHONE ENCOUNTER
Spoke with pt. Pt c/o shortness of breath and feet swelling ( feet have been swollen for approx 3 weeks, per pt). Pt stated she can not breathe when lying down. Pt has to sleep sitting up. Pt c/o albuterol inhaler not being effective.  Pt advised to be seen today at urgent care or ER. Pt stated she will not be going to urgent care. Offered to transfer pt to appointment desk to check same day availability at other locations. Pt accepted. Pt transferred to appointment desk to schedule at another location. Call ended well.

## 2022-07-14 NOTE — TELEPHONE ENCOUNTER
----- Message from Norman Cox sent at 7/14/2022 11:37 AM CDT -----  Pt is needing a call back regarding a pump she was given. Please call [phone]

## 2022-07-20 ENCOUNTER — TELEPHONE (OUTPATIENT)
Dept: OTOLARYNGOLOGY | Facility: CLINIC | Age: 72
End: 2022-07-20
Payer: MEDICARE

## 2022-07-20 NOTE — TELEPHONE ENCOUNTER
----- Message from Jon Shukla sent at 7/20/2022  4:07 PM CDT -----  Regarding: missed your call      The Pt's daughter would like to have a call back about the procedure date and if she could drop the Pt off early and pick the Pt up late due to her work obligations on that date     # [phone] (Nael Grissom)

## 2022-08-02 ENCOUNTER — OFFICE VISIT (OUTPATIENT)
Dept: OPHTHALMOLOGY | Facility: CLINIC | Age: 72
End: 2022-08-02
Payer: MEDICARE

## 2022-08-02 ENCOUNTER — OFFICE VISIT (OUTPATIENT)
Dept: PULMONOLOGY | Facility: CLINIC | Age: 72
End: 2022-08-02
Payer: MEDICARE

## 2022-08-02 VITALS
SYSTOLIC BLOOD PRESSURE: 140 MMHG | WEIGHT: 234.81 LBS | HEIGHT: 64 IN | OXYGEN SATURATION: 94 % | DIASTOLIC BLOOD PRESSURE: 90 MMHG | BODY MASS INDEX: 40.09 KG/M2 | HEART RATE: 65 BPM | RESPIRATION RATE: 18 BRPM

## 2022-08-02 DIAGNOSIS — Z79.899 LONG-TERM USE OF PLAQUENIL: ICD-10-CM

## 2022-08-02 DIAGNOSIS — Z98.890 HISTORY OF TRACHEOSTOMY: ICD-10-CM

## 2022-08-02 DIAGNOSIS — J44.9 CHRONIC OBSTRUCTIVE PULMONARY DISEASE, UNSPECIFIED COPD TYPE: Primary | ICD-10-CM

## 2022-08-02 DIAGNOSIS — M05.79 RHEUMATOID ARTHRITIS INVOLVING MULTIPLE SITES WITH POSITIVE RHEUMATOID FACTOR: ICD-10-CM

## 2022-08-02 DIAGNOSIS — E11.9 TYPE 2 DIABETES MELLITUS WITHOUT RETINOPATHY: Primary | ICD-10-CM

## 2022-08-02 DIAGNOSIS — H25.013 CORTICAL AGE-RELATED CATARACT OF BOTH EYES: ICD-10-CM

## 2022-08-02 DIAGNOSIS — R06.1 STRIDOR: ICD-10-CM

## 2022-08-02 DIAGNOSIS — H52.4 PRESBYOPIA: ICD-10-CM

## 2022-08-02 DIAGNOSIS — H25.13 NUCLEAR SCLEROSIS, BILATERAL: ICD-10-CM

## 2022-08-02 DIAGNOSIS — J38.6 SUBGLOTTIC STENOSIS: ICD-10-CM

## 2022-08-02 DIAGNOSIS — J44.89 ASTHMATIC BRONCHITIS , CHRONIC: ICD-10-CM

## 2022-08-02 DIAGNOSIS — T78.3XXS ANGIOEDEMA, SEQUELA: ICD-10-CM

## 2022-08-02 DIAGNOSIS — E11.36 DIABETIC CATARACT OF BOTH EYES: ICD-10-CM

## 2022-08-02 DIAGNOSIS — Z87.891 STOPPED SMOKING WITH GREATER THAN 30 PACK YEAR HISTORY: ICD-10-CM

## 2022-08-02 PROCEDURE — 99999 PR PBB SHADOW E&M-EST. PATIENT-LVL III: CPT | Mod: PBBFAC,,, | Performed by: OPTOMETRIST

## 2022-08-02 PROCEDURE — 99215 OFFICE O/P EST HI 40 MIN: CPT | Mod: PBBFAC | Performed by: INTERNAL MEDICINE

## 2022-08-02 PROCEDURE — 99999 PR PBB SHADOW E&M-EST. PATIENT-LVL V: ICD-10-PCS | Mod: PBBFAC,,, | Performed by: INTERNAL MEDICINE

## 2022-08-02 PROCEDURE — 99213 OFFICE O/P EST LOW 20 MIN: CPT | Mod: PBBFAC,27 | Performed by: OPTOMETRIST

## 2022-08-02 PROCEDURE — 92014 COMPRE OPH EXAM EST PT 1/>: CPT | Mod: S$PBB,,, | Performed by: OPTOMETRIST

## 2022-08-02 PROCEDURE — 99999 PR PBB SHADOW E&M-EST. PATIENT-LVL V: CPT | Mod: PBBFAC,,, | Performed by: INTERNAL MEDICINE

## 2022-08-02 PROCEDURE — 92083 HUMPHREY VISUAL FIELD - OU - BOTH EYES: ICD-10-PCS | Mod: 26,S$PBB,, | Performed by: OPTOMETRIST

## 2022-08-02 PROCEDURE — 92134 POSTERIOR SEGMENT OCT RETINA (OCULAR COHERENCE TOMOGRAPHY)-BOTH EYES: ICD-10-PCS | Mod: 26,S$PBB,, | Performed by: OPTOMETRIST

## 2022-08-02 PROCEDURE — 99214 OFFICE O/P EST MOD 30 MIN: CPT | Mod: S$PBB,,, | Performed by: INTERNAL MEDICINE

## 2022-08-02 PROCEDURE — 92014 PR EYE EXAM, EST PATIENT,COMPREHESV: ICD-10-PCS | Mod: S$PBB,,, | Performed by: OPTOMETRIST

## 2022-08-02 PROCEDURE — 92083 EXTENDED VISUAL FIELD XM: CPT | Mod: PBBFAC | Performed by: OPTOMETRIST

## 2022-08-02 PROCEDURE — 99214 PR OFFICE/OUTPT VISIT, EST, LEVL IV, 30-39 MIN: ICD-10-PCS | Mod: S$PBB,,, | Performed by: INTERNAL MEDICINE

## 2022-08-02 PROCEDURE — 99999 PR PBB SHADOW E&M-EST. PATIENT-LVL III: ICD-10-PCS | Mod: PBBFAC,,, | Performed by: OPTOMETRIST

## 2022-08-02 PROCEDURE — 92134 CPTRZ OPH DX IMG PST SGM RTA: CPT | Mod: PBBFAC | Performed by: OPTOMETRIST

## 2022-08-02 RX ORDER — FLUTICASONE FUROATE AND VILANTEROL 100; 25 UG/1; UG/1
1 POWDER RESPIRATORY (INHALATION) DAILY
Qty: 60 EACH | Refills: 6 | Status: SHIPPED | OUTPATIENT
Start: 2022-08-02 | End: 2022-11-17 | Stop reason: SDUPTHER

## 2022-08-02 NOTE — PROGRESS NOTES
Pulmonary Outpatient Follow Up Visit     Subjective:       Patient ID: Pageta Grissom is a 71 y.o. female.    Chief Complaint: Shortness of Breath and Asthma      HPI      71-year-old female patient presenting for 3 months follow-up after admission April 2022 for angioedema complicated by respiratory failure status post trach and then decannulation prior to discharge.    Does not have oxygen at home.    Sixty pack year smoking history, complaining of chronic coughing wheezing and stridor status post trach removal.    Elevated IgE noted on serologies, has an appointment for with allergist and also following with Dr. [facility] for stridor.    Input from ENT Dr. ibarra noted June 2022.    Diagnosed with subglottic stenosis.  Referred for resection versus dilation.      She reports history of chronic coughing asthma need for albuterol prior to her admission for angioedema.      Review of Systems   Constitutional: Positive for weakness. Negative for fever and chills.   HENT: Negative for nosebleeds.    Eyes: Negative for redness.   Respiratory: Positive for cough, sputum production, shortness of breath, wheezing, previous hospitalization due to pulmonary problems, dyspnea on extertion and use of rescue inhaler. Negative for choking.    Cardiovascular: Negative for chest pain.   Genitourinary: Negative for hematuria.   Endocrine: Negative for cold intolerance.    Musculoskeletal: Positive for arthralgias, back pain and gait problem.   Gastrointestinal: Negative for vomiting.   Neurological: Negative for syncope.   Hematological: Negative for adenopathy.   Psychiatric/Behavioral: Negative for confusion.       Outpatient Encounter Medications as of 8/2/2022   Medication Sig Dispense Refill    acetaminophen (TYLENOL) 500 MG tablet Take 500 mg by mouth as needed for Pain.      albuterol (PROVENTIL/VENTOLIN HFA) 90 mcg/actuation inhaler INHALE 2 PUFFS INTO THE LINGS EVERY 6  HOURS AS NEEDED WHEEZING 25.5 g 3    blood sugar diagnostic Strp To check BG 1 times daily, to use with insurance preferred meter 100 strip 11    blood-glucose meter kit To check BG 1 times daily, to use with insurance preferred meter 1 each 0    CHEST CONGESTION RELIEF DM  mg/5 mL Liqd Take 10 mLs by mouth every 6 (six) hours as needed.      cholecalciferol, vitamin D3, 2,000 unit Tab Take 2,000 Units by mouth once daily.      fish oil-omega-3 fatty acids 300-1,000 mg capsule Take 2 g by mouth once daily.      hydrOXYchloroQUINE (PLAQUENIL) 200 mg tablet Take 1 tablet (200 mg total) by mouth once daily. 90 tablet 1    lancets Misc To check BG 1 times daily, to use with insurance preferred meter 100 each 11    MAGTAB 84 mg TbSR Take 84 mg by mouth once daily at 6am.      multivitamin (THERAGRAN) per tablet Take 1 tablet by mouth once daily.      pantoprazole (PROTONIX) 40 MG tablet Take 40 mg by mouth once daily.      potassium chloride SA (K-DUR,KLOR-CON) 20 MEQ tablet Take 20 mEq by mouth once daily.      rosuvastatin (CRESTOR) 40 MG Tab Take 1 tablet (40 mg total) by mouth every evening. 30 tablet 11    EPINEPHrine (EPIPEN 2-DANA) 0.3 mg/0.3 mL AtIn Inject 0.3 mLs (0.3 mg total) into the muscle once. for 1 dose 1 each 11    fluticasone furoate-vilanteroL (BREO ELLIPTA) 100-25 mcg/dose diskus inhaler Inhale 1 puff into the lungs once daily. Controller 60 each 6    hydrALAZINE (APRESOLINE) 25 MG tablet Take 1 tablet (25 mg total) by mouth every 8 (eight) hours. 90 tablet 0    [DISCONTINUED] amLODIPine (NORVASC) 10 MG tablet TAKE 1 TABLET(10 MG) BY MOUTH EVERY DAY 90 tablet 3    [DISCONTINUED] hydroCHLOROthiazide (HYDRODIURIL) 25 MG tablet TAKE 1 TABLET(25 MG) BY MOUTH EVERY DAY 90 tablet 3    [DISCONTINUED] metFORMIN (GLUCOPHAGE) 500 MG tablet TAKE 1 TABLET(500 MG) BY MOUTH EVERY DAY 90 tablet 3     No facility-administered encounter medications on file as of 8/2/2022.       Objective:  "    Vital Signs (Most Recent)  Vital Signs  Pulse: 65  Resp: 18  SpO2: (!) 94 %  BP: (!) 140/90  Height and Weight  Height: 5' 4" (162.6 cm)  Weight: 106.5 kg (234 lb 12.6 oz)  BSA (Calculated - sq m): 2.19 sq meters  BMI (Calculated): 40.3  Weight in (lb) to have BMI = 25: 145.3]  Wt Readings from Last 2 Encounters:   08/02/22 106.5 kg (234 lb 12.6 oz)   06/24/22 108.5 kg (239 lb 3.2 oz)       Physical Exam   Constitutional: She is oriented to person, place, and time. She appears well-developed. No distress.   HENT:   Head: Normocephalic.   Neck:   Trach site noted   Cardiovascular: Normal rate and regular rhythm.   Pulmonary/Chest: Normal expansion and effort normal. No stridor. No respiratory distress. She has decreased breath sounds. She has rhonchi. She exhibits no tenderness.   Abdominal: She exhibits no distension.   Musculoskeletal:         General: No tenderness.      Cervical back: Neck supple.   Lymphadenopathy:     She has no cervical adenopathy.   Neurological: She is alert and oriented to person, place, and time. Gait normal.   Skin: Skin is warm.   Psychiatric: She has a normal mood and affect. Her behavior is normal. Judgment and thought content normal.   Nursing note and vitals reviewed.      Laboratory  Lab Results   Component Value Date    WBC 8.22 06/09/2022    RBC 3.62 (L) 06/09/2022    HGB 9.4 (L) 06/09/2022    HCT 31.4 (L) 06/09/2022    MCV 87 06/09/2022    MCH 26.0 (L) 06/09/2022    MCHC 29.9 (L) 06/09/2022    RDW 18.3 (H) 06/09/2022     06/09/2022    MPV 10.7 06/09/2022    GRAN 5.0 06/09/2022    GRAN 60.4 06/09/2022    LYMPH 2.2 06/09/2022    LYMPH 27.3 06/09/2022    MONO 0.8 06/09/2022    MONO 9.1 06/09/2022    EOS 0.2 06/09/2022    BASO 0.04 06/09/2022    EOSINOPHIL 2.3 06/09/2022    BASOPHIL 0.5 06/09/2022       BMP  Lab Results   Component Value Date     06/17/2022    K 3.8 06/17/2022     06/17/2022    CO2 25 06/17/2022    BUN 19 06/17/2022    CREATININE 1.3 06/17/2022 "    CALCIUM 9.3 06/17/2022    ANIONGAP 11 06/17/2022    ESTGFRAFRICA 48.0 (A) 06/17/2022    EGFRNONAA 41.7 (A) 06/17/2022    AST 10 06/17/2022    ALT <5 (L) 06/17/2022    PROT 6.5 06/17/2022       Lab Results   Component Value Date    BNP 96 04/06/2022    BNP 13 06/17/2021       Lab Results   Component Value Date    TSH 0.210 (L) 04/06/2022       Lab Results   Component Value Date    SEDRATE 60 (H) 06/09/2022       Lab Results   Component Value Date    CRP 21.0 (H) 06/09/2022     Lab Results   Component Value Date     (H) 06/02/2022        No results found for: ASPERGILLUS  No results found for: AFUMIGATUSCL     No results found for: ACE     Diagnostic Results:  I have personally reviewed today the following studies:    CXR 5/2022      FINDINGS:  There has been interval removal of the tracheostomy tube.  There is some minimal stranding change within the right lower lung zone which may be representative of atelectasis with edema and infiltrate thought less likely.  The heart is enlarged.  There is tortuosity of the descending thoracic aorta.  The right-sided PICC line catheter has been removed in the interval.            Assessment/Plan:   Chronic obstructive pulmonary disease, unspecified COPD type  -     Complete PFT with bronchodilator; Future; Expected date: 08/16/2022  -     Stress test, pulmonary; Future    Asthmatic bronchitis , chronic  -     fluticasone furoate-vilanteroL (BREO ELLIPTA) 100-25 mcg/dose diskus inhaler; Inhale 1 puff into the lungs once daily. Controller  Dispense: 60 each; Refill: 6    Stridor    Subglottic stenosis    Stopped smoking with greater than 30 pack year history  -     CT Chest Lung Screening Low Dose; Future; Expected date: 08/02/2022    Angioedema, sequela    History of tracheostomy      Continue albuterol p.r.n.    Start Breo 100 mcg 1 puff daily.    Elevated IgE in favor of asthmatic bronchitis and in favor of allergies, referred to Allergy and immunology.    Use EpiPen  p.r.n. age edema.    Follow with ENT for subglottic stenosis dilation versus resection.    Low-dose CT scan of the chest shared medical decision with patient for screening.    Encouraged patient to continue smoking cessation.    Check PFT with bronchodilator.  Check 6 minutes walking test.    Further recommendation to follow above workup.    Follow up in about 6 months (around 2/2/2023).    This note was prepared using voice recognition system and is likely to have sound alike errors that may have been overlooked even after proof reading.  Please call me with any questions    Discussed diagnosis, its evaluation, treatment and usual course. All questions answered.      Laney Romero MD

## 2022-08-02 NOTE — PROGRESS NOTES
HPI     Annual Exam     Comments: Pt reports for yearly exam with HVF, and GOCT. Denies any pain   or irritation. Va stable. Uses OTC readers only. Saw the ENT doc, said   there was pressure in the sinuses due to a blockage.               Comments     1. DM x2012  2. NS OD            Last edited by Oneil Logan on 8/2/2022  2:05 PM. (History)            Assessment /Plan     For exam results, see Encounter Report.    Type 2 diabetes mellitus without retinopathy  There was no diabetic retinopathy present in either eye today.   Recommended that pt continue care with PCP and/or specialists regarding diabetes.  Follow-up dilated eye exam recommended in 12 months, sooner with any vision changes or new concerns.    Rheumatoid arthritis involving multiple sites with positive rheumatoid factor  -     Lewis Visual Field - OU - Extended - Both Eyes  -     Posterior Segment OCT Retina-Both eyes  Long-term use of Plaquenil  -     Lewis Visual Field - OU - Extended - Both Eyes  -     Posterior Segment OCT Retina-Both eyes  24-2VF was done today  PT not currently on plaquenil, unsure if she will resume  Monitor 12 months    Nuclear sclerosis, bilateral  Cortical age-related cataract of both eyes  Diabetic cataract of both eyes  Cataracts not significantly affecting activities of daily living and therefore surgery is not indicated at this time.   Will continue to monitor over the next 12 months. Pt to call or RTC with any significant change in vision prior to next visit.     Presbyopia  Spec Rx is optional, ok to continue with OTC readers      No maculopathy present today.   Stressed importance of follow up visits with pt.  RTC 12 months for dilation,10-2 VF, and mOCT.   PRN sooner if any va changes.

## 2022-08-15 ENCOUNTER — OFFICE VISIT (OUTPATIENT)
Dept: ALLERGY | Facility: CLINIC | Age: 72
End: 2022-08-15
Payer: MEDICARE

## 2022-08-15 ENCOUNTER — NURSE TRIAGE (OUTPATIENT)
Dept: ADMINISTRATIVE | Facility: CLINIC | Age: 72
End: 2022-08-15
Payer: MEDICARE

## 2022-08-15 ENCOUNTER — HOSPITAL ENCOUNTER (EMERGENCY)
Facility: HOSPITAL | Age: 72
Discharge: HOME | End: 2022-08-15
Attending: EMERGENCY MEDICINE
Payer: MEDICARE

## 2022-08-15 VITALS
SYSTOLIC BLOOD PRESSURE: 185 MMHG | TEMPERATURE: 98 F | HEART RATE: 58 BPM | RESPIRATION RATE: 23 BRPM | DIASTOLIC BLOOD PRESSURE: 95 MMHG | OXYGEN SATURATION: 98 %

## 2022-08-15 VITALS
BODY MASS INDEX: 39.89 KG/M2 | TEMPERATURE: 98 F | DIASTOLIC BLOOD PRESSURE: 88 MMHG | HEART RATE: 83 BPM | SYSTOLIC BLOOD PRESSURE: 197 MMHG | WEIGHT: 232.38 LBS

## 2022-08-15 DIAGNOSIS — I16.0 HYPERTENSIVE URGENCY: Primary | ICD-10-CM

## 2022-08-15 DIAGNOSIS — T78.3XXA ANGIOEDEMA, INITIAL ENCOUNTER: Primary | ICD-10-CM

## 2022-08-15 DIAGNOSIS — R07.9 CHEST PAIN: ICD-10-CM

## 2022-08-15 DIAGNOSIS — T78.3XXD ANGIOEDEMA, SUBSEQUENT ENCOUNTER: ICD-10-CM

## 2022-08-15 DIAGNOSIS — R03.0 ELEVATED BLOOD PRESSURE READING: ICD-10-CM

## 2022-08-15 DIAGNOSIS — E87.6 HYPOKALEMIA: ICD-10-CM

## 2022-08-15 DIAGNOSIS — R76.8 ELEVATED IGE LEVEL: ICD-10-CM

## 2022-08-15 LAB
ALBUMIN SERPL BCP-MCNC: 3.2 G/DL (ref 3.5–5.2)
ALP SERPL-CCNC: 86 U/L (ref 55–135)
ALT SERPL W/O P-5'-P-CCNC: 8 U/L (ref 10–44)
ANION GAP SERPL CALC-SCNC: 13 MMOL/L (ref 8–16)
AST SERPL-CCNC: 13 U/L (ref 10–40)
BASOPHILS # BLD AUTO: 0.03 K/UL (ref 0–0.2)
BASOPHILS NFR BLD: 0.4 % (ref 0–1.9)
BILIRUB SERPL-MCNC: 0.3 MG/DL (ref 0.1–1)
BNP SERPL-MCNC: 302 PG/ML (ref 0–99)
BUN SERPL-MCNC: 17 MG/DL (ref 8–23)
CALCIUM SERPL-MCNC: 10.1 MG/DL (ref 8.7–10.5)
CHLORIDE SERPL-SCNC: 104 MMOL/L (ref 95–110)
CO2 SERPL-SCNC: 27 MMOL/L (ref 23–29)
CREAT SERPL-MCNC: 1.3 MG/DL (ref 0.5–1.4)
DIFFERENTIAL METHOD: ABNORMAL
EOSINOPHIL # BLD AUTO: 0.2 K/UL (ref 0–0.5)
EOSINOPHIL NFR BLD: 2.1 % (ref 0–8)
ERYTHROCYTE [DISTWIDTH] IN BLOOD BY AUTOMATED COUNT: 13.6 % (ref 11.5–14.5)
EST. GFR  (NO RACE VARIABLE): 44 ML/MIN/1.73 M^2
GLUCOSE SERPL-MCNC: 97 MG/DL (ref 70–110)
HCT VFR BLD AUTO: 34 % (ref 37–48.5)
HGB BLD-MCNC: 10.6 G/DL (ref 12–16)
IMM GRANULOCYTES # BLD AUTO: 0.01 K/UL (ref 0–0.04)
IMM GRANULOCYTES NFR BLD AUTO: 0.1 % (ref 0–0.5)
LYMPHOCYTES # BLD AUTO: 1.9 K/UL (ref 1–4.8)
LYMPHOCYTES NFR BLD: 25.3 % (ref 18–48)
MCH RBC QN AUTO: 24.8 PG (ref 27–31)
MCHC RBC AUTO-ENTMCNC: 31.2 G/DL (ref 32–36)
MCV RBC AUTO: 79 FL (ref 82–98)
MONOCYTES # BLD AUTO: 0.6 K/UL (ref 0.3–1)
MONOCYTES NFR BLD: 8.2 % (ref 4–15)
NEUTROPHILS # BLD AUTO: 4.8 K/UL (ref 1.8–7.7)
NEUTROPHILS NFR BLD: 63.9 % (ref 38–73)
NRBC BLD-RTO: 0 /100 WBC
PLATELET # BLD AUTO: 257 K/UL (ref 150–450)
PMV BLD AUTO: 10.5 FL (ref 9.2–12.9)
POTASSIUM SERPL-SCNC: 3 MMOL/L (ref 3.5–5.1)
PROT SERPL-MCNC: 7.4 G/DL (ref 6–8.4)
RBC # BLD AUTO: 4.28 M/UL (ref 4–5.4)
SODIUM SERPL-SCNC: 144 MMOL/L (ref 136–145)
TROPONIN I SERPL DL<=0.01 NG/ML-MCNC: 0.02 NG/ML (ref 0–0.03)
WBC # BLD AUTO: 7.46 K/UL (ref 3.9–12.7)

## 2022-08-15 PROCEDURE — 80053 COMPREHEN METABOLIC PANEL: CPT | Performed by: EMERGENCY MEDICINE

## 2022-08-15 PROCEDURE — 93010 ELECTROCARDIOGRAM REPORT: CPT | Mod: ,,, | Performed by: STUDENT IN AN ORGANIZED HEALTH CARE EDUCATION/TRAINING PROGRAM

## 2022-08-15 PROCEDURE — 99999 PR PBB SHADOW E&M-EST. PATIENT-LVL V: ICD-10-PCS | Mod: PBBFAC,,, | Performed by: ALLERGY & IMMUNOLOGY

## 2022-08-15 PROCEDURE — 99999 PR PBB SHADOW E&M-EST. PATIENT-LVL V: CPT | Mod: PBBFAC,,, | Performed by: ALLERGY & IMMUNOLOGY

## 2022-08-15 PROCEDURE — 99204 PR OFFICE/OUTPT VISIT, NEW, LEVL IV, 45-59 MIN: ICD-10-PCS | Mod: S$PBB,,, | Performed by: ALLERGY & IMMUNOLOGY

## 2022-08-15 PROCEDURE — 84484 ASSAY OF TROPONIN QUANT: CPT | Performed by: EMERGENCY MEDICINE

## 2022-08-15 PROCEDURE — 99284 EMERGENCY DEPT VISIT MOD MDM: CPT | Mod: 25,27

## 2022-08-15 PROCEDURE — 85025 COMPLETE CBC W/AUTO DIFF WBC: CPT | Performed by: EMERGENCY MEDICINE

## 2022-08-15 PROCEDURE — 99215 OFFICE O/P EST HI 40 MIN: CPT | Mod: PBBFAC,25 | Performed by: ALLERGY & IMMUNOLOGY

## 2022-08-15 PROCEDURE — 25000003 PHARM REV CODE 250: Performed by: EMERGENCY MEDICINE

## 2022-08-15 PROCEDURE — 93010 EKG 12-LEAD: ICD-10-PCS | Mod: ,,, | Performed by: STUDENT IN AN ORGANIZED HEALTH CARE EDUCATION/TRAINING PROGRAM

## 2022-08-15 PROCEDURE — 83880 ASSAY OF NATRIURETIC PEPTIDE: CPT | Performed by: EMERGENCY MEDICINE

## 2022-08-15 PROCEDURE — 93005 ELECTROCARDIOGRAM TRACING: CPT

## 2022-08-15 PROCEDURE — 99204 OFFICE O/P NEW MOD 45 MIN: CPT | Mod: S$PBB,,, | Performed by: ALLERGY & IMMUNOLOGY

## 2022-08-15 RX ORDER — ASPIRIN 325 MG
325 TABLET ORAL
Status: COMPLETED | OUTPATIENT
Start: 2022-08-15 | End: 2022-08-15

## 2022-08-15 RX ORDER — HYDRALAZINE HYDROCHLORIDE 25 MG/1
25 TABLET, FILM COATED ORAL EVERY 8 HOURS
Qty: 90 TABLET | Refills: 0 | Status: SHIPPED | OUTPATIENT
Start: 2022-08-15 | End: 2022-08-23 | Stop reason: SDUPTHER

## 2022-08-15 RX ORDER — CLONIDINE HYDROCHLORIDE 0.1 MG/1
0.1 TABLET ORAL
Status: COMPLETED | OUTPATIENT
Start: 2022-08-15 | End: 2022-08-15

## 2022-08-15 RX ORDER — POTASSIUM CHLORIDE 20 MEQ/1
40 TABLET, EXTENDED RELEASE ORAL
Status: COMPLETED | OUTPATIENT
Start: 2022-08-15 | End: 2022-08-15

## 2022-08-15 RX ADMIN — CLONIDINE HYDROCHLORIDE 0.1 MG: 0.1 TABLET ORAL at 05:08

## 2022-08-15 RX ADMIN — POTASSIUM CHLORIDE 40 MEQ: 1500 TABLET, EXTENDED RELEASE ORAL at 06:08

## 2022-08-15 RX ADMIN — ASPIRIN 325 MG ORAL TABLET 325 MG: 325 PILL ORAL at 05:08

## 2022-08-15 NOTE — ED PROVIDER NOTES
SCRIBE #1 NOTE: I, Lyla Lama/Hernandez Flores, am scribing for, and in the presence of, Arik Yao Jr., MD. I have scribed the entire note.       History     Chief Complaint   Patient presents with    Hypertension     From Dr. Екатерина Martinez's office where she was hypertensive. Pt has been off of lisinopril x 3 wks due to angioedema and has not been prescribed alternate.     Review of patient's allergies indicates:   Allergen Reactions    Lisinopril Anaphylaxis     Angioedema requiring trach         History of Present Illness     HPI    8/15/2022, 4:51 PM  History obtained from the patient      History of Present Illness: Page Grissom is a 71 y.o. female patient with a PMHx of DM, HTN, CKD, hypercholesterolemia who presents to the Emergency Department for evaluation of HTN. Patient reports being seen by Dr. Martinez ( Allergy) due to angioedema and was found to be hypertensive. She also notes being taken off of Hydralazine 25 mg x 3 weeks PTA due to angioedema and being unable to have rx refilled. Symptoms are constant and moderate in severity. No mitigating or exacerbating factors reported. Associated sxs include BLE swelling. Patient denies any CP, SOB, fever, chills, cough, and all other sxs at this time. No prior Tx reported. No further complaints or concerns at this time.       Arrival mode: EMS      PCP: NEELAM Rmoan Jr, MD        Past Medical History:  Past Medical History:   Diagnosis Date    Acidosis, metabolic, with respiratory acidosis 3/31/2022    Asthma     Back pain     Cataract     OD    CKD (chronic kidney disease) stage 3, GFR 30-59 ml/min 12/20/2020    Diabetes mellitus     Fibromyalgia     Gastroesophageal reflux disease     Hypercholesterolemia     Hypertension     Immune deficiency disorder     Obesity     Osteoporosis     Rheumatoid arthritis     Tobacco dependence     Trouble in sleeping     Type 2 diabetes mellitus        Past Surgical History:  Past Surgical History:    Procedure Laterality Date    BRONCHOSCOPY N/A 3/30/2022    Procedure: Bronchoscopy;  Surgeon: Isak Yadav MD;  Location: [facility] OR;  Service: General;  Laterality: N/A;    COLONOSCOPY N/A 2019    Procedure: COLONOSCOPY;  Surgeon: Yury Atwood MD;  Location: [facility] ENDO;  Service: Endoscopy;  Laterality: N/A;    HERNIA REPAIR      OOPHORECTOMY      TRACHEOSTOMY N/A 3/30/2022    Procedure: CREATION, TRACHEOSTOMY;  Surgeon: Isak Yadav MD;  Location: [facility] OR;  Service: General;  Laterality: N/A;         Family History:  Family History   Problem Relation Age of Onset    Hypertension Mother     Cancer Father     Colon cancer Father     Breast cancer Sister        Social History:  Social History     Tobacco Use    Smoking status: Former Smoker     Packs/day: 0.50     Years: 25.00     Pack years: 12.50     Types: Cigarettes     Quit date: 2022     Years since quittin.2    Smokeless tobacco: Never Used   Substance and Sexual Activity    Alcohol use: No    Drug use: No    Sexual activity: Not on file        Review of Systems     Review of Systems   Constitutional: Negative for chills and fever.   HENT: Negative for sore throat.    Respiratory: Negative for cough and shortness of breath.    Cardiovascular: Positive for leg swelling. Negative for chest pain.   Gastrointestinal: Negative for nausea.   Genitourinary: Negative for dysuria.   Musculoskeletal: Negative for back pain.   Skin: Negative for rash.   Neurological: Negative for weakness.   Hematological: Does not bruise/bleed easily.   All other systems reviewed and are negative.       Physical Exam     Initial Vitals   BP Pulse Resp Temp SpO2   08/15/22 1703 08/15/22 1657 08/15/22 1703 -- --   (!) 172/90 80 (!) 24        MAP       --                 Physical Exam  Nursing Notes and Vital Signs Reviewed.  Nursing Notes and Vital Signs Reviewed.  Constitutional: Patient is in no acute distress. Well-developed and  well-nourished.  Head: Atraumatic. Normocephalic.  Eyes:  EOM intact.  No scleral icterus.  ENT: Mucous membranes are moist.  Nares clear   Neck:  Full ROM. No JVD.  Cardiovascular: Regular rate. Regular rhythm No murmurs, rubs, or gallops. Distal pulses are 2+ and symmetric  Pulmonary/Chest: No respiratory distress. Clear to auscultation bilaterally. No wheezing or rales.  Equal chest wall rise bilaterally  Abdominal: Soft and non-distended.  There is no tenderness.  No rebound, guarding, or rigidity. Good bowel sounds.  Genitourinary: No CVA tenderness.  No suprapubic tenderness  Musculoskeletal: Moves all extremities. No obvious deformities.  5 x 5 strength in all extremities   Skin: Warm and dry.  Neurological:  Alert, awake, and appropriate.  Normal speech.  No acute focal neurological deficits are appreciated.  Two through 12 intact bilaterally.  Psychiatric: Normal affect. Good eye contact. Appropriate in content.     ED Course   Procedures  ED Vital Signs:  Vitals:    08/15/22 1657 08/15/22 1703 08/15/22 1706   BP:  (!) 172/90 (!) 172/90   Pulse: 80 74    Resp:  (!) 24        Abnormal Lab Results:  Labs Reviewed   CBC W/ AUTO DIFFERENTIAL - Abnormal; Notable for the following components:       Result Value    Hemoglobin 10.6 (*)     Hematocrit 34.0 (*)     MCV 79 (*)     MCH 24.8 (*)     MCHC 31.2 (*)     All other components within normal limits   COMPREHENSIVE METABOLIC PANEL - Abnormal; Notable for the following components:    Potassium 3.0 (*)     Albumin 3.2 (*)     ALT 8 (*)     eGFR 44 (*)     All other components within normal limits   B-TYPE NATRIURETIC PEPTIDE - Abnormal; Notable for the following components:     (*)     All other components within normal limits   TROPONIN I        All Lab Results:  Results for orders placed or performed during the hospital encounter of 08/15/22   CBC auto differential   Result Value Ref Range    WBC 7.46 3.90 - 12.70 K/uL    RBC 4.28 4.00 - 5.40 M/uL     Hemoglobin 10.6 (L) 12.0 - 16.0 g/dL    Hematocrit 34.0 (L) 37.0 - 48.5 %    MCV 79 (L) 82 - 98 fL    MCH 24.8 (L) 27.0 - 31.0 pg    MCHC 31.2 (L) 32.0 - 36.0 g/dL    RDW 13.6 11.5 - 14.5 %    Platelets 257 150 - 450 K/uL    MPV 10.5 9.2 - 12.9 fL    Immature Granulocytes 0.1 0.0 - 0.5 %    Gran # (ANC) 4.8 1.8 - 7.7 K/uL    Immature Grans (Abs) 0.01 0.00 - 0.04 K/uL    Lymph # 1.9 1.0 - 4.8 K/uL    Mono # 0.6 0.3 - 1.0 K/uL    Eos # 0.2 0.0 - 0.5 K/uL    Baso # 0.03 0.00 - 0.20 K/uL    nRBC 0 0 /100 WBC    Gran % 63.9 38.0 - 73.0 %    Lymph % 25.3 18.0 - 48.0 %    Mono % 8.2 4.0 - 15.0 %    Eosinophil % 2.1 0.0 - 8.0 %    Basophil % 0.4 0.0 - 1.9 %    Differential Method Automated    Comprehensive metabolic panel   Result Value Ref Range    Sodium 144 136 - 145 mmol/L    Potassium 3.0 (L) 3.5 - 5.1 mmol/L    Chloride 104 95 - 110 mmol/L    CO2 27 23 - 29 mmol/L    Glucose 97 70 - 110 mg/dL    BUN 17 8 - 23 mg/dL    Creatinine 1.3 0.5 - 1.4 mg/dL    Calcium 10.1 8.7 - 10.5 mg/dL    Total Protein 7.4 6.0 - 8.4 g/dL    Albumin 3.2 (L) 3.5 - 5.2 g/dL    Total Bilirubin 0.3 0.1 - 1.0 mg/dL    Alkaline Phosphatase 86 55 - 135 U/L    AST 13 10 - 40 U/L    ALT 8 (L) 10 - 44 U/L    Anion Gap 13 8 - 16 mmol/L    eGFR 44 (A) >60 mL/min/1.73 m^2   Troponin I #1   Result Value Ref Range    Troponin I 0.022 0.000 - 0.026 ng/mL   BNP   Result Value Ref Range     (H) 0 - 99 pg/mL         Imaging Results:  Imaging Results          X-Ray Chest AP Portable (Final result)  Result time 08/15/22 17:23:42    Final result by Joe Napoles MD (08/15/22 17:23:42)                 Impression:      No acute abnormality.      Electronically signed by: Dony Diaz  Date:    08/15/2022  Time:    17:23             Narrative:    EXAMINATION:  XR CHEST AP PORTABLE    CLINICAL HISTORY:  Chest Pain;    TECHNIQUE:  Single frontal view of the chest was performed.    COMPARISON:  None    FINDINGS:  The lungs are clear, with normal appearance of  pulmonary vasculature and no pleural effusion or pneumothorax.    Cardiomegaly.  The hilar and mediastinal contours are unremarkable.    Bones are intact.                                 The EKG was ordered, reviewed, and independently interpreted by the ED provider.  Interpretation time: 16:59  Rate: 72 BPM  Rhythm: normal sinus rhythm  Interpretation: Possible left atrial enlargement. Cannot rule out Anterior infarct, age undetermined. Prolonged QT. No STEMI.           The Emergency Provider reviewed the vital signs and test results, which are outlined above.     ED Discussion       6:16 PM: Reassessed pt at this time. Discussed with pt all pertinent ED information and results. Discussed pt dx and plan of tx. Gave pt all f/u and return to the ED instructions. All questions and concerns were addressed at this time. Pt expresses understanding of information and instructions, and is comfortable with plan to discharge. Pt is stable for discharge.    I discussed with patient and/or family/caretaker that evaluation in the ED does not suggest any emergent or life threatening medical conditions requiring immediate intervention beyond what was provided in the ED, and I believe patient is safe for discharge.  Regardless, an unremarkable evaluation in the ED does not preclude the development or presence of a serious of life threatening condition. As such, patient was instructed to return immediately for any worsening or change in current symptoms.    The patient is stable and nontoxic with no evidence of end-organ damage.  I will restart her medications that she has run out of and have her follow-up with primary care doctor.  The patient verbalized understanding room with plan of care and seems reliable.  She is safe for discharge in my opinion.  She is not having chest pain and has not had any chest pain.  Any diagnosis of chest pain is a computer glitch.       Medical Decision Making:   Clinical Tests:   Lab Tests: Ordered  and Reviewed  Radiological Study: Ordered and Reviewed  Medical Tests: Ordered and Reviewed           ED Medication(s):  Medications   potassium chloride SA CR tablet 40 mEq (has no administration in time range)   aspirin tablet 325 mg (325 mg Oral Given 8/15/22 1706)   cloNIDine tablet 0.1 mg (0.1 mg Oral Given 8/15/22 1706)       Current Discharge Medication List           Follow-up Information     E Douglas Roman Jr, MD.    Specialties: Internal Medicine, Pediatrics  Contact information:  [address]  [place] [address] [phone]                             Scribe Attestation:   Scribe #1: I performed the above scribed service and the documentation accurately describes the services I performed. I attest to the accuracy of the note.     Attending:   Physician Attestation Statement for Scribe #1: I, Arik Yao Jr., MD, personally performed the services described in this documentation, as scribed by Lyla Lama/Hernandez Flores, in my presence, and it is both accurate and complete.           Clinical Impression       ICD-10-CM ICD-9-CM   1. Hypertensive urgency  I16.0 401.9   2. Chest pain  R07.9 786.50   3. Hypokalemia  E87.6 276.8       Disposition:   Disposition: Discharged  Condition: Stable         Arik Yao Jr., MD  08/15/22 2538

## 2022-08-15 NOTE — PROGRESS NOTES
"Subjective:       Patient ID: Page Grissom is a 71 y.o. female.    Referred by Dr. THEA Roman    Chief Complaint:  Other (Angioedema believed to be related to ACE inhibitors.)      HPI: 71 year old female referred with a history of angioedema thought to be related to lisinopril. She thinks it was "the bone infusion".  Lisinopril was stopped. She was hospitalized in March. She was intubated and trached due to swelling. No swelling, since she was discharged in March.    She is not taking medications "because [facility] won't give them to me".    She has an Epipen 2pack.      Past Medical History:   Diagnosis Date    Acidosis, metabolic, with respiratory acidosis 3/31/2022    Asthma     Back pain     Cataract     OD    CKD (chronic kidney disease) stage 3, GFR 30-59 ml/min 12/20/2020    Diabetes mellitus     Fibromyalgia     Gastroesophageal reflux disease     Hypercholesterolemia     Hypertension     Immune deficiency disorder     Obesity     Osteoporosis     Rheumatoid arthritis     Tobacco dependence     Trouble in sleeping     Type 2 diabetes mellitus        Family History   Problem Relation Age of Onset    Hypertension Mother     Cancer Father     Colon cancer Father     Breast cancer Sister          Environmental History: Pets in the home: dogs (1).  Tobacco Smoke in Home: no  Review of Systems   Constitutional: Negative for chills and fever.   Eyes: Negative for visual disturbance.   Respiratory: Positive for cough. Negative for shortness of breath.    Cardiovascular: Positive for leg swelling. Negative for chest pain.   Neurological: Negative for light-headedness and numbness.   All other systems reviewed and are negative.       Objective:    Physical Exam      Assessment:       1. Angioedema, initial encounter    2. Angioedema, subsequent encounter    3. Elevated IgE level    4. Elevated blood pressure reading         Plan:       Angioedema, initial encounter  -     Ambulatory " referral/consult to Allergy  -     C1 Esterase Inhibitor Panel; Future; Expected date: 08/15/2022  -     C1 Esterase Inhibitor, Functional; Future; Expected date: 08/15/2022  -     C1Q Binding Assay; Future; Expected date: 08/15/2022  -     CBC Auto Differential; Future; Expected date: 08/15/2022  -     Comprehensive Metabolic Panel; Future; Expected date: 08/15/2022  -     C3 Complement; Future; Expected date: 08/15/2022  -     C4 Complement; Future; Expected date: 08/15/2022  -     Protein Electrophoresis, Serum; Future; Expected date: 08/15/2022  -     FACTOR 12 ASSAY; Future; Expected date: 08/15/2022    Angioedema, subsequent encounter  -     Ambulatory referral/consult to Allergy    Elevated IgE level  -     Ambulatory referral/consult to Allergy    Elevated blood pressure reading       Advised that she needed to go to the ER due to elevated blood pressure. She reports not having blood pressure medication for greater than 2 weeks. She later stated that she had not had her BP medications for over a month. She denies shortness of breath, chest pain, headache, numbness or tingling. She did appear anxious and forgetful during the end of the visit. Concerns for a Stroke. 911 was called as she did not appear able to drive herself. Daughter was called who stated that she would meet her at the ER after she gets off from work.  EMS arrived /101. She was taken by ambulance to the ER.    LOBO SHIELDS spent a total of 45 minutes on the day of the visit.  This includes face to face time and non-face to face time preparing to see the patient (eg, review of tests), obtaining and/or reviewing separately obtained history, documenting clinical information in the electronic or other health record, independently interpreting results and communicating results to the patient/family/caregiver, or care coordinator.    CC: Dr. Roman Jr

## 2022-08-15 NOTE — TELEPHONE ENCOUNTER
Pt calling in, pt currently in an ambulance on the way to the hospital and pt is wanting refills for hydralazine, hydroxychloroquine, protonix, potassium chloride, and rosuvastatin. Pt was currently hospitalized and ran out of home meds. Unable to get refills at pharmacy. Dispo is call PCP now. Unable to transfer pt to PCP office as she is pulling up to ED now in the ambulance. Advised pt I will send message to PCP office and to have her daughter follow up with PCP office. Advised to call back with further concerns.    Reason for Disposition   [1] Prescription refill request for ESSENTIAL medicine (i.e., likelihood of harm to patient if not taken) AND [2] triager unable to refill per department policy    Protocols used: MEDICATION REFILL AND RENEWAL CALL-A-

## 2022-08-17 ENCOUNTER — TELEPHONE (OUTPATIENT)
Dept: INTERNAL MEDICINE | Facility: CLINIC | Age: 72
End: 2022-08-17
Payer: MEDICARE

## 2022-08-18 ENCOUNTER — OFFICE VISIT (OUTPATIENT)
Dept: OTOLARYNGOLOGY | Facility: CLINIC | Age: 72
End: 2022-08-18
Payer: MEDICARE

## 2022-08-18 DIAGNOSIS — J38.6 SGS (SUBGLOTTIC STENOSIS): Primary | ICD-10-CM

## 2022-08-18 DIAGNOSIS — J39.8 TRACHEAL STENOSIS: ICD-10-CM

## 2022-08-18 PROCEDURE — 31575 PR LARYNGOSCOPY, FLEXIBLE; DIAGNOSTIC: ICD-10-PCS | Mod: S$PBB,,, | Performed by: OTOLARYNGOLOGY

## 2022-08-18 PROCEDURE — 31575 DIAGNOSTIC LARYNGOSCOPY: CPT | Mod: PBBFAC | Performed by: OTOLARYNGOLOGY

## 2022-08-18 PROCEDURE — 99999 PR PBB SHADOW E&M-EST. PATIENT-LVL IV: CPT | Mod: PBBFAC,,, | Performed by: OTOLARYNGOLOGY

## 2022-08-18 PROCEDURE — 99214 OFFICE O/P EST MOD 30 MIN: CPT | Mod: PBBFAC | Performed by: OTOLARYNGOLOGY

## 2022-08-18 PROCEDURE — 99999 PR PBB SHADOW E&M-EST. PATIENT-LVL IV: ICD-10-PCS | Mod: PBBFAC,,, | Performed by: OTOLARYNGOLOGY

## 2022-08-18 PROCEDURE — 99214 OFFICE O/P EST MOD 30 MIN: CPT | Mod: 25,S$PBB,, | Performed by: OTOLARYNGOLOGY

## 2022-08-18 PROCEDURE — 99214 PR OFFICE/OUTPT VISIT, EST, LEVL IV, 30-39 MIN: ICD-10-PCS | Mod: 25,S$PBB,, | Performed by: OTOLARYNGOLOGY

## 2022-08-18 PROCEDURE — 31575 DIAGNOSTIC LARYNGOSCOPY: CPT | Mod: S$PBB,,, | Performed by: OTOLARYNGOLOGY

## 2022-08-18 NOTE — PROGRESS NOTES
[facility]  Department of Otorhinolaryngology and Communication Sciences    Page Grissom is a 71 y.o. female who presents to the [facility] for consultation at the kind request of Dr. Masoud Lebron for further evaluation of airway stenosis.     She is without any complaints at the time of today's visit aside from a nuisance cough.  She denies dyspnea.  She denies noisy breathing.  She has a history of asthma/bronchitis and prior tobacco abuse.  Nevertheless, she has not smoked since recent discharge. She was hospitalized for angioedema event in March 2022 which required cricothyrotomy converted to tracheostomy.  She was decannulated in May.  She had presented to Dr. Lebron in June with some mild dyspnea and stridor.  He noted deformity of the airway and recommended that I evaluate her.    Past Medical History  She has a past medical history of Acidosis, metabolic, with respiratory acidosis, Asthma, Back pain, Cataract, CKD (chronic kidney disease) stage 3, GFR 30-59 ml/min, Diabetes mellitus, Fibromyalgia, Gastroesophageal reflux disease, Hypercholesterolemia, Hypertension, Immune deficiency disorder, Obesity, Osteoporosis, Rheumatoid arthritis, Tobacco dependence, Trouble in sleeping, and Type 2 diabetes mellitus.    Past Surgical History  She has a past surgical history that includes Oophorectomy; Hernia repair; Colonoscopy (N/A, 2/14/2019); Tracheostomy (N/A, 3/30/2022); and Bronchoscopy (N/A, 3/30/2022).    Family History  Her family history includes Breast cancer in her sister; Cancer in her father; Colon cancer in her father; Hypertension in her mother.    Social History  She reports that she quit smoking about 3 months ago. Her smoking use included cigarettes. She has a 12.50 pack-year smoking history. She has never used smokeless tobacco. She reports that she does not drink alcohol and does not use drugs.    Allergies  She is allergic to lisinopril.    Medications  She has a current  medication list which includes the following prescription(s): acetaminophen, albuterol, blood sugar diagnostic, blood-glucose meter, chest congestion relief dm, cholecalciferol (vitamin d3), epinephrine, fish oil-omega-3 fatty acids, fluticasone furoate-vilanterol, hydralazine, hydroxychloroquine, lancets, magtab, multivitamin, pantoprazole, potassium chloride sa, rosuvastatin, [DISCONTINUED] amlodipine, [DISCONTINUED] hydrochlorothiazide, and [DISCONTINUED] metformin.    Review of Systems   Constitutional: Negative for fever.   HENT: Negative for sore throat.    Eyes: Negative for visual disturbance.   Respiratory: Negative for wheezing.    Cardiovascular: Negative for chest pain.   Gastrointestinal: Negative for nausea.   Musculoskeletal: Negative for arthralgias.   Skin: Negative for rash.   Neurological: Negative for tremors.   Hematological: Does not bruise/bleed easily.   Psychiatric/Behavioral: The patient is not nervous/anxious.           Objective:     VS reviewed     Physical Exam    Constitutional: comfortable, well dressed  Psychiatric: appropriate affect  Respiratory: comfortably breathing, symmetric chest rise, no stridor  Voice: mild gentle roughness  Cardiovascular: upper extremities non-edematous  Lymphatic: no cervical lymphadenopathy  Neurologic: alert and oriented to time, place, person, and situation; cranial nerves 3-12 grossly intact  Head: normocephalic  Eyes: conjunctivae and sclerae clear  Ears: normal pinnae, normal external auditory canals, tympanic membranes intact  Nose: mucosa pink and noncongested, no masses, no mucopurulence, no polyps  Oral cavity / oropharynx: no mucosal lesions  Neck: soft, full range of motion, laryngotracheal complex palpable with appropriate landmarks, larynx elevates on swallowing  Indirect laryngoscopy: limited due to gag    Procedure  Flexible Laryngoscopy (47240): Laryngoscopy is indicated for assessment of upper aerodigestive structure and function. This  was carried out transnasally with a distal chip videoendoscope. After verbal consent was obtained, the patient was positioned and the nose was topically decongested with 1% phenylephrine and topically anesthetized with 4% lidocaine. The endoscope was passed through the most patent nasal cavity and positioned to image the nasopharynx, larynx, and hypopharynx in detail. The following features were examined: nasopharyngeal, laryngeal, hypopharyngeal masses; velopharyngeal strength, closure, and symmetry of motion; vocal fold range and symmetry of motion; laryngeal mucosal edema, erythema, inflammation, and hydration; salivary pooling; and gross laryngeal sensation. The equipment was removed. The patient tolerated the procedure well without complication. All findings were normal except:  - very mild A-frame deformity of cervical trachea, grade 1 at most  - alisia visible  - no paralysis or paresis  - subglottis patent  - mild interarytenoid mucosal hypertrophy    Representative image:        Assessment:     Page Grissom is a 71 y.o. 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  She has mild post tracheostomy tracheal stenosis, from which she is presently asymptomatic.       Plan:        I had a discussion with the patient regarding her condition and the further workup and management options.      I recommended that she complete fine cut CT of the neck to further characterize the deformity.    I recommend continued surveillance.  She will follow-up with me in 4-6 weeks, or sooner if needed.    All questions were answered, and the patient is in agreement with the above.     Nick Newberry M.D.  [facility]  Department of Otorhinolaryngology and Communication Sciences

## 2022-08-19 DIAGNOSIS — E11.8 TYPE 2 DIABETES MELLITUS WITH COMPLICATION, WITHOUT LONG-TERM CURRENT USE OF INSULIN: ICD-10-CM

## 2022-08-19 DIAGNOSIS — I10 ESSENTIAL HYPERTENSION: ICD-10-CM

## 2022-08-19 NOTE — TELEPHONE ENCOUNTER
Refill Routing Note   Medication(s) are not appropriate for processing by [facility] for the following reason(s):      - Required laboratory values are outdated    [facility] action(s):  Defer          Medication reconciliation completed: No     Appointments  past 12m or future 3m with PCP    Date Provider   Last Visit   5/19/2022 THEA Roman Jr., MD   Next Visit   Visit date not found THEA Roman Jr., MD   ED visits in past 90 days: 1        Note composed:6:02 PM 08/19/2022

## 2022-08-19 NOTE — TELEPHONE ENCOUNTER
----- Message from [place] sent at 8/19/2022  9:17 AM CDT -----  .Type:  RX Refill Request    Who Called: self  Refill or New Rx: refill  RX Name and Strength:amlodipine 10 mg, metformin 500 mg  How is the patient currently taking it? (ex. 1XDay):1/day, 1/day  Is this a 30 day or 90 day RX:90  Preferred Pharmacy with phone number:.  [id number] - [place], [place] - [address] AT [facility] OF [address]  [address]  [place] [address]  Phone: [phone] Fax: [phone]      Local or Mail Order:local  Ordering Provider: mere  Would the patient rather a call back or a response via [facility]? call  Best Call Back Number:[phone]

## 2022-08-19 NOTE — TELEPHONE ENCOUNTER
No new care gaps identified.  [facility] Embedded Care Gaps. Reference number: [id number]. 8/19/2022   3:43:06 PM CDT

## 2022-08-22 RX ORDER — AMLODIPINE BESYLATE 10 MG/1
10 TABLET ORAL DAILY
Qty: 90 TABLET | Refills: 3 | Status: SHIPPED | OUTPATIENT
Start: 2022-08-22 | End: 2023-08-03

## 2022-08-22 RX ORDER — METFORMIN HYDROCHLORIDE 500 MG/1
500 TABLET ORAL DAILY
Qty: 90 TABLET | Refills: 3 | Status: SHIPPED | OUTPATIENT
Start: 2022-08-22 | End: 2023-07-31

## 2022-08-23 ENCOUNTER — OFFICE VISIT (OUTPATIENT)
Dept: ALLERGY | Facility: CLINIC | Age: 72
End: 2022-08-23
Payer: MEDICARE

## 2022-08-23 ENCOUNTER — OFFICE VISIT (OUTPATIENT)
Dept: INTERNAL MEDICINE | Facility: CLINIC | Age: 72
End: 2022-08-23
Payer: MEDICARE

## 2022-08-23 ENCOUNTER — LAB VISIT (OUTPATIENT)
Dept: LAB | Facility: HOSPITAL | Age: 72
End: 2022-08-23
Attending: PEDIATRICS
Payer: MEDICARE

## 2022-08-23 VITALS
HEART RATE: 84 BPM | BODY MASS INDEX: 39.67 KG/M2 | DIASTOLIC BLOOD PRESSURE: 80 MMHG | HEIGHT: 64 IN | TEMPERATURE: 98 F | SYSTOLIC BLOOD PRESSURE: 152 MMHG | WEIGHT: 232.38 LBS

## 2022-08-23 VITALS
BODY MASS INDEX: 39.18 KG/M2 | WEIGHT: 229.5 LBS | HEART RATE: 67 BPM | DIASTOLIC BLOOD PRESSURE: 88 MMHG | SYSTOLIC BLOOD PRESSURE: 138 MMHG | HEIGHT: 64 IN | OXYGEN SATURATION: 96 %

## 2022-08-23 DIAGNOSIS — T78.3XXA ANGIOEDEMA, INITIAL ENCOUNTER: ICD-10-CM

## 2022-08-23 DIAGNOSIS — T46.4X5D ANGIOTENSIN CONVERTING ENZYME INHIBITOR-AGGRAVATED ANGIOEDEMA, SUBSEQUENT ENCOUNTER: ICD-10-CM

## 2022-08-23 DIAGNOSIS — T46.4X5D ANGIOTENSIN CONVERTING ENZYME INHIBITOR-AGGRAVATED ANGIOEDEMA, SUBSEQUENT ENCOUNTER: Primary | ICD-10-CM

## 2022-08-23 DIAGNOSIS — I10 ESSENTIAL HYPERTENSION: Primary | ICD-10-CM

## 2022-08-23 DIAGNOSIS — T78.3XXD ANGIOTENSIN CONVERTING ENZYME INHIBITOR-AGGRAVATED ANGIOEDEMA, SUBSEQUENT ENCOUNTER: Primary | ICD-10-CM

## 2022-08-23 DIAGNOSIS — T78.3XXD ANGIOTENSIN CONVERTING ENZYME INHIBITOR-AGGRAVATED ANGIOEDEMA, SUBSEQUENT ENCOUNTER: ICD-10-CM

## 2022-08-23 LAB
ALBUMIN SERPL BCP-MCNC: 3.4 G/DL (ref 3.5–5.2)
ALBUMIN SERPL BCP-MCNC: 3.4 G/DL (ref 3.5–5.2)
ALP SERPL-CCNC: 101 U/L (ref 55–135)
ALP SERPL-CCNC: 101 U/L (ref 55–135)
ALT SERPL W/O P-5'-P-CCNC: 9 U/L (ref 10–44)
ALT SERPL W/O P-5'-P-CCNC: 9 U/L (ref 10–44)
ANION GAP SERPL CALC-SCNC: 8 MMOL/L (ref 8–16)
ANION GAP SERPL CALC-SCNC: 8 MMOL/L (ref 8–16)
AST SERPL-CCNC: 14 U/L (ref 10–40)
AST SERPL-CCNC: 14 U/L (ref 10–40)
BASOPHILS # BLD AUTO: 0.04 K/UL (ref 0–0.2)
BASOPHILS # BLD AUTO: 0.04 K/UL (ref 0–0.2)
BASOPHILS NFR BLD: 0.5 % (ref 0–1.9)
BASOPHILS NFR BLD: 0.5 % (ref 0–1.9)
BILIRUB SERPL-MCNC: 0.3 MG/DL (ref 0.1–1)
BILIRUB SERPL-MCNC: 0.3 MG/DL (ref 0.1–1)
BUN SERPL-MCNC: 17 MG/DL (ref 8–23)
BUN SERPL-MCNC: 17 MG/DL (ref 8–23)
C3 SERPL-MCNC: 161 MG/DL (ref 50–180)
C3 SERPL-MCNC: 161 MG/DL (ref 50–180)
C4 SERPL-MCNC: 29 MG/DL (ref 11–44)
C4 SERPL-MCNC: 29 MG/DL (ref 11–44)
CALCIUM SERPL-MCNC: 9.4 MG/DL (ref 8.7–10.5)
CALCIUM SERPL-MCNC: 9.4 MG/DL (ref 8.7–10.5)
CHLORIDE SERPL-SCNC: 103 MMOL/L (ref 95–110)
CHLORIDE SERPL-SCNC: 103 MMOL/L (ref 95–110)
CO2 SERPL-SCNC: 29 MMOL/L (ref 23–29)
CO2 SERPL-SCNC: 29 MMOL/L (ref 23–29)
CREAT SERPL-MCNC: 1.3 MG/DL (ref 0.5–1.4)
CREAT SERPL-MCNC: 1.3 MG/DL (ref 0.5–1.4)
DIFFERENTIAL METHOD: ABNORMAL
DIFFERENTIAL METHOD: ABNORMAL
EOSINOPHIL # BLD AUTO: 0.2 K/UL (ref 0–0.5)
EOSINOPHIL # BLD AUTO: 0.2 K/UL (ref 0–0.5)
EOSINOPHIL NFR BLD: 2.9 % (ref 0–8)
EOSINOPHIL NFR BLD: 2.9 % (ref 0–8)
ERYTHROCYTE [DISTWIDTH] IN BLOOD BY AUTOMATED COUNT: 14.4 % (ref 11.5–14.5)
ERYTHROCYTE [DISTWIDTH] IN BLOOD BY AUTOMATED COUNT: 14.4 % (ref 11.5–14.5)
EST. GFR  (NO RACE VARIABLE): 44.2 ML/MIN/1.73 M^2
EST. GFR  (NO RACE VARIABLE): 44.2 ML/MIN/1.73 M^2
GLUCOSE SERPL-MCNC: 79 MG/DL (ref 70–110)
GLUCOSE SERPL-MCNC: 79 MG/DL (ref 70–110)
HCT VFR BLD AUTO: 37 % (ref 37–48.5)
HCT VFR BLD AUTO: 37 % (ref 37–48.5)
HGB BLD-MCNC: 10.9 G/DL (ref 12–16)
HGB BLD-MCNC: 10.9 G/DL (ref 12–16)
IMM GRANULOCYTES # BLD AUTO: 0.01 K/UL (ref 0–0.04)
IMM GRANULOCYTES # BLD AUTO: 0.01 K/UL (ref 0–0.04)
IMM GRANULOCYTES NFR BLD AUTO: 0.1 % (ref 0–0.5)
IMM GRANULOCYTES NFR BLD AUTO: 0.1 % (ref 0–0.5)
LYMPHOCYTES # BLD AUTO: 2.2 K/UL (ref 1–4.8)
LYMPHOCYTES # BLD AUTO: 2.2 K/UL (ref 1–4.8)
LYMPHOCYTES NFR BLD: 28.8 % (ref 18–48)
LYMPHOCYTES NFR BLD: 28.8 % (ref 18–48)
MCH RBC QN AUTO: 24.9 PG (ref 27–31)
MCH RBC QN AUTO: 24.9 PG (ref 27–31)
MCHC RBC AUTO-ENTMCNC: 29.5 G/DL (ref 32–36)
MCHC RBC AUTO-ENTMCNC: 29.5 G/DL (ref 32–36)
MCV RBC AUTO: 85 FL (ref 82–98)
MCV RBC AUTO: 85 FL (ref 82–98)
MONOCYTES # BLD AUTO: 0.6 K/UL (ref 0.3–1)
MONOCYTES # BLD AUTO: 0.6 K/UL (ref 0.3–1)
MONOCYTES NFR BLD: 8.2 % (ref 4–15)
MONOCYTES NFR BLD: 8.2 % (ref 4–15)
NEUTROPHILS # BLD AUTO: 4.6 K/UL (ref 1.8–7.7)
NEUTROPHILS # BLD AUTO: 4.6 K/UL (ref 1.8–7.7)
NEUTROPHILS NFR BLD: 59.5 % (ref 38–73)
NEUTROPHILS NFR BLD: 59.5 % (ref 38–73)
NRBC BLD-RTO: 0 /100 WBC
NRBC BLD-RTO: 0 /100 WBC
PLATELET # BLD AUTO: 282 K/UL (ref 150–450)
PLATELET # BLD AUTO: 282 K/UL (ref 150–450)
PMV BLD AUTO: 11.5 FL (ref 9.2–12.9)
PMV BLD AUTO: 11.5 FL (ref 9.2–12.9)
POTASSIUM SERPL-SCNC: 4 MMOL/L (ref 3.5–5.1)
POTASSIUM SERPL-SCNC: 4 MMOL/L (ref 3.5–5.1)
PROT SERPL-MCNC: 7.9 G/DL (ref 6–8.4)
PROT SERPL-MCNC: 7.9 G/DL (ref 6–8.4)
RBC # BLD AUTO: 4.38 M/UL (ref 4–5.4)
RBC # BLD AUTO: 4.38 M/UL (ref 4–5.4)
SODIUM SERPL-SCNC: 140 MMOL/L (ref 136–145)
SODIUM SERPL-SCNC: 140 MMOL/L (ref 136–145)
WBC # BLD AUTO: 7.67 K/UL (ref 3.9–12.7)
WBC # BLD AUTO: 7.67 K/UL (ref 3.9–12.7)

## 2022-08-23 PROCEDURE — 80053 COMPREHEN METABOLIC PANEL: CPT | Performed by: ALLERGY & IMMUNOLOGY

## 2022-08-23 PROCEDURE — 84165 PATHOLOGIST INTERPRETATION SPE: ICD-10-PCS | Mod: 26,,, | Performed by: PATHOLOGY

## 2022-08-23 PROCEDURE — 99214 OFFICE O/P EST MOD 30 MIN: CPT | Mod: PBBFAC,25,27 | Performed by: ALLERGY & IMMUNOLOGY

## 2022-08-23 PROCEDURE — 99999 PR PBB SHADOW E&M-EST. PATIENT-LVL IV: CPT | Mod: PBBFAC,,, | Performed by: ALLERGY & IMMUNOLOGY

## 2022-08-23 PROCEDURE — 85025 COMPLETE CBC W/AUTO DIFF WBC: CPT | Performed by: ALLERGY & IMMUNOLOGY

## 2022-08-23 PROCEDURE — 99214 OFFICE O/P EST MOD 30 MIN: CPT | Mod: PBBFAC,25 | Performed by: PHYSICIAN ASSISTANT

## 2022-08-23 PROCEDURE — 86161 COMPLEMENT/FUNCTION ACTIVITY: CPT | Performed by: ALLERGY & IMMUNOLOGY

## 2022-08-23 PROCEDURE — 84165 PROTEIN E-PHORESIS SERUM: CPT | Performed by: ALLERGY & IMMUNOLOGY

## 2022-08-23 PROCEDURE — 85280 CLOT FACTOR XII HAGEMAN: CPT | Performed by: ALLERGY & IMMUNOLOGY

## 2022-08-23 PROCEDURE — 99999 PR PBB SHADOW E&M-EST. PATIENT-LVL IV: ICD-10-PCS | Mod: PBBFAC,,, | Performed by: PHYSICIAN ASSISTANT

## 2022-08-23 PROCEDURE — 99214 OFFICE O/P EST MOD 30 MIN: CPT | Mod: S$PBB,,, | Performed by: ALLERGY & IMMUNOLOGY

## 2022-08-23 PROCEDURE — 99214 OFFICE O/P EST MOD 30 MIN: CPT | Mod: S$PBB,,, | Performed by: PHYSICIAN ASSISTANT

## 2022-08-23 PROCEDURE — 99999 PR PBB SHADOW E&M-EST. PATIENT-LVL IV: ICD-10-PCS | Mod: PBBFAC,,, | Performed by: ALLERGY & IMMUNOLOGY

## 2022-08-23 PROCEDURE — 86332 IMMUNE COMPLEX ASSAY: CPT | Performed by: ALLERGY & IMMUNOLOGY

## 2022-08-23 PROCEDURE — 99999 PR PBB SHADOW E&M-EST. PATIENT-LVL IV: CPT | Mod: PBBFAC,,, | Performed by: PHYSICIAN ASSISTANT

## 2022-08-23 PROCEDURE — 86160 COMPLEMENT ANTIGEN: CPT | Performed by: ALLERGY & IMMUNOLOGY

## 2022-08-23 PROCEDURE — 99214 PR OFFICE/OUTPT VISIT, EST, LEVL IV, 30-39 MIN: ICD-10-PCS | Mod: S$PBB,,, | Performed by: PHYSICIAN ASSISTANT

## 2022-08-23 PROCEDURE — 86160 COMPLEMENT ANTIGEN: CPT | Mod: 59 | Performed by: ALLERGY & IMMUNOLOGY

## 2022-08-23 PROCEDURE — 84165 PROTEIN E-PHORESIS SERUM: CPT | Mod: 26,,, | Performed by: PATHOLOGY

## 2022-08-23 PROCEDURE — 99214 PR OFFICE/OUTPT VISIT, EST, LEVL IV, 30-39 MIN: ICD-10-PCS | Mod: S$PBB,,, | Performed by: ALLERGY & IMMUNOLOGY

## 2022-08-23 PROCEDURE — 36415 COLL VENOUS BLD VENIPUNCTURE: CPT | Performed by: ALLERGY & IMMUNOLOGY

## 2022-08-23 RX ORDER — CETIRIZINE HYDROCHLORIDE 10 MG/1
10 TABLET ORAL DAILY
Qty: 90 TABLET | Refills: 3 | Status: SHIPPED | OUTPATIENT
Start: 2022-08-23 | End: 2023-10-27

## 2022-08-23 RX ORDER — HYDRALAZINE HYDROCHLORIDE 25 MG/1
25 TABLET, FILM COATED ORAL EVERY 8 HOURS
Qty: 270 TABLET | Refills: 0 | Status: SHIPPED | OUTPATIENT
Start: 2022-08-23 | End: 2022-12-13

## 2022-08-23 RX ORDER — FAMOTIDINE 40 MG/1
40 TABLET, FILM COATED ORAL DAILY
Qty: 90 TABLET | Refills: 3 | Status: SHIPPED | OUTPATIENT
Start: 2022-08-23 | End: 2024-02-16

## 2022-08-23 NOTE — PROGRESS NOTES
Subjective:      Patient ID: Page Grissom is a 71 y.o. female.    Chief Complaint: Follow-up    Here toady for an ER follow up for hypertensive urgency. Was out of BP meds after d/c'd lisinopril due to angioedema.  Potassium low last week in the ER. Being rechecked today in CMP. Pt was out of her potassium at the time.   PCP re-prescribed her meds yesterday. BP much better on amlodipine.   BP stable and controlled on amlodipine 10 mg and hydralazine 25mg TID.   Scheduled for follow up and labs with pcp in November.     According to chart pt d/c'd from [facility]. Pt unsure if still taking.   Hypertension  This is a chronic problem. Associated symptoms include shortness of breath. Pertinent negatives include no anxiety, blurred vision, chest pain, headaches, malaise/fatigue, neck pain, orthopnea, palpitations, peripheral edema, PND or sweats. There are no associated agents to hypertension.     Patient Active Problem List   Diagnosis    Rheumatoid arthritis involving multiple sites with positive rheumatoid factor    High risk medication use    Vitamin D deficiency disease    Mild persistent asthma without complication    Diabetes mellitus type 2 with complications    GERD (gastroesophageal reflux disease)    Essential hypertension    Hyperlipidemia associated with type 2 diabetes mellitus    Fibromyalgia    Long-term use of immunosuppressant medication    Cigarette smoker    Class 3 severe obesity due to excess calories with serious comorbidity and body mass index (BMI) of 40.0 to 44.9 in adult    CKD (chronic kidney disease) stage 3, GFR 30-59 ml/min    Secondary hyperparathyroidism of renal origin    Aortic atherosclerosis    Age-related osteoporosis without current pathological fracture    Acute airway obstruction    Angioedema    Tracheostomy in place    Acute kidney injury superimposed on chronic kidney disease    Morbid obesity    Hypokalemia    Nonsustained ventricular tachycardia     Dysphonia       Current Outpatient Medications:     acetaminophen (TYLENOL) 500 MG tablet, Take 500 mg by mouth as needed for Pain., Disp: , Rfl:     albuterol (PROVENTIL/VENTOLIN HFA) 90 mcg/actuation inhaler, INHALE 2 PUFFS INTO THE LINGS EVERY 6 HOURS AS NEEDED WHEEZING, Disp: 25.5 g, Rfl: 3    amLODIPine (NORVASC) 10 MG tablet, Take 1 tablet (10 mg total) by mouth once daily., Disp: 90 tablet, Rfl: 3    blood sugar diagnostic Strp, To check BG 1 times daily, to use with insurance preferred meter, Disp: 100 strip, Rfl: 11    blood-glucose meter kit, To check BG 1 times daily, to use with insurance preferred meter, Disp: 1 each, Rfl: 0    cetirizine (ZYRTEC) 10 MG tablet, Take 1 tablet (10 mg total) by mouth once daily., Disp: 90 tablet, Rfl: 3    cholecalciferol, vitamin D3, 2,000 unit Tab, Take 2,000 Units by mouth once daily., Disp: , Rfl:     EPINEPHrine (EPIPEN 2-DANA) 0.3 mg/0.3 mL AtIn, Inject 0.3 mLs (0.3 mg total) into the muscle once. for 1 dose, Disp: 1 each, Rfl: 11    famotidine (PEPCID) 40 MG tablet, Take 1 tablet (40 mg total) by mouth once daily., Disp: 90 tablet, Rfl: 3    fish oil-omega-3 fatty acids 300-1,000 mg capsule, Take 2 g by mouth once daily., Disp: , Rfl:     fluticasone furoate-vilanteroL (BREO ELLIPTA) 100-25 mcg/dose diskus inhaler, Inhale 1 puff into the lungs once daily. Controller, Disp: 60 each, Rfl: 6    hydrOXYchloroQUINE (PLAQUENIL) 200 mg tablet, Take 1 tablet (200 mg total) by mouth once daily., Disp: 90 tablet, Rfl: 1    lancets Misc, To check BG 1 times daily, to use with insurance preferred meter, Disp: 100 each, Rfl: 11    metFORMIN (GLUCOPHAGE) 500 MG tablet, Take 1 tablet (500 mg total) by mouth once daily., Disp: 90 tablet, Rfl: 3    multivitamin (THERAGRAN) per tablet, Take 1 tablet by mouth once daily., Disp: , Rfl:     pantoprazole (PROTONIX) 40 MG tablet, Take 1 tablet (40 mg total) by mouth once daily., Disp: 90 tablet, Rfl: 3    potassium  chloride SA (K-DUR,KLOR-CON) 20 MEQ tablet, Take 1 tablet (20 mEq total) by mouth once daily., Disp: 90 tablet, Rfl: 3    rosuvastatin (CRESTOR) 40 MG Tab, TAKE 1 TABLET(40 MG) BY MOUTH EVERY EVENING, Disp: 90 tablet, Rfl: 3    CHEST CONGESTION RELIEF DM  mg/5 mL Liqd, Take 10 mLs by mouth every 6 (six) hours as needed., Disp: , Rfl:     hydrALAZINE (APRESOLINE) 25 MG tablet, Take 1 tablet (25 mg total) by mouth every 8 (eight) hours., Disp: 270 tablet, Rfl: 0    MAGTAB 84 mg TbSR, Take 84 mg by mouth once daily at 6am., Disp: , Rfl:   Health Maintenance Due   Topic Date Due    Shingles Vaccine (2 of 3) 06/25/2015    COVID-19 Vaccine (3 - Booster for Pfizer series) 02/17/2022       Review of Systems   Constitutional: Negative for activity change, appetite change, chills, diaphoresis, fatigue, fever, malaise/fatigue and unexpected weight change.   HENT: Negative.  Negative for congestion, hearing loss, postnasal drip, rhinorrhea, sore throat, trouble swallowing and voice change.    Eyes: Negative.  Negative for blurred vision and visual disturbance.   Respiratory: Positive for shortness of breath. Negative for cough, choking, chest tightness, wheezing and stridor.    Cardiovascular: Negative for chest pain, palpitations, orthopnea, leg swelling and PND.   Gastrointestinal: Negative for abdominal distention, abdominal pain, blood in stool, constipation, diarrhea, nausea and vomiting.   Endocrine: Negative for cold intolerance, heat intolerance, polydipsia and polyuria.   Genitourinary: Negative.  Negative for difficulty urinating and frequency.   Musculoskeletal: Negative for arthralgias, back pain, gait problem, joint swelling, myalgias and neck pain.   Skin: Negative for color change, pallor, rash and wound.   Neurological: Negative for dizziness, tremors, weakness, light-headedness, numbness and headaches.   Hematological: Negative for adenopathy.   Psychiatric/Behavioral: Negative for behavioral  "problems, confusion, self-injury, sleep disturbance and suicidal ideas. The patient is not nervous/anxious.      Objective:   /88 (BP Location: Left arm, Patient Position: Sitting, BP Method: Large (Manual))   Pulse 67   Ht 5' 4" (1.626 m)   Wt 104.1 kg (229 lb 8 oz)   SpO2 96%   BMI 39.39 kg/m²     Physical Exam  Vitals reviewed.   Constitutional:       General: She is not in acute distress.     Appearance: Normal appearance. She is well-developed. She is obese. She is not ill-appearing, toxic-appearing or diaphoretic.   HENT:      Head: Normocephalic and atraumatic.      Right Ear: External ear normal.      Left Ear: External ear normal.      Nose: Nose normal.   Eyes:      Conjunctiva/sclera: Conjunctivae normal.      Pupils: Pupils are equal, round, and reactive to light.   Cardiovascular:      Rate and Rhythm: Normal rate and regular rhythm.      Heart sounds: Normal heart sounds. No murmur heard.    No friction rub. No gallop.   Pulmonary:      Effort: Pulmonary effort is normal. No respiratory distress.      Breath sounds: Normal breath sounds. No wheezing or rales.   Chest:      Chest wall: No tenderness.   Abdominal:      General: There is no distension.      Palpations: Abdomen is soft.      Tenderness: There is no abdominal tenderness.   Musculoskeletal:         General: Normal range of motion.      Cervical back: Normal range of motion and neck supple.   Lymphadenopathy:      Cervical: No cervical adenopathy.   Skin:     General: Skin is warm and dry.   Neurological:      Mental Status: She is alert and oriented to person, place, and time.   Psychiatric:         Behavior: Behavior normal.         Thought Content: Thought content normal.         Judgment: Judgment normal.       Lab Results   Component Value Date    WBC 7.46 08/15/2022    HGB 10.6 (L) 08/15/2022    HCT 34.0 (L) 08/15/2022    MCV 79 (L) 08/15/2022     08/15/2022       CMP  Sodium   Date Value Ref Range Status   08/15/2022 " 144 136 - 145 mmol/L Final     Potassium   Date Value Ref Range Status   08/15/2022 3.0 (L) 3.5 - 5.1 mmol/L Final     Chloride   Date Value Ref Range Status   08/15/2022 104 95 - 110 mmol/L Final     CO2   Date Value Ref Range Status   08/15/2022 27 23 - 29 mmol/L Final     Glucose   Date Value Ref Range Status   08/15/2022 97 70 - 110 mg/dL Final     BUN   Date Value Ref Range Status   08/15/2022 17 8 - 23 mg/dL Final     Creatinine   Date Value Ref Range Status   08/15/2022 1.3 0.5 - 1.4 mg/dL Final     Calcium   Date Value Ref Range Status   08/15/2022 10.1 8.7 - 10.5 mg/dL Final     Total Protein   Date Value Ref Range Status   08/15/2022 7.4 6.0 - 8.4 g/dL Final     Albumin   Date Value Ref Range Status   08/15/2022 3.2 (L) 3.5 - 5.2 g/dL Final     Total Bilirubin   Date Value Ref Range Status   08/15/2022 0.3 0.1 - 1.0 mg/dL Final     Comment:     For infants and newborns, interpretation of results should be based  on gestational age, weight and in agreement with clinical  observations.    Premature Infant recommended reference ranges:  Up to 24 hours.............<8.0 mg/dL  Up to 48 hours............<12.0 mg/dL  3-5 days..................<15.0 mg/dL  6-29 days.................<15.0 mg/dL       Alkaline Phosphatase   Date Value Ref Range Status   08/15/2022 86 55 - 135 U/L Final     AST   Date Value Ref Range Status   08/15/2022 13 10 - 40 U/L Final     ALT   Date Value Ref Range Status   08/15/2022 8 (L) 10 - 44 U/L Final     Anion Gap   Date Value Ref Range Status   08/15/2022 13 8 - 16 mmol/L Final     eGFR if    Date Value Ref Range Status   06/17/2022 48.0 (A) >60 mL/min/1.73 m^2 Final     eGFR if non    Date Value Ref Range Status   06/17/2022 41.7 (A) >60 mL/min/1.73 m^2 Final     Comment:     Calculation used to obtain the estimated glomerular filtration  rate (eGFR) is the CKD-EPI equation.          Assessment:     1. Essential hypertension      Plan:   Essential  hypertension  -     hydrALAZINE (APRESOLINE) 25 MG tablet; Take 1 tablet (25 mg total) by mouth every 8 (eight) hours.  Dispense: 270 tablet; Refill: 0    -recheck blood pressure in 10 days off [facility].   -continue amlodipine and hydralazine as prescribed for blood pressure.     Follow up in about 2 weeks (around 9/6/2022), or if symptoms worsen or fail to improve.

## 2022-08-23 NOTE — PROGRESS NOTES
"Subjective:       Patient ID: Page Grissom is a 71 y.o. female.    Referred by Dr. THEA Roman    Chief Complaint:  Follow-up      HPI today: 71 year old female with a history of ACE inhibitor angioedema here for follow up. She was seen last week and found to have an elevated BP. She was sent to the ER. She is back today to further discuss her angioedema. NO angioedema, since her previous visit. NO lisinopril since March of this year.    Albuterol twice a day  Ex-smoker- stopped in March  Smoked for 60 years- 1 pack a day        HPI 8/15/2022: 71 year old female referred with a history of angioedema thought to be related to lisinopril. She thinks it was "the bone infusion".  Lisinopril was stopped. She was hospitalized in March. She was intubated and trached due to swelling. No swelling, since she was discharged in March.    She is not taking medications "because [facility] won't give them to me".    She has an Epipen 2pack.      Past Medical History:   Diagnosis Date    Acidosis, metabolic, with respiratory acidosis 3/31/2022    Asthma     Back pain     Cataract     OD    CKD (chronic kidney disease) stage 3, GFR 30-59 ml/min 12/20/2020    Diabetes mellitus     Fibromyalgia     Gastroesophageal reflux disease     Hypercholesterolemia     Hypertension     Immune deficiency disorder     Obesity     Osteoporosis     Rheumatoid arthritis     Tobacco dependence     Trouble in sleeping     Type 2 diabetes mellitus        Family History   Problem Relation Age of Onset    Hypertension Mother     Cancer Father     Colon cancer Father     Breast cancer Sister          Environmental History: Pets in the home: dogs (1).  Tobacco Smoke in Home: no  Review of Systems   Constitutional: Negative for chills and fever.   HENT: Negative for congestion and rhinorrhea.    Eyes: Negative for discharge, itching and visual disturbance.   Respiratory: Positive for cough. Negative for shortness of breath.  "   Cardiovascular: Positive for leg swelling. Negative for chest pain.   Gastrointestinal: Negative for nausea and vomiting.        GERD   Skin: Negative for rash and wound.   Allergic/Immunologic: Positive for environmental allergies. Negative for food allergies.   Neurological: Negative for light-headedness and numbness.   Hematological: Negative for adenopathy. Does not bruise/bleed easily.   Psychiatric/Behavioral: Negative for behavioral problems and suicidal ideas.   All other systems reviewed and are negative.       Objective:    Physical Exam  Vitals reviewed.   Constitutional:       General: She is not in acute distress.     Appearance: Normal appearance. She is well-developed. She is obese. She is not ill-appearing, toxic-appearing or diaphoretic.   HENT:      Head: Normocephalic and atraumatic.      Right Ear: Tympanic membrane, ear canal and external ear normal. There is no impacted cerumen.      Left Ear: Tympanic membrane, ear canal and external ear normal. There is no impacted cerumen.      Nose: Nose normal. No congestion or rhinorrhea.      Mouth/Throat:      Pharynx: No oropharyngeal exudate or posterior oropharyngeal erythema.   Eyes:      General: No scleral icterus.        Right eye: No discharge.         Left eye: No discharge.      Pupils: Pupils are equal, round, and reactive to light.   Neck:      Thyroid: No thyromegaly.   Cardiovascular:      Rate and Rhythm: Normal rate and regular rhythm.      Heart sounds: Normal heart sounds. No murmur heard.    No friction rub. No gallop.   Pulmonary:      Effort: Pulmonary effort is normal. No respiratory distress.      Breath sounds: Normal breath sounds. No stridor. No wheezing, rhonchi or rales.   Chest:      Chest wall: No tenderness.   Abdominal:      General: Bowel sounds are normal. There is no distension.      Palpations: Abdomen is soft. There is no mass.      Tenderness: There is no abdominal tenderness. There is no guarding or rebound.       Hernia: A hernia is present.   Musculoskeletal:         General: No swelling, tenderness, deformity or signs of injury. Normal range of motion.      Cervical back: Normal range of motion and neck supple. No rigidity. No muscular tenderness.      Right lower leg: No edema.      Left lower leg: No edema.   Lymphadenopathy:      Cervical: No cervical adenopathy.   Skin:     General: Skin is warm.      Coloration: Skin is not jaundiced or pale.      Findings: No bruising or erythema.   Neurological:      Mental Status: She is alert and oriented to person, place, and time.      Gait: Gait normal.   Psychiatric:         Mood and Affect: Mood normal.         Behavior: Behavior normal.         Thought Content: Thought content normal.         Judgment: Judgment normal.           Assessment:       1. Angiotensin converting enzyme inhibitor-aggravated angioedema, subsequent encounter         Plan:       Angiotensin converting enzyme inhibitor-aggravated angioedema, subsequent encounter  -     C1 Esterase Inhibitor Panel; Future; Expected date: 08/23/2022  -     C1 Esterase Inhibitor, Functional; Future; Expected date: 08/23/2022  -     C1Q Binding Assay; Future; Expected date: 08/23/2022  -     CBC Auto Differential; Future; Expected date: 08/23/2022  -     Comprehensive Metabolic Panel; Future; Expected date: 08/23/2022  -     C3 Complement; Future; Expected date: 08/23/2022  -     C4 Complement; Future; Expected date: 08/23/2022  -     Protein Electrophoresis, Serum; Future; Expected date: 08/23/2022  -     FACTOR 12 ASSAY; Future; Expected date: 08/23/2022  -     cetirizine (ZYRTEC) 10 MG tablet; Take 1 tablet (10 mg total) by mouth once daily.  Dispense: 90 tablet; Refill: 3  -     famotidine (PEPCID) 40 MG tablet; Take 1 tablet (40 mg total) by mouth once daily.  Dispense: 90 tablet; Refill: 3    Angioedema was likely related to lisinopril. Will rule out other causes. Can see angioedema for 12 months after stopping ACE  inhibitor, with most episodes occurring the first three months after it was stopped.    Epipen 2 pack  Zyrtec and Famotidine daily  RTC 2 months, will contact with lab results.            [employer] spent a total of 30 minutes on the day of the visit.  This includes face to face time and non-face to face time preparing to see the patient (eg, review of tests), obtaining and/or reviewing separately obtained history, documenting clinical information in the electronic or other health record, independently interpreting results and communicating results to the patient/family/caregiver, or care coordinator.    CC: Dr. Roman, Jr

## 2022-08-24 LAB
ALBUMIN SERPL ELPH-MCNC: 3.68 G/DL (ref 3.35–5.55)
ALBUMIN SERPL ELPH-MCNC: 3.68 G/DL (ref 3.35–5.55)
ALPHA1 GLOB SERPL ELPH-MCNC: 0.38 G/DL (ref 0.17–0.41)
ALPHA1 GLOB SERPL ELPH-MCNC: 0.38 G/DL (ref 0.17–0.41)
ALPHA2 GLOB SERPL ELPH-MCNC: 0.98 G/DL (ref 0.43–0.99)
ALPHA2 GLOB SERPL ELPH-MCNC: 0.98 G/DL (ref 0.43–0.99)
B-GLOBULIN SERPL ELPH-MCNC: 0.95 G/DL (ref 0.5–1.1)
B-GLOBULIN SERPL ELPH-MCNC: 0.95 G/DL (ref 0.5–1.1)
FACT XII ACT/NOR PPP: 143 % (ref 30–130)
FACT XII ACT/NOR PPP: 143 % (ref 30–130)
GAMMA GLOB SERPL ELPH-MCNC: 1.41 G/DL (ref 0.67–1.58)
GAMMA GLOB SERPL ELPH-MCNC: 1.41 G/DL (ref 0.67–1.58)
PROT SERPL-MCNC: 7.4 G/DL (ref 6–8.4)
PROT SERPL-MCNC: 7.4 G/DL (ref 6–8.4)

## 2022-08-25 LAB — PATHOLOGIST INTERPRETATION SPE: NORMAL

## 2022-08-27 LAB
C1INH SERPL-MCNC: 25 MG/DL (ref 21–39)
C1INH SERPL-MCNC: 25 MG/DL (ref 21–39)

## 2022-08-30 LAB
IC SERPL C1Q BIND-ACNC: 2.7 UG EQ/ML (ref 0–3.9)
IC SERPL C1Q BIND-ACNC: 2.7 UG EQ/ML (ref 0–3.9)

## 2022-09-01 LAB
C1INH FUNCTIONAL/C1INH TOTAL MFR SERPL: >100 %
C1INH FUNCTIONAL/C1INH TOTAL MFR SERPL: >100 %

## 2022-09-06 ENCOUNTER — HOSPITAL ENCOUNTER (OUTPATIENT)
Dept: CARDIOLOGY | Facility: HOSPITAL | Age: 72
Discharge: HOME | End: 2022-09-06
Payer: MEDICARE

## 2022-09-06 ENCOUNTER — OFFICE VISIT (OUTPATIENT)
Dept: INTERNAL MEDICINE | Facility: CLINIC | Age: 72
End: 2022-09-06
Payer: MEDICARE

## 2022-09-06 ENCOUNTER — HOSPITAL ENCOUNTER (OUTPATIENT)
Dept: RADIOLOGY | Facility: HOSPITAL | Age: 72
Discharge: HOME | End: 2022-09-06
Attending: PHYSICIAN ASSISTANT
Payer: MEDICARE

## 2022-09-06 VITALS
SYSTOLIC BLOOD PRESSURE: 128 MMHG | BODY MASS INDEX: 39.55 KG/M2 | OXYGEN SATURATION: 93 % | HEIGHT: 64 IN | DIASTOLIC BLOOD PRESSURE: 66 MMHG | WEIGHT: 231.69 LBS | HEART RATE: 84 BPM | TEMPERATURE: 98 F

## 2022-09-06 DIAGNOSIS — I70.0 AORTIC ATHEROSCLEROSIS: ICD-10-CM

## 2022-09-06 DIAGNOSIS — E78.5 HYPERLIPIDEMIA ASSOCIATED WITH TYPE 2 DIABETES MELLITUS: ICD-10-CM

## 2022-09-06 DIAGNOSIS — I10 ESSENTIAL HYPERTENSION: Primary | ICD-10-CM

## 2022-09-06 DIAGNOSIS — E11.69 HYPERLIPIDEMIA ASSOCIATED WITH TYPE 2 DIABETES MELLITUS: ICD-10-CM

## 2022-09-06 DIAGNOSIS — E11.8 DIABETES MELLITUS TYPE 2 WITH COMPLICATIONS: ICD-10-CM

## 2022-09-06 DIAGNOSIS — E87.6 HYPOKALEMIA: ICD-10-CM

## 2022-09-06 DIAGNOSIS — R07.89 CHEST WALL PAIN: ICD-10-CM

## 2022-09-06 DIAGNOSIS — R09.89 ABNORMAL LUNG SOUNDS: ICD-10-CM

## 2022-09-06 PROCEDURE — 93010 ELECTROCARDIOGRAM REPORT: CPT | Mod: ,,, | Performed by: INTERNAL MEDICINE

## 2022-09-06 PROCEDURE — 99215 OFFICE O/P EST HI 40 MIN: CPT | Mod: PBBFAC | Performed by: PHYSICIAN ASSISTANT

## 2022-09-06 PROCEDURE — 71046 X-RAY EXAM CHEST 2 VIEWS: CPT | Mod: 26,,, | Performed by: RADIOLOGY

## 2022-09-06 PROCEDURE — 99999 PR PBB SHADOW E&M-EST. PATIENT-LVL V: CPT | Mod: PBBFAC,,, | Performed by: PHYSICIAN ASSISTANT

## 2022-09-06 PROCEDURE — 99999 PR PBB SHADOW E&M-EST. PATIENT-LVL V: ICD-10-PCS | Mod: PBBFAC,,, | Performed by: PHYSICIAN ASSISTANT

## 2022-09-06 PROCEDURE — 93010 EKG 12-LEAD: ICD-10-PCS | Mod: ,,, | Performed by: INTERNAL MEDICINE

## 2022-09-06 PROCEDURE — 99213 PR OFFICE/OUTPT VISIT, EST, LEVL III, 20-29 MIN: ICD-10-PCS | Mod: S$PBB,,, | Performed by: PHYSICIAN ASSISTANT

## 2022-09-06 PROCEDURE — 71046 X-RAY EXAM CHEST 2 VIEWS: CPT | Mod: TC

## 2022-09-06 PROCEDURE — 71046 XR CHEST PA AND LATERAL: ICD-10-PCS | Mod: 26,,, | Performed by: RADIOLOGY

## 2022-09-06 PROCEDURE — 99213 OFFICE O/P EST LOW 20 MIN: CPT | Mod: S$PBB,,, | Performed by: PHYSICIAN ASSISTANT

## 2022-09-06 PROCEDURE — 93005 ELECTROCARDIOGRAM TRACING: CPT

## 2022-09-06 NOTE — PROGRESS NOTES
Subjective:      Patient ID: Page Grissom is a 71 y.o. female.    Chief Complaint: Follow-up    HPI  Ms. Grissom is here today for a blood pressure follow up.   Currently on amlodipine 10mg and hydralazine 50mg TID.   Blood pressure has been great on the meds. Stable and controlled.     Pt reports a burning pain in her left breast on and off every few days for the past 2 weeks. Episodes last a few seconds. No rash. NO breast lumps. Up to date on mammogram.   Scheduled for PFTs and lung tests later this week. Does reports a cough with discolored sputum.     Patient Active Problem List   Diagnosis    Rheumatoid arthritis involving multiple sites with positive rheumatoid factor    High risk medication use    Vitamin D deficiency disease    Mild persistent asthma without complication    Diabetes mellitus type 2 with complications    GERD (gastroesophageal reflux disease)    Essential hypertension    Hyperlipidemia associated with type 2 diabetes mellitus    Fibromyalgia    Long-term use of immunosuppressant medication    Cigarette smoker    Class 3 severe obesity due to excess calories with serious comorbidity and body mass index (BMI) of 40.0 to 44.9 in adult    CKD (chronic kidney disease) stage 3, GFR 30-59 ml/min    Secondary hyperparathyroidism of renal origin    Aortic atherosclerosis    Age-related osteoporosis without current pathological fracture    Acute airway obstruction    Angioedema    Tracheostomy in place    Acute kidney injury superimposed on chronic kidney disease    Morbid obesity    Hypokalemia    Nonsustained ventricular tachycardia    Dysphonia       Current Outpatient Medications:     acetaminophen (TYLENOL) 500 MG tablet, Take 500 mg by mouth as needed for Pain., Disp: , Rfl:     albuterol (PROVENTIL/VENTOLIN HFA) 90 mcg/actuation inhaler, INHALE 2 PUFFS INTO THE LINGS EVERY 6 HOURS AS NEEDED WHEEZING, Disp: 25.5 g, Rfl: 3    amLODIPine (NORVASC) 10 MG tablet, Take 1 tablet (10 mg  total) by mouth once daily., Disp: 90 tablet, Rfl: 3    blood sugar diagnostic Strp, To check BG 1 times daily, to use with insurance preferred meter, Disp: 100 strip, Rfl: 11    blood-glucose meter kit, To check BG 1 times daily, to use with insurance preferred meter, Disp: 1 each, Rfl: 0    cetirizine (ZYRTEC) 10 MG tablet, Take 1 tablet (10 mg total) by mouth once daily., Disp: 90 tablet, Rfl: 3    CHEST CONGESTION RELIEF DM  mg/5 mL Liqd, Take 10 mLs by mouth every 6 (six) hours as needed., Disp: , Rfl:     cholecalciferol, vitamin D3, 2,000 unit Tab, Take 2,000 Units by mouth once daily., Disp: , Rfl:     famotidine (PEPCID) 40 MG tablet, Take 1 tablet (40 mg total) by mouth once daily., Disp: 90 tablet, Rfl: 3    fish oil-omega-3 fatty acids 300-1,000 mg capsule, Take 2 g by mouth once daily., Disp: , Rfl:     fluticasone furoate-vilanteroL (BREO ELLIPTA) 100-25 mcg/dose diskus inhaler, Inhale 1 puff into the lungs once daily. Controller, Disp: 60 each, Rfl: 6    hydrALAZINE (APRESOLINE) 25 MG tablet, Take 1 tablet (25 mg total) by mouth every 8 (eight) hours., Disp: 270 tablet, Rfl: 0    hydrOXYchloroQUINE (PLAQUENIL) 200 mg tablet, Take 1 tablet (200 mg total) by mouth once daily., Disp: 90 tablet, Rfl: 1    lancets Misc, To check BG 1 times daily, to use with insurance preferred meter, Disp: 100 each, Rfl: 11    MAGTAB 84 mg TbSR, Take 84 mg by mouth once daily at 6am., Disp: , Rfl:     metFORMIN (GLUCOPHAGE) 500 MG tablet, Take 1 tablet (500 mg total) by mouth once daily., Disp: 90 tablet, Rfl: 3    multivitamin (THERAGRAN) per tablet, Take 1 tablet by mouth once daily., Disp: , Rfl:     pantoprazole (PROTONIX) 40 MG tablet, Take 1 tablet (40 mg total) by mouth once daily., Disp: 90 tablet, Rfl: 3    potassium chloride SA (K-DUR,KLOR-CON) 20 MEQ tablet, Take 1 tablet (20 mEq total) by mouth once daily., Disp: 90 tablet, Rfl: 3    rosuvastatin (CRESTOR) 40 MG Tab, TAKE 1 TABLET(40 MG) BY MOUTH EVERY  "EVENING, Disp: 90 tablet, Rfl: 3    EPINEPHrine (EPIPEN 2-DANA) 0.3 mg/0.3 mL AtIn, Inject 0.3 mLs (0.3 mg total) into the muscle once. for 1 dose, Disp: 1 each, Rfl: 11    Review of Systems   Constitutional:  Negative for activity change, appetite change and unexpected weight change.   HENT: Negative.  Negative for hearing loss, postnasal drip, rhinorrhea, trouble swallowing and voice change.    Eyes: Negative.  Negative for visual disturbance.   Respiratory: Negative.  Negative for choking, chest tightness and shortness of breath.    Cardiovascular:  Negative for palpitations and leg swelling.   Gastrointestinal:  Negative for abdominal distention, blood in stool, constipation and diarrhea.   Endocrine: Negative for cold intolerance, heat intolerance, polydipsia and polyuria.   Genitourinary: Negative.  Negative for difficulty urinating and frequency.   Musculoskeletal:  Negative for back pain and gait problem.   Skin:  Negative for color change, pallor and wound.   Neurological:  Negative for dizziness, tremors and light-headedness.   Hematological:  Negative for adenopathy.   Psychiatric/Behavioral:  Negative for behavioral problems, confusion, self-injury, sleep disturbance and suicidal ideas. The patient is not nervous/anxious.    Objective:   /66   Pulse 84   Temp 98 °F (36.7 °C)   Ht 5' 4" (1.626 m)   Wt 105.1 kg (231 lb 11.3 oz)   SpO2 (!) 93%   BMI 39.77 kg/m²     Physical Exam  Vitals reviewed.   Constitutional:       General: She is not in acute distress.     Appearance: Normal appearance. She is well-developed. She is not ill-appearing, toxic-appearing or diaphoretic.   HENT:      Head: Normocephalic and atraumatic.      Right Ear: External ear normal.      Left Ear: External ear normal.      Nose: Nose normal.   Eyes:      Conjunctiva/sclera: Conjunctivae normal.      Pupils: Pupils are equal, round, and reactive to light.   Cardiovascular:      Rate and Rhythm: Normal rate and regular " rhythm.      Heart sounds: Normal heart sounds. No murmur heard.    No friction rub. No gallop.   Pulmonary:      Effort: Pulmonary effort is normal. No respiratory distress.      Breath sounds: Wheezing and rhonchi present. No decreased breath sounds or rales.   Chest:      Chest wall: Tenderness present.   Abdominal:      General: There is no distension.      Palpations: Abdomen is soft.      Tenderness: There is no abdominal tenderness.   Musculoskeletal:         General: Normal range of motion.      Cervical back: Normal range of motion and neck supple.   Lymphadenopathy:      Cervical: No cervical adenopathy.   Skin:     General: Skin is warm and dry.      Capillary Refill: Capillary refill takes less than 2 seconds.      Findings: No rash.   Neurological:      Mental Status: She is alert and oriented to person, place, and time.      Motor: No weakness.      Coordination: Coordination normal.      Gait: Gait normal.   Psychiatric:         Mood and Affect: Mood normal.         Behavior: Behavior normal.         Thought Content: Thought content normal.         Judgment: Judgment normal.     CMP  Sodium   Date Value Ref Range Status   08/23/2022 140 136 - 145 mmol/L Final   08/23/2022 140 136 - 145 mmol/L Final     Potassium   Date Value Ref Range Status   08/23/2022 4.0 3.5 - 5.1 mmol/L Final   08/23/2022 4.0 3.5 - 5.1 mmol/L Final     Chloride   Date Value Ref Range Status   08/23/2022 103 95 - 110 mmol/L Final   08/23/2022 103 95 - 110 mmol/L Final     CO2   Date Value Ref Range Status   08/23/2022 29 23 - 29 mmol/L Final   08/23/2022 29 23 - 29 mmol/L Final     Glucose   Date Value Ref Range Status   08/23/2022 79 70 - 110 mg/dL Final   08/23/2022 79 70 - 110 mg/dL Final     BUN   Date Value Ref Range Status   08/23/2022 17 8 - 23 mg/dL Final   08/23/2022 17 8 - 23 mg/dL Final     Creatinine   Date Value Ref Range Status   08/23/2022 1.3 0.5 - 1.4 mg/dL Final   08/23/2022 1.3 0.5 - 1.4 mg/dL Final     Calcium    Date Value Ref Range Status   08/23/2022 9.4 8.7 - 10.5 mg/dL Final   08/23/2022 9.4 8.7 - 10.5 mg/dL Final     Total Protein   Date Value Ref Range Status   08/23/2022 7.9 6.0 - 8.4 g/dL Final   08/23/2022 7.9 6.0 - 8.4 g/dL Final     Albumin   Date Value Ref Range Status   08/23/2022 3.4 (L) 3.5 - 5.2 g/dL Final   08/23/2022 3.4 (L) 3.5 - 5.2 g/dL Final     Total Bilirubin   Date Value Ref Range Status   08/23/2022 0.3 0.1 - 1.0 mg/dL Final     Comment:     For infants and newborns, interpretation of results should be based  on gestational age, weight and in agreement with clinical  observations.    Premature Infant recommended reference ranges:  Up to 24 hours.............<8.0 mg/dL  Up to 48 hours............<12.0 mg/dL  3-5 days..................<15.0 mg/dL  6-29 days.................<15.0 mg/dL     08/23/2022 0.3 0.1 - 1.0 mg/dL Final     Comment:     For infants and newborns, interpretation of results should be based  on gestational age, weight and in agreement with clinical  observations.    Premature Infant recommended reference ranges:  Up to 24 hours.............<8.0 mg/dL  Up to 48 hours............<12.0 mg/dL  3-5 days..................<15.0 mg/dL  6-29 days.................<15.0 mg/dL       Alkaline Phosphatase   Date Value Ref Range Status   08/23/2022 101 55 - 135 U/L Final   08/23/2022 101 55 - 135 U/L Final     AST   Date Value Ref Range Status   08/23/2022 14 10 - 40 U/L Final   08/23/2022 14 10 - 40 U/L Final     ALT   Date Value Ref Range Status   08/23/2022 9 (L) 10 - 44 U/L Final   08/23/2022 9 (L) 10 - 44 U/L Final     Anion Gap   Date Value Ref Range Status   08/23/2022 8 8 - 16 mmol/L Final   08/23/2022 8 8 - 16 mmol/L Final     eGFR if    Date Value Ref Range Status   06/17/2022 48.0 (A) >60 mL/min/1.73 m^2 Final     eGFR if non    Date Value Ref Range Status   06/17/2022 41.7 (A) >60 mL/min/1.73 m^2 Final     Comment:     Calculation used to obtain the  estimated glomerular filtration  rate (eGFR) is the CKD-EPI equation.        Lab Results   Component Value Date    HGBA1C 5.2 06/17/2022       Assessment:     1. Essential hypertension    2. Hyperlipidemia associated with type 2 diabetes mellitus    3. Diabetes mellitus type 2 with complications    4. Aortic atherosclerosis    5. Hypokalemia    6. Abnormal lung sounds    7. Chest wall pain      Plan:   Essential hypertension  -stable and controlled on hydralazine and amlodipine    Hyperlipidemia associated with type 2 diabetes mellitus  -stable on statin.     Diabetes mellitus type 2 with complications  -stable and controlled.     Aortic atherosclerosis  -stable on statin     Hypokalemia  -stable on recent labs.     Abnormal lung sounds  -     X-Ray Chest PA And Lateral; Future; Expected date: 09/06/2022    Chest wall pain  -     EKG 12-lead; Future  -likely costochondritis from her chronic cough  -scheduled for labs and follow up with pcp in 3 months    Follow up if symptoms worsen or fail to improve.

## 2022-09-07 ENCOUNTER — CLINICAL SUPPORT (OUTPATIENT)
Dept: PULMONOLOGY | Facility: CLINIC | Age: 72
End: 2022-09-07
Payer: MEDICARE

## 2022-09-07 DIAGNOSIS — Z93.0 TRACHEOSTOMY IN PLACE: ICD-10-CM

## 2022-09-07 DIAGNOSIS — J45.30 MILD PERSISTENT ASTHMA WITHOUT COMPLICATION: ICD-10-CM

## 2022-09-07 PROCEDURE — 99211 OFF/OP EST MAY X REQ PHY/QHP: CPT | Mod: PBBFAC,25

## 2022-09-07 PROCEDURE — 99999 PR PBB SHADOW E&M-EST. PATIENT-LVL I: ICD-10-PCS | Mod: PBBFAC,,,

## 2022-09-07 PROCEDURE — 99999 PR PBB SHADOW E&M-EST. PATIENT-LVL I: CPT | Mod: PBBFAC,,,

## 2022-09-07 PROCEDURE — 94762 N-INVAS EAR/PLS OXIMTRY CONT: CPT | Mod: PBBFAC

## 2022-09-08 ENCOUNTER — CLINICAL SUPPORT (OUTPATIENT)
Dept: PULMONOLOGY | Facility: CLINIC | Age: 72
End: 2022-09-08
Payer: MEDICARE

## 2022-09-08 VITALS — HEIGHT: 62 IN | WEIGHT: 232.13 LBS | BODY MASS INDEX: 42.72 KG/M2

## 2022-09-08 DIAGNOSIS — Z93.0 TRACHEOSTOMY IN PLACE: ICD-10-CM

## 2022-09-08 DIAGNOSIS — J45.30 MILD PERSISTENT ASTHMA WITHOUT COMPLICATION: ICD-10-CM

## 2022-09-08 LAB
BRPFT: NORMAL
FEF 25 75 CHG: -9.8 %
FEF 25 75 LLN: 0.58
FEF 25 75 POST REF: 132.3 %
FEF 25 75 PRE REF: 146.7 %
FEF 25 75 REF: 1.55
FET100 CHG: -2.4 %
FEV1 CHG: 3.6 %
FEV1 FVC CHG: -8.8 %
FEV1 FVC LLN: 66
FEV1 FVC POST REF: 114.6 %
FEV1 FVC PRE REF: 125.6 %
FEV1 FVC REF: 79
FEV1 LLN: 1.22
FEV1 POST REF: 88.3 %
FEV1 PRE REF: 85.2 %
FEV1 REF: 1.75
FVC CHG: 13.6 %
FVC LLN: 1.57
FVC POST REF: 76.6 %
FVC PRE REF: 67.5 %
FVC REF: 2.23
PEF CHG: 9.3 %
PEF LLN: 2.63
PEF POST REF: 70.9 %
PEF PRE REF: 64.9 %
PEF REF: 4.5
POST FEF 25 75: 2.05 L/S
POST FET 100: 1.66 SEC
POST FEV1 FVC: 90.23 %
POST FEV1: 1.54 L
POST FVC: 1.71 L
POST PEF: 3.19 L/S
PRE FEF 25 75: 2.27 L/S
PRE FET 100: 1.7 SEC
PRE FEV1 FVC: 98.94 %
PRE FEV1: 1.49 L
PRE FVC: 1.5 L
PRE PEF: 2.92 L/S

## 2022-09-08 PROCEDURE — 94060 PR EVAL OF BRONCHOSPASM: ICD-10-PCS | Mod: 26,59,S$PBB, | Performed by: INTERNAL MEDICINE

## 2022-09-08 PROCEDURE — 99999 PR PBB SHADOW E&M-EST. PATIENT-LVL II: ICD-10-PCS | Mod: PBBFAC,,,

## 2022-09-08 PROCEDURE — 94060 EVALUATION OF WHEEZING: CPT | Mod: 26,59,S$PBB, | Performed by: INTERNAL MEDICINE

## 2022-09-08 PROCEDURE — 94618 PULMONARY STRESS TESTING: CPT | Mod: PBBFAC

## 2022-09-08 PROCEDURE — 99212 OFFICE O/P EST SF 10 MIN: CPT | Mod: PBBFAC,25

## 2022-09-08 PROCEDURE — 95012 NITRIC OXIDE EXP GAS DETER: CPT | Mod: PBBFAC

## 2022-09-08 PROCEDURE — 99999 PR PBB SHADOW E&M-EST. PATIENT-LVL II: CPT | Mod: PBBFAC,,,

## 2022-09-08 PROCEDURE — 94618 PULMONARY STRESS TESTING: CPT | Mod: 26,S$PBB,, | Performed by: INTERNAL MEDICINE

## 2022-09-08 PROCEDURE — 94060 EVALUATION OF WHEEZING: CPT | Mod: PBBFAC

## 2022-09-08 PROCEDURE — 94618 PULMONARY STRESS TESTING: ICD-10-PCS | Mod: 26,S$PBB,, | Performed by: INTERNAL MEDICINE

## 2022-09-08 NOTE — PROCEDURES
"[facility]  Six Minute Walk   SUMMARY   Ordering Provider: Franklyn Cha MD   Interpreting Provider: Dwayne Guerra MD  Performing nurse/tech/RT: VT, RT  Diagnosis:  (Mild persistent asthma without complication; Tracheostomy in place , pt decannulated)  Height: 5' 2" (157.5 cm)  Weight: 105.3 kg (232 lb 2.3 oz)  BMI (Calculated): 42.4   Patient Race:     Phase Oxygen Assessment Supplemental O2 Heart   Rate Blood Pressure Dalia Dyspnea Scale Rating   Resting 95 % Room Air 78 bpm 136/69 0   Exercise        Minute        1 93 % Room Air 97 bpm     2 93 % Room Air 101 bpm     3 94 % Room Air 103 bpm     4 93 % Room Air 107 bpm     5 94 % Room Air 107 bpm     6  94 % Room Air 108 bpm 163/69 3   Recovery        Minute        1 96 % Room Air 90 bpm     2 97 % Room Air 87 bpm     3 95 % Room Air 84 bpm     4 95 % Room Air 82 bpm 145/79 0     Six Minute Walk Summary  6MWT Status: completed without stopping  Patient Reported: Dyspnea     Interpretation:  Did the patient stop or pause?: No         Total Time Walked (Calculated): 360 seconds  Final Partial Lap Distance (feet): 150 feet  Total Distance Meters (Calculated): 228.6 meters  Predicted Distance Meters (Calculated): 353.59 meters  Percentage of Predicted (Calculated): 64.65  Peak VO2 (Calculated): 10.84  Mets: 3.1  Has The Patient Had a Previous Six Minute Walk Test?: No       Previous 6MWT Results  Has The Patient Had a Previous Six Minute Walk Test?: No      Interpretation:  Total distance walked in six minutes is mildly reduced indicating a reduction in overall  functional capacity. The patient did not meet criteria for supplemental oxygen prescription.  Clinical correlation suggested.  [x] Mild exercise-induced hypoxemia described as an arterial oxygen saturation of 93-95% (with a fall of 3-4% with exercise),   [] Moderate exercise-induced hypoxemia as a fall in oxygen saturation to  89-93% (with a fall of 3-4 % with " exercise)  [] Severe exercise induced hypoxemia as < 89% O2 saturation (88% and below).  Medicare Criteria for Oxygen prescription comments: When arterial oxygen saturation is at or below 88% during exercise (severe exercise induced hypoxemia) then the patient falls under   Details about Medicare Group Criteria coverage can be found at http://www.cms.[facility].gov/manuals/downloads/     Dwayne Guerra MD

## 2022-09-08 NOTE — PROCEDURES
Clinical Guide to Interpretation or FeNO Levels :    FeNO  (ppb) LOW INTERMEDIATE HIGH   ADULT VALUES < 25 25-50          > 50   Th2-driven Inflammation Unlikely Likely Significant     Patients FeNO level at this visit : 13  (ppb)    Interpretation of FeNO measurement in adults:  [x] FENO is less than 25 ppb implies non eosinophilic airway inflammation or the absence of airway inflammation.    Comment: Low FENO (<25 ppb) in adult asthmatics with persistent symptoms suggests other etiologies for these symptoms and a lower likelihood of benefit from adding or increasing inhaled glucocorticoids.      Discussion:  A FENO less than 25 ppb in adults and less than 20 ppb in children younger than 12 years of age implies noneosinophilic airway inflammation or the absence of airway inflammation.  A FENO greater than 50 ppb in adults or greater than 35 ppb in children suggests eosinophilic airway inflammation.   Values of FENO between 25 and 50 ppb in adults (20 to 35 ppb in children) should be interpreted cautiously with reference to the clinical situation (eg, symptomatic, on or off therapy, current smoking).  A rising FENO with a greater than 20 percent change and more than 25 ppb (20 ppb in children) from a previously stable level suggests increasing eosinophilic airway inflammation, but there are wide inter-individual differences.  A decrease in FENO greater than 20 percent for values over 50 ppb or more than 10 ppb for values less than 50 ppb may be clinically important.  ?FENO in other respiratory diseases - Several other diseases are associated with altered levels of exhaled NO: low levels of FENO have been noted in cystic fibrosis, current smoking, pulmonary hypertension, hypothermia, primary ciliary dyskinesia, and bronchopulmonary dysplasia. Elevated FENO has been noted in atopy, nonasthmatic eosinophilic bronchitis, COPD exacerbations, noncystic fibrosis bronchiectasis, and viral upper respiratory  infections.    REFERENCE:  ATS Board of Directors, December 2004, and by the ERS Executive Committee, June 2004. ATS/ERS Recommendations for Standardized Procedures for the Online and Offline Measurement of Exhaled Lower Respiratory Nitric Oxide and Nasal Nitric Oxide. Guideline 2005     Dwayne Guerra MD

## 2022-09-08 NOTE — PROCEDURES
[facility]  [address] * [place] [address]  Telephone: [phone]  Test date: 22 Start: 22 22:05:43 Page Grissom  Doctor: ARINA Cha MD End: 22 05:54:27 [id number]  Oximetry: Summary Report  Comments: Room air  Recording time: 07:48:44 Highest pulse: 94 Highest SpO2: 96%  Excluded samplin:12:36 Lowest pulse: 26 Lowest SpO2: 80%  Total valid samplin:36:08 Mean pulse: 72 Mean SpO2: 89.7%  1 S.D.: 4.9 1 S.D.: 1.7  Time with SpO2<90: 3:33:32, 46.8%  Time with SpO2<80: 0:00:00, 0.0%  Time with SpO2<70: 0:00:00, 0.0%  Time with SpO2<60: 0:00:00, 0.0%  Time with SpO2<88: 0:30:44, 6.7%  Time with SpO2 =>90: 4:02:36, 53.2%  Time with SpO2=>80 & <90: 3:33:32, 46.8%  Time with SpO2=>70 & <80: 0:00:00, 0.0%  Time with SpO2=>60 & <70: 0:00:00, 0.0%  The longest continuous time with saturation <=89 was 00:16:24, which started at  22 03:55:23.  A desaturation event was defined as a decrease of saturation by 4 or more.  2 events were excluded due to artifact.  There were 25 desaturation events over 3 minutes duration.  There were 97 desaturation events of less than 3 minutes duration during which:  The mean high was 91.6%. The mean low was 86.4%.  The number of these events that were:  > 0 & <10 seconds: 22 > 0 seconds: 97  =>10 & <20 seconds: 32 =>10 seconds: 75  =>20 & <30 seconds: 9 =>20 seconds: 43  =>30 & <40 seconds: 5 =>30 seconds: 34  =>40 & <50 seconds: 6 =>40 seconds: 29  =>50 & <60 seconds: 1 =>50 seconds: 23  =>60 seconds: 22 =>60 seconds: 22  The mean length of desaturation events that were >=10 sec & <=3 mins was: 46.3 sec.  Desaturation event index (events >=10 sec per sampled hour): 9.9  Desaturation event index (events >= 0 sec per sampled hour): 12.8    Overnight Oxygen Saturation Study:    INTERPRETATION:  Conditions of Test: Noted above in report    Interpretation of results of overnight oxygen saturation study.  This was a technically   adequate study.  Overnight oxygen saturation study is abnormal with O2 saturation less than 89%.   Time with SpO2<88: 0:30:44, 6.7% of the study period. Lowest oxygen saturation recorded was 80% .    Based on the above information patient meets criteria for oxygen prescription.  Clinical correlation suggested.  Dwayne Guerra MD    Medicare Criteria Comments:   Overnight Oximetry test results suggest the patient does fall under Medicare Group 1 Criteria and would be eligible for oxygen prescription.   (Arterial oxygen saturation at or below 88% for at least 5 minutes taken during sleep on stable outpatient)    Details about Medicare Group Criteria coverage can be found at http://www.cms.[facility].gov/manuals/downloads/

## 2022-09-08 NOTE — PROGRESS NOTES
Clinical Guide to Interpretation or FeNO Levels :    FeNO  (ppb) LOW INTERMEDIATE HIGH   ADULT VALUES < 25 25-50          > 50   Th2-driven Inflammation Unlikely Likely Significant     Patients FeNO level at this visit : 13  (ppb)

## 2022-09-23 ENCOUNTER — TELEPHONE (OUTPATIENT)
Dept: ADMINISTRATIVE | Facility: HOSPITAL | Age: 72
End: 2022-09-23
Payer: MEDICARE

## 2022-09-25 DIAGNOSIS — J45.30 MILD PERSISTENT ASTHMA WITHOUT COMPLICATION: Primary | ICD-10-CM

## 2022-09-25 DIAGNOSIS — R09.02 NOCTURNAL HYPOXEMIA DUE TO ASTHMA: ICD-10-CM

## 2022-09-25 DIAGNOSIS — J45.909 NOCTURNAL HYPOXEMIA DUE TO ASTHMA: ICD-10-CM

## 2022-09-26 ENCOUNTER — TELEPHONE (OUTPATIENT)
Dept: PULMONOLOGY | Facility: CLINIC | Age: 72
End: 2022-09-26
Payer: MEDICARE

## 2022-09-26 NOTE — TELEPHONE ENCOUNTER
----- Message from Franklyn Cha MD sent at 9/25/2022  7:54 PM CDT -----  Please inform  Nocturnal oxygen ordered

## 2022-09-26 NOTE — PROGRESS NOTES
Orders Placed This Encounter   Procedures    OXYGEN FOR HOME USE     Doctor: ARINA Cha MD End: 22 05:54:27 [id number]  Oximetry: Summary Report  Comments: Room air  Recording time: 07:48:44 Highest pulse: 94 Highest SpO2: 96%  Excluded samplin:12:36 Lowest pulse: 26 Lowest SpO2: 80%  Total valid samplin:36:08 Mean pulse: 72 Mean SpO2: 89.7%  1 S.D.: 4.9 1 S.D.: 1.7  Time with SpO2<90: 3:33:32, 46.8%  Time with SpO2<80: 0:00:00, 0.0%  Time with SpO2<70: 0:00:00, 0.0%  Time with SpO2<60: 0:00:00, 0.0%  Time with SpO2<88: 0:30:44, 6.7%  Time with SpO2 =>90: 4:02:36, 53.2%  Time with SpO2=>80 & <90: 3:33:32, 46.8%  Time with SpO2=>70 & <80: 0:00:00, 0.0%  Time with SpO2=>60 & <70: 0:00:00, 0.0%  The longest continuous time with saturation <=89 was 00:16:24, which started at  22 03:55:23.     Order Specific Question:   Liter Flow     Answer:   2     Order Specific Question:   Duration     Answer:   With sleep     Order Specific Question:   Qualifying Test Performed at:     Answer:   Rest     Order Specific Question:   Oxygen saturation:     Answer:   80     Order Specific Question:   Portable mode:     Answer:   continuous     Order Specific Question:   Route     Answer:   nasal cannula     Order Specific Question:   Device:     Answer:   home concentrator     Order Specific Question:   Length of need (in months):     Answer:   99 mos     Order Specific Question:   Patient condition with qualifying saturation     Answer:   Other - List qualifying diagnosis and code     Comments:   noc hypoxe     Order Specific Question:   Select a diagnosis & list the code in the comments     Answer:   Nocturnal hypoxemia due to asthma [[id number]]     Order Specific Question:   Height:     Answer:   5 2     Order Specific Question:   Weight:     Answer:   232     Order Specific Question:   Alternative treatment measures have been tried or considered and deemed clinically ineffective.     Answer:   Yes

## 2022-09-26 NOTE — TELEPHONE ENCOUNTER
Spoke with pt. Advised her that ONSAT did show the need for night time oxygen. Advised her that oxygen has been ordered and to let me know if she hasn't heard from DME by the end of the week. Pt voiced understanding.

## 2022-09-30 ENCOUNTER — OFFICE VISIT (OUTPATIENT)
Dept: OTOLARYNGOLOGY | Facility: CLINIC | Age: 72
End: 2022-09-30
Payer: MEDICARE

## 2022-09-30 VITALS — SYSTOLIC BLOOD PRESSURE: 152 MMHG | HEART RATE: 66 BPM | DIASTOLIC BLOOD PRESSURE: 60 MMHG

## 2022-09-30 DIAGNOSIS — J39.8 TRACHEAL STENOSIS: ICD-10-CM

## 2022-09-30 DIAGNOSIS — J38.6 SGS (SUBGLOTTIC STENOSIS): Primary | ICD-10-CM

## 2022-09-30 PROCEDURE — 31575 PR LARYNGOSCOPY, FLEXIBLE; DIAGNOSTIC: ICD-10-PCS | Mod: S$PBB,,, | Performed by: OTOLARYNGOLOGY

## 2022-09-30 PROCEDURE — 31575 DIAGNOSTIC LARYNGOSCOPY: CPT | Mod: PBBFAC | Performed by: OTOLARYNGOLOGY

## 2022-09-30 PROCEDURE — 99999 PR PBB SHADOW E&M-EST. PATIENT-LVL IV: CPT | Mod: PBBFAC,,, | Performed by: OTOLARYNGOLOGY

## 2022-09-30 PROCEDURE — 99999 PR PBB SHADOW E&M-EST. PATIENT-LVL IV: ICD-10-PCS | Mod: PBBFAC,,, | Performed by: OTOLARYNGOLOGY

## 2022-09-30 PROCEDURE — 31575 DIAGNOSTIC LARYNGOSCOPY: CPT | Mod: S$PBB,,, | Performed by: OTOLARYNGOLOGY

## 2022-09-30 PROCEDURE — 99213 PR OFFICE/OUTPT VISIT, EST, LEVL III, 20-29 MIN: ICD-10-PCS | Mod: 25,S$PBB,, | Performed by: OTOLARYNGOLOGY

## 2022-09-30 PROCEDURE — 99214 OFFICE O/P EST MOD 30 MIN: CPT | Mod: PBBFAC,25 | Performed by: OTOLARYNGOLOGY

## 2022-09-30 PROCEDURE — 99213 OFFICE O/P EST LOW 20 MIN: CPT | Mod: 25,S$PBB,, | Performed by: OTOLARYNGOLOGY

## 2022-09-30 NOTE — PROGRESS NOTES
[facility]  Department of Otorhinolaryngology and Communication Sciences    Subjective:      Page Grissom is a 71 y.o. female who presents for follow-up. 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  She has mild post tracheostomy tracheal stenosis, from which she is presently asymptomatic.    Since her last visit, she reports that her breathing is good.  She denies dyspnea.  She reports that she wheezes at times.  She never feels like she can not take a deep breath.  Her voice is good, as is her swallowing function.    The patient's medications, allergies, past medical, surgical, social and family histories were reviewed and updated as appropriate.    A detailed review of systems was obtained with pertinent positives as per the above HPI, and otherwise negative.      Objective:     BP (!) 152/60 (Patient Position: Sitting)   Pulse 66    Constitutional: comfortable, well dressed  Psychiatric: appropriate affect  Respiratory: comfortably breathing, symmetric chest rise, no stridor; inconsistent course stridor, primarily inspiratory, only on vigorous respiration  Voice:  normal  Head: normocephalic  Eyes: conjunctivae and sclerae clear  Indirect laryngoscopy is limited due to gag    Procedure  Flexible Laryngoscopy (71122): Laryngoscopy is indicated for assessment of upper aerodigestive structure and function. This was carried out transnasally with a distal chip videoendoscope. After verbal consent was obtained, the patient was positioned and the nose was topically decongested with 1% phenylephrine and topically anesthetized with 4% lidocaine. The endoscope was passed through the most patent nasal cavity and positioned to image the nasopharynx, larynx, and hypopharynx in detail. The following features were examined: nasopharyngeal, laryngeal, hypopharyngeal masses; velopharyngeal strength, closure, and symmetry of motion; vocal fold range and symmetry  of motion; laryngeal mucosal edema, erythema, inflammation, and hydration; salivary pooling; and gross laryngeal sensation. The equipment was removed. The patient tolerated the procedure well without complication. All findings were normal except:  - very mild A-frame deformity of cervical trachea, grade 1 at most; interval increase in patency since last visit  - alisia visible  - no paralysis or paresis  - subglottis patent  - mild interarytenoid mucosal hypertrophy        Assessment:     Page Grissom is a 71 y.o. 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  She has mild post tracheostomy A-frame deformity, from which she is presently asymptomatic.     Plan:     As before, I recommended that she complete a CT of the neck to further anatomic a characterize the laryngotracheal framework.    Depending on findings, surgical intervention might be considered.  Nevertheless, it would carry with it  risk of further destabilizing the laryngotracheal framework necessitating tracheostomy dependency.  As such, at present, she is disinclined to pursue surgical intervention.    She will follow up with me in about 3 months, or sooner if needed.    All questions were answered, and the patient is in agreement with the plan.    Nick Newberry M.D.  [facility]  Department of Otorhinolaryngology and Communication Sciences

## 2022-10-03 ENCOUNTER — LAB VISIT (OUTPATIENT)
Dept: LAB | Facility: HOSPITAL | Age: 72
End: 2022-10-03
Attending: PHYSICIAN ASSISTANT
Payer: MEDICARE

## 2022-10-03 DIAGNOSIS — Z71.89 COMPLEX CARE COORDINATION: ICD-10-CM

## 2022-10-03 DIAGNOSIS — M05.79 RHEUMATOID ARTHRITIS INVOLVING MULTIPLE SITES WITH POSITIVE RHEUMATOID FACTOR: ICD-10-CM

## 2022-10-03 LAB
ALBUMIN SERPL BCP-MCNC: 3.2 G/DL (ref 3.5–5.2)
ALP SERPL-CCNC: 104 U/L (ref 55–135)
ALT SERPL W/O P-5'-P-CCNC: 9 U/L (ref 10–44)
ANION GAP SERPL CALC-SCNC: 10 MMOL/L (ref 8–16)
AST SERPL-CCNC: 11 U/L (ref 10–40)
BASOPHILS # BLD AUTO: 0.04 K/UL (ref 0–0.2)
BASOPHILS NFR BLD: 0.5 % (ref 0–1.9)
BILIRUB SERPL-MCNC: 0.3 MG/DL (ref 0.1–1)
BUN SERPL-MCNC: 25 MG/DL (ref 8–23)
CALCIUM SERPL-MCNC: 9.2 MG/DL (ref 8.7–10.5)
CHLORIDE SERPL-SCNC: 106 MMOL/L (ref 95–110)
CO2 SERPL-SCNC: 25 MMOL/L (ref 23–29)
CREAT SERPL-MCNC: 1.6 MG/DL (ref 0.5–1.4)
CRP SERPL-MCNC: 16.2 MG/L (ref 0–8.2)
DIFFERENTIAL METHOD: ABNORMAL
EOSINOPHIL # BLD AUTO: 0.3 K/UL (ref 0–0.5)
EOSINOPHIL NFR BLD: 3.4 % (ref 0–8)
ERYTHROCYTE [DISTWIDTH] IN BLOOD BY AUTOMATED COUNT: 16.3 % (ref 11.5–14.5)
ERYTHROCYTE [SEDIMENTATION RATE] IN BLOOD BY WESTERGREN METHOD: 47 MM/HR (ref 0–20)
EST. GFR  (NO RACE VARIABLE): 34 ML/MIN/1.73 M^2
GLUCOSE SERPL-MCNC: 123 MG/DL (ref 70–110)
HCT VFR BLD AUTO: 35.3 % (ref 37–48.5)
HGB BLD-MCNC: 10.2 G/DL (ref 12–16)
IMM GRANULOCYTES # BLD AUTO: 0.03 K/UL (ref 0–0.04)
IMM GRANULOCYTES NFR BLD AUTO: 0.4 % (ref 0–0.5)
LYMPHOCYTES # BLD AUTO: 1.8 K/UL (ref 1–4.8)
LYMPHOCYTES NFR BLD: 24.1 % (ref 18–48)
MCH RBC QN AUTO: 23.6 PG (ref 27–31)
MCHC RBC AUTO-ENTMCNC: 28.9 G/DL (ref 32–36)
MCV RBC AUTO: 82 FL (ref 82–98)
MONOCYTES # BLD AUTO: 0.6 K/UL (ref 0.3–1)
MONOCYTES NFR BLD: 7.6 % (ref 4–15)
NEUTROPHILS # BLD AUTO: 4.9 K/UL (ref 1.8–7.7)
NEUTROPHILS NFR BLD: 64 % (ref 38–73)
NRBC BLD-RTO: 0 /100 WBC
PLATELET # BLD AUTO: 270 K/UL (ref 150–450)
PMV BLD AUTO: 10.6 FL (ref 9.2–12.9)
POTASSIUM SERPL-SCNC: 4.3 MMOL/L (ref 3.5–5.1)
PROT SERPL-MCNC: 7.2 G/DL (ref 6–8.4)
RBC # BLD AUTO: 4.33 M/UL (ref 4–5.4)
SODIUM SERPL-SCNC: 141 MMOL/L (ref 136–145)
WBC # BLD AUTO: 7.6 K/UL (ref 3.9–12.7)

## 2022-10-03 PROCEDURE — 80053 COMPREHEN METABOLIC PANEL: CPT | Performed by: PHYSICIAN ASSISTANT

## 2022-10-03 PROCEDURE — 86140 C-REACTIVE PROTEIN: CPT | Performed by: PHYSICIAN ASSISTANT

## 2022-10-03 PROCEDURE — 85651 RBC SED RATE NONAUTOMATED: CPT | Performed by: PHYSICIAN ASSISTANT

## 2022-10-03 PROCEDURE — 36415 COLL VENOUS BLD VENIPUNCTURE: CPT | Mod: PO | Performed by: PHYSICIAN ASSISTANT

## 2022-10-03 PROCEDURE — 85025 COMPLETE CBC W/AUTO DIFF WBC: CPT | Performed by: PHYSICIAN ASSISTANT

## 2022-10-06 ENCOUNTER — HOSPITAL ENCOUNTER (OUTPATIENT)
Dept: RADIOLOGY | Facility: HOSPITAL | Age: 72
Discharge: HOME | End: 2022-10-06
Attending: PHYSICIAN ASSISTANT
Payer: MEDICARE

## 2022-10-06 ENCOUNTER — OFFICE VISIT (OUTPATIENT)
Dept: RHEUMATOLOGY | Facility: CLINIC | Age: 72
End: 2022-10-06
Payer: MEDICARE

## 2022-10-06 VITALS
BODY MASS INDEX: 44.14 KG/M2 | HEIGHT: 62 IN | DIASTOLIC BLOOD PRESSURE: 66 MMHG | SYSTOLIC BLOOD PRESSURE: 130 MMHG | HEART RATE: 70 BPM | WEIGHT: 239.88 LBS

## 2022-10-06 DIAGNOSIS — S89.92XA INJURY OF LEFT KNEE, INITIAL ENCOUNTER: ICD-10-CM

## 2022-10-06 DIAGNOSIS — Z99.81 ON HOME OXYGEN THERAPY: ICD-10-CM

## 2022-10-06 DIAGNOSIS — Z79.899 HIGH RISK MEDICATION USE: ICD-10-CM

## 2022-10-06 DIAGNOSIS — R06.00 DYSPNEA, UNSPECIFIED TYPE: ICD-10-CM

## 2022-10-06 DIAGNOSIS — Z51.81 MEDICATION MONITORING ENCOUNTER: ICD-10-CM

## 2022-10-06 DIAGNOSIS — M05.79 RHEUMATOID ARTHRITIS INVOLVING MULTIPLE SITES WITH POSITIVE RHEUMATOID FACTOR: Primary | ICD-10-CM

## 2022-10-06 DIAGNOSIS — M81.0 AGE-RELATED OSTEOPOROSIS WITHOUT CURRENT PATHOLOGICAL FRACTURE: ICD-10-CM

## 2022-10-06 DIAGNOSIS — Z79.60 LONG-TERM USE OF IMMUNOSUPPRESSANT MEDICATION: ICD-10-CM

## 2022-10-06 PROCEDURE — 99999 PR PBB SHADOW E&M-EST. PATIENT-LVL V: CPT | Mod: PBBFAC,,, | Performed by: PHYSICIAN ASSISTANT

## 2022-10-06 PROCEDURE — 73564 X-RAY EXAM KNEE 4 OR MORE: CPT | Mod: TC,50

## 2022-10-06 PROCEDURE — 73564 XR KNEE ORTHO BILAT WITH FLEXION: ICD-10-PCS | Mod: 26,50,, | Performed by: RADIOLOGY

## 2022-10-06 PROCEDURE — 99215 OFFICE O/P EST HI 40 MIN: CPT | Mod: PBBFAC | Performed by: PHYSICIAN ASSISTANT

## 2022-10-06 PROCEDURE — 99999 PR PBB SHADOW E&M-EST. PATIENT-LVL V: ICD-10-PCS | Mod: PBBFAC,,, | Performed by: PHYSICIAN ASSISTANT

## 2022-10-06 PROCEDURE — 73564 X-RAY EXAM KNEE 4 OR MORE: CPT | Mod: 26,50,, | Performed by: RADIOLOGY

## 2022-10-06 PROCEDURE — 99215 PR OFFICE/OUTPT VISIT, EST, LEVL V, 40-54 MIN: ICD-10-PCS | Mod: S$PBB,,, | Performed by: PHYSICIAN ASSISTANT

## 2022-10-06 PROCEDURE — 99215 OFFICE O/P EST HI 40 MIN: CPT | Mod: S$PBB,,, | Performed by: PHYSICIAN ASSISTANT

## 2022-10-06 RX ORDER — HYDROXYCHLOROQUINE SULFATE 200 MG/1
200 TABLET, FILM COATED ORAL DAILY
Qty: 90 TABLET | Refills: 1 | Status: SHIPPED | OUTPATIENT
Start: 2022-10-06 | End: 2023-04-10

## 2022-10-06 ASSESSMENT — ROUTINE ASSESSMENT OF PATIENT INDEX DATA (RAPID3): MDHAQ FUNCTION SCORE: 0.2
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
## 2022-10-10 ENCOUNTER — TELEPHONE (OUTPATIENT)
Dept: NEPHROLOGY | Facility: CLINIC | Age: 72
End: 2022-10-10
Payer: MEDICARE

## 2022-10-10 ENCOUNTER — TELEPHONE (OUTPATIENT)
Dept: RHEUMATOLOGY | Facility: CLINIC | Age: 72
End: 2022-10-10
Payer: MEDICARE

## 2022-10-10 NOTE — TELEPHONE ENCOUNTER
Patient would like to hold off on receiving knee injections per her Kidney Doctors request. Patient stated she will give a call back after she speaks with her Kidney Doctor.

## 2022-10-10 NOTE — TELEPHONE ENCOUNTER
Patient was taken off plaquenil by her PCP. Patient wants to know if she should still be taking the plaquenil. She stated that plaquenil was affecting her kidneys.

## 2022-10-10 NOTE — TELEPHONE ENCOUNTER
----- Message from Rima Aragon sent at 10/10/2022 10:54 AM CDT -----  Regarding: pt called  Name of Who is Calling: ANA AGUERO [[id number]]      What is the request in detail: pt is requesting to speak with a nurse to see if it is ok for her to receive knee injections and not cause harm to her kidneys . Please advise       Can the clinic reply by [facility]: No      What Number to Call Back if not in [facility]: [phone] (home)

## 2022-10-10 NOTE — TELEPHONE ENCOUNTER
----- Message from Marah Cox PA-C sent at 10/6/2022  1:33 PM CDT -----  Severe bone-on-bone joint arthritis in both knees.  If she would like for me to do a left knee injection, please schedule a procedure only visit.

## 2022-10-10 NOTE — TELEPHONE ENCOUNTER
Patient called and asked if she could have an injection in her knee for arthritis. Please advise what medication she is speaking of regarding her kidney dysfunction concerns.  Also she reports not taking Plaquenil, is she supposed to be on that?

## 2022-10-11 ENCOUNTER — TELEPHONE (OUTPATIENT)
Dept: RHEUMATOLOGY | Facility: CLINIC | Age: 72
End: 2022-10-11
Payer: MEDICARE

## 2022-10-11 NOTE — TELEPHONE ENCOUNTER
----- Message from Marah Cox PA-C sent at 10/11/2022  2:58 PM CDT -----  I spoke with her nephrologist.  She may resume Plaquenil 200 mg once daily  ----- Message -----  From: Frank Winkler MD  Sent: 10/11/2022   2:49 PM CDT  To: Marah Cox PA-C    Hi    There is no contraindication from renal standpoint, so yes please go ahead and resume Plaquenil.      By the way, who is covering Dr. Mcgarry? I am seeing one of his pts in hospital currently and need to speak with him but I see he is out.   ----- Message -----  From: Marah Cox PA-C  Sent: 10/11/2022   1:31 PM CDT  To: MD Dr. Peterson Garza,  This patient sent me a message saying PCP took her off plaquenil b/c of her kidneys.  Is she ok to continue Plaquenil once daily for RA or do I need to discuss alternative therapy w her?  Thank keshia Cox PA-C  [facility]  [facility]

## 2022-10-11 NOTE — TELEPHONE ENCOUNTER
Informed patient per Adriana,       She is okay to take the plaquenil 200mg once daily. Patient verbalized understanding and was grateful for the call_DD

## 2022-10-14 ENCOUNTER — HOSPITAL ENCOUNTER (OUTPATIENT)
Dept: RADIOLOGY | Facility: HOSPITAL | Age: 72
Discharge: HOME | End: 2022-10-14
Attending: OTOLARYNGOLOGY
Payer: MEDICARE

## 2022-10-14 DIAGNOSIS — J38.6 SGS (SUBGLOTTIC STENOSIS): ICD-10-CM

## 2022-10-14 DIAGNOSIS — J39.8 TRACHEAL STENOSIS: ICD-10-CM

## 2022-10-14 PROCEDURE — 70490 CT SOFT TISSUE NECK W/O DYE: CPT | Mod: TC

## 2022-10-19 ENCOUNTER — TELEPHONE (OUTPATIENT)
Dept: OTOLARYNGOLOGY | Facility: CLINIC | Age: 72
End: 2022-10-19
Payer: MEDICARE

## 2022-10-21 ENCOUNTER — TELEPHONE (OUTPATIENT)
Dept: ADMINISTRATIVE | Facility: CLINIC | Age: 72
End: 2022-10-21
Payer: MEDICARE

## 2022-10-24 ENCOUNTER — OFFICE VISIT (OUTPATIENT)
Dept: INTERNAL MEDICINE | Facility: CLINIC | Age: 72
End: 2022-10-24
Payer: MEDICARE

## 2022-10-24 VITALS
HEART RATE: 68 BPM | TEMPERATURE: 98 F | HEIGHT: 62 IN | SYSTOLIC BLOOD PRESSURE: 128 MMHG | RESPIRATION RATE: 18 BRPM | OXYGEN SATURATION: 96 % | BODY MASS INDEX: 44.79 KG/M2 | WEIGHT: 243.38 LBS | DIASTOLIC BLOOD PRESSURE: 74 MMHG

## 2022-10-24 DIAGNOSIS — N18.31 STAGE 3A CHRONIC KIDNEY DISEASE: ICD-10-CM

## 2022-10-24 DIAGNOSIS — M05.79 RHEUMATOID ARTHRITIS INVOLVING MULTIPLE SITES WITH POSITIVE RHEUMATOID FACTOR: ICD-10-CM

## 2022-10-24 DIAGNOSIS — R26.9 ABNORMALITY OF GAIT AND MOBILITY: ICD-10-CM

## 2022-10-24 DIAGNOSIS — Z00.00 ENCOUNTER FOR PREVENTIVE HEALTH EXAMINATION: Primary | ICD-10-CM

## 2022-10-24 DIAGNOSIS — E11.69 HYPERLIPIDEMIA ASSOCIATED WITH TYPE 2 DIABETES MELLITUS: ICD-10-CM

## 2022-10-24 DIAGNOSIS — M81.0 AGE-RELATED OSTEOPOROSIS WITHOUT CURRENT PATHOLOGICAL FRACTURE: ICD-10-CM

## 2022-10-24 DIAGNOSIS — I10 ESSENTIAL HYPERTENSION: ICD-10-CM

## 2022-10-24 DIAGNOSIS — J45.30 MILD PERSISTENT ASTHMA WITHOUT COMPLICATION: ICD-10-CM

## 2022-10-24 DIAGNOSIS — I70.0 AORTIC ATHEROSCLEROSIS: ICD-10-CM

## 2022-10-24 DIAGNOSIS — N17.9 ACUTE KIDNEY INJURY SUPERIMPOSED ON CHRONIC KIDNEY DISEASE: ICD-10-CM

## 2022-10-24 DIAGNOSIS — Z99.81 DEPENDENCE ON SUPPLEMENTAL OXYGEN: ICD-10-CM

## 2022-10-24 DIAGNOSIS — J45.909 NOCTURNAL HYPOXEMIA DUE TO ASTHMA: ICD-10-CM

## 2022-10-24 DIAGNOSIS — E11.8 DIABETES MELLITUS TYPE 2 WITH COMPLICATIONS: ICD-10-CM

## 2022-10-24 DIAGNOSIS — N18.9 ACUTE KIDNEY INJURY SUPERIMPOSED ON CHRONIC KIDNEY DISEASE: ICD-10-CM

## 2022-10-24 DIAGNOSIS — R09.02 NOCTURNAL HYPOXEMIA DUE TO ASTHMA: ICD-10-CM

## 2022-10-24 DIAGNOSIS — K21.9 GASTROESOPHAGEAL REFLUX DISEASE WITHOUT ESOPHAGITIS: ICD-10-CM

## 2022-10-24 DIAGNOSIS — M79.7 FIBROMYALGIA: ICD-10-CM

## 2022-10-24 DIAGNOSIS — Z87.891 HISTORY OF TOBACCO ABUSE: ICD-10-CM

## 2022-10-24 DIAGNOSIS — E78.5 HYPERLIPIDEMIA ASSOCIATED WITH TYPE 2 DIABETES MELLITUS: ICD-10-CM

## 2022-10-24 PROCEDURE — 99215 OFFICE O/P EST HI 40 MIN: CPT | Mod: PBBFAC,PN | Performed by: NURSE PRACTITIONER

## 2022-10-24 PROCEDURE — 99999 PR PBB SHADOW E&M-EST. PATIENT-LVL V: CPT | Mod: PBBFAC,,, | Performed by: NURSE PRACTITIONER

## 2022-10-24 PROCEDURE — G0439 PR MEDICARE ANNUAL WELLNESS SUBSEQUENT VISIT: ICD-10-PCS | Mod: ,,, | Performed by: NURSE PRACTITIONER

## 2022-10-24 PROCEDURE — G0439 PPPS, SUBSEQ VISIT: HCPCS | Mod: ,,, | Performed by: NURSE PRACTITIONER

## 2022-10-24 PROCEDURE — 99999 PR PBB SHADOW E&M-EST. PATIENT-LVL V: ICD-10-PCS | Mod: PBBFAC,,, | Performed by: NURSE PRACTITIONER

## 2022-10-24 NOTE — PATIENT INSTRUCTIONS
Counseling and Referral of Other Preventative  (Italic type indicates deductible and co-insurance are waived)    Patient Name: Page Grissom  Today's Date: 10/24/2022    Health Maintenance       Date Due Completion Date    Shingles Vaccine (2 of 3) 06/25/2015 4/30/2015    COVID-19 Vaccine (3 - Booster for Pfizer series) 11/12/2021 9/17/2021    Influenza Vaccine (1) 09/01/2022 1/20/2022    Override on 11/10/2016: Done    TETANUS VACCINE 10/16/2022 10/16/2012    Hemoglobin A1c 12/17/2022 6/17/2022    Mammogram 01/20/2023 1/20/2022    Override on 11/9/2012: Done    Foot Exam 02/21/2023 2/21/2022 (Done)    Override on 2/21/2022: Done    Override on 12/17/2021: Done    Override on 12/10/2020: Done    Override on 12/10/2020: Done    Override on 6/5/2020: Done    Override on 6/17/2019: Done    Override on 6/13/2018: Done    Override on 5/9/2017: Done    Override on 4/30/2015: Done    Override on 4/30/2014: Done (per Trey Swan DPM)    Diabetes Urine Screening 05/19/2023 5/19/2022    Lipid Panel 06/17/2023 6/17/2022    Eye Exam 08/02/2023 8/2/2022    Override on 8/19/2019: Done    Override on 8/10/2017: Done    Override on 8/4/2016: Done    Override on 5/3/2013: Done    Colorectal Cancer Screening 02/14/2024 2/14/2019    DEXA Scan 02/21/2025 2/21/2022        No orders of the defined types were placed in this encounter.    The following information is provided to all patients.  This information is to help you find resources for any of the problems found today that may be affecting your health:                Living healthy guide: www.[facility].[place].gov      Understanding Diabetes: www.diabetes.org      Eating healthy: www.cdc.gov/healthyweight      CDC home safety checklist: www.cdc.gov/steadi/patient.html      Agency on Aging: www.goea.[place].[facility]      Alcoholics anonymous (AA): www.aa.org      Physical Activity: www.rosalinda.nih.gov/cw9lqsj      Tobacco use: www.quitwithusla.org

## 2022-10-24 NOTE — PROGRESS NOTES
I offered to discuss advanced care planning, including how to pick a person who would make decisions for you if you were unable to make them for yourself, called a health care power of , and what kind of decisions you might make such as use of life sustaining treatments such as ventilators and tube feeding when faced with a life limiting illness recorded on a living will that they will need to know. (How you want to be cared for as you near the end of your natural life)     X  Patient has advanced directives on file, which we reviewed, and they do not wish to make changes.    Completed during hospital admission.

## 2022-10-26 ENCOUNTER — PROCEDURE VISIT (OUTPATIENT)
Dept: RHEUMATOLOGY | Facility: CLINIC | Age: 72
End: 2022-10-26
Payer: MEDICARE

## 2022-10-26 VITALS
WEIGHT: 243.38 LBS | HEIGHT: 62 IN | DIASTOLIC BLOOD PRESSURE: 66 MMHG | HEART RATE: 62 BPM | SYSTOLIC BLOOD PRESSURE: 131 MMHG | BODY MASS INDEX: 44.79 KG/M2

## 2022-10-26 DIAGNOSIS — M17.0 PRIMARY OSTEOARTHRITIS OF BOTH KNEES: Primary | ICD-10-CM

## 2022-10-26 PROCEDURE — 99499 UNLISTED E&M SERVICE: CPT | Mod: S$PBB,,, | Performed by: PHYSICIAN ASSISTANT

## 2022-10-26 PROCEDURE — 99499 NO LOS: ICD-10-PCS | Mod: S$PBB,,, | Performed by: PHYSICIAN ASSISTANT

## 2022-10-26 NOTE — PROCEDURES
Procedures    Patient schedule an appointment for left knee injection only.  Upon evaluation of the patient she was not having any knee pain.  She has some stiffness which improves once she gets up and starts moving around.  Patient deferred injection for now.  She will notify the office if she changes her mind in the future.

## 2022-10-27 ENCOUNTER — LAB VISIT (OUTPATIENT)
Dept: LAB | Facility: HOSPITAL | Age: 72
End: 2022-10-27
Attending: INTERNAL MEDICINE
Payer: MEDICARE

## 2022-10-27 DIAGNOSIS — N18.31 STAGE 3A CHRONIC KIDNEY DISEASE: ICD-10-CM

## 2022-10-27 LAB
ALBUMIN SERPL BCP-MCNC: 3.3 G/DL (ref 3.5–5.2)
ANION GAP SERPL CALC-SCNC: 11 MMOL/L (ref 8–16)
BASOPHILS # BLD AUTO: 0.04 K/UL (ref 0–0.2)
BASOPHILS NFR BLD: 0.5 % (ref 0–1.9)
BUN SERPL-MCNC: 21 MG/DL (ref 8–23)
CALCIUM SERPL-MCNC: 9.4 MG/DL (ref 8.7–10.5)
CHLORIDE SERPL-SCNC: 103 MMOL/L (ref 95–110)
CO2 SERPL-SCNC: 24 MMOL/L (ref 23–29)
CREAT SERPL-MCNC: 1.3 MG/DL (ref 0.5–1.4)
DIFFERENTIAL METHOD: ABNORMAL
EOSINOPHIL # BLD AUTO: 0.3 K/UL (ref 0–0.5)
EOSINOPHIL NFR BLD: 3 % (ref 0–8)
ERYTHROCYTE [DISTWIDTH] IN BLOOD BY AUTOMATED COUNT: 18.5 % (ref 11.5–14.5)
EST. GFR  (NO RACE VARIABLE): 44 ML/MIN/1.73 M^2
GLUCOSE SERPL-MCNC: 89 MG/DL (ref 70–110)
HCT VFR BLD AUTO: 36.9 % (ref 37–48.5)
HGB BLD-MCNC: 10.5 G/DL (ref 12–16)
IMM GRANULOCYTES # BLD AUTO: 0.04 K/UL (ref 0–0.04)
IMM GRANULOCYTES NFR BLD AUTO: 0.5 % (ref 0–0.5)
LYMPHOCYTES # BLD AUTO: 2.2 K/UL (ref 1–4.8)
LYMPHOCYTES NFR BLD: 25.8 % (ref 18–48)
MCH RBC QN AUTO: 23.9 PG (ref 27–31)
MCHC RBC AUTO-ENTMCNC: 28.5 G/DL (ref 32–36)
MCV RBC AUTO: 84 FL (ref 82–98)
MONOCYTES # BLD AUTO: 0.7 K/UL (ref 0.3–1)
MONOCYTES NFR BLD: 8.6 % (ref 4–15)
NEUTROPHILS # BLD AUTO: 5.2 K/UL (ref 1.8–7.7)
NEUTROPHILS NFR BLD: 61.6 % (ref 38–73)
NRBC BLD-RTO: 0 /100 WBC
PHOSPHATE SERPL-MCNC: 3.4 MG/DL (ref 2.7–4.5)
PLATELET # BLD AUTO: 309 K/UL (ref 150–450)
PMV BLD AUTO: 11.1 FL (ref 9.2–12.9)
POTASSIUM SERPL-SCNC: 4.2 MMOL/L (ref 3.5–5.1)
PTH-INTACT SERPL-MCNC: 138.3 PG/ML (ref 9–77)
RBC # BLD AUTO: 4.4 M/UL (ref 4–5.4)
SODIUM SERPL-SCNC: 138 MMOL/L (ref 136–145)
WBC # BLD AUTO: 8.46 K/UL (ref 3.9–12.7)

## 2022-10-27 PROCEDURE — 85025 COMPLETE CBC W/AUTO DIFF WBC: CPT | Performed by: INTERNAL MEDICINE

## 2022-10-27 PROCEDURE — 36415 COLL VENOUS BLD VENIPUNCTURE: CPT | Performed by: INTERNAL MEDICINE

## 2022-10-27 PROCEDURE — 80069 RENAL FUNCTION PANEL: CPT | Performed by: INTERNAL MEDICINE

## 2022-10-27 PROCEDURE — 83970 ASSAY OF PARATHORMONE: CPT | Performed by: INTERNAL MEDICINE

## 2022-11-03 ENCOUNTER — OFFICE VISIT (OUTPATIENT)
Dept: NEPHROLOGY | Facility: CLINIC | Age: 72
End: 2022-11-03
Payer: MEDICARE

## 2022-11-03 VITALS
DIASTOLIC BLOOD PRESSURE: 60 MMHG | SYSTOLIC BLOOD PRESSURE: 106 MMHG | HEIGHT: 62 IN | HEART RATE: 70 BPM | WEIGHT: 246 LBS | BODY MASS INDEX: 45.27 KG/M2 | OXYGEN SATURATION: 98 %

## 2022-11-03 DIAGNOSIS — M05.79 RHEUMATOID ARTHRITIS INVOLVING MULTIPLE SITES WITH POSITIVE RHEUMATOID FACTOR: ICD-10-CM

## 2022-11-03 DIAGNOSIS — N25.81 SECONDARY HYPERPARATHYROIDISM OF RENAL ORIGIN: ICD-10-CM

## 2022-11-03 DIAGNOSIS — E55.9 VITAMIN D DEFICIENCY: ICD-10-CM

## 2022-11-03 DIAGNOSIS — M79.7 FIBROMYALGIA: ICD-10-CM

## 2022-11-03 DIAGNOSIS — N17.9 ACUTE KIDNEY INJURY SUPERIMPOSED ON CHRONIC KIDNEY DISEASE: ICD-10-CM

## 2022-11-03 DIAGNOSIS — E55.9 VITAMIN D DEFICIENCY DISEASE: ICD-10-CM

## 2022-11-03 DIAGNOSIS — N18.31 STAGE 3A CHRONIC KIDNEY DISEASE: Primary | ICD-10-CM

## 2022-11-03 DIAGNOSIS — N18.9 ACUTE KIDNEY INJURY SUPERIMPOSED ON CHRONIC KIDNEY DISEASE: ICD-10-CM

## 2022-11-03 DIAGNOSIS — E66.01 MORBID OBESITY: ICD-10-CM

## 2022-11-03 PROCEDURE — 99214 OFFICE O/P EST MOD 30 MIN: CPT | Mod: PBBFAC,PO | Performed by: INTERNAL MEDICINE

## 2022-11-03 PROCEDURE — 99999 PR PBB SHADOW E&M-EST. PATIENT-LVL IV: CPT | Mod: PBBFAC,,, | Performed by: INTERNAL MEDICINE

## 2022-11-03 PROCEDURE — 99999 PR PBB SHADOW E&M-EST. PATIENT-LVL IV: ICD-10-PCS | Mod: PBBFAC,,, | Performed by: INTERNAL MEDICINE

## 2022-11-03 PROCEDURE — 99215 OFFICE O/P EST HI 40 MIN: CPT | Mod: S$PBB,,, | Performed by: INTERNAL MEDICINE

## 2022-11-03 PROCEDURE — 99215 PR OFFICE/OUTPT VISIT, EST, LEVL V, 40-54 MIN: ICD-10-PCS | Mod: S$PBB,,, | Performed by: INTERNAL MEDICINE

## 2022-11-03 NOTE — PROGRESS NOTES
Subjective:       Patient ID: Page Grissom is a 72 y.o. Black or  female who presents for follow up evaluation of Chronic Kidney Disease    HPI     She is referred by her PCP for KYE on CKD with baseline creatinine ranging 1.3-1.6mg/dL. She was in her usual state of health until she injured herself and was given tramadol and prednisone, developed severe constipation. This has nor sorted itself out and she feels better. No NSAIDs recently and no ABX.  No IV contrast. No LUTS. She admits to too much soda. She has used an unknown cough medicine? In lieu of Mucinex  Peak Cr was 2.8 with repeat 2.5mg/dL    Interval history June 2021: Tylenol for OA knee pain, walks in [employer] for exercise, no new medications.     Interval History Feb 2022: Doing well, still smoking. Increased water intake, two jarek a day. Will quit smoking when dtr gets . Seeing new Rheumatologist    Interval History May 2022: prolonged hospital stay after severe anaphylaxis, required intubation. She is s/p KYE which required dialysis. Recovered enough kidney function and last HD was during hospital stay. She is feeling well. dtr accompanies pt. She has pulmonary follow up soon     Nov 2022: Here with dtr. OA knees is OK, still active and cooking and ADLs. No orthostasis. Seeing Pulmonary     Review of Systems   Constitutional:  Negative for activity change, appetite change, fatigue and unexpected weight change.   HENT:  Negative for facial swelling.    Respiratory:  Positive for cough and wheezing (less than previous, and upper airway).    Cardiovascular:  Negative for leg swelling.   Gastrointestinal:  Negative for constipation and diarrhea.   Genitourinary:  Negative for difficulty urinating.   Musculoskeletal:  Positive for arthralgias and back pain.   Allergic/Immunologic: Positive for immunocompromised state (due to RA meds, DM2 and CKD).   Neurological:  Negative for weakness.   Psychiatric/Behavioral:  Negative  for confusion and decreased concentration.      Objective:      Physical Exam  Vitals and nursing note reviewed.   Constitutional:       General: She is not in acute distress.     Appearance: Normal appearance. She is not ill-appearing.   HENT:      Head: Atraumatic.   Eyes:      General: No scleral icterus.  Neck:      Vascular: No JVD.   Cardiovascular:      Rate and Rhythm: Normal rate.      Heart sounds: S1 normal and S2 normal.     No friction rub.   Pulmonary:      Effort: Pulmonary effort is normal.      Breath sounds: Wheezing (upper airway) present.   Abdominal:      General: There is no distension.   Musculoskeletal:      Cervical back: Neck supple.      Right lower leg: No edema.      Left lower leg: No edema.   Skin:     General: Skin is warm and dry.      Coloration: Skin is not jaundiced.   Neurological:      Mental Status: She is alert and oriented to person, place, and time. Mental status is at baseline.      Gait: Gait abnormal.   Psychiatric:         Mood and Affect: Mood normal.         Behavior: Behavior normal.         Thought Content: Thought content normal.         Judgment: Judgment normal.       Assessment:       1. Stage 3a chronic kidney disease    2. Acute kidney injury superimposed on chronic kidney disease--required HD March 2022    3. Vitamin D deficiency disease    4. Morbid obesity    5. Rheumatoid arthritis involving multiple sites with positive rheumatoid factor    6. Fibromyalgia    7. Secondary hyperparathyroidism of renal origin    8. Vitamin D deficiency          Plan:           CKD Stage 3 s/p KYE  - kidney function has returned to baseline   - Avoid NSAIDs, continue a low sodium diet, BP control and ensure hydration    HTN  - typically controlled, monitor    Mineral and Bone Disease/SHPT  - continue to trend PTH  - continue vitamin D    RA  - continue Plaquenil, she us UTD with eye exams    DM  - well controlled, no retinopathy   - continue current medications       RTC  9mo  51 minutes of total time spent on the encounter, which includes face to face time and non-face to face time preparing to see the patient (eg, review of tests), Obtaining and/or reviewing separately obtained history, Documenting clinical information in the electronic or other health record, Independently interpreting results (not separately reported) and communicating results to the patient/family/caregiver, or Care coordination (not separately reported).

## 2022-11-10 ENCOUNTER — LAB VISIT (OUTPATIENT)
Dept: LAB | Facility: HOSPITAL | Age: 72
End: 2022-11-10
Attending: PEDIATRICS
Payer: MEDICARE

## 2022-11-10 DIAGNOSIS — E11.8 DIABETES MELLITUS TYPE 2 WITH COMPLICATIONS: ICD-10-CM

## 2022-11-10 DIAGNOSIS — E78.5 HYPERLIPIDEMIA ASSOCIATED WITH TYPE 2 DIABETES MELLITUS: ICD-10-CM

## 2022-11-10 DIAGNOSIS — E11.69 HYPERLIPIDEMIA ASSOCIATED WITH TYPE 2 DIABETES MELLITUS: ICD-10-CM

## 2022-11-10 LAB
ALBUMIN SERPL BCP-MCNC: 3.3 G/DL (ref 3.5–5.2)
ALP SERPL-CCNC: 98 U/L (ref 55–135)
ALT SERPL W/O P-5'-P-CCNC: 10 U/L (ref 10–44)
ANION GAP SERPL CALC-SCNC: 10 MMOL/L (ref 8–16)
AST SERPL-CCNC: 16 U/L (ref 10–40)
BILIRUB SERPL-MCNC: 0.4 MG/DL (ref 0.1–1)
BUN SERPL-MCNC: 23 MG/DL (ref 8–23)
CALCIUM SERPL-MCNC: 9.6 MG/DL (ref 8.7–10.5)
CHLORIDE SERPL-SCNC: 105 MMOL/L (ref 95–110)
CHOLEST SERPL-MCNC: 156 MG/DL (ref 120–199)
CHOLEST/HDLC SERPL: 2.6 {RATIO} (ref 2–5)
CO2 SERPL-SCNC: 27 MMOL/L (ref 23–29)
CREAT SERPL-MCNC: 1.4 MG/DL (ref 0.5–1.4)
EST. GFR  (NO RACE VARIABLE): 40.2 ML/MIN/1.73 M^2
ESTIMATED AVG GLUCOSE: 114 MG/DL (ref 68–131)
GLUCOSE SERPL-MCNC: 98 MG/DL (ref 70–110)
HBA1C MFR BLD: 5.6 % (ref 4–5.6)
HDLC SERPL-MCNC: 60 MG/DL (ref 40–75)
HDLC SERPL: 38.5 % (ref 20–50)
LDLC SERPL CALC-MCNC: 77.2 MG/DL (ref 63–159)
NONHDLC SERPL-MCNC: 96 MG/DL
POTASSIUM SERPL-SCNC: 4.5 MMOL/L (ref 3.5–5.1)
PROT SERPL-MCNC: 7.7 G/DL (ref 6–8.4)
SODIUM SERPL-SCNC: 142 MMOL/L (ref 136–145)
TRIGL SERPL-MCNC: 94 MG/DL (ref 30–150)

## 2022-11-10 PROCEDURE — 80061 LIPID PANEL: CPT | Performed by: PEDIATRICS

## 2022-11-10 PROCEDURE — 80053 COMPREHEN METABOLIC PANEL: CPT | Performed by: PEDIATRICS

## 2022-11-10 PROCEDURE — 36415 COLL VENOUS BLD VENIPUNCTURE: CPT | Performed by: PEDIATRICS

## 2022-11-10 PROCEDURE — 83036 HEMOGLOBIN GLYCOSYLATED A1C: CPT | Performed by: PEDIATRICS

## 2022-11-16 NOTE — PT/OT/SLP PROGRESS
Physical Therapy      Patient Name:  Page Grissom   MRN:  [id number]    Time: 0115-0145    Communicated with nurse, Justino , and completed Epic chart review prior to tx session.    S: Patient agreed to PT session. Reports feeling much better today.     O: Patient found sitting up in chair.     STS from chair > RW: Min A (VC for hand placement)    6ft RW Min-Mod A w/ close chair follow    Patient required both standing and seated rest breaks due to fatigue and SOB. Resolves with rest.     Educated patient on TE to be performed throughout the day to maintain current level of ROM and strength to BLE. Exercises included: LAQ, Hip Flexion, AP. Patient successfully demonstrated x10 reps of each exercise with correct form. Re enforced importance of utilizing call light and not attempt to get up without staff assistance due to increased fall risk. Encouraged patient to increase OOB tolerance by remaining up in chair for 2 hour minimum, especially with meals. Patient verbalized understanding.     Patient left sitting up in chair with call light in reach and chair alarm active. AVASYS present.     A: Patient would continue to benefit from skilled PT to address functional limitations in order to return to PLOF/decrease caregiver burden.    P: Cont PT POC & current D/C recommendations.     Charges: 1G 1TA    Shanelle Willett PTA         No

## 2022-11-16 NOTE — PROGRESS NOTES
Pulmonary Outpatient   Visit     Subjective:       Patient ID: Page Grissom is a 72 y.o. female.    Chief Complaint: Asthma      Page Grissom is 71 y.o.  Follow up post hospitalisation  Anaid edema needed emergent trach  Has been since decannulation  Smoker, quit during acute crisis  Has epipen  Has inhaler using  Trach wound healed bandage removed  Was at [facility] then [facility]  No oxygen  Still has some dysphonia        11/17/2022  Last seen 08/2/2022  Working [employer] 10 pm to 7 am  Sleep pattern off  Has Nocturnal Oxygen  No cough, No wheezing, No SOB  Has baseline dysphonia: seen ENT: defered Surgery  Asked for refill  ACT score 23  FeNo 13  BREO and ALEX  6MWD: No desat: reduced exercise  capacity       Asthma Control Test  In the past 4  weeks, how much of the time did your asthma keep you from getting as much done at work, school or at home?: None of the time  During the past 4 weeks, how often have you had shortness of breath?: Not at all  During the past 4 weeks, how often did your asthma symptoms (wheezing, couging, shortness of breath, chest tightness or pain) wake you up at night or earlier than usual in the morning?: Not at all  During the past 4 weeks, how often have you used your rescue inhaler or nebulizer medication (such as albuterol)?: Once a week or less  How would you rate your asthma control during the past 4 weeks?: Well controlled  If your score is 19 or less, your asthma may not be under control: 23           The following portions of the patient's history were reviewed and updated as appropriate:   She  has a past medical history of Acidosis, metabolic, with respiratory acidosis (3/31/2022), Asthma, Back pain, Cataract, CKD (chronic kidney disease) stage 3, GFR 30-59 ml/min (12/20/2020), Diabetes mellitus, Fibromyalgia, Gastroesophageal reflux disease, Hypercholesterolemia, Hypertension, Immune deficiency disorder, Obesity,  Osteoporosis, Rheumatoid arthritis, Tobacco dependence, Trouble in sleeping, and Type 2 diabetes mellitus.  She does not have any pertinent problems on file.  She  has a past surgical history that includes Oophorectomy; Hernia repair; Colonoscopy (N/A, 2/14/2019); Tracheostomy (N/A, 3/30/2022); and Bronchoscopy (N/A, 3/30/2022).  Her family history includes Breast cancer in her sister; Cancer in her father; Colon cancer in her father; Hypertension in her mother.  She  reports that she quit smoking about 6 months ago. Her smoking use included cigarettes. She has a 12.50 pack-year smoking history. She has never used smokeless tobacco. She reports that she does not drink alcohol and does not use drugs.  She has a current medication list which includes the following prescription(s): acetaminophen, amlodipine, blood sugar diagnostic, blood-glucose meter, cetirizine, cholecalciferol (vitamin d3), famotidine, fish oil-omega-3 fatty acids, hydralazine, hydroxychloroquine, lancets, metformin, multivitamin, pantoprazole, potassium chloride sa, rosuvastatin, albuterol, epinephrine, fluticasone furoate-vilanterol, and [DISCONTINUED] hydrochlorothiazide.  Current Outpatient Medications on File Prior to Visit   Medication Sig Dispense Refill    acetaminophen (TYLENOL) 500 MG tablet Take 500 mg by mouth as needed for Pain.      amLODIPine (NORVASC) 10 MG tablet Take 1 tablet (10 mg total) by mouth once daily. 90 tablet 3    blood sugar diagnostic Strp To check BG 1 times daily, to use with insurance preferred meter 100 strip 11    blood-glucose meter kit To check BG 1 times daily, to use with insurance preferred meter 1 each 0    cetirizine (ZYRTEC) 10 MG tablet Take 1 tablet (10 mg total) by mouth once daily. 90 tablet 3    cholecalciferol, vitamin D3, 2,000 unit Tab Take 2,000 Units by mouth once daily.      famotidine (PEPCID) 40 MG tablet Take 1 tablet (40 mg total) by mouth once daily. 90 tablet 3    fish oil-omega-3 fatty  acids 300-1,000 mg capsule Take 2 g by mouth once daily.      hydrALAZINE (APRESOLINE) 25 MG tablet Take 1 tablet (25 mg total) by mouth every 8 (eight) hours. 270 tablet 0    hydrOXYchloroQUINE (PLAQUENIL) 200 mg tablet Take 1 tablet (200 mg total) by mouth once daily. 90 tablet 1    lancets Misc To check BG 1 times daily, to use with insurance preferred meter 100 each 11    metFORMIN (GLUCOPHAGE) 500 MG tablet Take 1 tablet (500 mg total) by mouth once daily. 90 tablet 3    multivitamin (THERAGRAN) per tablet Take 1 tablet by mouth once daily.      pantoprazole (PROTONIX) 40 MG tablet Take 1 tablet (40 mg total) by mouth once daily. 90 tablet 3    potassium chloride SA (K-DUR,KLOR-CON) 20 MEQ tablet Take 1 tablet (20 mEq total) by mouth once daily. 90 tablet 3    rosuvastatin (CRESTOR) 40 MG Tab TAKE 1 TABLET(40 MG) BY MOUTH EVERY EVENING 90 tablet 3    [DISCONTINUED] albuterol (PROVENTIL/VENTOLIN HFA) 90 mcg/actuation inhaler INHALE 2 PUFFS INTO THE LINGS EVERY 6 HOURS AS NEEDED WHEEZING 25.5 g 3    [DISCONTINUED] fluticasone furoate-vilanteroL (BREO ELLIPTA) 100-25 mcg/dose diskus inhaler Inhale 1 puff into the lungs once daily. Controller 60 each 6    EPINEPHrine (EPIPEN 2-DANA) 0.3 mg/0.3 mL AtIn Inject 0.3 mLs (0.3 mg total) into the muscle once. for 1 dose (Patient not taking: Reported on 10/24/2022) 1 each 11    [DISCONTINUED] hydroCHLOROthiazide (HYDRODIURIL) 25 MG tablet TAKE 1 TABLET(25 MG) BY MOUTH EVERY DAY 90 tablet 3     No current facility-administered medications on file prior to visit.     She is allergic to lisinopril..      Review of Systems   Constitutional: Negative.    HENT:  Positive for voice change.    Eyes: Negative.    Respiratory:  Negative for cough, sputum production, choking, chest tightness, wheezing, dyspnea on extertion and somnolence.    Genitourinary: Negative.    Endocrine: endocrine negative    Musculoskeletal: Negative.    Skin: Negative.    Gastrointestinal: Negative.     Neurological: Negative.    Psychiatric/Behavioral: Negative.       Outpatient Encounter Medications as of 11/17/2022   Medication Sig Dispense Refill    acetaminophen (TYLENOL) 500 MG tablet Take 500 mg by mouth as needed for Pain.      amLODIPine (NORVASC) 10 MG tablet Take 1 tablet (10 mg total) by mouth once daily. 90 tablet 3    blood sugar diagnostic Strp To check BG 1 times daily, to use with insurance preferred meter 100 strip 11    blood-glucose meter kit To check BG 1 times daily, to use with insurance preferred meter 1 each 0    cetirizine (ZYRTEC) 10 MG tablet Take 1 tablet (10 mg total) by mouth once daily. 90 tablet 3    cholecalciferol, vitamin D3, 2,000 unit Tab Take 2,000 Units by mouth once daily.      famotidine (PEPCID) 40 MG tablet Take 1 tablet (40 mg total) by mouth once daily. 90 tablet 3    fish oil-omega-3 fatty acids 300-1,000 mg capsule Take 2 g by mouth once daily.      hydrALAZINE (APRESOLINE) 25 MG tablet Take 1 tablet (25 mg total) by mouth every 8 (eight) hours. 270 tablet 0    hydrOXYchloroQUINE (PLAQUENIL) 200 mg tablet Take 1 tablet (200 mg total) by mouth once daily. 90 tablet 1    lancets Misc To check BG 1 times daily, to use with insurance preferred meter 100 each 11    metFORMIN (GLUCOPHAGE) 500 MG tablet Take 1 tablet (500 mg total) by mouth once daily. 90 tablet 3    multivitamin (THERAGRAN) per tablet Take 1 tablet by mouth once daily.      pantoprazole (PROTONIX) 40 MG tablet Take 1 tablet (40 mg total) by mouth once daily. 90 tablet 3    potassium chloride SA (K-DUR,KLOR-CON) 20 MEQ tablet Take 1 tablet (20 mEq total) by mouth once daily. 90 tablet 3    rosuvastatin (CRESTOR) 40 MG Tab TAKE 1 TABLET(40 MG) BY MOUTH EVERY EVENING 90 tablet 3    [DISCONTINUED] albuterol (PROVENTIL/VENTOLIN HFA) 90 mcg/actuation inhaler INHALE 2 PUFFS INTO THE LINGS EVERY 6 HOURS AS NEEDED WHEEZING 25.5 g 3    [DISCONTINUED] fluticasone furoate-vilanteroL (BREO ELLIPTA) 100-25 mcg/dose  "diskus inhaler Inhale 1 puff into the lungs once daily. Controller 60 each 6    albuterol (PROVENTIL/VENTOLIN HFA) 90 mcg/actuation inhaler INHALE 2 PUFFS INTO THE LINGS EVERY 6 HOURS AS NEEDED WHEEZING 25.5 g 3    EPINEPHrine (EPIPEN 2-DANA) 0.3 mg/0.3 mL AtIn Inject 0.3 mLs (0.3 mg total) into the muscle once. for 1 dose (Patient not taking: Reported on 10/24/2022) 1 each 11    fluticasone furoate-vilanteroL (BREO ELLIPTA) 100-25 mcg/dose diskus inhaler Inhale 1 puff into the lungs once daily. Controller 60 each 6    [DISCONTINUED] hydroCHLOROthiazide (HYDRODIURIL) 25 MG tablet TAKE 1 TABLET(25 MG) BY MOUTH EVERY DAY 90 tablet 3     No facility-administered encounter medications on file as of 11/17/2022.       Objective:     Vital Signs (Most Recent)  Vital Signs  Pulse: 64  Resp: 17  SpO2: 96 %  BP: 126/72  Height and Weight  Height: 5' 2" (157.5 cm)  Weight: 105.3 kg (232 lb 2.3 oz)  BSA (Calculated - sq m): 2.15 sq meters  BMI (Calculated): 42.4  Weight in (lb) to have BMI = 25: 136.4]  Wt Readings from Last 2 Encounters:   11/17/22 105.3 kg (232 lb 2.3 oz)   11/17/22 114.3 kg (251 lb 15.8 oz)       Physical Exam   Constitutional: She is oriented to person, place, and time. She appears well-developed and well-nourished.   HENT:   Head: Normocephalic.   Mouth/Throat: Mallampati Score: II.   Neck: No JVD present.   Cardiovascular: Normal rate and intact distal pulses.   No murmur heard.  Pulmonary/Chest: Normal expansion, effort normal and breath sounds normal.   Abdominal: Soft. Bowel sounds are normal.   Musculoskeletal:         General: No edema. Normal range of motion.      Cervical back: Normal range of motion and neck supple.   Lymphadenopathy:     She has no axillary adenopathy.   Neurological: She is alert and oriented to person, place, and time.   Skin: Skin is warm and dry.   Psychiatric: She has a normal mood and affect.   Nursing note and vitals reviewed.    Laboratory  Lab Results   Component Value " Date    WBC 8.46 10/27/2022    RBC 4.40 10/27/2022    HGB 10.5 (L) 10/27/2022    HCT 36.9 (L) 10/27/2022    MCV 84 10/27/2022    MCH 23.9 (L) 10/27/2022    MCHC 28.5 (L) 10/27/2022    RDW 18.5 (H) 10/27/2022     10/27/2022    MPV 11.1 10/27/2022    GRAN 5.2 10/27/2022    GRAN 61.6 10/27/2022    LYMPH 2.2 10/27/2022    LYMPH 25.8 10/27/2022    MONO 0.7 10/27/2022    MONO 8.6 10/27/2022    EOS 0.3 10/27/2022    BASO 0.04 10/27/2022    EOSINOPHIL 3.0 10/27/2022    BASOPHIL 0.5 10/27/2022       BMP  Lab Results   Component Value Date     11/10/2022    K 4.5 11/10/2022     11/10/2022    CO2 27 11/10/2022    BUN 23 11/10/2022    CREATININE 1.4 11/10/2022    CALCIUM 9.6 11/10/2022    ANIONGAP 10 11/10/2022    ESTGFRAFRICA 48.0 (A) 06/17/2022    EGFRNONAA 41.7 (A) 06/17/2022    AST 16 11/10/2022    ALT 10 11/10/2022    PROT 7.7 11/10/2022       Lab Results   Component Value Date     (H) 08/15/2022    BNP 96 04/06/2022    BNP 13 06/17/2021       Lab Results   Component Value Date    TSH 0.210 (L) 04/06/2022       Lab Results   Component Value Date    SEDRATE 47 (H) 10/03/2022       Lab Results   Component Value Date    CRP 16.2 (H) 10/03/2022     Lab Results   Component Value Date     (H) 06/02/2022        No results found for: ASPERGILLUS  No results found for: AFUMIGATUSCL     No results found for: ACE     Diagnostic Results:  I have personally reviewed today the following studies:    CT Soft Tissue Neck WO Contrast  Narrative: EXAMINATION:  CT SOFT TISSUE NECK WITHOUT CONTRAST    CLINICAL HISTORY:  subglottic/tracheal stenosis;Stenosis of larynx    TECHNIQUE:  Low dose axial images, sagittal and coronal reformations were performed from the skull base to the level of the clavicles.  Contrast was not administered.    COMPARISON:  None    FINDINGS:  There is no discrete mass, fluid collection, or suspicious lymph node identified in the neck.    Left palatine tonsil calcification noted likely  sequela of prior inflammatory/infectious process.  Aero digestive structures including the larynx are otherwise unremarkable.    Major salivary glands are unremarkable.    Thyroid gland appears within normal limits.    Scattered atherosclerosis noted.  Vascular assessment otherwise limited without intravenous contrast.    Remaining soft tissues of the neck are unremarkable.    No acute bony abnormality.  No aggressive lytic or blastic lesion.    Degenerative changes noted throughout the spine with greatest bony spinal canal and neural foraminal narrowing noted at C4-C5 and C5-C6.    Temporomandibular joints are well maintained.  Few dental caries noted.    Mucosal thickening of the bilateral maxillary sinuses with prominent hyperostosis of the right maxillary sinus wall and more mild hyperostosis of the left maxillary sinus wall.  Remaining paranasal sinuses are clear.  Mastoid air cells and middle ears are clear.    Orbits and globes appear within normal limits.    Visualized calvarium is intact without acute or aggressive abnormality.  Bilateral carotid siphon calcifications noted.  Visualized intracranial contents otherwise grossly unremarkable.    Visualized upper lungs are unremarkable.  Scattered coronary calcifications noted.  Impression: No discrete mass, fluid collection, or suspicious lymph node in the neck.    Left palatine tonsil calcification likely sequela of prior inflammatory/infectious process.  Aero digestive structures including the larynx are otherwise unremarkable.    Degenerative changes of the cervical spine.  Chronic right greater than left maxillary sinus disease.    Electronically signed by: Jayy Mayer  Date:    10/14/2022  Time:    14:07       Baseline spirometry shows a reduced vital capacity however the post bronchodilator value is normal. Spirometry remains unimproved following bronchodilator.   Â    Notes:   Â    The failure to demonstrate improvement in spirometry does not  "preclude a clinical response to a trial of bronchodilators. The vital capacity may be underestimated due to the short expiratory time.  FEV1: 1.49L( 85.2%), FVC 1.50L( 67.5%)  FEV1/FVC 99  FVC improved by 13.6% with BRD      Ordering Provider: Franklyn Cha MD              Interpreting Provider: Dwayne Guerra MD  Performing nurse/tech/RT: VT, RT  Diagnosis:  (Mild persistent asthma without complication; Tracheostomy in place , pt decannulated)  Height: 5' 2" (157.5 cm)  Weight: 105.3 kg (232 lb 2.3 oz)  BMI (Calculated): 42.4              Patient Race:            Phase Oxygen Assessment Supplemental O2 Heart   Rate Blood Pressure Dalia Dyspnea Scale Rating   Resting 95 % Room Air 78 bpm 136/69 0   Exercise             Minute             1 93 % Room Air 97 bpm       2 93 % Room Air 101 bpm       3 94 % Room Air 103 bpm       4 93 % Room Air 107 bpm       5 94 % Room Air 107 bpm       6  94 % Room Air 108 bpm 163/69 3   Recovery             Minute             1 96 % Room Air 90 bpm       2 97 % Room Air 87 bpm       3 95 % Room Air 84 bpm       4 95 % Room Air 82 bpm 145/79 0      Six Minute Walk Summary  6MWT Status: completed without stopping  Patient Reported: Dyspnea         Interpretation:  Did the patient stop or pause?: No  Total Time Walked (Calculated): 360 seconds  Final Partial Lap Distance (feet): 150 feet  Total Distance Meters (Calculated): 228.6 meters  Predicted Distance Meters (Calculated): 353.59 meters  Percentage of Predicted (Calculated): 64.65  Peak VO2 (Calculated): 10.84  Mets: 3.1  Has The Patient Had a Previous Six Minute Walk Test?: No     Previous 6MWT Results  Has The Patient Had a Previous Six Minute Walk Test?: No        Interpretation:  Total distance walked in six minutes is mildly reduced indicating a reduction in overall  functional capacity. The patient did not meet criteria for supplemental oxygen prescription.  Clinical correlation suggested.  [x] Mild " exercise-induced hypoxemia described as an arterial oxygen saturation of 93-95% (with a fall of 3-4% with exercise),   [] Moderate exercise-induced hypoxemia as a fall in oxygen saturation to  89-93% (with a fall of 3-4 % with exercise)  [] Severe exercise induced hypoxemia as < 89% O2 saturation (88% and below).  Medicare Criteria for Oxygen prescription comments: When arterial oxygen saturation is at or below 88% during exercise (severe exercise induced hypoxemia) then the patient falls under   Details about Medicare Group Criteria coverage can be found at http://[email or web address]/manuals/downloads/      Dwayne Guerra MD        Clinical Guide to Interpretation or FeNO Levels :     FeNO  (ppb) LOW INTERMEDIATE HIGH   ADULT VALUES < 25 25-50          > 50   Th2-driven Inflammation Unlikely Likely Significant      Patients FeNO level at this visit : 13  (ppb)     Interpretation of FeNO measurement in adults:  [x] FENO is less than 25 ppb implies non eosinophilic airway inflammation or the absence of airway inflammation.               Comment: Low FENO (<25 ppb) in adult asthmatics with persistent symptoms suggests other etiologies for these symptoms and a lower likelihood of benefit from adding or increasing inhaled glucocorticoids.        Discussion:  A FENO less than 25 ppb in adults and less than 20 ppb in children younger than 12 years of age implies noneosinophilic airway inflammation or the absence of airway inflammation.  A FENO greater than 50 ppb in adults or greater than 35 ppb in children suggests eosinophilic airway inflammation.   Values of FENO between 25 and 50 ppb in adults (20 to 35 ppb in children) should be interpreted cautiously with reference to the clinical situation (eg, symptomatic, on or off therapy, current smoking).  A rising FENO with a greater than 20 percent change and more than 25 ppb (20 ppb in children) from a previously stable level suggests increasing eosinophilic airway inflammation,  but there are wide inter-individual differences.  A decrease in FENO greater than 20 percent for values over 50 ppb or more than 10 ppb for values less than 50 ppb may be clinically important.  ?FENO in other respiratory diseases - Several other diseases are associated with altered levels of exhaled NO: low levels of FENO have been noted in cystic fibrosis, current smoking, pulmonary hypertension, hypothermia, primary ciliary dyskinesia, and bronchopulmonary dysplasia. Elevated FENO has been noted in atopy, nonasthmatic eosinophilic bronchitis, COPD exacerbations, noncystic fibrosis bronchiectasis, and viral upper respiratory infections    Assessment/Plan:     Problem List Items Addressed This Visit       Rheumatoid arthritis involving multiple sites with positive rheumatoid factor     On Plaquenil         Mild persistent asthma without complication - Primary     Asthma ROS:   She is taking medications regularly as instructed, no medication side effects noted, no significant ongoing wheezing or shortness of breath.   ACT score 23  New concerns: None.   FEV1: 1.49L( 85.2%): Poor FVL  FEV1/FVC 99  Restrictive defect  Exam: appears well, vitals normal, no respiratory distress, acyanotic, normal RR, chest clear, no wheezing, crepitations, rhonchi, normal symmetric air entry.     Assessment: Asthma   controlled.     Plan: . CONTINUE ALBUTEROL. Orders as documented in EMR.Re evaluate in 12 month     Requested Prescriptions     Signed Prescriptions Disp Refills    fluticasone furoate-vilanteroL (BREO ELLIPTA) 100-25 mcg/dose diskus inhaler 60 each 6     Sig: Inhale 1 puff into the lungs once daily. Controller    albuterol (PROVENTIL/VENTOLIN HFA) 90 mcg/actuation inhaler 25.5 g 3     Sig: INHALE 2 PUFFS INTO THE LINGS EVERY 6 HOURS AS NEEDED WHEEZING             Relevant Medications    albuterol (PROVENTIL/VENTOLIN HFA) 90 mcg/actuation inhaler    Other Relevant Orders    X-Ray Chest PA And Lateral    Spirometry  with/without bronchodilator    X-Ray Chest PA And Lateral    Spirometry with/without bronchodilator    Diabetes mellitus type 2 with complications     Monitored by PCP  Metformin         Essential hypertension     Stable on hydralazine         Hyperlipidemia associated with type 2 diabetes mellitus     On Crestor         Class 3 severe obesity due to excess calories with serious comorbidity and body mass index (BMI) of 40.0 to 44.9 in adult     General weight loss/lifestyle modification strategies discussed (elicit support from others; identify saboteurs; non-food rewards).  Diet interventions: low calorie (1000 kCal/d) deficit diet          Aortic atherosclerosis     PCP monitoring         RESOLVED: Tracheostomy in place    Dysphonia     Chronic         Nocturnal hypoxemia due to asthma     Night O2 : 2 LPM          Other Visit Diagnoses       Asthmatic bronchitis , chronic        Relevant Medications    fluticasone furoate-vilanteroL (BREO ELLIPTA) 100-25 mcg/dose diskus inhaler    Mild persistent asthma with acute exacerbation        Relevant Medications    albuterol (PROVENTIL/VENTOLIN HFA) 90 mcg/actuation inhaler            Follow up in about 1 year (around 11/17/2023), or cxr, sosa.    This note was prepared using voice recognition system and is likely to have sound alike errors that may have been overlooked even after proof reading.  Please call me with any questions    Discussed diagnosis, its evaluation, treatment and usual course. All questions answered.      Franklyn Cha MD

## 2022-11-17 ENCOUNTER — OFFICE VISIT (OUTPATIENT)
Dept: INTERNAL MEDICINE | Facility: CLINIC | Age: 72
End: 2022-11-17
Payer: MEDICARE

## 2022-11-17 ENCOUNTER — OFFICE VISIT (OUTPATIENT)
Dept: SLEEP MEDICINE | Facility: CLINIC | Age: 72
End: 2022-11-17
Payer: MEDICARE

## 2022-11-17 VITALS
WEIGHT: 252 LBS | HEIGHT: 62 IN | BODY MASS INDEX: 46.38 KG/M2 | HEART RATE: 81 BPM | OXYGEN SATURATION: 98 % | DIASTOLIC BLOOD PRESSURE: 78 MMHG | SYSTOLIC BLOOD PRESSURE: 148 MMHG | TEMPERATURE: 98 F

## 2022-11-17 VITALS
OXYGEN SATURATION: 96 % | WEIGHT: 232.13 LBS | DIASTOLIC BLOOD PRESSURE: 72 MMHG | SYSTOLIC BLOOD PRESSURE: 126 MMHG | HEIGHT: 62 IN | RESPIRATION RATE: 17 BRPM | BODY MASS INDEX: 42.72 KG/M2 | HEART RATE: 64 BPM

## 2022-11-17 DIAGNOSIS — Z93.0 TRACHEOSTOMY IN PLACE: ICD-10-CM

## 2022-11-17 DIAGNOSIS — M81.0 AGE-RELATED OSTEOPOROSIS WITHOUT CURRENT PATHOLOGICAL FRACTURE: ICD-10-CM

## 2022-11-17 DIAGNOSIS — M79.7 FIBROMYALGIA: ICD-10-CM

## 2022-11-17 DIAGNOSIS — E66.01 MORBID OBESITY: ICD-10-CM

## 2022-11-17 DIAGNOSIS — J45.30 MILD PERSISTENT ASTHMA WITHOUT COMPLICATION: Primary | ICD-10-CM

## 2022-11-17 DIAGNOSIS — J45.909 NOCTURNAL HYPOXEMIA DUE TO ASTHMA: ICD-10-CM

## 2022-11-17 DIAGNOSIS — R49.0 DYSPHONIA: ICD-10-CM

## 2022-11-17 DIAGNOSIS — J45.30 MILD PERSISTENT ASTHMA WITHOUT COMPLICATION: ICD-10-CM

## 2022-11-17 DIAGNOSIS — Z12.31 ENCOUNTER FOR SCREENING MAMMOGRAM FOR MALIGNANT NEOPLASM OF BREAST: ICD-10-CM

## 2022-11-17 DIAGNOSIS — N25.81 SECONDARY HYPERPARATHYROIDISM OF RENAL ORIGIN: ICD-10-CM

## 2022-11-17 DIAGNOSIS — E66.01 CLASS 3 SEVERE OBESITY DUE TO EXCESS CALORIES WITH SERIOUS COMORBIDITY AND BODY MASS INDEX (BMI) OF 40.0 TO 44.9 IN ADULT: ICD-10-CM

## 2022-11-17 DIAGNOSIS — E78.5 HYPERLIPIDEMIA ASSOCIATED WITH TYPE 2 DIABETES MELLITUS: ICD-10-CM

## 2022-11-17 DIAGNOSIS — E11.69 HYPERLIPIDEMIA ASSOCIATED WITH TYPE 2 DIABETES MELLITUS: ICD-10-CM

## 2022-11-17 DIAGNOSIS — I70.0 AORTIC ATHEROSCLEROSIS: ICD-10-CM

## 2022-11-17 DIAGNOSIS — M05.79 RHEUMATOID ARTHRITIS INVOLVING MULTIPLE SITES WITH POSITIVE RHEUMATOID FACTOR: ICD-10-CM

## 2022-11-17 DIAGNOSIS — N18.31 STAGE 3A CHRONIC KIDNEY DISEASE: ICD-10-CM

## 2022-11-17 DIAGNOSIS — J44.89 ASTHMATIC BRONCHITIS , CHRONIC: ICD-10-CM

## 2022-11-17 DIAGNOSIS — E11.8 DIABETES MELLITUS TYPE 2 WITH COMPLICATIONS: ICD-10-CM

## 2022-11-17 DIAGNOSIS — J45.31 MILD PERSISTENT ASTHMA WITH ACUTE EXACERBATION: ICD-10-CM

## 2022-11-17 DIAGNOSIS — R09.02 NOCTURNAL HYPOXEMIA DUE TO ASTHMA: ICD-10-CM

## 2022-11-17 DIAGNOSIS — I10 ESSENTIAL HYPERTENSION: ICD-10-CM

## 2022-11-17 DIAGNOSIS — E55.9 VITAMIN D DEFICIENCY DISEASE: ICD-10-CM

## 2022-11-17 DIAGNOSIS — I10 ESSENTIAL HYPERTENSION: Primary | ICD-10-CM

## 2022-11-17 DIAGNOSIS — Z87.891 FORMER SMOKER, STOPPED SMOKING IN DISTANT PAST: ICD-10-CM

## 2022-11-17 DIAGNOSIS — K21.9 GASTROESOPHAGEAL REFLUX DISEASE WITHOUT ESOPHAGITIS: ICD-10-CM

## 2022-11-17 PROCEDURE — 99999 PR PBB SHADOW E&M-EST. PATIENT-LVL IV: ICD-10-PCS | Mod: PBBFAC,,, | Performed by: INTERNAL MEDICINE

## 2022-11-17 PROCEDURE — 99214 OFFICE O/P EST MOD 30 MIN: CPT | Mod: S$PBB,,, | Performed by: INTERNAL MEDICINE

## 2022-11-17 PROCEDURE — 99999 PR PBB SHADOW E&M-EST. PATIENT-LVL IV: CPT | Mod: PBBFAC,,, | Performed by: INTERNAL MEDICINE

## 2022-11-17 PROCEDURE — 99214 PR OFFICE/OUTPT VISIT, EST, LEVL IV, 30-39 MIN: ICD-10-PCS | Mod: S$PBB,,, | Performed by: NURSE PRACTITIONER

## 2022-11-17 PROCEDURE — 99214 PR OFFICE/OUTPT VISIT, EST, LEVL IV, 30-39 MIN: ICD-10-PCS | Mod: S$PBB,,, | Performed by: INTERNAL MEDICINE

## 2022-11-17 PROCEDURE — 99214 OFFICE O/P EST MOD 30 MIN: CPT | Mod: S$PBB,,, | Performed by: NURSE PRACTITIONER

## 2022-11-17 PROCEDURE — 99999 PR PBB SHADOW E&M-EST. PATIENT-LVL IV: ICD-10-PCS | Mod: PBBFAC,,, | Performed by: NURSE PRACTITIONER

## 2022-11-17 PROCEDURE — 99214 OFFICE O/P EST MOD 30 MIN: CPT | Mod: PBBFAC,27 | Performed by: NURSE PRACTITIONER

## 2022-11-17 PROCEDURE — 99214 OFFICE O/P EST MOD 30 MIN: CPT | Mod: PBBFAC | Performed by: INTERNAL MEDICINE

## 2022-11-17 PROCEDURE — 99999 PR PBB SHADOW E&M-EST. PATIENT-LVL IV: CPT | Mod: PBBFAC,,, | Performed by: NURSE PRACTITIONER

## 2022-11-17 RX ORDER — ALBUTEROL SULFATE 90 UG/1
AEROSOL, METERED RESPIRATORY (INHALATION)
Qty: 25.5 G | Refills: 3 | Status: SHIPPED | OUTPATIENT
Start: 2022-11-17 | End: 2024-01-12 | Stop reason: SDUPTHER

## 2022-11-17 RX ORDER — FLUTICASONE FUROATE AND VILANTEROL 100; 25 UG/1; UG/1
1 POWDER RESPIRATORY (INHALATION) DAILY
Qty: 60 EACH | Refills: 6 | Status: SHIPPED | OUTPATIENT
Start: 2022-11-17 | End: 2024-01-12 | Stop reason: SDUPTHER

## 2022-11-17 NOTE — ASSESSMENT & PLAN NOTE
Asthma ROS:   She is taking medications regularly as instructed, no medication side effects noted, no significant ongoing wheezing or shortness of breath.   ACT score 23  New concerns: None.   FEV1: 1.49L( 85.2%): Poor FVL  FEV1/FVC 99  Restrictive defect  Exam: appears well, vitals normal, no respiratory distress, acyanotic, normal RR, chest clear, no wheezing, crepitations, rhonchi, normal symmetric air entry.     Assessment: Asthma   controlled.     Plan: . CONTINUE ALBUTEROL. Orders as documented in EMR.Re evaluate in 12 month     Requested Prescriptions     Signed Prescriptions Disp Refills    fluticasone furoate-vilanteroL (BREO ELLIPTA) 100-25 mcg/dose diskus inhaler 60 each 6     Sig: Inhale 1 puff into the lungs once daily. Controller    albuterol (PROVENTIL/VENTOLIN HFA) 90 mcg/actuation inhaler 25.5 g 3     Sig: INHALE 2 PUFFS INTO THE LINGS EVERY 6 HOURS AS NEEDED WHEEZING

## 2022-11-22 ENCOUNTER — OFFICE VISIT (OUTPATIENT)
Dept: ALLERGY | Facility: CLINIC | Age: 72
End: 2022-11-22
Payer: MEDICARE

## 2022-11-22 VITALS
TEMPERATURE: 98 F | HEART RATE: 77 BPM | HEIGHT: 62 IN | WEIGHT: 249.81 LBS | SYSTOLIC BLOOD PRESSURE: 137 MMHG | DIASTOLIC BLOOD PRESSURE: 70 MMHG | BODY MASS INDEX: 45.97 KG/M2

## 2022-11-22 DIAGNOSIS — T46.4X5A ANGIOEDEMA DUE TO ANGIOTENSIN CONVERTING ENZYME INHIBITOR (ACE-I): Primary | ICD-10-CM

## 2022-11-22 DIAGNOSIS — T78.3XXA ANGIOEDEMA DUE TO ANGIOTENSIN CONVERTING ENZYME INHIBITOR (ACE-I): Primary | ICD-10-CM

## 2022-11-22 PROCEDURE — 99999 PR PBB SHADOW E&M-EST. PATIENT-LVL IV: ICD-10-PCS | Mod: PBBFAC,,, | Performed by: ALLERGY & IMMUNOLOGY

## 2022-11-22 PROCEDURE — 99214 OFFICE O/P EST MOD 30 MIN: CPT | Mod: PBBFAC | Performed by: ALLERGY & IMMUNOLOGY

## 2022-11-22 PROCEDURE — 99214 PR OFFICE/OUTPT VISIT, EST, LEVL IV, 30-39 MIN: ICD-10-PCS | Mod: S$PBB,,, | Performed by: ALLERGY & IMMUNOLOGY

## 2022-11-22 PROCEDURE — 99214 OFFICE O/P EST MOD 30 MIN: CPT | Mod: S$PBB,,, | Performed by: ALLERGY & IMMUNOLOGY

## 2022-11-22 PROCEDURE — 99999 PR PBB SHADOW E&M-EST. PATIENT-LVL IV: CPT | Mod: PBBFAC,,, | Performed by: ALLERGY & IMMUNOLOGY

## 2022-11-22 NOTE — PROGRESS NOTES
Subjective:       Patient ID: Page Grissom is a 72 y.o. female.    Chief Complaint: 6m flu     HPI    ) DM: no hyper/hypoglycemic symptoms.   2) HTN: stable at home no HTNive symptoms, only on apresoline  3) LIPIDS:  tolerating and compliant with crestor, she is working on weight loss/diet changes  4) ASTHMA: stopped smoking . Asthma quiet seeing pulm  5) GERD: Quiet.   6) Rheum/osteoporosis: No current symptoms. 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   HENT:  Negative for congestion, ear pain, postnasal drip, rhinorrhea, sinus pressure, sinus pain, sneezing, sore throat, tinnitus, trouble swallowing and voice change.    Eyes:  Negative for photophobia, pain and visual disturbance.   Respiratory:  Negative for cough, chest tightness, shortness of breath and wheezing.    Cardiovascular:  Negative for chest pain, palpitations and leg swelling.   Gastrointestinal:  Negative for abdominal distention, abdominal pain, constipation, diarrhea, nausea and vomiting.   Genitourinary:  Negative for decreased urine volume, difficulty urinating, dysuria, flank pain, frequency, hematuria and urgency.   Musculoskeletal:  Negative for arthralgias, back pain, joint swelling, neck pain and neck stiffness.   Allergic/Immunologic: Negative for immunocompromised state.   Neurological:  Negative for dizziness, tremors, seizures, syncope, facial asymmetry, speech difficulty, weakness, light-headedness, numbness and headaches.   Hematological:  Negative for adenopathy. Does not bruise/bleed easily.   Psychiatric/Behavioral:  Negative for confusion and sleep disturbance.      Objective:      Physical Exam  Constitutional:       Appearance: She is obese.   HENT:       Head: Normocephalic and atraumatic.      Right Ear: Tympanic membrane normal.      Left Ear: Tympanic membrane normal.   Eyes:      Conjunctiva/sclera: Conjunctivae normal.   Cardiovascular:      Rate and Rhythm: Normal rate and regular rhythm.      Heart sounds: Normal heart sounds.   Pulmonary:      Effort: Pulmonary effort is normal.      Breath sounds: Normal breath sounds.   Abdominal:      General: Bowel sounds are normal.      Palpations: Abdomen is soft.   Musculoskeletal:         General: Normal range of motion.      Cervical back: Normal range of motion and neck supple.   Skin:     General: Skin is warm and dry.   Neurological:      Mental Status: She is alert and oriented to person, place, and time.       Assessment:     Vitals:    11/17/22 1039   BP: (!) 148/78   Pulse: 81   Temp: 97.6 °F (36.4 °C)         1. Essential hypertension    2. Hyperlipidemia associated with type 2 diabetes mellitus    3. Aortic atherosclerosis    4. Mild persistent asthma without complication    5. Dysphonia    6. Stage 3a chronic kidney disease    7. Rheumatoid arthritis involving multiple sites with positive rheumatoid factor    8. Class 3 severe obesity due to excess calories with serious comorbidity and body mass index (BMI) of 40.0 to 44.9 in adult    9. Diabetes mellitus type 2 with complications    10. Morbid obesity    11. Secondary hyperparathyroidism of renal origin    12. Vitamin D deficiency disease    13. Gastroesophageal reflux disease without esophagitis    14. Age-related osteoporosis without current pathological fracture    15. Fibromyalgia    16. Former smoker, stopped smoking in distant past          Plan:   Essential hypertension  -     TSH; Future; Expected date: 05/17/2023    Hyperlipidemia associated with type 2 diabetes mellitus  -     Comprehensive Metabolic Panel; Future; Expected date: 05/16/2023  -     Lipid Panel; Future; Expected date: 05/16/2023    Aortic atherosclerosis    Mild persistent  asthma without complication    Dysphonia    Stage 3a chronic kidney disease    Rheumatoid arthritis involving multiple sites with positive rheumatoid factor    Class 3 severe obesity due to excess calories with serious comorbidity and body mass index (BMI) of 40.0 to 44.9 in adult    Diabetes mellitus type 2 with complications  -     Hemoglobin A1C; Future; Expected date: 05/16/2023  -     Microalbumin/Creatinine Ratio, Urine; Future; Expected date: 05/17/2023    Morbid obesity  -     TSH; Future; Expected date: 05/17/2023    Secondary hyperparathyroidism of renal origin    Vitamin D deficiency disease    Gastroesophageal reflux disease without esophagitis    Age-related osteoporosis without current pathological fracture    Fibromyalgia    Former smoker, stopped smoking in distant past      Pt is stable overall  Working on diet/weight loss/joint issues limit activity   Keep subspecialty care  Cant order mammo yet due to insurance. Hard stop. Due in Jan 2023  Vaccines discussed  Return in 6 mo w lab

## 2022-11-22 NOTE — PROGRESS NOTES
"Subjective:       Patient ID: Page Grissom is a 72 y.o. female.      Chief Complaint:  Follow-up      HPI today: 72 year old female with a history of ACE inhibitor angioedema- she denies swelling, since she was seen here previously. She denies tongue or throat swelling.  Zyrtec every other day.        HPI 8/23/2022: 71 year old female with a history of ACE inhibitor angioedema here for follow up. She was seen last week and found to have an elevated BP. She was sent to the ER. She is back today to further discuss her angioedema. NO angioedema, since her previous visit. NO lisinopril since March of this year.    Albuterol twice a day  Ex-smoker- stopped in March  Smoked for 60 years- 1 pack a day        HPI 8/15/2022: 71 year old female referred with a history of angioedema thought to be related to lisinopril. She thinks it was "the bone infusion".  Lisinopril was stopped. She was hospitalized in March. She was intubated and trached due to swelling. No swelling, since she was discharged in March.    She is not taking medications "because [employer] won't give them to me".    She has an Epipen 2pack.      Past Medical History:   Diagnosis Date    Acidosis, metabolic, with respiratory acidosis 3/31/2022    Asthma     Back pain     Cataract     OD    CKD (chronic kidney disease) stage 3, GFR 30-59 ml/min 12/20/2020    Diabetes mellitus     Fibromyalgia     Gastroesophageal reflux disease     Hypercholesterolemia     Hypertension     Immune deficiency disorder     Obesity     Osteoporosis     Rheumatoid arthritis     Tobacco dependence     Trouble in sleeping     Type 2 diabetes mellitus        Family History   Problem Relation Age of Onset    Hypertension Mother     Cancer Father     Colon cancer Father     Breast cancer Sister      Current Outpatient Medications on File Prior to Visit   Medication Sig Dispense Refill    acetaminophen (TYLENOL) 500 MG tablet Take 500 mg by mouth as needed for Pain.      albuterol " (PROVENTIL/VENTOLIN HFA) 90 mcg/actuation inhaler INHALE 2 PUFFS INTO THE LINGS EVERY 6 HOURS AS NEEDED WHEEZING 25.5 g 3    amLODIPine (NORVASC) 10 MG tablet Take 1 tablet (10 mg total) by mouth once daily. 90 tablet 3    blood sugar diagnostic Strp To check BG 1 times daily, to use with insurance preferred meter 100 strip 11    blood-glucose meter kit To check BG 1 times daily, to use with insurance preferred meter 1 each 0    cetirizine (ZYRTEC) 10 MG tablet Take 1 tablet (10 mg total) by mouth once daily. 90 tablet 3    cholecalciferol, vitamin D3, 2,000 unit Tab Take 2,000 Units by mouth once daily.      famotidine (PEPCID) 40 MG tablet Take 1 tablet (40 mg total) by mouth once daily. 90 tablet 3    fish oil-omega-3 fatty acids 300-1,000 mg capsule Take 2 g by mouth once daily.      fluticasone furoate-vilanteroL (BREO ELLIPTA) 100-25 mcg/dose diskus inhaler Inhale 1 puff into the lungs once daily. Controller 60 each 6    hydrOXYchloroQUINE (PLAQUENIL) 200 mg tablet Take 1 tablet (200 mg total) by mouth once daily. 90 tablet 1    lancets Misc To check BG 1 times daily, to use with insurance preferred meter 100 each 11    metFORMIN (GLUCOPHAGE) 500 MG tablet Take 1 tablet (500 mg total) by mouth once daily. 90 tablet 3    multivitamin (THERAGRAN) per tablet Take 1 tablet by mouth once daily.      pantoprazole (PROTONIX) 40 MG tablet Take 1 tablet (40 mg total) by mouth once daily. 90 tablet 3    potassium chloride SA (K-DUR,KLOR-CON) 20 MEQ tablet Take 1 tablet (20 mEq total) by mouth once daily. 90 tablet 3    rosuvastatin (CRESTOR) 40 MG Tab TAKE 1 TABLET(40 MG) BY MOUTH EVERY EVENING 90 tablet 3    EPINEPHrine (EPIPEN 2-DANA) 0.3 mg/0.3 mL AtIn Inject 0.3 mLs (0.3 mg total) into the muscle once. for 1 dose (Patient not taking: Reported on 10/24/2022) 1 each 11    hydrALAZINE (APRESOLINE) 25 MG tablet Take 1 tablet (25 mg total) by mouth every 8 (eight) hours. 270 tablet 0    [DISCONTINUED] hydroCHLOROthiazide  (HYDRODIURIL) 25 MG tablet TAKE 1 TABLET(25 MG) BY MOUTH EVERY DAY 90 tablet 3     No current facility-administered medications on file prior to visit.        Environmental History: Pets in the home: dogs (1).  Tobacco Smoke in Home: no  Review of Systems   Constitutional:  Negative for chills and fever.   HENT:  Negative for congestion and rhinorrhea.    Eyes:  Negative for discharge, itching and visual disturbance.   Respiratory:  Negative for cough, shortness of breath and wheezing.    Cardiovascular:  Positive for leg swelling. Negative for chest pain.   Gastrointestinal:  Negative for nausea and vomiting.        GERD   Skin:  Negative for rash and wound.   Allergic/Immunologic: Positive for environmental allergies. Negative for food allergies.   Neurological:  Negative for light-headedness and numbness.   Hematological:  Negative for adenopathy. Does not bruise/bleed easily.   Psychiatric/Behavioral:  Negative for behavioral problems and suicidal ideas.    All other systems reviewed and are negative.     Objective:    Physical Exam  Vitals reviewed.   Constitutional:       General: She is not in acute distress.     Appearance: Normal appearance. She is well-developed. She is obese. She is not ill-appearing, toxic-appearing or diaphoretic.   HENT:      Head: Normocephalic and atraumatic.      Right Ear: Tympanic membrane, ear canal and external ear normal. There is no impacted cerumen.      Left Ear: Tympanic membrane, ear canal and external ear normal. There is no impacted cerumen.      Nose: Nose normal. No congestion or rhinorrhea.      Mouth/Throat:      Pharynx: No oropharyngeal exudate or posterior oropharyngeal erythema.   Eyes:      General: No scleral icterus.        Right eye: No discharge.         Left eye: No discharge.      Pupils: Pupils are equal, round, and reactive to light.   Neck:      Thyroid: No thyromegaly.   Cardiovascular:      Rate and Rhythm: Normal rate and regular rhythm.      Heart  sounds: Normal heart sounds. No murmur heard.    No friction rub. No gallop.   Pulmonary:      Effort: Pulmonary effort is normal. No respiratory distress.      Breath sounds: Normal breath sounds. No stridor. No wheezing, rhonchi or rales.   Chest:      Chest wall: No tenderness.   Abdominal:      Hernia: No hernia is present.   Musculoskeletal:         General: No swelling, tenderness, deformity or signs of injury. Normal range of motion.      Cervical back: Normal range of motion and neck supple. No rigidity. No muscular tenderness.      Right lower leg: No edema.      Left lower leg: No edema.   Lymphadenopathy:      Cervical: No cervical adenopathy.   Skin:     General: Skin is warm.      Coloration: Skin is not jaundiced or pale.      Findings: No bruising or erythema.   Neurological:      Mental Status: She is alert and oriented to person, place, and time.      Gait: Gait normal.   Psychiatric:         Mood and Affect: Mood normal.         Behavior: Behavior normal.         Thought Content: Thought content normal.         Judgment: Judgment normal.         Assessment:       1. 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 Can see angioedema for 12 months after stopping ACE inhibitor, with most episodes occurring the first three months after it was stopped.    Epipen 2 pack  Zyrtec and Famotidine daily  RTC 4-6 months      MD,FACAAI                    Problems Address                                                 Amount and/or Complexity                                                                      Risk       3           [] 2 or more self-limited or minor problems                      [] Limited                                                                        [] Low                  [] 1 stable chronic illness                                                   Any combination of the two                                               OTC drugs                  []Acute, uncomplicated illness or injury                            Review of prior external notes from unique source           Minor surgery with no risk factors                                                                                                               [] 1 []2  []3+                                                                                                              Review of results from each unique test                                                                                                               [] 1 []2  [] 3+                                                                                                              Order of each unique test                                                                                                               [] 1 []2  [] 3+                                                                                                              Or                                                                                                             [] Assessment requiring an independent historian      4            [] One or more chronic illness with exacerbation,              [] Moderate                                                                      [] Moderate                 Progression, or side effects of treatment                            -test documents or independent historians                        Prescription drug management                [x]  2 or more stable chronic illnesses                                    [] Independent interpretation of tests                              Minor surgery with identifiable risk                [] 1 undiagnosed new problem with uncertain prognosis    [] Discussion or management of test results                    elective major surgery                [] 1 acute illness  with                systemic symptoms                                                                                                                                                              [] 1 acute complicated injury                                                                                                                                          Elective major surgery                                                                                                                                                                                                                                                                                                                                                                                                  5            [] 1 or more chronic illnesses with severe exacerbation,     [] Extensive(two from below)                                         [] High                                                                                                               [] Independent interpretation of results                         Drug therapy requiring intensive                                                                                                               []Discussion of management or test interpretation           monitoring                                                                                                                                                                                                       Decision to de-escalate care                 [] 1 acute or chronic illness or injury that poses a threat                                                                                               Decision regarding hospitalization

## 2022-11-30 ENCOUNTER — TELEPHONE (OUTPATIENT)
Dept: INTERNAL MEDICINE | Facility: CLINIC | Age: 72
End: 2022-11-30
Payer: MEDICARE

## 2022-11-30 ENCOUNTER — PATIENT OUTREACH (OUTPATIENT)
Dept: ADMINISTRATIVE | Facility: HOSPITAL | Age: 72
End: 2022-11-30
Payer: MEDICARE

## 2022-11-30 VITALS — SYSTOLIC BLOOD PRESSURE: 137 MMHG | DIASTOLIC BLOOD PRESSURE: 70 MMHG

## 2022-11-30 NOTE — PROGRESS NOTES
HTN Report: Patient states she checks her BP at home, last reading on 11/27/22 was 137/70, remote BP will be entered.

## 2022-12-06 ENCOUNTER — TELEPHONE (OUTPATIENT)
Dept: RHEUMATOLOGY | Facility: CLINIC | Age: 72
End: 2022-12-06
Payer: MEDICARE

## 2022-12-06 ENCOUNTER — LAB VISIT (OUTPATIENT)
Dept: LAB | Facility: HOSPITAL | Age: 72
End: 2022-12-06
Attending: PHYSICIAN ASSISTANT
Payer: MEDICARE

## 2022-12-06 DIAGNOSIS — M05.79 RHEUMATOID ARTHRITIS INVOLVING MULTIPLE SITES WITH POSITIVE RHEUMATOID FACTOR: ICD-10-CM

## 2022-12-06 LAB
ALBUMIN SERPL BCP-MCNC: 3.2 G/DL (ref 3.5–5.2)
ALP SERPL-CCNC: 95 U/L (ref 55–135)
ALT SERPL W/O P-5'-P-CCNC: 12 U/L (ref 10–44)
ANION GAP SERPL CALC-SCNC: 10 MMOL/L (ref 8–16)
AST SERPL-CCNC: 14 U/L (ref 10–40)
BASOPHILS # BLD AUTO: 0.04 K/UL (ref 0–0.2)
BASOPHILS NFR BLD: 0.6 % (ref 0–1.9)
BILIRUB SERPL-MCNC: 0.3 MG/DL (ref 0.1–1)
BUN SERPL-MCNC: 29 MG/DL (ref 8–23)
CALCIUM SERPL-MCNC: 9 MG/DL (ref 8.7–10.5)
CHLORIDE SERPL-SCNC: 103 MMOL/L (ref 95–110)
CO2 SERPL-SCNC: 26 MMOL/L (ref 23–29)
CREAT SERPL-MCNC: 1.6 MG/DL (ref 0.5–1.4)
CRP SERPL-MCNC: 16.9 MG/L (ref 0–8.2)
DIFFERENTIAL METHOD: ABNORMAL
EOSINOPHIL # BLD AUTO: 0.2 K/UL (ref 0–0.5)
EOSINOPHIL NFR BLD: 3.6 % (ref 0–8)
ERYTHROCYTE [DISTWIDTH] IN BLOOD BY AUTOMATED COUNT: 17.6 % (ref 11.5–14.5)
ERYTHROCYTE [SEDIMENTATION RATE] IN BLOOD BY WESTERGREN METHOD: 110 MM/HR (ref 0–20)
EST. GFR  (NO RACE VARIABLE): 34 ML/MIN/1.73 M^2
GLUCOSE SERPL-MCNC: 111 MG/DL (ref 70–110)
HCT VFR BLD AUTO: 33.7 % (ref 37–48.5)
HGB BLD-MCNC: 9.9 G/DL (ref 12–16)
IMM GRANULOCYTES # BLD AUTO: 0.02 K/UL (ref 0–0.04)
IMM GRANULOCYTES NFR BLD AUTO: 0.3 % (ref 0–0.5)
LYMPHOCYTES # BLD AUTO: 2 K/UL (ref 1–4.8)
LYMPHOCYTES NFR BLD: 29.6 % (ref 18–48)
MCH RBC QN AUTO: 24.3 PG (ref 27–31)
MCHC RBC AUTO-ENTMCNC: 29.4 G/DL (ref 32–36)
MCV RBC AUTO: 83 FL (ref 82–98)
MONOCYTES # BLD AUTO: 0.6 K/UL (ref 0.3–1)
MONOCYTES NFR BLD: 9.2 % (ref 4–15)
NEUTROPHILS # BLD AUTO: 3.8 K/UL (ref 1.8–7.7)
NEUTROPHILS NFR BLD: 56.7 % (ref 38–73)
NRBC BLD-RTO: 0 /100 WBC
PLATELET # BLD AUTO: 260 K/UL (ref 150–450)
PMV BLD AUTO: 10.9 FL (ref 9.2–12.9)
POTASSIUM SERPL-SCNC: 4.4 MMOL/L (ref 3.5–5.1)
PROT SERPL-MCNC: 7.6 G/DL (ref 6–8.4)
RBC # BLD AUTO: 4.08 M/UL (ref 4–5.4)
SODIUM SERPL-SCNC: 139 MMOL/L (ref 136–145)
WBC # BLD AUTO: 6.66 K/UL (ref 3.9–12.7)

## 2022-12-06 PROCEDURE — 85025 COMPLETE CBC W/AUTO DIFF WBC: CPT | Performed by: PHYSICIAN ASSISTANT

## 2022-12-06 PROCEDURE — 86140 C-REACTIVE PROTEIN: CPT | Performed by: PHYSICIAN ASSISTANT

## 2022-12-06 PROCEDURE — 85651 RBC SED RATE NONAUTOMATED: CPT | Performed by: PHYSICIAN ASSISTANT

## 2022-12-06 PROCEDURE — 36415 COLL VENOUS BLD VENIPUNCTURE: CPT | Mod: PO | Performed by: PHYSICIAN ASSISTANT

## 2022-12-06 PROCEDURE — 80053 COMPREHEN METABOLIC PANEL: CPT | Performed by: PHYSICIAN ASSISTANT

## 2022-12-06 NOTE — TELEPHONE ENCOUNTER
----- Message from Marah Cox PA-C sent at 12/6/2022  4:12 PM CST -----  Significantly elevated inflammatory markers.  Please call her to verify her appointment for later this week to discuss in detail.  However her joints?  Has she had any illnesses recently?

## 2022-12-06 NOTE — TELEPHONE ENCOUNTER
No joint pain. No illnesses per patient.    Informed patient of lab results and patient confirmed appt for 12/08-DD

## 2022-12-08 ENCOUNTER — TELEPHONE (OUTPATIENT)
Dept: RHEUMATOLOGY | Facility: CLINIC | Age: 72
End: 2022-12-08

## 2022-12-08 ENCOUNTER — OFFICE VISIT (OUTPATIENT)
Dept: RHEUMATOLOGY | Facility: CLINIC | Age: 72
End: 2022-12-08
Payer: MEDICARE

## 2022-12-08 ENCOUNTER — HOSPITAL ENCOUNTER (OUTPATIENT)
Dept: RADIOLOGY | Facility: HOSPITAL | Age: 72
Discharge: HOME | End: 2022-12-08
Attending: PHYSICIAN ASSISTANT
Payer: MEDICARE

## 2022-12-08 VITALS
WEIGHT: 255.75 LBS | DIASTOLIC BLOOD PRESSURE: 71 MMHG | SYSTOLIC BLOOD PRESSURE: 153 MMHG | HEART RATE: 71 BPM | HEIGHT: 62 IN | BODY MASS INDEX: 47.06 KG/M2

## 2022-12-08 DIAGNOSIS — R70.0 ELEVATED SED RATE: ICD-10-CM

## 2022-12-08 DIAGNOSIS — Z79.60 LONG-TERM USE OF IMMUNOSUPPRESSANT MEDICATION: ICD-10-CM

## 2022-12-08 DIAGNOSIS — M05.79 RHEUMATOID ARTHRITIS INVOLVING MULTIPLE SITES WITH POSITIVE RHEUMATOID FACTOR: ICD-10-CM

## 2022-12-08 DIAGNOSIS — Z51.81 MEDICATION MONITORING ENCOUNTER: ICD-10-CM

## 2022-12-08 DIAGNOSIS — Z99.81 ON HOME OXYGEN THERAPY: ICD-10-CM

## 2022-12-08 DIAGNOSIS — M05.79 RHEUMATOID ARTHRITIS INVOLVING MULTIPLE SITES WITH POSITIVE RHEUMATOID FACTOR: Primary | ICD-10-CM

## 2022-12-08 DIAGNOSIS — Z79.899 HIGH RISK MEDICATION USE: ICD-10-CM

## 2022-12-08 DIAGNOSIS — E55.9 VITAMIN D DEFICIENCY DISEASE: ICD-10-CM

## 2022-12-08 DIAGNOSIS — M81.0 AGE-RELATED OSTEOPOROSIS WITHOUT CURRENT PATHOLOGICAL FRACTURE: ICD-10-CM

## 2022-12-08 PROCEDURE — 73130 X-RAY EXAM OF HAND: CPT | Mod: 26,50,, | Performed by: RADIOLOGY

## 2022-12-08 PROCEDURE — 99215 OFFICE O/P EST HI 40 MIN: CPT | Mod: PBBFAC | Performed by: PHYSICIAN ASSISTANT

## 2022-12-08 PROCEDURE — 73630 XR FOOT COMPLETE 3 VIEW BILATERAL: ICD-10-PCS | Mod: 26,50,, | Performed by: RADIOLOGY

## 2022-12-08 PROCEDURE — 99999 PR PBB SHADOW E&M-EST. PATIENT-LVL V: ICD-10-PCS | Mod: PBBFAC,,, | Performed by: PHYSICIAN ASSISTANT

## 2022-12-08 PROCEDURE — 99214 PR OFFICE/OUTPT VISIT, EST, LEVL IV, 30-39 MIN: ICD-10-PCS | Mod: S$PBB,,, | Performed by: PHYSICIAN ASSISTANT

## 2022-12-08 PROCEDURE — 73630 X-RAY EXAM OF FOOT: CPT | Mod: TC,50

## 2022-12-08 PROCEDURE — 73130 XR HAND COMPLETE 3 VIEWS BILATERAL: ICD-10-PCS | Mod: 26,50,, | Performed by: RADIOLOGY

## 2022-12-08 PROCEDURE — 73130 X-RAY EXAM OF HAND: CPT | Mod: TC,50

## 2022-12-08 PROCEDURE — 73630 X-RAY EXAM OF FOOT: CPT | Mod: 26,50,, | Performed by: RADIOLOGY

## 2022-12-08 PROCEDURE — 99999 PR PBB SHADOW E&M-EST. PATIENT-LVL V: CPT | Mod: PBBFAC,,, | Performed by: PHYSICIAN ASSISTANT

## 2022-12-08 PROCEDURE — 99214 OFFICE O/P EST MOD 30 MIN: CPT | Mod: S$PBB,,, | Performed by: PHYSICIAN ASSISTANT

## 2022-12-08 ASSESSMENT — ROUTINE ASSESSMENT OF PATIENT INDEX DATA (RAPID3): MDHAQ FUNCTION SCORE: 0

## 2022-12-08 NOTE — PROGRESS NOTES
Subjective:      Patient ID: Page Grissom is a 72 y.o. female.    Chief Complaint: No chief complaint on file.      HPI   Page Grissom  is a 72 y.o. female  Who is here today to follow-up on seropositive rheumatoid arthritis as well as osteoporosis.  From a RA perspective she is doing quite well.  Pain 0/10.  No joint pain/effusion/prolonged am stiffness.  She currently is on Plaquenil 200 mg daily.  Previously she has been on methotrexate in the past but was able to wean down and off several years ago.        No c/o shortness of breath here in the office today.  Pulmonology  started her on supplemental oxygen at night, which uses sometimes.  She sees Dr. Romero in pulmonology.    Also with a history of osteoporosis.  In 2017 she was placed on Fosamax.  However she had problems remembering taking it.  At that time she did not want add more oral medications to her regimen.  She moved to IV Reclast 10/09/2018.  She had some mild arthralgias which improved within about 2 days.  No falls or fracture since last visit.  She is on vitamin-D supplementation over-the-counter.  Repeat DEXA scan in 10/10/2019 showed improvement.  At that time Reclast was discontinued.  She had another bone density scan done this year.  It showed progression of osteoporosis.  The decision was made to move her back to Reclast.  She had 1 dose on 03/04/2022.  States it did not go well.  She has a very hard stick and had a stick her 3 or 4 times.  Additionally she just overall felt crummy for about 3 or 4 days following the infusion.  Wasn't able to function due to s/e    She has CKD.  She prefers to use ibuprofen as Tylenol arthritis not work for her.  She has been counseled in the past to avoid NSAIDs.    Patient denies fevers, chills, photosensitivity, eye pain, chest pain, hematuria, blood in the stool, rash, sicca symptoms, raynauds, finger ulcerations.  Rheumatologic systems otherwise negative.    MHAQ:  0.4  Serologies/Labs:  (+) RF, CCP  Neg NAYELY  Current Treatment:  Plaquenil 200mg daily  Reclast 3/4/22  Previous Treatment:   Fosamax - compliance issues  MTX - weaned down and off x 2 yrs      Current Outpatient Medications:     acetaminophen (TYLENOL) 500 MG tablet, Take 500 mg by mouth as needed for Pain., Disp: , Rfl:     albuterol (PROVENTIL/VENTOLIN HFA) 90 mcg/actuation inhaler, INHALE 2 PUFFS INTO THE LINGS EVERY 6 HOURS AS NEEDED WHEEZING, Disp: 25.5 g, Rfl: 3    amLODIPine (NORVASC) 10 MG tablet, Take 1 tablet (10 mg total) by mouth once daily., Disp: 90 tablet, Rfl: 3    blood sugar diagnostic Strp, To check BG 1 times daily, to use with insurance preferred meter, Disp: 100 strip, Rfl: 11    blood-glucose meter kit, To check BG 1 times daily, to use with insurance preferred meter, Disp: 1 each, Rfl: 0    cetirizine (ZYRTEC) 10 MG tablet, Take 1 tablet (10 mg total) by mouth once daily., Disp: 90 tablet, Rfl: 3    cholecalciferol, vitamin D3, 2,000 unit Tab, Take 2,000 Units by mouth once daily., Disp: , Rfl:     famotidine (PEPCID) 40 MG tablet, Take 1 tablet (40 mg total) by mouth once daily., Disp: 90 tablet, Rfl: 3    fish oil-omega-3 fatty acids 300-1,000 mg capsule, Take 2 g by mouth once daily., Disp: , Rfl:     fluticasone furoate-vilanteroL (BREO ELLIPTA) 100-25 mcg/dose diskus inhaler, Inhale 1 puff into the lungs once daily. Controller, Disp: 60 each, Rfl: 6    hydrOXYchloroQUINE (PLAQUENIL) 200 mg tablet, Take 1 tablet (200 mg total) by mouth once daily., Disp: 90 tablet, Rfl: 1    lancets Misc, To check BG 1 times daily, to use with insurance preferred meter, Disp: 100 each, Rfl: 11    metFORMIN (GLUCOPHAGE) 500 MG tablet, Take 1 tablet (500 mg total) by mouth once daily., Disp: 90 tablet, Rfl: 3    multivitamin (THERAGRAN) per tablet, Take 1 tablet by mouth once daily., Disp: , Rfl:     pantoprazole (PROTONIX) 40 MG tablet, Take 1 tablet (40 mg total) by mouth once daily., Disp: 90 tablet,  "Rfl: 3    potassium chloride SA (K-DUR,KLOR-CON) 20 MEQ tablet, Take 1 tablet (20 mEq total) by mouth once daily., Disp: 90 tablet, Rfl: 3    rosuvastatin (CRESTOR) 40 MG Tab, TAKE 1 TABLET(40 MG) BY MOUTH EVERY EVENING, Disp: 90 tablet, Rfl: 3    EPINEPHrine (EPIPEN 2-DANA) 0.3 mg/0.3 mL AtIn, Inject 0.3 mLs (0.3 mg total) into the muscle once. for 1 dose (Patient not taking: Reported on 10/24/2022), Disp: 1 each, Rfl: 11    hydrALAZINE (APRESOLINE) 25 MG tablet, Take 1 tablet (25 mg total) by mouth every 8 (eight) hours., Disp: 270 tablet, Rfl: 0    Past Medical History:   Diagnosis Date    Acidosis, metabolic, with respiratory acidosis 3/31/2022    Asthma     Back pain     Cataract     OD    CKD (chronic kidney disease) stage 3, GFR 30-59 ml/min 2020    Diabetes mellitus     Fibromyalgia     Gastroesophageal reflux disease     Hypercholesterolemia     Hypertension     Immune deficiency disorder     Obesity     Osteoporosis     Rheumatoid arthritis     Tobacco dependence     Trouble in sleeping     Type 2 diabetes mellitus      Family History   Problem Relation Age of Onset    Hypertension Mother     Cancer Father     Colon cancer Father     Breast cancer Sister      Social History     Socioeconomic History    Marital status:    Tobacco Use    Smoking status: Former     Packs/day: 0.50     Years: 25.00     Pack years: 12.50     Types: Cigarettes     Quit date: 2022     Years since quittin.6    Smokeless tobacco: Never   Substance and Sexual Activity    Alcohol use: No    Drug use: No    Sexual activity: Not Currently   Social History Narrative    1 dog, no smokers in household; No HH as of 2022, has daytime sitter 2022.     Review of patient's allergies indicates:   Allergen Reactions    Lisinopril Anaphylaxis     Angioedema requiring trach       Objective:   BP (!) 153/71   Pulse 71   Ht 5' 2" (1.575 m)   Wt 116 kg (255 lb 11.7 oz)   BMI 46.77 kg/m²   Immunization History "   Administered Date(s) Administered    COVID-19, MRNA, LN-S, PF (Pfizer) (Purple Cap) 08/17/2021, 09/17/2021    Influenza 10/10/2006, 12/19/2007, 10/08/2009, 12/15/2010, 11/09/2011, 10/16/2012    Influenza (FLUAD) - Quadrivalent - Adjuvanted - PF *Preferred* (65+) 12/10/2020, 01/20/2022    Influenza - High Dose - PF (65 years and older) 12/06/2017, 12/13/2018, 12/24/2019    Influenza - Quadrivalent 10/30/2014    Influenza - Trivalent (ADULT) 10/08/2013    Influenza Split 10/10/2006, 12/19/2007, 10/08/2009, 12/15/2010, 11/09/2011, 10/16/2012, 10/08/2013    PPD Test 04/05/2010, 04/07/2010    Pneumococcal Conjugate - 13 Valent 04/30/2015    Pneumococcal Polysaccharide - 23 Valent 02/12/2007, 12/13/2018    Tdap 10/16/2012    Zoster 04/30/2015       Physical Exam   Constitutional: She is oriented to person, place, and time. No distress.   HENT:   Head: Normocephalic and atraumatic.   Pulmonary/Chest: Effort normal.   Abdominal: She exhibits no distension.   Musculoskeletal:         General: No swelling or tenderness. Normal range of motion.      Cervical back: Normal range of motion.      Right knee: No effusion.      Left knee: No effusion.   Lymphadenopathy:     She has no cervical adenopathy.   Neurological: She is alert and oriented to person, place, and time.   Skin: Skin is warm and dry. No rash noted. No pallor.   Psychiatric: Mood normal.   Nursing note and vitals reviewed.      Right Side Rheumatological Exam     Examination finds the 1st PIP, 1st MCP, 2nd PIP, 2nd MCP, 3rd PIP, 3rd MCP, 4th PIP, 4th MCP, 5th PIP and 5th MCP normal.    Shoulder Exam   Tenderness Location: no tenderness    Range of Motion   The patient has normal right shoulder ROM.    Knee Exam   Patellofemoral Crepitus: negative  Effusion: negative  Warmth: negative    Elbow/Wrist Exam   Tenderness Location: no elbow tenderness and no wrist tenderness    Range of Motion   Elbow   The patient has normal right elbow ROM.    Range of Motion    Wrist   The patient has normal right wrist ROM.    Left Side Rheumatological Exam     Examination finds the 1st PIP, 1st MCP, 2nd PIP, 2nd MCP, 3rd PIP, 3rd MCP, 4th PIP, 4th MCP, 5th PIP and 5th MCP normal.    Shoulder Exam   Tenderness Location: no tenderness    Range of Motion   The patient has normal left shoulder ROM.    Knee Exam     Patellofemoral Crepitus: negative  Effusion: negative  Warmth: negative    Elbow/Wrist Exam   Tenderness Location: no elbow tenderness and no wrist tenderness    Range of Motion   Elbow   The patient has normal left elbow ROM.    Range of Motion   Wrist   The patient has normal left wrist ROM.       no active synovitis   100% full fist formation bilaterally   negative squeeze test        Recent Results (from the past 672 hour(s))   CBC Auto Differential    Collection Time: 12/06/22 10:42 AM   Result Value Ref Range    WBC 6.66 3.90 - 12.70 K/uL    RBC 4.08 4.00 - 5.40 M/uL    Hemoglobin 9.9 (L) 12.0 - 16.0 g/dL    Hematocrit 33.7 (L) 37.0 - 48.5 %    MCV 83 82 - 98 fL    MCH 24.3 (L) 27.0 - 31.0 pg    MCHC 29.4 (L) 32.0 - 36.0 g/dL    RDW 17.6 (H) 11.5 - 14.5 %    Platelets 260 150 - 450 K/uL    MPV 10.9 9.2 - 12.9 fL    Immature Granulocytes 0.3 0.0 - 0.5 %    Gran # (ANC) 3.8 1.8 - 7.7 K/uL    Immature Grans (Abs) 0.02 0.00 - 0.04 K/uL    Lymph # 2.0 1.0 - 4.8 K/uL    Mono # 0.6 0.3 - 1.0 K/uL    Eos # 0.2 0.0 - 0.5 K/uL    Baso # 0.04 0.00 - 0.20 K/uL    nRBC 0 0 /100 WBC    Gran % 56.7 38.0 - 73.0 %    Lymph % 29.6 18.0 - 48.0 %    Mono % 9.2 4.0 - 15.0 %    Eosinophil % 3.6 0.0 - 8.0 %    Basophil % 0.6 0.0 - 1.9 %    Differential Method Automated    Comprehensive Metabolic Panel    Collection Time: 12/06/22 10:42 AM   Result Value Ref Range    Sodium 139 136 - 145 mmol/L    Potassium 4.4 3.5 - 5.1 mmol/L    Chloride 103 95 - 110 mmol/L    CO2 26 23 - 29 mmol/L    Glucose 111 (H) 70 - 110 mg/dL    BUN 29 (H) 8 - 23 mg/dL    Creatinine 1.6 (H) 0.5 - 1.4 mg/dL    Calcium 9.0  8.7 - 10.5 mg/dL    Total Protein 7.6 6.0 - 8.4 g/dL    Albumin 3.2 (L) 3.5 - 5.2 g/dL    Total Bilirubin 0.3 0.1 - 1.0 mg/dL    Alkaline Phosphatase 95 55 - 135 U/L    AST 14 10 - 40 U/L    ALT 12 10 - 44 U/L    Anion Gap 10 8 - 16 mmol/L    eGFR 34 (A) >60 mL/min/1.73 m^2   Sedimentation rate    Collection Time: 12/06/22 10:42 AM   Result Value Ref Range    Sed Rate 110 (H) 0 - 20 mm/Hr   C-Reactive Protein    Collection Time: 12/06/22 10:42 AM   Result Value Ref Range    CRP 16.9 (H) 0.0 - 8.2 mg/L         No results found for: TBGOLDPLUS   Lab Results   Component Value Date    HEPAIGM Negative 03/08/2010    HEPBIGM Negative 03/08/2010    HEPBCAB Negative 04/04/2022    HEPCAB Negative 10/23/2014        Imaging  I have personally reviewed images and report of the dexa from 2/21/22.  I agree with the interpretation.  FINDINGS:  The L1 to L4 vertebral bone mineral density is equal to 1.112 g/cm squared with a T score of -0.7.  There has been no significant change relative to the prior study.     The left femoral neck bone mineral density is equal to 0.680 g/cm squared with a T score of -2.6.  There has been  a -12.3% statistically significant change relative to the prior study.     Impression:  Osteoporosis    Assessment:     1. Rheumatoid arthritis involving multiple sites with positive rheumatoid factor    2. On home oxygen therapy    3. High risk medication use    4. Long-term use of immunosuppressant medication    5. Medication monitoring encounter    6. Vitamin D deficiency disease    7. Age-related osteoporosis without current pathological fracture    8. Elevated sed rate              Plan:     Diagnoses and all orders for this visit:    Rheumatoid arthritis involving multiple sites with positive rheumatoid factor  -     X-Ray Foot Complete Bilateral; Future  -     X-Ray Hand 3 View Bilateral; Future    On home oxygen therapy    High risk medication use    Long-term use of immunosuppressant  medication    Medication monitoring encounter    Vitamin D deficiency disease    Age-related osteoporosis without current pathological fracture    Elevated sed rate  -     Protein Electrophoresis, Serum; Future  -     Immunofixation Electrophoresis; Future  -     X-Ray Foot Complete Bilateral; Future  -     X-Ray Hand 3 View Bilateral; Future        Seropositive rheumatoid arthritis   Increased Sed rate/crp  Recheck in 2 weeks w MACHELLE and SPEP  Update xrays today  Sxs stable/controlled   continue Plaquenil 200 mg daily   Continue Plaquenil monitoring with Ophthalmology - last visit 2/2022  F/u sooner if sxs worsen  Pt declined flu vaccine  Elevated sed rate  No headaches  No visual disturbances  No hip or shoulder pain  Recheck in 2 weeks with SPEP and MACHELLE  Get updated hand/foot xrays today  SOB, now on supplemental O2 at home   Counseled pt to use O2  F/u pulm as directed  Did not do previuosly ordered Chest CT  osteoporosis in the setting of CKD  Reclast dc'ed due to intolerance - last dose 3/2022  Plan for Prolia in future - due 3/2023   discussed risks and benefits  DEXA 2/2024  Avoid NSAIDs d/t CKD  Drug therapy requiring intensive monitoring for toxicity  High Risk Medication Monitoring encounter  No current medication related issues, no evidence of toxicity  I ordered labs for toxicity monitoring, have personally reviewed the findings, and discussed them with the patient.  Pending labs will be sent via the portal  Compromised immune system secondary to autoimmune disease and/or use of immunosuppressive drugs.  Monitor carefully for infections.  Advised patient to get immediate medical care if any infection arises.  Also advised strict adherence age-appropriate vaccinations and cancer screenings with PCP.  Patient advised to hold DMARD and/or biologic therapy for signs of infection or for surgery. If you are unsure what to do please call our office for instruction.[facility] [phone]  Return  to clinic: 6 mos w Reg 4 labs    The patient understands, chooses and consents to this plan and accepts all the risks which include but are not limited to the risks mentioned above.     Disclaimer: This note was prepared using a voice recognition system and is likely to have sound alike errors within the text.

## 2022-12-08 NOTE — TELEPHONE ENCOUNTER
Spoke w/ patient in regards to her xray results,     Xray hands and feet reviewed.   Slight worsening OA on xray, but no erosions concerning for RA progression.     Patient verbalized understanding and was grateful for the call-DD

## 2022-12-08 NOTE — TELEPHONE ENCOUNTER
----- Message from Marah Cox PA-C sent at 12/8/2022  4:06 PM CST -----  Xray hands and feet reviewed.  Slight worsening OA on xray, but no erosions concerning for RA progression.

## 2022-12-13 ENCOUNTER — HOSPITAL ENCOUNTER (OUTPATIENT)
Dept: RADIOLOGY | Facility: HOSPITAL | Age: 72
Discharge: HOME | End: 2022-12-13
Attending: PHYSICIAN ASSISTANT
Payer: MEDICARE

## 2022-12-13 ENCOUNTER — OFFICE VISIT (OUTPATIENT)
Dept: INTERNAL MEDICINE | Facility: CLINIC | Age: 72
End: 2022-12-13
Payer: MEDICARE

## 2022-12-13 VITALS
TEMPERATURE: 96 F | BODY MASS INDEX: 47.06 KG/M2 | DIASTOLIC BLOOD PRESSURE: 82 MMHG | WEIGHT: 255.75 LBS | HEIGHT: 62 IN | HEART RATE: 69 BPM | OXYGEN SATURATION: 96 % | SYSTOLIC BLOOD PRESSURE: 142 MMHG
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
## 2022-12-13 DIAGNOSIS — I10 PRIMARY HYPERTENSION: ICD-10-CM

## 2022-12-13 DIAGNOSIS — R60.0 BILATERAL LOWER EXTREMITY EDEMA: Primary | ICD-10-CM

## 2022-12-13 PROCEDURE — 72220 X-RAY EXAM SACRUM TAILBONE: CPT | Mod: TC

## 2022-12-13 PROCEDURE — 99214 PR OFFICE/OUTPT VISIT, EST, LEVL IV, 30-39 MIN: ICD-10-PCS | Mod: S$PBB,,, | Performed by: PHYSICIAN ASSISTANT

## 2022-12-13 PROCEDURE — 72220 XR SACRUM AND COCCYX: ICD-10-PCS | Mod: 26,,, | Performed by: RADIOLOGY

## 2022-12-13 PROCEDURE — 99999 PR PBB SHADOW E&M-EST. PATIENT-LVL V: CPT | Mod: PBBFAC,,, | Performed by: PHYSICIAN ASSISTANT

## 2022-12-13 PROCEDURE — 99999 PR PBB SHADOW E&M-EST. PATIENT-LVL V: ICD-10-PCS | Mod: PBBFAC,,, | Performed by: PHYSICIAN ASSISTANT

## 2022-12-13 PROCEDURE — 99215 OFFICE O/P EST HI 40 MIN: CPT | Mod: PBBFAC | Performed by: PHYSICIAN ASSISTANT

## 2022-12-13 PROCEDURE — 72220 X-RAY EXAM SACRUM TAILBONE: CPT | Mod: 26,,, | Performed by: RADIOLOGY

## 2022-12-13 PROCEDURE — 99214 OFFICE O/P EST MOD 30 MIN: CPT | Mod: S$PBB,,, | Performed by: PHYSICIAN ASSISTANT

## 2022-12-13 RX ORDER — HYDRALAZINE HYDROCHLORIDE 50 MG/1
50 TABLET, FILM COATED ORAL 3 TIMES DAILY
Qty: 90 TABLET | Refills: 2 | Status: SHIPPED | OUTPATIENT
Start: 2022-12-13 | End: 2023-03-14

## 2022-12-13 NOTE — PROGRESS NOTES
Subjective:      Patient ID: Page Grissom is a 72 y.o. female.    Chief Complaint: Follow-up    Fall  The accident occurred 12 to 24 hours ago. The fall occurred from a stool. She landed on Hard floor. The point of impact was the buttocks. The pain is present in the buttocks and back. The pain is at a severity of 6/10. The pain is moderate. The symptoms are aggravated by standing. Pertinent negatives include no abdominal pain, bowel incontinence, fever, headaches, hearing loss, hematuria, loss of consciousness, nausea, numbness, tingling, visual change or vomiting. She has tried acetaminophen (voltaren gel, topical anesthesia, heat) for the symptoms. The treatment provided no relief. Was sitting in a chair and the chair collapsed.   No head injury. NO LOC.     Also pt reports bilateral lower extremity swelling on and off. The problem has been going on since April when she had to switch off the lisinopril.     BP has been elevated for past month. Hasn't been checking regularly at home. No headache, chest pain, palpitations, or shortness of breath. Pt reports being compliant with her bp medications.   BP Readings from Last 3 Encounters:   12/13/22 (!) 142/82   12/08/22 (!) 153/71   11/30/22 137/70      Patient Active Problem List   Diagnosis    Rheumatoid arthritis involving multiple sites with positive rheumatoid factor    High risk medication use    Vitamin D deficiency disease    Mild persistent asthma without complication    Diabetes mellitus type 2 with complications    GERD (gastroesophageal reflux disease)    Essential hypertension    Hyperlipidemia associated with type 2 diabetes mellitus    Fibromyalgia    Long-term use of immunosuppressant medication    Former smoker, stopped smoking in distant past    Class 3 severe obesity due to excess calories with serious comorbidity and body mass index (BMI) of 40.0 to 44.9 in adult    CKD (chronic kidney disease) stage 3, GFR 30-59 ml/min    Secondary  hyperparathyroidism of renal origin    Aortic atherosclerosis    Age-related osteoporosis without current pathological fracture    Angioedema    Acute kidney injury superimposed on chronic kidney disease--required HD March 2022    Hypokalemia    Nonsustained ventricular tachycardia    Dysphonia    Nocturnal hypoxemia due to asthma         Current Outpatient Medications:     acetaminophen (TYLENOL) 500 MG tablet, Take 500 mg by mouth as needed for Pain., Disp: , Rfl:     albuterol (PROVENTIL/VENTOLIN HFA) 90 mcg/actuation inhaler, INHALE 2 PUFFS INTO THE LINGS EVERY 6 HOURS AS NEEDED WHEEZING, Disp: 25.5 g, Rfl: 3    amLODIPine (NORVASC) 10 MG tablet, Take 1 tablet (10 mg total) by mouth once daily., Disp: 90 tablet, Rfl: 3    blood sugar diagnostic Strp, To check BG 1 times daily, to use with insurance preferred meter, Disp: 100 strip, Rfl: 11    blood-glucose meter kit, To check BG 1 times daily, to use with insurance preferred meter, Disp: 1 each, Rfl: 0    cetirizine (ZYRTEC) 10 MG tablet, Take 1 tablet (10 mg total) by mouth once daily., Disp: 90 tablet, Rfl: 3    cholecalciferol, vitamin D3, 2,000 unit Tab, Take 2,000 Units by mouth once daily., Disp: , Rfl:     famotidine (PEPCID) 40 MG tablet, Take 1 tablet (40 mg total) by mouth once daily., Disp: 90 tablet, Rfl: 3    fish oil-omega-3 fatty acids 300-1,000 mg capsule, Take 2 g by mouth once daily., Disp: , Rfl:     fluticasone furoate-vilanteroL (BREO ELLIPTA) 100-25 mcg/dose diskus inhaler, Inhale 1 puff into the lungs once daily. Controller, Disp: 60 each, Rfl: 6    hydrOXYchloroQUINE (PLAQUENIL) 200 mg tablet, Take 1 tablet (200 mg total) by mouth once daily., Disp: 90 tablet, Rfl: 1    lancets Misc, To check BG 1 times daily, to use with insurance preferred meter, Disp: 100 each, Rfl: 11    metFORMIN (GLUCOPHAGE) 500 MG tablet, Take 1 tablet (500 mg total) by mouth once daily., Disp: 90 tablet, Rfl: 3    multivitamin (THERAGRAN) per tablet, Take 1 tablet  by mouth once daily., Disp: , Rfl:     pantoprazole (PROTONIX) 40 MG tablet, Take 1 tablet (40 mg total) by mouth once daily., Disp: 90 tablet, Rfl: 3    potassium chloride SA (K-DUR,KLOR-CON) 20 MEQ tablet, Take 1 tablet (20 mEq total) by mouth once daily., Disp: 90 tablet, Rfl: 3    rosuvastatin (CRESTOR) 40 MG Tab, TAKE 1 TABLET(40 MG) BY MOUTH EVERY EVENING, Disp: 90 tablet, Rfl: 3    EPINEPHrine (EPIPEN 2-DANA) 0.3 mg/0.3 mL AtIn, Inject 0.3 mLs (0.3 mg total) into the muscle once. for 1 dose (Patient not taking: Reported on 10/24/2022), Disp: 1 each, Rfl: 11    hydrALAZINE (APRESOLINE) 50 MG tablet, Take 1 tablet (50 mg total) by mouth 3 (three) times daily., Disp: 90 tablet, Rfl: 2    Review of Systems   Constitutional:  Negative for activity change, appetite change, chills, diaphoresis, fatigue, fever and unexpected weight change.   HENT: Negative.  Negative for congestion, hearing loss, postnasal drip, rhinorrhea, sore throat, trouble swallowing and voice change.    Eyes: Negative.  Negative for visual disturbance.   Respiratory: Negative.  Negative for cough, choking, chest tightness and shortness of breath.    Cardiovascular:  Negative for chest pain, palpitations and leg swelling.   Gastrointestinal:  Negative for abdominal distention, abdominal pain, blood in stool, bowel incontinence, constipation, diarrhea, nausea and vomiting.   Endocrine: Negative for cold intolerance, heat intolerance, polydipsia and polyuria.   Genitourinary: Negative.  Negative for difficulty urinating, frequency and hematuria.   Musculoskeletal:  Positive for arthralgias, back pain and myalgias. Negative for gait problem, joint swelling, neck pain and neck stiffness.   Skin:  Negative for color change, pallor, rash and wound.   Neurological:  Negative for dizziness, tingling, tremors, loss of consciousness, weakness, light-headedness, numbness and headaches.   Hematological:  Negative for adenopathy.   Psychiatric/Behavioral:   "Negative for behavioral problems, confusion, self-injury, sleep disturbance and suicidal ideas. The patient is not nervous/anxious.    Objective:   BP (!) 142/82 (BP Location: Left arm, Patient Position: Sitting, BP Method: Large (Manual))   Pulse 69   Temp 96.4 °F (35.8 °C) (Tympanic)   Ht 5' 2" (1.575 m)   Wt 116 kg (255 lb 11.7 oz)   SpO2 96%   BMI 46.77 kg/m²     Physical Exam  Vitals reviewed.   Constitutional:       General: She is not in acute distress.     Appearance: Normal appearance. She is well-developed. She is not ill-appearing, toxic-appearing or diaphoretic.   HENT:      Head: Normocephalic and atraumatic.      Right Ear: External ear normal.      Left Ear: External ear normal.      Nose: Nose normal.   Eyes:      Conjunctiva/sclera: Conjunctivae normal.      Pupils: Pupils are equal, round, and reactive to light.   Cardiovascular:      Rate and Rhythm: Normal rate and regular rhythm.      Heart sounds: Normal heart sounds. No murmur heard.    No friction rub. No gallop.   Pulmonary:      Effort: Pulmonary effort is normal. No respiratory distress.      Breath sounds: Normal breath sounds. No wheezing or rales.   Chest:      Chest wall: No tenderness.   Abdominal:      General: There is no distension.      Palpations: Abdomen is soft.      Tenderness: There is no abdominal tenderness.   Musculoskeletal:         General: Normal range of motion.      Cervical back: Normal range of motion and neck supple.      Lumbar back: Tenderness and bony tenderness present. No swelling, edema or signs of trauma. Normal range of motion.      Right lower leg: Normal. No swelling or lacerations. No edema.      Left lower leg: Normal. No swelling or lacerations. No edema.      Right ankle: Swelling present.      Left ankle: Swelling present.        Legs:       Comments: Bilateral lower extremity pitting edema of ankles and feet +1   Lymphadenopathy:      Cervical: No cervical adenopathy.   Skin:     General: Skin " is warm and dry.      Capillary Refill: Capillary refill takes less than 2 seconds.      Findings: No rash.   Neurological:      Mental Status: She is alert and oriented to person, place, and time.      Motor: No weakness.      Coordination: Coordination normal.      Gait: Gait normal.   Psychiatric:         Mood and Affect: Mood normal.         Behavior: Behavior normal.         Thought Content: Thought content normal.         Judgment: Judgment normal.       Assessment:     1. Bilateral lower extremity edema    2. Fall, initial encounter    3. Primary hypertension      Plan:   Bilateral lower extremity edema  -     COMPRESSION STOCKINGS    Fall, initial encounter  -     X-Ray Sacrum And Coccyx; Future; Expected date: 12/13/2022  -RICE    Primary hypertension  -     hydrALAZINE (APRESOLINE) 50 MG tablet; Take 1 tablet (50 mg total) by mouth 3 (three) times daily.  Dispense: 90 tablet; Refill: 2    -increase hydralazine from 25 TID to 50 TID  -monitor BP and bring log in to follow up    Follow up in about 2 weeks (around 12/27/2022), or if symptoms worsen or fail to improve.

## 2022-12-20 ENCOUNTER — LAB VISIT (OUTPATIENT)
Dept: LAB | Facility: HOSPITAL | Age: 72
End: 2022-12-20
Attending: PHYSICIAN ASSISTANT
Payer: MEDICARE

## 2022-12-20 DIAGNOSIS — R70.0 ELEVATED SED RATE: ICD-10-CM

## 2022-12-20 DIAGNOSIS — M05.79 RHEUMATOID ARTHRITIS INVOLVING MULTIPLE SITES WITH POSITIVE RHEUMATOID FACTOR: ICD-10-CM

## 2022-12-20 LAB
ALBUMIN SERPL BCP-MCNC: 3 G/DL (ref 3.5–5.2)
ALP SERPL-CCNC: 100 U/L (ref 55–135)
ALT SERPL W/O P-5'-P-CCNC: 10 U/L (ref 10–44)
ANION GAP SERPL CALC-SCNC: 9 MMOL/L (ref 8–16)
AST SERPL-CCNC: 11 U/L (ref 10–40)
BASOPHILS # BLD AUTO: 0.05 K/UL (ref 0–0.2)
BASOPHILS NFR BLD: 0.6 % (ref 0–1.9)
BILIRUB SERPL-MCNC: 0.3 MG/DL (ref 0.1–1)
BUN SERPL-MCNC: 25 MG/DL (ref 8–23)
CALCIUM SERPL-MCNC: 9.5 MG/DL (ref 8.7–10.5)
CHLORIDE SERPL-SCNC: 103 MMOL/L (ref 95–110)
CO2 SERPL-SCNC: 29 MMOL/L (ref 23–29)
CREAT SERPL-MCNC: 1.6 MG/DL (ref 0.5–1.4)
DIFFERENTIAL METHOD: ABNORMAL
EOSINOPHIL # BLD AUTO: 0.2 K/UL (ref 0–0.5)
EOSINOPHIL NFR BLD: 3 % (ref 0–8)
ERYTHROCYTE [DISTWIDTH] IN BLOOD BY AUTOMATED COUNT: 16.7 % (ref 11.5–14.5)
EST. GFR  (NO RACE VARIABLE): 34 ML/MIN/1.73 M^2
GLUCOSE SERPL-MCNC: 115 MG/DL (ref 70–110)
HCT VFR BLD AUTO: 33.7 % (ref 37–48.5)
HGB BLD-MCNC: 10 G/DL (ref 12–16)
IMM GRANULOCYTES # BLD AUTO: 0.04 K/UL (ref 0–0.04)
IMM GRANULOCYTES NFR BLD AUTO: 0.5 % (ref 0–0.5)
LYMPHOCYTES # BLD AUTO: 2.1 K/UL (ref 1–4.8)
LYMPHOCYTES NFR BLD: 26.6 % (ref 18–48)
MCH RBC QN AUTO: 24.6 PG (ref 27–31)
MCHC RBC AUTO-ENTMCNC: 29.7 G/DL (ref 32–36)
MCV RBC AUTO: 83 FL (ref 82–98)
MONOCYTES # BLD AUTO: 0.7 K/UL (ref 0.3–1)
MONOCYTES NFR BLD: 8.9 % (ref 4–15)
NEUTROPHILS # BLD AUTO: 4.7 K/UL (ref 1.8–7.7)
NEUTROPHILS NFR BLD: 60.4 % (ref 38–73)
NRBC BLD-RTO: 0 /100 WBC
PLATELET # BLD AUTO: 254 K/UL (ref 150–450)
PMV BLD AUTO: 10.6 FL (ref 9.2–12.9)
POTASSIUM SERPL-SCNC: 4.1 MMOL/L (ref 3.5–5.1)
PROT SERPL-MCNC: 7.9 G/DL (ref 6–8.4)
RBC # BLD AUTO: 4.07 M/UL (ref 4–5.4)
SODIUM SERPL-SCNC: 141 MMOL/L (ref 136–145)
WBC # BLD AUTO: 7.72 K/UL (ref 3.9–12.7)

## 2022-12-20 PROCEDURE — 84165 PROTEIN E-PHORESIS SERUM: CPT | Mod: 26,,, | Performed by: PATHOLOGY

## 2022-12-20 PROCEDURE — 85025 COMPLETE CBC W/AUTO DIFF WBC: CPT | Performed by: PHYSICIAN ASSISTANT

## 2022-12-20 PROCEDURE — 84165 PROTEIN E-PHORESIS SERUM: CPT | Performed by: PHYSICIAN ASSISTANT

## 2022-12-20 PROCEDURE — 86334 IMMUNOFIX E-PHORESIS SERUM: CPT | Mod: 26,,, | Performed by: PATHOLOGY

## 2022-12-20 PROCEDURE — 86334 PATHOLOGIST INTERPRETATION IFE: ICD-10-PCS | Mod: 26,,, | Performed by: PATHOLOGY

## 2022-12-20 PROCEDURE — 86334 IMMUNOFIX E-PHORESIS SERUM: CPT | Performed by: PHYSICIAN ASSISTANT

## 2022-12-20 PROCEDURE — 84165 PATHOLOGIST INTERPRETATION SPE: ICD-10-PCS | Mod: 26,,, | Performed by: PATHOLOGY

## 2022-12-20 PROCEDURE — 80053 COMPREHEN METABOLIC PANEL: CPT | Performed by: PHYSICIAN ASSISTANT

## 2022-12-20 PROCEDURE — 36415 COLL VENOUS BLD VENIPUNCTURE: CPT | Mod: PO | Performed by: PHYSICIAN ASSISTANT

## 2022-12-21 LAB
ALBUMIN SERPL ELPH-MCNC: 3.32 G/DL (ref 3.35–5.55)
ALPHA1 GLOB SERPL ELPH-MCNC: 0.4 G/DL (ref 0.17–0.41)
ALPHA2 GLOB SERPL ELPH-MCNC: 0.95 G/DL (ref 0.43–0.99)
B-GLOBULIN SERPL ELPH-MCNC: 1.01 G/DL (ref 0.5–1.1)
GAMMA GLOB SERPL ELPH-MCNC: 1.42 G/DL (ref 0.67–1.58)
INTERPRETATION SERPL IFE-IMP: NORMAL
PATHOLOGIST INTERPRETATION IFE: NORMAL
PATHOLOGIST INTERPRETATION SPE: NORMAL
PROT SERPL-MCNC: 7.1 G/DL (ref 6–8.4)

## 2022-12-27 ENCOUNTER — LAB VISIT (OUTPATIENT)
Dept: LAB | Facility: HOSPITAL | Age: 72
End: 2022-12-27
Attending: PHYSICIAN ASSISTANT
Payer: MEDICARE

## 2022-12-27 ENCOUNTER — OFFICE VISIT (OUTPATIENT)
Dept: INTERNAL MEDICINE | Facility: CLINIC | Age: 72
End: 2022-12-27
Payer: MEDICARE

## 2022-12-27 VITALS
HEIGHT: 62 IN | TEMPERATURE: 97 F | SYSTOLIC BLOOD PRESSURE: 136 MMHG | BODY MASS INDEX: 46.78 KG/M2 | DIASTOLIC BLOOD PRESSURE: 60 MMHG | OXYGEN SATURATION: 95 % | HEART RATE: 68 BPM | WEIGHT: 254.19 LBS

## 2022-12-27 DIAGNOSIS — J45.909 NOCTURNAL HYPOXEMIA DUE TO ASTHMA: ICD-10-CM

## 2022-12-27 DIAGNOSIS — R09.02 NOCTURNAL HYPOXEMIA DUE TO ASTHMA: ICD-10-CM

## 2022-12-27 DIAGNOSIS — N18.31 STAGE 3A CHRONIC KIDNEY DISEASE: ICD-10-CM

## 2022-12-27 DIAGNOSIS — M05.79 RHEUMATOID ARTHRITIS INVOLVING MULTIPLE SITES WITH POSITIVE RHEUMATOID FACTOR: ICD-10-CM

## 2022-12-27 DIAGNOSIS — I10 ESSENTIAL HYPERTENSION: Primary | ICD-10-CM

## 2022-12-27 DIAGNOSIS — N18.9 ACUTE KIDNEY INJURY SUPERIMPOSED ON CHRONIC KIDNEY DISEASE: ICD-10-CM

## 2022-12-27 DIAGNOSIS — N17.9 ACUTE KIDNEY INJURY SUPERIMPOSED ON CHRONIC KIDNEY DISEASE: ICD-10-CM

## 2022-12-27 PROCEDURE — 36415 COLL VENOUS BLD VENIPUNCTURE: CPT | Performed by: PHYSICIAN ASSISTANT

## 2022-12-27 PROCEDURE — 99999 PR PBB SHADOW E&M-EST. PATIENT-LVL IV: ICD-10-PCS | Mod: PBBFAC,,, | Performed by: PHYSICIAN ASSISTANT

## 2022-12-27 PROCEDURE — 99213 OFFICE O/P EST LOW 20 MIN: CPT | Mod: S$PBB,,, | Performed by: PHYSICIAN ASSISTANT

## 2022-12-27 PROCEDURE — 99214 OFFICE O/P EST MOD 30 MIN: CPT | Mod: PBBFAC | Performed by: PHYSICIAN ASSISTANT

## 2022-12-27 PROCEDURE — 99213 PR OFFICE/OUTPT VISIT, EST, LEVL III, 20-29 MIN: ICD-10-PCS | Mod: S$PBB,,, | Performed by: PHYSICIAN ASSISTANT

## 2022-12-27 PROCEDURE — 99999 PR PBB SHADOW E&M-EST. PATIENT-LVL IV: CPT | Mod: PBBFAC,,, | Performed by: PHYSICIAN ASSISTANT

## 2022-12-27 PROCEDURE — 85652 RBC SED RATE AUTOMATED: CPT | Performed by: PHYSICIAN ASSISTANT

## 2022-12-27 NOTE — PROGRESS NOTES
Subjective:      Patient ID: Page Grissom is a 72 y.o. female.    Chief Complaint: Follow-up    HPI  Here today for a blood pressure follow up after increasing her hydralazine from 25 TID to 50 TID. BP doing great with dose increase. No SE. No hypotension.   Swelling improved in lower extremities.   Pain from fall has subsided.     Patient Active Problem List   Diagnosis    Rheumatoid arthritis involving multiple sites with positive rheumatoid factor    High risk medication use    Vitamin D deficiency disease    Mild persistent asthma without complication    Diabetes mellitus type 2 with complications    GERD (gastroesophageal reflux disease)    Essential hypertension    Hyperlipidemia associated with type 2 diabetes mellitus    Fibromyalgia    Long-term use of immunosuppressant medication    Former smoker, stopped smoking in distant past    Class 3 severe obesity due to excess calories with serious comorbidity and body mass index (BMI) of 40.0 to 44.9 in adult    CKD (chronic kidney disease) stage 3, GFR 30-59 ml/min    Secondary hyperparathyroidism of renal origin    Aortic atherosclerosis    Age-related osteoporosis without current pathological fracture    Angioedema    Acute kidney injury superimposed on chronic kidney disease--required HD March 2022    Hypokalemia    Nonsustained ventricular tachycardia    Dysphonia    Nocturnal hypoxemia due to asthma         Current Outpatient Medications:     acetaminophen (TYLENOL) 500 MG tablet, Take 500 mg by mouth as needed for Pain., Disp: , Rfl:     albuterol (PROVENTIL/VENTOLIN HFA) 90 mcg/actuation inhaler, INHALE 2 PUFFS INTO THE LINGS EVERY 6 HOURS AS NEEDED WHEEZING, Disp: 25.5 g, Rfl: 3    amLODIPine (NORVASC) 10 MG tablet, Take 1 tablet (10 mg total) by mouth once daily., Disp: 90 tablet, Rfl: 3    blood sugar diagnostic Strp, To check BG 1 times daily, to use with insurance preferred meter, Disp: 100 strip, Rfl: 11    blood-glucose meter kit, To  check BG 1 times daily, to use with insurance preferred meter, Disp: 1 each, Rfl: 0    cetirizine (ZYRTEC) 10 MG tablet, Take 1 tablet (10 mg total) by mouth once daily., Disp: 90 tablet, Rfl: 3    cholecalciferol, vitamin D3, 2,000 unit Tab, Take 2,000 Units by mouth once daily., Disp: , Rfl:     famotidine (PEPCID) 40 MG tablet, Take 1 tablet (40 mg total) by mouth once daily., Disp: 90 tablet, Rfl: 3    fish oil-omega-3 fatty acids 300-1,000 mg capsule, Take 2 g by mouth once daily., Disp: , Rfl:     fluticasone furoate-vilanteroL (BREO ELLIPTA) 100-25 mcg/dose diskus inhaler, Inhale 1 puff into the lungs once daily. Controller, Disp: 60 each, Rfl: 6    hydrALAZINE (APRESOLINE) 50 MG tablet, Take 1 tablet (50 mg total) by mouth 3 (three) times daily., Disp: 90 tablet, Rfl: 2    hydrOXYchloroQUINE (PLAQUENIL) 200 mg tablet, Take 1 tablet (200 mg total) by mouth once daily., Disp: 90 tablet, Rfl: 1    lancets Misc, To check BG 1 times daily, to use with insurance preferred meter, Disp: 100 each, Rfl: 11    metFORMIN (GLUCOPHAGE) 500 MG tablet, Take 1 tablet (500 mg total) by mouth once daily., Disp: 90 tablet, Rfl: 3    multivitamin (THERAGRAN) per tablet, Take 1 tablet by mouth once daily., Disp: , Rfl:     pantoprazole (PROTONIX) 40 MG tablet, Take 1 tablet (40 mg total) by mouth once daily., Disp: 90 tablet, Rfl: 3    potassium chloride SA (K-DUR,KLOR-CON) 20 MEQ tablet, Take 1 tablet (20 mEq total) by mouth once daily., Disp: 90 tablet, Rfl: 3    rosuvastatin (CRESTOR) 40 MG Tab, TAKE 1 TABLET(40 MG) BY MOUTH EVERY EVENING, Disp: 90 tablet, Rfl: 3    EPINEPHrine (EPIPEN 2-DANA) 0.3 mg/0.3 mL AtIn, Inject 0.3 mLs (0.3 mg total) into the muscle once. for 1 dose (Patient not taking: Reported on 10/24/2022), Disp: 1 each, Rfl: 11    Review of Systems   Constitutional:  Negative for activity change, appetite change, chills, diaphoresis, fatigue, fever and unexpected weight change.   HENT: Negative.  Negative for  "congestion, hearing loss, postnasal drip, rhinorrhea, sore throat, trouble swallowing and voice change.    Eyes: Negative.  Negative for visual disturbance.   Respiratory: Negative.  Negative for cough, choking, chest tightness and shortness of breath.    Cardiovascular:  Negative for chest pain, palpitations and leg swelling.   Gastrointestinal:  Negative for abdominal distention, abdominal pain, blood in stool, constipation, diarrhea, nausea and vomiting.   Endocrine: Negative for cold intolerance, heat intolerance, polydipsia and polyuria.   Genitourinary: Negative.  Negative for difficulty urinating and frequency.   Musculoskeletal:  Negative for arthralgias, back pain, gait problem, joint swelling and myalgias.   Skin:  Negative for color change, pallor, rash and wound.   Neurological:  Negative for dizziness, tremors, weakness, light-headedness, numbness and headaches.   Hematological:  Negative for adenopathy.   Psychiatric/Behavioral:  Negative for behavioral problems, confusion, self-injury, sleep disturbance and suicidal ideas. The patient is not nervous/anxious.    Objective:   /60 (BP Location: Left arm, Patient Position: Sitting, BP Method: Large (Manual))   Pulse 68   Temp 96.9 °F (36.1 °C) (Tympanic)   Ht 5' 2" (1.575 m)   Wt 115.3 kg (254 lb 3.1 oz)   SpO2 95%   BMI 46.49 kg/m²     Physical Exam  Vitals and nursing note reviewed.   Constitutional:       General: She is not in acute distress.     Appearance: Normal appearance. She is well-developed. She is not ill-appearing, toxic-appearing or diaphoretic.   HENT:      Head: Normocephalic and atraumatic.   Cardiovascular:      Rate and Rhythm: Normal rate and regular rhythm.      Heart sounds: Normal heart sounds. No murmur heard.    No friction rub. No gallop.   Pulmonary:      Effort: Pulmonary effort is normal. No respiratory distress.      Breath sounds: Normal breath sounds. No wheezing or rales.   Musculoskeletal:         General: " Normal range of motion.   Skin:     General: Skin is warm.      Capillary Refill: Capillary refill takes less than 2 seconds.      Findings: No rash.   Neurological:      Mental Status: She is alert and oriented to person, place, and time.      Motor: No weakness.      Gait: Gait normal.   Psychiatric:         Mood and Affect: Mood normal.         Behavior: Behavior normal.         Thought Content: Thought content normal.         Judgment: Judgment normal.       Assessment:     1. Essential hypertension    2. Nocturnal hypoxemia due to asthma    3. Acute kidney injury superimposed on chronic kidney disease--required HD March 2022    4. Stage 3a chronic kidney disease      Plan:   Essential hypertension  -stable and controlled on current medications.     Nocturnal hypoxemia due to asthma  -breathing good on exam today    Acute kidney injury superimposed on chronic kidney disease--required HD March 2022    Stage 3a chronic kidney disease  -saw nephrology last month      Follow up if symptoms worsen or fail to improve.

## 2022-12-28 LAB — ERYTHROCYTE [SEDIMENTATION RATE] IN BLOOD BY PHOTOMETRIC METHOD: >120 MM/HR (ref 0–36)

## 2022-12-30 ENCOUNTER — TELEPHONE (OUTPATIENT)
Dept: RHEUMATOLOGY | Facility: CLINIC | Age: 72
End: 2022-12-30
Payer: MEDICARE

## 2022-12-30 DIAGNOSIS — R70.0 ELEVATED SED RATE: Primary | ICD-10-CM

## 2022-12-30 NOTE — TELEPHONE ENCOUNTER
----- Message from Marah Cox PA-C sent at 12/30/2022  3:07 PM CST -----  Significantly elevated ESR.  Even higher than original lab.  Dr. Mcgarry rec follow through on CT chest ordered (in 10/2022) and f/u w hematology.  I'd also add CT chest/abd/pelvis w wo contrast   Adriana

## 2022-12-30 NOTE — TELEPHONE ENCOUNTER
Spoke with pt and advised her of below, scheduled CTs for 1.9.23 at 1:50. Pt verbalized understanding.

## 2022-12-30 NOTE — TELEPHONE ENCOUNTER
CTs scheduled for 1.9.23 at 1:50 pm and 2:10 pm.    Message sent to hematology/oncology department to schedule appointment

## 2022-12-30 NOTE — TELEPHONE ENCOUNTER
"Called patient.     Denies any fever, no chills, no weight loss > 10 lbs without trying, no headache, no visual disturbance, no muscular weakness, no proximal muscular weakness, no rash, no arthralgias aside from coccyx pain.     She did have a fall two weeks ago and is still having pain around her tailbone. She repeatedly states she thinks this is the cause of her elevated sed rate. (Sed rate was elevated above baseline prior to fall).    Discussed case with Dr. Mcgarry.   Per him:  "DDX of such high acute phase reactants: infection, malignancy, vasculitis, active inflammatory arthritis.  Ensure no active arthritis or PMR/GCA: if so, justify early biologic therapy due to safety concerns (CKD) with oral DMARD use / as steroid sparring agent.  Follow ordered Chest CT.  Have her follow with Hem/Onc to update malignancy screening/search for occult malignancy"    Sending message to Marah Cox       Referral to hem/onc sent  Needs Chest CT scheduled              "

## 2022-12-30 NOTE — TELEPHONE ENCOUNTER
RAIZA Bobo Staff 1 hour ago (3:05 PM)     Just verifying that this lady's ct chest is being scheduled.  Also, that she schedules f/u w hematology per [facility]/Dr. Medina's rec w sign elevation of ESR       RAIZA Bobo PA-C 3 hours ago (12:58 PM)     What was the original message sent?      RAIZA Feldman Staff; Marah Cox PA-C 3 hours ago (12:42 PM)     AARON Wright -   Spoke with this patient today. No new symptoms aside from fall two weeks ago.     Staff -   Needs Chest CT scheduled please.

## 2023-01-03 NOTE — PROGRESS NOTES
"  Page Grissom presented for a  Medicare AWV and comprehensive Health Risk Assessment today. The following components were reviewed and updated:    Medical history  Family History  Social history  Allergies and Current Medications  Health Risk Assessment  Health Maintenance  Care Team         ** See Completed Assessments for Annual Wellness Visit within the encounter summary.**         The following assessments were completed:  Living Situation  CAGE  Depression Screening  Timed Get Up and Go  Whisper Test  Cognitive Function Screening  Nutrition Screening  ADL Screening  PAQ Screening        Vitals:    10/24/22 1137   BP: 128/74   BP Location: Left arm   Patient Position: Sitting   BP Method: Large (Manual)   Pulse: 68   Resp: 18   Temp: 97.7 °F (36.5 °C)   TempSrc: Temporal   SpO2: 96%   Weight: 110.4 kg (243 lb 6.2 oz)   Height: 5' 2" (1.575 m)     Body mass index is 44.52 kg/m².  Physical Exam  Vitals reviewed.   Constitutional:       Appearance: Normal appearance.   Cardiovascular:      Rate and Rhythm: Normal rate and regular rhythm.   Pulmonary:      Effort: Pulmonary effort is normal.      Breath sounds: Normal breath sounds.   Abdominal:      General: Abdomen is flat. There is no distension.      Tenderness: There is no abdominal tenderness.   Skin:     General: Skin is warm.      Coloration: Skin is not jaundiced.   Neurological:      General: No focal deficit present.      Mental Status: She is alert and oriented to person, place, and time.   Psychiatric:         Mood and Affect: Mood normal.         Behavior: Behavior is cooperative.               Diagnoses and health risks identified today and associated recommendations/orders:    1. Encounter for preventive health examination        2. Dependence on supplemental oxygen  Stable.  Will continue current treatment plan.     3. Abnormality of gait and mobility  Stable.  Will continue current treatment plan.     4. Mild persistent asthma without " complication  Stable.  Will continue current treatment plan.     5. Nocturnal hypoxemia due to asthma  Stable.  Will continue current treatment plan.     6. Aortic atherosclerosis  Stable.  Will continue current treatment plan.     7. Essential hypertension  Stable.  Will continue current treatment plan.     8. Hyperlipidemia associated with type 2 diabetes mellitus  Stable.  Will continue current treatment plan.     9. Acute kidney injury superimposed on chronic kidney disease  Stable.  Will continue current treatment plan.     10. Stage 3a chronic kidney disease  Stable.  Will continue current treatment plan.     11. Rheumatoid arthritis involving multiple sites with positive rheumatoid factor  Stable.  Will continue current treatment plan.     12. Diabetes mellitus type 2 with complications  Stable.  Will continue current treatment plan.     13. Gastroesophageal reflux disease without esophagitis  Stable.  Will continue current treatment plan.     14. Age-related osteoporosis without current pathological fracture  Stable.  Will continue current treatment plan.     15. Fibromyalgia  Stable.  Will continue current treatment plan.     16. History of tobacco abuse  Stable.  Will continue current treatment plan.       Provided Page with a 5-10 year written screening schedule and personal prevention plan. Recommendations were developed using the USPSTF age appropriate recommendations. Education, counseling, and referrals were provided as needed. After Visit Summary printed and given to patient which includes a list of additional screenings\tests needed.    No follow-ups on file.    Marquita Hayes NP

## 2023-01-06 ENCOUNTER — OFFICE VISIT (OUTPATIENT)
Dept: OTOLARYNGOLOGY | Facility: CLINIC | Age: 73
End: 2023-01-06
Payer: MEDICARE

## 2023-01-06 VITALS
DIASTOLIC BLOOD PRESSURE: 79 MMHG | BODY MASS INDEX: 46.74 KG/M2 | WEIGHT: 254 LBS | SYSTOLIC BLOOD PRESSURE: 142 MMHG | HEIGHT: 62 IN | HEART RATE: 96 BPM

## 2023-01-06 DIAGNOSIS — J39.8 TRACHEAL STENOSIS: Primary | ICD-10-CM

## 2023-01-06 PROCEDURE — 31575 DIAGNOSTIC LARYNGOSCOPY: CPT | Mod: S$PBB,,, | Performed by: OTOLARYNGOLOGY

## 2023-01-06 PROCEDURE — 99213 PR OFFICE/OUTPT VISIT, EST, LEVL III, 20-29 MIN: ICD-10-PCS | Mod: 25,S$PBB,, | Performed by: OTOLARYNGOLOGY

## 2023-01-06 PROCEDURE — 99213 OFFICE O/P EST LOW 20 MIN: CPT | Mod: 25,S$PBB,, | Performed by: OTOLARYNGOLOGY

## 2023-01-06 PROCEDURE — 99214 OFFICE O/P EST MOD 30 MIN: CPT | Mod: PBBFAC,25 | Performed by: OTOLARYNGOLOGY

## 2023-01-06 PROCEDURE — 99999 PR PBB SHADOW E&M-EST. PATIENT-LVL IV: CPT | Mod: PBBFAC,,, | Performed by: OTOLARYNGOLOGY

## 2023-01-06 PROCEDURE — 31575 DIAGNOSTIC LARYNGOSCOPY: CPT | Mod: PBBFAC | Performed by: OTOLARYNGOLOGY

## 2023-01-06 PROCEDURE — 99999 PR PBB SHADOW E&M-EST. PATIENT-LVL IV: ICD-10-PCS | Mod: PBBFAC,,, | Performed by: OTOLARYNGOLOGY

## 2023-01-06 PROCEDURE — 31575 PR LARYNGOSCOPY, FLEXIBLE; DIAGNOSTIC: ICD-10-PCS | Mod: S$PBB,,, | Performed by: OTOLARYNGOLOGY

## 2023-01-06 NOTE — PROGRESS NOTES
"[facility]  Department of Otorhinolaryngology and Communication Sciences    Subjective:      Page Grissom is a 72 y.o. female who presents for follow-up. 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  She has mild post tracheostomy tracheal stenosis, from which she has been asymptomatic.    She is accompanied by her daughter at today's visit.  Since her last visit, she reports that her breathing is good.  She denies dyspnea at rest or on exertion  She reports that she wheezes at times, and that she always has.  She never feels like she cannot take a deep breath.  Her voice is good, as is her swallowing function.  Her daughter reports that sometimes, the noise of her breathing is alarming to friends or family.  Nevertheless, the patient never feels that her airflow is restricted.    CT neck/chest 10/14/2022:  Report states aero digestive structures including the larynx are unremarkable.  I have independently reviewed the images and make note of tracheal stenosis at the level of the manubrium, approximally 3.5 cm below the true vocal folds, involving destabilized tracheal cartilage, with length of involved segment estimated at 9 mm, airway lumen across segment estimated at 13 x 15 mm    Spirometry 9/8/2022: Baseline spirometry shows a reduced vital capacity however the post bronchodilator value is normal. Spirometry remains unimproved following bronchodilator.     The patient's medications, allergies, past medical, surgical, social and family histories were reviewed and updated as appropriate.    A detailed review of systems was obtained with pertinent positives as per the above HPI, and otherwise negative.      Objective:     BP (!) 142/79   Pulse 96   Ht 5' 2" (1.575 m)   Wt 115.2 kg (254 lb)   BMI 46.46 kg/m²    Constitutional: comfortable, well dressed  Psychiatric: appropriate affect  Respiratory: comfortably breathing, symmetric chest rise, no " stridor; inconsistent mild coarse stridor, primarily inspiratory, only on vigorous respiration  Voice:  normal  Head: normocephalic  Eyes: conjunctivae and sclerae clear  Indirect laryngoscopy is limited due to gag    Procedure  Flexible Laryngoscopy (47812): Laryngoscopy is indicated for assessment of upper aerodigestive structure and function. This was carried out transnasally with a distal chip videoendoscope. After verbal consent was obtained, the patient was positioned and the nose was topically decongested with 1% phenylephrine and topically anesthetized with 4% lidocaine. The endoscope was passed through the most patent nasal cavity and positioned to image the nasopharynx, larynx, and hypopharynx in detail. The following features were examined: nasopharyngeal, laryngeal, hypopharyngeal masses; velopharyngeal strength, closure, and symmetry of motion; vocal fold range and symmetry of motion; laryngeal mucosal edema, erythema, inflammation, and hydration; salivary pooling; and gross laryngeal sensation. The equipment was removed. The patient tolerated the procedure well without complication. All findings were normal except:  - very mild A-frame deformity of cervico-thoracic trachea, grade 1 at most; stable since last visit  - no paralysis or paresis  - subglottis widely patent  - mild interarytenoid mucosal hypertrophy        Assessment:     Page Grissom is a 72 y.o. 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     Plan:     I recommended definitive evaluation by Interventional pulmonology versus thoracic surgery, as the involved tracheal segment is beyond my anatomic reach.    The patient remains disinclined to consider surgical/procedural intervention.    She will follow up with me in about 4 months, or  sooner if needed.    All questions were answered, and the patient is in agreement with the plan.    Nick Newberry M.D.  [facility]  Department of Otorhinolaryngology and Communication Sciences

## 2023-01-09 ENCOUNTER — HOSPITAL ENCOUNTER (OUTPATIENT)
Dept: RADIOLOGY | Facility: HOSPITAL | Age: 73
Discharge: HOME | End: 2023-01-09
Attending: PHYSICIAN ASSISTANT
Payer: MEDICARE

## 2023-01-09 DIAGNOSIS — R70.0 ELEVATED SED RATE: ICD-10-CM

## 2023-01-09 PROCEDURE — 71260 CT CHEST ABDOMEN PELVIS WITH CONTRAST (XPD): ICD-10-PCS | Mod: 26,,, | Performed by: RADIOLOGY

## 2023-01-09 PROCEDURE — 71260 CT THORAX DX C+: CPT | Mod: TC

## 2023-01-09 PROCEDURE — 74177 CT CHEST ABDOMEN PELVIS WITH CONTRAST (XPD): ICD-10-PCS | Mod: 26,,, | Performed by: RADIOLOGY

## 2023-01-09 PROCEDURE — 71260 CT THORAX DX C+: CPT | Mod: 26,,, | Performed by: RADIOLOGY

## 2023-01-09 PROCEDURE — 74177 CT ABD & PELVIS W/CONTRAST: CPT | Mod: TC

## 2023-01-09 PROCEDURE — 74177 CT ABD & PELVIS W/CONTRAST: CPT | Mod: 26,,, | Performed by: RADIOLOGY

## 2023-01-09 PROCEDURE — 25500020 PHARM REV CODE 255: Performed by: PHYSICIAN ASSISTANT

## 2023-01-09 RX ADMIN — IOHEXOL 100 ML: 350 INJECTION, SOLUTION INTRAVENOUS at 02:01

## 2023-01-09 RX ADMIN — IOHEXOL 30 ML: 350 INJECTION, SOLUTION INTRAVENOUS at 01:01

## 2023-01-12 ENCOUNTER — OFFICE VISIT (OUTPATIENT)
Dept: CARDIOTHORACIC SURGERY | Facility: CLINIC | Age: 73
End: 2023-01-12
Payer: MEDICARE

## 2023-01-12 VITALS
HEART RATE: 76 BPM | WEIGHT: 259.69 LBS | HEIGHT: 62 IN | SYSTOLIC BLOOD PRESSURE: 135 MMHG | OXYGEN SATURATION: 99 % | DIASTOLIC BLOOD PRESSURE: 73 MMHG | BODY MASS INDEX: 47.79 KG/M2

## 2023-01-12 DIAGNOSIS — J39.8 TRACHEAL STENOSIS: Primary | ICD-10-CM

## 2023-01-12 DIAGNOSIS — J39.8 TRACHEAL STENOSIS: ICD-10-CM

## 2023-01-12 PROCEDURE — 99203 OFFICE O/P NEW LOW 30 MIN: CPT | Mod: S$PBB,,, | Performed by: STUDENT IN AN ORGANIZED HEALTH CARE EDUCATION/TRAINING PROGRAM

## 2023-01-12 PROCEDURE — 99999 PR PBB SHADOW E&M-EST. PATIENT-LVL IV: CPT | Mod: PBBFAC,,, | Performed by: STUDENT IN AN ORGANIZED HEALTH CARE EDUCATION/TRAINING PROGRAM

## 2023-01-12 PROCEDURE — 99203 PR OFFICE/OUTPT VISIT, NEW, LEVL III, 30-44 MIN: ICD-10-PCS | Mod: S$PBB,,, | Performed by: STUDENT IN AN ORGANIZED HEALTH CARE EDUCATION/TRAINING PROGRAM

## 2023-01-12 PROCEDURE — 99214 OFFICE O/P EST MOD 30 MIN: CPT | Mod: PBBFAC | Performed by: STUDENT IN AN ORGANIZED HEALTH CARE EDUCATION/TRAINING PROGRAM

## 2023-01-12 PROCEDURE — 99999 PR PBB SHADOW E&M-EST. PATIENT-LVL IV: ICD-10-PCS | Mod: PBBFAC,,, | Performed by: STUDENT IN AN ORGANIZED HEALTH CARE EDUCATION/TRAINING PROGRAM

## 2023-01-12 NOTE — PROGRESS NOTES
History & Physical    SUBJECTIVE:     History of Present Illness:  Patient is a 72 y.o. female with HTN, CKD, HLD, DM, COPD, and RANDEE here today for evaluation of tracheal stenosis. In march 2022 she was admitted for angioedema and underwent cricothyrotomy converted to tracheostomy. She has mild post tracheostomy tracheal stenosis, from which she has been asymptomatic. She denies resting SOB.      No chief complaint on file.      Review of patient's allergies indicates:   Allergen Reactions    Ace inhibitors Swelling    Lisinopril Anaphylaxis     Angioedema requiring trach       Current Outpatient Medications   Medication Sig Dispense Refill    acetaminophen (TYLENOL) 500 MG tablet Take 500 mg by mouth as needed for Pain.      albuterol (PROVENTIL/VENTOLIN HFA) 90 mcg/actuation inhaler INHALE 2 PUFFS INTO THE LINGS EVERY 6 HOURS AS NEEDED WHEEZING 25.5 g 3    amLODIPine (NORVASC) 10 MG tablet Take 1 tablet (10 mg total) by mouth once daily. 90 tablet 3    blood sugar diagnostic Strp To check BG 1 times daily, to use with insurance preferred meter 100 strip 11    blood-glucose meter kit To check BG 1 times daily, to use with insurance preferred meter 1 each 0    cetirizine (ZYRTEC) 10 MG tablet Take 1 tablet (10 mg total) by mouth once daily. 90 tablet 3    cholecalciferol, vitamin D3, 2,000 unit Tab Take 2,000 Units by mouth once daily.      EPINEPHrine (EPIPEN 2-DANA) 0.3 mg/0.3 mL AtIn Inject 0.3 mLs (0.3 mg total) into the muscle once. for 1 dose (Patient not taking: Reported on 10/24/2022) 1 each 11    famotidine (PEPCID) 40 MG tablet Take 1 tablet (40 mg total) by mouth once daily. 90 tablet 3    fish oil-omega-3 fatty acids 300-1,000 mg capsule Take 2 g by mouth once daily.      fluticasone furoate-vilanteroL (BREO ELLIPTA) 100-25 mcg/dose diskus inhaler Inhale 1 puff into the lungs once daily. Controller 60 each 6    hydrALAZINE (APRESOLINE) 50 MG tablet Take 1 tablet (50 mg total) by mouth 3 (three) times  daily. 90 tablet 2    hydrOXYchloroQUINE (PLAQUENIL) 200 mg tablet Take 1 tablet (200 mg total) by mouth once daily. 90 tablet 1    lancets Misc To check BG 1 times daily, to use with insurance preferred meter 100 each 11    metFORMIN (GLUCOPHAGE) 500 MG tablet Take 1 tablet (500 mg total) by mouth once daily. 90 tablet 3    multivitamin (THERAGRAN) per tablet Take 1 tablet by mouth once daily.      pantoprazole (PROTONIX) 40 MG tablet Take 1 tablet (40 mg total) by mouth once daily. 90 tablet 3    potassium chloride SA (K-DUR,KLOR-CON) 20 MEQ tablet Take 1 tablet (20 mEq total) by mouth once daily. 90 tablet 3    rosuvastatin (CRESTOR) 40 MG Tab TAKE 1 TABLET(40 MG) BY MOUTH EVERY EVENING 90 tablet 3     No current facility-administered medications for this visit.       Past Medical History:   Diagnosis Date    Acidosis, metabolic, with respiratory acidosis 3/31/2022    Asthma     Back pain     Cataract     OD    CKD (chronic kidney disease) stage 3, GFR 30-59 ml/min 12/20/2020    Diabetes mellitus     Fibromyalgia     Gastroesophageal reflux disease     Hypercholesterolemia     Hypertension     Immune deficiency disorder     Obesity     Osteoporosis     Rheumatoid arthritis     Tobacco dependence     Trouble in sleeping     Type 2 diabetes mellitus      Past Surgical History:   Procedure Laterality Date    BRONCHOSCOPY N/A 3/30/2022    Procedure: Bronchoscopy;  Surgeon: Isak Yadav MD;  Location: [facility] OR;  Service: General;  Laterality: N/A;    COLONOSCOPY N/A 2/14/2019    Procedure: COLONOSCOPY;  Surgeon: Yury Atwood MD;  Location: [facility] ENDO;  Service: Endoscopy;  Laterality: N/A;    HERNIA REPAIR      OOPHORECTOMY      TRACHEOSTOMY N/A 3/30/2022    Procedure: CREATION, TRACHEOSTOMY;  Surgeon: Isak Yadav MD;  Location: [facility] OR;  Service: General;  Laterality: N/A;     Family History   Problem Relation Age of Onset    Hypertension Mother     Cancer Father     Colon cancer Father     Breast cancer  "Sister      Social History     Tobacco Use    Smoking status: Former     Packs/day: 0.50     Years: 25.00     Pack years: 12.50     Types: Cigarettes     Quit date: 2022     Years since quittin.7    Smokeless tobacco: Never   Substance Use Topics    Alcohol use: No    Drug use: No        Review of Systems:  Review of Systems    OBJECTIVE:     Vital Signs (Most Recent)  Vitals:    23 1131   BP: 135/73   Pulse: 76   SpO2: 99%   Weight: 117.8 kg (259 lb 11.2 oz)   Height: 5' 2" (1.575 m)   PainSc: 0-No pain       Physical Exam:  Physical Exam    Diagnostic Results:  CAP CT 23:  1. No evidence of acute cardiopulmonary process.  No evidence of acute abdominopelvic process.  2. Bilateral thyroid nodules, largest measuring up to 1.4 cm.  3. 4 mm right upper lobe pulmonary nodule.  Consider follow-up per Fleischner criteria.  4. Indeterminate sub 3 cm left adrenal nodule.  Consider abdominal MRI for definitive characterization.  5. Complex ventral abdominal wall hernia containing mesenteric fat.       ASSESSMENT/PLAN:     Patient is a 72 y.o. female with HTN, CKD, HLD, DM, COPD, and RANDEE here today for evaluation of tracheal stenosis following tracheostomy.     PLAN:Plan     At this point she declines bronchoscopy. Will plan to repeat a neck and chest CT in 6 months. In the interim if she changes her mind and would like to undergo bronchoscopy with dilation she will call our office. Risks benefits of the procedure were discussed at length. ED precautions given. In the event she would like to proceed with procedure she does not need a clinic visit. Will plan for flexible bronchoscopy with balloon dilation, possible trufreeze possible kenalog injection.        "

## 2023-01-13 DIAGNOSIS — E04.1 THYROID NODULE: Primary | ICD-10-CM

## 2023-01-13 DIAGNOSIS — E27.8 ADRENAL NODULE: Primary | ICD-10-CM

## 2023-01-17 ENCOUNTER — TELEPHONE (OUTPATIENT)
Dept: INTERNAL MEDICINE | Facility: CLINIC | Age: 73
End: 2023-01-17
Payer: MEDICARE

## 2023-01-17 ENCOUNTER — TELEPHONE (OUTPATIENT)
Dept: OTOLARYNGOLOGY | Facility: CLINIC | Age: 73
End: 2023-01-17
Payer: MEDICARE

## 2023-01-17 DIAGNOSIS — Z12.31 ENCOUNTER FOR SCREENING MAMMOGRAM FOR MALIGNANT NEOPLASM OF BREAST: Primary | ICD-10-CM

## 2023-01-17 DIAGNOSIS — Z12.31 ENCOUNTER FOR SCREENING MAMMOGRAM FOR MALIGNANT NEOPLASM OF BREAST: ICD-10-CM

## 2023-01-17 NOTE — TELEPHONE ENCOUNTER
----- Message from Berkley Marmolejo sent at 1/17/2023 11:23 AM CST -----  Contact: Page  Patient is calling to speak  with the nurse regarding orders. Reports needing mammogram orders. Please give patient as call back at .[phone]

## 2023-01-17 NOTE — TELEPHONE ENCOUNTER
----- Message from Berkley Marmolejo sent at 1/17/2023 11:23 AM CST -----  Contact: Pgae  Patient is calling to speak  with the nurse regarding orders. Reports needing mammogram orders. Please give patient as call back at .[phone]

## 2023-01-17 NOTE — TELEPHONE ENCOUNTER
----- Message from Berkley Marmolejo sent at 1/17/2023 11:23 AM CST -----  Contact: Page  Patient is calling to speak  with the nurse regarding orders. Reports needing mammogram orders. Please give patient as call back at .[phone]

## 2023-02-09 ENCOUNTER — TELEPHONE (OUTPATIENT)
Dept: INTERNAL MEDICINE | Facility: CLINIC | Age: 73
End: 2023-02-09
Payer: MEDICARE

## 2023-02-09 ENCOUNTER — OFFICE VISIT (OUTPATIENT)
Dept: UROLOGY | Facility: CLINIC | Age: 73
End: 2023-02-09
Payer: MEDICARE

## 2023-02-09 VITALS
SYSTOLIC BLOOD PRESSURE: 134 MMHG | BODY MASS INDEX: 45.88 KG/M2 | WEIGHT: 258.94 LBS | RESPIRATION RATE: 18 BRPM | DIASTOLIC BLOOD PRESSURE: 66 MMHG | HEIGHT: 63 IN | HEART RATE: 69 BPM | TEMPERATURE: 97 F

## 2023-02-09 DIAGNOSIS — E27.8 OTHER SPECIFIED DISORDERS OF ADRENAL GLAND: ICD-10-CM

## 2023-02-09 DIAGNOSIS — Z12.31 ENCOUNTER FOR SCREENING MAMMOGRAM FOR MALIGNANT NEOPLASM OF BREAST: Primary | ICD-10-CM

## 2023-02-09 DIAGNOSIS — E27.8 ADRENAL NODULE: Primary | ICD-10-CM

## 2023-02-09 PROCEDURE — 99999 PR PBB SHADOW E&M-EST. PATIENT-LVL V: ICD-10-PCS | Mod: PBBFAC,,, | Performed by: UROLOGY

## 2023-02-09 PROCEDURE — 99215 OFFICE O/P EST HI 40 MIN: CPT | Mod: PBBFAC | Performed by: UROLOGY

## 2023-02-09 PROCEDURE — 99204 PR OFFICE/OUTPT VISIT, NEW, LEVL IV, 45-59 MIN: ICD-10-PCS | Mod: S$PBB,,, | Performed by: UROLOGY

## 2023-02-09 PROCEDURE — 99999 PR PBB SHADOW E&M-EST. PATIENT-LVL V: CPT | Mod: PBBFAC,,, | Performed by: UROLOGY

## 2023-02-09 PROCEDURE — 99204 OFFICE O/P NEW MOD 45 MIN: CPT | Mod: S$PBB,,, | Performed by: UROLOGY

## 2023-02-09 RX ORDER — DEXAMETHASONE 1 MG/1
1 TABLET ORAL ONCE
Qty: 1 TABLET | Refills: 0 | Status: SHIPPED | OUTPATIENT
Start: 2023-02-09 | End: 2023-02-09

## 2023-02-09 RX ORDER — DEXAMETHASONE 1 MG/1
1 TABLET ORAL EVERY 12 HOURS
Qty: 20 TABLET | Refills: 0 | Status: SHIPPED | OUTPATIENT
Start: 2023-02-09 | End: 2023-02-09

## 2023-02-09 NOTE — TELEPHONE ENCOUNTER
----- Message from Janet Chamberlain sent at 2/9/2023 10:30 AM CST -----  Contact: Self - [phone]  .Type:  Mammogram    Caller is requesting to schedule their annual mammogram appointment.  Order is not listed in EPIC.  Please enter order and contact patient to schedule.  Name of Caller:Page  Where would they like the mammogram performed?[facility]  Would the patient rather a call back or a response via [facility]? Call Back  Best Call Back Number:[phone]  Additional Information:

## 2023-02-09 NOTE — PROGRESS NOTES
Chief Complaint:  Left adrenal nodule    HPI:   Page Grissom is a 72 y.o. female that presents today as a referral from HANNAH Cox for left adrenal nodule.  This was found it inadvertently on a CT scan obtained for elevated ESR.  Patient was unaware that she had an adrenal nodule.  No prior chest or upper abdominal imaging here had noted this.  She voids with a good stream and denies any gross hematuria.  She denies any palpitations or headaches.  She denies any difficulty with hypertension or double vision.  She denies profuse sweating.  She denies family history of  cancers.  Denies prior kidney stones.  She denies prior urologic procedures or surgeries.      PMH:  Past Medical History:   Diagnosis Date    Acidosis, metabolic, with respiratory acidosis 3/31/2022    Asthma     Back pain     Cataract     OD    CKD (chronic kidney disease) stage 3, GFR 30-59 ml/min 12/20/2020    Diabetes mellitus     Fibromyalgia     Gastroesophageal reflux disease     Hypercholesterolemia     Hypertension     Immune deficiency disorder     Obesity     Osteoporosis     Rheumatoid arthritis     Tobacco dependence     Trouble in sleeping     Type 2 diabetes mellitus        PSH:  Past Surgical History:   Procedure Laterality Date    BRONCHOSCOPY N/A 3/30/2022    Procedure: Bronchoscopy;  Surgeon: Isak Yadav MD;  Location: [facility] OR;  Service: General;  Laterality: N/A;    COLONOSCOPY N/A 2/14/2019    Procedure: COLONOSCOPY;  Surgeon: Yury Atwood MD;  Location: [facility] ENDO;  Service: Endoscopy;  Laterality: N/A;    HERNIA REPAIR      OOPHORECTOMY      TRACHEOSTOMY N/A 3/30/2022    Procedure: CREATION, TRACHEOSTOMY;  Surgeon: Isak Yadav MD;  Location: [facility] OR;  Service: General;  Laterality: N/A;       Family History:  Family History   Problem Relation Age of Onset    Hypertension Mother     Cancer Father     Colon cancer Father     Breast cancer Sister        Social History:  Social History     Tobacco Use    Smoking  status: Former     Packs/day: 0.50     Years: 25.00     Pack years: 12.50     Types: Cigarettes     Quit date: 2022     Years since quittin.7    Smokeless tobacco: Never   Substance Use Topics    Alcohol use: No    Drug use: No        Review of Systems:  General: No fever, chills  Skin: No rashes  Chest:  Denies cough and sputum production  Heart: Denies chest pain  Resp: Denies dyspnea  Abdomen: Denies diarrhea, abdominal pain, hematemesis, or blood in stool.  Musculoskeletal: No joint stiffness or swelling. Denies back pain.  : see HPI  Neuro: no dizziness or weakness    Allergies:  Ace inhibitors and Lisinopril    Medications:    Current Outpatient Medications:     acetaminophen (TYLENOL) 500 MG tablet, Take 500 mg by mouth as needed for Pain., Disp: , Rfl:     albuterol (PROVENTIL/VENTOLIN HFA) 90 mcg/actuation inhaler, INHALE 2 PUFFS INTO THE LINGS EVERY 6 HOURS AS NEEDED WHEEZING, Disp: 25.5 g, Rfl: 3    amLODIPine (NORVASC) 10 MG tablet, Take 1 tablet (10 mg total) by mouth once daily., Disp: 90 tablet, Rfl: 3    blood sugar diagnostic Strp, To check BG 1 times daily, to use with insurance preferred meter, Disp: 100 strip, Rfl: 11    blood-glucose meter kit, To check BG 1 times daily, to use with insurance preferred meter, Disp: 1 each, Rfl: 0    cetirizine (ZYRTEC) 10 MG tablet, Take 1 tablet (10 mg total) by mouth once daily., Disp: 90 tablet, Rfl: 3    cholecalciferol, vitamin D3, 2,000 unit Tab, Take 2,000 Units by mouth once daily., Disp: , Rfl:     famotidine (PEPCID) 40 MG tablet, Take 1 tablet (40 mg total) by mouth once daily., Disp: 90 tablet, Rfl: 3    fish oil-omega-3 fatty acids 300-1,000 mg capsule, Take 2 g by mouth once daily., Disp: , Rfl:     fluticasone furoate-vilanteroL (BREO ELLIPTA) 100-25 mcg/dose diskus inhaler, Inhale 1 puff into the lungs once daily. Controller, Disp: 60 each, Rfl: 6    hydrALAZINE (APRESOLINE) 50 MG tablet, Take 1 tablet (50 mg total) by mouth 3 (three)  times daily., Disp: 90 tablet, Rfl: 2    hydrOXYchloroQUINE (PLAQUENIL) 200 mg tablet, Take 1 tablet (200 mg total) by mouth once daily., Disp: 90 tablet, Rfl: 1    lancets Misc, To check BG 1 times daily, to use with insurance preferred meter, Disp: 100 each, Rfl: 11    metFORMIN (GLUCOPHAGE) 500 MG tablet, Take 1 tablet (500 mg total) by mouth once daily., Disp: 90 tablet, Rfl: 3    multivitamin (THERAGRAN) per tablet, Take 1 tablet by mouth once daily., Disp: , Rfl:     pantoprazole (PROTONIX) 40 MG tablet, Take 1 tablet (40 mg total) by mouth once daily., Disp: 90 tablet, Rfl: 3    potassium chloride SA (K-DUR,KLOR-CON) 20 MEQ tablet, Take 1 tablet (20 mEq total) by mouth once daily., Disp: 90 tablet, Rfl: 3    rosuvastatin (CRESTOR) 40 MG Tab, TAKE 1 TABLET(40 MG) BY MOUTH EVERY EVENING, Disp: 90 tablet, Rfl: 3    dexAMETHasone (DECADRON) 1 MG Tab, Take 1 tablet (1 mg total) by mouth once. Take at 11pm prior to your lab test for 1 dose, Disp: 1 tablet, Rfl: 0    EPINEPHrine (EPIPEN 2-DANA) 0.3 mg/0.3 mL AtIn, Inject 0.3 mLs (0.3 mg total) into the muscle once. for 1 dose (Patient not taking: Reported on 10/24/2022), Disp: 1 each, Rfl: 11    Physical Exam:  Vitals:    02/09/23 0923   BP: 134/66   Pulse: 69   Resp: 18   Temp: 97.3 °F (36.3 °C)     Body mass index is 46.46 kg/m².  General: awake, alert, cooperative  Head: NC/AT  Ears: external ears normal  Eyes: sclera normal  Lungs: normal inspiration, NAD  Heart: well-perfused  Abdomen: Soft, NT, ND, no abdominal striae  Back:  No buffalo hump  Skin: The skin is warm and dry  Ext: No c/c/e.  Neuro: grossly intact, AOx3    RADIOLOGY:  CT CHEST ABDOMEN PELVIS WITH CONTRAST (XPD)  01/09/2023     CLINICAL HISTORY:  elevated ESR;Elevated erythrocyte sedimentation rate     TECHNIQUE:  Helical axial images are acquired through the chest, abdomen and pelvis after the IV administration 100 mL of Omnipaque 350.  Coronal sagittal reconstructions were performed.  Dose  automated exposure control, dose radiation lowering technique was utilized.     COMPARISON:  None     FINDINGS:  CHEST:     Thyroid nodules are present bilaterally, largest in the left lobe measuring 1.4 cm.  Scattered axillary and subpectoral lymph nodes measure less than 1 cm short axis dimension.  No thoracic lymphadenopathy is present.  Heart size is at the upper limits of normal.  No pericardial effusion.  Mild atherosclerosis of the thoracic aorta and its branches including the coronary arteries.     Patent central airways.  No filling defects are present.  There is scattered dependent atelectasis.     There is a 4 mm noncalcified pulmonary nodule (image 41, series 2).  No lung mass.  No pleural effusion is present.     ABDOMEN/PELVIS:     Unremarkable gallbladder.  Liver appears within normal limits.  Patent portal, splenic and superior mesenteric veins.  Subtle subcentimeter low-density lesions are present within the spleen.  No suspicious features.  Pancreas and right adrenal gland appear normal.  Left adrenal nodule measures 2.4 by 2.3 cm.  Hounsfield units on the single phase postcontrast study are 59.     Positive oral contrast was administered.  The bowel is nonobstructed.  A normal appendix is present.  Air and stool are present throughout the colon which appears within normal limits.  No abdominal or pelvic lymphadenopathy is present.     Complex ventral abdominal wall hernia is present.  There appear to be 3-4 separate abdominal wall defects along the midline.  Hernia defects contain fat.  There is mild to moderate atherosclerosis of the abdominal aorta and its branches.  No aneurysm.  A degenerated calcified uterine fibroid is present at the left half the uterus measuring 2.1 cm.  Unremarkable bladder.     BONES AND SOFT TISSUES:     There is a chronic appearing compression deformity at T12 with 50% loss of height anteriorly.  Remainder the vertebral body heights are preserved.  There is 2 mm  anterolisthesis of L4 on L5.  Degenerative disc changes are present throughout the thoracic and lumbar spine.  No suspicious osteolytic or osteoblastic lesion.     Impression:     1. No evidence of acute cardiopulmonary process.  No evidence of acute abdominopelvic process.  2. Bilateral thyroid nodules, largest measuring up to 1.4 cm.  3. 4 mm right upper lobe pulmonary nodule.  Consider follow-up per Fleischner criteria.  4. Indeterminate sub 3 cm left adrenal nodule.  Consider abdominal MRI for definitive characterization.  5. Complex ventral abdominal wall hernia containing mesenteric fat.    LABS:  I personally reviewed the following lab values:  Lab Results   Component Value Date    WBC 7.72 12/20/2022    HGB 10.0 (L) 12/20/2022    HCT 33.7 (L) 12/20/2022     12/20/2022     12/20/2022    K 4.1 12/20/2022     12/20/2022    CREATININE 1.6 (H) 12/20/2022    BUN 25 (H) 12/20/2022    CO2 29 12/20/2022    TSH 0.210 (L) 04/06/2022    GLUF 122 (H) 12/16/2009    HGBA1C 5.6 11/10/2022    CHOL 156 11/10/2022    TRIG 94 11/10/2022    HDL 60 11/10/2022    ALT 10 12/20/2022    AST 11 12/20/2022     Assessment/Plan:   Page Grissom is a 72 y.o. female with:    Left Adrenal Nodule - patient needs metabolic workup to ensure that this nodule is not functional.  She also needs a repeat CT scan without contrast to ensure that this is a typical adrenal nodule.  Obtain CT abdomen without contrast as well as plasma metanephrines and an overnight dexamethasone suppression test.  Follow-up with me after to review.    Thank you for allowing me the opportunity to participate in this patient's care.     Dieter Baltazar MD  Urology

## 2023-02-10 ENCOUNTER — TELEPHONE (OUTPATIENT)
Dept: UROLOGY | Facility: CLINIC | Age: 73
End: 2023-02-10
Payer: MEDICARE

## 2023-02-10 NOTE — TELEPHONE ENCOUNTER
Message left for pt to return call       ----- Message from Margarito Mercedes sent at 2/10/2023  3:12 PM CST -----  Contact: [phone]  Type:  Patient Returning Call    Who Called: Page  Who Left Message for Patient: Nurse  Does the patient know what this is regarding?: No  Would the patient rather a call back or a response via [facility]?  Call  Best Call Back Number: [phone]  Additional Information:

## 2023-02-13 PROBLEM — N17.9 ACUTE KIDNEY INJURY SUPERIMPOSED ON CHRONIC KIDNEY DISEASE: Status: RESOLVED | Noted: 2022-03-29 | Resolved: 2023-02-13

## 2023-02-13 PROBLEM — N18.9 ACUTE KIDNEY INJURY SUPERIMPOSED ON CHRONIC KIDNEY DISEASE: Status: RESOLVED | Noted: 2022-03-29 | Resolved: 2023-02-13

## 2023-02-14 ENCOUNTER — TELEPHONE (OUTPATIENT)
Dept: UROLOGY | Facility: CLINIC | Age: 73
End: 2023-02-14
Payer: MEDICARE

## 2023-02-14 ENCOUNTER — LAB VISIT (OUTPATIENT)
Dept: LAB | Facility: HOSPITAL | Age: 73
End: 2023-02-14
Attending: UROLOGY
Payer: MEDICARE

## 2023-02-14 DIAGNOSIS — E27.8 ADRENAL NODULE: ICD-10-CM

## 2023-02-14 LAB — CORTIS SERPL-MCNC: 1.8 UG/DL (ref 4.3–22.4)

## 2023-02-14 PROCEDURE — 82533 TOTAL CORTISOL: CPT | Performed by: UROLOGY

## 2023-02-14 PROCEDURE — 36415 COLL VENOUS BLD VENIPUNCTURE: CPT | Performed by: UROLOGY

## 2023-02-14 NOTE — TELEPHONE ENCOUNTER
Returned pt's call there was no answer. Pt has scan on 3/7. Labs today      ----- Message from Susan Benitez sent at 2/13/2023  7:58 AM CST -----  Contact: [phone]  Pt is calling regarding cat scan for tomorrow . Please call back at [phone] . Thanks/dj

## 2023-03-01 ENCOUNTER — LAB VISIT (OUTPATIENT)
Dept: LAB | Facility: HOSPITAL | Age: 73
End: 2023-03-01
Attending: INTERNAL MEDICINE
Payer: MEDICARE

## 2023-03-01 DIAGNOSIS — E55.9 VITAMIN D DEFICIENCY: ICD-10-CM

## 2023-03-01 DIAGNOSIS — N18.31 STAGE 3A CHRONIC KIDNEY DISEASE: ICD-10-CM

## 2023-03-01 DIAGNOSIS — M05.79 RHEUMATOID ARTHRITIS INVOLVING MULTIPLE SITES WITH POSITIVE RHEUMATOID FACTOR: ICD-10-CM

## 2023-03-01 LAB
ALBUMIN SERPL BCP-MCNC: 2.9 G/DL (ref 3.5–5.2)
ALP SERPL-CCNC: 93 U/L (ref 55–135)
ALT SERPL W/O P-5'-P-CCNC: 15 U/L (ref 10–44)
ANION GAP SERPL CALC-SCNC: 9 MMOL/L (ref 8–16)
AST SERPL-CCNC: 13 U/L (ref 10–40)
BASOPHILS # BLD AUTO: 0.05 K/UL (ref 0–0.2)
BASOPHILS NFR BLD: 0.6 % (ref 0–1.9)
BILIRUB SERPL-MCNC: 0.3 MG/DL (ref 0.1–1)
BUN SERPL-MCNC: 19 MG/DL (ref 8–23)
CALCIUM SERPL-MCNC: 8.9 MG/DL (ref 8.7–10.5)
CHLORIDE SERPL-SCNC: 106 MMOL/L (ref 95–110)
CO2 SERPL-SCNC: 24 MMOL/L (ref 23–29)
CREAT SERPL-MCNC: 1.4 MG/DL (ref 0.5–1.4)
CRP SERPL-MCNC: 26.1 MG/L (ref 0–8.2)
DIFFERENTIAL METHOD: ABNORMAL
EOSINOPHIL # BLD AUTO: 0.2 K/UL (ref 0–0.5)
EOSINOPHIL NFR BLD: 2.8 % (ref 0–8)
ERYTHROCYTE [DISTWIDTH] IN BLOOD BY AUTOMATED COUNT: 16.8 % (ref 11.5–14.5)
ERYTHROCYTE [SEDIMENTATION RATE] IN BLOOD BY WESTERGREN METHOD: 45 MM/HR (ref 0–20)
EST. GFR  (NO RACE VARIABLE): 40 ML/MIN/1.73 M^2
GLUCOSE SERPL-MCNC: 147 MG/DL (ref 70–110)
HCT VFR BLD AUTO: 33.5 % (ref 37–48.5)
HGB BLD-MCNC: 10.2 G/DL (ref 12–16)
IMM GRANULOCYTES # BLD AUTO: 0.05 K/UL (ref 0–0.04)
IMM GRANULOCYTES NFR BLD AUTO: 0.6 % (ref 0–0.5)
LYMPHOCYTES # BLD AUTO: 1.9 K/UL (ref 1–4.8)
LYMPHOCYTES NFR BLD: 22.5 % (ref 18–48)
MCH RBC QN AUTO: 24.3 PG (ref 27–31)
MCHC RBC AUTO-ENTMCNC: 30.4 G/DL (ref 32–36)
MCV RBC AUTO: 80 FL (ref 82–98)
MONOCYTES # BLD AUTO: 0.8 K/UL (ref 0.3–1)
MONOCYTES NFR BLD: 9.7 % (ref 4–15)
NEUTROPHILS # BLD AUTO: 5.5 K/UL (ref 1.8–7.7)
NEUTROPHILS NFR BLD: 63.8 % (ref 38–73)
NRBC BLD-RTO: 0 /100 WBC
PLATELET # BLD AUTO: 284 K/UL (ref 150–450)
PMV BLD AUTO: 10 FL (ref 9.2–12.9)
POTASSIUM SERPL-SCNC: 4 MMOL/L (ref 3.5–5.1)
PROT SERPL-MCNC: 7.3 G/DL (ref 6–8.4)
RBC # BLD AUTO: 4.2 M/UL (ref 4–5.4)
SODIUM SERPL-SCNC: 139 MMOL/L (ref 136–145)
WBC # BLD AUTO: 8.58 K/UL (ref 3.9–12.7)

## 2023-03-01 PROCEDURE — 85651 RBC SED RATE NONAUTOMATED: CPT | Performed by: PHYSICIAN ASSISTANT

## 2023-03-01 PROCEDURE — 86140 C-REACTIVE PROTEIN: CPT | Performed by: PHYSICIAN ASSISTANT

## 2023-03-01 PROCEDURE — 85025 COMPLETE CBC W/AUTO DIFF WBC: CPT | Performed by: PHYSICIAN ASSISTANT

## 2023-03-01 PROCEDURE — 36415 COLL VENOUS BLD VENIPUNCTURE: CPT | Mod: PO | Performed by: INTERNAL MEDICINE

## 2023-03-01 PROCEDURE — 80053 COMPREHEN METABOLIC PANEL: CPT | Performed by: PHYSICIAN ASSISTANT

## 2023-03-01 PROCEDURE — 82306 VITAMIN D 25 HYDROXY: CPT | Performed by: INTERNAL MEDICINE

## 2023-03-02 ENCOUNTER — TELEPHONE (OUTPATIENT)
Dept: RHEUMATOLOGY | Facility: CLINIC | Age: 73
End: 2023-03-02
Payer: MEDICARE

## 2023-03-02 LAB — 25(OH)D3+25(OH)D2 SERPL-MCNC: 46 NG/ML (ref 30–96)

## 2023-03-02 NOTE — TELEPHONE ENCOUNTER
----- Message from Susan Benitez sent at 3/2/2023  2:23 PM CST -----  Contact: [phone]  Type:  Patient Returning Call    Who Called:Page  Who Left Message for Patient:, YULISSA  Does the patient know what this is regarding?:no  Would the patient rather a call back or a response via [facility]? Call back  Best Call Back Number: [phone]   Additional Information:

## 2023-03-03 ENCOUNTER — TELEPHONE (OUTPATIENT)
Dept: RHEUMATOLOGY | Facility: CLINIC | Age: 73
End: 2023-03-03
Payer: MEDICARE

## 2023-03-03 NOTE — TELEPHONE ENCOUNTER
----- Message from Marah Cox PA-C sent at 3/1/2023  4:28 PM CST -----  No f/u w me on file.  Can she come in the next week?  Adriana

## 2023-03-07 ENCOUNTER — HOSPITAL ENCOUNTER (OUTPATIENT)
Dept: RADIOLOGY | Facility: HOSPITAL | Age: 73
Discharge: HOME | End: 2023-03-07
Attending: UROLOGY
Payer: MEDICARE

## 2023-03-07 DIAGNOSIS — E27.8 ADRENAL NODULE: ICD-10-CM

## 2023-03-07 PROCEDURE — 74150 CT ABDOMEN W/O CONTRAST: CPT | Mod: 26,,, | Performed by: RADIOLOGY

## 2023-03-07 PROCEDURE — 74150 CT ABDOMEN W/O CONTRAST: CPT | Mod: TC

## 2023-03-07 PROCEDURE — 74150 CT ABDOMEN WITHOUT CONTRAST: ICD-10-PCS | Mod: 26,,, | Performed by: RADIOLOGY

## 2023-03-09 ENCOUNTER — OFFICE VISIT (OUTPATIENT)
Dept: RHEUMATOLOGY | Facility: CLINIC | Age: 73
End: 2023-03-09
Payer: MEDICARE

## 2023-03-09 VITALS
HEIGHT: 63 IN | DIASTOLIC BLOOD PRESSURE: 60 MMHG | WEIGHT: 271.38 LBS | SYSTOLIC BLOOD PRESSURE: 143 MMHG | BODY MASS INDEX: 48.09 KG/M2 | HEART RATE: 80 BPM

## 2023-03-09 DIAGNOSIS — R70.0 ELEVATED SED RATE: ICD-10-CM

## 2023-03-09 DIAGNOSIS — Z79.60 LONG-TERM USE OF IMMUNOSUPPRESSANT MEDICATION: ICD-10-CM

## 2023-03-09 DIAGNOSIS — M05.79 RHEUMATOID ARTHRITIS INVOLVING MULTIPLE SITES WITH POSITIVE RHEUMATOID FACTOR: Primary | ICD-10-CM

## 2023-03-09 DIAGNOSIS — Z51.81 MEDICATION MONITORING ENCOUNTER: ICD-10-CM

## 2023-03-09 DIAGNOSIS — M81.0 AGE-RELATED OSTEOPOROSIS WITHOUT CURRENT PATHOLOGICAL FRACTURE: ICD-10-CM

## 2023-03-09 DIAGNOSIS — Z99.81 ON HOME OXYGEN THERAPY: ICD-10-CM

## 2023-03-09 DIAGNOSIS — Z79.899 HIGH RISK MEDICATION USE: ICD-10-CM

## 2023-03-09 DIAGNOSIS — E27.8 ADRENAL NODULE: ICD-10-CM

## 2023-03-09 PROCEDURE — 99999 PR PBB SHADOW E&M-EST. PATIENT-LVL IV: CPT | Mod: PBBFAC,,, | Performed by: PHYSICIAN ASSISTANT

## 2023-03-09 PROCEDURE — 99214 OFFICE O/P EST MOD 30 MIN: CPT | Mod: S$PBB,,, | Performed by: PHYSICIAN ASSISTANT

## 2023-03-09 PROCEDURE — 99214 PR OFFICE/OUTPT VISIT, EST, LEVL IV, 30-39 MIN: ICD-10-PCS | Mod: S$PBB,,, | Performed by: PHYSICIAN ASSISTANT

## 2023-03-09 PROCEDURE — 99214 OFFICE O/P EST MOD 30 MIN: CPT | Mod: PBBFAC | Performed by: PHYSICIAN ASSISTANT

## 2023-03-09 PROCEDURE — 99999 PR PBB SHADOW E&M-EST. PATIENT-LVL IV: ICD-10-PCS | Mod: PBBFAC,,, | Performed by: PHYSICIAN ASSISTANT

## 2023-03-09 NOTE — PATIENT INSTRUCTIONS
Please schedule appt to see Dr. Laney Romero for your lungs.      Please keep appt w Dr. Baltazar for follow up on adrenal nodule/

## 2023-03-09 NOTE — PROGRESS NOTES
Subjective:      Patient ID: Page Grissom is a 72 y.o. female.    Chief Complaint: Rheumatoid Arthritis        HPI   Page Grissom  is a 72 y.o. female  Who is here today to follow-up on seropositive RA and osteoporosis.  From a RA perspective she is doing quite well.  Pain 0/10.  No joint pain/effusion/prolonged am stiffness.  She currently is on Plaquenil 200 mg daily.  Previously she has been on methotrexate in the past but was able to wean down and off several years ago.        No c/o shortness of breath here in the office today.  Pulmonology  started her on supplemental oxygen at night, which uses sometimes.  She sees Dr. Romero in pulmonology.    I have done some additional workup with continued elevations of ESR and CRP.  Patient denies headaches, visual disturbances, jaw claudication, hip and shoulder pain.  RA is controlled.  SPEP and IFV in the past has been unremarkable.  Had CT chest abdomen and pelvis which was negative for hilar adenopathy.  No evidence of ILD.  Was significant for an adrenal mass.  I referred her to Dr. Baltazar who has done further workup on the adrenal nodule.  She has follow-up later this month with him.    Also with a history of osteoporosis.  In 2017 she was placed on Fosamax.  However she had problems remembering taking it.  At that time she did not want add more oral medications to her regimen.  She moved to IV Reclast 10/09/2018.  She had some mild arthralgias which improved within about 2 days.  No falls or fracture since last visit.  She is on vitamin-D supplementation over-the-counter.  Repeat DEXA scan in 10/10/2019 showed improvement.  At that time Reclast was discontinued.  She had another bone density scan done this year.  It showed progression of osteoporosis.  The decision was made to move her back to Reclast.  She had 1 dose on 03/04/2022.  States it did not go well.  She has a very hard stick and had a stick her 3 or 4 times.  Additionally she just  overall felt crummy for about 3 or 4 days following the infusion.  Wasn't able to function due to s/e    She has CKD.  She prefers to use ibuprofen as Tylenol arthritis not work for her.  She has been counseled in the past to avoid NSAIDs.    Patient denies fevers, chills, photosensitivity, eye pain, chest pain, hematuria, blood in the stool, rash, sicca symptoms, raynauds, finger ulcerations.  Rheumatologic systems otherwise negative.    MHAQ: 0.4  Serologies/Labs:  (+) RF, CCP  Neg NAYELY  Current Treatment:  Plaquenil 200mg daily  Reclast 3/4/22  Previous Treatment:   Fosamax - compliance issues  MTX - weaned down and off x 2 yrs      Current Outpatient Medications:     acetaminophen (TYLENOL) 500 MG tablet, Take 500 mg by mouth as needed for Pain., Disp: , Rfl:     albuterol (PROVENTIL/VENTOLIN HFA) 90 mcg/actuation inhaler, INHALE 2 PUFFS INTO THE LINGS EVERY 6 HOURS AS NEEDED WHEEZING, Disp: 25.5 g, Rfl: 3    amLODIPine (NORVASC) 10 MG tablet, Take 1 tablet (10 mg total) by mouth once daily., Disp: 90 tablet, Rfl: 3    blood sugar diagnostic Strp, To check BG 1 times daily, to use with insurance preferred meter, Disp: 100 strip, Rfl: 11    blood-glucose meter kit, To check BG 1 times daily, to use with insurance preferred meter, Disp: 1 each, Rfl: 0    cetirizine (ZYRTEC) 10 MG tablet, Take 1 tablet (10 mg total) by mouth once daily., Disp: 90 tablet, Rfl: 3    cholecalciferol, vitamin D3, 2,000 unit Tab, Take 2,000 Units by mouth once daily., Disp: , Rfl:     famotidine (PEPCID) 40 MG tablet, Take 1 tablet (40 mg total) by mouth once daily., Disp: 90 tablet, Rfl: 3    fish oil-omega-3 fatty acids 300-1,000 mg capsule, Take 2 g by mouth once daily., Disp: , Rfl:     fluticasone furoate-vilanteroL (BREO ELLIPTA) 100-25 mcg/dose diskus inhaler, Inhale 1 puff into the lungs once daily. Controller, Disp: 60 each, Rfl: 6    hydrALAZINE (APRESOLINE) 50 MG tablet, Take 1 tablet (50 mg total) by mouth 3 (three) times  daily., Disp: 90 tablet, Rfl: 2    hydrOXYchloroQUINE (PLAQUENIL) 200 mg tablet, Take 1 tablet (200 mg total) by mouth once daily., Disp: 90 tablet, Rfl: 1    lancets Misc, To check BG 1 times daily, to use with insurance preferred meter, Disp: 100 each, Rfl: 11    metFORMIN (GLUCOPHAGE) 500 MG tablet, Take 1 tablet (500 mg total) by mouth once daily., Disp: 90 tablet, Rfl: 3    multivitamin (THERAGRAN) per tablet, Take 1 tablet by mouth once daily., Disp: , Rfl:     pantoprazole (PROTONIX) 40 MG tablet, Take 1 tablet (40 mg total) by mouth once daily., Disp: 90 tablet, Rfl: 3    potassium chloride SA (K-DUR,KLOR-CON) 20 MEQ tablet, Take 1 tablet (20 mEq total) by mouth once daily., Disp: 90 tablet, Rfl: 3    rosuvastatin (CRESTOR) 40 MG Tab, TAKE 1 TABLET(40 MG) BY MOUTH EVERY EVENING, Disp: 90 tablet, Rfl: 3    EPINEPHrine (EPIPEN 2-DANA) 0.3 mg/0.3 mL AtIn, Inject 0.3 mLs (0.3 mg total) into the muscle once. for 1 dose (Patient not taking: Reported on 10/24/2022), Disp: 1 each, Rfl: 11    Past Medical History:   Diagnosis Date    Acidosis, metabolic, with respiratory acidosis 3/31/2022    Asthma     Back pain     Cataract     OD    CKD (chronic kidney disease) stage 3, GFR 30-59 ml/min 2020    Diabetes mellitus     Fibromyalgia     Gastroesophageal reflux disease     Hypercholesterolemia     Hypertension     Immune deficiency disorder     Obesity     Osteoporosis     Rheumatoid arthritis     Tobacco dependence     Trouble in sleeping     Type 2 diabetes mellitus      Family History   Problem Relation Age of Onset    Hypertension Mother     Cancer Father     Colon cancer Father     Breast cancer Sister      Social History     Socioeconomic History    Marital status:    Tobacco Use    Smoking status: Former     Packs/day: 0.50     Years: 25.00     Pack years: 12.50     Types: Cigarettes     Quit date: 2022     Years since quittin.8    Smokeless tobacco: Never   Substance and Sexual Activity     "Alcohol use: No    Drug use: No    Sexual activity: Not Currently   Social History Narrative    1 dog, no smokers in household; No HH as of 05/2022, has daytime sitter 05/2022.     Review of patient's allergies indicates:   Allergen Reactions    Ace inhibitors Swelling    Lisinopril Anaphylaxis     Angioedema requiring trach       Objective:   BP (!) 143/60   Pulse 80   Ht 5' 2.6" (1.59 m)   Wt 123.1 kg (271 lb 6.2 oz)   BMI 48.69 kg/m²   Immunization History   Administered Date(s) Administered    COVID-19, MRNA, LN-S, PF (Pfizer) (Purple Cap) 08/17/2021, 09/17/2021    Influenza 10/10/2006, 12/19/2007, 10/08/2009, 12/15/2010, 11/09/2011, 10/16/2012    Influenza (FLUAD) - Quadrivalent - Adjuvanted - PF *Preferred* (65+) 12/10/2020, 01/20/2022    Influenza - High Dose - PF (65 years and older) 12/06/2017, 12/13/2018, 12/24/2019    Influenza - Quadrivalent 10/30/2014    Influenza - Trivalent (ADULT) 10/08/2013    Influenza Split 10/10/2006, 12/19/2007, 10/08/2009, 12/15/2010, 11/09/2011, 10/16/2012, 10/08/2013    PPD Test 04/05/2010, 04/07/2010    Pneumococcal Conjugate - 13 Valent 04/30/2015    Pneumococcal Polysaccharide - 23 Valent 02/12/2007, 12/13/2018    Tdap 10/16/2012    Zoster 04/30/2015       Physical Exam   Constitutional: She is oriented to person, place, and time. No distress.   HENT:   Head: Normocephalic and atraumatic.   Pulmonary/Chest: Effort normal.   Abdominal: She exhibits no distension.   Musculoskeletal:         General: No swelling or tenderness. Normal range of motion.      Cervical back: Normal range of motion.      Right knee: No effusion.      Left knee: No effusion.   Lymphadenopathy:     She has no cervical adenopathy.   Neurological: She is alert and oriented to person, place, and time.   Skin: Skin is warm and dry. No rash noted. No pallor.   Psychiatric: Mood normal.   Nursing note and vitals reviewed.      Right Side Rheumatological Exam     Examination finds the 1st PIP, 1st MCP, " 2nd PIP, 2nd MCP, 3rd PIP, 3rd MCP, 4th PIP, 4th MCP, 5th PIP and 5th MCP normal.    Shoulder Exam   Tenderness Location: no tenderness    Range of Motion   The patient has normal right shoulder ROM.    Knee Exam   Patellofemoral Crepitus: negative  Effusion: negative  Warmth: negative    Elbow/Wrist Exam   Tenderness Location: no elbow tenderness and no wrist tenderness    Range of Motion   Elbow   The patient has normal right elbow ROM.    Range of Motion   Wrist   The patient has normal right wrist ROM.    Left Side Rheumatological Exam     Examination finds the 1st PIP, 1st MCP, 2nd PIP, 2nd MCP, 3rd PIP, 3rd MCP, 4th PIP, 4th MCP, 5th PIP and 5th MCP normal.    Shoulder Exam   Tenderness Location: no tenderness    Range of Motion   The patient has normal left shoulder ROM.    Knee Exam     Patellofemoral Crepitus: negative  Effusion: negative  Warmth: negative    Elbow/Wrist Exam   Tenderness Location: no elbow tenderness and no wrist tenderness    Range of Motion   Elbow   The patient has normal left elbow ROM.    Range of Motion   Wrist   The patient has normal left wrist ROM.       no active synovitis   100% full fist formation bilaterally   negative squeeze test        Recent Results (from the past 672 hour(s))   CORTISOL, 8AM    Collection Time: 02/14/23  8:27 AM   Result Value Ref Range    Cortisol, 8 AM 1.80 (L) 4.30 - 22.40 ug/dL   METANEPHRINES, PLASMA FREE    Collection Time: 02/14/23  8:27 AM   Result Value Ref Range    Metanephrine, Free <25 < OR = 57 pg/mL    Metanephrine, Total, Plasma 111 < OR = 205 pg/mL    Normetanephrine, Free 111 < OR = 148 pg/mL   Vitamin D    Collection Time: 03/01/23  9:46 AM   Result Value Ref Range    Vit D, 25-Hydroxy 46 30 - 96 ng/mL   CBC Auto Differential    Collection Time: 03/01/23  9:46 AM   Result Value Ref Range    WBC 8.58 3.90 - 12.70 K/uL    RBC 4.20 4.00 - 5.40 M/uL    Hemoglobin 10.2 (L) 12.0 - 16.0 g/dL    Hematocrit 33.5 (L) 37.0 - 48.5 %    MCV 80 (L) 82  - 98 fL    MCH 24.3 (L) 27.0 - 31.0 pg    MCHC 30.4 (L) 32.0 - 36.0 g/dL    RDW 16.8 (H) 11.5 - 14.5 %    Platelets 284 150 - 450 K/uL    MPV 10.0 9.2 - 12.9 fL    Immature Granulocytes 0.6 (H) 0.0 - 0.5 %    Gran # (ANC) 5.5 1.8 - 7.7 K/uL    Immature Grans (Abs) 0.05 (H) 0.00 - 0.04 K/uL    Lymph # 1.9 1.0 - 4.8 K/uL    Mono # 0.8 0.3 - 1.0 K/uL    Eos # 0.2 0.0 - 0.5 K/uL    Baso # 0.05 0.00 - 0.20 K/uL    nRBC 0 0 /100 WBC    Gran % 63.8 38.0 - 73.0 %    Lymph % 22.5 18.0 - 48.0 %    Mono % 9.7 4.0 - 15.0 %    Eosinophil % 2.8 0.0 - 8.0 %    Basophil % 0.6 0.0 - 1.9 %    Differential Method Automated    Comprehensive Metabolic Panel    Collection Time: 03/01/23  9:46 AM   Result Value Ref Range    Sodium 139 136 - 145 mmol/L    Potassium 4.0 3.5 - 5.1 mmol/L    Chloride 106 95 - 110 mmol/L    CO2 24 23 - 29 mmol/L    Glucose 147 (H) 70 - 110 mg/dL    BUN 19 8 - 23 mg/dL    Creatinine 1.4 0.5 - 1.4 mg/dL    Calcium 8.9 8.7 - 10.5 mg/dL    Total Protein 7.3 6.0 - 8.4 g/dL    Albumin 2.9 (L) 3.5 - 5.2 g/dL    Total Bilirubin 0.3 0.1 - 1.0 mg/dL    Alkaline Phosphatase 93 55 - 135 U/L    AST 13 10 - 40 U/L    ALT 15 10 - 44 U/L    Anion Gap 9 8 - 16 mmol/L    eGFR 40 (A) >60 mL/min/1.73 m^2   Sedimentation rate    Collection Time: 03/01/23  9:46 AM   Result Value Ref Range    Sed Rate 45 (H) 0 - 20 mm/Hr   C-Reactive Protein    Collection Time: 03/01/23  9:46 AM   Result Value Ref Range    CRP 26.1 (H) 0.0 - 8.2 mg/L         No results found for: TBGOLDPLUS   Lab Results   Component Value Date    HEPAIGM Negative 03/08/2010    HEPBIGM Negative 03/08/2010    HEPBCAB Negative 04/04/2022    HEPCAB Negative 10/23/2014        Imaging  I have personally reviewed images and report of the dexa from 2/21/22.  I agree with the interpretation.  FINDINGS:  The L1 to L4 vertebral bone mineral density is equal to 1.112 g/cm squared with a T score of -0.7.  There has been no significant change relative to the prior study.     The  left femoral neck bone mineral density is equal to 0.680 g/cm squared with a T score of -2.6.  There has been  a -12.3% statistically significant change relative to the prior study.     Impression:  Osteoporosis    Assessment:     1. Rheumatoid arthritis involving multiple sites with positive rheumatoid factor    2. High risk medication use    3. Long-term use of immunosuppressant medication    4. Medication monitoring encounter    5. Adrenal nodule    6. Age-related osteoporosis without current pathological fracture    7. Elevated sed rate    8. On home oxygen therapy                Plan:     Page was seen today for rheumatoid arthritis.    Diagnoses and all orders for this visit:    Rheumatoid arthritis involving multiple sites with positive rheumatoid factor    High risk medication use    Long-term use of immunosuppressant medication    Medication monitoring encounter    Adrenal nodule    Age-related osteoporosis without current pathological fracture    Elevated sed rate    On home oxygen therapy            Seropositive rheumatoid arthritis   Increased Sed rate/crp  SPEP and MACHELLE unremarkable  No joint complaints  No ILD on CT  No h/a  No hip/shoulder pain  No visual disturbances  Sxs stable/controlled   continue Plaquenil 200 mg daily   Continue Plaquenil monitoring with Ophthalmology - last visit 8/2022 - no maculopathy  F/u sooner if sxs worsen  SOB, now on supplemental O2 at home   Counseled pt to use O2  F/u pulm as directed  Do for f/u  Will cc Dr. Romero;'s staff to call and schedule  osteoporosis in the setting of CKD  Reclast dc'ed due to intolerance - last dose 3/2022  Plan for Prolia in future   discussed risks and benefits  DEXA 2/2024  Avoid NSAIDs d/t CKD  Drug therapy requiring intensive monitoring for toxicity  High Risk Medication Monitoring encounter  No current medication related issues, no evidence of toxicity  I ordered labs for toxicity monitoring, have personally reviewed the  findings, and discussed them with the patient.  Pending labs will be sent via the portal  Compromised immune system secondary to autoimmune disease and/or use of immunosuppressive drugs.  Monitor carefully for infections.  Advised patient to get immediate medical care if any infection arises.  Also advised strict adherence age-appropriate vaccinations and cancer screenings with PCP.  Patient advised to hold DMARD and/or biologic therapy for signs of infection or for surgery. If you are unsure what to do please call our office for instruction.[facility] [phone]  Return to clinic: 3-4 mos w Reg 4 labs    The patient understands, chooses and consents to this plan and accepts all the risks which include but are not limited to the risks mentioned above.     Disclaimer: This note was prepared using a voice recognition system and is likely to have sound alike errors within the text.

## 2023-03-17 ENCOUNTER — HOSPITAL ENCOUNTER (OUTPATIENT)
Dept: RADIOLOGY | Facility: HOSPITAL | Age: 73
Discharge: HOME | End: 2023-03-17
Attending: PEDIATRICS
Payer: MEDICARE

## 2023-03-17 VITALS — BODY MASS INDEX: 48.09 KG/M2 | WEIGHT: 271.38 LBS | HEIGHT: 63 IN

## 2023-03-17 DIAGNOSIS — Z12.31 ENCOUNTER FOR SCREENING MAMMOGRAM FOR MALIGNANT NEOPLASM OF BREAST: ICD-10-CM

## 2023-03-17 PROCEDURE — 77067 SCR MAMMO BI INCL CAD: CPT | Mod: TC

## 2023-03-17 PROCEDURE — 77067 SCR MAMMO BI INCL CAD: CPT | Mod: 26,,, | Performed by: RADIOLOGY

## 2023-03-17 PROCEDURE — 77067 MAMMO DIGITAL SCREENING BILAT WITH TOMO: ICD-10-PCS | Mod: 26,,, | Performed by: RADIOLOGY

## 2023-03-17 PROCEDURE — 77063 BREAST TOMOSYNTHESIS BI: CPT | Mod: 26,,, | Performed by: RADIOLOGY

## 2023-03-17 PROCEDURE — 77063 MAMMO DIGITAL SCREENING BILAT WITH TOMO: ICD-10-PCS | Mod: 26,,, | Performed by: RADIOLOGY

## 2023-03-21 ENCOUNTER — OFFICE VISIT (OUTPATIENT)
Dept: ALLERGY | Facility: CLINIC | Age: 73
End: 2023-03-21
Payer: MEDICARE

## 2023-03-21 VITALS
BODY MASS INDEX: 47.93 KG/M2 | SYSTOLIC BLOOD PRESSURE: 139 MMHG | DIASTOLIC BLOOD PRESSURE: 69 MMHG | TEMPERATURE: 99 F | HEART RATE: 75 BPM | HEIGHT: 63 IN | OXYGEN SATURATION: 92 % | WEIGHT: 270.5 LBS

## 2023-03-21 DIAGNOSIS — Z99.81 ON HOME O2: ICD-10-CM

## 2023-03-21 DIAGNOSIS — J45.40 MODERATE PERSISTENT ASTHMA WITHOUT COMPLICATION: Primary | ICD-10-CM

## 2023-03-21 DIAGNOSIS — J38.3 VOCAL CORD DYSFUNCTION: ICD-10-CM

## 2023-03-21 DIAGNOSIS — T78.3XXD ANGIOTENSIN CONVERTING ENZYME INHIBITOR-AGGRAVATED ANGIOEDEMA, SUBSEQUENT ENCOUNTER: ICD-10-CM

## 2023-03-21 DIAGNOSIS — Z87.891 FORMER CIGARETTE SMOKER: ICD-10-CM

## 2023-03-21 DIAGNOSIS — T46.4X5D ANGIOTENSIN CONVERTING ENZYME INHIBITOR-AGGRAVATED ANGIOEDEMA, SUBSEQUENT ENCOUNTER: ICD-10-CM

## 2023-03-21 PROCEDURE — 94640 AIRWAY INHALATION TREATMENT: CPT | Mod: PBBFAC

## 2023-03-21 PROCEDURE — 99215 OFFICE O/P EST HI 40 MIN: CPT | Mod: PBBFAC | Performed by: ALLERGY & IMMUNOLOGY

## 2023-03-21 PROCEDURE — 99999 PR PBB SHADOW E&M-EST. PATIENT-LVL V: ICD-10-PCS | Mod: PBBFAC,,, | Performed by: ALLERGY & IMMUNOLOGY

## 2023-03-21 PROCEDURE — 99999 PR PBB SHADOW E&M-EST. PATIENT-LVL V: CPT | Mod: PBBFAC,,, | Performed by: ALLERGY & IMMUNOLOGY

## 2023-03-21 PROCEDURE — 99214 OFFICE O/P EST MOD 30 MIN: CPT | Mod: 25,S$PBB,, | Performed by: ALLERGY & IMMUNOLOGY

## 2023-03-21 PROCEDURE — 94640 AIRWAY INHALATION TREATMENT: CPT | Mod: S$PBB,,, | Performed by: ALLERGY & IMMUNOLOGY

## 2023-03-21 PROCEDURE — 99214 PR OFFICE/OUTPT VISIT, EST, LEVL IV, 30-39 MIN: ICD-10-PCS | Mod: 25,S$PBB,, | Performed by: ALLERGY & IMMUNOLOGY

## 2023-03-21 PROCEDURE — 94640 PR INHAL RX, AIRWAY OBST/DX SPUTUM INDUCT: ICD-10-PCS | Mod: S$PBB,,, | Performed by: ALLERGY & IMMUNOLOGY

## 2023-03-21 RX ORDER — IPRATROPIUM BROMIDE 0.5 MG/2.5ML
0.5 SOLUTION RESPIRATORY (INHALATION)
Status: COMPLETED | OUTPATIENT
Start: 2023-03-21 | End: 2023-03-21

## 2023-03-21 RX ORDER — ALBUTEROL SULFATE 0.83 MG/ML
2.5 SOLUTION RESPIRATORY (INHALATION)
Status: COMPLETED | OUTPATIENT
Start: 2023-03-21 | End: 2023-03-21

## 2023-03-21 RX ADMIN — IPRATROPIUM BROMIDE 0.5 MG: 0.5 SOLUTION RESPIRATORY (INHALATION) at 11:03

## 2023-03-21 RX ADMIN — ALBUTEROL SULFATE 2.5 MG: 2.5 SOLUTION RESPIRATORY (INHALATION) at 11:03

## 2023-03-21 NOTE — PROGRESS NOTES
"Subjective:       Patient ID: Page Grissom is a 72 y.o. female.      Chief Complaint:  Follow-up        HPI 11/22/2022: 72 year old female with a history of ACE inhibitor angioedema- she denies swelling, since she was seen here previously. She denies tongue or throat swelling.  Zyrtec every other day.  She denies angioedema since she was seen here previously.  No ER or Urgent care for asthma  NO oral steroids      HPI 8/23/2022: 71 year old female with a history of ACE inhibitor angioedema here for follow up. She was seen last week and found to have an elevated BP. She was sent to the ER. She is back today to further discuss her angioedema. NO angioedema, since her previous visit. NO lisinopril since March of this year.    Albuterol twice a day  Ex-smoker- stopped in March  Smoked for 60 years- 1 pack a day        HPI 8/15/2022: 71 year old female referred with a history of angioedema thought to be related to lisinopril. She thinks it was "the bone infusion".  Lisinopril was stopped. She was hospitalized in March. She was intubated and trached due to swelling. No swelling, since she was discharged in March.    She [facility]    She has an Epipen 2pack.      Past Medical History:   Diagnosis Date    Acidosis, metabolic, with respiratory acidosis 3/31/2022    Asthma     Back pain     Cataract     OD    CKD (chronic kidney disease) stage 3, GFR 30-59 ml/min 12/20/2020    Diabetes mellitus     Fibromyalgia     Gastroesophageal reflux disease     Hypercholesterolemia     Hypertension     Immune deficiency disorder     Obesity     Osteoporosis     Rheumatoid arthritis     Tobacco dependence     Trouble in sleeping     Type 2 diabetes mellitus        Family History   Problem Relation Age of Onset    Hypertension Mother     Cancer Father     Colon cancer Father     Breast cancer Sister      Current Outpatient Medications on File Prior to Visit   Medication " Sig Dispense Refill    acetaminophen (TYLENOL) 500 MG tablet Take 500 mg by mouth as needed for Pain.      albuterol (PROVENTIL/VENTOLIN HFA) 90 mcg/actuation inhaler INHALE 2 PUFFS INTO THE LINGS EVERY 6 HOURS AS NEEDED WHEEZING 25.5 g 3    amLODIPine (NORVASC) 10 MG tablet Take 1 tablet (10 mg total) by mouth once daily. 90 tablet 3    blood sugar diagnostic Strp To check BG 1 times daily, to use with insurance preferred meter 100 strip 11    blood-glucose meter kit To check BG 1 times daily, to use with insurance preferred meter 1 each 0    cetirizine (ZYRTEC) 10 MG tablet Take 1 tablet (10 mg total) by mouth once daily. 90 tablet 3    cholecalciferol, vitamin D3, 2,000 unit Tab Take 2,000 Units by mouth once daily.      famotidine (PEPCID) 40 MG tablet Take 1 tablet (40 mg total) by mouth once daily. 90 tablet 3    fish oil-omega-3 fatty acids 300-1,000 mg capsule Take 2 g by mouth once daily.      fluticasone furoate-vilanteroL (BREO ELLIPTA) 100-25 mcg/dose diskus inhaler Inhale 1 puff into the lungs once daily. Controller 60 each 6    hydrALAZINE (APRESOLINE) 50 MG tablet TAKE 1 TABLET(50 MG) BY MOUTH THREE TIMES DAILY 90 tablet 2    hydrOXYchloroQUINE (PLAQUENIL) 200 mg tablet Take 1 tablet (200 mg total) by mouth once daily. 90 tablet 1    lancets Misc To check BG 1 times daily, to use with insurance preferred meter 100 each 11    metFORMIN (GLUCOPHAGE) 500 MG tablet Take 1 tablet (500 mg total) by mouth once daily. 90 tablet 3    multivitamin (THERAGRAN) per tablet Take 1 tablet by mouth once daily.      pantoprazole (PROTONIX) 40 MG tablet Take 1 tablet (40 mg total) by mouth once daily. 90 tablet 3    potassium chloride SA (K-DUR,KLOR-CON) 20 MEQ tablet Take 1 tablet (20 mEq total) by mouth once daily. 90 tablet 3    rosuvastatin (CRESTOR) 40 MG Tab TAKE 1 TABLET(40 MG) BY MOUTH EVERY EVENING 90 tablet 3    EPINEPHrine (EPIPEN 2-DANA) 0.3 mg/0.3 mL AtIn Inject 0.3 mLs (0.3 mg total) into the muscle once.  for 1 dose (Patient not taking: Reported on 10/24/2022) 1 each 11    [DISCONTINUED] hydroCHLOROthiazide (HYDRODIURIL) 25 MG tablet TAKE 1 TABLET(25 MG) BY MOUTH EVERY DAY 90 tablet 3     No current facility-administered medications on file prior to visit.        Environmental History: Pets in the home: dogs (1).  Tobacco Smoke in Home: no  Review of Systems   Constitutional:  Negative for chills and fever.   HENT:  Negative for congestion and rhinorrhea.    Eyes:  Negative for discharge, itching and visual disturbance.   Respiratory:  Negative for cough, shortness of breath and wheezing.    Cardiovascular:  Positive for leg swelling. Negative for chest pain.   Gastrointestinal:  Negative for nausea and vomiting.        GERD   Skin:  Negative for rash and wound.   Allergic/Immunologic: Positive for environmental allergies. Negative for food allergies.   Neurological:  Negative for light-headedness and numbness.   Hematological:  Negative for adenopathy. Does not bruise/bleed easily.   Psychiatric/Behavioral:  Negative for behavioral problems and suicidal ideas.    All other systems reviewed and are negative.     Objective:    Physical Exam  Vitals reviewed.   Constitutional:       General: She is not in acute distress.     Appearance: Normal appearance. She is well-developed. She is obese. She is not ill-appearing, toxic-appearing or diaphoretic.   HENT:      Head: Normocephalic and atraumatic.      Right Ear: Tympanic membrane, ear canal and external ear normal. There is no impacted cerumen.      Left Ear: Tympanic membrane, ear canal and external ear normal. There is no impacted cerumen.      Nose: Nose normal. No congestion or rhinorrhea.      Mouth/Throat:      Pharynx: No oropharyngeal exudate or posterior oropharyngeal erythema.   Eyes:      General: No scleral icterus.        Right eye: No discharge.         Left eye: No discharge.      Pupils: Pupils are equal, round, and reactive to light.   Neck:       Thyroid: No thyromegaly.   Cardiovascular:      Rate and Rhythm: Normal rate and regular rhythm.      Heart sounds: Normal heart sounds. No murmur heard.    No friction rub. No gallop.   Pulmonary:      Effort: Pulmonary effort is normal. No respiratory distress.      Breath sounds: Normal breath sounds. No stridor. No wheezing, rhonchi or rales.   Chest:      Chest wall: No tenderness.   Abdominal:      Hernia: No hernia is present.   Musculoskeletal:         General: No swelling, tenderness, deformity or signs of injury. Normal range of motion.      Cervical back: Normal range of motion and neck supple. No rigidity. No muscular tenderness.      Right lower leg: No edema.      Left lower leg: No edema.   Lymphadenopathy:      Cervical: No cervical adenopathy.   Skin:     General: Skin is warm.      Coloration: Skin is not jaundiced or pale.      Findings: No bruising or erythema.   Neurological:      Mental Status: She is alert and oriented to person, place, and time.      Gait: Gait normal.   Psychiatric:         Mood and Affect: Mood normal.         Behavior: Behavior normal.         Thought Content: Thought content normal.         Judgment: Judgment normal.     O2 on RA after albuterol and atrovent 96%  O2 prior to treatment 92% on RA  Albuterol and atrovent given today. Lung exam improved, no wheeze  Suspect vocal cord dysfunction as when asked to breath like she is using a straw her wheezing sound stopped.    Assessment:       1. Moderate persistent asthma without complication    2. Angiotensin converting enzyme inhibitor-aggravated angioedema, subsequent encounter    3. Vocal cord dysfunction    4. On home O2    5. Former cigarette smoker             Plan:       Moderate persistent asthma without complication  -     albuterol nebulizer solution 2.5 mg  -     ipratropium 0.02 % nebulizer solution 0.5 mg    Angiotensin converting enzyme inhibitor-aggravated angioedema, subsequent encounter    Vocal cord  dysfunction  -     Ambulatory referral/consult to Speech Therapy; Future; Expected date: 03/28/2023    On home O2    Former cigarette smoker        Angioedema was likely related to lisinopril. Can see angioedema for 12 months after stopping ACE inhibitor, with most episodes occurring the first three months after it was stopped.  Agree with continued avoidance of ACE inhibitors    Epipen 2 pack  Albuterol and Atrovent given today on the clinic.  O2 sat 96% on RA post procedure. Suspect vocal cord dysfunction as well. Referral to speech therapy placed.      Continue current medications.    Asthma- Followed by Pulmonary      RTC 4-6 months or sooner, if needed    MD,FACSWETHA                    Problems Address                                                 Amount and/or Complexity                                                                      Risk       3           [] 2 or more self-limited or minor problems                      [] Limited                                                                        [] Low                  [] 1 stable chronic illness                                                  Any combination of the two                                               OTC drugs                  []Acute, uncomplicated illness or injury                            Review of prior external notes from unique source           Minor surgery with no risk factors                                                                                                               [] 1 []2  []3+                                                                                                              Review of results from each unique test                                                                                                               [] 1 []2  [] 3+                                                                                                              Order of each unique test                                                                                                                [] 1 []2  [] 3+                                                                                                              Or                                                                                                             [] Assessment requiring an independent historian      4            [] One or more chronic illness with exacerbation,              [] Moderate                                                                      [x] Moderate                 Progression, or side effects of treatment                            -test documents or independent historians                        Prescription drug management                [x]  2 or more stable chronic illnesses                                    [] Independent interpretation of tests                              Minor surgery with identifiable risk                [] 1 undiagnosed new problem with uncertain prognosis    [] Discussion or management of test results                    elective major surgery                [] 1 acute illness with                systemic symptoms                                                                                                                                                              [] 1 acute complicated injury                                                                                                                                          Elective major surgery                                                                                                                                                                                                                                                                                                                                                                                                  5            [] 1 or more chronic illnesses with severe  exacerbation,     [] Extensive(two from below)                                         [] High                                                                                                               [] Independent interpretation of results                         Drug therapy requiring intensive                                                                                                               []Discussion of management or test interpretation           monitoring                                                                                                                                                                                                       Decision to de-escalate care                 [] 1 acute or chronic illness or injury that poses a threat                                                                                               Decision regarding hospitalization

## 2023-03-31 ENCOUNTER — CLINICAL SUPPORT (OUTPATIENT)
Dept: REHABILITATION | Facility: HOSPITAL | Age: 73
End: 2023-03-31
Attending: ALLERGY & IMMUNOLOGY
Payer: MEDICARE

## 2023-03-31 DIAGNOSIS — J38.3 VOCAL CORD DYSFUNCTION: ICD-10-CM

## 2023-03-31 PROCEDURE — 92507 TX SP LANG VOICE COMM INDIV: CPT | Performed by: SPEECH-LANGUAGE PATHOLOGIST

## 2023-03-31 PROCEDURE — 92524 BEHAVRAL QUALIT ANALYS VOICE: CPT | Performed by: SPEECH-LANGUAGE PATHOLOGIST

## 2023-03-31 NOTE — PROGRESS NOTES
See initial evaluation in plan of care.     Bernadette Parnell MA, CCC-SLP  Speech Language Pathologist  3/31/2023

## 2023-03-31 NOTE — PLAN OF CARE
[facility]  Speech Therapy Evaluation -Voice    Date: 3/31/2023     Name: Page Grissom   MRN: [id number]    Therapy Diagnosis:   Encounter Diagnosis   Name Primary?    Vocal cord dysfunction     Physician: Екатерина Martinez MD  Physician Orders: Ambulatory referral/consult to speech therapy   Medical Diagnosis: Vocal cord dysfunction [J38.3]    Visit # / Visits Authorized:  1 / 1   Date of Evaluation:  3/31/2023   Insurance Authorization Period: 3/21/2023 - 3/20/2024  Plan of Care Certification:    Evaluation only      Time In: 1:16 PM   Time Out: 1:56 PM   Procedure Min.   Qualitative Analysis of Voice and Resonance  20   Speech-Language Voice Therapy  20     Precautions: Standard and Respiratory   Subjective     History of Current Condition:   Patient presents along and on time; case history provided by patient and taken from chart review. Patient's medical history is significant for hospitalization in March 2022 for angioedema and inability to intubate resulting in tracheostomy placement. She was discharged to [facility] and eventually was decannulated. 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 Scope with laryngology with similar findings and surveillance recommended. She is now awaiting appointment with CT surgery in July to address surgical options per patient.    Notable audible inhalation upon entering therapy room that reduced significantly and was eliminated once patient caught her breath. She denies dysphonia, difficulty breathing, shortness of breath. She does have asthma to which she attributes wheezing. She denies dysphagia.        Past Medical History: Page Grissom  has a past medical history of Acidosis, metabolic, with respiratory acidosis (3/31/2022), Asthma, Back pain, Cataract, CKD (chronic kidney disease) stage 3, GFR 30-59 ml/min (12/20/2020), Diabetes mellitus, Fibromyalgia,  Gastroesophageal reflux disease, Hypercholesterolemia, Hypertension, Immune deficiency disorder, Obesity, Osteoporosis, Rheumatoid arthritis, Tobacco dependence, Trouble in sleeping, and Type 2 diabetes mellitus.  Page Grissom  has a past surgical history that includes Oophorectomy; Hernia repair; Colonoscopy (N/A, 2019); Tracheostomy (N/A, 3/30/2022); and Bronchoscopy (N/A, 3/30/2022).  Medical Hx and Allergies: Page has a current medication list which includes the following prescription(s): acetaminophen, albuterol, amlodipine, blood sugar diagnostic, blood-glucose meter, cetirizine, cholecalciferol (vitamin d3), epinephrine, famotidine, fish oil-omega-3 fatty acids, fluticasone furoate-vilanterol, hydralazine, hydroxychloroquine, lancets, metformin, multivitamin, pantoprazole, potassium chloride sa, rosuvastatin, and [DISCONTINUED] hydrochlorothiazide.   Review of patient's allergies indicates:   Allergen Reactions    Ace inhibitors Swelling    Lisinopril Anaphylaxis     Angioedema requiring trach     Prior Therapy:  NA  Social History:  retired from [employer]  Prior Level of Function: WFL   Current Level of Function: WFL  Pain: 0/10  Pain Location / Description: NA  Nutrition:  denies dysphagia  Patient's Therapy Goals:  NA  Objective   Formal Assessment:  VOICE EVALUATION    Vocal Complaints: NA    Swallowing: denies dysphagia    Breathing: audible inhalation    Smokin packs/day; quit in May 2022 55+ year pack history  EtOH: 0 drinks/week   Caffeine: 2 cups/day   Reflux: yes and Pepcid management  Water Intake: 0 oz/day     Vocal activities/abuses:   Typical voice demands     Environment: NA    Laryngeal laryngoscopy findings per Dr. Newberry on 22:  Procedure  Flexible Laryngoscopy (36844): Laryngoscopy is indicated for assessment of upper aerodigestive structure and function. This was carried out transnasally with a distal chip videoendoscope. After verbal consent was obtained, the patient  was positioned and the nose was topically decongested with 1% phenylephrine and topically anesthetized with 4% lidocaine. The endoscope was passed through the most patent nasal cavity and positioned to image the nasopharynx, larynx, and hypopharynx in detail. The following features were examined: nasopharyngeal, laryngeal, hypopharyngeal masses; velopharyngeal strength, closure, and symmetry of motion; vocal fold range and symmetry of motion; laryngeal mucosal edema, erythema, inflammation, and hydration; salivary pooling; and gross laryngeal sensation. The equipment was removed. The patient tolerated the procedure well without complication. All findings were normal except:  - very mild A-frame deformity of cervical trachea, grade 1 at most; interval increase in patency since last visit  - alisia visible  - no paralysis or paresis  - subglottis patent  - mild interarytenoid mucosal hypertrophy       Perceptual assessment:    -Quality: appropriate for age and gender  -Volume: appropriate for age and gender  -Pitch: appropriate for age and gender  -Flexibility: appropriate for age and gender    GRABS Scale (0 normal, 1 mild, 2 moderate, 3 severe)  Grade (overall severity): 0  Roughness: 0  Breathiness: 0  Asthenia (weakness): 0  Strain: 0    Maximum Phonation Time (MPT) (ah > 18s WFL):   Trial Seconds    Trial 1 17   Trial 2 18   Trial 3  18   Average  17.7       S/Z ratio (ratio of 1.4+ may indicate a degree of vocal fold dysfunction):   Trials /S/ (seconds) /Z/ (seconds) Ratio (S/Z ratio)   Trial 1 10 11    Trial 2 10 10    Trial 3  11 10    Average  10.3 10.3 1       Voice Handicap Index (VHI-10) (completed to assess self-perceived handicap associated with dysphonia; >11 considered abnormal):     My voice makes it difficult for people to hear me 0   2.   I run out of air when I talk 1   3.   People have difficulty understanding me in a noisy room  0   4.   The sound of my voice varies throughout the day  2   5.    "My family has difficulty understanding me when I call throughout the house 0   6.   I use the phone less often than I would like to 0   7.   I'm tense when talking to others because of my voice  0   8.   I tend to avoid groups of people because of my voice  0   9.   People seem irritated with my voice 0   10. People ask "What's wrong with your voice?" 0   TOTAL 3/40       Reflux Symptom Index (RSI) Score:   Reflux Symptom Index (RSI) is a questionnaire given to the patient to  the possible presence and/or severity of LPR symptoms within the last month and any relationship between this condition and the patient's dysphagia. A score that is greater than 15 may be indicative of LPR.    Hoarseness or problem with your voice  0   2.   Clearing your throat  0   3.   Excess throat mucus or post-nasal drip 1   4.   Difficulty swallowing food, liquid, or pills 0   5.   Coughing after you ate or after lying down  0   6.   Breathing difficulties or choking episodes 0   7.   Troublesome or annoying cough 0   8.   Sensations of something sticking in your throat or a lump in your throat 0   9.   Heartburn, chest pain, indigestion, or stomach acid coming up 1   TOTAL 2/45       Oral-Motor Assessment  WNL    Muscle Tension Assessment  Tension Observed: NA  Tenderness with palpation/massage: no  Reduced thyrohyoid space at rest: no  Comments: -    Breath Support  At rest: Thoracic  Sustained Phonation: Thoracic  Conversation: Thoracic  Speaks on residual air: no    Impact of Voice Impairment on Functioning: (0=no impact; 10=substantial impact)  Vocal Effort: 0  Vocal Fatigue: 0  Vocal Impact: 0    Education / Stimulability Trials  Discussed importance of vocal hygiene including: hydration, reducing caffeine / drying agents, and reducing throat clearing, coughing, or other phonotraumatic behaviors.  Patient was stimulable for improved voice using rescue breathing for paradoxical vocal cord dysfunction exercises.        Treatment "   Total Treatment Time Separate from Evaluation: 20 minutes     Education: Plan of Care, role of SLP in care, vocal hygeine, GERD management, and education on nature of paradoxical vocal cord dysfunction along with rescue breathing/treatment was discussed with patient. Patient  expressed understanding.     Home Program: Patient provided vocal hygiene and GERD management programs as well as PVFM rescue breaths to use PRN  Assessment     Page presents to [facility] status post medical diagnosis of Vocal cord dysfunction [J38.3]. She presents with vocal cord dysfunction characterized by wheezing on inhalation that was eliminated with rescue breaths and increased hydration. Patient denies any dysphonia, SOB/dyspnea. She was educated on findings and recommendations as well as vocal hygiene, GERD management and exercise program along with triggers of VCD and was able to implement independently by end of today's session. Patient was encouraged to follow up given any changes in functioning. She expressed understanding of information provided and agreement with POC.     Plan     Plan of Care Certification Period: Evaluation only    Recommended Treatment Plan:  Patient would not benefit from outpatient speech therapy services at this time.     Other Recommendations: NA    Therapist's Name:   Bernadette Parnell MA, CCC-SLP  Speech Language Pathologist  3/31/2023     I CERTIFY THE NEED FOR THESE SERVICES FURNISHED UNDER THIS PLAN OF TREATMENT AND WHILE UNDER MY CARE    Physician Name: _______________________________    Physician Signature: ____________________________

## 2023-04-26 NOTE — TELEPHONE ENCOUNTER
----- Message from Nohelia Luna sent at 12/2/2019 10:57 AM CST -----  Contact: pt  1. What is the name of the medication you are requesting? Hydroxychloroquine  2. What is the dose? .  3. How do you take the medication? Orally, topically, etc? .  4. How often do you take this medication? .  5. Do you need a 30 day or 90 day supply? .  6. How many refills are you requesting? .  7. What is your preferred pharmacy and location of the pharmacy? .  8. Who can we contact with further questions? .   Spoke to patient in regards to lab results and provider instructions, patient verbalized understanding but stated her cardiologist is questioning her labs, she asked if you can call cardiologist and talk about lab results. Pt stated she is having coronary artery bypass surgery next Wednesday.

## 2023-04-27 ENCOUNTER — OFFICE VISIT (OUTPATIENT)
Dept: UROLOGY | Facility: CLINIC | Age: 73
End: 2023-04-27
Payer: MEDICARE

## 2023-04-27 VITALS
HEART RATE: 74 BPM | WEIGHT: 270.5 LBS | BODY MASS INDEX: 49.78 KG/M2 | TEMPERATURE: 98 F | DIASTOLIC BLOOD PRESSURE: 80 MMHG | SYSTOLIC BLOOD PRESSURE: 149 MMHG | HEIGHT: 62 IN

## 2023-04-27 DIAGNOSIS — E27.8 OTHER SPECIFIED DISORDERS OF ADRENAL GLAND: Primary | ICD-10-CM

## 2023-04-27 PROCEDURE — 99213 OFFICE O/P EST LOW 20 MIN: CPT | Mod: S$PBB,,, | Performed by: UROLOGY

## 2023-04-27 PROCEDURE — 99214 OFFICE O/P EST MOD 30 MIN: CPT | Mod: PBBFAC | Performed by: UROLOGY

## 2023-04-27 PROCEDURE — 99213 PR OFFICE/OUTPT VISIT, EST, LEVL III, 20-29 MIN: ICD-10-PCS | Mod: S$PBB,,, | Performed by: UROLOGY

## 2023-04-27 PROCEDURE — 99999 PR PBB SHADOW E&M-EST. PATIENT-LVL IV: ICD-10-PCS | Mod: PBBFAC,,, | Performed by: UROLOGY

## 2023-04-27 PROCEDURE — 99999 PR PBB SHADOW E&M-EST. PATIENT-LVL IV: CPT | Mod: PBBFAC,,, | Performed by: UROLOGY

## 2023-04-27 NOTE — PROGRESS NOTES
Chief Complaint:  Left adrenal nodule    HPI:   04/27/2023 - patient returns today for follow-up and review of her plasma metanephrines and dexamethasone suppression test, both of these were normal as was her CT abdomen without contrast showing a left adrenal nodule 2.6 cm with Hounsfield units of 20    02/09/2023 - 71 yo  female that presents for left adrenal nodule.  This was found inadvertently on a CT scan obtained for elevated ESR.  Patient was unaware that she had an adrenal nodule.  No prior chest or upper abdominal imaging here had noted this.  She voids with a good stream and denies any gross hematuria.  She denies any palpitations or headaches.  She denies any difficulty with hypertension or double vision.  She denies profuse sweating.  She denies family history of  cancers.  Denies prior kidney stones.  She denies prior urologic procedures or surgeries.      PMH:  Past Medical History:   Diagnosis Date    Acidosis, metabolic, with respiratory acidosis 3/31/2022    Asthma     Back pain     Cataract     OD    CKD (chronic kidney disease) stage 3, GFR 30-59 ml/min 12/20/2020    Diabetes mellitus     Fibromyalgia     Gastroesophageal reflux disease     Hypercholesterolemia     Hypertension     Immune deficiency disorder     Obesity     Osteoporosis     Rheumatoid arthritis     Tobacco dependence     Trouble in sleeping     Type 2 diabetes mellitus        PSH:  Past Surgical History:   Procedure Laterality Date    BRONCHOSCOPY N/A 3/30/2022    Procedure: Bronchoscopy;  Surgeon: Isak Yadav MD;  Location: [facility] OR;  Service: General;  Laterality: N/A;    COLONOSCOPY N/A 2/14/2019    Procedure: COLONOSCOPY;  Surgeon: Yury Atwood MD;  Location: [facility] ENDO;  Service: Endoscopy;  Laterality: N/A;    HERNIA REPAIR      OOPHORECTOMY      TRACHEOSTOMY N/A 3/30/2022    Procedure: CREATION, TRACHEOSTOMY;  Surgeon: Isak Yadav MD;  Location: [facility] OR;  Service: General;  Laterality: N/A;       Family  History:  Family History   Problem Relation Age of Onset    Hypertension Mother     Cancer Father     Colon cancer Father     Breast cancer Sister        Social History:  Social History     Tobacco Use    Smoking status: Former     Packs/day: 0.50     Years: 25.00     Pack years: 12.50     Types: Cigarettes     Quit date: 2022     Years since quittin.9    Smokeless tobacco: Never   Substance Use Topics    Alcohol use: No    Drug use: No        Review of Systems:  General: No fever, chills  Skin: No rashes  Chest:  Denies cough and sputum production  Heart: Denies chest pain  Resp: Denies dyspnea  Abdomen: Denies diarrhea, abdominal pain, hematemesis, or blood in stool.  Musculoskeletal: No joint stiffness or swelling. Denies back pain.  : see HPI  Neuro: no dizziness or weakness    Allergies:  Ace inhibitors and Lisinopril    Medications:    Current Outpatient Medications:     acetaminophen (TYLENOL) 500 MG tablet, Take 500 mg by mouth as needed for Pain., Disp: , Rfl:     albuterol (PROVENTIL/VENTOLIN HFA) 90 mcg/actuation inhaler, INHALE 2 PUFFS INTO THE LINGS EVERY 6 HOURS AS NEEDED WHEEZING, Disp: 25.5 g, Rfl: 3    amLODIPine (NORVASC) 10 MG tablet, Take 1 tablet (10 mg total) by mouth once daily., Disp: 90 tablet, Rfl: 3    blood sugar diagnostic Strp, To check BG 1 times daily, to use with insurance preferred meter, Disp: 100 strip, Rfl: 11    blood-glucose meter kit, To check BG 1 times daily, to use with insurance preferred meter, Disp: 1 each, Rfl: 0    cetirizine (ZYRTEC) 10 MG tablet, Take 1 tablet (10 mg total) by mouth once daily., Disp: 90 tablet, Rfl: 3    cholecalciferol, vitamin D3, 2,000 unit Tab, Take 2,000 Units by mouth once daily., Disp: , Rfl:     famotidine (PEPCID) 40 MG tablet, Take 1 tablet (40 mg total) by mouth once daily., Disp: 90 tablet, Rfl: 3    fish oil-omega-3 fatty acids 300-1,000 mg capsule, Take 2 g by mouth once daily., Disp: , Rfl:     fluticasone  furoate-vilanteroL (BREO ELLIPTA) 100-25 mcg/dose diskus inhaler, Inhale 1 puff into the lungs once daily. Controller, Disp: 60 each, Rfl: 6    hydrALAZINE (APRESOLINE) 50 MG tablet, TAKE 1 TABLET(50 MG) BY MOUTH THREE TIMES DAILY, Disp: 90 tablet, Rfl: 2    hydrOXYchloroQUINE (PLAQUENIL) 200 mg tablet, TAKE 1 TABLET(200 MG) BY MOUTH EVERY DAY, Disp: 90 tablet, Rfl: 1    lancets Misc, To check BG 1 times daily, to use with insurance preferred meter, Disp: 100 each, Rfl: 11    metFORMIN (GLUCOPHAGE) 500 MG tablet, Take 1 tablet (500 mg total) by mouth once daily., Disp: 90 tablet, Rfl: 3    multivitamin (THERAGRAN) per tablet, Take 1 tablet by mouth once daily., Disp: , Rfl:     pantoprazole (PROTONIX) 40 MG tablet, Take 1 tablet (40 mg total) by mouth once daily., Disp: 90 tablet, Rfl: 3    potassium chloride SA (K-DUR,KLOR-CON) 20 MEQ tablet, Take 1 tablet (20 mEq total) by mouth once daily., Disp: 90 tablet, Rfl: 3    rosuvastatin (CRESTOR) 40 MG Tab, TAKE 1 TABLET(40 MG) BY MOUTH EVERY EVENING, Disp: 90 tablet, Rfl: 3    EPINEPHrine (EPIPEN 2-DANA) 0.3 mg/0.3 mL AtIn, Inject 0.3 mLs (0.3 mg total) into the muscle once. for 1 dose (Patient not taking: Reported on 10/24/2022), Disp: 1 each, Rfl: 11    Physical Exam:  Vitals:    04/27/23 1107   BP: (!) 149/80   Pulse: 74   Temp: 97.8 °F (36.6 °C)     Body mass index is 49.48 kg/m².  General: awake, alert, cooperative  Head: NC/AT  Ears: external ears normal  Eyes: sclera normal  Lungs: normal inspiration, NAD  Heart: well-perfused  Abdomen: Soft, NT, ND, no abdominal striae  Back:  No buffalo hump  Skin: The skin is warm and dry  Ext: No c/c/e.  Neuro: grossly intact, AOx3    RADIOLOGY:  CT ABDOMEN WITHOUT CONTRAST 03/07/2023     CLINICAL INDICATION:  Adrenal mass     TECHNIQUE: Axial and multiplanar 2-D reformations provided  without contrast     PRIORS:CT chest and abdomen January 2023 describing bilateral thyroid nodules, right upper lobe pulmonary nodule and a 3  cm left adrenal nodule     FINDINGS:  1.  The scan does not extend superiorly to include use of the thyroid gland nor the right upper lobe pulmonary nodule     2.  Left adrenal mass is well-circumscribed measuring 2.6 cm in greatest diameter with Hounsfield units primarily in the 20s  3.  ASPVD without aneurysm  4.  Thoracolumbar spondylosis with significant disc height loss and early anterior compression involving the lower thoracic spine at 3 levels  5.  Fat filled ventral hernia     Otherwise the pulmonary parenchyma, organs of the abdomen, GI tract,  tract, retroperitoneum, soft tissues, muscles and bones are unremarkable.        Impression:    1.  This study does not include the thyroid nodules nor the right upper lobe pulmonary nodule described in January.  The thyroid could be evaluated by ultrasound and the pulmonary nodule by follow-up noncontrast CT at either 6 or 12 months depending upon clinical indications.     2.  Left adrenal mass measuring 2.6 cm (this is minimally increased from the previous report but I do not have those images for direct comparison and unless the patient has a known primary malignancy, further follow-up recommended)  3.  Thoracic spondylosis with anterior compression fracture as described above, stable by report from previous scan     NOTE: Adrenal MRI may be useful if this patient has a known primary malignancy, otherwise follow-up for stability at 6-12 months recommended.    LABS:  I personally reviewed the following lab values:  Lab Results   Component Value Date    WBC 8.58 03/01/2023    HGB 10.2 (L) 03/01/2023    HCT 33.5 (L) 03/01/2023     03/01/2023     03/01/2023    K 4.0 03/01/2023     03/01/2023    CREATININE 1.4 03/01/2023    BUN 19 03/01/2023    CO2 24 03/01/2023    TSH 0.210 (L) 04/06/2022    GLUF 122 (H) 12/16/2009    HGBA1C 5.6 11/10/2022    CHOL 156 11/10/2022    TRIG 94 11/10/2022    HDL 60 11/10/2022    ALT 15 03/01/2023    AST 13 03/01/2023      Assessment/Plan:   Page Grissom is a 72 y.o. female with:    Left Adrenal Nodule - metabolic workup negative, CT negative for concerning features, follow-up six months with repeat CT scan    Dieter Baltazar MD  Urology

## 2023-05-03 DIAGNOSIS — Z71.89 COMPLEX CARE COORDINATION: ICD-10-CM

## 2023-05-15 ENCOUNTER — LAB VISIT (OUTPATIENT)
Dept: LAB | Facility: HOSPITAL | Age: 73
End: 2023-05-15
Attending: NURSE PRACTITIONER
Payer: MEDICARE

## 2023-05-15 DIAGNOSIS — I10 ESSENTIAL HYPERTENSION: ICD-10-CM

## 2023-05-15 DIAGNOSIS — E11.8 DIABETES MELLITUS TYPE 2 WITH COMPLICATIONS: ICD-10-CM

## 2023-05-15 DIAGNOSIS — E11.69 HYPERLIPIDEMIA ASSOCIATED WITH TYPE 2 DIABETES MELLITUS: ICD-10-CM

## 2023-05-15 DIAGNOSIS — E78.5 HYPERLIPIDEMIA ASSOCIATED WITH TYPE 2 DIABETES MELLITUS: ICD-10-CM

## 2023-05-15 DIAGNOSIS — E66.01 MORBID OBESITY: ICD-10-CM

## 2023-05-15 LAB
ALBUMIN SERPL BCP-MCNC: 3.2 G/DL (ref 3.5–5.2)
ALP SERPL-CCNC: 104 U/L (ref 55–135)
ALT SERPL W/O P-5'-P-CCNC: 11 U/L (ref 10–44)
ANION GAP SERPL CALC-SCNC: 10 MMOL/L (ref 8–16)
AST SERPL-CCNC: 15 U/L (ref 10–40)
BILIRUB SERPL-MCNC: 0.3 MG/DL (ref 0.1–1)
BUN SERPL-MCNC: 15 MG/DL (ref 8–23)
CALCIUM SERPL-MCNC: 9.2 MG/DL (ref 8.7–10.5)
CHLORIDE SERPL-SCNC: 104 MMOL/L (ref 95–110)
CHOLEST SERPL-MCNC: 129 MG/DL (ref 120–199)
CHOLEST/HDLC SERPL: 2.5 {RATIO} (ref 2–5)
CO2 SERPL-SCNC: 27 MMOL/L (ref 23–29)
CREAT SERPL-MCNC: 1.4 MG/DL (ref 0.5–1.4)
EST. GFR  (NO RACE VARIABLE): 40.2 ML/MIN/1.73 M^2
ESTIMATED AVG GLUCOSE: 117 MG/DL (ref 68–131)
GLUCOSE SERPL-MCNC: 116 MG/DL (ref 70–110)
HBA1C MFR BLD: 5.7 % (ref 4–5.6)
HDLC SERPL-MCNC: 51 MG/DL (ref 40–75)
HDLC SERPL: 39.5 % (ref 20–50)
LDLC SERPL CALC-MCNC: 56 MG/DL (ref 63–159)
NONHDLC SERPL-MCNC: 78 MG/DL
POTASSIUM SERPL-SCNC: 4.1 MMOL/L (ref 3.5–5.1)
PROT SERPL-MCNC: 7.7 G/DL (ref 6–8.4)
SODIUM SERPL-SCNC: 141 MMOL/L (ref 136–145)
TRIGL SERPL-MCNC: 110 MG/DL (ref 30–150)

## 2023-05-15 PROCEDURE — 83036 HEMOGLOBIN GLYCOSYLATED A1C: CPT | Performed by: NURSE PRACTITIONER

## 2023-05-15 PROCEDURE — 80061 LIPID PANEL: CPT | Performed by: NURSE PRACTITIONER

## 2023-05-15 PROCEDURE — 84443 ASSAY THYROID STIM HORMONE: CPT | Performed by: NURSE PRACTITIONER

## 2023-05-15 PROCEDURE — 80053 COMPREHEN METABOLIC PANEL: CPT | Performed by: NURSE PRACTITIONER

## 2023-05-15 PROCEDURE — 36415 COLL VENOUS BLD VENIPUNCTURE: CPT | Mod: PO | Performed by: NURSE PRACTITIONER

## 2023-05-16 LAB — TSH SERPL DL<=0.005 MIU/L-ACNC: 1.59 UIU/ML (ref 0.4–4)

## 2023-05-30 ENCOUNTER — OFFICE VISIT (OUTPATIENT)
Dept: INTERNAL MEDICINE | Facility: CLINIC | Age: 73
End: 2023-05-30
Payer: MEDICARE

## 2023-05-30 VITALS
TEMPERATURE: 99 F | WEIGHT: 270.5 LBS | DIASTOLIC BLOOD PRESSURE: 84 MMHG | BODY MASS INDEX: 49.78 KG/M2 | SYSTOLIC BLOOD PRESSURE: 134 MMHG | HEIGHT: 62 IN | HEART RATE: 91 BPM | OXYGEN SATURATION: 92 %

## 2023-05-30 DIAGNOSIS — E66.01 CLASS 3 SEVERE OBESITY DUE TO EXCESS CALORIES WITH SERIOUS COMORBIDITY AND BODY MASS INDEX (BMI) OF 40.0 TO 44.9 IN ADULT: ICD-10-CM

## 2023-05-30 DIAGNOSIS — N25.81 SECONDARY HYPERPARATHYROIDISM OF RENAL ORIGIN: ICD-10-CM

## 2023-05-30 DIAGNOSIS — E87.6 HYPOKALEMIA: ICD-10-CM

## 2023-05-30 DIAGNOSIS — M05.79 RHEUMATOID ARTHRITIS INVOLVING MULTIPLE SITES WITH POSITIVE RHEUMATOID FACTOR: ICD-10-CM

## 2023-05-30 DIAGNOSIS — I70.0 AORTIC ATHEROSCLEROSIS: ICD-10-CM

## 2023-05-30 DIAGNOSIS — E11.8 DIABETES MELLITUS TYPE 2 WITH COMPLICATIONS: Primary | ICD-10-CM

## 2023-05-30 DIAGNOSIS — K21.9 GASTROESOPHAGEAL REFLUX DISEASE WITHOUT ESOPHAGITIS: ICD-10-CM

## 2023-05-30 DIAGNOSIS — R09.02 NOCTURNAL HYPOXEMIA DUE TO ASTHMA: ICD-10-CM

## 2023-05-30 DIAGNOSIS — N18.31 STAGE 3A CHRONIC KIDNEY DISEASE: ICD-10-CM

## 2023-05-30 DIAGNOSIS — J45.30 MILD PERSISTENT ASTHMA WITHOUT COMPLICATION: ICD-10-CM

## 2023-05-30 DIAGNOSIS — T78.3XXD ANGIOEDEMA, SUBSEQUENT ENCOUNTER: ICD-10-CM

## 2023-05-30 DIAGNOSIS — M81.0 AGE-RELATED OSTEOPOROSIS WITHOUT CURRENT PATHOLOGICAL FRACTURE: ICD-10-CM

## 2023-05-30 DIAGNOSIS — M54.50 CHRONIC MIDLINE LOW BACK PAIN WITHOUT SCIATICA: ICD-10-CM

## 2023-05-30 DIAGNOSIS — E11.69 HYPERLIPIDEMIA ASSOCIATED WITH TYPE 2 DIABETES MELLITUS: ICD-10-CM

## 2023-05-30 DIAGNOSIS — Z79.60 LONG-TERM USE OF IMMUNOSUPPRESSANT MEDICATION: ICD-10-CM

## 2023-05-30 DIAGNOSIS — E78.5 HYPERLIPIDEMIA ASSOCIATED WITH TYPE 2 DIABETES MELLITUS: ICD-10-CM

## 2023-05-30 DIAGNOSIS — E55.9 VITAMIN D DEFICIENCY DISEASE: ICD-10-CM

## 2023-05-30 DIAGNOSIS — I47.29 NONSUSTAINED VENTRICULAR TACHYCARDIA: ICD-10-CM

## 2023-05-30 DIAGNOSIS — J45.909 NOCTURNAL HYPOXEMIA DUE TO ASTHMA: ICD-10-CM

## 2023-05-30 DIAGNOSIS — G89.29 CHRONIC MIDLINE LOW BACK PAIN WITHOUT SCIATICA: ICD-10-CM

## 2023-05-30 PROCEDURE — 99999 PR PBB SHADOW E&M-EST. PATIENT-LVL IV: ICD-10-PCS | Mod: PBBFAC,,, | Performed by: PEDIATRICS

## 2023-05-30 PROCEDURE — 99214 PR OFFICE/OUTPT VISIT, EST, LEVL IV, 30-39 MIN: ICD-10-PCS | Mod: S$PBB,,, | Performed by: PEDIATRICS

## 2023-05-30 PROCEDURE — 99214 OFFICE O/P EST MOD 30 MIN: CPT | Mod: S$PBB,,, | Performed by: PEDIATRICS

## 2023-05-30 PROCEDURE — 99999 PR PBB SHADOW E&M-EST. PATIENT-LVL IV: CPT | Mod: PBBFAC,,, | Performed by: PEDIATRICS

## 2023-05-30 PROCEDURE — 99214 OFFICE O/P EST MOD 30 MIN: CPT | Mod: PBBFAC | Performed by: PEDIATRICS

## 2023-05-30 RX ORDER — TIRZEPATIDE 2.5 MG/.5ML
2.5 INJECTION, SOLUTION SUBCUTANEOUS
Qty: 4 PEN | Refills: 11 | Status: SHIPPED | OUTPATIENT
Start: 2023-05-30 | End: 2023-07-17 | Stop reason: CLARIF

## 2023-05-30 NOTE — PROGRESS NOTES
Subjective     Patient ID: Page Grissom is a 72 y.o. female.    Chief Complaint: Follow-up    Page Grissom is a 72 y.o. female who presents to the clinic for a follow up.       1) DM: no hyper/hypoglycemic symptoms. no self monitoring, currently on Metformin.  2) HTN: no BP checks, no HTNive symptoms, on apressoline and amlodipine  3) LIPIDS:  tolerating and compliant with crestor  4) ASTHMA/subglottis stenosis: stopped smoking . Asthma quiet no albuterol or advair currently. Has ENT follow up.   5) GERD: Quiet.   6) Rheum/osteoporosis: No current symptoms. Can't take NSAIDS due to CKD, on plaquenil  7) CKD: seeing renal  8)nonsustained Vtach: occurred in hospital, on no meds currently.      LABS REVIEWED: A1C, microalbumin/creatnine, TSH, lipid panel, CMP, and CBC    Review of Systems   Constitutional:  Negative for chills, diaphoresis, fatigue and fever.   Respiratory:  Negative for cough and shortness of breath.    Cardiovascular:  Negative for chest pain.   Gastrointestinal:  Negative for abdominal pain, anal bleeding, constipation, diarrhea, nausea and vomiting.   Endocrine: Negative for cold intolerance, heat intolerance, polydipsia, polyphagia and polyuria.   All other systems reviewed and are negative.       Objective     Physical Exam  Constitutional:       General: She is not in acute distress.     Appearance: Normal appearance. She is obese. She is not ill-appearing or toxic-appearing.   Cardiovascular:      Rate and Rhythm: Normal rate and regular rhythm.      Pulses: Normal pulses.      Heart sounds: Normal heart sounds. No murmur heard.    No friction rub.   Pulmonary:      Effort: Pulmonary effort is normal.      Breath sounds: Normal breath sounds.      Comments: Lungs are clear, and occasional subglottis gasp  Musculoskeletal:      Comments: Pulses 2+, Foot hygiene was good, no ulcers, no onychomycosis, no tinea, monofilament intact, and trace edema         Neurological:       Mental Status: She is alert and oriented to person, place, and time.   Psychiatric:         Mood and Affect: Mood normal.         Behavior: Behavior normal.         Thought Content: Thought content normal.         Judgment: Judgment normal.          Assessment and Plan     1. Diabetes mellitus type 2 with complications  -     tirzepatide (MOUNJARO) 2.5 mg/0.5 mL PnIj; Inject 2.5 mg into the skin every 7 days.  Dispense: 4 pen; Refill: 11  -     Comprehensive Metabolic Panel; Future; Expected date: 05/30/2023  -     Lipid Panel; Future; Expected date: 05/30/2023  -     Hemoglobin A1C; Future; Expected date: 05/30/2023    2. Class 3 severe obesity due to excess calories with serious comorbidity and body mass index (BMI) of 40.0 to 44.9 in adult    3. Stage 3a chronic kidney disease    4. Aortic atherosclerosis    5. Angioedema, subsequent encounter    6. Age-related osteoporosis without current pathological fracture    7. Gastroesophageal reflux disease without esophagitis    8. Hyperlipidemia associated with type 2 diabetes mellitus    9. Hypokalemia    10. Long-term use of immunosuppressant medication    11. Mild persistent asthma without complication    12. Nocturnal hypoxemia due to asthma    13. Nonsustained ventricular tachycardia    14. Rheumatoid arthritis involving multiple sites with positive rheumatoid factor  Overview:  + , +       15. Secondary hyperparathyroidism of renal origin    16. Vitamin D deficiency disease    17. Chronic midline low back pain without sciatica  -     Ambulatory referral/consult to Physical/Occupational Therapy; Future; Expected date: 06/06/2023      At goal for B/P, lipids, and A1c. Due to her weight GLP-1 therapy offered. GLP1 inhibitor and its RB/CI/SE discussed with Pt. Start Mounjaro, telemedicine in 4-6 weeks.   Maintain meds, self monitoring D&E, weight moderation. F/U 3 months with labs.           Follow up in about 3 months (around 8/30/2023).

## 2023-06-09 ENCOUNTER — TELEPHONE (OUTPATIENT)
Dept: INTERNAL MEDICINE | Facility: CLINIC | Age: 73
End: 2023-06-09
Payer: MEDICARE

## 2023-06-09 DIAGNOSIS — E11.8 DIABETES MELLITUS TYPE 2 WITH COMPLICATIONS: Primary | ICD-10-CM

## 2023-06-09 NOTE — TELEPHONE ENCOUNTER
----- Message from Marguerite Estevez sent at 6/9/2023 10:42 AM CDT -----  Contact: Page  Patient calling to speak with the nurse regarding medication tirzepatide (MOUNJARO) 2.5 mg/0.5 mL. Patient states medication is too expensive and wants to see if she can be prescribed something else. Please give patient a call at [phone].

## 2023-06-09 NOTE — TELEPHONE ENCOUNTER
----- Message from Marguerite Estevez sent at 6/9/2023 10:42 AM CDT -----  Contact: Page  Patient calling to speak with the nurse regarding medication tirzepatide (MOUNJARO) 2.5 mg/0.5 mL. Patient states medication is too expensive and wants to see if she can be prescribed something else. Please give patient a call at [phone].

## 2023-07-03 ENCOUNTER — LAB VISIT (OUTPATIENT)
Dept: LAB | Facility: HOSPITAL | Age: 73
End: 2023-07-03
Attending: PHYSICIAN ASSISTANT
Payer: MEDICARE

## 2023-07-03 DIAGNOSIS — M05.79 RHEUMATOID ARTHRITIS INVOLVING MULTIPLE SITES WITH POSITIVE RHEUMATOID FACTOR: ICD-10-CM

## 2023-07-03 LAB
ALBUMIN SERPL BCP-MCNC: 3.2 G/DL (ref 3.5–5.2)
ALP SERPL-CCNC: 114 U/L (ref 55–135)
ALT SERPL W/O P-5'-P-CCNC: 11 U/L (ref 10–44)
ANION GAP SERPL CALC-SCNC: 11 MMOL/L (ref 8–16)
AST SERPL-CCNC: 16 U/L (ref 10–40)
BASOPHILS # BLD AUTO: 0.04 K/UL (ref 0–0.2)
BASOPHILS NFR BLD: 0.5 % (ref 0–1.9)
BILIRUB SERPL-MCNC: 0.3 MG/DL (ref 0.1–1)
BUN SERPL-MCNC: 19 MG/DL (ref 8–23)
CALCIUM SERPL-MCNC: 9.4 MG/DL (ref 8.7–10.5)
CHLORIDE SERPL-SCNC: 103 MMOL/L (ref 95–110)
CO2 SERPL-SCNC: 27 MMOL/L (ref 23–29)
CREAT SERPL-MCNC: 1.5 MG/DL (ref 0.5–1.4)
CRP SERPL-MCNC: 15.1 MG/L (ref 0–8.2)
DIFFERENTIAL METHOD: ABNORMAL
EOSINOPHIL # BLD AUTO: 0.3 K/UL (ref 0–0.5)
EOSINOPHIL NFR BLD: 3.1 % (ref 0–8)
ERYTHROCYTE [DISTWIDTH] IN BLOOD BY AUTOMATED COUNT: 17.2 % (ref 11.5–14.5)
ERYTHROCYTE [SEDIMENTATION RATE] IN BLOOD BY WESTERGREN METHOD: 54 MM/HR (ref 0–20)
EST. GFR  (NO RACE VARIABLE): 37 ML/MIN/1.73 M^2
GLUCOSE SERPL-MCNC: 133 MG/DL (ref 70–110)
HCT VFR BLD AUTO: 36.4 % (ref 37–48.5)
HGB BLD-MCNC: 10.5 G/DL (ref 12–16)
IMM GRANULOCYTES # BLD AUTO: 0.03 K/UL (ref 0–0.04)
IMM GRANULOCYTES NFR BLD AUTO: 0.4 % (ref 0–0.5)
LYMPHOCYTES # BLD AUTO: 2.4 K/UL (ref 1–4.8)
LYMPHOCYTES NFR BLD: 29.8 % (ref 18–48)
MCH RBC QN AUTO: 22.8 PG (ref 27–31)
MCHC RBC AUTO-ENTMCNC: 28.8 G/DL (ref 32–36)
MCV RBC AUTO: 79 FL (ref 82–98)
MONOCYTES # BLD AUTO: 0.7 K/UL (ref 0.3–1)
MONOCYTES NFR BLD: 8.4 % (ref 4–15)
NEUTROPHILS # BLD AUTO: 4.7 K/UL (ref 1.8–7.7)
NEUTROPHILS NFR BLD: 57.8 % (ref 38–73)
NRBC BLD-RTO: 0 /100 WBC
PLATELET # BLD AUTO: 292 K/UL (ref 150–450)
PMV BLD AUTO: 10.6 FL (ref 9.2–12.9)
POTASSIUM SERPL-SCNC: 4.2 MMOL/L (ref 3.5–5.1)
PROT SERPL-MCNC: 7.9 G/DL (ref 6–8.4)
RBC # BLD AUTO: 4.6 M/UL (ref 4–5.4)
SODIUM SERPL-SCNC: 141 MMOL/L (ref 136–145)
WBC # BLD AUTO: 8.09 K/UL (ref 3.9–12.7)

## 2023-07-03 PROCEDURE — 85651 RBC SED RATE NONAUTOMATED: CPT | Performed by: PHYSICIAN ASSISTANT

## 2023-07-03 PROCEDURE — 85025 COMPLETE CBC W/AUTO DIFF WBC: CPT | Performed by: PHYSICIAN ASSISTANT

## 2023-07-03 PROCEDURE — 86140 C-REACTIVE PROTEIN: CPT | Performed by: PHYSICIAN ASSISTANT

## 2023-07-03 PROCEDURE — 80053 COMPREHEN METABOLIC PANEL: CPT | Performed by: PHYSICIAN ASSISTANT

## 2023-07-03 PROCEDURE — 36415 COLL VENOUS BLD VENIPUNCTURE: CPT | Mod: PO | Performed by: PHYSICIAN ASSISTANT

## 2023-07-11 ENCOUNTER — OFFICE VISIT (OUTPATIENT)
Dept: RHEUMATOLOGY | Facility: CLINIC | Age: 73
End: 2023-07-11
Payer: MEDICARE

## 2023-07-11 VITALS
HEART RATE: 68 BPM | SYSTOLIC BLOOD PRESSURE: 128 MMHG | HEIGHT: 62 IN | DIASTOLIC BLOOD PRESSURE: 68 MMHG | WEIGHT: 273.81 LBS | BODY MASS INDEX: 50.39 KG/M2

## 2023-07-11 DIAGNOSIS — M05.79 RHEUMATOID ARTHRITIS INVOLVING MULTIPLE SITES WITH POSITIVE RHEUMATOID FACTOR: ICD-10-CM

## 2023-07-11 PROCEDURE — 99999 PR PBB SHADOW E&M-EST. PATIENT-LVL IV: ICD-10-PCS | Mod: PBBFAC,,, | Performed by: PHYSICIAN ASSISTANT

## 2023-07-11 PROCEDURE — 99999 PR PBB SHADOW E&M-EST. PATIENT-LVL IV: CPT | Mod: PBBFAC,,, | Performed by: PHYSICIAN ASSISTANT

## 2023-07-11 PROCEDURE — 99214 PR OFFICE/OUTPT VISIT, EST, LEVL IV, 30-39 MIN: ICD-10-PCS | Mod: S$PBB,,, | Performed by: PHYSICIAN ASSISTANT

## 2023-07-11 PROCEDURE — 99214 OFFICE O/P EST MOD 30 MIN: CPT | Mod: S$PBB,,, | Performed by: PHYSICIAN ASSISTANT

## 2023-07-11 PROCEDURE — 99214 OFFICE O/P EST MOD 30 MIN: CPT | Mod: PBBFAC | Performed by: PHYSICIAN ASSISTANT

## 2023-07-11 RX ORDER — HYDROXYCHLOROQUINE SULFATE 200 MG/1
200 TABLET, FILM COATED ORAL DAILY
Qty: 90 TABLET | Refills: 1 | Status: SHIPPED | OUTPATIENT
Start: 2023-07-11 | End: 2024-01-03

## 2023-07-11 ASSESSMENT — ROUTINE ASSESSMENT OF PATIENT INDEX DATA (RAPID3): MDHAQ FUNCTION SCORE: 0.2

## 2023-07-11 NOTE — PATIENT INSTRUCTIONS
Please schedule appt to follow up w the lung doctor regarding your breathing and the oxygen that has been ordered for night time use (Dr. Laney Romero)    Follow up Dr Baltazar in October - he plans to repeat CT scan.  Contact his office to schedule

## 2023-07-11 NOTE — PROGRESS NOTES
Subjective:      Patient ID: Page Grissom is a 72 y.o. female.    Chief Complaint: Disease Management and Rheumatoid Arthritis        HPI   Page Grissom  is a 72 y.o. female  Who is here today to follow-up on seropositive RA and osteoporosis.  From a RA perspective she is doing quite well.  Pain 0/10.  No joint pain/effusion/prolonged am stiffness.  She currently is on Plaquenil 200 mg daily.  Previously she has been on methotrexate in the past but was able to wean down and off several years ago.        No c/o shortness of breath here in the office today.  Pulmonology  started her on supplemental oxygen at night, which uses sometimes.  She sees Dr. Romero in pulmonology.  Over due for f/u by about 5 mos.    I have done some additional workup with continued elevations of ESR and CRP.  Patient denies headaches, visual disturbances, jaw claudication, hip and shoulder pain.  RA is controlled.  SPEP and IFV in the past has been unremarkable.  Had CT chest abdomen and pelvis which was negative for hilar adenopathy.  No evidence of ILD.  Was significant for an adrenal mass.  I referred her to Dr. Baltazar who has done further workup on the adrenal nodule.  She has follow-up later this month with him.    Also with a history of osteoporosis.  In 2017 she was placed on Fosamax.  However she had problems remembering taking it.  At that time she did not want add more oral medications to her regimen.  She moved to IV Reclast 10/09/2018.  She had some mild arthralgias which improved within about 2 days.  No falls or fracture since last visit.  She is on vitamin-D supplementation over-the-counter.  Repeat DEXA scan in 10/10/2019 showed improvement.  At that time Reclast was discontinued.  She had another bone density scan done 2022.  It showed progression of osteoporosis.  The decision was made to move her back to Reclast.  She had 1 dose on 03/04/2022.  States it did not go well.  She has a very hard stick and  had a stick her 3 or 4 times.  Additionally she just overall felt crummy for about 3 or 4 days following the infusion.  Reclast was DC'ed.  We had discussed possibility of Prolia in future.    She has CKD.  She prefers to use ibuprofen as Tylenol arthritis not work for her.  She has been counseled in the past to avoid NSAIDs.    Patient denies fevers, chills, photosensitivity, eye pain, chest pain, hematuria, blood in the stool, rash, sicca symptoms, raynauds, finger ulcerations.  Rheumatologic systems otherwise negative.    MHAQ: 0.4  Serologies/Labs:  (+) RF, CCP  Neg NAYELY  Current Treatment:  Plaquenil 200mg daily  Reclast 3/4/22  Previous Treatment:   Fosamax - compliance issues  MTX - weaned down and off x 2 yrs      Current Outpatient Medications:     acetaminophen (TYLENOL) 500 MG tablet, Take 500 mg by mouth as needed for Pain., Disp: , Rfl:     albuterol (PROVENTIL/VENTOLIN HFA) 90 mcg/actuation inhaler, INHALE 2 PUFFS INTO THE LINGS EVERY 6 HOURS AS NEEDED WHEEZING, Disp: 25.5 g, Rfl: 3    amLODIPine (NORVASC) 10 MG tablet, Take 1 tablet (10 mg total) by mouth once daily., Disp: 90 tablet, Rfl: 3    blood sugar diagnostic Strp, To check BG 1 times daily, to use with insurance preferred meter, Disp: 100 strip, Rfl: 11    blood-glucose meter kit, To check BG 1 times daily, to use with insurance preferred meter, Disp: 1 each, Rfl: 0    cetirizine (ZYRTEC) 10 MG tablet, Take 1 tablet (10 mg total) by mouth once daily., Disp: 90 tablet, Rfl: 3    cholecalciferol, vitamin D3, 2,000 unit Tab, Take 2,000 Units by mouth once daily., Disp: , Rfl:     famotidine (PEPCID) 40 MG tablet, Take 1 tablet (40 mg total) by mouth once daily., Disp: 90 tablet, Rfl: 3    fish oil-omega-3 fatty acids 300-1,000 mg capsule, Take 2 g by mouth once daily., Disp: , Rfl:     fluticasone furoate-vilanteroL (BREO ELLIPTA) 100-25 mcg/dose diskus inhaler, Inhale 1 puff into the lungs once daily. Controller, Disp: 60 each, Rfl: 6     hydrALAZINE (APRESOLINE) 50 MG tablet, TAKE 1 TABLET(50 MG) BY MOUTH THREE TIMES DAILY, Disp: 90 tablet, Rfl: 2    hydrOXYchloroQUINE (PLAQUENIL) 200 mg tablet, TAKE 1 TABLET(200 MG) BY MOUTH EVERY DAY, Disp: 90 tablet, Rfl: 1    lancets Misc, To check BG 1 times daily, to use with insurance preferred meter, Disp: 100 each, Rfl: 11    metFORMIN (GLUCOPHAGE) 500 MG tablet, Take 1 tablet (500 mg total) by mouth once daily., Disp: 90 tablet, Rfl: 3    multivitamin (THERAGRAN) per tablet, Take 1 tablet by mouth once daily., Disp: , Rfl:     pantoprazole (PROTONIX) 40 MG tablet, Take 1 tablet (40 mg total) by mouth once daily., Disp: 90 tablet, Rfl: 3    potassium chloride SA (K-DUR,KLOR-CON) 20 MEQ tablet, Take 1 tablet (20 mEq total) by mouth once daily., Disp: 90 tablet, Rfl: 3    rosuvastatin (CRESTOR) 40 MG Tab, TAKE 1 TABLET(40 MG) BY MOUTH EVERY EVENING, Disp: 90 tablet, Rfl: 3    tirzepatide (MOUNJARO) 2.5 mg/0.5 mL PnIj, Inject 2.5 mg into the skin every 7 days., Disp: 4 pen, Rfl: 11    EPINEPHrine (EPIPEN 2-DANA) 0.3 mg/0.3 mL AtIn, Inject 0.3 mLs (0.3 mg total) into the muscle once. for 1 dose (Patient not taking: Reported on 10/24/2022), Disp: 1 each, Rfl: 11    Past Medical History:   Diagnosis Date    Acidosis, metabolic, with respiratory acidosis 3/31/2022    Asthma     Back pain     Cataract     OD    CKD (chronic kidney disease) stage 3, GFR 30-59 ml/min 12/20/2020    Diabetes mellitus     Fibromyalgia     Gastroesophageal reflux disease     Hypercholesterolemia     Hypertension     Immune deficiency disorder     Obesity     Osteoporosis     Rheumatoid arthritis     Tobacco dependence     Trouble in sleeping     Type 2 diabetes mellitus      Family History   Problem Relation Age of Onset    Hypertension Mother     Cancer Father     Colon cancer Father     Breast cancer Sister      Social History     Socioeconomic History    Marital status:    Tobacco Use    Smoking status: Former     Packs/day:  "0.50     Years: 25.00     Pack years: 12.50     Types: Cigarettes     Quit date: 2022     Years since quittin.2    Smokeless tobacco: Never   Substance and Sexual Activity    Alcohol use: No    Drug use: No    Sexual activity: Not Currently   Social History Narrative    1 dog, no smokers in household; No HH as of 2022, has daytime sitter 2022.     Review of patient's allergies indicates:   Allergen Reactions    Ace inhibitors Swelling    Lisinopril Anaphylaxis     Angioedema requiring trach       Objective:   /68   Pulse 68   Ht 5' 2" (1.575 m)   Wt 124.2 kg (273 lb 13 oz)   BMI 50.08 kg/m²   Immunization History   Administered Date(s) Administered    COVID-19, MRNA, LN-S, PF (Pfizer) (Purple Cap) 2021, 2021    Influenza 10/10/2006, 2007, 10/08/2009, 12/15/2010, 2011, 10/16/2012    Influenza (FLUAD) - Quadrivalent - Adjuvanted - PF *Preferred* (65+) 12/10/2020, 2022    Influenza - High Dose - PF (65 years and older) 2017, 2018, 2019    Influenza - Quadrivalent 10/30/2014    Influenza - Trivalent (ADULT) 10/08/2013    Influenza Split 10/10/2006, 2007, 10/08/2009, 12/15/2010, 2011, 10/16/2012, 10/08/2013    PPD Test 2010, 2010    Pneumococcal Conjugate - 13 Valent 2015    Pneumococcal Polysaccharide - 23 Valent 2007, 2018    Tdap 10/16/2012    Zoster 2015       Physical Exam   Constitutional: She is oriented to person, place, and time. No distress.   HENT:   Head: Normocephalic and atraumatic.   Pulmonary/Chest: Effort normal.   Abdominal: She exhibits no distension.   Musculoskeletal:         General: No swelling or tenderness. Normal range of motion.      Cervical back: Normal range of motion.      Right knee: No effusion.      Left knee: No effusion.   Lymphadenopathy:     She has no cervical adenopathy.   Neurological: She is alert and oriented to person, place, and time.   Skin: Skin is warm " and dry. No rash noted. No pallor.   Psychiatric: Mood normal.   Nursing note and vitals reviewed.      Right Side Rheumatological Exam     Examination finds the 1st PIP, 1st MCP, 2nd PIP, 2nd MCP, 3rd PIP, 3rd MCP, 4th PIP, 4th MCP, 5th PIP and 5th MCP normal.    Shoulder Exam   Tenderness Location: no tenderness    Range of Motion   The patient has normal right shoulder ROM.    Knee Exam   Patellofemoral Crepitus: negative  Effusion: negative  Warmth: negative    Elbow/Wrist Exam   Tenderness Location: no elbow tenderness and no wrist tenderness    Range of Motion   Elbow   The patient has normal right elbow ROM.    Range of Motion   Wrist   The patient has normal right wrist ROM.    Left Side Rheumatological Exam     Examination finds the 1st PIP, 1st MCP, 2nd PIP, 2nd MCP, 3rd PIP, 3rd MCP, 4th PIP, 4th MCP, 5th PIP and 5th MCP normal.    Shoulder Exam   Tenderness Location: no tenderness    Range of Motion   The patient has normal left shoulder ROM.    Knee Exam     Patellofemoral Crepitus: negative  Effusion: negative  Warmth: negative    Elbow/Wrist Exam   Tenderness Location: no elbow tenderness and no wrist tenderness    Range of Motion   Elbow   The patient has normal left elbow ROM.    Range of Motion   Wrist   The patient has normal left wrist ROM.       no active synovitis   100% full fist formation bilaterally   negative squeeze test        Recent Results (from the past 672 hour(s))   CBC Auto Differential    Collection Time: 07/03/23  9:58 AM   Result Value Ref Range    WBC 8.09 3.90 - 12.70 K/uL    RBC 4.60 4.00 - 5.40 M/uL    Hemoglobin 10.5 (L) 12.0 - 16.0 g/dL    Hematocrit 36.4 (L) 37.0 - 48.5 %    MCV 79 (L) 82 - 98 fL    MCH 22.8 (L) 27.0 - 31.0 pg    MCHC 28.8 (L) 32.0 - 36.0 g/dL    RDW 17.2 (H) 11.5 - 14.5 %    Platelets 292 150 - 450 K/uL    MPV 10.6 9.2 - 12.9 fL    Immature Granulocytes 0.4 0.0 - 0.5 %    Gran # (ANC) 4.7 1.8 - 7.7 K/uL    Immature Grans (Abs) 0.03 0.00 - 0.04 K/uL     Lymph # 2.4 1.0 - 4.8 K/uL    Mono # 0.7 0.3 - 1.0 K/uL    Eos # 0.3 0.0 - 0.5 K/uL    Baso # 0.04 0.00 - 0.20 K/uL    nRBC 0 0 /100 WBC    Gran % 57.8 38.0 - 73.0 %    Lymph % 29.8 18.0 - 48.0 %    Mono % 8.4 4.0 - 15.0 %    Eosinophil % 3.1 0.0 - 8.0 %    Basophil % 0.5 0.0 - 1.9 %    Differential Method Automated    Comprehensive Metabolic Panel    Collection Time: 07/03/23  9:58 AM   Result Value Ref Range    Sodium 141 136 - 145 mmol/L    Potassium 4.2 3.5 - 5.1 mmol/L    Chloride 103 95 - 110 mmol/L    CO2 27 23 - 29 mmol/L    Glucose 133 (H) 70 - 110 mg/dL    BUN 19 8 - 23 mg/dL    Creatinine 1.5 (H) 0.5 - 1.4 mg/dL    Calcium 9.4 8.7 - 10.5 mg/dL    Total Protein 7.9 6.0 - 8.4 g/dL    Albumin 3.2 (L) 3.5 - 5.2 g/dL    Total Bilirubin 0.3 0.1 - 1.0 mg/dL    Alkaline Phosphatase 114 55 - 135 U/L    AST 16 10 - 40 U/L    ALT 11 10 - 44 U/L    eGFR 37 (A) >60 mL/min/1.73 m^2    Anion Gap 11 8 - 16 mmol/L   Sedimentation rate    Collection Time: 07/03/23  9:58 AM   Result Value Ref Range    Sed Rate 54 (H) 0 - 20 mm/Hr   C-Reactive Protein    Collection Time: 07/03/23  9:58 AM   Result Value Ref Range    CRP 15.1 (H) 0.0 - 8.2 mg/L         No results found for: TBGOLDPLUS   Lab Results   Component Value Date    HEPAIGM Negative 03/08/2010    HEPBIGM Negative 03/08/2010    HEPBCAB Negative 04/04/2022    HEPCAB Negative 10/23/2014        Imaging  I have personally reviewed images and report of the dexa from 2/21/22.  I agree with the interpretation.  FINDINGS:  The L1 to L4 vertebral bone mineral density is equal to 1.112 g/cm squared with a T score of -0.7.  There has been no significant change relative to the prior study.     The left femoral neck bone mineral density is equal to 0.680 g/cm squared with a T score of -2.6.  There has been  a -12.3% statistically significant change relative to the prior study.     Impression:  Osteoporosis    Assessment:     No diagnosis found.              Plan:     There are no  diagnoses linked to this encounter.          Seropositive rheumatoid arthritis   Increased Sed rate/crp  ESR at her baseline (she is typically 50-60)  SPEP and MACHELLE in past wnl  CT Chest/abd/pelvis  Adrenal mass - pt seeing Dr. Baltazar  Plan for f/u CT in 10/2023 per Dr. Baltazar  Sxs stable/controlled  continue Plaquenil 200 mg daily   Pt does not feel need to resume MTX  Continue Plaquenil monitoring with Ophthalmology - last visit 8/2022 - no maculopathy  F/u sooner if sxs worsen  SOB, now on supplemental O2 at home   Counseled pt to use O2  Over due for f/u w Dr. Romero - pt advised to schedule f/u  osteoporosis in the setting of CKD  Reclast dc'ed due to intolerance - last dose 3/2022  Recommend prolia   discussed risks and benefits  Literature provided to the pt  Pt defers prolia at this time  She understands she is at a higher fracture risk without treatment.  She will notify the office if she breaks any bones or changes her mind regarding Prolia treatment  DEXA 2/2024  Avoid NSAIDs d/t CKD  Drug therapy requiring intensive monitoring for toxicity  High Risk Medication Monitoring encounter  No current medication related issues, no evidence of toxicity  I ordered labs for toxicity monitoring, have personally reviewed the findings, and discussed them with the patient.  Pending labs will be sent via the portal  Compromised immune system secondary to autoimmune disease and/or use of immunosuppressive drugs.  Monitor carefully for infections.  Advised patient to get immediate medical care if any infection arises.  Also advised strict adherence age-appropriate vaccinations and cancer screenings with PCP.  Patient advised to hold DMARD and/or biologic therapy for signs of infection or for surgery. If you are unsure what to do please call our office for instruction.[facility] [phone]  Return to clinic: 3-4 mos w Reg 4 labs    The patient understands, chooses and consents to this plan and accepts  all the risks which include but are not limited to the risks mentioned above.     Disclaimer: This note was prepared using a voice recognition system and is likely to have sound alike errors within the text.

## 2023-07-11 NOTE — PROGRESS NOTES
"History & Physical    SUBJECTIVE:     History of Present Illness:  Patient is a 72 y.o. female with HTN, CKD, HLD, DM, COPD, and RANDEE here today for evaluation of tracheal stenosis. In march 2022 she was admitted for angioedema and underwent cricothyrotomy converted to tracheostomy. She has mild post tracheostomy tracheal stenosis, from which she has been asymptomatic. Patient offered bronchoscopy at previous appointment; however, she declined to undergo the procedure at that time. Discussed plan with patient to repeat a neck and chest CT in 6 months.       Interval History:   Comes to clinic with updated neck and chest CT today. She arrives with an audible stridor and appears short of breath but the patient reports that her  dyspnea and work of breathing is "the same" Denies chest pain. Denies situations with difficult air entry   Chief Complaint   Patient presents with    Follow-up       Review of patient's allergies indicates:   Allergen Reactions    Ace inhibitors Swelling    Lisinopril Anaphylaxis     Angioedema requiring trach       Current Outpatient Medications   Medication Sig Dispense Refill    acetaminophen (TYLENOL) 500 MG tablet Take 500 mg by mouth as needed for Pain.      albuterol (PROVENTIL/VENTOLIN HFA) 90 mcg/actuation inhaler INHALE 2 PUFFS INTO THE LINGS EVERY 6 HOURS AS NEEDED WHEEZING 25.5 g 3    amLODIPine (NORVASC) 10 MG tablet Take 1 tablet (10 mg total) by mouth once daily. 90 tablet 3    blood sugar diagnostic Strp To check BG 1 times daily, to use with insurance preferred meter 100 strip 11    blood-glucose meter kit To check BG 1 times daily, to use with insurance preferred meter 1 each 0    cetirizine (ZYRTEC) 10 MG tablet Take 1 tablet (10 mg total) by mouth once daily. 90 tablet 3    cholecalciferol, vitamin D3, 2,000 unit Tab Take 2,000 Units by mouth once daily.      famotidine (PEPCID) 40 MG tablet Take 1 tablet (40 mg total) by mouth once daily. 90 tablet 3    fish oil-omega-3 " fatty acids 300-1,000 mg capsule Take 2 g by mouth once daily.      fluticasone furoate-vilanteroL (BREO ELLIPTA) 100-25 mcg/dose diskus inhaler Inhale 1 puff into the lungs once daily. Controller 60 each 6    hydrALAZINE (APRESOLINE) 50 MG tablet TAKE 1 TABLET(50 MG) BY MOUTH THREE TIMES DAILY 90 tablet 2    hydrOXYchloroQUINE (PLAQUENIL) 200 mg tablet Take 1 tablet (200 mg total) by mouth once daily. 90 tablet 1    lancets Misc To check BG 1 times daily, to use with insurance preferred meter 100 each 11    metFORMIN (GLUCOPHAGE) 500 MG tablet Take 1 tablet (500 mg total) by mouth once daily. 90 tablet 3    multivitamin (THERAGRAN) per tablet Take 1 tablet by mouth once daily.      pantoprazole (PROTONIX) 40 MG tablet Take 1 tablet (40 mg total) by mouth once daily. 90 tablet 3    potassium chloride SA (K-DUR,KLOR-CON) 20 MEQ tablet Take 1 tablet (20 mEq total) by mouth once daily. 90 tablet 3    rosuvastatin (CRESTOR) 40 MG Tab TAKE 1 TABLET(40 MG) BY MOUTH EVERY EVENING 90 tablet 3    tirzepatide (MOUNJARO) 2.5 mg/0.5 mL PnIj Inject 2.5 mg into the skin every 7 days. 4 pen 11    EPINEPHrine (EPIPEN 2-DANA) 0.3 mg/0.3 mL AtIn Inject 0.3 mLs (0.3 mg total) into the muscle once. for 1 dose (Patient not taking: Reported on 10/24/2022) 1 each 11     No current facility-administered medications for this visit.       Past Medical History:   Diagnosis Date    Acidosis, metabolic, with respiratory acidosis 3/31/2022    Asthma     Back pain     Cataract     OD    CKD (chronic kidney disease) stage 3, GFR 30-59 ml/min 12/20/2020    Diabetes mellitus     Fibromyalgia     Gastroesophageal reflux disease     Hypercholesterolemia     Hypertension     Immune deficiency disorder     Obesity     Osteoporosis     Rheumatoid arthritis     Tobacco dependence     Trouble in sleeping     Type 2 diabetes mellitus      Past Surgical History:   Procedure Laterality Date    BRONCHOSCOPY N/A 3/30/2022    Procedure: Bronchoscopy;  Surgeon:  "Isak Yadav MD;  Location: [facility] OR;  Service: General;  Laterality: N/A;    COLONOSCOPY N/A 2019    Procedure: COLONOSCOPY;  Surgeon: Yury Atwood MD;  Location: [facility] ENDO;  Service: Endoscopy;  Laterality: N/A;    HERNIA REPAIR      OOPHORECTOMY      TRACHEOSTOMY N/A 3/30/2022    Procedure: CREATION, TRACHEOSTOMY;  Surgeon: Isak Yadav MD;  Location: [facility] OR;  Service: General;  Laterality: N/A;     Family History   Problem Relation Age of Onset    Hypertension Mother     Cancer Father     Colon cancer Father     Breast cancer Sister      Social History     Tobacco Use    Smoking status: Former     Packs/day: 0.50     Years: 25.00     Pack years: 12.50     Types: Cigarettes     Quit date: 2022     Years since quittin.2    Smokeless tobacco: Never   Substance Use Topics    Alcohol use: No    Drug use: No        Review of Systems:  Review of Systems   Respiratory:  Positive for chest tightness, shortness of breath and stridor.    Cardiovascular:  Negative for chest pain and palpitations.   Gastrointestinal:  Negative for abdominal distention.   Genitourinary: Negative.    Skin:  Negative for color change and rash.   Neurological:  Negative for dizziness and syncope.   Psychiatric/Behavioral:  Negative for agitation. The patient is not nervous/anxious.      OBJECTIVE:     Vital Signs (Most Recent)  Vitals:    23 1039   BP: 139/68   Pulse: 80   SpO2: 97%   Weight: 124.6 kg (274 lb 11.1 oz)   Height: 5' 2" (1.575 m)   PainSc: 0-No pain         Physical Exam:  Physical Exam  Constitutional:       Appearance: Normal appearance. She is obese.   HENT:      Head: Atraumatic.   Eyes:      Extraocular Movements: Extraocular movements intact.   Cardiovascular:      Rate and Rhythm: Normal rate and regular rhythm.   Pulmonary:      Breath sounds: Stridor present.      Comments: Mild increased work of breathing after ambulating. Mild stridor   Abdominal:      Palpations: Abdomen is soft. "   Musculoskeletal:         General: Normal range of motion.      Cervical back: Normal range of motion.   Skin:     General: Skin is warm and dry.   Neurological:      General: No focal deficit present.      Mental Status: She is alert and oriented to person, place, and time.   Psychiatric:         Mood and Affect: Mood normal.         Behavior: Behavior normal.       Diagnostic Results:    CAP CT 1/9/23:  1. No evidence of acute cardiopulmonary process.  No evidence of acute abdominopelvic process.  2. Bilateral thyroid nodules, largest measuring up to 1.4 cm.  3. 4 mm right upper lobe pulmonary nodule.  Consider follow-up per Fleischner criteria.  4. Indeterminate sub 3 cm left adrenal nodule.  Consider abdominal MRI for definitive characterization.  5. Complex ventral abdominal wall hernia containing mesenteric fat.    CT neck/chest 07/13/23:    There has been interval development of severe focal stenosis of the trachea at the level of the thyroid gland which itself is mildly enlarged.  This is better visualized on the chest CT of the same day where the tracheal lumen measures approximately 9 x 4 mm in transverse dimensions at its level of maximal stenosis.  Degenerative changes flatten/impinge the traversing cord most notable at C5-6.        ASSESSMENT/PLAN:     Patient is a 72 y.o. female with HTN, CKD, HLD, DM, COPD, and RANDEE here today for evaluation of tracheal stenosis following tracheostomy.     PLAN:Plan     Interval increase in focal segment of upper tracheal narrowing. Offered Bronchoscopy with balloon dilation as soon as next week however she would like procedure to be completed in August. We will schedule for flexible bronchoscopy with balloon dilation possible Trufreeze and possible kenalog possible rigid bronchoscopy. Will need consents morning of procedure. She knows to call our office in the interim if her breathing worsens and she would like procedure scheduled sooner. Likely start with LMA.

## 2023-07-13 ENCOUNTER — TELEPHONE (OUTPATIENT)
Dept: INTERNAL MEDICINE | Facility: CLINIC | Age: 73
End: 2023-07-13
Payer: MEDICARE

## 2023-07-13 ENCOUNTER — OFFICE VISIT (OUTPATIENT)
Dept: CARDIOTHORACIC SURGERY | Facility: CLINIC | Age: 73
End: 2023-07-13
Payer: MEDICARE

## 2023-07-13 ENCOUNTER — HOSPITAL ENCOUNTER (OUTPATIENT)
Dept: RADIOLOGY | Facility: HOSPITAL | Age: 73
Discharge: HOME | End: 2023-07-13
Attending: STUDENT IN AN ORGANIZED HEALTH CARE EDUCATION/TRAINING PROGRAM
Payer: MEDICARE

## 2023-07-13 VITALS
WEIGHT: 274.69 LBS | BODY MASS INDEX: 50.55 KG/M2 | SYSTOLIC BLOOD PRESSURE: 139 MMHG | OXYGEN SATURATION: 97 % | HEART RATE: 80 BPM | HEIGHT: 62 IN | DIASTOLIC BLOOD PRESSURE: 68 MMHG

## 2023-07-13 DIAGNOSIS — J39.8 TRACHEAL STENOSIS: Primary | ICD-10-CM

## 2023-07-13 DIAGNOSIS — J39.8 TRACHEAL STENOSIS: ICD-10-CM

## 2023-07-13 PROCEDURE — 99214 PR OFFICE/OUTPT VISIT, EST, LEVL IV, 30-39 MIN: ICD-10-PCS | Mod: 57,S$PBB,, | Performed by: STUDENT IN AN ORGANIZED HEALTH CARE EDUCATION/TRAINING PROGRAM

## 2023-07-13 PROCEDURE — 70490 CT SOFT TISSUE NECK WITHOUT CONTRAST: ICD-10-PCS | Mod: 26,,, | Performed by: RADIOLOGY

## 2023-07-13 PROCEDURE — 99214 OFFICE O/P EST MOD 30 MIN: CPT | Mod: PBBFAC,25 | Performed by: STUDENT IN AN ORGANIZED HEALTH CARE EDUCATION/TRAINING PROGRAM

## 2023-07-13 PROCEDURE — 71250 CT THORAX DX C-: CPT | Mod: TC

## 2023-07-13 PROCEDURE — 70490 CT SOFT TISSUE NECK W/O DYE: CPT | Mod: 26,,, | Performed by: RADIOLOGY

## 2023-07-13 PROCEDURE — 99999 PR PBB SHADOW E&M-EST. PATIENT-LVL IV: CPT | Mod: PBBFAC,,, | Performed by: STUDENT IN AN ORGANIZED HEALTH CARE EDUCATION/TRAINING PROGRAM

## 2023-07-13 PROCEDURE — 99214 OFFICE O/P EST MOD 30 MIN: CPT | Mod: 57,S$PBB,, | Performed by: STUDENT IN AN ORGANIZED HEALTH CARE EDUCATION/TRAINING PROGRAM

## 2023-07-13 PROCEDURE — 70490 CT SOFT TISSUE NECK W/O DYE: CPT | Mod: TC

## 2023-07-13 PROCEDURE — 99999 PR PBB SHADOW E&M-EST. PATIENT-LVL IV: ICD-10-PCS | Mod: PBBFAC,,, | Performed by: STUDENT IN AN ORGANIZED HEALTH CARE EDUCATION/TRAINING PROGRAM

## 2023-07-13 NOTE — TELEPHONE ENCOUNTER
SW pt's daughter regarding swelling and she states pt has swelling in both feet. She has tried compression hose and she elevates legs as well, but nothing is working. She states it is not everyday but is constant and wants to know if a fluid pill can be sent in to the pharmacy for her. I advised her that I will send the message to Dr. Roman and return call with response.

## 2023-07-14 RX ORDER — FUROSEMIDE 20 MG/1
20 TABLET ORAL DAILY PRN
Qty: 90 TABLET | Refills: 3 | Status: SHIPPED | OUTPATIENT
Start: 2023-07-14 | End: 2024-07-13

## 2023-07-14 NOTE — TELEPHONE ENCOUNTER
Returned call to pt to advise her that medication has been sent in to the pharmacy. She verbalized understanding.

## 2023-07-17 ENCOUNTER — TELEPHONE (OUTPATIENT)
Dept: INTERNAL MEDICINE | Facility: CLINIC | Age: 73
End: 2023-07-17
Payer: MEDICARE

## 2023-07-17 DIAGNOSIS — E11.8 DIABETES MELLITUS TYPE 2 WITH COMPLICATIONS: Primary | ICD-10-CM

## 2023-07-17 DIAGNOSIS — I10 PRIMARY HYPERTENSION: ICD-10-CM

## 2023-07-17 RX ORDER — DULAGLUTIDE 0.75 MG/.5ML
0.75 INJECTION, SOLUTION SUBCUTANEOUS
Qty: 4 PEN | Refills: 11 | Status: SHIPPED | OUTPATIENT
Start: 2023-07-17 | End: 2024-01-11

## 2023-07-17 RX ORDER — HYDRALAZINE HYDROCHLORIDE 50 MG/1
TABLET, FILM COATED ORAL
Qty: 90 TABLET | Refills: 2 | Status: SHIPPED | OUTPATIENT
Start: 2023-07-17 | End: 2023-10-16

## 2023-07-18 DIAGNOSIS — J39.8 TRACHEAL STENOSIS: Primary | ICD-10-CM

## 2023-07-19 NOTE — TELEPHONE ENCOUNTER
Returned call to pt regarding trulicity being sent in due to mounjaro not being covered under insurance formulary. Pt verbalized understanding and states medication has been picked up and daughter will show her how to administer. F/U appt scheduled. She verbalized understanding of appt date and time.

## 2023-07-31 DIAGNOSIS — E78.5 HYPERLIPIDEMIA ASSOCIATED WITH TYPE 2 DIABETES MELLITUS: ICD-10-CM

## 2023-07-31 DIAGNOSIS — E11.8 TYPE 2 DIABETES MELLITUS WITH COMPLICATION, WITHOUT LONG-TERM CURRENT USE OF INSULIN: ICD-10-CM

## 2023-07-31 DIAGNOSIS — E11.69 HYPERLIPIDEMIA ASSOCIATED WITH TYPE 2 DIABETES MELLITUS: ICD-10-CM

## 2023-07-31 RX ORDER — METFORMIN HYDROCHLORIDE 500 MG/1
500 TABLET ORAL DAILY
Qty: 90 TABLET | Refills: 1 | Status: SHIPPED | OUTPATIENT
Start: 2023-07-31 | End: 2024-01-11 | Stop reason: SDUPTHER

## 2023-07-31 RX ORDER — POTASSIUM CHLORIDE 20 MEQ/1
20 TABLET, EXTENDED RELEASE ORAL DAILY
Qty: 90 TABLET | Refills: 3 | Status: SHIPPED | OUTPATIENT
Start: 2023-07-31 | End: 2024-01-11 | Stop reason: SDUPTHER

## 2023-07-31 RX ORDER — ROSUVASTATIN CALCIUM 40 MG/1
40 TABLET, COATED ORAL NIGHTLY
Qty: 90 TABLET | Refills: 3 | Status: SHIPPED | OUTPATIENT
Start: 2023-07-31 | End: 2024-01-11 | Stop reason: SDUPTHER

## 2023-07-31 NOTE — TELEPHONE ENCOUNTER
Refill Decision Note   Page Grissom  is requesting a refill authorization.  Brief Assessment and Rationale for Refill:  Approve     Medication Therapy Plan:       Medication Reconciliation Completed: No   Comments:     No Care Gaps recommended.     Note composed:1:12 PM 07/31/2023

## 2023-07-31 NOTE — TELEPHONE ENCOUNTER
Refill Routing Note   Medication(s) are not appropriate for processing by [facility] for the following reason(s):      Required labs abnormal : Metformin (eGFR, Cr); K-Dur (Cr)    [facility] action(s):  Defer  Approve Care Due:  None identified              Appointments  past 12m or future 3m with PCP    Date Provider   Last Visit   5/30/2023 Abraham Roman MD   Next Visit   8/15/2023 Abraham Roman MD   ED visits in past 90 days: 0        Note composed:12:46 PM 07/31/2023

## 2023-07-31 NOTE — TELEPHONE ENCOUNTER
No care due was identified.  [facility] Embedded Care Due Messages. Reference number: [id number].   7/31/2023 3:40:53 AM CDT

## 2023-08-03 ENCOUNTER — OFFICE VISIT (OUTPATIENT)
Dept: OPHTHALMOLOGY | Facility: CLINIC | Age: 73
End: 2023-08-03
Payer: MEDICARE

## 2023-08-03 DIAGNOSIS — H25.13 NUCLEAR SCLEROSIS, BILATERAL: ICD-10-CM

## 2023-08-03 DIAGNOSIS — E11.36 DIABETIC CATARACT OF BOTH EYES: ICD-10-CM

## 2023-08-03 DIAGNOSIS — H52.4 PRESBYOPIA: ICD-10-CM

## 2023-08-03 DIAGNOSIS — Z79.899 LONG-TERM USE OF PLAQUENIL: ICD-10-CM

## 2023-08-03 DIAGNOSIS — E11.9 TYPE 2 DIABETES MELLITUS WITHOUT RETINOPATHY: Primary | ICD-10-CM

## 2023-08-03 DIAGNOSIS — I10 ESSENTIAL HYPERTENSION: ICD-10-CM

## 2023-08-03 DIAGNOSIS — H25.013 CORTICAL AGE-RELATED CATARACT OF BOTH EYES: ICD-10-CM

## 2023-08-03 DIAGNOSIS — M05.79 RHEUMATOID ARTHRITIS INVOLVING MULTIPLE SITES WITH POSITIVE RHEUMATOID FACTOR: ICD-10-CM

## 2023-08-03 PROCEDURE — 99213 OFFICE O/P EST LOW 20 MIN: CPT | Mod: PBBFAC | Performed by: OPTOMETRIST

## 2023-08-03 PROCEDURE — 92083 HUMPHREY VISUAL FIELD - OU - BOTH EYES: ICD-10-PCS | Mod: 26,S$PBB,, | Performed by: OPTOMETRIST

## 2023-08-03 PROCEDURE — 92014 PR EYE EXAM, EST PATIENT,COMPREHESV: ICD-10-PCS | Mod: S$PBB,,, | Performed by: OPTOMETRIST

## 2023-08-03 PROCEDURE — 92083 EXTENDED VISUAL FIELD XM: CPT | Mod: PBBFAC | Performed by: OPTOMETRIST

## 2023-08-03 PROCEDURE — 92134 POSTERIOR SEGMENT OCT RETINA (OCULAR COHERENCE TOMOGRAPHY)-BOTH EYES: ICD-10-PCS | Mod: 26,S$PBB,, | Performed by: OPTOMETRIST

## 2023-08-03 PROCEDURE — 92014 COMPRE OPH EXAM EST PT 1/>: CPT | Mod: S$PBB,,, | Performed by: OPTOMETRIST

## 2023-08-03 PROCEDURE — 99999 PR PBB SHADOW E&M-EST. PATIENT-LVL III: CPT | Mod: PBBFAC,,, | Performed by: OPTOMETRIST

## 2023-08-03 PROCEDURE — 92134 CPTRZ OPH DX IMG PST SGM RTA: CPT | Mod: PBBFAC | Performed by: OPTOMETRIST

## 2023-08-03 PROCEDURE — 99999 PR PBB SHADOW E&M-EST. PATIENT-LVL III: ICD-10-PCS | Mod: PBBFAC,,, | Performed by: OPTOMETRIST

## 2023-08-03 RX ORDER — PANTOPRAZOLE SODIUM 40 MG/1
40 TABLET, DELAYED RELEASE ORAL
Qty: 90 TABLET | Refills: 3 | Status: SHIPPED | OUTPATIENT
Start: 2023-08-03

## 2023-08-03 RX ORDER — AMLODIPINE BESYLATE 10 MG/1
10 TABLET ORAL
Qty: 90 TABLET | Refills: 3 | Status: SHIPPED | OUTPATIENT
Start: 2023-08-03

## 2023-08-03 NOTE — PROGRESS NOTES
HPI    Pt is Ep with DNL  Pt is on Plaquenil 200mg 1 tab daily   Pt states no vision changes   Pt states she is here for her yearly eye exam   Pt last eye exam was 08/02/2022  last dilated was on 08/02/202  Diagnosed with diabetes in 1980   Lab Results       Component                Value               Date                       HGBA1C                   5.7 (H)             05/15/2023                     Last edited by Mary Ann Rain on 8/3/2023  9:39 AM.            Assessment /Plan     For exam results, see Encounter Report.    Type 2 diabetes mellitus without retinopathy  There was no diabetic retinopathy present in either eye today.   Recommended that pt continue care with PCP and/or specialists regarding diabetes.  Follow-up dilated eye exam recommended in 12 months, sooner with any vision changes or new concerns.    Rheumatoid arthritis involving multiple sites with positive rheumatoid factor  Long-term use of Plaquenil  -     Lewis Visual Field - OU - Extended - Both Eyes  -     Posterior Segment OCT Retina-Both eyes  No maculopathy present today.   Stressed importance of follow up visits with pt.    Nuclear sclerosis, bilateral  Cortical age-related cataract of both eyes  Diabetic cataract of both eyes  Cataracts not significantly affecting activities of daily living and therefore surgery is not indicated at this time.   Will continue to monitor over the next 12 months. Pt to call or RTC with any significant change in vision prior to next visit.     Presbyopia  Spec Rx is optional, ok to continue with OTC readers        RTC 12 months for dilation and mOCT.   PRN sooner if any va changes.

## 2023-08-03 NOTE — TELEPHONE ENCOUNTER
No care due was identified.  [facility] Embedded Care Due Messages. Reference number: [id number].   8/03/2023 3:46:06 AM CDT

## 2023-08-03 NOTE — TELEPHONE ENCOUNTER
Refill Decision Note   Page Grissom  is requesting a refill authorization.  Brief Assessment and Rationale for Refill:  Approve     Medication Therapy Plan:         Alert overridden per protocol: Yes   Comments:     Note composed:1:37 PM 08/03/2023

## 2023-08-15 ENCOUNTER — LAB VISIT (OUTPATIENT)
Dept: LAB | Facility: HOSPITAL | Age: 73
End: 2023-08-15
Attending: PEDIATRICS
Payer: MEDICARE

## 2023-08-15 ENCOUNTER — TELEPHONE (OUTPATIENT)
Dept: CARDIOTHORACIC SURGERY | Facility: CLINIC | Age: 73
End: 2023-08-15
Payer: MEDICARE

## 2023-08-15 ENCOUNTER — OFFICE VISIT (OUTPATIENT)
Dept: INTERNAL MEDICINE | Facility: CLINIC | Age: 73
End: 2023-08-15
Payer: MEDICARE

## 2023-08-15 VITALS
WEIGHT: 268.5 LBS | TEMPERATURE: 98 F | HEART RATE: 99 BPM | RESPIRATION RATE: 16 BRPM | DIASTOLIC BLOOD PRESSURE: 74 MMHG | BODY MASS INDEX: 49.41 KG/M2 | HEIGHT: 62 IN | OXYGEN SATURATION: 98 % | SYSTOLIC BLOOD PRESSURE: 138 MMHG

## 2023-08-15 DIAGNOSIS — K21.9 GASTROESOPHAGEAL REFLUX DISEASE WITHOUT ESOPHAGITIS: ICD-10-CM

## 2023-08-15 DIAGNOSIS — N18.31 STAGE 3A CHRONIC KIDNEY DISEASE: ICD-10-CM

## 2023-08-15 DIAGNOSIS — J45.30 MILD PERSISTENT ASTHMA WITHOUT COMPLICATION: ICD-10-CM

## 2023-08-15 DIAGNOSIS — D84.821 DRUG-INDUCED IMMUNODEFICIENCY: ICD-10-CM

## 2023-08-15 DIAGNOSIS — M81.0 AGE-RELATED OSTEOPOROSIS WITHOUT CURRENT PATHOLOGICAL FRACTURE: ICD-10-CM

## 2023-08-15 DIAGNOSIS — I10 ESSENTIAL HYPERTENSION: ICD-10-CM

## 2023-08-15 DIAGNOSIS — M79.7 FIBROMYALGIA: ICD-10-CM

## 2023-08-15 DIAGNOSIS — E55.9 VITAMIN D DEFICIENCY DISEASE: ICD-10-CM

## 2023-08-15 DIAGNOSIS — I47.29 NONSUSTAINED VENTRICULAR TACHYCARDIA: ICD-10-CM

## 2023-08-15 DIAGNOSIS — N25.81 SECONDARY HYPERPARATHYROIDISM OF RENAL ORIGIN: ICD-10-CM

## 2023-08-15 DIAGNOSIS — E78.5 HYPERLIPIDEMIA ASSOCIATED WITH TYPE 2 DIABETES MELLITUS: ICD-10-CM

## 2023-08-15 DIAGNOSIS — I70.0 AORTIC ATHEROSCLEROSIS: ICD-10-CM

## 2023-08-15 DIAGNOSIS — Z79.899 DRUG-INDUCED IMMUNODEFICIENCY: ICD-10-CM

## 2023-08-15 DIAGNOSIS — E66.01 CLASS 3 SEVERE OBESITY DUE TO EXCESS CALORIES WITH SERIOUS COMORBIDITY AND BODY MASS INDEX (BMI) OF 40.0 TO 44.9 IN ADULT: ICD-10-CM

## 2023-08-15 DIAGNOSIS — E11.69 HYPERLIPIDEMIA ASSOCIATED WITH TYPE 2 DIABETES MELLITUS: ICD-10-CM

## 2023-08-15 DIAGNOSIS — M05.79 RHEUMATOID ARTHRITIS INVOLVING MULTIPLE SITES WITH POSITIVE RHEUMATOID FACTOR: ICD-10-CM

## 2023-08-15 DIAGNOSIS — I50.1 LEFT VENTRICULAR FAILURE, UNSPECIFIED: ICD-10-CM

## 2023-08-15 DIAGNOSIS — E11.8 DIABETES MELLITUS TYPE 2 WITH COMPLICATIONS: ICD-10-CM

## 2023-08-15 DIAGNOSIS — E11.8 DIABETES MELLITUS TYPE 2 WITH COMPLICATIONS: Primary | ICD-10-CM

## 2023-08-15 LAB
ALBUMIN SERPL BCP-MCNC: 3 G/DL (ref 3.5–5.2)
ALP SERPL-CCNC: 100 U/L (ref 55–135)
ALT SERPL W/O P-5'-P-CCNC: 8 U/L (ref 10–44)
ANION GAP SERPL CALC-SCNC: 13 MMOL/L (ref 8–16)
AST SERPL-CCNC: 14 U/L (ref 10–40)
BILIRUB SERPL-MCNC: 0.3 MG/DL (ref 0.1–1)
BUN SERPL-MCNC: 13 MG/DL (ref 8–23)
CALCIUM SERPL-MCNC: 9.4 MG/DL (ref 8.7–10.5)
CHLORIDE SERPL-SCNC: 104 MMOL/L (ref 95–110)
CHOLEST SERPL-MCNC: 118 MG/DL (ref 120–199)
CHOLEST/HDLC SERPL: 2 {RATIO} (ref 2–5)
CO2 SERPL-SCNC: 25 MMOL/L (ref 23–29)
CREAT SERPL-MCNC: 1.7 MG/DL (ref 0.5–1.4)
EST. GFR  (NO RACE VARIABLE): 31.9 ML/MIN/1.73 M^2
ESTIMATED AVG GLUCOSE: 114 MG/DL (ref 68–131)
GLUCOSE SERPL-MCNC: 88 MG/DL (ref 70–110)
HBA1C MFR BLD: 5.6 % (ref 4–5.6)
HDLC SERPL-MCNC: 60 MG/DL (ref 40–75)
HDLC SERPL: 50.8 % (ref 20–50)
LDLC SERPL CALC-MCNC: 47 MG/DL (ref 63–159)
NONHDLC SERPL-MCNC: 58 MG/DL
POTASSIUM SERPL-SCNC: 3.9 MMOL/L (ref 3.5–5.1)
PROT SERPL-MCNC: 8.1 G/DL (ref 6–8.4)
SODIUM SERPL-SCNC: 142 MMOL/L (ref 136–145)
TRIGL SERPL-MCNC: 55 MG/DL (ref 30–150)

## 2023-08-15 PROCEDURE — 83036 HEMOGLOBIN GLYCOSYLATED A1C: CPT | Performed by: PEDIATRICS

## 2023-08-15 PROCEDURE — 99214 OFFICE O/P EST MOD 30 MIN: CPT | Mod: S$PBB,,, | Performed by: PEDIATRICS

## 2023-08-15 PROCEDURE — 36415 COLL VENOUS BLD VENIPUNCTURE: CPT | Performed by: PEDIATRICS

## 2023-08-15 PROCEDURE — 80061 LIPID PANEL: CPT | Performed by: PEDIATRICS

## 2023-08-15 PROCEDURE — 99999 PR PBB SHADOW E&M-EST. PATIENT-LVL V: ICD-10-PCS | Mod: PBBFAC,,, | Performed by: PEDIATRICS

## 2023-08-15 PROCEDURE — 99214 PR OFFICE/OUTPT VISIT, EST, LEVL IV, 30-39 MIN: ICD-10-PCS | Mod: S$PBB,,, | Performed by: PEDIATRICS

## 2023-08-15 PROCEDURE — 99999 PR PBB SHADOW E&M-EST. PATIENT-LVL V: CPT | Mod: PBBFAC,,, | Performed by: PEDIATRICS

## 2023-08-15 PROCEDURE — 80053 COMPREHEN METABOLIC PANEL: CPT | Performed by: PEDIATRICS

## 2023-08-15 PROCEDURE — 99215 OFFICE O/P EST HI 40 MIN: CPT | Mod: PBBFAC | Performed by: PEDIATRICS

## 2023-08-15 NOTE — PROGRESS NOTES
Subjective     Patient ID: Page Grissom is a 72 y.o. female.    Chief Complaint: Follow-up    Page Grissom is a 72 y.o. female who presents to the clinic for a follow up. Pt has a tracheal stenosis surgery scheduled on 8/16 with Dr. Rosado.     1) DM: no hyper/hypoglycemic symptoms. no self monitoring, currently on Trulicity 0.75 mg,  6 pound weight loss since start of medication, no SE, tolerating medication well.   2) HTN: no BP checks, no HTNive symptoms, on apressoline and amlodipine  3) LIPIDS:  tolerating and compliant with crestor 40 mg  4) ASTHMA/subglottis stenosis: stopped smoking . Asthma quiet no albuterol or advair currently. Has ENT follow up.   5) GERD: Quiet.   6) Rheum/osteoporosis/vitamin D deficiency: No current symptoms. Can't take NSAIDS due to CKD, on plaquenil  7) CKD/secondary hyperparathyroidism: seeing renal   8)L ventricular heart failure/Nonsustained Vtach: occurred in hospital, on no meds currently.  9)Obesity: 6 pound weight loss with trulicity   10) Aortic atherosclerosis: seen on imaging. 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   Respiratory:  Positive for cough, shortness of breath and wheezing.         Asthma has been quiet, her sxs are due to her upper airways stenosis, today had mild hemoptysis with coughing, tomorrow sees ENT for surgery   Cardiovascular:  Negative for chest pain and palpitations.   Gastrointestinal:  Negative for abdominal pain, anal bleeding, constipation, diarrhea, nausea, vomiting and reflux.   Endocrine: Negative for cold intolerance, heat intolerance, polydipsia, polyphagia and polyuria.   All other systems reviewed and are negative.         Objective     Physical Exam  Constitutional:       General: She is not in acute distress.     Appearance: Normal appearance. She is  obese. She is not ill-appearing or toxic-appearing.   Cardiovascular:      Rate and Rhythm: Normal rate and regular rhythm.      Pulses: Normal pulses.      Heart sounds: Normal heart sounds. No murmur heard.     No friction rub.   Pulmonary:      Effort: Pulmonary effort is normal.      Breath sounds: Normal breath sounds.   Abdominal:      General: There is no distension.      Palpations: There is no mass.      Tenderness: There is no abdominal tenderness. There is no guarding or rebound.      Hernia: No hernia is present.   Musculoskeletal:      Right lower leg: Edema (trace pedal edema bilaterally) present.      Left lower leg: Edema present.   Neurological:      Mental Status: She is alert and oriented to person, place, and time.   Psychiatric:         Mood and Affect: Mood normal.         Behavior: Behavior normal.         Thought Content: Thought content normal.         Judgment: Judgment normal.            Assessment and Plan     1. Diabetes mellitus type 2 with complications  -     Comprehensive Metabolic Panel; Future; Expected date: 08/15/2023  -     Lipid Panel; Future; Expected date: 08/15/2023  -     Microalbumin/Creatinine Ratio, Urine; Future; Expected date: 08/15/2023  -     Hemoglobin A1C; Future; Expected date: 08/15/2023    2. Essential hypertension    3. Fibromyalgia    4. Gastroesophageal reflux disease without esophagitis    5. Hyperlipidemia associated with type 2 diabetes mellitus    6. Age-related osteoporosis without current pathological fracture    7. Aortic atherosclerosis    8. Class 3 severe obesity due to excess calories with serious comorbidity and body mass index (BMI) of 40.0 to 44.9 in adult    9. Stage 3a chronic kidney disease    10. Mild persistent asthma without complication    11. Rheumatoid arthritis involving multiple sites with positive rheumatoid factor  Overview:  + , +       12. Vitamin D deficiency disease    13. Secondary hyperparathyroidism of renal  origin    14. Drug-induced immunodeficiency    15. Left ventricular failure, unspecified        Pt will notify me if she chooses to increase trulicity dosage. Labs today, suspect at goal for A1C and lipids. BP at goal. Continue D&E, weight loss, and self monitoring. Tdap and shingles vaccine discussed. Flu, covid, and RSV vaccines in fall discussed.         Follow up in about 6 months (around 2/15/2024).

## 2023-08-15 NOTE — PRE-PROCEDURE INSTRUCTIONS
PRE-OP INSTRUCTIONS:  Instructed to have nothing by mouth past midnight.  It is ok to take AM medications with a few sips of water.  Instructed to shower the night before surgery as well as the morning of surgery with an antibacterial soap like dial or hibiclens from the neck down.  Encouraged to wear loose fitting, comfortable clothing .  Medication instructions for pm prior to and am of surgery reviewed.  Instructed to avoid taking vitamins,supplements,aspirin or ibuprofen the am of surgery.    Patient denies any side effects or issues with anesthesia or sedation.     Patient does not know arrival time.Explained that this information comes from the surgeon's office and if they haven't heard from them by 2 or 3 pm to call the office.Patient stated an understanding.

## 2023-08-16 ENCOUNTER — HOSPITAL ENCOUNTER (OUTPATIENT)
Facility: HOSPITAL | Age: 73
Discharge: HOME | End: 2023-08-16
Attending: STUDENT IN AN ORGANIZED HEALTH CARE EDUCATION/TRAINING PROGRAM | Admitting: STUDENT IN AN ORGANIZED HEALTH CARE EDUCATION/TRAINING PROGRAM
Payer: MEDICARE

## 2023-08-16 ENCOUNTER — ANESTHESIA EVENT (OUTPATIENT)
Dept: SURGERY | Facility: HOSPITAL | Age: 73
End: 2023-08-16
Payer: MEDICARE

## 2023-08-16 ENCOUNTER — ANESTHESIA (OUTPATIENT)
Dept: SURGERY | Facility: HOSPITAL | Age: 73
End: 2023-08-16
Payer: MEDICARE

## 2023-08-16 VITALS
SYSTOLIC BLOOD PRESSURE: 153 MMHG | OXYGEN SATURATION: 94 % | BODY MASS INDEX: 45.75 KG/M2 | HEART RATE: 67 BPM | TEMPERATURE: 98 F | DIASTOLIC BLOOD PRESSURE: 91 MMHG | RESPIRATION RATE: 27 BRPM | HEIGHT: 64 IN | WEIGHT: 268 LBS

## 2023-08-16 DIAGNOSIS — J39.8 TRACHEAL STENOSIS: Primary | ICD-10-CM

## 2023-08-16 LAB
ALLENS TEST: ABNORMAL
ALLENS TEST: ABNORMAL
DELSYS: ABNORMAL
DELSYS: ABNORMAL
FLOW: 6
FLOW: 6
HCO3 UR-SCNC: 26.9 MMOL/L (ref 24–28)
HCO3 UR-SCNC: 27.3 MMOL/L (ref 24–28)
MODE: ABNORMAL
MODE: ABNORMAL
PCO2 BLDA: 52.4 MMHG (ref 35–45)
PCO2 BLDA: 59.5 MMHG (ref 35–45)
PH SMN: 7.27 [PH] (ref 7.35–7.45)
PH SMN: 7.32 [PH] (ref 7.35–7.45)
PO2 BLDA: 117 MMHG (ref 80–100)
PO2 BLDA: 137 MMHG (ref 80–100)
POC BE: 0 MMOL/L
POC BE: 1 MMOL/L
POC SATURATED O2: 98 % (ref 95–100)
POC SATURATED O2: 99 % (ref 95–100)
POC TCO2: 29 MMOL/L (ref 23–27)
POC TCO2: 29 MMOL/L (ref 23–27)
POCT GLUCOSE: 134 MG/DL (ref 70–110)
POCT GLUCOSE: 88 MG/DL (ref 70–110)
SAMPLE: ABNORMAL
SAMPLE: ABNORMAL
SITE: ABNORMAL
SITE: ABNORMAL

## 2023-08-16 PROCEDURE — 99900035 HC TECH TIME PER 15 MIN (STAT)

## 2023-08-16 PROCEDURE — 71000044 HC DOSC ROUTINE RECOVERY FIRST HOUR: Performed by: STUDENT IN AN ORGANIZED HEALTH CARE EDUCATION/TRAINING PROGRAM

## 2023-08-16 PROCEDURE — C1769 GUIDE WIRE: HCPCS | Performed by: STUDENT IN AN ORGANIZED HEALTH CARE EDUCATION/TRAINING PROGRAM

## 2023-08-16 PROCEDURE — 27201423 OPTIME MED/SURG SUP & DEVICES STERILE SUPPLY: Performed by: STUDENT IN AN ORGANIZED HEALTH CARE EDUCATION/TRAINING PROGRAM

## 2023-08-16 PROCEDURE — 36000706: Performed by: STUDENT IN AN ORGANIZED HEALTH CARE EDUCATION/TRAINING PROGRAM

## 2023-08-16 PROCEDURE — 37000008 HC ANESTHESIA 1ST 15 MINUTES: Performed by: STUDENT IN AN ORGANIZED HEALTH CARE EDUCATION/TRAINING PROGRAM

## 2023-08-16 PROCEDURE — 82962 GLUCOSE BLOOD TEST: CPT | Performed by: STUDENT IN AN ORGANIZED HEALTH CARE EDUCATION/TRAINING PROGRAM

## 2023-08-16 PROCEDURE — 31630 PR BRONCHOSCOPY,TRACH/BRONCH DILATN: ICD-10-PCS | Mod: GC,,, | Performed by: STUDENT IN AN ORGANIZED HEALTH CARE EDUCATION/TRAINING PROGRAM

## 2023-08-16 PROCEDURE — C1726 CATH, BAL DIL, NON-VASCULAR: HCPCS | Performed by: STUDENT IN AN ORGANIZED HEALTH CARE EDUCATION/TRAINING PROGRAM

## 2023-08-16 PROCEDURE — 71000015 HC POSTOP RECOV 1ST HR: Performed by: STUDENT IN AN ORGANIZED HEALTH CARE EDUCATION/TRAINING PROGRAM

## 2023-08-16 PROCEDURE — D9220A PRA ANESTHESIA: Mod: ANES,,, | Performed by: ANESTHESIOLOGY

## 2023-08-16 PROCEDURE — 25000003 PHARM REV CODE 250

## 2023-08-16 PROCEDURE — 63600175 PHARM REV CODE 636 W HCPCS

## 2023-08-16 PROCEDURE — 36600 WITHDRAWAL OF ARTERIAL BLOOD: CPT

## 2023-08-16 PROCEDURE — 71000016 HC POSTOP RECOV ADDL HR: Performed by: STUDENT IN AN ORGANIZED HEALTH CARE EDUCATION/TRAINING PROGRAM

## 2023-08-16 PROCEDURE — 94640 AIRWAY INHALATION TREATMENT: CPT

## 2023-08-16 PROCEDURE — D9220A PRA ANESTHESIA: ICD-10-PCS | Mod: ANES,,, | Performed by: ANESTHESIOLOGY

## 2023-08-16 PROCEDURE — 27000221 HC OXYGEN, UP TO 24 HOURS

## 2023-08-16 PROCEDURE — 36000707: Performed by: STUDENT IN AN ORGANIZED HEALTH CARE EDUCATION/TRAINING PROGRAM

## 2023-08-16 PROCEDURE — 63600175 PHARM REV CODE 636 W HCPCS: Performed by: STUDENT IN AN ORGANIZED HEALTH CARE EDUCATION/TRAINING PROGRAM

## 2023-08-16 PROCEDURE — D9220A PRA ANESTHESIA: Mod: CRNA,,, | Performed by: NURSE ANESTHETIST, CERTIFIED REGISTERED

## 2023-08-16 PROCEDURE — 71000033 HC RECOVERY, INTIAL HOUR: Performed by: STUDENT IN AN ORGANIZED HEALTH CARE EDUCATION/TRAINING PROGRAM

## 2023-08-16 PROCEDURE — 94761 N-INVAS EAR/PLS OXIMETRY MLT: CPT

## 2023-08-16 PROCEDURE — 71000039 HC RECOVERY, EACH ADD'L HOUR: Performed by: STUDENT IN AN ORGANIZED HEALTH CARE EDUCATION/TRAINING PROGRAM

## 2023-08-16 PROCEDURE — 25000242 PHARM REV CODE 250 ALT 637 W/ HCPCS: Performed by: STUDENT IN AN ORGANIZED HEALTH CARE EDUCATION/TRAINING PROGRAM

## 2023-08-16 PROCEDURE — 82803 BLOOD GASES ANY COMBINATION: CPT

## 2023-08-16 PROCEDURE — D9220A PRA ANESTHESIA: ICD-10-PCS | Mod: CRNA,,, | Performed by: NURSE ANESTHETIST, CERTIFIED REGISTERED

## 2023-08-16 PROCEDURE — 37000009 HC ANESTHESIA EA ADD 15 MINS: Performed by: STUDENT IN AN ORGANIZED HEALTH CARE EDUCATION/TRAINING PROGRAM

## 2023-08-16 PROCEDURE — 31630 BRONCHOSCOPY DILATE/FX REPR: CPT | Mod: GC,,, | Performed by: STUDENT IN AN ORGANIZED HEALTH CARE EDUCATION/TRAINING PROGRAM

## 2023-08-16 RX ORDER — ONDANSETRON 2 MG/ML
INJECTION INTRAMUSCULAR; INTRAVENOUS
Status: DISCONTINUED | OUTPATIENT
Start: 2023-08-16 | End: 2023-08-16

## 2023-08-16 RX ORDER — DEXMEDETOMIDINE HYDROCHLORIDE 100 UG/ML
INJECTION, SOLUTION INTRAVENOUS
Status: DISCONTINUED | OUTPATIENT
Start: 2023-08-16 | End: 2023-08-16

## 2023-08-16 RX ORDER — TRIAMCINOLONE ACETONIDE 40 MG/ML
INJECTION, SUSPENSION INTRA-ARTICULAR; INTRAMUSCULAR
Status: DISCONTINUED | OUTPATIENT
Start: 2023-08-16 | End: 2023-08-16 | Stop reason: HOSPADM

## 2023-08-16 RX ORDER — METOPROLOL TARTRATE 1 MG/ML
INJECTION, SOLUTION INTRAVENOUS
Status: DISCONTINUED | OUTPATIENT
Start: 2023-08-16 | End: 2023-08-16

## 2023-08-16 RX ORDER — ROCURONIUM BROMIDE 10 MG/ML
INJECTION, SOLUTION INTRAVENOUS
Status: DISCONTINUED | OUTPATIENT
Start: 2023-08-16 | End: 2023-08-16

## 2023-08-16 RX ORDER — IPRATROPIUM BROMIDE AND ALBUTEROL SULFATE 2.5; .5 MG/3ML; MG/3ML
3 SOLUTION RESPIRATORY (INHALATION) ONCE
Status: COMPLETED | OUTPATIENT
Start: 2023-08-16 | End: 2023-08-16

## 2023-08-16 RX ORDER — FENTANYL CITRATE 50 UG/ML
INJECTION, SOLUTION INTRAMUSCULAR; INTRAVENOUS
Status: DISCONTINUED | OUTPATIENT
Start: 2023-08-16 | End: 2023-08-16

## 2023-08-16 RX ORDER — DEXAMETHASONE SODIUM PHOSPHATE 4 MG/ML
INJECTION, SOLUTION INTRA-ARTICULAR; INTRALESIONAL; INTRAMUSCULAR; INTRAVENOUS; SOFT TISSUE
Status: DISCONTINUED | OUTPATIENT
Start: 2023-08-16 | End: 2023-08-16

## 2023-08-16 RX ORDER — LIDOCAINE HYDROCHLORIDE 20 MG/ML
INJECTION INTRAVENOUS
Status: DISCONTINUED | OUTPATIENT
Start: 2023-08-16 | End: 2023-08-16

## 2023-08-16 RX ORDER — SUCCINYLCHOLINE CHLORIDE 20 MG/ML
INJECTION INTRAMUSCULAR; INTRAVENOUS
Status: DISCONTINUED | OUTPATIENT
Start: 2023-08-16 | End: 2023-08-16

## 2023-08-16 RX ORDER — LIDOCAINE HYDROCHLORIDE 10 MG/ML
1 INJECTION, SOLUTION EPIDURAL; INFILTRATION; INTRACAUDAL; PERINEURAL ONCE
Status: DISPENSED | OUTPATIENT
Start: 2023-08-16

## 2023-08-16 RX ORDER — MIDAZOLAM HYDROCHLORIDE 1 MG/ML
INJECTION, SOLUTION INTRAMUSCULAR; INTRAVENOUS
Status: DISCONTINUED | OUTPATIENT
Start: 2023-08-16 | End: 2023-08-16

## 2023-08-16 RX ORDER — DEXMEDETOMIDINE HYDROCHLORIDE 100 UG/ML
INJECTION, SOLUTION INTRAVENOUS CONTINUOUS PRN
Status: DISCONTINUED | OUTPATIENT
Start: 2023-08-16 | End: 2023-08-16

## 2023-08-16 RX ORDER — IPRATROPIUM BROMIDE AND ALBUTEROL SULFATE 2.5; .5 MG/3ML; MG/3ML
SOLUTION RESPIRATORY (INHALATION)
Status: DISCONTINUED
Start: 2023-08-16 | End: 2023-08-16 | Stop reason: HOSPADM

## 2023-08-16 RX ORDER — PROPOFOL 10 MG/ML
VIAL (ML) INTRAVENOUS
Status: DISCONTINUED | OUTPATIENT
Start: 2023-08-16 | End: 2023-08-16

## 2023-08-16 RX ADMIN — MIDAZOLAM HYDROCHLORIDE 1 MG: 1 INJECTION, SOLUTION INTRAMUSCULAR; INTRAVENOUS at 08:08

## 2023-08-16 RX ADMIN — FENTANYL CITRATE 50 MCG: 50 INJECTION, SOLUTION INTRAMUSCULAR; INTRAVENOUS at 08:08

## 2023-08-16 RX ADMIN — IPRATROPIUM BROMIDE AND ALBUTEROL SULFATE 3 ML: .5; 3 SOLUTION RESPIRATORY (INHALATION) at 01:08

## 2023-08-16 RX ADMIN — SODIUM CHLORIDE: 9 INJECTION, SOLUTION INTRAVENOUS at 07:08

## 2023-08-16 RX ADMIN — DEXMEDETOMIDINE 8 MCG: 100 INJECTION, SOLUTION, CONCENTRATE INTRAVENOUS at 09:08

## 2023-08-16 RX ADMIN — ROCURONIUM BROMIDE 20 MG: 10 INJECTION INTRAVENOUS at 08:08

## 2023-08-16 RX ADMIN — METOPROLOL TARTRATE 2.5 MG: 5 INJECTION, SOLUTION INTRAVENOUS at 08:08

## 2023-08-16 RX ADMIN — GLYCOPYRROLATE 0.2 MG: 0.2 INJECTION, SOLUTION INTRAMUSCULAR; INTRAVENOUS at 08:08

## 2023-08-16 RX ADMIN — FENTANYL CITRATE 25 MCG: 50 INJECTION, SOLUTION INTRAMUSCULAR; INTRAVENOUS at 08:08

## 2023-08-16 RX ADMIN — ONDANSETRON 4 MG: 2 INJECTION INTRAMUSCULAR; INTRAVENOUS at 08:08

## 2023-08-16 RX ADMIN — DEXAMETHASONE SODIUM PHOSPHATE 8 MG: 4 INJECTION, SOLUTION INTRAMUSCULAR; INTRAVENOUS at 08:08

## 2023-08-16 RX ADMIN — SUCCINYLCHOLINE CHLORIDE 120 MG: 20 INJECTION, SOLUTION INTRAMUSCULAR; INTRAVENOUS at 08:08

## 2023-08-16 RX ADMIN — SUGAMMADEX 400 MG: 100 INJECTION, SOLUTION INTRAVENOUS at 09:08

## 2023-08-16 RX ADMIN — DEXMEDETOMIDINE 4 MCG: 100 INJECTION, SOLUTION, CONCENTRATE INTRAVENOUS at 10:08

## 2023-08-16 RX ADMIN — LIDOCAINE HYDROCHLORIDE 100 MG: 20 INJECTION INTRAVENOUS at 08:08

## 2023-08-16 RX ADMIN — PROPOFOL 175 MCG/KG/MIN: 10 INJECTION, EMULSION INTRAVENOUS at 08:08

## 2023-08-16 RX ADMIN — LIDOCAINE HYDROCHLORIDE 100 MG: 20 INJECTION INTRAVENOUS at 09:08

## 2023-08-16 RX ADMIN — ROCURONIUM BROMIDE 30 MG: 10 INJECTION INTRAVENOUS at 08:08

## 2023-08-16 RX ADMIN — PROPOFOL 200 MG: 10 INJECTION, EMULSION INTRAVENOUS at 08:08

## 2023-08-16 NOTE — OP NOTE
12/4/2022  INevin MD, saw and evaluated the patient  I have discussed the patient with the resident/non-physician practitioner and agree with the resident's/non-physician practitioner's findings, Plan of Care, and MDM as documented in the resident's/non-physician practitioner's note, except where noted  All available labs and Radiology studies were reviewed  I was present for key portions of any procedure(s) performed by the resident/non-physician practitioner and I was immediately available to provide assistance  At this point I agree with the current assessment done in the Emergency Department  I have conducted an independent evaluation of this patient a history and physical is as follows: This is a 58-year-old female with a relevant past medical history of hypertension, breast cancer, presenting to the ED today for a complaint of a fall  Patient states that she fell Friday, took 3 steps up her stairs, and fell back onto her neck and head  She denies any focal weakness, but does state that she has been unable to walk appropriately due to significant weakness in her bilateral lower extremities  She denies any nausea, vomiting, fever chills or sweats, chest pain shortness of breath, or any other significantly related symptoms  Her exam is remarkable for right hand weakness, which the patient has not noted previously  She also has been complaining of some urinary incontinence, which the family feel is chronic but patient does not seem to recognize that  She is wearing a depends today  Her exam otherwise is unremarkable  Her differential diagnosis includes:  Acute intracranial bleed versus ischemic stroke versus urinary tract infection versus electrolyte abnormality versus any combination of the above versus other  Patient was found to have slightly elevated CPK at 6:33 a m  Dara Soulier Rest her blood work for the most part was unremarkable    She underwent CT scan of her head, C-spine, [facility]  General Surgery  Operative Note    SUMMARY     Date of Procedure: 8/16/2023     Procedure: Procedure(s) (LRB):  Flex Bronchoscopy with balloon dialation & kenelog (N/A)       Surgeon(s) and Role:     * Juan Rosado MD - Primary     * Estela Rice PA-RAÚL - Assisting     * Gilbert Paulino MD - Resident - Assisting    Pre-Operative Diagnosis: 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 female with tracheal stenosis after an emergent tracheotomy. We recommended bronchoscopy with dilation. The patient was in agreement with the plan and did sign informed consent.     Procedure:   The patient was identified in the preoperative area and all questions were answered. Informed consent was verified. She was then transported to the operating room and placed on the table in the supine position. Anesthesia was induced without complication with an ETT. A timeout was performed verifying correct patient and procedure.     We introduced the flexible bronchoscope through the ETT. The stenosis was easily traversed. The right lung was examined and there were mild secretions that were suctioned. The left lung was examined and there was mild bloody secretions that was suctioned. There was no further blood. While pulling back, the tracheal stenosis was visualized 7cm from the alisia. The majority of the stenosis was along the posterior trachea. The stenotic area was injected in different quadrants with a total of 5cc of kenolog. The tracheal stenosis was then dilated with bronchoscopic balloons up to 20mm diameter. Post-dilation, her stenosis appeared moderately improved. However, there was concern for an element of tracheomalacia. A small amount of blood was suctioned from right and left main bronchi.     The patient was then awakened from anesthesia with  C-spine showed that she had C for 5 fracture, with some C4 widening  Patient was then requested to be hospitalized by the Trauma Service, who accepted without any further orders requested      ED Course  ED Course as of 12/04/22 1748   Sun Dec 04, 2022   1119 Total CK(!): 633         Critical Care Time  Procedures some difficulty and transported to the PACU in stable condition.     Dr. Rosado was scrubbed for the case.        Estimated Blood Loss (EBL): minimal           Implants: * No implants in log *    Specimens:   Specimen (24h ago, onward)      None                    Condition: Stable    Disposition: PACU - hemodynamically stable.    Gilbert Paulino MD  [facility]

## 2023-08-16 NOTE — ANESTHESIA PREPROCEDURE EVALUATION
08/16/2023  Page Grissom is a 72 y.o., female.      Pre-op Assessment    I have reviewed the Patient Summary Reports.    I have reviewed the NPO Status.      Review of Systems  Anesthesia Hx:  History of prior surgery of interest to airway management or planning: Denies Family Hx of Anesthesia complications.   Denies Personal Hx of Anesthesia complications.   Cardiovascular:   Hypertension CAD   CHF    Pulmonary:   Asthma Shortness of breath (chronically SOB)    Renal/:   Chronic Renal Disease, CKD    Hepatic/GI:   GERD    Neurological:   Neuromuscular Disease,    Endocrine:   Diabetes, poorly controlled, type 2        Physical Exam  General: Morbid Obesity, Cooperative, Alert and Oriented    Airway:  Mallampati: IV   Mouth Opening: Normal  TM Distance: Normal, at least 6 cm  Tongue: Large  Previous emergent cricothyrotomy 2/2 angioedema. Site healed. Per report distal tracheal stenosis   Dental:  Periodontal disease  Poor dentition. Multiple missing broken and diseased teeth. Patient reports she lost several caps and never been replaced.   Chest/Lungs:  Tachypnea  Baseline SOB  Heart:  Rate: Normal        Anesthesia Plan  Type of Anesthesia, risks & benefits discussed:    Anesthesia Type: Gen ETT, Gen Supraglottic Airway, Gen Natural Airway, MAC  Intra-op Monitoring Plan: Standard ASA Monitors  Induction:  IV  Informed Consent: Informed consent signed with the Patient and all parties understand the risks and agree with anesthesia plan.  All questions answered.   ASA Score: 4    Ready For Surgery From Anesthesia Perspective.     .

## 2023-08-16 NOTE — TRANSFER OF CARE
"Anesthesia Transfer of Care Note    Patient: Page Grissom    Procedure(s) Performed: Procedure(s) (LRB):  Flex Bronchoscopy with balloon dialation & kenelog (N/A)    Patient location: PACU    Anesthesia Type: general    Transport from OR: Transported from OR on 100% O2 by closed face mask with adequate spontaneous ventilation    Post pain: adequate analgesia    Post assessment: no apparent anesthetic complications and tolerated procedure well    Post vital signs: stable    Level of consciousness: awake and alert    Nausea/Vomiting: no nausea/vomiting    Complications: none    Transfer of care protocol was followedComments: Patient requiring positive pressure and jaw thrust after extubation. Maintaining spo2 at 100%. Patient transferred to PACU with positive pressure via eboni kisha. Report given to RT and RN. Patient to remain on CPAP in PACU.         Last vitals:   Visit Vitals  /69 (BP Location: Left arm, Patient Position: Lying)   Pulse 81   Resp (!) 22   Ht 5' 4" (1.626 m)   Wt 121.6 kg (268 lb)   SpO2 100%   Breastfeeding No   BMI 46.00 kg/m²     "

## 2023-08-16 NOTE — BRIEF OP NOTE
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 Area of stenosis seen approx 7 cm proximal to alisia. Kenolog injected and balloon dilation performed up to 20mm. Moderate improvement in stenosis    Estimated Blood Loss: minimal         Specimens:   Specimen (24h ago, onward)      None              Discharge Note    OUTCOME: Patient tolerated treatment/procedure well without complication and is now ready for discharge.    DISPOSITION: Home or Self Care    FINAL DIAGNOSIS:  Tracheal stenosis    FOLLOWUP: In clinic    DISCHARGE INSTRUCTIONS:    Discharge Procedure Orders   Diet Adult Regular     Lifting restrictions     No driving until:   Order Comments: No driving until off narcotics and able to move arms freely     Notify your health care provider if you experience any of the following:  temperature >100.4     Notify your health care provider if you experience any of the following:  persistent nausea and vomiting or diarrhea     Notify your health care provider if you experience any of the following:  severe uncontrolled pain     Notify your health care provider if you experience any of the following:  redness, tenderness, or signs of infection (pain, swelling, redness, odor or green/yellow discharge around incision site)     Notify your health care provider if you experience any of the following:  difficulty breathing or increased cough     Notify your health care provider if you experience any of the following:  severe persistent headache     Notify your health  care provider if you experience any of the following:  worsening rash     Notify your health care provider if you experience any of the following:  persistent dizziness, light-headedness, or visual disturbances     Notify your health care provider if you experience any of the following:  increased confusion or weakness

## 2023-08-16 NOTE — H&P
"History & Physical     SUBJECTIVE:      History of Present Illness:  Patient is a 72 y.o. female with HTN, CKD, HLD, DM, COPD, and RANDEE here today for evaluation of tracheal stenosis. In march 2022 she was admitted for angioedema and underwent cricothyrotomy converted to tracheostomy. She has mild post tracheostomy tracheal stenosis, from which she has been asymptomatic. Patient offered bronchoscopy at previous appointment; however, she declined to undergo the procedure at that time. Discussed plan with patient to repeat a neck and chest CT in 6 months.         Interval History:   Comes to clinic with updated neck and chest CT today. She arrives with an audible stridor and appears short of breath but the patient reports that her  dyspnea and work of breathing is "the same" Denies chest pain. Denies situations with difficult air entry       Chief Complaint   Patient presents with    Follow-up               Review of patient's allergies indicates:   Allergen Reactions    Ace inhibitors Swelling    Lisinopril Anaphylaxis       Angioedema requiring trach         Current Medications          Current Outpatient Medications   Medication Sig Dispense Refill    acetaminophen (TYLENOL) 500 MG tablet Take 500 mg by mouth as needed for Pain.        albuterol (PROVENTIL/VENTOLIN HFA) 90 mcg/actuation inhaler INHALE 2 PUFFS INTO THE LINGS EVERY 6 HOURS AS NEEDED WHEEZING 25.5 g 3    amLODIPine (NORVASC) 10 MG tablet Take 1 tablet (10 mg total) by mouth once daily. 90 tablet 3    blood sugar diagnostic Strp To check BG 1 times daily, to use with insurance preferred meter 100 strip 11    blood-glucose meter kit To check BG 1 times daily, to use with insurance preferred meter 1 each 0    cetirizine (ZYRTEC) 10 MG tablet Take 1 tablet (10 mg total) by mouth once daily. 90 tablet 3    cholecalciferol, vitamin D3, 2,000 unit Tab Take 2,000 Units by mouth once daily.        famotidine (PEPCID) 40 MG tablet Take 1 tablet (40 mg total) by " mouth once daily. 90 tablet 3    fish oil-omega-3 fatty acids 300-1,000 mg capsule Take 2 g by mouth once daily.        fluticasone furoate-vilanteroL (BREO ELLIPTA) 100-25 mcg/dose diskus inhaler Inhale 1 puff into the lungs once daily. Controller 60 each 6    hydrALAZINE (APRESOLINE) 50 MG tablet TAKE 1 TABLET(50 MG) BY MOUTH THREE TIMES DAILY 90 tablet 2    hydrOXYchloroQUINE (PLAQUENIL) 200 mg tablet Take 1 tablet (200 mg total) by mouth once daily. 90 tablet 1    lancets Misc To check BG 1 times daily, to use with insurance preferred meter 100 each 11    metFORMIN (GLUCOPHAGE) 500 MG tablet Take 1 tablet (500 mg total) by mouth once daily. 90 tablet 3    multivitamin (THERAGRAN) per tablet Take 1 tablet by mouth once daily.        pantoprazole (PROTONIX) 40 MG tablet Take 1 tablet (40 mg total) by mouth once daily. 90 tablet 3    potassium chloride SA (K-DUR,KLOR-CON) 20 MEQ tablet Take 1 tablet (20 mEq total) by mouth once daily. 90 tablet 3    rosuvastatin (CRESTOR) 40 MG Tab TAKE 1 TABLET(40 MG) BY MOUTH EVERY EVENING 90 tablet 3    tirzepatide (MOUNJARO) 2.5 mg/0.5 mL PnIj Inject 2.5 mg into the skin every 7 days. 4 pen 11    EPINEPHrine (EPIPEN 2-DANA) 0.3 mg/0.3 mL AtIn Inject 0.3 mLs (0.3 mg total) into the muscle once. for 1 dose (Patient not taking: Reported on 10/24/2022) 1 each 11      No current facility-administered medications for this visit.                 Past Medical History:   Diagnosis Date    Acidosis, metabolic, with respiratory acidosis 3/31/2022    Asthma      Back pain      Cataract       OD    CKD (chronic kidney disease) stage 3, GFR 30-59 ml/min 12/20/2020    Diabetes mellitus      Fibromyalgia      Gastroesophageal reflux disease      Hypercholesterolemia      Hypertension      Immune deficiency disorder      Obesity      Osteoporosis      Rheumatoid arthritis      Tobacco dependence      Trouble in sleeping      Type 2 diabetes mellitus              Past Surgical History:  "  Procedure Laterality Date    BRONCHOSCOPY N/A 3/30/2022     Procedure: Bronchoscopy;  Surgeon: Isak Yadav MD;  Location: [facility] OR;  Service: General;  Laterality: N/A;    COLONOSCOPY N/A 2019     Procedure: COLONOSCOPY;  Surgeon: Yury Atwood MD;  Location: [facility] ENDO;  Service: Endoscopy;  Laterality: N/A;    HERNIA REPAIR        OOPHORECTOMY        TRACHEOSTOMY N/A 3/30/2022     Procedure: CREATION, TRACHEOSTOMY;  Surgeon: Isak Yadav MD;  Location: [facility] OR;  Service: General;  Laterality: N/A;            Family History   Problem Relation Age of Onset    Hypertension Mother      Cancer Father      Colon cancer Father      Breast cancer Sister        Social History            Tobacco Use    Smoking status: Former       Packs/day: 0.50       Years: 25.00       Pack years: 12.50       Types: Cigarettes       Quit date: 2022       Years since quittin.2    Smokeless tobacco: Never   Substance Use Topics    Alcohol use: No    Drug use: No         Review of Systems:  Review of Systems   Respiratory:  Positive for chest tightness, shortness of breath and stridor.    Cardiovascular:  Negative for chest pain and palpitations.   Gastrointestinal:  Negative for abdominal distention.   Genitourinary: Negative.    Skin:  Negative for color change and rash.   Neurological:  Negative for dizziness and syncope.   Psychiatric/Behavioral:  Negative for agitation. The patient is not nervous/anxious.       OBJECTIVE:      Vital Signs (Most Recent)      Vitals:     23 1039   BP: 139/68   Pulse: 80   SpO2: 97%   Weight: 124.6 kg (274 lb 11.1 oz)   Height: 5' 2" (1.575 m)   PainSc: 0-No pain            Physical Exam:  Physical Exam  Constitutional:       Appearance: Normal appearance. She is obese.   HENT:      Head: Atraumatic.   Eyes:      Extraocular Movements: Extraocular movements intact.   Cardiovascular:      Rate and Rhythm: Normal rate and regular rhythm.   Pulmonary:      Breath sounds: Stridor " present.      Comments: Mild increased work of breathing after ambulating. Mild stridor   Abdominal:      Palpations: Abdomen is soft.   Musculoskeletal:         General: Normal range of motion.      Cervical back: Normal range of motion.   Skin:     General: Skin is warm and dry.   Neurological:      General: No focal deficit present.      Mental Status: She is alert and oriented to person, place, and time.   Psychiatric:         Mood and Affect: Mood normal.         Behavior: Behavior normal.         Diagnostic Results:     CAP CT 1/9/23:  1. No evidence of acute cardiopulmonary process.  No evidence of acute abdominopelvic process.  2. Bilateral thyroid nodules, largest measuring up to 1.4 cm.  3. 4 mm right upper lobe pulmonary nodule.  Consider follow-up per Fleischner criteria.  4. Indeterminate sub 3 cm left adrenal nodule.  Consider abdominal MRI for definitive characterization.  5. Complex ventral abdominal wall hernia containing mesenteric fat.     CT neck/chest 07/13/23:    There has been interval development of severe focal stenosis of the trachea at the level of the thyroid gland which itself is mildly enlarged.  This is better visualized on the chest CT of the same day where the tracheal lumen measures approximately 9 x 4 mm in transverse dimensions at its level of maximal stenosis.  Degenerative changes flatten/impinge the traversing cord most notable at C5-6.        ASSESSMENT/PLAN:      Patient is a 72 y.o. female with HTN, CKD, HLD, DM, COPD, and RANDEE here today for evaluation of tracheal stenosis following tracheostomy.      PLAN:  Plan   Interval increase in focal segment of upper tracheal narrowing.   To OR for Bronchoscopy with balloon dilation today. possible Trufreeze and possible kenalog possible rigid bronchoscopy. start with LMA.     Gilbert Paulino MD  [facility] PGY4

## 2023-08-16 NOTE — NURSING TRANSFER
Nursing Transfer Note      8/16/2023   2:44 PM    Nurse giving handoff:angie rn   Nurse receiving handoff:[facility] nurse    Reason patient is being transferred: post procedure    Transfer To: [facility] 38    Transfer via stretcher    Transfer with n/a    Transported by rn    Patient belongings transferred with patient: No    Chart send with patient: Yes    Notified: daughter    Patient reassessed at: 8/16/23 @1411

## 2023-08-16 NOTE — ANESTHESIA POSTPROCEDURE EVALUATION
Anesthesia Post Evaluation    Patient: Page Grissom    Procedure(s) Performed: Procedure(s) (LRB):  Flex Bronchoscopy with balloon dialation & kenelog (N/A)    Final Anesthesia Type: general      Patient location during evaluation: PACU  Patient participation: Yes- Able to Participate  Level of consciousness: awake and alert  Post-procedure vital signs: reviewed and stable  Pain management: adequate  Airway patency: patent (baseline SOB)  RANDEE mitigation strategies: Extubation and recovery carried out in lateral, semiupright, or other nonsupine position  PONV status at discharge: No PONV  Anesthetic complications: no      Cardiovascular status: hemodynamically stable  Respiratory status: room air and spontaneous ventilation  Hydration status: euvolemic  Follow-up not needed.          Vitals Value Taken Time   /68 08/16/23 1402   Temp 36.5 °C (97.7 °F) 08/16/23 1010   Pulse 71 08/16/23 1415   Resp 27 08/16/23 1415   SpO2 94 % 08/16/23 1415   Vitals shown include unvalidated device data.      Event Time   Out of Recovery 13:15:00         Pain/José Score: José Score: 9 (8/16/2023  1:15 PM)

## 2023-08-16 NOTE — ANESTHESIA RELEASE NOTE
"Anesthesia Release from PACU Note    Patient: Page Grissom    Procedure(s) Performed: Procedure(s) (LRB):  Flex Bronchoscopy with balloon dialation & kenelog (N/A)    Anesthesia type: general    Post pain: Adequate analgesia    Post assessment: no apparent anesthetic complications    Last Vitals:   Visit Vitals  BP (!) 156/72   Pulse (!) 58   Temp 36.5 °C (97.7 °F) (Temporal)   Resp 18   Ht 5' 4" (1.626 m)   Wt 121.6 kg (268 lb)   SpO2 97%   Breastfeeding No   BMI 46.00 kg/m²       Post vital signs: stable    Level of consciousness: awake, alert  and oriented    Nausea/Vomiting: no nausea/no vomiting    Complications: none    Airway Patency: SOB at baseline     Respiratory: spontaneous ventilation, room air    Cardiovascular: stable    Hydration: euvolemic  "

## 2023-08-16 NOTE — CARE UPDATE
Patient received from OR with [facility] CPAP mask with 10 liters of O2 bled in. Patient changed to 100% NRB and then to simple mask @ 6 liters. Patient sats 100% sustained with each device. ABG obtained, will continue to monitor.

## 2023-08-16 NOTE — ANESTHESIA PROCEDURE NOTES
Intubation    Date/Time: 8/16/2023 8:28 AM    Performed by: Lauren Coyle  Authorized by: Adeola Becker MD    Intubation:     Induction:  Intravenous    Intubated:  Postinduction    Mask Ventilation:  Easy mask    Attempts:  1    Attempted By:  Student    Method of Intubation:  Video laryngoscopy    Blade:  Keane 3    Laryngeal View Grade: Grade I - full view of cords      Difficult Airway Encountered?: No      Complications:  None    Airway Device:  Oral endotracheal tube    Airway Device Size:  9.0    Style/Cuff Inflation:  Cuffed (inflated to minimal occlusive pressure)    Tube secured:  17    Secured at:  The lips (for bronch)    Placement Verified By:  Capnometry    Complicating Factors:  None    Findings Post-Intubation:  BS equal bilateral

## 2023-08-17 LAB — POCT GLUCOSE: 153 MG/DL (ref 70–110)

## 2023-09-25 ENCOUNTER — TELEPHONE (OUTPATIENT)
Dept: CARDIOTHORACIC SURGERY | Facility: CLINIC | Age: 73
End: 2023-09-25
Payer: MEDICARE

## 2023-09-25 DIAGNOSIS — J39.8 TRACHEAL STENOSIS: Primary | ICD-10-CM

## 2023-09-25 NOTE — TELEPHONE ENCOUNTER
Patient's daughter called back. Patient is not having any chest pain, just has shortness of breath and breathing like previously at her appointment. Spoke to Dr Rosado and advised patient if she has any chest pain or breathing changes to go to the nearest ER. If patient is still the same as at appointment to follow up with chest ct on Wed. Patient's daughter verbalized understanding to go to the ER if chest pain or difficultly breathing. Patient given phone number is she has any other questions/concerns.

## 2023-09-25 NOTE — TELEPHONE ENCOUNTER
----- Message from Ezekiel Estevez sent at 9/25/2023  8:16 AM CDT -----  Regarding: Orders: CAT SCAN / advise: symptoms  Contact: Cassy (daughter) @ [phone]  CallerCassy (daughter) is calling asking to speak with someone in the office. Caller states that pt is complaining about breathing and pain in her chest; pt has started coughing again; you can hear that her breathing is abnormal. Caller is very concerned and is asking if an order can be put in for pt to have another CAT SCAN.     Caller is asking for a return call back today to advise. Thanks.

## 2023-09-25 NOTE — TELEPHONE ENCOUNTER
Called patient's daughter twice and left message. If patient is just having sob and breathing the same as office visit, we can order a chest ct. If patient is having chest pains to be evaluated in the ER. Left direct phone line on voicemail for daughter to call back if chest ct is needed.

## 2023-09-26 ENCOUNTER — OFFICE VISIT (OUTPATIENT)
Dept: ALLERGY | Facility: CLINIC | Age: 73
End: 2023-09-26
Payer: MEDICARE

## 2023-09-26 ENCOUNTER — HOSPITAL ENCOUNTER (OUTPATIENT)
Dept: RADIOLOGY | Facility: HOSPITAL | Age: 73
Discharge: HOME | End: 2023-09-26
Payer: MEDICARE

## 2023-09-26 VITALS
HEART RATE: 73 BPM | WEIGHT: 262.38 LBS | HEIGHT: 63 IN | BODY MASS INDEX: 46.49 KG/M2 | TEMPERATURE: 98 F | SYSTOLIC BLOOD PRESSURE: 145 MMHG | DIASTOLIC BLOOD PRESSURE: 78 MMHG

## 2023-09-26 DIAGNOSIS — R03.0 ELEVATED BLOOD PRESSURE READING: ICD-10-CM

## 2023-09-26 DIAGNOSIS — T78.3XXA ANGIOEDEMA DUE TO ANGIOTENSIN CONVERTING ENZYME INHIBITOR (ACE-I): Primary | ICD-10-CM

## 2023-09-26 DIAGNOSIS — T46.4X5A ANGIOEDEMA DUE TO ANGIOTENSIN CONVERTING ENZYME INHIBITOR (ACE-I): Primary | ICD-10-CM

## 2023-09-26 DIAGNOSIS — J39.8 TRACHEAL STENOSIS: ICD-10-CM

## 2023-09-26 PROCEDURE — 99214 OFFICE O/P EST MOD 30 MIN: CPT | Mod: S$PBB,,, | Performed by: ALLERGY & IMMUNOLOGY

## 2023-09-26 PROCEDURE — 99214 OFFICE O/P EST MOD 30 MIN: CPT | Mod: PBBFAC,25 | Performed by: ALLERGY & IMMUNOLOGY

## 2023-09-26 PROCEDURE — 71250 CT THORAX DX C-: CPT | Mod: TC

## 2023-09-26 PROCEDURE — 71250 CT CHEST WITHOUT CONTRAST: ICD-10-PCS | Mod: 26,,, | Performed by: RADIOLOGY

## 2023-09-26 PROCEDURE — 99999 PR PBB SHADOW E&M-EST. PATIENT-LVL IV: CPT | Mod: PBBFAC,,, | Performed by: ALLERGY & IMMUNOLOGY

## 2023-09-26 PROCEDURE — 71250 CT THORAX DX C-: CPT | Mod: 26,,, | Performed by: RADIOLOGY

## 2023-09-26 PROCEDURE — 99999 PR PBB SHADOW E&M-EST. PATIENT-LVL IV: ICD-10-PCS | Mod: PBBFAC,,, | Performed by: ALLERGY & IMMUNOLOGY

## 2023-09-26 PROCEDURE — 99214 PR OFFICE/OUTPT VISIT, EST, LEVL IV, 30-39 MIN: ICD-10-PCS | Mod: S$PBB,,, | Performed by: ALLERGY & IMMUNOLOGY

## 2023-09-26 NOTE — PROGRESS NOTES
Subjective:       Patient ID: Pageta Grissom is a 72 y.o. female.      Chief Complaint:  Follow-up      HPI 9/26/2023: 72 year old female originally seen for ACE inhibitor angioedema (8/15/2023)  No tongue or throat swelling.  Not required Epipen.                Past Medical History:   Diagnosis Date    Acidosis, metabolic, with respiratory acidosis 3/31/2022    Asthma     Back pain     Cataract     OD    CKD (chronic kidney disease) stage 3, GFR 30-59 ml/min 12/20/2020    Diabetes mellitus     Fibromyalgia     Gastroesophageal reflux disease     Hypercholesterolemia     Hypertension     Immune deficiency disorder     Left ventricular failure, unspecified 8/15/2023    Obesity     Osteoporosis     Rheumatoid arthritis     Tobacco dependence     Trouble in sleeping     Type 2 diabetes mellitus        Family History   Problem Relation Age of Onset    Hypertension Mother     Cancer Father     Colon cancer Father     Breast cancer Sister      Current Outpatient Medications on File Prior to Visit   Medication Sig Dispense Refill    acetaminophen (TYLENOL) 500 MG tablet Take 500 mg by mouth as needed for Pain.      albuterol (PROVENTIL/VENTOLIN HFA) 90 mcg/actuation inhaler INHALE 2 PUFFS INTO THE LINGS EVERY 6 HOURS AS NEEDED WHEEZING 25.5 g 3    amLODIPine (NORVASC) 10 MG tablet TAKE 1 TABLET(10 MG) BY MOUTH EVERY DAY 90 tablet 3    blood sugar diagnostic Strp To check BG 1 times daily, to use with insurance preferred meter 100 strip 11    blood-glucose meter kit To check BG 1 times daily, to use with insurance preferred meter 1 each 0    cholecalciferol, vitamin D3, 2,000 unit Tab Take 2,000 Units by mouth once daily.      dulaglutide (TRULICITY) 0.75 mg/0.5 mL pen injector Inject 0.75 mg into the skin every 7 days. (Patient taking differently: Inject 0.75 mg into the skin every 7 days. SUNDAYS) 4 pen 11    EPINEPHrine (EPIPEN 2-DANA) 0.3 mg/0.3 mL AtIn Inject 0.3 mLs (0.3 mg total) into the muscle once. for 1  dose 1 each 11    fish oil-omega-3 fatty acids 300-1,000 mg capsule Take 2 g by mouth once daily.      fluticasone furoate-vilanteroL (BREO ELLIPTA) 100-25 mcg/dose diskus inhaler Inhale 1 puff into the lungs once daily. Controller 60 each 6    furosemide (LASIX) 20 MG tablet Take 1 tablet (20 mg total) by mouth daily as needed (edema/swelling). 90 tablet 3    hydrALAZINE (APRESOLINE) 50 MG tablet TAKE 1 TABLET BY MOUTH THREE TIMES DAILY 90 tablet 2    hydrOXYchloroQUINE (PLAQUENIL) 200 mg tablet Take 1 tablet (200 mg total) by mouth once daily. 90 tablet 1    lancets Misc To check BG 1 times daily, to use with insurance preferred meter 100 each 11    metFORMIN (GLUCOPHAGE) 500 MG tablet Take 1 tablet (500 mg total) by mouth once daily. 90 tablet 1    multivitamin (THERAGRAN) per tablet Take 1 tablet by mouth once daily.      pantoprazole (PROTONIX) 40 MG tablet TAKE 1 TABLET(40 MG) BY MOUTH EVERY DAY 90 tablet 3    potassium chloride SA (K-DUR,KLOR-CON) 20 MEQ tablet Take 1 tablet (20 mEq total) by mouth once daily. 90 tablet 3    rosuvastatin (CRESTOR) 40 MG Tab Take 1 tablet (40 mg total) by mouth every evening. 90 tablet 3    cetirizine (ZYRTEC) 10 MG tablet Take 1 tablet (10 mg total) by mouth once daily. 90 tablet 3    famotidine (PEPCID) 40 MG tablet Take 1 tablet (40 mg total) by mouth once daily. 90 tablet 3    [DISCONTINUED] hydroCHLOROthiazide (HYDRODIURIL) 25 MG tablet TAKE 1 TABLET(25 MG) BY MOUTH EVERY DAY 90 tablet 3     Current Facility-Administered Medications on File Prior to Visit   Medication Dose Route Frequency Provider Last Rate Last Admin    LIDOcaine (PF) 10 mg/ml (1%) injection 10 mg  1 mL Intradermal Once Dave Jansen PA-C            Environmental History: Pets in the home: dogs (1).  Tobacco Smoke in Home: no  Review of Systems   Constitutional:  Negative for chills and fever.   HENT:  Negative for congestion and rhinorrhea.    Eyes:  Negative for discharge, itching and visual  disturbance.   Respiratory:  Negative for cough, shortness of breath and wheezing.    Cardiovascular:  Negative for chest pain and leg swelling.   Gastrointestinal:  Negative for nausea and vomiting.   Skin:  Negative for rash and wound.   Allergic/Immunologic: Positive for environmental allergies. Negative for food allergies.   Neurological:  Negative for light-headedness and numbness.   Hematological:  Negative for adenopathy. Does not bruise/bleed easily.   Psychiatric/Behavioral:  Negative for behavioral problems and suicidal ideas.    All other systems reviewed and are negative.       Objective:    Physical Exam  Vitals reviewed.   Constitutional:       General: She is not in acute distress.     Appearance: Normal appearance. She is well-developed. She is obese. She is not ill-appearing, toxic-appearing or diaphoretic.   HENT:      Head: Normocephalic and atraumatic.      Right Ear: Tympanic membrane, ear canal and external ear normal. There is no impacted cerumen.      Left Ear: Tympanic membrane, ear canal and external ear normal. There is no impacted cerumen.      Nose: Nose normal. No congestion or rhinorrhea.      Mouth/Throat:      Pharynx: No oropharyngeal exudate or posterior oropharyngeal erythema.   Eyes:      General: No scleral icterus.        Right eye: No discharge.         Left eye: No discharge.      Pupils: Pupils are equal, round, and reactive to light.   Neck:      Thyroid: No thyromegaly.   Cardiovascular:      Rate and Rhythm: Normal rate and regular rhythm.      Heart sounds: Normal heart sounds. No murmur heard.     No friction rub. No gallop.   Pulmonary:      Effort: Pulmonary effort is normal. No respiratory distress.      Breath sounds: Normal breath sounds. No stridor. No wheezing, rhonchi or rales.   Chest:      Chest wall: No tenderness.   Musculoskeletal:         General: No swelling, tenderness, deformity or signs of injury. Normal range of motion.      Cervical back: Normal  range of motion and neck supple. No rigidity. No muscular tenderness.      Right lower leg: No edema.   Lymphadenopathy:      Cervical: No cervical adenopathy.   Skin:     General: Skin is warm.      Coloration: Skin is not jaundiced or pale.      Findings: No bruising or erythema.   Neurological:      Mental Status: She is alert and oriented to person, place, and time.      Gait: Gait normal.   Psychiatric:         Mood and Affect: Mood normal.         Behavior: Behavior normal.         Thought Content: Thought content normal.         Judgment: Judgment normal.           Assessment:       1. Angioedema due to angiotensin converting enzyme inhibitor (ACE-I)    2. Elevated blood pressure reading             Plan:       Angioedema due to angiotensin converting enzyme inhibitor (ACE-I)    Elevated blood pressure reading    No episodes of swelling/angioedema- outside of the 12 month window.  Recommend she avoid ACE inhibitors.      RTC prn    MD,LOBO                    Problems Address                                                 Amount and/or Complexity                                                                      Risk       3           [] 2 or more self-limited or minor problems                      [] Limited                                                                        [] Low                  [] 1 stable chronic illness                                                  Any combination of the two                                               OTC drugs                  []Acute, uncomplicated illness or injury                            Review of prior external notes from unique source           Minor surgery with no risk factors                                                                                                               [] 1 []2  []3+                                                                                                              Review of results from each unique  test                                                                                                               [] 1 []2  [] 3+                                                                                                              Order of each unique test                                                                                                               [] 1 []2  [] 3+                                                                                                              Or                                                                                                             [] Assessment requiring an independent historian      4            [] One or more chronic illness with exacerbation,              [] Moderate                                                                      [] Moderate                 Progression, or side effects of treatment                            -test documents or independent historians                        Prescription drug management                [x]  2 or more stable chronic illnesses                                    [] Independent interpretation of tests                              Minor surgery with identifiable risk                [] 1 undiagnosed new problem with uncertain prognosis    [] Discussion or management of test results                    elective major surgery                [] 1 acute illness with                systemic symptoms                                                                                                                                                              [] 1 acute complicated injury                                                                                                                                          Elective major surgery                                                                                                                                                                                                                                                                                                                                                                                                   5            [] 1 or more chronic illnesses with severe exacerbation,     [] Extensive(two from below)                                         [] High                                                                                                               [] Independent interpretation of results                         Drug therapy requiring intensive                                                                                                               []Discussion of management or test interpretation           monitoring                                                                                                                                                                                                       Decision to de-escalate care                 [] 1 acute or chronic illness or injury that poses a threat                                                                                               Decision regarding hospitalization

## 2023-10-04 DIAGNOSIS — M05.79 RHEUMATOID ARTHRITIS INVOLVING MULTIPLE SITES WITH POSITIVE RHEUMATOID FACTOR: ICD-10-CM

## 2023-10-04 NOTE — TELEPHONE ENCOUNTER
Left message for patient.  I gave her refills for 6 mos 2 mos ago.  Please verify pt taking daily.  Should have a refill left

## 2023-10-10 RX ORDER — HYDROXYCHLOROQUINE SULFATE 200 MG/1
200 TABLET, FILM COATED ORAL
Qty: 90 TABLET | Refills: 1 | OUTPATIENT
Start: 2023-10-10

## 2023-10-14 DIAGNOSIS — I10 PRIMARY HYPERTENSION: ICD-10-CM

## 2023-10-14 NOTE — TELEPHONE ENCOUNTER
No care due was identified.  Health [facility] Embedded Care Due Messages. Reference number: [id number].   10/14/2023 3:59:42 AM CDT

## 2023-10-16 RX ORDER — HYDRALAZINE HYDROCHLORIDE 50 MG/1
TABLET, FILM COATED ORAL
Qty: 270 TABLET | Refills: 2 | Status: SHIPPED | OUTPATIENT
Start: 2023-10-16

## 2023-10-16 NOTE — TELEPHONE ENCOUNTER
Refill Decision Note   Page Grissom  is requesting a refill authorization.  Brief Assessment and Rationale for Refill:  Approve     Medication Therapy Plan:         Comments:     Note composed:10:01 AM 10/16/2023

## 2023-10-27 ENCOUNTER — HOSPITAL ENCOUNTER (OUTPATIENT)
Dept: RADIOLOGY | Facility: HOSPITAL | Age: 73
Discharge: HOME | End: 2023-10-27
Attending: UROLOGY
Payer: MEDICARE

## 2023-10-27 DIAGNOSIS — E27.8 OTHER SPECIFIED DISORDERS OF ADRENAL GLAND: ICD-10-CM

## 2023-10-27 DIAGNOSIS — T78.3XXD ANGIOTENSIN CONVERTING ENZYME INHIBITOR-AGGRAVATED ANGIOEDEMA, SUBSEQUENT ENCOUNTER: ICD-10-CM

## 2023-10-27 DIAGNOSIS — T46.4X5D ANGIOTENSIN CONVERTING ENZYME INHIBITOR-AGGRAVATED ANGIOEDEMA, SUBSEQUENT ENCOUNTER: ICD-10-CM

## 2023-10-27 PROCEDURE — 74150 CT ABDOMEN W/O CONTRAST: CPT | Mod: TC

## 2023-10-27 PROCEDURE — 74150 CT ABDOMEN WITHOUT CONTRAST: ICD-10-PCS | Mod: 26,,, | Performed by: RADIOLOGY

## 2023-10-27 PROCEDURE — 74150 CT ABDOMEN W/O CONTRAST: CPT | Mod: 26,,, | Performed by: RADIOLOGY

## 2023-10-27 RX ORDER — CETIRIZINE HYDROCHLORIDE 10 MG/1
10 TABLET ORAL
Qty: 90 TABLET | Refills: 3 | Status: SHIPPED | OUTPATIENT
Start: 2023-10-27

## 2023-11-14 ENCOUNTER — OFFICE VISIT (OUTPATIENT)
Dept: INTERNAL MEDICINE | Facility: CLINIC | Age: 73
End: 2023-11-14
Payer: MEDICARE

## 2023-11-14 VITALS
SYSTOLIC BLOOD PRESSURE: 120 MMHG | OXYGEN SATURATION: 100 % | HEART RATE: 72 BPM | DIASTOLIC BLOOD PRESSURE: 70 MMHG | HEIGHT: 63 IN | BODY MASS INDEX: 46.25 KG/M2 | RESPIRATION RATE: 18 BRPM | WEIGHT: 261 LBS

## 2023-11-14 DIAGNOSIS — Z99.81 DEPENDENCE ON SUPPLEMENTAL OXYGEN: ICD-10-CM

## 2023-11-14 DIAGNOSIS — N25.81 SECONDARY HYPERPARATHYROIDISM OF RENAL ORIGIN: ICD-10-CM

## 2023-11-14 DIAGNOSIS — I15.2 HYPERTENSION ASSOCIATED WITH DIABETES: ICD-10-CM

## 2023-11-14 DIAGNOSIS — N18.30 CKD STAGE 3 DUE TO TYPE 2 DIABETES MELLITUS: ICD-10-CM

## 2023-11-14 DIAGNOSIS — E78.5 HYPERLIPIDEMIA ASSOCIATED WITH TYPE 2 DIABETES MELLITUS: ICD-10-CM

## 2023-11-14 DIAGNOSIS — I70.0 AORTIC ATHEROSCLEROSIS: ICD-10-CM

## 2023-11-14 DIAGNOSIS — E11.22 CKD STAGE 3 DUE TO TYPE 2 DIABETES MELLITUS: ICD-10-CM

## 2023-11-14 DIAGNOSIS — E11.59 HYPERTENSION ASSOCIATED WITH DIABETES: ICD-10-CM

## 2023-11-14 DIAGNOSIS — K21.9 GASTROESOPHAGEAL REFLUX DISEASE WITHOUT ESOPHAGITIS: ICD-10-CM

## 2023-11-14 DIAGNOSIS — Z79.899 DRUG-INDUCED IMMUNODEFICIENCY: ICD-10-CM

## 2023-11-14 DIAGNOSIS — M05.79 RHEUMATOID ARTHRITIS INVOLVING MULTIPLE SITES WITH POSITIVE RHEUMATOID FACTOR: ICD-10-CM

## 2023-11-14 DIAGNOSIS — E55.9 VITAMIN D DEFICIENCY DISEASE: ICD-10-CM

## 2023-11-14 DIAGNOSIS — J45.30 MILD PERSISTENT ASTHMA WITHOUT COMPLICATION: ICD-10-CM

## 2023-11-14 DIAGNOSIS — E11.69 HYPERLIPIDEMIA ASSOCIATED WITH TYPE 2 DIABETES MELLITUS: ICD-10-CM

## 2023-11-14 DIAGNOSIS — D84.821 DRUG-INDUCED IMMUNODEFICIENCY: ICD-10-CM

## 2023-11-14 DIAGNOSIS — E11.8 DIABETES MELLITUS TYPE 2 WITH COMPLICATIONS: ICD-10-CM

## 2023-11-14 DIAGNOSIS — Z00.00 ENCOUNTER FOR PREVENTIVE HEALTH EXAMINATION: Primary | ICD-10-CM

## 2023-11-14 PROCEDURE — G0439 PPPS, SUBSEQ VISIT: HCPCS | Mod: ,,, | Performed by: NURSE PRACTITIONER

## 2023-11-14 PROCEDURE — G0439 PR MEDICARE ANNUAL WELLNESS SUBSEQUENT VISIT: ICD-10-PCS | Mod: ,,, | Performed by: NURSE PRACTITIONER

## 2023-11-14 PROCEDURE — 99215 OFFICE O/P EST HI 40 MIN: CPT | Mod: PBBFAC,PO | Performed by: NURSE PRACTITIONER

## 2023-11-14 PROCEDURE — 99999 PR PBB SHADOW E&M-EST. PATIENT-LVL V: CPT | Mod: PBBFAC,,, | Performed by: NURSE PRACTITIONER

## 2023-11-14 PROCEDURE — 99999 PR PBB SHADOW E&M-EST. PATIENT-LVL V: ICD-10-PCS | Mod: PBBFAC,,, | Performed by: NURSE PRACTITIONER

## 2023-11-14 NOTE — PATIENT INSTRUCTIONS
Counseling and Referral of Other Preventative  (Italic type indicates deductible and co-insurance are waived)    Patient Name: Page Grissom  Today's Date: 11/14/2023    Health Maintenance       Date Due Completion Date    RSV Vaccine (Age 60+) (1 - 1-dose 60+ series) Never done ---    Shingles Vaccine (1 of 2) 06/25/2015 4/30/2015    TETANUS VACCINE 10/16/2022 10/16/2012    Influenza Vaccine (1) 09/01/2023 1/20/2022    Override on 11/10/2016: Done    COVID-19 Vaccine (3 - 2023-24 season) 09/01/2023 9/17/2021    Colorectal Cancer Screening 02/14/2024 2/14/2019    Hemoglobin A1c 02/15/2024 8/15/2023    Mammogram 03/17/2024 3/17/2023    Override on 11/9/2012: Done    Diabetes Urine Screening 05/15/2024 5/15/2023    Foot Exam 05/30/2024 5/30/2023 (Done)    Override on 5/30/2023: Done    Override on 2/21/2022: Done    Override on 12/17/2021: Done    Override on 12/10/2020: Done    Override on 6/5/2020: Done    Override on 6/13/2018: Done    Override on 5/9/2017: Done    Override on 4/30/2015: Done    Override on 4/30/2014: Done (per Trey Swan DPM)    Eye Exam 08/03/2024 8/3/2023    Override on 5/3/2013: Done    Lipid Panel 08/15/2024 8/15/2023    DEXA Scan 02/21/2025 2/21/2022        No orders of the defined types were placed in this encounter.    The following information is provided to all patients.  This information is to help you find resources for any of the problems found today that may be affecting your health:                Living healthy guide: www.[facility].[place].gov      Understanding Diabetes: www.diabetes.org      Eating healthy: www.cdc.gov/healthyweight      CDC home safety checklist: www.cdc.gov/steadi/patient.html      Agency on Aging: www.goea.[place].gov      Alcoholics anonymous (AA): www.aa.org      Physical Activity: www.rosalinda.nih.gov/cg6ucdv      Tobacco use: www.quitwithusla.org

## 2023-11-14 NOTE — PROGRESS NOTES
"  Page Grissom presented for a  Medicare AWV and comprehensive Health Risk Assessment today. The following components were reviewed and updated:    Medical history  Family History  Social history  Allergies and Current Medications  Health Risk Assessment  Health Maintenance  Care Team         ** See Completed Assessments for Annual Wellness Visit within the encounter summary.**         The following assessments were completed:  Living Situation  CAGE  Depression Screening  Timed Get Up and Go  Whisper Test  Cognitive Function Screening    Nutrition Screening  ADL Screening  PAQ Screening        Vitals:    11/14/23 1123   BP: 120/70   Pulse: 72   Resp: 18   SpO2: 100%   Weight: 118.4 kg (261 lb 0.4 oz)   Height: 5' 2.5" (1.588 m)     Body mass index is 46.98 kg/m².  Physical Exam  Constitutional:       Appearance: Normal appearance. She is well-developed. She is not ill-appearing or diaphoretic.   HENT:      Head: Normocephalic and atraumatic.      Right Ear: External ear normal.      Left Ear: External ear normal.      Nose: Nose normal.   Eyes:      General: No scleral icterus.        Right eye: No discharge.         Left eye: No discharge.      Conjunctiva/sclera: Conjunctivae normal.   Neck:      Thyroid: No thyromegaly.      Vascular: No carotid bruit.      Trachea: No tracheal deviation.   Cardiovascular:      Rate and Rhythm: Normal rate and regular rhythm.      Heart sounds: No murmur heard.     No friction rub. No gallop.   Pulmonary:      Effort: Pulmonary effort is normal. No respiratory distress.      Breath sounds: Normal breath sounds. No stridor. No wheezing, rhonchi or rales.   Chest:      Chest wall: No tenderness.   Abdominal:      General: Bowel sounds are normal. There is no distension.      Palpations: Abdomen is soft. There is no mass.      Tenderness: There is no abdominal tenderness. There is no guarding or rebound.      Hernia: No hernia is present.      Comments: Central adiposity "   Musculoskeletal:         General: No tenderness. Normal range of motion.      Cervical back: Normal range of motion and neck supple. No edema or tenderness. Normal range of motion.   Lymphadenopathy:      Head:      Right side of head: No tonsillar adenopathy.      Left side of head: No tonsillar adenopathy.      Cervical: No cervical adenopathy.      Upper Body:      Right upper body: No supraclavicular adenopathy.      Left upper body: No supraclavicular adenopathy.   Skin:     General: Skin is warm and dry.      Findings: No lesion or rash.   Neurological:      Mental Status: She is alert and oriented to person, place, and time.      Deep Tendon Reflexes: Reflexes are normal and symmetric.   Psychiatric:         Mood and Affect: Mood normal.         Behavior: Behavior normal.               Diagnoses and health risks identified today and associated recommendations/orders:    1. Encounter for preventive health examination  Screenings performed, as noted above.  Personal preventative testing needs reviewed.      2. Dependence on supplemental oxygen  Assessed, pulse ox 100%    3. Hyperlipidemia associated with type 2 diabetes mellitus  Treated with Crestor, stable, cont tx    4. Aortic atherosclerosis  Monitored, stable on a statin, cont tx    5. Hypertension associated with diabetes  Treated with amlodipine, hydralizine, stable, cont tx    6. CKD stage 3 due to type 2 diabetes mellitus  Monitored, stable, last GFR 37, follow up with pcp    7. Rheumatoid arthritis involving multiple sites with positive rheumatoid factor  Treated with Plaquenil, stable, cont tx    8. Drug-induced immunodeficiency  Monitored, stable, follow up with pcp    9. Vitamin D deficiency disease  Treated with supplements, stable, cont tx    10. Diabetes mellitus type 2 with complications  Treated with Trulcity, Metformin, stable, cont tx    11. Gastroesophageal reflux disease without esophagitis  Treated with Pepcid, Protonix prn, stable,  cont tx    12. Secondary hyperparathyroidism of renal origin  Monitored, stable, last .7, follow up with pcp    13. Mild persistent asthma without complication  Treated with inhalers, stable, cont tx    Review for Substance Use Disorders: patient does not use substances per chart    Review for opioid screening: Patient is not prescribed opioids     Declined vaccines today      Provided Page with a 5-10 year written screening schedule and personal prevention plan. Recommendations were developed using the USPSTF age appropriate recommendations. Education, counseling, and referrals were provided as needed. After Visit Summary printed and given to patient which includes a list of additional screenings\tests needed.    No follow-ups on file.    Steve Gonzalez, GEORGETTE  I offered to discuss advanced care planning, including how to pick a person who would make decisions for you if you were unable to make them for yourself, called a health care power of , and what kind of decisions you might make such as use of life sustaining treatments such as ventilators and tube feeding when faced with a life limiting illness recorded on a living will that they will need to know. (How you want to be cared for as you near the end of your natural life)     X Patient is interested in learning more about how to make advanced directives.  I provided them paperwork and offered to discuss this with them.

## 2023-12-03 DIAGNOSIS — Z71.89 COMPLEX CARE COORDINATION: ICD-10-CM

## 2023-12-07 ENCOUNTER — TELEPHONE (OUTPATIENT)
Dept: INTERNAL MEDICINE | Facility: CLINIC | Age: 73
End: 2023-12-07
Payer: MEDICARE

## 2024-01-01 NOTE — PROGRESS NOTES
Called to the emergency room due to the inability for an airway to be obtained.     The patient had an emergent quite go to tracheostomy tube placed likely through a tracheal ring below the cricothyroid membrane.     The patient has a Cracco tracheostomy tube with an internal diameter of 5 mm.     We will follow up with the patient and discuss this with the intensivist to determine if this needs to be upsized to a more formal tracheostomy tube.  If a formal tracheostomy tube is needed he will likely be performed in the operating room             [email or web address]

## 2024-01-03 DIAGNOSIS — M05.79 RHEUMATOID ARTHRITIS INVOLVING MULTIPLE SITES WITH POSITIVE RHEUMATOID FACTOR: ICD-10-CM

## 2024-01-03 RX ORDER — HYDROXYCHLOROQUINE SULFATE 200 MG/1
200 TABLET, FILM COATED ORAL DAILY
Qty: 90 TABLET | Refills: 0 | Status: SHIPPED | OUTPATIENT
Start: 2024-01-03

## 2024-01-04 ENCOUNTER — LAB VISIT (OUTPATIENT)
Dept: LAB | Facility: HOSPITAL | Age: 74
End: 2024-01-04
Attending: PHYSICIAN ASSISTANT
Payer: MEDICARE

## 2024-01-04 DIAGNOSIS — M05.79 RHEUMATOID ARTHRITIS INVOLVING MULTIPLE SITES WITH POSITIVE RHEUMATOID FACTOR: ICD-10-CM

## 2024-01-04 LAB
ALBUMIN SERPL BCP-MCNC: 3.3 G/DL (ref 3.5–5.2)
ALP SERPL-CCNC: 105 U/L (ref 55–135)
ALT SERPL W/O P-5'-P-CCNC: 10 U/L (ref 10–44)
ANION GAP SERPL CALC-SCNC: 11 MMOL/L (ref 8–16)
AST SERPL-CCNC: 16 U/L (ref 10–40)
BASOPHILS # BLD AUTO: 0.05 K/UL (ref 0–0.2)
BASOPHILS NFR BLD: 0.6 % (ref 0–1.9)
BILIRUB SERPL-MCNC: 0.3 MG/DL (ref 0.1–1)
BUN SERPL-MCNC: 17 MG/DL (ref 8–23)
CALCIUM SERPL-MCNC: 9.2 MG/DL (ref 8.7–10.5)
CHLORIDE SERPL-SCNC: 104 MMOL/L (ref 95–110)
CO2 SERPL-SCNC: 26 MMOL/L (ref 23–29)
CREAT SERPL-MCNC: 1.4 MG/DL (ref 0.5–1.4)
CRP SERPL-MCNC: 19 MG/L (ref 0–8.2)
DIFFERENTIAL METHOD BLD: ABNORMAL
EOSINOPHIL # BLD AUTO: 0.2 K/UL (ref 0–0.5)
EOSINOPHIL NFR BLD: 2.8 % (ref 0–8)
ERYTHROCYTE [DISTWIDTH] IN BLOOD BY AUTOMATED COUNT: 17.8 % (ref 11.5–14.5)
ERYTHROCYTE [SEDIMENTATION RATE] IN BLOOD BY WESTERGREN METHOD: 47 MM/HR (ref 0–20)
EST. GFR  (NO RACE VARIABLE): 40 ML/MIN/1.73 M^2
GLUCOSE SERPL-MCNC: 96 MG/DL (ref 70–110)
HCT VFR BLD AUTO: 36.5 % (ref 37–48.5)
HGB BLD-MCNC: 11.1 G/DL (ref 12–16)
IMM GRANULOCYTES # BLD AUTO: 0.02 K/UL (ref 0–0.04)
IMM GRANULOCYTES NFR BLD AUTO: 0.3 % (ref 0–0.5)
LYMPHOCYTES # BLD AUTO: 2.4 K/UL (ref 1–4.8)
LYMPHOCYTES NFR BLD: 30.6 % (ref 18–48)
MCH RBC QN AUTO: 24.2 PG (ref 27–31)
MCHC RBC AUTO-ENTMCNC: 30.4 G/DL (ref 32–36)
MCV RBC AUTO: 80 FL (ref 82–98)
MONOCYTES # BLD AUTO: 0.7 K/UL (ref 0.3–1)
MONOCYTES NFR BLD: 9.1 % (ref 4–15)
NEUTROPHILS # BLD AUTO: 4.4 K/UL (ref 1.8–7.7)
NEUTROPHILS NFR BLD: 56.6 % (ref 38–73)
NRBC BLD-RTO: 0 /100 WBC
PLATELET # BLD AUTO: 297 K/UL (ref 150–450)
PMV BLD AUTO: 10.9 FL (ref 9.2–12.9)
POTASSIUM SERPL-SCNC: 3.9 MMOL/L (ref 3.5–5.1)
PROT SERPL-MCNC: 8 G/DL (ref 6–8.4)
RBC # BLD AUTO: 4.58 M/UL (ref 4–5.4)
SODIUM SERPL-SCNC: 141 MMOL/L (ref 136–145)
WBC # BLD AUTO: 7.8 K/UL (ref 3.9–12.7)

## 2024-01-04 PROCEDURE — 85025 COMPLETE CBC W/AUTO DIFF WBC: CPT | Performed by: PHYSICIAN ASSISTANT

## 2024-01-04 PROCEDURE — 86140 C-REACTIVE PROTEIN: CPT | Performed by: PHYSICIAN ASSISTANT

## 2024-01-04 PROCEDURE — 80053 COMPREHEN METABOLIC PANEL: CPT | Performed by: PHYSICIAN ASSISTANT

## 2024-01-04 PROCEDURE — 36415 COLL VENOUS BLD VENIPUNCTURE: CPT | Mod: PN | Performed by: PHYSICIAN ASSISTANT

## 2024-01-04 PROCEDURE — 85651 RBC SED RATE NONAUTOMATED: CPT | Performed by: PHYSICIAN ASSISTANT

## 2024-01-10 ENCOUNTER — TELEPHONE (OUTPATIENT)
Dept: RHEUMATOLOGY | Facility: CLINIC | Age: 74
End: 2024-01-10
Payer: MEDICARE

## 2024-01-11 ENCOUNTER — OFFICE VISIT (OUTPATIENT)
Dept: INTERNAL MEDICINE | Facility: CLINIC | Age: 74
End: 2024-01-11
Payer: MEDICARE

## 2024-01-11 ENCOUNTER — TELEPHONE (OUTPATIENT)
Dept: PULMONOLOGY | Facility: CLINIC | Age: 74
End: 2024-01-11
Payer: MEDICARE

## 2024-01-11 VITALS
OXYGEN SATURATION: 95 % | HEIGHT: 62 IN | SYSTOLIC BLOOD PRESSURE: 138 MMHG | TEMPERATURE: 97 F | WEIGHT: 261.69 LBS | BODY MASS INDEX: 48.16 KG/M2 | DIASTOLIC BLOOD PRESSURE: 64 MMHG | HEART RATE: 79 BPM

## 2024-01-11 DIAGNOSIS — J39.8 TRACHEAL STENOSIS: ICD-10-CM

## 2024-01-11 DIAGNOSIS — I70.0 AORTIC ATHEROSCLEROSIS: ICD-10-CM

## 2024-01-11 DIAGNOSIS — Z12.31 ENCOUNTER FOR SCREENING MAMMOGRAM FOR MALIGNANT NEOPLASM OF BREAST: ICD-10-CM

## 2024-01-11 DIAGNOSIS — E78.5 HYPERLIPIDEMIA ASSOCIATED WITH TYPE 2 DIABETES MELLITUS: ICD-10-CM

## 2024-01-11 DIAGNOSIS — E11.8 DIABETES MELLITUS TYPE 2 WITH COMPLICATIONS: Primary | ICD-10-CM

## 2024-01-11 DIAGNOSIS — E55.9 VITAMIN D DEFICIENCY DISEASE: ICD-10-CM

## 2024-01-11 DIAGNOSIS — J45.909 NOCTURNAL HYPOXEMIA DUE TO ASTHMA: ICD-10-CM

## 2024-01-11 DIAGNOSIS — N18.30 CKD STAGE 3 DUE TO TYPE 2 DIABETES MELLITUS: ICD-10-CM

## 2024-01-11 DIAGNOSIS — R09.02 NOCTURNAL HYPOXEMIA DUE TO ASTHMA: ICD-10-CM

## 2024-01-11 DIAGNOSIS — R49.0 DYSPHONIA: ICD-10-CM

## 2024-01-11 DIAGNOSIS — J45.30 MILD PERSISTENT ASTHMA WITHOUT COMPLICATION: Primary | ICD-10-CM

## 2024-01-11 DIAGNOSIS — K21.9 GASTROESOPHAGEAL REFLUX DISEASE WITHOUT ESOPHAGITIS: ICD-10-CM

## 2024-01-11 DIAGNOSIS — N25.81 SECONDARY HYPERPARATHYROIDISM OF RENAL ORIGIN: ICD-10-CM

## 2024-01-11 DIAGNOSIS — E66.01 CLASS 3 SEVERE OBESITY DUE TO EXCESS CALORIES WITH SERIOUS COMORBIDITY AND BODY MASS INDEX (BMI) OF 40.0 TO 44.9 IN ADULT: ICD-10-CM

## 2024-01-11 DIAGNOSIS — M81.0 AGE-RELATED OSTEOPOROSIS WITHOUT CURRENT PATHOLOGICAL FRACTURE: ICD-10-CM

## 2024-01-11 DIAGNOSIS — E11.59 HYPERTENSION ASSOCIATED WITH DIABETES: ICD-10-CM

## 2024-01-11 DIAGNOSIS — M79.7 FIBROMYALGIA: ICD-10-CM

## 2024-01-11 DIAGNOSIS — E11.22 CKD STAGE 3 DUE TO TYPE 2 DIABETES MELLITUS: ICD-10-CM

## 2024-01-11 DIAGNOSIS — I15.2 HYPERTENSION ASSOCIATED WITH DIABETES: ICD-10-CM

## 2024-01-11 DIAGNOSIS — J45.30 MILD PERSISTENT ASTHMA WITHOUT COMPLICATION: ICD-10-CM

## 2024-01-11 DIAGNOSIS — Z12.11 SCREENING FOR MALIGNANT NEOPLASM OF COLON: ICD-10-CM

## 2024-01-11 DIAGNOSIS — E11.69 HYPERLIPIDEMIA ASSOCIATED WITH TYPE 2 DIABETES MELLITUS: ICD-10-CM

## 2024-01-11 DIAGNOSIS — E11.8 TYPE 2 DIABETES MELLITUS WITH COMPLICATION, WITHOUT LONG-TERM CURRENT USE OF INSULIN: ICD-10-CM

## 2024-01-11 DIAGNOSIS — M05.79 RHEUMATOID ARTHRITIS INVOLVING MULTIPLE SITES WITH POSITIVE RHEUMATOID FACTOR: ICD-10-CM

## 2024-01-11 PROCEDURE — 99214 OFFICE O/P EST MOD 30 MIN: CPT | Mod: S$PBB,,, | Performed by: NURSE PRACTITIONER

## 2024-01-11 PROCEDURE — 99999 PR PBB SHADOW E&M-EST. PATIENT-LVL V: CPT | Mod: PBBFAC,,, | Performed by: NURSE PRACTITIONER

## 2024-01-11 PROCEDURE — 99215 OFFICE O/P EST HI 40 MIN: CPT | Mod: PBBFAC | Performed by: NURSE PRACTITIONER

## 2024-01-11 RX ORDER — METFORMIN HYDROCHLORIDE 500 MG/1
500 TABLET ORAL DAILY
Qty: 90 TABLET | Refills: 1 | Status: SHIPPED | OUTPATIENT
Start: 2024-01-11 | End: 2024-01-24

## 2024-01-11 RX ORDER — POTASSIUM CHLORIDE 20 MEQ/1
20 TABLET, EXTENDED RELEASE ORAL DAILY
Qty: 90 TABLET | Refills: 3 | Status: SHIPPED | OUTPATIENT
Start: 2024-01-11

## 2024-01-11 RX ORDER — ROSUVASTATIN CALCIUM 40 MG/1
40 TABLET, COATED ORAL NIGHTLY
Qty: 90 TABLET | Refills: 3 | Status: SHIPPED | OUTPATIENT
Start: 2024-01-11

## 2024-01-11 NOTE — PROGRESS NOTES
Subjective:       Patient ID: Page Grissom is a 73 y.o. female.    Chief Complaint: Annual Exam    HPI    1) DM: no hyper/hypoglycemic symptoms. no self monitoring, currently on Trulicity 0.75 mg, pt has not yet done labs. Weight loss did plateau, will increase dose.  2) HTN: no BP checks, no HTNive symptoms, on apressoline and amlodipine  3) LIPIDS:  tolerating and compliant with crestor 40 mg  4) ASTHMA/subglottis stenosis: stopped smoking . Asthma quiet no albuterol or advair currently. Has ENT follow up.   5) GERD: Quiet.   6) Rheum/osteoporosis/vitamin D deficiency: No current symptoms. Can't take NSAIDS due to CKD, on plaquenil  7) CKD/secondary hyperparathyroidism: seeing renal   8)L ventricular heart failure/Nonsustained Vtach: occurred in hospital, on no meds currently.  9)Obesity: 6 pound weight loss with trulicity   10) Aortic atherosclerosis: seen on imaging. Risk management   11) Drug-induced immunodeficiency: due to plaquenil for rheumatoid arthritis             Review of Systems   Constitutional:  Negative for activity change, appetite change, chills, diaphoresis, fatigue, fever and unexpected weight change.   HENT:  Negative for congestion, ear pain, postnasal drip, rhinorrhea, sinus pressure, sinus pain, sneezing, sore throat, tinnitus, trouble swallowing and voice change.    Eyes:  Negative for photophobia, pain and visual disturbance.   Respiratory:  Negative for cough, chest tightness, shortness of breath and wheezing.    Cardiovascular:  Negative for chest pain, palpitations and leg swelling.   Gastrointestinal:  Negative for abdominal distention, abdominal pain, constipation, diarrhea, nausea and vomiting.   Genitourinary:  Negative for decreased urine volume, difficulty urinating, dysuria, flank pain, frequency, hematuria and urgency.   Musculoskeletal:  Negative for arthralgias, back pain, joint swelling, neck pain and neck stiffness.   Allergic/Immunologic: Negative for  immunocompromised state.   Neurological:  Negative for dizziness, tremors, seizures, syncope, facial asymmetry, speech difficulty, weakness, light-headedness, numbness and headaches.   Hematological:  Negative for adenopathy. Does not bruise/bleed easily.   Psychiatric/Behavioral:  Negative for confusion and sleep disturbance.        Objective:      Physical Exam    Assessment:     Vitals:    01/11/24 0940   BP: 138/64   Pulse: 79   Temp: 97.2 °F (36.2 °C)         1. Diabetes mellitus type 2 with complications    2. Secondary hyperparathyroidism of renal origin    3. Vitamin D deficiency disease    4. Class 3 severe obesity due to excess calories with serious comorbidity and body mass index (BMI) of 40.0 to 44.9 in adult    5. Rheumatoid arthritis involving multiple sites with positive rheumatoid factor    6. Age-related osteoporosis without current pathological fracture    7. CKD stage 3 due to type 2 diabetes mellitus    8. Aortic atherosclerosis    9. Hyperlipidemia associated with type 2 diabetes mellitus    10. Hypertension associated with diabetes    11. Mild persistent asthma without complication    12. Nocturnal hypoxemia due to asthma    13. Dysphonia    14. Tracheal stenosis    15. Gastroesophageal reflux disease without esophagitis    16. Fibromyalgia    17. Screening for malignant neoplasm of colon    18. Encounter for screening mammogram for malignant neoplasm of breast    19. Type 2 diabetes mellitus with complication, without long-term current use of insulin        Plan:   Diabetes mellitus type 2 with complications  -     dulaglutide (TRULICITY) 1.5 mg/0.5 mL pen injector; Inject 1.5 mg into the skin every 7 days.  Dispense: 4 pen ; Refill: 11    Secondary hyperparathyroidism of renal origin    Vitamin D deficiency disease    Class 3 severe obesity due to excess calories with serious comorbidity and body mass index (BMI) of 40.0 to 44.9 in adult    Rheumatoid arthritis involving multiple sites with  positive rheumatoid factor    Age-related osteoporosis without current pathological fracture    CKD stage 3 due to type 2 diabetes mellitus    Aortic atherosclerosis    Hyperlipidemia associated with type 2 diabetes mellitus  -     rosuvastatin (CRESTOR) 40 MG Tab; Take 1 tablet (40 mg total) by mouth every evening.  Dispense: 90 tablet; Refill: 3    Hypertension associated with diabetes    Mild persistent asthma without complication    Nocturnal hypoxemia due to asthma    Dysphonia    Tracheal stenosis    Gastroesophageal reflux disease without esophagitis    Fibromyalgia    Screening for malignant neoplasm of colon  -     Ambulatory referral/consult to Endo Procedure ; Future; Expected date: 01/12/2024    Encounter for screening mammogram for malignant neoplasm of breast  -     Mammo Digital Screening Bilat w/ Navjot; Future; Expected date: 04/11/2024    Type 2 diabetes mellitus with complication, without long-term current use of insulin  -     metFORMIN (GLUCOPHAGE) 500 MG tablet; Take 1 tablet (500 mg total) by mouth once daily.  Dispense: 90 tablet; Refill: 1    Other orders  -     potassium chloride SA (K-DUR,KLOR-CON) 20 MEQ tablet; Take 1 tablet (20 mEq total) by mouth once daily.  Dispense: 90 tablet; Refill: 3      Pt was supposed to come in next month, a month early ,so labs were not done. She will do them tomorrow instead.  Increase trulicity to 1.5   Continue specialty care  Continue working on diet. Activity restricted  Return in 6 mo

## 2024-01-12 ENCOUNTER — CLINICAL SUPPORT (OUTPATIENT)
Dept: PULMONOLOGY | Facility: CLINIC | Age: 74
End: 2024-01-12
Attending: INTERNAL MEDICINE
Payer: MEDICARE

## 2024-01-12 ENCOUNTER — HOSPITAL ENCOUNTER (OUTPATIENT)
Dept: RADIOLOGY | Facility: HOSPITAL | Age: 74
Discharge: HOME | End: 2024-01-12
Attending: INTERNAL MEDICINE
Payer: MEDICARE

## 2024-01-12 ENCOUNTER — OFFICE VISIT (OUTPATIENT)
Dept: PULMONOLOGY | Facility: CLINIC | Age: 74
End: 2024-01-12
Payer: MEDICARE

## 2024-01-12 VITALS
HEIGHT: 62 IN | BODY MASS INDEX: 48.16 KG/M2 | DIASTOLIC BLOOD PRESSURE: 64 MMHG | SYSTOLIC BLOOD PRESSURE: 118 MMHG | WEIGHT: 261.69 LBS | HEART RATE: 69 BPM | RESPIRATION RATE: 19 BRPM | OXYGEN SATURATION: 97 %

## 2024-01-12 DIAGNOSIS — J45.30 MILD PERSISTENT ASTHMA WITHOUT COMPLICATION: ICD-10-CM

## 2024-01-12 DIAGNOSIS — R49.0 DYSPHONIA: ICD-10-CM

## 2024-01-12 DIAGNOSIS — R09.02 NOCTURNAL HYPOXEMIA DUE TO ASTHMA: ICD-10-CM

## 2024-01-12 DIAGNOSIS — J45.909 NOCTURNAL HYPOXEMIA DUE TO ASTHMA: ICD-10-CM

## 2024-01-12 DIAGNOSIS — J45.30 MILD PERSISTENT ASTHMA WITHOUT COMPLICATION: Primary | ICD-10-CM

## 2024-01-12 DIAGNOSIS — I15.2 HYPERTENSION ASSOCIATED WITH DIABETES: ICD-10-CM

## 2024-01-12 DIAGNOSIS — E11.69 HYPERLIPIDEMIA ASSOCIATED WITH TYPE 2 DIABETES MELLITUS: ICD-10-CM

## 2024-01-12 DIAGNOSIS — J44.89 ASTHMATIC BRONCHITIS , CHRONIC: ICD-10-CM

## 2024-01-12 DIAGNOSIS — E11.59 HYPERTENSION ASSOCIATED WITH DIABETES: ICD-10-CM

## 2024-01-12 DIAGNOSIS — J39.8 TRACHEAL STENOSIS: ICD-10-CM

## 2024-01-12 DIAGNOSIS — E78.5 HYPERLIPIDEMIA ASSOCIATED WITH TYPE 2 DIABETES MELLITUS: ICD-10-CM

## 2024-01-12 DIAGNOSIS — E66.01 CLASS 3 SEVERE OBESITY DUE TO EXCESS CALORIES WITH SERIOUS COMORBIDITY AND BODY MASS INDEX (BMI) OF 40.0 TO 44.9 IN ADULT: ICD-10-CM

## 2024-01-12 DIAGNOSIS — J45.31 MILD PERSISTENT ASTHMA WITH ACUTE EXACERBATION: ICD-10-CM

## 2024-01-12 LAB
BRPFT: NORMAL
FEF 25 75 CHG: 13 %
FEF 25 75 LLN: 0.56
FEF 25 75 POST REF: 145.1 %
FEF 25 75 PRE REF: 128.4 %
FEF 25 75 REF: 1.51
FET100 CHG: -17.4 %
FEV1 CHG: 3.2 %
FEV1 FVC CHG: 4.1 %
FEV1 FVC LLN: 65
FEV1 FVC POST REF: 119.3 %
FEV1 FVC PRE REF: 114.6 %
FEV1 FVC REF: 79
FEV1 LLN: 1.19
FEV1 POST REF: 84.4 %
FEV1 PRE REF: 81.8 %
FEV1 REF: 1.72
FVC CHG: -0.9 %
FVC LLN: 1.54
FVC POST REF: 70.3 %
FVC PRE REF: 71 %
FVC REF: 2.2
PEF CHG: -11.6 %
PEF LLN: 2.46
PEF POST REF: 79.2 %
PEF PRE REF: 89.6 %
PEF REF: 4.33
POST FEF 25 75: 2.19 L/S
POST FET 100: 1.41 SEC
POST FEV1 FVC: 93.79 %
POST FEV1: 1.45 L
POST FVC: 1.54 L
POST PEF: 3.42 L/S
PRE FEF 25 75: 1.94 L/S
PRE FET 100: 1.7 SEC
PRE FEV1 FVC: 90.06 %
PRE FEV1: 1.4 L
PRE FVC: 1.56 L
PRE PEF: 3.87 L/S

## 2024-01-12 PROCEDURE — 99215 OFFICE O/P EST HI 40 MIN: CPT | Mod: PBBFAC,25 | Performed by: INTERNAL MEDICINE

## 2024-01-12 PROCEDURE — 99999 PR PBB SHADOW E&M-EST. PATIENT-LVL V: CPT | Mod: PBBFAC,,, | Performed by: INTERNAL MEDICINE

## 2024-01-12 PROCEDURE — 94060 EVALUATION OF WHEEZING: CPT | Mod: 26,S$PBB,, | Performed by: INTERNAL MEDICINE

## 2024-01-12 PROCEDURE — 71046 X-RAY EXAM CHEST 2 VIEWS: CPT | Mod: TC

## 2024-01-12 PROCEDURE — 99214 OFFICE O/P EST MOD 30 MIN: CPT | Mod: 25,S$PBB,, | Performed by: INTERNAL MEDICINE

## 2024-01-12 PROCEDURE — 94060 EVALUATION OF WHEEZING: CPT | Mod: PBBFAC

## 2024-01-12 PROCEDURE — 71046 X-RAY EXAM CHEST 2 VIEWS: CPT | Mod: 26,,, | Performed by: RADIOLOGY

## 2024-01-12 RX ORDER — ALBUTEROL SULFATE 90 UG/1
AEROSOL, METERED RESPIRATORY (INHALATION)
Qty: 25.5 G | Refills: 3 | Status: SHIPPED | OUTPATIENT
Start: 2024-01-12

## 2024-01-12 RX ORDER — FLUTICASONE FUROATE AND VILANTEROL 100; 25 UG/1; UG/1
1 POWDER RESPIRATORY (INHALATION) DAILY
Qty: 60 EACH | Refills: 5 | Status: SHIPPED | OUTPATIENT
Start: 2024-01-12

## 2024-01-12 NOTE — PROGRESS NOTES
Pulmonary Outpatient   Visit     Subjective:       Patient ID: Page Grissom is a 73 y.o. female.    Chief Complaint: Asthma        Page Grissom is 71 y.o.  Follow up post hospitalisation  Anaid edema needed emergent trach  Has been since decannulation  Smoker, quit during acute crisis  Has epipen  Has inhaler using  Trach wound healed bandage removed  Was at [facility] then [facility]  No oxygen  Still has some dysphonia        11/17/2022  Last seen 08/2/2022  Working [employer] 10 pm to 7 am  Sleep pattern off  Has Nocturnal Oxygen  No cough, No wheezing, No SOB  Has baseline dysphonia: seen ENT: defered Surgery  Asked for refill  ACT score 23  FeNo 13  BREO and ALEX  6MWD: No desat: reduced exercise  capacity    01/12/2024  Followup  Last visit 11/17/2022  No respiratory exacerbation in interim  No cough, No wheezing, No SOB  Baseline dysphone  Needs RSV  ACT 23  Spirometry FVL consistent with tracheal stenosis  Meds BREO and Albuterol         Asthma Control Test  In the past 4  weeks, how much of the time did your asthma keep you from getting as much done at work, school or at home?: None of the time  During the past 4 weeks, how often have you had shortness of breath?: Not at all  During the past 4 weeks, how often did your asthma symptoms (wheezing, couging, shortness of breath, chest tightness or pain) wake you up at night or earlier than usual in the morning?: Once or twice  During the past 4 weeks, how often have you used your rescue inhaler or nebulizer medication (such as albuterol)?: Not at all  How would you rate your asthma control during the past 4 weeks?: Well controlled  If your score is 19 or less, your asthma may not be under control: 23           The following portions of the patient's history were reviewed and updated as appropriate:   She  has a past medical history of Acidosis, metabolic, with respiratory acidosis (3/31/2022), Asthma, Back pain,  Cataract, CKD (chronic kidney disease) stage 3, GFR 30-59 ml/min (12/20/2020), Diabetes mellitus, Fibromyalgia, Gastroesophageal reflux disease, Hypercholesterolemia, Hypertension, Immune deficiency disorder, Left ventricular failure, unspecified (8/15/2023), Obesity, Osteoporosis, Rheumatoid arthritis, Tobacco dependence, Trouble in sleeping, and Type 2 diabetes mellitus.  She does not have any pertinent problems on file.  She  has a past surgical history that includes Oophorectomy; Hernia repair; Colonoscopy (N/A, 2/14/2019); Tracheostomy (N/A, 3/30/2022); Bronchoscopy (N/A, 3/30/2022); Bronchoscopy (N/A, 8/16/2023); and Bronchial dilation (8/16/2023).  Her family history includes Breast cancer in her sister; Cancer in her father; Colon cancer in her father; Hypertension in her mother.  She  reports that she quit smoking about 20 months ago. Her smoking use included cigarettes. She started smoking about 26 years ago. She has a 12.5 pack-year smoking history. She has never used smokeless tobacco. She reports that she does not drink alcohol and does not use drugs.  She has a current medication list which includes the following prescription(s): acetaminophen, albuterol, amlodipine, blood sugar diagnostic, blood-glucose meter, cetirizine, cholecalciferol (vitamin d3), dulaglutide, epinephrine, famotidine, fish oil-omega-3 fatty acids, fluticasone furoate-vilanterol, furosemide, hydralazine, hydroxychloroquine, lancets, metformin, multivitamin, pantoprazole, potassium chloride sa, rosuvastatin, and [DISCONTINUED] hydrochlorothiazide, and the following Facility-Administered Medications: lidocaine (pf) 10 mg/ml (1%).  Current Outpatient Medications on File Prior to Visit   Medication Sig Dispense Refill    acetaminophen (TYLENOL) 500 MG tablet Take 500 mg by mouth as needed for Pain.      amLODIPine (NORVASC) 10 MG tablet TAKE 1 TABLET(10 MG) BY MOUTH EVERY DAY 90 tablet 3    blood sugar diagnostic Strp To check BG 1  times daily, to use with insurance preferred meter 100 strip 11    blood-glucose meter kit To check BG 1 times daily, to use with insurance preferred meter 1 each 0    cetirizine (ZYRTEC) 10 MG tablet TAKE 1 TABLET BY MOUTH EVERY DAY 90 tablet 3    cholecalciferol, vitamin D3, 2,000 unit Tab Take 2,000 Units by mouth once daily.      dulaglutide (TRULICITY) 1.5 mg/0.5 mL pen injector Inject 1.5 mg into the skin every 7 days. 4 pen 11    EPINEPHrine (EPIPEN 2-DANA) 0.3 mg/0.3 mL AtIn Inject 0.3 mLs (0.3 mg total) into the muscle once. for 1 dose 1 each 11    famotidine (PEPCID) 40 MG tablet Take 1 tablet (40 mg total) by mouth once daily. 90 tablet 3    fish oil-omega-3 fatty acids 300-1,000 mg capsule Take 2 g by mouth once daily.      furosemide (LASIX) 20 MG tablet Take 1 tablet (20 mg total) by mouth daily as needed (edema/swelling). 90 tablet 3    hydrALAZINE (APRESOLINE) 50 MG tablet TAKE 1 TABLET BY MOUTH THREE TIMES DAILY 270 tablet 2    hydroxychloroquine (PLAQUENIL) 200 mg tablet Take 1 tablet (200 mg total) by mouth once daily. 90 tablet 0    lancets Misc To check BG 1 times daily, to use with insurance preferred meter 100 each 11    metFORMIN (GLUCOPHAGE) 500 MG tablet Take 1 tablet (500 mg total) by mouth once daily. 90 tablet 1    multivitamin (THERAGRAN) per tablet Take 1 tablet by mouth once daily.      pantoprazole (PROTONIX) 40 MG tablet TAKE 1 TABLET(40 MG) BY MOUTH EVERY DAY 90 tablet 3    potassium chloride SA (K-DUR,KLOR-CON) 20 MEQ tablet Take 1 tablet (20 mEq total) by mouth once daily. 90 tablet 3    rosuvastatin (CRESTOR) 40 MG Tab Take 1 tablet (40 mg total) by mouth every evening. 90 tablet 3    [DISCONTINUED] albuterol (PROVENTIL/VENTOLIN HFA) 90 mcg/actuation inhaler INHALE 2 PUFFS INTO THE LINGS EVERY 6 HOURS AS NEEDED WHEEZING 25.5 g 3    [DISCONTINUED] fluticasone furoate-vilanteroL (BREO ELLIPTA) 100-25 mcg/dose diskus inhaler Inhale 1 puff into the lungs once daily. Controller 60  each 6    [DISCONTINUED] hydroCHLOROthiazide (HYDRODIURIL) 25 MG tablet TAKE 1 TABLET(25 MG) BY MOUTH EVERY DAY 90 tablet 3     Current Facility-Administered Medications on File Prior to Visit   Medication Dose Route Frequency Provider Last Rate Last Admin    LIDOcaine (PF) 10 mg/ml (1%) injection 10 mg  1 mL Intradermal Once Dave Jansen PA-C         She is allergic to ace inhibitors and lisinopril..      Review of Systems   Constitutional: Negative.    HENT:  Positive for voice change.    Eyes: Negative.    Respiratory:  Negative for cough, sputum production, choking, chest tightness, wheezing, dyspnea on extertion and somnolence.    Genitourinary: Negative.    Endocrine: endocrine negative    Musculoskeletal: Negative.    Skin: Negative.    Gastrointestinal: Negative.    Neurological: Negative.    Psychiatric/Behavioral: Negative.         Outpatient Encounter Medications as of 1/12/2024   Medication Sig Dispense Refill    acetaminophen (TYLENOL) 500 MG tablet Take 500 mg by mouth as needed for Pain.      albuterol (PROVENTIL/VENTOLIN HFA) 90 mcg/actuation inhaler INHALE 2 PUFFS INTO THE LINGS EVERY 6 HOURS AS NEEDED WHEEZING 25.5 g 3    amLODIPine (NORVASC) 10 MG tablet TAKE 1 TABLET(10 MG) BY MOUTH EVERY DAY 90 tablet 3    blood sugar diagnostic Strp To check BG 1 times daily, to use with insurance preferred meter 100 strip 11    blood-glucose meter kit To check BG 1 times daily, to use with insurance preferred meter 1 each 0    cetirizine (ZYRTEC) 10 MG tablet TAKE 1 TABLET BY MOUTH EVERY DAY 90 tablet 3    cholecalciferol, vitamin D3, 2,000 unit Tab Take 2,000 Units by mouth once daily.      dulaglutide (TRULICITY) 1.5 mg/0.5 mL pen injector Inject 1.5 mg into the skin every 7 days. 4 pen 11    EPINEPHrine (EPIPEN 2-DANA) 0.3 mg/0.3 mL AtIn Inject 0.3 mLs (0.3 mg total) into the muscle once. for 1 dose 1 each 11    famotidine (PEPCID) 40 MG tablet Take 1 tablet (40 mg total) by mouth once daily. 90 tablet  3    fish oil-omega-3 fatty acids 300-1,000 mg capsule Take 2 g by mouth once daily.      fluticasone furoate-vilanteroL (BREO ELLIPTA) 100-25 mcg/dose diskus inhaler Inhale 1 puff into the lungs once daily. Controller 60 each 5    furosemide (LASIX) 20 MG tablet Take 1 tablet (20 mg total) by mouth daily as needed (edema/swelling). 90 tablet 3    hydrALAZINE (APRESOLINE) 50 MG tablet TAKE 1 TABLET BY MOUTH THREE TIMES DAILY 270 tablet 2    hydroxychloroquine (PLAQUENIL) 200 mg tablet Take 1 tablet (200 mg total) by mouth once daily. 90 tablet 0    lancets Misc To check BG 1 times daily, to use with insurance preferred meter 100 each 11    metFORMIN (GLUCOPHAGE) 500 MG tablet Take 1 tablet (500 mg total) by mouth once daily. 90 tablet 1    multivitamin (THERAGRAN) per tablet Take 1 tablet by mouth once daily.      pantoprazole (PROTONIX) 40 MG tablet TAKE 1 TABLET(40 MG) BY MOUTH EVERY DAY 90 tablet 3    potassium chloride SA (K-DUR,KLOR-CON) 20 MEQ tablet Take 1 tablet (20 mEq total) by mouth once daily. 90 tablet 3    rosuvastatin (CRESTOR) 40 MG Tab Take 1 tablet (40 mg total) by mouth every evening. 90 tablet 3    [DISCONTINUED] albuterol (PROVENTIL/VENTOLIN HFA) 90 mcg/actuation inhaler INHALE 2 PUFFS INTO THE LINGS EVERY 6 HOURS AS NEEDED WHEEZING 25.5 g 3    [DISCONTINUED] dulaglutide (TRULICITY) 0.75 mg/0.5 mL pen injector Inject 0.75 mg into the skin every 7 days. (Patient taking differently: Inject 0.75 mg into the skin every 7 days. SUNDAYS) 4 pen 11    [DISCONTINUED] fluticasone furoate-vilanteroL (BREO ELLIPTA) 100-25 mcg/dose diskus inhaler Inhale 1 puff into the lungs once daily. Controller 60 each 6    [DISCONTINUED] hydroCHLOROthiazide (HYDRODIURIL) 25 MG tablet TAKE 1 TABLET(25 MG) BY MOUTH EVERY DAY 90 tablet 3    [DISCONTINUED] hydrOXYchloroQUINE (PLAQUENIL) 200 mg tablet Take 1 tablet (200 mg total) by mouth once daily. 90 tablet 1    [DISCONTINUED] metFORMIN (GLUCOPHAGE) 500 MG tablet Take 1  "tablet (500 mg total) by mouth once daily. 90 tablet 1    [DISCONTINUED] potassium chloride SA (K-DUR,KLOR-CON) 20 MEQ tablet Take 1 tablet (20 mEq total) by mouth once daily. 90 tablet 3    [DISCONTINUED] rosuvastatin (CRESTOR) 40 MG Tab Take 1 tablet (40 mg total) by mouth every evening. 90 tablet 3     Facility-Administered Encounter Medications as of 1/12/2024   Medication Dose Route Frequency Provider Last Rate Last Admin    LIDOcaine (PF) 10 mg/ml (1%) injection 10 mg  1 mL Intradermal Once Dave Jansen PA-C           Objective:     Vital Signs (Most Recent)  Vital Signs  Pulse: 69  Resp: 19  SpO2: 97 %  BP: 118/64  Height and Weight  Height: 5' 2" (157.5 cm)  Weight: 118.7 kg (261 lb 11 oz)  BSA (Calculated - sq m): 2.28 sq meters  BMI (Calculated): 47.9  Weight in (lb) to have BMI = 25: 136.4]  Wt Readings from Last 2 Encounters:   01/12/24 118.7 kg (261 lb 11 oz)   01/11/24 118.7 kg (261 lb 11 oz)       Physical Exam   Constitutional: She is oriented to person, place, and time. She appears well-developed and well-nourished.   HENT:   Head: Normocephalic.   Mouth/Throat: Mallampati Score: II.   Neck: No JVD present.   Cardiovascular: Normal rate and intact distal pulses.   No murmur heard.  Pulmonary/Chest: Normal expansion, effort normal and breath sounds normal.   Abdominal: Soft. Bowel sounds are normal.   Musculoskeletal:         General: No edema. Normal range of motion.      Cervical back: Normal range of motion and neck supple.   Lymphadenopathy:     She has no axillary adenopathy.   Neurological: She is alert and oriented to person, place, and time.   Skin: Skin is warm and dry.   Psychiatric: She has a normal mood and affect.   Nursing note and vitals reviewed.      Laboratory  Lab Results   Component Value Date    WBC 7.80 01/04/2024    RBC 4.58 01/04/2024    HGB 11.1 (L) 01/04/2024    HCT 36.5 (L) 01/04/2024    MCV 80 (L) 01/04/2024    MCH 24.2 (L) 01/04/2024    MCHC 30.4 (L) 01/04/2024    " "RDW 17.8 (H) 01/04/2024     01/04/2024    MPV 10.9 01/04/2024    GRAN 4.4 01/04/2024    GRAN 56.6 01/04/2024    LYMPH 2.4 01/04/2024    LYMPH 30.6 01/04/2024    MONO 0.7 01/04/2024    MONO 9.1 01/04/2024    EOS 0.2 01/04/2024    BASO 0.05 01/04/2024    EOSINOPHIL 2.8 01/04/2024    BASOPHIL 0.6 01/04/2024       BMP  Lab Results   Component Value Date     01/04/2024    K 3.9 01/04/2024     01/04/2024    CO2 26 01/04/2024    BUN 17 01/04/2024    CREATININE 1.4 01/04/2024    CALCIUM 9.2 01/04/2024    ANIONGAP 11 01/04/2024    ESTGFRAFRICA 48.0 (A) 06/17/2022    EGFRNONAA 41.7 (A) 06/17/2022    AST 16 01/04/2024    ALT 10 01/04/2024    PROT 8.0 01/04/2024       Lab Results   Component Value Date     (H) 08/15/2022    BNP 96 04/06/2022    BNP 13 06/17/2021       Lab Results   Component Value Date    TSH 1.587 05/15/2023       Lab Results   Component Value Date    SEDRATE 47 (H) 01/04/2024       Lab Results   Component Value Date    CRP 19.0 (H) 01/04/2024     Lab Results   Component Value Date     (H) 06/02/2022        No results found for: "ASPERGILLUS"  No results found for: "AFUMIGATUSCL"     No results found for: "ACE"     Diagnostic Results:  I have personally reviewed today the following studies:    X-Ray Chest PA And Lateral  Narrative: EXAMINATION:  XR CHEST PA AND LATERAL    CLINICAL HISTORY:  Mild persistent asthma, uncomplicated    TECHNIQUE:  PA and lateral views of the chest were performed.    COMPARISON:  08/16/2023    FINDINGS:  The cardiac silhouette is at the upper limits of normal in size.    The lungs are clear bilaterally.  Mild chronic lower thoracic compression fracture deformity is unchanged.  Impression: No acute findings.    Electronically signed by: Justino Martínez MD  Date:    01/12/2024  Time:    09:25         Baseline spirometry shows a reduced vital capacity however the post bronchodilator value is normal. Spirometry remains unimproved following " bronchodilator.   Â    Notes:   Â    The failure to demonstrate improvement in spirometry does not preclude a clinical response to a trial of bronchodilators. The vital capacity may be underestimated due to the short expiratory time.  FEV1: 1.40 L( 81.8%), FVC 1.56L( 71.0%)  FEV1/FVC 90            Assessment/Plan:     Problem List Items Addressed This Visit       Hyperlipidemia associated with type 2 diabetes mellitus    Class 3 severe obesity due to excess calories with serious comorbidity and body mass index (BMI) of 40.0 to 44.9 in adult    Hypertension associated with diabetes    Mild persistent asthma without complication - Primary     Asthma ROS:   She is  taking medications regularly as instructed, no medication side effects noted, no significant ongoing wheezing or shortness of breath.     New concerns: None.     Exam: appears well, vitals normal, no respiratory distress, acyanotic, normal RR, chest clear, no wheezing, crepitations, rhonchi, normal symmetric air entry.     Assessment: Asthma well controlled.     CXR and Spirometry reviewed    Plan:   BREO. CONTINUE ALBUTEROL. 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    This note was prepared using voice  recognition system and is likely to have sound alike errors that may have been overlooked even after proof reading.  Please call me with any questions    Discussed diagnosis, its evaluation, treatment and usual course. All questions answered.      Franklyn Cha MD

## 2024-01-12 NOTE — ASSESSMENT & PLAN NOTE
Asthma ROS:   She is  taking medications regularly as instructed, no medication side effects noted, no significant ongoing wheezing or shortness of breath.     New concerns: None.     Exam: appears well, vitals normal, no respiratory distress, acyanotic, normal RR, chest clear, no wheezing, crepitations, rhonchi, normal symmetric air entry.     Assessment: Asthma well controlled.     CXR and Spirometry reviewed    Plan:   BREO. CONTINUE ALBUTEROL. Orders as documented in EMR.Re evaluate in 12 month

## 2024-01-16 ENCOUNTER — HOSPITAL ENCOUNTER (OUTPATIENT)
Dept: PREADMISSION TESTING | Facility: HOSPITAL | Age: 74
Discharge: HOME | End: 2024-01-16
Attending: COLON & RECTAL SURGERY
Payer: MEDICARE

## 2024-01-16 DIAGNOSIS — Z12.11 SCREENING FOR MALIGNANT NEOPLASM OF COLON: Primary | ICD-10-CM

## 2024-01-16 RX ORDER — SODIUM, POTASSIUM,MAG SULFATES 17.5-3.13G
1 SOLUTION, RECONSTITUTED, ORAL ORAL DAILY
Qty: 1 KIT | Refills: 0 | Status: SHIPPED | OUTPATIENT
Start: 2024-01-16 | End: 2024-01-18

## 2024-01-24 DIAGNOSIS — E11.8 TYPE 2 DIABETES MELLITUS WITH COMPLICATION, WITHOUT LONG-TERM CURRENT USE OF INSULIN: ICD-10-CM

## 2024-01-24 RX ORDER — METFORMIN HYDROCHLORIDE 500 MG/1
500 TABLET ORAL
Qty: 90 TABLET | Refills: 0 | Status: SHIPPED | OUTPATIENT
Start: 2024-01-24

## 2024-01-24 NOTE — TELEPHONE ENCOUNTER
No care due was identified.  [facility] Embedded Care Due Messages. Reference number: [id number].   1/24/2024 4:17:44 AM CST

## 2024-01-24 NOTE — TELEPHONE ENCOUNTER
Refill Decision Note   Page Grissom  is requesting a refill authorization.  Brief Assessment and Rationale for Refill:  Approve     Medication Therapy Plan:  Renal fx reviewed: appears stable      Pharmacist review requested: Yes   Extended chart review required: Yes   Comments:     Note composed:12:51 PM 01/24/2024

## 2024-01-24 NOTE — TELEPHONE ENCOUNTER
Refill Routing Note   Medication(s) are not appropriate for processing by [facility] for the following reason(s):        Required labs abnormal    [facility] action(s):  Defer        Medication Therapy Plan: eGFR abnormal; recently sent to pharmacy by the NP    Pharmacist review requested: Yes     Appointments  past 12m or future 3m with PCP    Date Provider   Last Visit   8/15/2023 Abraham Roman MD   Next Visit   6/6/2024 Abraham Roman MD   ED visits in past 90 days: 0        Note composed:9:46 AM 01/24/2024

## 2024-02-09 ENCOUNTER — LAB VISIT (OUTPATIENT)
Dept: LAB | Facility: HOSPITAL | Age: 74
End: 2024-02-09
Attending: PEDIATRICS
Payer: MEDICARE

## 2024-02-09 DIAGNOSIS — E11.8 DIABETES MELLITUS TYPE 2 WITH COMPLICATIONS: ICD-10-CM

## 2024-02-09 LAB
ALBUMIN SERPL BCP-MCNC: 3.2 G/DL (ref 3.5–5.2)
ALBUMIN/CREAT UR: 59.3 UG/MG (ref 0–30)
ALP SERPL-CCNC: 108 U/L (ref 55–135)
ALT SERPL W/O P-5'-P-CCNC: 9 U/L (ref 10–44)
ANION GAP SERPL CALC-SCNC: 11 MMOL/L (ref 8–16)
AST SERPL-CCNC: 14 U/L (ref 10–40)
BILIRUB SERPL-MCNC: 0.3 MG/DL (ref 0.1–1)
BUN SERPL-MCNC: 19 MG/DL (ref 8–23)
CALCIUM SERPL-MCNC: 9.1 MG/DL (ref 8.7–10.5)
CHLORIDE SERPL-SCNC: 107 MMOL/L (ref 95–110)
CHOLEST SERPL-MCNC: 127 MG/DL (ref 120–199)
CHOLEST/HDLC SERPL: 2.3 {RATIO} (ref 2–5)
CO2 SERPL-SCNC: 25 MMOL/L (ref 23–29)
CREAT SERPL-MCNC: 1.5 MG/DL (ref 0.5–1.4)
CREAT UR-MCNC: 27 MG/DL (ref 15–325)
EST. GFR  (NO RACE VARIABLE): 36.8 ML/MIN/1.73 M^2
ESTIMATED AVG GLUCOSE: 105 MG/DL (ref 68–131)
GLUCOSE SERPL-MCNC: 80 MG/DL (ref 70–110)
HBA1C MFR BLD: 5.3 % (ref 4–5.6)
HDLC SERPL-MCNC: 56 MG/DL (ref 40–75)
HDLC SERPL: 44.1 % (ref 20–50)
LDLC SERPL CALC-MCNC: 53.2 MG/DL (ref 63–159)
MICROALBUMIN UR DL<=1MG/L-MCNC: 16 UG/ML
NONHDLC SERPL-MCNC: 71 MG/DL
POTASSIUM SERPL-SCNC: 4 MMOL/L (ref 3.5–5.1)
PROT SERPL-MCNC: 7.1 G/DL (ref 6–8.4)
SODIUM SERPL-SCNC: 143 MMOL/L (ref 136–145)
TRIGL SERPL-MCNC: 89 MG/DL (ref 30–150)

## 2024-02-09 PROCEDURE — 83036 HEMOGLOBIN GLYCOSYLATED A1C: CPT | Performed by: PEDIATRICS

## 2024-02-09 PROCEDURE — 80061 LIPID PANEL: CPT | Performed by: PEDIATRICS

## 2024-02-09 PROCEDURE — 80053 COMPREHEN METABOLIC PANEL: CPT | Performed by: PEDIATRICS

## 2024-02-09 PROCEDURE — 82043 UR ALBUMIN QUANTITATIVE: CPT | Performed by: PEDIATRICS

## 2024-02-09 PROCEDURE — 36415 COLL VENOUS BLD VENIPUNCTURE: CPT | Mod: PN | Performed by: PEDIATRICS

## 2024-02-16 ENCOUNTER — ANESTHESIA (OUTPATIENT)
Dept: ENDOSCOPY | Facility: HOSPITAL | Age: 74
End: 2024-02-16
Payer: MEDICARE

## 2024-02-16 ENCOUNTER — HOSPITAL ENCOUNTER (OUTPATIENT)
Facility: HOSPITAL | Age: 74
Discharge: HOME | End: 2024-02-16
Attending: COLON & RECTAL SURGERY | Admitting: COLON & RECTAL SURGERY
Payer: MEDICARE

## 2024-02-16 ENCOUNTER — ANESTHESIA EVENT (OUTPATIENT)
Dept: ENDOSCOPY | Facility: HOSPITAL | Age: 74
End: 2024-02-16
Payer: MEDICARE

## 2024-02-16 DIAGNOSIS — Z12.11 SCREENING FOR COLON CANCER: ICD-10-CM

## 2024-02-16 LAB — GLUCOSE SERPL-MCNC: 116 MG/DL (ref 70–110)

## 2024-02-16 PROCEDURE — 37000009 HC ANESTHESIA EA ADD 15 MINS: Performed by: COLON & RECTAL SURGERY

## 2024-02-16 PROCEDURE — 45385 COLONOSCOPY W/LESION REMOVAL: CPT | Mod: 22,,, | Performed by: COLON & RECTAL SURGERY

## 2024-02-16 PROCEDURE — 88305 TISSUE EXAM BY PATHOLOGIST: CPT | Mod: 26,,, | Performed by: PATHOLOGY

## 2024-02-16 PROCEDURE — 37000008 HC ANESTHESIA 1ST 15 MINUTES: Performed by: COLON & RECTAL SURGERY

## 2024-02-16 PROCEDURE — 25000003 PHARM REV CODE 250: Performed by: NURSE ANESTHETIST, CERTIFIED REGISTERED

## 2024-02-16 PROCEDURE — 27200997: Performed by: COLON & RECTAL SURGERY

## 2024-02-16 PROCEDURE — 45380 COLONOSCOPY AND BIOPSY: CPT | Mod: 22,59,, | Performed by: COLON & RECTAL SURGERY

## 2024-02-16 PROCEDURE — 63600175 PHARM REV CODE 636 W HCPCS: Performed by: NURSE ANESTHETIST, CERTIFIED REGISTERED

## 2024-02-16 PROCEDURE — 88305 TISSUE EXAM BY PATHOLOGIST: CPT | Performed by: PATHOLOGY

## 2024-02-16 PROCEDURE — 45380 COLONOSCOPY AND BIOPSY: CPT | Performed by: COLON & RECTAL SURGERY

## 2024-02-16 PROCEDURE — 27201089 HC SNARE, DISP (ANY): Performed by: COLON & RECTAL SURGERY

## 2024-02-16 PROCEDURE — 25000003 PHARM REV CODE 250: Performed by: COLON & RECTAL SURGERY

## 2024-02-16 PROCEDURE — 27201012 HC FORCEPS, HOT/COLD, DISP: Performed by: COLON & RECTAL SURGERY

## 2024-02-16 PROCEDURE — 45385 COLONOSCOPY W/LESION REMOVAL: CPT | Performed by: COLON & RECTAL SURGERY

## 2024-02-16 RX ORDER — LIDOCAINE HYDROCHLORIDE 10 MG/ML
INJECTION, SOLUTION EPIDURAL; INFILTRATION; INTRACAUDAL; PERINEURAL
Status: DISCONTINUED | OUTPATIENT
Start: 2024-02-16 | End: 2024-02-16

## 2024-02-16 RX ORDER — PROPOFOL 10 MG/ML
VIAL (ML) INTRAVENOUS
Status: DISCONTINUED | OUTPATIENT
Start: 2024-02-16 | End: 2024-02-16

## 2024-02-16 RX ORDER — DEXTROMETHORPHAN/PSEUDOEPHED 2.5-7.5/.8
DROPS ORAL
Status: DISCONTINUED | OUTPATIENT
Start: 2024-02-16 | End: 2024-02-16 | Stop reason: HOSPADM

## 2024-02-16 RX ADMIN — PROPOFOL 30 MG: 10 INJECTION, EMULSION INTRAVENOUS at 10:02

## 2024-02-16 RX ADMIN — PROPOFOL 30 MG: 10 INJECTION, EMULSION INTRAVENOUS at 09:02

## 2024-02-16 RX ADMIN — LIDOCAINE HYDROCHLORIDE 50 MG: 10 SOLUTION INTRAVENOUS at 09:02

## 2024-02-16 RX ADMIN — PROPOFOL 70 MG: 10 INJECTION, EMULSION INTRAVENOUS at 09:02

## 2024-02-16 RX ADMIN — SODIUM CHLORIDE, SODIUM LACTATE, POTASSIUM CHLORIDE, AND CALCIUM CHLORIDE: .6; .31; .03; .02 INJECTION, SOLUTION INTRAVENOUS at 09:02

## 2024-02-16 NOTE — TRANSFER OF CARE
"Anesthesia Transfer of Care Note    Patient: Page Grissom    Procedure(s) Performed: Procedure(s) (LRB):  COLONOSCOPY (N/A)    Patient location: PACU    Anesthesia Type: MAC    Transport from OR: Transported from OR on room air with adequate spontaneous ventilation    Post pain: adequate analgesia    Post assessment: no apparent anesthetic complications    Post vital signs: stable    Level of consciousness: responds to stimulation    Nausea/Vomiting: no nausea/vomiting    Complications: none    Transfer of care protocol was followed      Last vitals: Visit Vitals  BP (!) 145/82 (BP Location: Left arm, Patient Position: Lying)   Pulse 66   Temp 36.7 °C (98.1 °F) (Temporal)   Resp (!) 24   Ht 5' 4" (1.626 m)   Wt 116.1 kg (256 lb)   SpO2 98%   Breastfeeding No   BMI 43.94 kg/m²     "

## 2024-02-16 NOTE — [employer] PATIENT INSTRUCTIONS
Discharge Summary/Instructions after an Endoscopic Procedure  Patient Name: Page Grissom  Patient MRN: [id number]  Patient YOB: 1950  Friday, February 16, 2024 Justino Ware MD  Dear patient,  As a result of recent federal legislation (The Federal Cures Act), you may   receive lab or pathology results from your procedure in your [facility]   account before your physician is able to contact you. Your physician or   their representative will relay the results to you with their   recommendations at their soonest availability.  Thank you,  RESTRICTIONS:  During your procedure today, you received medications for sedation.  These   medications may affect your judgment, balance and coordination.  Therefore,   for 24 hours, you have the following restrictions:   - DO NOT drive a car, operate machinery, make legal/financial decisions,   sign important papers or drink alcohol.    ACTIVITY:  Today: no heavy lifting, straining or running due to procedural   sedation/anesthesia.  The following day: return to full activity including work.  DIET:  Eat and drink normally unless instructed otherwise.     TREATMENT FOR COMMON SIDE EFFECTS:  - Mild abdominal pain, nausea, belching, bloating or excessive gas:  rest,   eat lightly and use a heating pad.  - Sore Throat: treat with throat lozenges and/or gargle with warm salt   water.  - Because air was used during the procedure, expelling large amounts of air   from your rectum or belching is normal.  - If a bowel prep was taken, you may not have a bowel movement for 1-3 days.    This is normal.  SYMPTOMS TO WATCH FOR AND REPORT TO YOUR PHYSICIAN:  1. Abdominal pain or bloating, other than gas cramps.  2. Chest pain.  3. Back pain.  4. Signs of infection such as: chills or fever occurring within 24 hours   after the procedure.  5. Rectal bleeding, which would show as bright red, maroon, or black stools.   (A tablespoon of blood from the rectum is not serious, especially  if   hemorrhoids are present.)  6. Vomiting.  7. Weakness or dizziness.  GO DIRECTLY TO THE NEAREST EMERGENCY ROOM IF YOU HAVE ANY OF THE FOLLOWING:      Difficulty breathing              Chills and/or fever over 101 F   Persistent vomiting and/or vomiting blood   Severe abdominal pain   Severe chest pain   Black, tarry stools   Bleeding- more than one tablespoon   Any other symptom or condition that you feel may need urgent attention  Your doctor recommends these additional instructions:  If any biopsies were taken, your doctors clinic will contact you in 1 to 2   weeks with any results.  - Discharge patient to home.   - High fiber diet.   - Continue present medications.   - Await pathology results.   - Repeat colonoscopy in 3 years for surveillance.   - Return to primary care physician PRN.  For questions, problems or results please call your physician Justino Ware MD at Work:  [phone]  If you have any questions about the above instructions, call the GI   department at [phone] or call the endoscopy unit at [phone]   from 7am until 3 pm.  [facility] [place], EMERGENCY ROOM PHONE NUMBER:   [phone]  IF A COMPLICATION OR EMERGENCY SITUATION ARISES AND YOU ARE UNABLE TO REACH   YOUR PHYSICIAN - GO DIRECTLY TO THE EMERGENCY ROOM.  I have read or have had read to me these discharge instructions for my   procedure and have received a written copy.  I understand these   instructions and will follow-up with my physician if I have any questions.     __________________________________       _____________________________________  Nurse Signature                                          Patient/Designated   Responsible Party Signature  MD Justino Seymour MD  2/16/2024 10:31:33 AM  This report has been verified and signed electronically.  Dear patient,  As a result of recent federal legislation (The Federal Cures Act), you may   receive lab or pathology  results from your procedure in your [facility]   account before your physician is able to contact you. Your physician or   their representative will relay the results to you with their   recommendations at their soonest availability.  Thank you,  PROVATION

## 2024-02-16 NOTE — PLAN OF CARE
DR ZACARIAS AT BEDSIDE TO SPEAK TO PT. REGARDING RESULTS.  VSS, NO GI BLEEDING, NO ABD. PAIN, NO N/V. PT. DISCHARGED FROM UNIT.

## 2024-02-16 NOTE — ANESTHESIA PREPROCEDURE EVALUATION
02/16/2024  Page Grissom is a 73 y.o., female.      Pre-op Assessment    I have reviewed the Patient Summary Reports.     I have reviewed the Nursing Notes. I have reviewed the NPO Status.   I have reviewed the Medications.     Review of Systems  Anesthesia Hx:  No problems with previous Anesthesia             Denies Family Hx of Anesthesia complications.    Denies Personal Hx of Anesthesia complications.                    Social:  Former Smoker       Hematology/Oncology:    Oncology Normal    -- Anemia:                                  EENT/Dental:  EENT/Dental Normal           Cardiovascular:     Hypertension           hyperlipidemia   ECG has been reviewed.                          Pulmonary:    Asthma mild                   Renal/:  Chronic Renal Disease, CKD                Hepatic/GI:     GERD             Musculoskeletal:  Musculoskeletal Normal                Neurological:    Neuromuscular Disease,                                   Endocrine:  Diabetes, type 2           Dermatological:  Skin Normal    Psych:  Psychiatric Normal                  Physical Exam  General: Well nourished, Cooperative, Alert and Oriented    Airway:  Mallampati: II   Mouth Opening: Normal  TM Distance: Normal  Tongue: Normal  Neck ROM: Normal ROM    Chest/Lungs:  Clear to auscultation, Normal Respiratory Rate    Heart:  Rate: Normal  Rhythm: Regular Rhythm    Anesthesia Plan  Type of Anesthesia, risks & benefits discussed:    Anesthesia Type: MAC  Intra-op Monitoring Plan: Standard ASA Monitors  Induction:  IV  Informed Consent: Informed consent signed with the Patient and all parties understand the risks and agree with anesthesia plan.  All questions answered.   ASA Score: 3  Day of Surgery Review of History & Physical: H&P Update referred to the surgeon/provider.I have interviewed and examined the patient. I have  reviewed the patient's H&P dated: There are no significant changes.     Ready For Surgery From Anesthesia Perspective.   .

## 2024-02-16 NOTE — ANESTHESIA POSTPROCEDURE EVALUATION
Anesthesia Post Evaluation    Patient: Page Grissom    Procedure(s) Performed: Procedure(s) (LRB):  COLONOSCOPY (N/A)    Final Anesthesia Type: MAC      Patient location during evaluation: PACU  Patient participation: Yes- Able to Participate  Level of consciousness: awake and alert  Post-procedure vital signs: reviewed and stable  Pain management: adequate  Airway patency: patent    PONV status at discharge: No PONV  Anesthetic complications: no      Cardiovascular status: blood pressure returned to baseline  Respiratory status: unassisted and room air  Hydration status: euvolemic  Follow-up not needed.              Vitals Value Taken Time   /65 02/16/24 1044   Temp 36.6 °C (97.9 °F) 02/16/24 1024   Pulse 60 02/16/24 1044   Resp 20 02/16/24 1044   SpO2 99 % 02/16/24 1044         Event Time   Out of Recovery 10:55:55         Pain/José Score: José Score: 10 (2/16/2024 10:44 AM)

## 2024-02-16 NOTE — H&P
[facility] ([facility])  Colon and Rectal Surgery  History & Physical    Patient Name: Page Grissom  MRN: [id number]  Admission Date: 2/16/2024  Attending Physician: Justino Ware MD  Primary Care Provider: Abraham Roman MD    Patient information was obtained from patient and medical records.    Subjective:     Chief Complaint/Reason for Admission: Here for Colonoscopy    History of Present Illness:  Patient is a 73 y.o. female presents for colonoscopy. Last cscope 5yrs ago and has personal hx of polyps. No current hematochezia or melena. +fam hx of CRC in father.    Current Facility-Administered Medications on File Prior to Encounter   Medication    LIDOcaine (PF) 10 mg/ml (1%) injection 10 mg     Current Outpatient Medications on File Prior to Encounter   Medication Sig    amLODIPine (NORVASC) 10 MG tablet TAKE 1 TABLET(10 MG) BY MOUTH EVERY DAY    cetirizine (ZYRTEC) 10 MG tablet TAKE 1 TABLET BY MOUTH EVERY DAY    cholecalciferol, vitamin D3, 2,000 unit Tab Take 2,000 Units by mouth once daily.    dulaglutide (TRULICITY) 1.5 mg/0.5 mL pen injector Inject 1.5 mg into the skin every 7 days.    famotidine (PEPCID) 40 MG tablet Take 1 tablet (40 mg total) by mouth once daily.    fish oil-omega-3 fatty acids 300-1,000 mg capsule Take 2 g by mouth once daily.    furosemide (LASIX) 20 MG tablet Take 1 tablet (20 mg total) by mouth daily as needed (edema/swelling).    hydrALAZINE (APRESOLINE) 50 MG tablet TAKE 1 TABLET BY MOUTH THREE TIMES DAILY    hydroxychloroquine (PLAQUENIL) 200 mg tablet Take 1 tablet (200 mg total) by mouth once daily.    multivitamin (THERAGRAN) per tablet Take 1 tablet by mouth once daily.    pantoprazole (PROTONIX) 40 MG tablet TAKE 1 TABLET(40 MG) BY MOUTH EVERY DAY    potassium chloride SA (K-DUR,KLOR-CON) 20 MEQ tablet Take 1 tablet (20 mEq total) by mouth once daily.    rosuvastatin (CRESTOR) 40 MG Tab Take 1 tablet (40 mg total) by mouth every evening.     acetaminophen (TYLENOL) 500 MG tablet Take 500 mg by mouth as needed for Pain.    albuterol (PROVENTIL/VENTOLIN HFA) 90 mcg/actuation inhaler INHALE 2 PUFFS INTO THE LINGS EVERY 6 HOURS AS NEEDED WHEEZING    blood sugar diagnostic Strp To check BG 1 times daily, to use with insurance preferred meter    blood-glucose meter kit To check BG 1 times daily, to use with insurance preferred meter    EPINEPHrine (EPIPEN 2-DANA) 0.3 mg/0.3 mL AtIn Inject 0.3 mLs (0.3 mg total) into the muscle once. for 1 dose    fluticasone furoate-vilanteroL (BREO ELLIPTA) 100-25 mcg/dose diskus inhaler Inhale 1 puff into the lungs once daily. Controller    lancets Misc To check BG 1 times daily, to use with insurance preferred meter    [DISCONTINUED] hydroCHLOROthiazide (HYDRODIURIL) 25 MG tablet TAKE 1 TABLET(25 MG) BY MOUTH EVERY DAY       Review of patient's allergies indicates:   Allergen Reactions    Ace inhibitors Swelling    Lisinopril Anaphylaxis     Angioedema requiring trach       Past Medical History:   Diagnosis Date    Acidosis, metabolic, with respiratory acidosis 3/31/2022    Asthma     Back pain     Cataract     OD    CKD (chronic kidney disease) stage 3, GFR 30-59 ml/min 12/20/2020    Diabetes mellitus     Fibromyalgia     Gastroesophageal reflux disease     Hypercholesterolemia     Hypertension     Immune deficiency disorder     Left ventricular failure, unspecified 8/15/2023    Obesity     Osteoporosis     Rheumatoid arthritis     Tobacco dependence     Trouble in sleeping     Type 2 diabetes mellitus      Past Surgical History:   Procedure Laterality Date    BRONCHIAL DILATION  8/16/2023    Procedure: DILATION, BRONCHUS;  Surgeon: Juan Rosado MD;  Location: [address];  Service: Cardiothoracic;;    BRONCHOSCOPY N/A 3/30/2022    Procedure: Bronchoscopy;  Surgeon: Isak Yadav MD;  Location: [facility] OR;  Service: General;  Laterality: N/A;    BRONCHOSCOPY N/A 8/16/2023    Procedure: Flex Bronchoscopy with  balloon dialation & kenelog;  Surgeon: Juan Rosado MD;  [address];  Service: Cardiothoracic;  Laterality: N/A;    COLONOSCOPY N/A 2019    Procedure: COLONOSCOPY;  Surgeon: Yury Atwood MD;  Location: [facility];  Service: Endoscopy;  Laterality: N/A;    HERNIA REPAIR      OOPHORECTOMY      TRACHEOSTOMY N/A 3/30/2022    Procedure: CREATION, TRACHEOSTOMY;  Surgeon: Isak Yadav MD;  Location: [facility] OR;  Service: General;  Laterality: N/A;     Family History       Problem Relation (Age of Onset)    Breast cancer Sister    Cancer Father    Colon cancer Father    Hypertension Mother          Tobacco Use    Smoking status: Former     Current packs/day: 0.00     Average packs/day: 0.5 packs/day for 25.0 years (12.5 ttl pk-yrs)     Types: Cigarettes     Start date: 1997     Quit date: 2022     Years since quittin.8    Smokeless tobacco: Never   Substance and Sexual Activity    Alcohol use: No    Drug use: No    Sexual activity: Not Currently     Review of Systems   Constitutional:  Negative for activity change, appetite change, chills, fatigue, fever and unexpected weight change.   HENT:  Negative for congestion, ear pain, sore throat and trouble swallowing.    Eyes:  Negative for pain, redness and itching.   Respiratory:  Negative for cough, shortness of breath and wheezing.    Cardiovascular:  Negative for chest pain, palpitations and leg swelling.   Gastrointestinal:  Negative for abdominal distention, abdominal pain, anal bleeding, blood in stool, constipation, diarrhea, nausea, rectal pain and vomiting.   Endocrine: Negative for cold intolerance, heat intolerance and polyuria.   Genitourinary:  Negative for dysuria, flank pain, frequency and hematuria.   Musculoskeletal:  Negative for gait problem, joint swelling and neck pain.   Skin:  Negative for color change, rash and wound.   Allergic/Immunologic: Negative for environmental allergies and immunocompromised state.    Neurological:  Negative for dizziness, speech difficulty, weakness and numbness.   Psychiatric/Behavioral:  Negative for agitation, confusion and hallucinations.      Objective:     Vital Signs (Most Recent):  Temp: 98.1 °F (36.7 °C) (02/16/24 0921)  Pulse: 66 (02/16/24 0921)  Resp: (!) 24 (02/16/24 0921)  BP: (!) 145/82 (02/16/24 0921)  SpO2: 98 % (02/16/24 0921) Vital Signs (24h Range):  Temp:  [98.1 °F (36.7 °C)] 98.1 °F (36.7 °C)  Pulse:  [66] 66  Resp:  [24] 24  SpO2:  [98 %] 98 %  BP: (145)/(82) 145/82     Weight: 116.1 kg (256 lb)  Body mass index is 43.94 kg/m².    Physical Exam  Constitutional:       Appearance: She is well-developed.   HENT:      Head: Normocephalic and atraumatic.   Eyes:      Conjunctiva/sclera: Conjunctivae normal.   Neck:      Thyroid: No thyromegaly.   Cardiovascular:      Rate and Rhythm: Normal rate and regular rhythm.   Pulmonary:      Effort: Pulmonary effort is normal. No respiratory distress.   Abdominal:      General: There is no distension.      Palpations: Abdomen is soft. There is no mass.      Tenderness: There is no abdominal tenderness.   Musculoskeletal:         General: No tenderness. Normal range of motion.      Cervical back: Normal range of motion.   Skin:     General: Skin is warm and dry.      Capillary Refill: Capillary refill takes less than 2 seconds.      Findings: No rash.   Neurological:      Mental Status: She is alert and oriented to person, place, and time.           Assessment/Plan:     Patient is a 73 y.o. female who presents for colonoscopy     - Ok to proceed to endoscopy suite for colonoscopy  - Consent obtained. All risks, benefits and alternatives fully explained to patient, including but not limited to bleeding, infection, perforation, and missed polyps. All questions appropriately answered to patient's satisfaction. Consent signed and placed on chart.    There are no hospital problems to display for this patient.    VTE Risk Mitigation (From  admission, onward)      None            Justino Ware MD  Colon and Rectal Surgery  [facility] ([facility])

## 2024-02-16 NOTE — BRIEF OP NOTE
[facility] (H[facility])  Brief Operative Note     SUMMARY     Surgery Date: 2/16/2024     Surgeon(s) and Role:     * Pedro Pablo Ware MD - Primary    Assisting Surgeon: None    Pre-op Diagnosis:  Screening for malignant neoplasm of colon [Z12.11]    Post-op Diagnosis:  Post-Op Diagnosis Codes:     * Screening for malignant neoplasm of colon [Z12.11]    Procedure(s) (LRB):  COLONOSCOPY (N/A)    Anesthesia: Choice    Description of the findings of the procedure: multiple polyps removed    Estimated Blood Loss: * No values recorded between 2/16/2024  9:44 AM and 2/16/2024 10:20 AM *         Specimens:   Specimen (24h ago, onward)       Start     Ordered    02/16/24 1008  Specimen to Pathology, Surgery Gastrointestinal tract  Once        Comments: Pre-op Diagnosis: Screening for malignant neoplasm of colon [Z12.11]Procedure(s):COLONOSCOPY Name of specimens: 1. Cecal polypectomy x22. Hepatic flexure polypectomy3. Splenic flexure polypectomy4. Descending colon polypectomy5. Sigmoid polypectomy     References:    Click here for ordering Quick Tip   Question Answer Comment   Procedure Type: Gastrointestinal tract    Specimen Class: Routine/Screening    Which provider would you like to cc? PEDRO PABLO WARE    Release to patient Immediate        02/16/24 1019                    Discharge Note    SUMMARY     Admit Date: 2/16/2024    Discharge Date and Time: 2/16/2024 10:20 AM    Hospital Course Patient was seen in the preoperative area by both myself and anesthesia. All consents were verified and all questions appropriately answered. All risks, benefits and alternatives explained to patient. Patient proceeded to endoscopy suite for colonoscopy and was discharged home postoperative once cleared by anesthesia.    Final Diagnosis: Post-Op Diagnosis Codes:     * Screening for malignant neoplasm of colon [Z12.11]    Disposition: Home or Self Care    Follow Up/Patient Instructions: See Provation  report    Medications:  Reconciled Home Medications:      Medication List        CONTINUE taking these medications      acetaminophen 500 MG tablet  Commonly known as: TYLENOL  Take 500 mg by mouth as needed for Pain.     albuterol 90 mcg/actuation inhaler  Commonly known as: PROVENTIL/VENTOLIN HFA  INHALE 2 PUFFS INTO THE LINGS EVERY 6 HOURS AS NEEDED WHEEZING     amLODIPine 10 MG tablet  Commonly known as: NORVASC  TAKE 1 TABLET(10 MG) BY MOUTH EVERY DAY     blood sugar diagnostic Strp  To check BG 1 times daily, to use with insurance preferred meter     blood-glucose meter kit  To check BG 1 times daily, to use with insurance preferred meter     cetirizine 10 MG tablet  Commonly known as: ZYRTEC  TAKE 1 TABLET BY MOUTH EVERY DAY     cholecalciferol (vitamin D3) 50 mcg (2,000 unit) Tab  Commonly known as: VITAMIN D3  Take 2,000 Units by mouth once daily.     dulaglutide 1.5 mg/0.5 mL pen injector  Commonly known as: TRULICITY  Inject 1.5 mg into the skin every 7 days.     EPINEPHrine 0.3 mg/0.3 mL Atin  Commonly known as: EPIPEN 2-DANA  Inject 0.3 mLs (0.3 mg total) into the muscle once. for 1 dose     famotidine 40 MG tablet  Commonly known as: PEPCID  Take 1 tablet (40 mg total) by mouth once daily.     fish oil-omega-3 fatty acids 300-1,000 mg capsule  Take 2 g by mouth once daily.     fluticasone furoate-vilanteroL 100-25 mcg/dose diskus inhaler  Commonly known as: BREO ELLIPTA  Inhale 1 puff into the lungs once daily. Controller     furosemide 20 MG tablet  Commonly known as: LASIX  Take 1 tablet (20 mg total) by mouth daily as needed (edema/swelling).     hydrALAZINE 50 MG tablet  Commonly known as: APRESOLINE  TAKE 1 TABLET BY MOUTH THREE TIMES DAILY     hydroxychloroquine 200 mg tablet  Commonly known as: PLAQUENIL  Take 1 tablet (200 mg total) by mouth once daily.     lancets Misc  To check BG 1 times daily, to use with insurance preferred meter     metFORMIN 500 MG tablet  Commonly known as:  GLUCOPHAGE  TAKE 1 TABLET(500 MG) BY MOUTH EVERY DAY     multivitamin per tablet  Commonly known as: THERAGRAN  Take 1 tablet by mouth once daily.     pantoprazole 40 MG tablet  Commonly known as: PROTONIX  TAKE 1 TABLET(40 MG) BY MOUTH EVERY DAY     potassium chloride SA 20 MEQ tablet  Commonly known as: K-DUR,KLOR-CON  Take 1 tablet (20 mEq total) by mouth once daily.     rosuvastatin 40 MG Tab  Commonly known as: CRESTOR  Take 1 tablet (40 mg total) by mouth every evening.            Discharge Procedure Orders   Diet general     Call MD for:  temperature >100.4     Call MD for:  persistent nausea and vomiting     Call MD for:  severe uncontrolled pain     Call MD for:  difficulty breathing, headache or visual disturbances     Call MD for:  redness, tenderness, or signs of infection (pain, swelling, redness, odor or green/yellow discharge around incision site)     Call MD for:  hives     Call MD for:  persistent dizziness or light-headedness     Call MD for:  extreme fatigue     Activity as tolerated      Follow-up Information       Abraham Roman MD Follow up.    Specialties: Internal Medicine, Pediatrics  Why: As needed  Contact information:  [address]  [place] [address] [phone]

## 2024-02-19 ENCOUNTER — TELEPHONE (OUTPATIENT)
Dept: SURGERY | Facility: CLINIC | Age: 74
End: 2024-02-19
Payer: MEDICARE

## 2024-02-19 VITALS
SYSTOLIC BLOOD PRESSURE: 141 MMHG | HEIGHT: 64 IN | TEMPERATURE: 98 F | OXYGEN SATURATION: 99 % | WEIGHT: 256 LBS | BODY MASS INDEX: 43.71 KG/M2 | HEART RATE: 60 BPM | RESPIRATION RATE: 20 BRPM | DIASTOLIC BLOOD PRESSURE: 65 MMHG

## 2024-02-19 LAB
FINAL PATHOLOGIC DIAGNOSIS: NORMAL
GROSS: NORMAL
Lab: NORMAL
POCT GLUCOSE: 116 MG/DL (ref 70–110)

## 2024-02-19 NOTE — TELEPHONE ENCOUNTER
Spoke with patient regarding recent colonoscopy results. Patient verbalized understanding with no further questions at this time.      ----- Message from Justino Ware MD sent at 2/19/2024  1:10 PM CST -----  Please call patient and let her know that all lesions removed on recent colonoscopy were benign. Nothing further needs to be done at this time as they were completely removed. Recommend keeping repeat surveillance colonoscopy in 3 years as previously discussed.

## 2024-03-12 ENCOUNTER — OFFICE VISIT (OUTPATIENT)
Dept: RHEUMATOLOGY | Facility: CLINIC | Age: 74
End: 2024-03-12
Payer: MEDICARE

## 2024-03-12 VITALS
DIASTOLIC BLOOD PRESSURE: 64 MMHG | HEIGHT: 64 IN | SYSTOLIC BLOOD PRESSURE: 135 MMHG | BODY MASS INDEX: 44.48 KG/M2 | HEART RATE: 68 BPM | WEIGHT: 260.56 LBS

## 2024-03-12 DIAGNOSIS — D84.9 IMMUNOCOMPROMISED: ICD-10-CM

## 2024-03-12 DIAGNOSIS — Z79.899 HIGH RISK MEDICATION USE: ICD-10-CM

## 2024-03-12 DIAGNOSIS — Z51.81 MEDICATION MONITORING ENCOUNTER: ICD-10-CM

## 2024-03-12 DIAGNOSIS — M05.79 RHEUMATOID ARTHRITIS INVOLVING MULTIPLE SITES WITH POSITIVE RHEUMATOID FACTOR: Primary | ICD-10-CM

## 2024-03-12 DIAGNOSIS — R70.0 ELEVATED SED RATE: ICD-10-CM

## 2024-03-12 DIAGNOSIS — M81.0 AGE-RELATED OSTEOPOROSIS WITHOUT CURRENT PATHOLOGICAL FRACTURE: ICD-10-CM

## 2024-03-12 DIAGNOSIS — E55.9 VITAMIN D DEFICIENCY DISEASE: ICD-10-CM

## 2024-03-12 PROCEDURE — 99999 PR PBB SHADOW E&M-EST. PATIENT-LVL V: CPT | Mod: PBBFAC,,, | Performed by: PHYSICIAN ASSISTANT

## 2024-03-12 PROCEDURE — 99215 OFFICE O/P EST HI 40 MIN: CPT | Mod: PBBFAC | Performed by: PHYSICIAN ASSISTANT

## 2024-03-12 PROCEDURE — 99214 OFFICE O/P EST MOD 30 MIN: CPT | Mod: S$PBB,,, | Performed by: PHYSICIAN ASSISTANT

## 2024-03-12 RX ORDER — HYDROXYCHLOROQUINE SULFATE 200 MG/1
200 TABLET, FILM COATED ORAL DAILY
Qty: 90 TABLET | Refills: 3 | Status: SHIPPED | OUTPATIENT
Start: 2024-03-12

## 2024-03-12 NOTE — PROGRESS NOTES
Subjective:      Patient ID: Page Grissom is a 73 y.o. female.    Chief Complaint: Disease Management and Rheumatoid Arthritis    HPI   Page Grissom  is a 73 y.o. female  Who is here today to follow-up on seropositive RA and osteoporosis.  From a RA perspective she is doing quite well.  Pain 0/10.  No joint pain/effusion/prolonged am stiffness.  She currently is on Plaquenil 200 mg daily.  Previously she has been on methotrexate in the past but was able to wean down and off several years ago.  Denies am stiffness.    No c/o shortness of breath here in the office today.  Pulmonology  started her on supplemental oxygen at night, which uses sometimes.  She had follow up in 1/2024 w Dr. Cha.  On breo.  Planning f/u in 1/2025.    I have done some additional workup with continued elevations of ESR and CRP.  Patient denies headaches, visual disturbances, jaw claudication, hip and shoulder pain.  RA is controlled.  SPEP and IFV in the past has been unremarkable.  Had CT chest abdomen and pelvis which was negative for hilar adenopathy.  No evidence of ILD.  Was significant for an adrenal mass.  I referred her to Dr. Baltazar who has done further workup on the adrenal nodule which was negative.  He requested f/u in 6 mos w repeat CT scan.  She is overdue for f/u by nearly 6 mos.     Also with a history of osteoporosis.  In 2017 she was placed on Fosamax.  However she had problems remembering taking it.  At that time she did not want add more oral medications to her regimen.  She moved to IV Reclast 10/09/2018.  She had some mild arthralgias which improved within about 2 days.  W worsening BMD on DEXA, 2022, we redosed w Reclast on 03/04/2022.  She has a very hard stick and had a stick her 3 or 4 times.  Additionally she just overall felt crummy for about 3 or 4 days following the infusion.  Reclast was DC'ed.  We had discussed possibility of Prolia in future but pt deferred.    She has CKD.  She uses  tylenol arthritis on occasion.  Avoids PO NSAIDs    Patient denies fevers, chills, photosensitivity, eye pain, chest pain, hematuria, blood in the stool, rash, sicca symptoms, raynauds, finger ulcerations.  Rheumatologic systems otherwise negative.    MHAQ: 0.3  Serologies/Labs:  (+) RF, CCP  Neg NAYELY  Current Treatment:  Plaquenil 200mg daily  Vit D3 otc daily  Previous Treatment:   Fosamax - compliance issues  Reclast - flu like sxs  MTX - weaned down and off x 2 yrs    GERD: controlled on protonix   Gait:  steady - no ambulatory aid  Invasive dental work:  Considering dentures  H/o fragility fracture:  pt denies      Current Outpatient Medications:     acetaminophen (TYLENOL) 500 MG tablet, Take 500 mg by mouth as needed for Pain., Disp: , Rfl:     albuterol (PROVENTIL/VENTOLIN HFA) 90 mcg/actuation inhaler, INHALE 2 PUFFS INTO THE LINGS EVERY 6 HOURS AS NEEDED WHEEZING, Disp: 25.5 g, Rfl: 3    amLODIPine (NORVASC) 10 MG tablet, TAKE 1 TABLET(10 MG) BY MOUTH EVERY DAY, Disp: 90 tablet, Rfl: 3    blood sugar diagnostic Strp, To check BG 1 times daily, to use with insurance preferred meter, Disp: 100 strip, Rfl: 11    blood-glucose meter kit, To check BG 1 times daily, to use with insurance preferred meter, Disp: 1 each, Rfl: 0    cetirizine (ZYRTEC) 10 MG tablet, TAKE 1 TABLET BY MOUTH EVERY DAY, Disp: 90 tablet, Rfl: 3    cholecalciferol, vitamin D3, 2,000 unit Tab, Take 2,000 Units by mouth once daily., Disp: , Rfl:     dulaglutide (TRULICITY) 1.5 mg/0.5 mL pen injector, Inject 1.5 mg into the skin every 7 days., Disp: 4 pen , Rfl: 11    fish oil-omega-3 fatty acids 300-1,000 mg capsule, Take 2 g by mouth once daily., Disp: , Rfl:     fluticasone furoate-vilanteroL (BREO ELLIPTA) 100-25 mcg/dose diskus inhaler, Inhale 1 puff into the lungs once daily. Controller, Disp: 60 each, Rfl: 5    furosemide (LASIX) 20 MG tablet, Take 1 tablet (20 mg total) by mouth daily as needed (edema/swelling)., Disp: 90 tablet, Rfl:  3    hydrALAZINE (APRESOLINE) 50 MG tablet, TAKE 1 TABLET BY MOUTH THREE TIMES DAILY, Disp: 270 tablet, Rfl: 2    lancets Misc, To check BG 1 times daily, to use with insurance preferred meter, Disp: 100 each, Rfl: 11    metFORMIN (GLUCOPHAGE) 500 MG tablet, TAKE 1 TABLET(500 MG) BY MOUTH EVERY DAY, Disp: 90 tablet, Rfl: 0    multivitamin (THERAGRAN) per tablet, Take 1 tablet by mouth once daily., Disp: , Rfl:     pantoprazole (PROTONIX) 40 MG tablet, TAKE 1 TABLET(40 MG) BY MOUTH EVERY DAY, Disp: 90 tablet, Rfl: 3    potassium chloride SA (K-DUR,KLOR-CON) 20 MEQ tablet, Take 1 tablet (20 mEq total) by mouth once daily., Disp: 90 tablet, Rfl: 3    rosuvastatin (CRESTOR) 40 MG Tab, Take 1 tablet (40 mg total) by mouth every evening., Disp: 90 tablet, Rfl: 3    EPINEPHrine (EPIPEN 2-DANA) 0.3 mg/0.3 mL AtIn, Inject 0.3 mLs (0.3 mg total) into the muscle once. for 1 dose, Disp: 1 each, Rfl: 11    famotidine (PEPCID) 40 MG tablet, Take 1 tablet (40 mg total) by mouth once daily., Disp: 90 tablet, Rfl: 3    hydroxychloroquine (PLAQUENIL) 200 mg tablet, Take 1 tablet (200 mg total) by mouth once daily., Disp: 90 tablet, Rfl: 3  No current facility-administered medications for this visit.    Facility-Administered Medications Ordered in Other Visits:     LIDOcaine (PF) 10 mg/ml (1%) injection 10 mg, 1 mL, Intradermal, Once, Dave Jansen PA-C    Past Medical History:   Diagnosis Date    Acidosis, metabolic, with respiratory acidosis 3/31/2022    Asthma     Back pain     Cataract     OD    CKD (chronic kidney disease) stage 3, GFR 30-59 ml/min 12/20/2020    Diabetes mellitus     Fibromyalgia     Gastroesophageal reflux disease     Hypercholesterolemia     Hypertension     Immune deficiency disorder     Left ventricular failure, unspecified 8/15/2023    Obesity     Osteoporosis     Rheumatoid arthritis     Tobacco dependence     Trouble in sleeping     Type 2 diabetes mellitus      Family History   Problem Relation Age of  Onset    Hypertension Mother     Cancer Father     Colon cancer Father     Breast cancer Sister      Social History     Socioeconomic History    Marital status:    Tobacco Use    Smoking status: Former     Current packs/day: 0.00     Average packs/day: 0.5 packs/day for 25.0 years (12.5 ttl pk-yrs)     Types: Cigarettes     Start date: 1997     Quit date: 2022     Years since quittin.8    Smokeless tobacco: Never   Substance and Sexual Activity    Alcohol use: No    Drug use: No    Sexual activity: Not Currently   Social History Narrative    1 dog, no smokers in household; No HH as of 2022, has daytime sitter 2022.     Social Determinants of Health     Financial Resource Strain: Low Risk  (2023)    Overall Financial Resource Strain (CARDIA)     Difficulty of Paying Living Expenses: Not hard at all   Food Insecurity: No Food Insecurity (2023)    Hunger Vital Sign     Worried About Running Out of Food in the Last Year: Never true     Ran Out of Food in the Last Year: Never true   Transportation Needs: No Transportation Needs (2023)    PRAPARE - Transportation     Lack of Transportation (Medical): No     Lack of Transportation (Non-Medical): No   Physical Activity: Inactive (2023)    Exercise Vital Sign     Days of Exercise per Week: 0 days     Minutes of Exercise per Session: 0 min   Stress: No Stress Concern Present (2023)    [employer] [employer] - Occupational Stress Questionnaire     Feeling of Stress : Not at all   Social Connections: Socially Isolated (2023)    Social Connection and Isolation Panel [NHANES]     Frequency of Communication with Friends and Family: Three times a week     Frequency of Social Gatherings with Friends and Family: Three times a week     Attends Zoroastrian Services: Never     Active Member of Clubs or Organizations: No     Attends Club or Organization Meetings: Never     Marital Status:    Housing  "Stability: Low Risk  (11/14/2023)    Housing Stability Vital Sign     Unable to Pay for Housing in the Last Year: No     Number of Places Lived in the Last Year: 1     Unstable Housing in the Last Year: No     Review of patient's allergies indicates:   Allergen Reactions    Ace inhibitors Swelling    Lisinopril Anaphylaxis     Angioedema requiring trach       Objective:   /64   Pulse 68   Ht 5' 4" (1.626 m)   Wt 118.2 kg (260 lb 9.3 oz)   BMI 44.73 kg/m²   Immunization History   Administered Date(s) Administered    COVID-19, MRNA, LN-S, PF (Pfizer) (Purple Cap) 08/17/2021, 09/17/2021    Influenza 10/10/2006, 12/19/2007, 10/08/2009, 12/15/2010, 11/09/2011, 10/16/2012    Influenza (FLUAD) - Quadrivalent - Adjuvanted - PF *Preferred* (65+) 12/10/2020, 01/20/2022    Influenza - High Dose - PF (65 years and older) 12/06/2017, 12/13/2018, 12/24/2019    Influenza - Quadrivalent 10/30/2014    Influenza - Trivalent (ADULT) 10/08/2013    Influenza Split 10/10/2006, 12/19/2007, 10/08/2009, 12/15/2010, 11/09/2011, 10/16/2012, 10/08/2013    PPD Test 04/05/2010, 04/07/2010    Pneumococcal Conjugate - 13 Valent 04/30/2015    Pneumococcal Polysaccharide - 23 Valent 02/12/2007, 12/13/2018    Tdap 10/16/2012    Zoster 04/30/2015       Physical Exam   Constitutional: She is oriented to person, place, and time. No distress.   HENT:   Head: Normocephalic and atraumatic.   Pulmonary/Chest: Effort normal.   Abdominal: She exhibits no distension.   Musculoskeletal:         General: No swelling or tenderness. Normal range of motion.      Cervical back: Normal range of motion.   Lymphadenopathy:     She has no cervical adenopathy.   Neurological: She is alert and oriented to person, place, and time.   Skin: Skin is warm and dry. No rash noted.   Psychiatric: Mood normal.   Nursing note and vitals reviewed.     no active synovitis   100% full fist formation bilaterally   negative squeeze test  No effusions of large or small " joints  Well preserved ROM      Recent Results (from the past 672 hour(s))   POCT glucose    Collection Time: 02/16/24  9:29 AM   Result Value Ref Range    POCT Glucose 116 (H) 70 - 110 mg/dL   POCT glucose    Collection Time: 02/16/24  9:30 AM   Result Value Ref Range    POC Glucose 116 (A) 70 - 110 MG/DL   Specimen to Pathology, Surgery Gastrointestinal tract    Collection Time: 02/16/24 10:20 AM   Result Value Ref Range    Final Pathologic Diagnosis       1. Colon, cecal polyp x2 (polypectomy):  Tubulovillous adenoma, multiple fragments  No high-grade dysplasia.      2. Colon, hepatic flexure polyp (polypectomy):  Tubular adenoma.  No high-grade dysplasia    3. Colon, splenic flexure polyp (polypectomy):  Tubular adenoma    4. Colon, descending polyp (polypectomy):    Tubular adenoma    5. Colon, sigmoid polyp (polypectomy):   Hyperplastic polyp        Gross       1: Surgery ID:  [id number]   Pathology ID:  [id number]  1. Received in formalin labeled &quot;cecal polypectomy x2&quot; are multiple pink-red tissue fragments aggregating to 1.5 x 1.5 x 0.3 cm.  The specimen is submitted entirely in cassette 1A.    AIL--1-A        Grossed by: Maria L Dee MS PA([facility])   2: Surgery ID:  [id number]   Pathology ID:  [id number]  2. Received in formalin labeled &quot;hepatic flexure polypectomy&quot; are multiple tan-pink tissue fragments aggregating to 0.8 x 0.8 x 0.3 cm.  The specimen is submitted entirely in cassette 2A.    XWW--2-A        Grossed by: Maria L Dee MS PA(AS[facility])   3: Surgery ID:  [id number]   Pathology ID:  [id number]  3. Received in formalin labeled &quot;splenic flexure polypectomy&quot; is a single tan tissue fragment measuring 0.4 x 0.4 x 0.3 cm.  The specimen is submitted entirely in cassette 3A.    SXQ--3-A        Grossed by: Maria L Dee MS PA(ASCP)   4: Surgery ID:  [id number]   Pathology ID:  [id number]  4. Received in formal in labeled &quot;descending colon polypectomy&quot; are 2 tan tissue  "fragments ranging from 0.1-0.2 cm in greatest dimension.  The specimen is submitted entirely in cassette 4A.    TSO--4-A        Grossed by: Maria L Dee MS, PA([facility])   5: Surgery ID:  [id number]   Pathology ID:  [id number]  5. Received in formalin labeled &quot;sigmoid polypectomy&quot; is a single tan tissue fragment measuring 0.4 x 0.4 x 0.3 cm.  The specimen is submitted entirely in cassette 5A.    BFP--5-A        Grossed by: Maria L Dee MS, PA([facility])      Disclaimer       Unless the case is a 'gross only' or additional testing only, the final diagnosis for each specimen is based on a microscopic examination of appropriate tissue sections.         No results found for: "TBGOLDPLUS"   Lab Results   Component Value Date    HEPAIGM Negative 03/08/2010    HEPBIGM Negative 03/08/2010    HEPBCAB Negative 04/04/2022    HEPCAB Negative 10/23/2014        Imaging  I have personally reviewed images and report of the dexa from 2/21/22.  I agree with the interpretation.  FINDINGS:  The L1 to L4 vertebral bone mineral density is equal to 1.112 g/cm squared with a T score of -0.7.  There has been no significant change relative to the prior study.     The left femoral neck bone mineral density is equal to 0.680 g/cm squared with a T score of -2.6.  There has been  a -12.3% statistically significant change relative to the prior study.     Impression:  Osteoporosis    Assessment:     1. Rheumatoid arthritis involving multiple sites with positive rheumatoid factor    2. High risk medication use    3. Medication monitoring encounter    4. Immunocompromised    5. Age-related osteoporosis without current pathological fracture    6. Elevated sed rate    7. Vitamin D deficiency disease          Plan:     Page was seen today for disease management and rheumatoid arthritis.    Diagnoses and all orders for this visit:    Rheumatoid arthritis involving multiple sites with positive rheumatoid factor  -     hydroxychloroquine " (PLAQUENIL) 200 mg tablet; Take 1 tablet (200 mg total) by mouth once daily.    High risk medication use    Medication monitoring encounter    Immunocompromised    Age-related osteoporosis without current pathological fracture  -     DXA Bone Density Axial Skeleton 1 or more sites; Standing  -     Vitamin D; Standing    Elevated sed rate    Vitamin D deficiency disease      Seropositive rheumatoid arthritis   Increased Sed rate/crp  ESR at her baseline (she is typically 50-60)  SPEP and MACHELLE in past wnl  CT Chest/abd/pelvis  Adrenal mass - pt seeing Dr. Baltazar  Plan for f/u CT in 10/2023 per Dr. Baltazar  Sxs stable/controlled  continue Plaquenil 200 mg daily   Pt does not feel need to resume MTX  Continue Plaquenil monitoring with Ophthalmology - last visit 8/2022 - no maculopathy  F/u sooner if sxs worsen  SOB, now on supplemental O2 at home Prn  Not using it regularly  Saw Dr. Cha 1/2024  She was stable and he recommended f/u in 1/2025  osteoporosis in the setting of CKD  Reclast dc'ed due to intolerance - last dose 3/2022  Will order updated DEXA  If she shows high fracture risk, will plan to start Prolia  Discussed risks and benefits  Literature provided to the pt  Previously deferred Prolia  She understands she is at a higher fracture risk without treatment.  She is considering Dentures  Would delay start of Prolia til after dental work completed  DEXA 2/2024  Avoid NSAIDs d/t CKD  Adrenal mass  W/u negative 4/2023 per Dr. Baltazar office notes  She is over due for f/u and repeat CT.  Rec f/u w Dr. Baltazar  Chronically elevated ESR  No ha/jaw claudication/sudden visual changes  No hip nor shoulder pain  Drug therapy requiring intensive monitoring for toxicity  High Risk Medication Monitoring encounter  No current medication related issues, no evidence of toxicity  I ordered labs for toxicity monitoring, have personally reviewed the findings, and discussed them with the patient.  Pending labs will be sent via  the portal  Compromised immune system secondary to autoimmune disease and/or use of immunosuppressive drugs.  Monitor carefully for infections.  Advised patient to get immediate medical care if any infection arises.  Also advised strict adherence age-appropriate vaccinations and cancer screenings with PCP.  Patient advised to hold DMARD and/or biologic therapy for signs of infection or for surgery. If you are unsure what to do please call our office for instruction.[facility] [phone]  Return to clinic: 6 mos w Reg 4/Vit D same day    The patient understands, chooses and consents to this plan and accepts all the risks which include but are not limited to the risks mentioned above.     Disclaimer: This note was prepared using a voice recognition system and is likely to have sound alike errors within the text.

## 2024-03-18 ENCOUNTER — HOSPITAL ENCOUNTER (OUTPATIENT)
Dept: RADIOLOGY | Facility: HOSPITAL | Age: 74
Discharge: HOME | End: 2024-03-18
Attending: NURSE PRACTITIONER
Payer: MEDICARE

## 2024-03-18 VITALS — HEIGHT: 64 IN | BODY MASS INDEX: 44.48 KG/M2 | WEIGHT: 260.56 LBS

## 2024-03-18 DIAGNOSIS — Z12.31 ENCOUNTER FOR SCREENING MAMMOGRAM FOR MALIGNANT NEOPLASM OF BREAST: ICD-10-CM

## 2024-03-18 PROCEDURE — 77063 BREAST TOMOSYNTHESIS BI: CPT | Mod: 26,,, | Performed by: RADIOLOGY

## 2024-03-18 PROCEDURE — 77067 SCR MAMMO BI INCL CAD: CPT | Mod: 26,,, | Performed by: RADIOLOGY

## 2024-03-18 PROCEDURE — 77063 BREAST TOMOSYNTHESIS BI: CPT | Mod: TC

## 2024-03-19 ENCOUNTER — TELEPHONE (OUTPATIENT)
Dept: INTERNAL MEDICINE | Facility: CLINIC | Age: 74
End: 2024-03-19
Payer: MEDICARE

## 2024-03-26 ENCOUNTER — PATIENT MESSAGE (OUTPATIENT)
Dept: RESEARCH | Facility: HOSPITAL | Age: 74
End: 2024-03-26
Payer: MEDICARE

## 2024-04-09 DIAGNOSIS — I10 ESSENTIAL HYPERTENSION: ICD-10-CM

## 2024-04-09 RX ORDER — AMLODIPINE BESYLATE 10 MG/1
10 TABLET ORAL
Qty: 90 TABLET | Refills: 1 | Status: SHIPPED | OUTPATIENT
Start: 2024-04-09

## 2024-04-09 NOTE — TELEPHONE ENCOUNTER
No care due was identified.  [facility] Embedded Care Due Messages. Reference number: [id number].   4/09/2024 8:09:05 AM CDT

## 2024-04-10 NOTE — EICU
eICU Physician Virtual/Remote Brief Evaluation Note      Telephone call RN  PICC line placed, chest x-ray completed  Patient still hypotensive  Chart reviewed, patient observed, discussed with RN  Chest x-ray with right arm PICC line in good position with tip at cavoatrial junction.  No pneumothorax  PICC line may be used for all medications and TPN  Levophed drip ordered     HALLE Bajwa MD  [facility] Attending  [phone]    This report has been created through the use of M-Modal dictation software. Typographical and content errors may occur with this process. While efforts are made to detect and correct such errors, in some cases errors will persist. For this reason, wording in this document should be considered in the proper context and not strictly verbatim    [Negative] : Allergic/Immunologic

## 2024-04-10 NOTE — TELEPHONE ENCOUNTER
Refill Decision Note   Page Grissom  is requesting a refill authorization.  Brief Assessment and Rationale for Refill:  Approve     Medication Therapy Plan:         Comments:     Note composed:11:48 PM 04/09/2024

## 2024-04-18 NOTE — Clinical Note
ESR/CRP continue to be elevated.  CT shows adrenal nodule (seeing Dr. Baltazar for def work up and mgmt).  Also w COPD.  No ILD on CT.  RA is quite.  No sxs of PMR.  SPEP and MACHELLE unremarkable.  On Plaq 200mg daily.  Would you change anything?  She has no pain and no synoviits on exam.  Am I getting too hung up on ESR/CRP?
None Known

## 2024-06-06 ENCOUNTER — OFFICE VISIT (OUTPATIENT)
Dept: INTERNAL MEDICINE | Facility: CLINIC | Age: 74
End: 2024-06-06
Payer: MEDICARE

## 2024-06-06 ENCOUNTER — APPOINTMENT (OUTPATIENT)
Dept: RADIOLOGY | Facility: HOSPITAL | Age: 74
End: 2024-06-06
Attending: PHYSICIAN ASSISTANT
Payer: MEDICARE

## 2024-06-06 VITALS
SYSTOLIC BLOOD PRESSURE: 138 MMHG | DIASTOLIC BLOOD PRESSURE: 76 MMHG | TEMPERATURE: 98 F | HEIGHT: 64 IN | WEIGHT: 259.06 LBS | BODY MASS INDEX: 44.23 KG/M2

## 2024-06-06 DIAGNOSIS — E78.5 HYPERLIPIDEMIA ASSOCIATED WITH TYPE 2 DIABETES MELLITUS: ICD-10-CM

## 2024-06-06 DIAGNOSIS — M81.0 AGE-RELATED OSTEOPOROSIS WITHOUT CURRENT PATHOLOGICAL FRACTURE: ICD-10-CM

## 2024-06-06 DIAGNOSIS — K21.9 GASTROESOPHAGEAL REFLUX DISEASE WITHOUT ESOPHAGITIS: ICD-10-CM

## 2024-06-06 DIAGNOSIS — N25.81 SECONDARY HYPERPARATHYROIDISM OF RENAL ORIGIN: ICD-10-CM

## 2024-06-06 DIAGNOSIS — T78.3XXD ANGIOEDEMA, SUBSEQUENT ENCOUNTER: ICD-10-CM

## 2024-06-06 DIAGNOSIS — N18.32 STAGE 3B CHRONIC KIDNEY DISEASE: ICD-10-CM

## 2024-06-06 DIAGNOSIS — Z00.00 WELL ADULT EXAM: ICD-10-CM

## 2024-06-06 DIAGNOSIS — I70.0 AORTIC ATHEROSCLEROSIS: ICD-10-CM

## 2024-06-06 DIAGNOSIS — E55.9 VITAMIN D DEFICIENCY DISEASE: ICD-10-CM

## 2024-06-06 DIAGNOSIS — M79.7 FIBROMYALGIA: ICD-10-CM

## 2024-06-06 DIAGNOSIS — E11.29 CONTROLLED TYPE 2 DIABETES MELLITUS WITH MICROALBUMINURIA, WITHOUT LONG-TERM CURRENT USE OF INSULIN: ICD-10-CM

## 2024-06-06 DIAGNOSIS — J45.30 MILD PERSISTENT ASTHMA WITHOUT COMPLICATION: ICD-10-CM

## 2024-06-06 DIAGNOSIS — I50.1 LEFT VENTRICULAR FAILURE, UNSPECIFIED: ICD-10-CM

## 2024-06-06 DIAGNOSIS — E27.8 OTHER SPECIFIED DISORDERS OF ADRENAL GLAND: Primary | ICD-10-CM

## 2024-06-06 DIAGNOSIS — N18.31 STAGE 3A CHRONIC KIDNEY DISEASE: ICD-10-CM

## 2024-06-06 DIAGNOSIS — E11.22 CKD STAGE 3 DUE TO TYPE 2 DIABETES MELLITUS: ICD-10-CM

## 2024-06-06 DIAGNOSIS — M05.79 RHEUMATOID ARTHRITIS INVOLVING MULTIPLE SITES WITH POSITIVE RHEUMATOID FACTOR: ICD-10-CM

## 2024-06-06 DIAGNOSIS — I47.29 NONSUSTAINED VENTRICULAR TACHYCARDIA: ICD-10-CM

## 2024-06-06 DIAGNOSIS — J45.909 NOCTURNAL HYPOXEMIA DUE TO ASTHMA: ICD-10-CM

## 2024-06-06 DIAGNOSIS — E87.6 HYPOKALEMIA: ICD-10-CM

## 2024-06-06 DIAGNOSIS — R09.02 NOCTURNAL HYPOXEMIA DUE TO ASTHMA: ICD-10-CM

## 2024-06-06 DIAGNOSIS — E11.59 HYPERTENSION ASSOCIATED WITH DIABETES: ICD-10-CM

## 2024-06-06 DIAGNOSIS — I15.2 HYPERTENSION ASSOCIATED WITH DIABETES: ICD-10-CM

## 2024-06-06 DIAGNOSIS — Z79.899 HIGH RISK MEDICATION USE: ICD-10-CM

## 2024-06-06 DIAGNOSIS — R80.9 CONTROLLED TYPE 2 DIABETES MELLITUS WITH MICROALBUMINURIA, WITHOUT LONG-TERM CURRENT USE OF INSULIN: ICD-10-CM

## 2024-06-06 DIAGNOSIS — E66.01 CLASS 3 SEVERE OBESITY DUE TO EXCESS CALORIES WITH SERIOUS COMORBIDITY AND BODY MASS INDEX (BMI) OF 40.0 TO 44.9 IN ADULT: ICD-10-CM

## 2024-06-06 DIAGNOSIS — N18.30 CKD STAGE 3 DUE TO TYPE 2 DIABETES MELLITUS: ICD-10-CM

## 2024-06-06 DIAGNOSIS — E11.69 HYPERLIPIDEMIA ASSOCIATED WITH TYPE 2 DIABETES MELLITUS: ICD-10-CM

## 2024-06-06 DIAGNOSIS — Z79.60 LONG-TERM USE OF IMMUNOSUPPRESSANT MEDICATION: ICD-10-CM

## 2024-06-06 PROBLEM — D84.821 DRUG-INDUCED IMMUNODEFICIENCY: Status: RESOLVED | Noted: 2023-08-15 | Resolved: 2024-06-06

## 2024-06-06 PROCEDURE — 77080 DXA BONE DENSITY AXIAL: CPT | Mod: 26,,, | Performed by: RADIOLOGY

## 2024-06-06 PROCEDURE — 77080 DXA BONE DENSITY AXIAL: CPT | Mod: TC

## 2024-06-06 PROCEDURE — 99999 PR PBB SHADOW E&M-EST. PATIENT-LVL V: CPT | Mod: PBBFAC,,, | Performed by: PEDIATRICS

## 2024-06-06 PROCEDURE — 99215 OFFICE O/P EST HI 40 MIN: CPT | Mod: PBBFAC | Performed by: PEDIATRICS

## 2024-06-06 PROCEDURE — 99397 PER PM REEVAL EST PAT 65+ YR: CPT | Mod: S$PBB,GZ,, | Performed by: PEDIATRICS

## 2024-06-06 NOTE — PROGRESS NOTES
Patient ID: Page Grissom is a 73 y.o. female.    Chief Complaint: Follow-up    History of Present Illness    CHIEF COMPLAINT:  Patient presents today for follow up.    DIABETES AND KIDNEY FUNCTION:  Patient checks her blood sugar once a week and reports normal readings. She is currently on Trulicity 1.5 and has experienced a weight loss of approximately eight or nine lbs over the past year due to a decreased appetite. She occasionally eats breakfast and consumes a lot of fruit. Her kidney function was last recorded in the CKD three range.    BLOOD PRESSURE:  Patient's blood pressure is reported to be good today. She is currently on Prestoline and amlodipine for blood pressure management.    ASTHMA:  Patient reports that her asthma is doing well. She continues to cough up some mucus, but it is not limiting her activities.    CARDIOVASCULAR HEALTH:  In regards to her heart failure and ventricular tachycardia, the patient denies experiencing any chest pain or palpitations. She is currently on medication for cholesterol management and aortic atherosclerosis.    RHEUMATOID ARTHRITIS:  Patient is being managed by rheumatology for her rheumatoid arthritis. She is currently on Plaquenil for the same.    EYE HEALTH:  Patient reports having undergone an eye exam in March.    VACCINATIONS:  Patient needs to receive vaccinations for RSV, shingles, and tetanus.    PMH, PSH, SH, FH reviewed with patient.    ROS:  General: -fever, -chills, -fatigue, -weight gain, +weight loss, +appetite changes  Eyes: -vision changes, -redness, -discharge  ENT: -ear pain, -nasal congestion, -sore throat  Cardiovascular: -chest pain, -palpitations, -lower extremity edema  Respiratory: +cough, -shortness of breath  Gastrointestinal: -abdominal pain, -nausea, -vomiting, -diarrhea, -constipation, -blood in stool  Genitourinary: -dysuria, -hematuria, -frequency  Musculoskeletal: -joint pain, -muscle pain  Skin: -rash, -lesion  Neurological:  -headache, -dizziness, -numbness, -tingling  Psychiatric: -anxiety, -depression, -sleep difficulty         Exam:  Physical Exam    General: No acute distress. Well-developed. Well-nourished.  Eyes: EOMI. Sclerae anicteric.  HENT: Normocephalic. Atraumatic. Nares patent. Moist oral mucosa.  Cardiovascular: Regular rate. Regular rhythm. No murmurs. No rubs. No gallops. Normal S1, S2.  Respiratory: Normal respiratory effort. Clear to auscultation bilaterally. No rales. No rhonchi. No wheezing.  Abdomen: Soft. Non-tender. Non-distended. Normoactive bowel sounds.  Musculoskeletal: No  obvious deformity.  Extremities: No lower extremity edema. Good pulses in feet.  Neurological: Alert & oriented x3. No slurred speech. Normal gait. Normal sensation in feet.  Psychiatric: Normal mood. Normal affect. Good insight. Good judgment.  Skin: Warm. Dry. No rash.  Pulses 2+, Foot hygiene was good, no ulcers, no onchomycosis, no tinea, monofilament intact.         Assessment/Plan:  Other specified disorders of adrenal gland  -     CT Abdomen Without Contrast; Future; Expected date: 06/06/2024  -     Ambulatory referral/consult to Urology; Future; Expected date: 06/13/2024    Controlled type 2 diabetes mellitus with microalbuminuria, without long-term current use of insulin  -     Comprehensive Metabolic Panel; Future; Expected date: 06/06/2024  -     Lipid Panel; Future; Expected date: 06/06/2024  -     Hemoglobin A1C; Future; Expected date: 06/06/2024  -     Microalbumin/Creatinine Ratio, Urine; Future; Expected date: 06/06/2024  -     Comprehensive Metabolic Panel; Future; Expected date: 06/06/2024  -     Lipid Panel; Future; Expected date: 06/06/2024  -     Hemoglobin A1C; Future; Expected date: 06/06/2024  -     Microalbumin/Creatinine Ratio, Urine; Future; Expected date: 06/06/2024  -     Ambulatory referral/consult to Optometry; Future; Expected date: 06/13/2024    Gastroesophageal reflux disease without esophagitis    Mild  persistent asthma without complication    Vitamin D deficiency disease  -     Vitamin D; Future; Expected date: 06/06/2024    High risk medication use    Rheumatoid arthritis involving multiple sites with positive rheumatoid factor    Hypertension associated with diabetes    Hyperlipidemia associated with type 2 diabetes mellitus    Fibromyalgia    Long-term use of immunosuppressant medication    Class 3 severe obesity due to excess calories with serious comorbidity and body mass index (BMI) of 40.0 to 44.9 in adult    CKD stage 3 due to type 2 diabetes mellitus    Aortic atherosclerosis    Age-related osteoporosis without current pathological fracture    Angioedema, subsequent encounter    Hypokalemia    Nonsustained ventricular tachycardia    Nocturnal hypoxemia due to asthma    Stage 3b chronic kidney disease    Stage 3a chronic kidney disease    Left ventricular failure, unspecified    Secondary hyperparathyroidism of renal origin  -     PTH, intact; Future; Expected date: 06/06/2024    Well adult exam         Assessment & Plan    E11.9 Type 2 diabetes mellitus without complications  J45.909 Unspecified asthma, uncomplicated  I70.0 Atherosclerosis of aorta  N18.30 Chronic kidney disease, stage 3 unspecified  Z13.21 Encounter for screening for nutritional disorder  N18.4 Chronic kidney disease, stage 4 (severe)  I10 Essential (primary) hypertension  M06.9 Rheumatoid arthritis, unspecified  E78.5 Hyperlipidemia, unspecified  I47.20 Ventricular tachycardia, unspecified  I50.9 Heart failure, unspecified  DIABETES:  - Continued the current treatment plan for the patient's diabetes.  - Considered increasing the Trulicity dosage if blood sugar remain uncontrolled.  - Discussed the potential addition of a new diabetic medication, either Jardiance or Farxiga, to safeguard kidney function should it deteriorate.  - Explained the impact of Trulicity on blood sugar and appetite, and outlined the potential side effects of the  new diabetic medication.  HYPERTENSION:  - Continued the current treatment plan for the patient's blood pressure.  ASTHMA:  - Continued the current treatment plan for the patient's asthma.  HEART FAILURE:  - Continued the current treatment plan for the patient's heart failure.  RHEUMATOID ARTHRITIS:  - Continued the current treatment plan for the patient's rheumatoid arthritis.  - Highlighted the importance of regular eye exams with an optometrist when taking Plaquenil.  LAB FINDINGS:  - Ordered labs for cholesterol, blood sugar, and kidney function.  - Plan to review these lab results the following day and adjust the treatment plan as necessary.  WEIGHT MANAGEMENT:  - Encouraged the patient to persist with weight loss efforts.  VACCINATIONS:  - Emphasized the necessity for RSV, shingles, and tetanus vaccinations.        Await labs today.  Visit today included increased complexity associated with the care of the episodic problem  addressed and managing the longitudinal care of the patient due to the serious and/or complex managed problem(s) .      Follow up in about 6 months (around 12/6/2024).    This note was generated with the assistance of ambient listening technology. Verbal consent was obtained by the patient and accompanying visitor(s) for the recording of patient appointment to facilitate this note. I attest to having reviewed and edited the generated note for accuracy, though some syntax or spelling errors may persist. Please contact the author of this note for any clarification.

## 2024-06-07 ENCOUNTER — TELEPHONE (OUTPATIENT)
Dept: INTERNAL MEDICINE | Facility: CLINIC | Age: 74
End: 2024-06-07
Payer: MEDICARE

## 2024-06-07 NOTE — TELEPHONE ENCOUNTER
----- Message from Abraham Roman MD sent at 6/7/2024  6:42 AM CDT -----  Notify patient labs are stable and no adjustment in medications.

## 2024-06-17 ENCOUNTER — OFFICE VISIT (OUTPATIENT)
Dept: UROLOGY | Facility: CLINIC | Age: 74
End: 2024-06-17
Payer: MEDICARE

## 2024-06-17 VITALS
HEART RATE: 77 BPM | SYSTOLIC BLOOD PRESSURE: 125 MMHG | WEIGHT: 258.19 LBS | HEIGHT: 64 IN | DIASTOLIC BLOOD PRESSURE: 64 MMHG | BODY MASS INDEX: 44.08 KG/M2

## 2024-06-17 DIAGNOSIS — D44.12 NEOPLASM OF UNCERTAIN BEHAVIOR OF LEFT ADRENAL GLAND: Primary | ICD-10-CM

## 2024-06-17 DIAGNOSIS — R35.0 FREQUENCY OF MICTURITION: ICD-10-CM

## 2024-06-17 DIAGNOSIS — R35.1 NOCTURIA: ICD-10-CM

## 2024-06-17 PROCEDURE — 99214 OFFICE O/P EST MOD 30 MIN: CPT | Mod: S$PBB,,, | Performed by: UROLOGY

## 2024-06-17 PROCEDURE — 99999 PR PBB SHADOW E&M-EST. PATIENT-LVL V: CPT | Mod: PBBFAC,,, | Performed by: UROLOGY

## 2024-06-17 PROCEDURE — 99215 OFFICE O/P EST HI 40 MIN: CPT | Mod: PBBFAC | Performed by: UROLOGY

## 2024-06-17 RX ORDER — SOLIFENACIN SUCCINATE 5 MG/1
5 TABLET, FILM COATED ORAL NIGHTLY
Qty: 90 TABLET | Refills: 1 | Status: SHIPPED | OUTPATIENT
Start: 2024-06-17 | End: 2025-06-17

## 2024-06-17 NOTE — PROGRESS NOTES
Subjective:       Patient ID: Pageshell Grissom is a 73 y.o. female.    Chief Complaint: Follow-up (/)      History of Present Illness:     Ms Grissom was referred to Urology due to an incidental small 2.5 cm left adrenal mass that was found on a CT chest without IV contrast on 23.  She has nocturia that varies.  She says that sometimes she voids 7 times at night if she takes her fluid pill in the evening.  She has daytime urinary frequency.  No urgency.  No dysuria.  No gross hematuria.  Normal urinary flow.  No difficulty voiding.  Occasional constipation.  No history of recurrent UTIs.    Follow-up  Pertinent negatives include no chest pain, chills, fever, nausea, rash or vomiting.        Past Medical History:   Diagnosis Date    Acidosis, metabolic, with respiratory acidosis 3/31/2022    Asthma     Back pain     Cataract     OD    CKD (chronic kidney disease) stage 3, GFR 30-59 ml/min 2020    Diabetes mellitus     Fibromyalgia     Gastroesophageal reflux disease     Hypercholesterolemia     Hypertension     Immune deficiency disorder     Left ventricular failure, unspecified 8/15/2023    Obesity     Osteoporosis     Rheumatoid arthritis     Tobacco dependence     Trouble in sleeping     Type 2 diabetes mellitus      Family History   Problem Relation Name Age of Onset    Hypertension Mother      Cancer Father      Colon cancer Father      Breast cancer Sister       Social History     Socioeconomic History    Marital status:    Tobacco Use    Smoking status: Former     Current packs/day: 0.00     Average packs/day: 0.5 packs/day for 25.0 years (12.5 ttl pk-yrs)     Types: Cigarettes     Start date: 1997     Quit date: 2022     Years since quittin.1    Smokeless tobacco: Never   Substance and Sexual Activity    Alcohol use: No    Drug use: No    Sexual activity: Not Currently   Social History Narrative    1 dog, no smokers in household; No HH as of 2022, has daytime sitter  05/2022.     Social Determinants of Health     Financial Resource Strain: Low Risk  (11/14/2023)    Overall Financial Resource Strain (CARDIA)     Difficulty of Paying Living Expenses: Not hard at all   Food Insecurity: No Food Insecurity (11/14/2023)    Hunger Vital Sign     Worried About Running Out of Food in the Last Year: Never true     Ran Out of Food in the Last Year: Never true   Transportation Needs: No Transportation Needs (11/14/2023)    PRAPARE - Transportation     Lack of Transportation (Medical): No     Lack of Transportation (Non-Medical): No   Physical Activity: Inactive (11/14/2023)    Exercise Vital Sign     Days of Exercise per Week: 0 days     Minutes of Exercise per Session: 0 min   Stress: No Stress Concern Present (11/14/2023)    [employer] - Occupational Stress Questionnaire     Feeling of Stress : Not at all   Housing Stability: Low Risk  (11/14/2023)    Housing Stability Vital Sign     Unable to Pay for Housing in the Last Year: No     Number of Places Lived in the Last Year: 1     Unstable Housing in the Last Year: No     Outpatient Encounter Medications as of 6/17/2024   Medication Sig Dispense Refill    acetaminophen (TYLENOL) 500 MG tablet Take 500 mg by mouth as needed for Pain.      albuterol (PROVENTIL/VENTOLIN HFA) 90 mcg/actuation inhaler INHALE 2 PUFFS INTO THE LINGS EVERY 6 HOURS AS NEEDED WHEEZING 25.5 g 3    amLODIPine (NORVASC) 10 MG tablet TAKE 1 TABLET(10 MG) BY MOUTH EVERY DAY 90 tablet 1    blood sugar diagnostic Strp To check BG 1 times daily, to use with insurance preferred meter 100 strip 11    blood-glucose meter kit To check BG 1 times daily, to use with insurance preferred meter 1 each 0    cetirizine (ZYRTEC) 10 MG tablet TAKE 1 TABLET BY MOUTH EVERY DAY 90 tablet 3    cholecalciferol, vitamin D3, 2,000 unit Tab Take 2,000 Units by mouth once daily.      dulaglutide (TRULICITY) 1.5 mg/0.5 mL pen injector Inject 1.5 mg into the skin  every 7 days. 4 pen 11    EPINEPHrine (EPIPEN 2-DANA) 0.3 mg/0.3 mL AtIn Inject 0.3 mLs (0.3 mg total) into the muscle once. for 1 dose (Patient not taking: Reported on 6/6/2024) 1 each 11    famotidine (PEPCID) 40 MG tablet Take 1 tablet (40 mg total) by mouth once daily. 90 tablet 3    fish oil-omega-3 fatty acids 300-1,000 mg capsule Take 2 g by mouth once daily.      fluticasone furoate-vilanteroL (BREO ELLIPTA) 100-25 mcg/dose diskus inhaler Inhale 1 puff into the lungs once daily. Controller (Patient not taking: Reported on 6/6/2024) 60 each 5    furosemide (LASIX) 20 MG tablet Take 1 tablet (20 mg total) by mouth daily as needed (edema/swelling). 90 tablet 3    hydrALAZINE (APRESOLINE) 50 MG tablet TAKE 1 TABLET BY MOUTH THREE TIMES DAILY 270 tablet 2    hydroxychloroquine (PLAQUENIL) 200 mg tablet Take 1 tablet (200 mg total) by mouth once daily. 90 tablet 3    lancets Misc To check BG 1 times daily, to use with insurance preferred meter 100 each 11    metFORMIN (GLUCOPHAGE) 500 MG tablet TAKE 1 TABLET(500 MG) BY MOUTH EVERY DAY 90 tablet 0    multivitamin (THERAGRAN) per tablet Take 1 tablet by mouth once daily.      pantoprazole (PROTONIX) 40 MG tablet TAKE 1 TABLET(40 MG) BY MOUTH EVERY DAY 90 tablet 3    potassium chloride SA (K-DUR,KLOR-CON) 20 MEQ tablet Take 1 tablet (20 mEq total) by mouth once daily. 90 tablet 3    rosuvastatin (CRESTOR) 40 MG Tab Take 1 tablet (40 mg total) by mouth every evening. 90 tablet 3    solifenacin (VESICARE) 5 MG tablet Take 1 tablet (5 mg total) by mouth every evening. 90 tablet 1    [DISCONTINUED] hydroCHLOROthiazide (HYDRODIURIL) 25 MG tablet TAKE 1 TABLET(25 MG) BY MOUTH EVERY DAY 90 tablet 3     Facility-Administered Encounter Medications as of 6/17/2024   Medication Dose Route Frequency Provider Last Rate Last Admin    LIDOcaine (PF) 10 mg/ml (1%) injection 10 mg  1 mL Intradermal Once Dave Jansen, PANydiaC            Review of Systems   Constitutional:  Negative  "for chills and fever.   Respiratory:  Negative for shortness of breath.    Cardiovascular:  Negative for chest pain.   Gastrointestinal:  Negative for nausea and vomiting.   Genitourinary:  Positive for frequency. Negative for difficulty urinating, dysuria, hematuria and urgency.   Musculoskeletal:  Negative for back pain.   Skin:  Negative for rash.   Neurological:  Negative for dizziness.   Psychiatric/Behavioral:  Negative for agitation.        Objective:     /64 (BP Location: Right arm, Patient Position: Sitting)   Pulse 77   Ht 5' 4" (1.626 m)   Wt 117.1 kg (258 lb 2.5 oz)   BMI 44.31 kg/m²     Physical Exam  Constitutional:       General: She is not in acute distress.     Appearance: She is obese.   Pulmonary:      Effort: Pulmonary effort is normal.   Abdominal:      General: There is no distension.      Palpations: Abdomen is soft.   Musculoskeletal:      Comments: Mild bilateral lower extremity edema.   Neurological:      Mental Status: She is alert and oriented to person, place, and time.         No visits with results within 1 Day(s) from this visit.   Latest known visit with results is:   Lab Visit on 06/06/2024   Component Date Value Ref Range Status    Sodium 06/06/2024 139  136 - 145 mmol/L Final    Potassium 06/06/2024 4.2  3.5 - 5.1 mmol/L Final    Chloride 06/06/2024 104  95 - 110 mmol/L Final    CO2 06/06/2024 26  23 - 29 mmol/L Final    Glucose 06/06/2024 81  70 - 110 mg/dL Final    BUN 06/06/2024 21  8 - 23 mg/dL Final    Creatinine 06/06/2024 1.3  0.5 - 1.4 mg/dL Final    Calcium 06/06/2024 9.2  8.7 - 10.5 mg/dL Final    Total Protein 06/06/2024 7.6  6.0 - 8.4 g/dL Final    Albumin 06/06/2024 3.2 (L)  3.5 - 5.2 g/dL Final    Total Bilirubin 06/06/2024 0.3  0.1 - 1.0 mg/dL Final    Comment: For infants and newborns, interpretation of results should be based  on gestational age, weight and in agreement with clinical  observations.    Premature Infant recommended reference ranges:  Up to " 24 hours.............<8.0 mg/dL  Up to 48 hours............<12.0 mg/dL  3-5 days..................<15.0 mg/dL  6-29 days.................<15.0 mg/dL      Alkaline Phosphatase 06/06/2024 107  55 - 135 U/L Final    AST 06/06/2024 15  10 - 40 U/L Final    ALT 06/06/2024 9 (L)  10 - 44 U/L Final    eGFR 06/06/2024 43.7 (A)  >60 mL/min/1.73 m^2 Final    Anion Gap 06/06/2024 9  8 - 16 mmol/L Final    Cholesterol 06/06/2024 135  120 - 199 mg/dL Final    Comment: The National Cholesterol Education Program (NCEP) has set the  following guidelines (reference ranges) for Cholesterol:  Optimal.....................<200 mg/dL  Borderline High.............200-239 mg/dL  High........................> or = 240 mg/dL      Triglycerides 06/06/2024 81  30 - 150 mg/dL Final    Comment: The National Cholesterol Education Program (NCEP) has set the  following guidelines (reference values) for triglycerides:  Normal......................<150 mg/dL  Borderline High.............150-199 mg/dL  High........................200-499 mg/dL      HDL 06/06/2024 51  40 - 75 mg/dL Final    Comment: The National Cholesterol Education Program (NCEP) has set the  following guidelines (reference values) for HDL Cholesterol:  Low...............<40 mg/dL  Optimal...........>60 mg/dL      LDL Cholesterol 06/06/2024 67.8  63.0 - 159.0 mg/dL Final    Comment: The National Cholesterol Education Program (NCEP) has set the  following guidelines (reference values) for LDL Cholesterol:  Optimal.......................<130 mg/dL  Borderline High...............130-159 mg/dL  High..........................160-189 mg/dL  Very High.....................>190 mg/dL      HDL/Cholesterol Ratio 06/06/2024 37.8  20.0 - 50.0 % Final    Total Cholesterol/HDL Ratio 06/06/2024 2.6  2.0 - 5.0 Final    Non-HDL Cholesterol 06/06/2024 84  mg/dL Final    Comment: Risk category and Non-HDL cholesterol goals:  Coronary heart disease (CHD)or equivalent (10-year risk of CHD >20%):  Non-HDL  cholesterol goal     <130 mg/dL  Two or more CHD risk factors and 10-year risk of CHD <= 20%:  Non-HDL cholesterol goal     <160 mg/dL  0 to 1 CHD risk factor:  Non-HDL cholesterol goal     <190 mg/dL      Hemoglobin A1C 06/06/2024 5.4  4.0 - 5.6 % Final    Comment: ADA Screening Guidelines:  5.7-6.4%  Consistent with prediabetes  >or=6.5%  Consistent with diabetes    High levels of fetal hemoglobin interfere with the HbA1C  assay. Heterozygous hemoglobin variants (HbS, HgC, etc)do  not significantly interfere with this assay.   However, presence of multiple variants may affect accuracy.      Estimated Avg Glucose 06/06/2024 108  68 - 131 mg/dL Final    Vit D, 25-Hydroxy 06/06/2024 58  30 - 96 ng/mL Final    Comment: Vitamin D deficiency.........<10 ng/mL                              Vitamin D insufficiency......10-29 ng/mL       Vitamin D sufficiency........> or equal to 30 ng/mL  Vitamin D toxicity............>100 ng/mL      PTH, Intact 06/06/2024 125.4 (H)  9.0 - 77.0 pg/mL Final        No results found for this or any previous visit (from the past 8760 hour(s)).     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 Nocturia    3. Frequency of micturition      Plan:     Orders Placed This Encounter    solifenacin (VESICARE) 5 MG tablet        Assessment:  - CT chest without IV contrast 9-26-23 showed a 2.5 cm nonspecific left adrenal mass..  - Nocturia and daytime urinary frequency.  - CKD.  - Mild bilateral lower extremity edema.  She takes lasix 20 mg 1 PO qdaily PRN.  - DM.  - Obesity.  - Left ventricular failure per medical records.    Plan:  - I encouraged to keep her imaging appointment for a CT abdomen without IV contrast on 6-25-24.  - Started solifenacin 5 mg 1 PO qhs today on 6-17-24.  I told her that if her occasional constipation worsens that she can try miralax OTC daily PRN constipation.  - I recommended that when she take the lasix that she not take it close before  bedtime.  - I discussed dietary modifications with her today and I recommended she drink mostly water during the day.  I recommended that she stop drinking coke.  - I recommended she limit her fluid intake at night to small amounts of water and to void just prior to bedtime.    - I discussed the option of her elevating her legs some during the day.  - I discussed the option of her wearing compressing stockings some during the day.  - RTC in 4 weeks with a PVR on arrival.

## 2024-06-19 ENCOUNTER — TELEPHONE (OUTPATIENT)
Dept: INTERNAL MEDICINE | Facility: CLINIC | Age: 74
End: 2024-06-19
Payer: MEDICARE

## 2024-06-19 NOTE — TELEPHONE ENCOUNTER
Called pt's daughter to let her know [facility] paperwork is ready for pickup. No answer, unable to leave vm due to mailbox being full.

## 2024-06-25 ENCOUNTER — HOSPITAL ENCOUNTER (OUTPATIENT)
Dept: RADIOLOGY | Facility: HOSPITAL | Age: 74
Discharge: HOME | End: 2024-06-25
Attending: PEDIATRICS
Payer: MEDICARE

## 2024-06-25 DIAGNOSIS — E27.8 OTHER SPECIFIED DISORDERS OF ADRENAL GLAND: ICD-10-CM

## 2024-06-25 PROCEDURE — 74150 CT ABDOMEN W/O CONTRAST: CPT | Mod: 26,,, | Performed by: RADIOLOGY

## 2024-06-25 PROCEDURE — 74150 CT ABDOMEN W/O CONTRAST: CPT | Mod: TC

## 2024-06-26 ENCOUNTER — TELEPHONE (OUTPATIENT)
Dept: INTERNAL MEDICINE | Facility: CLINIC | Age: 74
End: 2024-06-26
Payer: MEDICARE

## 2024-06-26 NOTE — TELEPHONE ENCOUNTER
----- Message from Abraham Rmoan MD sent at 6/25/2024  4:07 PM CDT -----  Notify patient left adrenal gland nodule is unchanged and does not need follow up unless something changes.

## 2024-07-03 DIAGNOSIS — Z71.89 COMPLEX CARE COORDINATION: ICD-10-CM

## 2024-07-11 RX ORDER — FUROSEMIDE 20 MG/1
20 TABLET ORAL DAILY PRN
Qty: 90 TABLET | Refills: 3 | OUTPATIENT
Start: 2024-07-11

## 2024-07-11 NOTE — TELEPHONE ENCOUNTER
Refill Decision Note   Page Grissom  is requesting a refill authorization.  Brief Assessment and Rationale for Refill:  Quick Discontinue     Medication Therapy Plan:  The original prescription was discontinued on 2/16/2024 by Laura Ibanez NP for the following reason: Patient no longer taking.      Comments:     Note composed:1:46 PM 07/11/2024

## 2024-07-11 NOTE — TELEPHONE ENCOUNTER
No care due was identified.  [facility] Embedded Care Due Messages. Reference number: [id number].   7/11/2024 4:09:38 AM CDT

## 2024-09-06 ENCOUNTER — LAB VISIT (OUTPATIENT)
Dept: LAB | Facility: HOSPITAL | Age: 74
End: 2024-09-06
Attending: STUDENT IN AN ORGANIZED HEALTH CARE EDUCATION/TRAINING PROGRAM
Payer: MEDICARE

## 2024-09-06 DIAGNOSIS — M81.0 AGE-RELATED OSTEOPOROSIS WITHOUT CURRENT PATHOLOGICAL FRACTURE: ICD-10-CM

## 2024-09-06 DIAGNOSIS — M05.79 RHEUMATOID ARTHRITIS INVOLVING MULTIPLE SITES WITH POSITIVE RHEUMATOID FACTOR: ICD-10-CM

## 2024-09-06 LAB
25(OH)D3+25(OH)D2 SERPL-MCNC: 57 NG/ML (ref 30–96)
ALBUMIN SERPL BCP-MCNC: 3.3 G/DL (ref 3.5–5.2)
ALP SERPL-CCNC: 98 U/L (ref 55–135)
ALT SERPL W/O P-5'-P-CCNC: 12 U/L (ref 10–44)
ANION GAP SERPL CALC-SCNC: 9 MMOL/L (ref 8–16)
AST SERPL-CCNC: 17 U/L (ref 10–40)
BASOPHILS # BLD AUTO: 0.04 K/UL (ref 0–0.2)
BASOPHILS NFR BLD: 0.6 % (ref 0–1.9)
BILIRUB SERPL-MCNC: 0.3 MG/DL (ref 0.1–1)
BUN SERPL-MCNC: 18 MG/DL (ref 8–23)
CALCIUM SERPL-MCNC: 9.2 MG/DL (ref 8.7–10.5)
CHLORIDE SERPL-SCNC: 108 MMOL/L (ref 95–110)
CO2 SERPL-SCNC: 24 MMOL/L (ref 23–29)
CREAT SERPL-MCNC: 1.3 MG/DL (ref 0.5–1.4)
CRP SERPL-MCNC: 10.1 MG/L (ref 0–8.2)
DIFFERENTIAL METHOD BLD: ABNORMAL
EOSINOPHIL # BLD AUTO: 0.2 K/UL (ref 0–0.5)
EOSINOPHIL NFR BLD: 3.4 % (ref 0–8)
ERYTHROCYTE [DISTWIDTH] IN BLOOD BY AUTOMATED COUNT: 17.3 % (ref 11.5–14.5)
ERYTHROCYTE [SEDIMENTATION RATE] IN BLOOD BY WESTERGREN METHOD: 44 MM/HR (ref 0–20)
EST. GFR  (NO RACE VARIABLE): 44 ML/MIN/1.73 M^2
GLUCOSE SERPL-MCNC: 134 MG/DL (ref 70–110)
HCT VFR BLD AUTO: 39.3 % (ref 37–48.5)
HGB BLD-MCNC: 11.6 G/DL (ref 12–16)
IMM GRANULOCYTES # BLD AUTO: 0.01 K/UL (ref 0–0.04)
IMM GRANULOCYTES NFR BLD AUTO: 0.2 % (ref 0–0.5)
LYMPHOCYTES # BLD AUTO: 2.1 K/UL (ref 1–4.8)
LYMPHOCYTES NFR BLD: 33.1 % (ref 18–48)
MCH RBC QN AUTO: 23.8 PG (ref 27–31)
MCHC RBC AUTO-ENTMCNC: 29.5 G/DL (ref 32–36)
MCV RBC AUTO: 81 FL (ref 82–98)
MONOCYTES # BLD AUTO: 0.6 K/UL (ref 0.3–1)
MONOCYTES NFR BLD: 8.8 % (ref 4–15)
NEUTROPHILS # BLD AUTO: 3.5 K/UL (ref 1.8–7.7)
NEUTROPHILS NFR BLD: 53.9 % (ref 38–73)
NRBC BLD-RTO: 0 /100 WBC
PLATELET # BLD AUTO: 268 K/UL (ref 150–450)
PMV BLD AUTO: 10.9 FL (ref 9.2–12.9)
POTASSIUM SERPL-SCNC: 4 MMOL/L (ref 3.5–5.1)
PROT SERPL-MCNC: 7.7 G/DL (ref 6–8.4)
RBC # BLD AUTO: 4.88 M/UL (ref 4–5.4)
SODIUM SERPL-SCNC: 141 MMOL/L (ref 136–145)
WBC # BLD AUTO: 6.46 K/UL (ref 3.9–12.7)

## 2024-09-06 PROCEDURE — 85025 COMPLETE CBC W/AUTO DIFF WBC: CPT | Performed by: PHYSICIAN ASSISTANT

## 2024-09-06 PROCEDURE — 36415 COLL VENOUS BLD VENIPUNCTURE: CPT | Mod: PN | Performed by: PHYSICIAN ASSISTANT

## 2024-09-06 PROCEDURE — 80053 COMPREHEN METABOLIC PANEL: CPT | Performed by: PHYSICIAN ASSISTANT

## 2024-09-06 PROCEDURE — 85651 RBC SED RATE NONAUTOMATED: CPT | Performed by: PHYSICIAN ASSISTANT

## 2024-09-06 PROCEDURE — 82306 VITAMIN D 25 HYDROXY: CPT | Performed by: PHYSICIAN ASSISTANT

## 2024-09-06 PROCEDURE — 86140 C-REACTIVE PROTEIN: CPT | Performed by: PHYSICIAN ASSISTANT

## 2024-09-13 ENCOUNTER — HOSPITAL ENCOUNTER (OUTPATIENT)
Dept: RADIOLOGY | Facility: HOSPITAL | Age: 74
Discharge: HOME | End: 2024-09-13
Attending: STUDENT IN AN ORGANIZED HEALTH CARE EDUCATION/TRAINING PROGRAM
Payer: MEDICARE

## 2024-09-13 ENCOUNTER — TELEPHONE (OUTPATIENT)
Dept: RHEUMATOLOGY | Facility: CLINIC | Age: 74
End: 2024-09-13

## 2024-09-13 ENCOUNTER — OFFICE VISIT (OUTPATIENT)
Dept: RHEUMATOLOGY | Facility: CLINIC | Age: 74
End: 2024-09-13
Payer: MEDICARE

## 2024-09-13 VITALS
BODY MASS INDEX: 44.53 KG/M2 | WEIGHT: 260.81 LBS | HEIGHT: 64 IN | DIASTOLIC BLOOD PRESSURE: 65 MMHG | SYSTOLIC BLOOD PRESSURE: 144 MMHG | HEART RATE: 79 BPM

## 2024-09-13 DIAGNOSIS — M81.0 AGE-RELATED OSTEOPOROSIS WITHOUT CURRENT PATHOLOGICAL FRACTURE: ICD-10-CM

## 2024-09-13 DIAGNOSIS — Z51.81 ENCOUNTER FOR MONITORING IMMUNOSUPPRESSIVE MEDICATION THERAPY CAUSING IMMUNODEFICIENCY: ICD-10-CM

## 2024-09-13 DIAGNOSIS — Z79.60 ENCOUNTER FOR MONITORING IMMUNOSUPPRESSIVE MEDICATION THERAPY CAUSING IMMUNODEFICIENCY: ICD-10-CM

## 2024-09-13 DIAGNOSIS — Z79.899 HIGH RISK MEDICATION USE: ICD-10-CM

## 2024-09-13 DIAGNOSIS — N18.32 STAGE 3B CHRONIC KIDNEY DISEASE: ICD-10-CM

## 2024-09-13 DIAGNOSIS — Z79.899 IMMUNODEFICIENCY DUE TO DRUG THERAPY: ICD-10-CM

## 2024-09-13 DIAGNOSIS — D84.821 ENCOUNTER FOR MONITORING IMMUNOSUPPRESSIVE MEDICATION THERAPY CAUSING IMMUNODEFICIENCY: ICD-10-CM

## 2024-09-13 DIAGNOSIS — E55.9 VITAMIN D DEFICIENCY DISEASE: ICD-10-CM

## 2024-09-13 DIAGNOSIS — D84.821 IMMUNODEFICIENCY DUE TO DRUG THERAPY: ICD-10-CM

## 2024-09-13 DIAGNOSIS — M05.79 RHEUMATOID ARTHRITIS INVOLVING MULTIPLE SITES WITH POSITIVE RHEUMATOID FACTOR: Primary | ICD-10-CM

## 2024-09-13 PROCEDURE — 99999 PR PBB SHADOW E&M-EST. PATIENT-LVL V: CPT | Mod: PBBFAC,,, | Performed by: STUDENT IN AN ORGANIZED HEALTH CARE EDUCATION/TRAINING PROGRAM

## 2024-09-13 PROCEDURE — 72100 X-RAY EXAM L-S SPINE 2/3 VWS: CPT | Mod: 26,,, | Performed by: RADIOLOGY

## 2024-09-13 PROCEDURE — 72070 X-RAY EXAM THORAC SPINE 2VWS: CPT | Mod: TC

## 2024-09-13 PROCEDURE — 99215 OFFICE O/P EST HI 40 MIN: CPT | Mod: PBBFAC,25 | Performed by: STUDENT IN AN ORGANIZED HEALTH CARE EDUCATION/TRAINING PROGRAM

## 2024-09-13 PROCEDURE — 72070 X-RAY EXAM THORAC SPINE 2VWS: CPT | Mod: 26,,, | Performed by: RADIOLOGY

## 2024-09-13 PROCEDURE — 72100 X-RAY EXAM L-S SPINE 2/3 VWS: CPT | Mod: TC

## 2024-09-13 NOTE — PROGRESS NOTES
"Subjective:      Patient ID: Page Grissom is a 73 y.o. female.    Chief Complaint: RA and osteoporosis.    HPI:   Patient with asthma, DM2, HTN, CKD 3, osteoporosis, secondary hyperparathyroidism (renal), former smoker, fibromyalgia, seropositive RA presents for Rheumatology follow up for RA and osteoporosis.    Rheumatoid arthritis.  Doing well from RA perspective. Taking Plaquenil 200 mg daily. Last eye exam was 08/2023 with no maculopathy. In the past was on MTX but off it for years due to remission. Denies prolonged stiffness, joint swelling, joint pain other than OA knees.    Osteoarthritis.  Severe bone on bone OA in the knees. Takes Tylenol which helps. Used to get steroid injections in the knees. Feels fine now and does not want any Orthopedic evaluation or treatment. Does not want surgery.    Osteoporosis.  In 2017, she took Fosamax but was forgetting to take it. Took IV Reclast 10/2018 and 03/2022 (due to worsening BMD on DXA in 2022). Had difficulty getting venous access for the Reclast and had flu-like symptoms for 3-4 days afterward, so Reclast was discontinued. She had deferred Prolia as well. Repeat DXA this year shows osteoporosis.   Had menopause at about 37 years of age following oophorectomy.  Taking Vitamin D supplements: 2500 units daily.  Invasive dental work: needs teeth pulled and partial dentures placed.  History of falls: Had 1 fall getting out of a chair a year or so ago. No fractures from it.  Personal hx of fractures: No fragility fractures.  Family hx of fractures: None.  Acid reflux: Yes.  History of skeletal metastases or radiation to the skeleton: None.  History of kidney stones: No.    Social Hx: Former smoker 1 PPD, quit a few years ago. No alcohol use. Drinks 32 oz coke twice daily.      Objective:   BP (!) 144/65 (BP Location: Right arm, Patient Position: Sitting, BP Method: Large (Automatic))   Pulse 79   Ht 5' 4" (1.626 m)   Wt 118.3 kg (260 lb 12.9 oz)   BMI 44.77 " kg/m²   Physical Exam  Constitutional:       General: She is not in acute distress.     Appearance: Normal appearance. She is obese.   HENT:      Head: Normocephalic and atraumatic.      Mouth/Throat:      Mouth: Mucous membranes are moist.      Pharynx: Oropharynx is clear.   Cardiovascular:      Rate and Rhythm: Normal rate and regular rhythm.   Pulmonary:      Effort: Pulmonary effort is normal.      Breath sounds: Normal breath sounds.   Abdominal:      Palpations: Abdomen is soft.      Tenderness: There is no abdominal tenderness.   Musculoskeletal:         General: No swelling or tenderness.      Cervical back: Normal range of motion. No tenderness.   Skin:     General: Skin is warm and dry.   Neurological:      Mental Status: She is alert and oriented to person, place, and time. Mental status is at baseline.             Assessment and Plan:     Problem List Items Addressed This Visit          Unprioritized    Stage 3b chronic kidney disease    Rheumatoid arthritis involving multiple sites with positive rheumatoid factor - Primary    Relevant Orders    Comprehensive Metabolic Panel    CBC Auto Differential    C-Reactive Protein    Sedimentation rate    Age-related osteoporosis without current pathological fracture    Relevant Orders    ALKALINE PHOSPHATASE, BONE SPECIFIC    Comprehensive Metabolic Panel    PTH, Intact    Phosphorus    X-Ray Lumbar Spine AP And Lateral    X-Ray Thoracic Spine AP Lateral    High risk medication use    Relevant Orders    Comprehensive Metabolic Panel    CBC Auto Differential    C-Reactive Protein    Sedimentation rate    Vitamin D deficiency disease     Other Visit Diagnoses       Immunodeficiency due to drug therapy        Relevant Orders    Comprehensive Metabolic Panel    CBC Auto Differential    C-Reactive Protein    Sedimentation rate    Encounter for monitoring immunosuppressive medication therapy causing immunodeficiency        Relevant Orders    Comprehensive Metabolic  Panel    CBC Auto Differential    C-Reactive Protein    Sedimentation rate            Patient with asthma, DM2, HTN, CKD 3, osteoporosis, secondary hyperparathyroidism (renal), former smoker, fibromyalgia, seropositive RA presents for Rheumatology follow up for RA and osteoporosis.    Seropositive rheumatoid arthritis  Labs in 2010:  RF+ 809  CCP+ 576  NAYELY and myomarker panel negative    Monitoring labs 09/2024:  CBC - stable microcytic anemia  CMP - stable CKD stage 3b  ESR and CRP - stable    Last Plaquenil eye exam without maculopathy 08/2023.    Plan:  Continue Plaquenil 200 mg daily.  Get updated Plaquenil toxicity eye exam.  Safety and monitoring labs in 6 months with follow up.          Osteoporosis  Secondary hyperparathyroidism due to CKD  DXA 06/06/2024:  The L1 to L4 vertebral bone mineral density is equal to 1.16 g/cm squared with a T score of -0.2.  There has been a 4.7% statistically significant change relative to the prior study.   The left femoral neck bone mineral density is equal to 0.68 g/cm squared with a T score of -2.6.  There has been  no significant change relative to the prior study.    Reviewed DXA images and there is concern for sclerosis issa at L1 level. Will check XR.    Vitamin D 57      Not a great candidate for anabolic agent with secondary hyperparathyroidism.    Plan:  XR thoracic and lumbar spine looking for fractures or degenerative changes.  Will have her meet with our clinical pharmacist to discuss Prolia.  Get dental work done immediately.  Osteoporosis labs in 6 months with follow up. Can move to sooner if she completes dental work and wants to start Prolia.            High Risk Medication Monitoring encounter  Drug therapy requiring intensive monitoring for toxicity  No current medication related issues, no evidence of toxicity  Appropriate labs ordered for toxicity monitoring    Compromised immune system secondary to autoimmune disease and/or use of immunosuppressive  drugs.  Monitor carefully for infections.  Advised patient to get immediate medical care if any infection arises.  Also advised strict adherence age-appropriate vaccinations and cancer screenings with PCP.    Patient advised to hold DMARD and/or biologic therapy for signs of infection or for surgery. If you are unsure what to do please call our office for instruction.[facility] [phone]            Follow up in about 6 months (around 3/13/2025).

## 2024-09-13 NOTE — TELEPHONE ENCOUNTER
----- Message from Virginie Pierre MD sent at 9/13/2024  1:55 PM CDT -----  Osteoporosis. CKD stage 3b. Needs dental work done. Consider Prolia. She would like you to call her or have a visit with her at a time her daughter can be present and go over risks and benefits of Prolia (which I already discussed with her today as well). We would only start after dental work is done and everything is healed.

## 2024-09-13 NOTE — Clinical Note
Osteoporosis. CKD stage 3b. Needs dental work done. Consider Prolia. She would like you to call her or have a visit with her at a time her daughter can be present and go over risks and benefits of Prolia (which I already discussed with her today as well). We would only start after dental work is done and everything is healed.

## 2024-09-17 ENCOUNTER — TELEPHONE (OUTPATIENT)
Dept: INTERNAL MEDICINE | Facility: CLINIC | Age: 74
End: 2024-09-17
Payer: COMMERCIAL

## 2024-09-17 RX ORDER — DICLOFENAC SODIUM 75 MG/1
75 TABLET, DELAYED RELEASE ORAL 2 TIMES DAILY
Qty: 60 TABLET | Refills: 0 | Status: SHIPPED | OUTPATIENT
Start: 2024-09-17

## 2024-09-17 NOTE — TELEPHONE ENCOUNTER
----- Message from Maria Guadalupe Emanuel sent at 9/17/2024  6:37 AM CDT -----  Name of Caller:Patient's daughterCassy Call Back Number:[phone]  Additional Information: Patient's daughter is requesting a call back regarding joint pain her mother is experiencing.

## 2024-09-17 NOTE — TELEPHONE ENCOUNTER
Outgoing call to daughter Cassy re: X-ray results and change in osteoporosis therapy. Cassy and mother to present to [facility] on Thursday @ 10 am for appt w/ Lissy

## 2024-09-17 NOTE — TELEPHONE ENCOUNTER
Spoke with pt's daughter and advised that the requested pain meds was sent to the pharmacy of choice.

## 2024-09-17 NOTE — TELEPHONE ENCOUNTER
X-ray results per Dr. Pierre- There is a compression fracture in the T11 vertebra. It looks to be not new. This along with arthritis in the middle and low back might be falsely elevating your bone density score in the spine meaning that the bone density in the spine might be lower than stated on your DEXA scan.

## 2024-09-17 NOTE — TELEPHONE ENCOUNTER
----- Message from Linda Soriano MA sent at 9/17/2024  8:22 AM CDT -----  Contact pt daughter regarding call back, she stated her mother is having joint pain she is currently taking tylenol and is not working, would like to know can you call something in at the pharmacy? Please advise. Thanks  ----- Message -----  From: Maria Guadalupe Emanuel  Sent: 9/17/2024   6:40 AM CDT  To: Jessie Cole Jr Staff    Name of Caller:Patient's daughterCassy Call Back Number:[phone]  Additional Information: Patient's daughter is requesting a call back regarding joint pain her mother is experiencing.

## 2024-09-19 ENCOUNTER — OFFICE VISIT (OUTPATIENT)
Dept: OPHTHALMOLOGY | Facility: CLINIC | Age: 74
End: 2024-09-19
Payer: MEDICARE

## 2024-09-19 ENCOUNTER — CLINICAL SUPPORT (OUTPATIENT)
Dept: RHEUMATOLOGY | Facility: CLINIC | Age: 74
End: 2024-09-19
Payer: MEDICARE

## 2024-09-19 DIAGNOSIS — E11.29 CONTROLLED TYPE 2 DIABETES MELLITUS WITH MICROALBUMINURIA, WITHOUT LONG-TERM CURRENT USE OF INSULIN: ICD-10-CM

## 2024-09-19 DIAGNOSIS — H25.013 CORTICAL AGE-RELATED CATARACT OF BOTH EYES: ICD-10-CM

## 2024-09-19 DIAGNOSIS — H25.13 NUCLEAR SCLEROSIS, BILATERAL: ICD-10-CM

## 2024-09-19 DIAGNOSIS — R80.9 CONTROLLED TYPE 2 DIABETES MELLITUS WITH MICROALBUMINURIA, WITHOUT LONG-TERM CURRENT USE OF INSULIN: ICD-10-CM

## 2024-09-19 DIAGNOSIS — M81.0 AGE-RELATED OSTEOPOROSIS WITHOUT CURRENT PATHOLOGICAL FRACTURE: Primary | ICD-10-CM

## 2024-09-19 DIAGNOSIS — M05.79 RHEUMATOID ARTHRITIS INVOLVING MULTIPLE SITES WITH POSITIVE RHEUMATOID FACTOR: ICD-10-CM

## 2024-09-19 DIAGNOSIS — E11.9 TYPE 2 DIABETES MELLITUS WITHOUT RETINOPATHY: Primary | ICD-10-CM

## 2024-09-19 DIAGNOSIS — E11.36 DIABETIC CATARACT OF BOTH EYES: ICD-10-CM

## 2024-09-19 DIAGNOSIS — Z79.899 LONG-TERM USE OF PLAQUENIL: ICD-10-CM

## 2024-09-19 PROCEDURE — 99214 OFFICE O/P EST MOD 30 MIN: CPT | Mod: PBBFAC,25 | Performed by: OPTOMETRIST

## 2024-09-19 PROCEDURE — 92134 CPTRZ OPH DX IMG PST SGM RTA: CPT | Mod: PBBFAC | Performed by: OPTOMETRIST

## 2024-09-19 PROCEDURE — 99213 OFFICE O/P EST LOW 20 MIN: CPT | Mod: PBBFAC,27,25

## 2024-09-19 PROCEDURE — 99999 PR PBB SHADOW E&M-EST. PATIENT-LVL III: CPT | Mod: PBBFAC,,,

## 2024-09-19 PROCEDURE — 99999 PR PBB SHADOW E&M-EST. PATIENT-LVL IV: CPT | Mod: PBBFAC,,, | Performed by: OPTOMETRIST

## 2024-09-19 NOTE — PROGRESS NOTES
HPI     Diabetic Eye Exam            Comments: Diagnosed with diabetes in 2012  Lab Results       Component                Value               Date                       HGBA1C                   5.4                 06/06/2024              Vision changes since last eye exam?: no  Any eye pain today: no  Other ocular symptoms: no  Interested in contact lens fitting today? No    Plaquenil use: 200md qd x 2013  Last 10-2 VF: 8/03/2023  Last mOCT: 9/19/2024         Last edited by Shania Walter MA on 9/19/2024  8:35 AM.            Assessment /Plan     For exam results, see Encounter Report.    Type 2 diabetes mellitus without retinopathy  There was no diabetic retinopathy present in either eye today.   Recommended that pt continue care with PCP and/or specialists regarding diabetes.  Follow-up dilated eye exam recommended in 12 months, sooner with any vision changes or new concerns.    Rheumatoid arthritis involving multiple sites with positive rheumatoid factor  Long-term use of Plaquenil  -     Posterior Segment OCT Retina-Both eyes    Nuclear sclerosis, bilateral  Cortical age-related cataract of both eyes  Diabetic cataract of both eyes  Cataracts not significantly affecting activities of daily living and therefore surgery is not indicated at this time.   Will continue to monitor over the next 12 months. Pt to call or RTC with any significant change in vision prior to next visit.       No maculopathy present today.   Stressed importance of follow up visits with pt.  RTC 12 months for dilation,10-2 VF, and mOCT.   PRN sooner if any va changes.

## 2024-09-19 NOTE — PROGRESS NOTES
Clinical Pharmacy Progress Note: Medication Education     Patient and daughter Cassy were counseled by pharmacist on new medication Prolia and its indications, side effects, and reasons for taking this medication.     - Educated patient on mechanism of action, frequency and route of administration, onset of action, and safety profile of Prolia  - Plan for CMP prior to each dose of Prolia.     Patient was provided educational drug card/handout from  and [facility] pamphlet.    Patient to have full dental extraction + implant prior to beginning Prolia therapy. Patient and daughter to contact the office once cleared by oral surgeon to begin Prolia therapy.     Patient requests additional mobility aid for running errands; she has a walker at home, but requests a scooter/mobility aid for outside of the home. Advised that I will discuss with Dr. Pierre.    Patient requests clearance from Nephrology prior to starting Prolia.     Patient expressed understanding and had no further questions.      Thank you for allowing us to participate in this patient's care.    Lissy Fontenot, PharmD  Ambulatory Clinical Pharmacist- Rheumatology

## 2024-09-24 ENCOUNTER — PATIENT MESSAGE (OUTPATIENT)
Dept: RHEUMATOLOGY | Facility: CLINIC | Age: 74
End: 2024-09-24
Payer: COMMERCIAL

## 2024-10-14 RX ORDER — DICLOFENAC SODIUM 75 MG/1
75 TABLET, DELAYED RELEASE ORAL 2 TIMES DAILY
Qty: 60 TABLET | Refills: 0 | Status: SHIPPED | OUTPATIENT
Start: 2024-10-14

## 2024-10-14 NOTE — TELEPHONE ENCOUNTER
Care Due:                  Date            Visit Type   Department     Provider  --------------------------------------------------------------------------------                                EP -                              PRIMARY      [facility]  Last Visit: 06-      CARE ([facility])   MEDICINE       Abraham Roman  Next Visit: None Scheduled  None         None Found                                                            Last  Test          Frequency    Reason                     Performed    Due Date  --------------------------------------------------------------------------------    HBA1C.......  6 months...  metFORMIN................  06- 12-    [facility] Embedded Care Due Messages. Reference number: [id number].   10/14/2024 4:03:36 AM CDT

## 2024-11-02 DIAGNOSIS — E11.8 TYPE 2 DIABETES MELLITUS WITH COMPLICATION, WITHOUT LONG-TERM CURRENT USE OF INSULIN: ICD-10-CM

## 2024-11-02 DIAGNOSIS — T46.4X5D ANGIOTENSIN CONVERTING ENZYME INHIBITOR-AGGRAVATED ANGIOEDEMA, SUBSEQUENT ENCOUNTER: ICD-10-CM

## 2024-11-02 DIAGNOSIS — T78.3XXD ANGIOTENSIN CONVERTING ENZYME INHIBITOR-AGGRAVATED ANGIOEDEMA, SUBSEQUENT ENCOUNTER: ICD-10-CM

## 2024-11-04 DIAGNOSIS — E11.8 TYPE 2 DIABETES MELLITUS WITH COMPLICATION, WITHOUT LONG-TERM CURRENT USE OF INSULIN: ICD-10-CM

## 2024-11-04 RX ORDER — METFORMIN HYDROCHLORIDE 500 MG/1
500 TABLET ORAL
Qty: 90 TABLET | Refills: 0 | Status: SHIPPED | OUTPATIENT
Start: 2024-11-04

## 2024-11-04 RX ORDER — CETIRIZINE HYDROCHLORIDE 10 MG/1
10 TABLET ORAL
Qty: 90 TABLET | Refills: 3 | Status: SHIPPED | OUTPATIENT
Start: 2024-11-04

## 2024-11-04 RX ORDER — METFORMIN HYDROCHLORIDE 500 MG/1
500 TABLET ORAL
Qty: 90 TABLET | Refills: 0 | OUTPATIENT
Start: 2024-11-04

## 2024-11-04 NOTE — TELEPHONE ENCOUNTER
No care due was identified.  [facility] Embedded Care Due Messages. Reference number: [id number].   11/04/2024 4:06:42 AM CST

## 2024-11-04 NOTE — TELEPHONE ENCOUNTER
Refill Decision Note   Page Grissom  is requesting a refill authorization.  Brief Assessment and Rationale for Refill:  Quick Discontinue     Medication Therapy Plan:         Comments:     Note composed:1:23 PM 11/04/2024

## 2024-11-20 ENCOUNTER — TELEPHONE (OUTPATIENT)
Dept: OPHTHALMOLOGY | Facility: CLINIC | Age: 74
End: 2024-11-20
Payer: COMMERCIAL

## 2024-11-20 NOTE — TELEPHONE ENCOUNTER
----- Message from Maryanne sent at 11/20/2024  1:23 PM CST -----  Contact: Page Abel is calling because she need verification stating she does not need eye glassed for the DMV. Please call her at [phone].    Thanks  Sl

## 2024-11-20 NOTE — TELEPHONE ENCOUNTER
I called MsTana Grissom but she did not answer.  I left a voicemail letting her know that Dr. Martínez is out on Wednesday afternoons but that we would likely just need to fill out the vision form for the DMV.  We will speak with Dr. Martínez about this tomorrow morning and return a call to her and let her know what Dr. Martínez would like to do.

## 2024-11-26 ENCOUNTER — LAB VISIT (OUTPATIENT)
Dept: LAB | Facility: HOSPITAL | Age: 74
End: 2024-11-26
Attending: PEDIATRICS
Payer: MEDICARE

## 2024-11-26 DIAGNOSIS — R80.9 CONTROLLED TYPE 2 DIABETES MELLITUS WITH MICROALBUMINURIA, WITHOUT LONG-TERM CURRENT USE OF INSULIN: ICD-10-CM

## 2024-11-26 DIAGNOSIS — E11.29 CONTROLLED TYPE 2 DIABETES MELLITUS WITH MICROALBUMINURIA, WITHOUT LONG-TERM CURRENT USE OF INSULIN: ICD-10-CM

## 2024-11-26 LAB
ALBUMIN/CREAT UR: 22.7 UG/MG (ref 0–30)
CREAT UR-MCNC: 44 MG/DL (ref 15–325)
MICROALBUMIN UR DL<=1MG/L-MCNC: 10 UG/ML

## 2024-11-26 PROCEDURE — 82043 UR ALBUMIN QUANTITATIVE: CPT | Performed by: PEDIATRICS

## 2024-12-04 DIAGNOSIS — I10 ESSENTIAL HYPERTENSION: ICD-10-CM

## 2024-12-04 NOTE — TELEPHONE ENCOUNTER
Refill Routing Note   Medication(s) are not appropriate for processing by [facility] for the following reason(s):        Required vitals abnormal    [facility] action(s):  Defer               Appointments  past 12m or future 3m with PCP    Date Provider   Last Visit   6/6/2024 Abraham Roman MD   Next Visit   Visit date not found Abraham Roman MD   ED visits in past 90 days: 0        Note composed:5:42 PM 12/04/2024

## 2024-12-04 NOTE — TELEPHONE ENCOUNTER
No care due was identified.  [facility] Embedded Care Due Messages. Reference number: [id number].   12/04/2024 4:06:34 AM CST

## 2024-12-05 RX ORDER — AMLODIPINE BESYLATE 10 MG/1
10 TABLET ORAL
Qty: 90 TABLET | Refills: 3 | Status: SHIPPED | OUTPATIENT
Start: 2024-12-05

## 2024-12-06 ENCOUNTER — OFFICE VISIT (OUTPATIENT)
Dept: INTERNAL MEDICINE | Facility: CLINIC | Age: 74
End: 2024-12-06
Payer: MEDICARE

## 2024-12-06 VITALS
BODY MASS INDEX: 46.11 KG/M2 | TEMPERATURE: 96 F | SYSTOLIC BLOOD PRESSURE: 136 MMHG | OXYGEN SATURATION: 97 % | RESPIRATION RATE: 18 BRPM | HEIGHT: 64 IN | WEIGHT: 270.06 LBS | HEART RATE: 62 BPM | DIASTOLIC BLOOD PRESSURE: 66 MMHG

## 2024-12-06 DIAGNOSIS — M79.7 FIBROMYALGIA: ICD-10-CM

## 2024-12-06 DIAGNOSIS — N18.32 STAGE 3B CHRONIC KIDNEY DISEASE: ICD-10-CM

## 2024-12-06 DIAGNOSIS — E11.8 TYPE 2 DIABETES MELLITUS WITH COMPLICATION, WITHOUT LONG-TERM CURRENT USE OF INSULIN: ICD-10-CM

## 2024-12-06 DIAGNOSIS — E11.29 CONTROLLED TYPE 2 DIABETES MELLITUS WITH MICROALBUMINURIA, WITHOUT LONG-TERM CURRENT USE OF INSULIN: ICD-10-CM

## 2024-12-06 DIAGNOSIS — N18.30 CKD STAGE 3 DUE TO TYPE 2 DIABETES MELLITUS: ICD-10-CM

## 2024-12-06 DIAGNOSIS — I70.0 AORTIC ATHEROSCLEROSIS: ICD-10-CM

## 2024-12-06 DIAGNOSIS — E55.9 VITAMIN D DEFICIENCY DISEASE: ICD-10-CM

## 2024-12-06 DIAGNOSIS — I10 PRIMARY HYPERTENSION: ICD-10-CM

## 2024-12-06 DIAGNOSIS — I15.2 HYPERTENSION ASSOCIATED WITH DIABETES: Primary | ICD-10-CM

## 2024-12-06 DIAGNOSIS — E78.5 HYPERLIPIDEMIA ASSOCIATED WITH TYPE 2 DIABETES MELLITUS: ICD-10-CM

## 2024-12-06 DIAGNOSIS — E11.22 CKD STAGE 3 DUE TO TYPE 2 DIABETES MELLITUS: ICD-10-CM

## 2024-12-06 DIAGNOSIS — E11.59 HYPERTENSION ASSOCIATED WITH DIABETES: Primary | ICD-10-CM

## 2024-12-06 DIAGNOSIS — E11.69 HYPERLIPIDEMIA ASSOCIATED WITH TYPE 2 DIABETES MELLITUS: ICD-10-CM

## 2024-12-06 DIAGNOSIS — J45.30 MILD PERSISTENT ASTHMA WITHOUT COMPLICATION: ICD-10-CM

## 2024-12-06 DIAGNOSIS — M81.0 AGE-RELATED OSTEOPOROSIS WITHOUT CURRENT PATHOLOGICAL FRACTURE: ICD-10-CM

## 2024-12-06 DIAGNOSIS — K21.9 GASTROESOPHAGEAL REFLUX DISEASE WITHOUT ESOPHAGITIS: ICD-10-CM

## 2024-12-06 DIAGNOSIS — R80.9 CONTROLLED TYPE 2 DIABETES MELLITUS WITH MICROALBUMINURIA, WITHOUT LONG-TERM CURRENT USE OF INSULIN: ICD-10-CM

## 2024-12-06 PROCEDURE — 99215 OFFICE O/P EST HI 40 MIN: CPT | Mod: PBBFAC | Performed by: NURSE PRACTITIONER

## 2024-12-06 PROCEDURE — 99999 PR PBB SHADOW E&M-EST. PATIENT-LVL V: CPT | Mod: PBBFAC,,, | Performed by: NURSE PRACTITIONER

## 2024-12-06 RX ORDER — METFORMIN HYDROCHLORIDE 500 MG/1
500 TABLET ORAL DAILY
Qty: 90 TABLET | Refills: 3 | Status: SHIPPED | OUTPATIENT
Start: 2024-12-06

## 2024-12-06 RX ORDER — HYDRALAZINE HYDROCHLORIDE 50 MG/1
50 TABLET, FILM COATED ORAL 3 TIMES DAILY
Qty: 270 TABLET | Refills: 2 | Status: SHIPPED | OUTPATIENT
Start: 2024-12-06

## 2024-12-06 NOTE — PROGRESS NOTES
Subjective:       Patient ID: Page Grissom is a 74 y.o. female.    Chief Complaint:   HPI    1) DM: no hyper/hypoglycemic symptoms. no self monitoring, currently on Trulicity 0.75 mg, pt has not yet done labs. Weight loss did plateau, will increase dose.  2) HTN: no BP checks, no HTNive symptoms, on apressoline and amlodipine  3) LIPIDS:  tolerating and compliant with crestor 40 mg  4) ASTHMA/subglottis stenosis: stopped smoking . Asthma quiet no albuterol or advair currently. Has ENT follow up.   5) GERD: Quiet.   6) Rheum/osteoporosis/vitamin D deficiency: No current symptoms. Can't take NSAIDS due to CKD, on plaquenil  7) CKD/secondary hyperparathyroidism: seeing renal   8)L ventricular heart failure/Nonsustained Vtach: occurred in hospital, on no meds currently.  9)Obesity: 6 pound weight loss with trulicity   10) Aortic atherosclerosis: seen on imaging. Risk management   11) Drug-induced immunodeficiency: due to plaquenil for rheumatoid arthritis                 Review of Systems   Constitutional:  Negative for activity change, appetite change, chills, diaphoresis, fatigue, fever and unexpected weight change.   HENT:  Negative for congestion, ear pain, postnasal drip, rhinorrhea, sinus pressure, sinus pain, sneezing, sore throat, tinnitus, trouble swallowing and voice change.    Eyes:  Negative for photophobia, pain and visual disturbance.   Respiratory:  Negative for cough, chest tightness, shortness of breath and wheezing.    Cardiovascular:  Negative for chest pain, palpitations and leg swelling.   Gastrointestinal:  Negative for abdominal distention, abdominal pain, constipation, diarrhea, nausea and vomiting.   Genitourinary:  Negative for decreased urine volume, difficulty urinating, dysuria, flank pain, frequency, hematuria and urgency.   Musculoskeletal:  Negative for arthralgias, back pain, joint swelling, neck pain and neck stiffness.   Allergic/Immunologic: Negative for immunocompromised  state.   Neurological:  Negative for dizziness, tremors, seizures, syncope, facial asymmetry, speech difficulty, weakness, light-headedness, numbness and headaches.   Hematological:  Negative for adenopathy. Does not bruise/bleed easily.   Psychiatric/Behavioral:  Negative for confusion and sleep disturbance.        Objective:      Physical Exam  Vitals reviewed.   Constitutional:       Appearance: She is obese.   Cardiovascular:      Rate and Rhythm: Normal rate and regular rhythm.      Heart sounds: Normal heart sounds.   Pulmonary:      Effort: Pulmonary effort is normal.      Breath sounds: Normal breath sounds.   Abdominal:      General: Bowel sounds are normal.      Palpations: Abdomen is soft.   Musculoskeletal:         General: Normal range of motion.      Cervical back: Normal range of motion and neck supple.   Skin:     General: Skin is warm and dry.   Neurological:      Mental Status: She is alert and oriented to person, place, and time.         Assessment:     Vitals:    12/06/24 1104   BP: 136/66   Pulse: 62   Resp: 18   Temp: 96 °F (35.6 °C)         1. Hypertension associated with diabetes    2. Hyperlipidemia associated with type 2 diabetes mellitus    3. Aortic atherosclerosis    4. CKD stage 3 due to type 2 diabetes mellitus    5. Stage 3b chronic kidney disease    6. Age-related osteoporosis without current pathological fracture    7. Controlled type 2 diabetes mellitus with microalbuminuria, without long-term current use of insulin    8. Vitamin D deficiency disease    9. Gastroesophageal reflux disease without esophagitis    10. Fibromyalgia    11. Mild persistent asthma without complication    12. Primary hypertension    13. Type 2 diabetes mellitus with complication, without long-term current use of insulin        Plan:   Hypertension associated with diabetes    Hyperlipidemia associated with type 2 diabetes mellitus    Aortic atherosclerosis    CKD stage 3 due to type 2 diabetes  mellitus    Stage 3b chronic kidney disease    Age-related osteoporosis without current pathological fracture    Controlled type 2 diabetes mellitus with microalbuminuria, without long-term current use of insulin  -     Comprehensive Metabolic Panel; Future; Expected date: 06/04/2025  -     Lipid Panel; Future; Expected date: 06/04/2025  -     Hemoglobin A1C; Future; Expected date: 12/06/2024  -     TSH; Future; Expected date: 06/04/2025    Vitamin D deficiency disease    Gastroesophageal reflux disease without esophagitis    Fibromyalgia    Mild persistent asthma without complication    Primary hypertension  -     hydrALAZINE (APRESOLINE) 50 MG tablet; Take 1 tablet (50 mg total) by mouth 3 (three) times daily.  Dispense: 270 tablet; Refill: 2    Type 2 diabetes mellitus with complication, without long-term current use of insulin  -     metFORMIN (GLUCOPHAGE) 500 MG tablet; Take 1 tablet (500 mg total) by mouth Daily.  Dispense: 90 tablet; Refill: 3      Doing well  Maintain meds/specialty care  Rtc 6 mo w lab  Chronic care mgt done

## 2024-12-09 NOTE — TELEPHONE ENCOUNTER
----- Message from Michaelsheyla sent at 12/9/2024 11:32 AM CST -----  Type:  RX Refill Request    Who Called: ANA AGUERO [[id number]]  Refill or New Rx:  RX Name and Strength:EPINEPHrine (EPIPEN 2-DANA) 0.3 mg/0.3 mL AtIn  How is the patient currently taking it? (ex. 1XDay):  Is this a 30 day or 90 day RX:  Preferred Pharmacy with phone number:  [id number] - [place], [place] - [address] AT [facility] [address]  [address]  [place] [address]  Phone: [phone] Fax: [phone]      Local or Mail Order:  Ordering Provider:  Would the patient rather a call back or a response via [facility]?   Best Call Back Number: [phone]  Additional Information:

## 2024-12-10 RX ORDER — EPINEPHRINE 0.3 MG/.3ML
1 INJECTION SUBCUTANEOUS ONCE
Qty: 0.3 ML | Refills: 2 | Status: SHIPPED | OUTPATIENT
Start: 2024-12-10 | End: 2024-12-10

## 2025-01-03 DIAGNOSIS — E78.5 HYPERLIPIDEMIA ASSOCIATED WITH TYPE 2 DIABETES MELLITUS: ICD-10-CM

## 2025-01-03 DIAGNOSIS — E11.69 HYPERLIPIDEMIA ASSOCIATED WITH TYPE 2 DIABETES MELLITUS: ICD-10-CM

## 2025-01-06 RX ORDER — ROSUVASTATIN CALCIUM 40 MG/1
40 TABLET, COATED ORAL NIGHTLY
Qty: 90 TABLET | Refills: 3 | Status: SHIPPED | OUTPATIENT
Start: 2025-01-06

## 2025-01-06 RX ORDER — POTASSIUM CHLORIDE 20 MEQ/1
20 TABLET, EXTENDED RELEASE ORAL
Qty: 90 TABLET | Refills: 3 | Status: SHIPPED | OUTPATIENT
Start: 2025-01-06

## 2025-01-10 ENCOUNTER — CLINICAL SUPPORT (OUTPATIENT)
Dept: PULMONOLOGY | Facility: CLINIC | Age: 75
End: 2025-01-10
Attending: INTERNAL MEDICINE
Payer: MEDICARE

## 2025-01-10 ENCOUNTER — HOSPITAL ENCOUNTER (OUTPATIENT)
Dept: RADIOLOGY | Facility: HOSPITAL | Age: 75
Discharge: HOME | End: 2025-01-10
Attending: INTERNAL MEDICINE
Payer: MEDICARE

## 2025-01-10 DIAGNOSIS — J45.30 MILD PERSISTENT ASTHMA WITHOUT COMPLICATION: ICD-10-CM

## 2025-01-10 LAB
BRPFT: NORMAL
FEF 25 75 CHG: -3.3 %
FEF 25 75 LLN: 1.07
FEF 25 75 POST REF: 51.4 %
FEF 25 75 PRE REF: 53.1 %
FEF 25 75 REF: 2.47
FET100 CHG: -17.6 %
FEV1 CHG: 1.4 %
FEV1 FVC CHG: -3.4 %
FEV1 FVC LLN: 65
FEV1 FVC POST REF: 99.7 %
FEV1 FVC PRE REF: 103.3 %
FEV1 FVC REF: 78
FEV1 LLN: 1.26
FEV1 POST REF: 82.9 %
FEV1 PRE REF: 81.8 %
FEV1 REF: 1.82
FVC CHG: 5 %
FVC LLN: 1.64
FVC POST REF: 82.5 %
FVC PRE REF: 78.6 %
FVC REF: 2.35
PEF CHG: -11.2 %
PEF LLN: 2.58
PEF POST REF: 66 %
PEF PRE REF: 74.4 %
PEF REF: 4.59
POST FEF 25 75: 1.27 L/S (ref 1.07–3.87)
POST FET 100: 6.91 SEC
POST FEV1 FVC: 77.95 % (ref 64.83–89.73)
POST FEV1: 1.51 L (ref 1.26–2.37)
POST FVC: 1.94 L (ref 1.64–3.1)
POST PEF: 3.03 L/S (ref 2.58–6.59)
PRE FEF 25 75: 1.31 L/S (ref 1.07–3.87)
PRE FET 100: 8.39 SEC
PRE FEV1 FVC: 80.73 % (ref 64.83–89.73)
PRE FEV1: 1.49 L (ref 1.26–2.37)
PRE FVC: 1.85 L (ref 1.64–3.1)
PRE PEF: 3.41 L/S (ref 2.58–6.59)

## 2025-01-10 PROCEDURE — 71046 X-RAY EXAM CHEST 2 VIEWS: CPT | Mod: TC

## 2025-01-10 PROCEDURE — 71046 X-RAY EXAM CHEST 2 VIEWS: CPT | Mod: 26,,, | Performed by: RADIOLOGY

## 2025-02-05 DIAGNOSIS — E11.8 TYPE 2 DIABETES MELLITUS WITH COMPLICATION, WITHOUT LONG-TERM CURRENT USE OF INSULIN: ICD-10-CM

## 2025-02-05 RX ORDER — METFORMIN HYDROCHLORIDE 500 MG/1
500 TABLET ORAL
Qty: 90 TABLET | Refills: 3 | Status: SHIPPED | OUTPATIENT
Start: 2025-02-05

## 2025-02-22 DIAGNOSIS — Z00.00 ENCOUNTER FOR MEDICARE ANNUAL WELLNESS EXAM: ICD-10-CM

## 2025-03-13 ENCOUNTER — PATIENT MESSAGE (OUTPATIENT)
Dept: RHEUMATOLOGY | Facility: CLINIC | Age: 75
End: 2025-03-13
Payer: COMMERCIAL

## 2025-03-13 ENCOUNTER — TELEPHONE (OUTPATIENT)
Dept: INTERNAL MEDICINE | Facility: CLINIC | Age: 75
End: 2025-03-13
Payer: COMMERCIAL

## 2025-03-13 DIAGNOSIS — Z12.31 ENCOUNTER FOR SCREENING MAMMOGRAM FOR MALIGNANT NEOPLASM OF BREAST: ICD-10-CM

## 2025-03-13 DIAGNOSIS — Z12.11 SCREENING FOR MALIGNANT NEOPLASM OF COLON: Primary | ICD-10-CM

## 2025-03-13 NOTE — TELEPHONE ENCOUNTER
----- Message from Sonia sent at 3/13/2025  2:36 PM CDT -----  Contact: ANA AGUERO [[id number]]  .Type:  MammogramCaller is requesting to schedule their annual mammogram appointment.  Order is not listed in EPIC.  Please enter order and contact patient to schedule.Name of Caller:ANA AGUERO [[id number]][facility] they [facility] [facility] or [facility]? Call [facility] Call Back Number:.[phone]

## 2025-03-13 NOTE — TELEPHONE ENCOUNTER
----- Message from Sonia sent at 3/13/2025  2:36 PM CDT -----  Contact: ANA AGUERO [[id number]]  .Type:  MammogramCaller is requesting to schedule their annual mammogram appointment.  Order is not listed in EPIC.  Please enter order and contact patient to schedule.Name of Caller:ANA AGUERO [[id number]]Where would they [facility] the mammogram performed[place] or a [facility]? Call [facility] Call Back Number:.[phone]

## 2025-03-13 NOTE — TELEPHONE ENCOUNTER
----- Message from Sonia sent at 3/13/2025  2:36 PM CDT -----  Contact: ANA AGUERO [[id number]]  .Type:  MammogramCaller is requesting to schedule their annual mammogram appointment.  Order is not listed in EPIC.  Please enter order and contact patient to schedule.Name of Caller:ANA AGUERO [[id number]]Where would they [facility] the mammogram [facility] the [facility] or a [facility] via [facility]? Call [facility] Call Back Number:.[phone]

## 2025-03-14 ENCOUNTER — OFFICE VISIT (OUTPATIENT)
Dept: RHEUMATOLOGY | Facility: CLINIC | Age: 75
End: 2025-03-14
Payer: MEDICARE

## 2025-03-14 ENCOUNTER — LAB VISIT (OUTPATIENT)
Dept: LAB | Facility: HOSPITAL | Age: 75
End: 2025-03-14
Attending: STUDENT IN AN ORGANIZED HEALTH CARE EDUCATION/TRAINING PROGRAM
Payer: MEDICARE

## 2025-03-14 VITALS
DIASTOLIC BLOOD PRESSURE: 73 MMHG | HEART RATE: 68 BPM | BODY MASS INDEX: 44.6 KG/M2 | WEIGHT: 261.25 LBS | HEIGHT: 64 IN | SYSTOLIC BLOOD PRESSURE: 153 MMHG

## 2025-03-14 DIAGNOSIS — D64.9 NORMOCYTIC ANEMIA: ICD-10-CM

## 2025-03-14 DIAGNOSIS — M81.0 AGE-RELATED OSTEOPOROSIS WITHOUT CURRENT PATHOLOGICAL FRACTURE: ICD-10-CM

## 2025-03-14 DIAGNOSIS — Z79.899 HIGH RISK MEDICATION USE: ICD-10-CM

## 2025-03-14 DIAGNOSIS — E66.01 CLASS 3 SEVERE OBESITY DUE TO EXCESS CALORIES WITH SERIOUS COMORBIDITY AND BODY MASS INDEX (BMI) OF 40.0 TO 44.9 IN ADULT: ICD-10-CM

## 2025-03-14 DIAGNOSIS — D84.821 ENCOUNTER FOR MONITORING IMMUNOSUPPRESSIVE MEDICATION THERAPY CAUSING IMMUNODEFICIENCY: ICD-10-CM

## 2025-03-14 DIAGNOSIS — M05.79 RHEUMATOID ARTHRITIS INVOLVING MULTIPLE SITES WITH POSITIVE RHEUMATOID FACTOR: ICD-10-CM

## 2025-03-14 DIAGNOSIS — D84.821 IMMUNODEFICIENCY DUE TO DRUG THERAPY: ICD-10-CM

## 2025-03-14 DIAGNOSIS — Z79.60 ENCOUNTER FOR MONITORING IMMUNOSUPPRESSIVE MEDICATION THERAPY CAUSING IMMUNODEFICIENCY: ICD-10-CM

## 2025-03-14 DIAGNOSIS — M05.79 RHEUMATOID ARTHRITIS INVOLVING MULTIPLE SITES WITH POSITIVE RHEUMATOID FACTOR: Primary | ICD-10-CM

## 2025-03-14 DIAGNOSIS — E55.9 VITAMIN D DEFICIENCY DISEASE: ICD-10-CM

## 2025-03-14 DIAGNOSIS — Z79.899 IMMUNODEFICIENCY DUE TO DRUG THERAPY: ICD-10-CM

## 2025-03-14 DIAGNOSIS — E66.813 CLASS 3 SEVERE OBESITY DUE TO EXCESS CALORIES WITH SERIOUS COMORBIDITY AND BODY MASS INDEX (BMI) OF 40.0 TO 44.9 IN ADULT: ICD-10-CM

## 2025-03-14 DIAGNOSIS — Z51.81 ENCOUNTER FOR MONITORING IMMUNOSUPPRESSIVE MEDICATION THERAPY CAUSING IMMUNODEFICIENCY: ICD-10-CM

## 2025-03-14 DIAGNOSIS — N18.32 STAGE 3B CHRONIC KIDNEY DISEASE: ICD-10-CM

## 2025-03-14 LAB
ALBUMIN SERPL BCP-MCNC: 3.5 G/DL (ref 3.5–5.2)
ALP SERPL-CCNC: 104 U/L (ref 40–150)
ALT SERPL W/O P-5'-P-CCNC: 13 U/L (ref 10–44)
ANION GAP SERPL CALC-SCNC: 11 MMOL/L (ref 8–16)
AST SERPL-CCNC: 15 U/L (ref 10–40)
BASOPHILS # BLD AUTO: 0.04 K/UL (ref 0–0.2)
BASOPHILS NFR BLD: 0.6 % (ref 0–1.9)
BILIRUB SERPL-MCNC: 0.3 MG/DL (ref 0.1–1)
BUN SERPL-MCNC: 16 MG/DL (ref 8–23)
CALCIUM SERPL-MCNC: 9.5 MG/DL (ref 8.7–10.5)
CHLORIDE SERPL-SCNC: 104 MMOL/L (ref 95–110)
CO2 SERPL-SCNC: 25 MMOL/L (ref 23–29)
CREAT SERPL-MCNC: 1.3 MG/DL (ref 0.5–1.4)
CRP SERPL-MCNC: 10.3 MG/L (ref 0–8.2)
DIFFERENTIAL METHOD BLD: ABNORMAL
EOSINOPHIL # BLD AUTO: 0.2 K/UL (ref 0–0.5)
EOSINOPHIL NFR BLD: 2.9 % (ref 0–8)
ERYTHROCYTE [DISTWIDTH] IN BLOOD BY AUTOMATED COUNT: 17.2 % (ref 11.5–14.5)
EST. GFR  (NO RACE VARIABLE): 43.4 ML/MIN/1.73 M^2
FERRITIN SERPL-MCNC: 26 NG/ML (ref 20–300)
GLUCOSE SERPL-MCNC: 86 MG/DL (ref 70–110)
HCT VFR BLD AUTO: 40.1 % (ref 37–48.5)
HGB BLD-MCNC: 11.9 G/DL (ref 12–16)
IMM GRANULOCYTES # BLD AUTO: 0.01 K/UL (ref 0–0.04)
IMM GRANULOCYTES NFR BLD AUTO: 0.2 % (ref 0–0.5)
IRON SERPL-MCNC: 37 UG/DL (ref 30–160)
LYMPHOCYTES # BLD AUTO: 1.8 K/UL (ref 1–4.8)
LYMPHOCYTES NFR BLD: 28.8 % (ref 18–48)
MCH RBC QN AUTO: 25 PG (ref 27–31)
MCHC RBC AUTO-ENTMCNC: 29.7 G/DL (ref 32–36)
MCV RBC AUTO: 84 FL (ref 82–98)
MONOCYTES # BLD AUTO: 0.6 K/UL (ref 0.3–1)
MONOCYTES NFR BLD: 10.2 % (ref 4–15)
NEUTROPHILS # BLD AUTO: 3.5 K/UL (ref 1.8–7.7)
NEUTROPHILS NFR BLD: 57.3 % (ref 38–73)
NRBC BLD-RTO: 0 /100 WBC
PLATELET # BLD AUTO: 254 K/UL (ref 150–450)
PMV BLD AUTO: 11.1 FL (ref 9.2–12.9)
POTASSIUM SERPL-SCNC: 4.6 MMOL/L (ref 3.5–5.1)
PROT SERPL-MCNC: 8.2 G/DL (ref 6–8.4)
RBC # BLD AUTO: 4.76 M/UL (ref 4–5.4)
SATURATED IRON: 9 % (ref 20–50)
SODIUM SERPL-SCNC: 140 MMOL/L (ref 136–145)
TOTAL IRON BINDING CAPACITY: 422 UG/DL (ref 250–450)
TRANSFERRIN SERPL-MCNC: 285 MG/DL (ref 200–375)
WBC # BLD AUTO: 6.17 K/UL (ref 3.9–12.7)

## 2025-03-14 PROCEDURE — 36415 COLL VENOUS BLD VENIPUNCTURE: CPT | Performed by: STUDENT IN AN ORGANIZED HEALTH CARE EDUCATION/TRAINING PROGRAM

## 2025-03-14 PROCEDURE — 85025 COMPLETE CBC W/AUTO DIFF WBC: CPT | Performed by: STUDENT IN AN ORGANIZED HEALTH CARE EDUCATION/TRAINING PROGRAM

## 2025-03-14 PROCEDURE — 86140 C-REACTIVE PROTEIN: CPT | Performed by: STUDENT IN AN ORGANIZED HEALTH CARE EDUCATION/TRAINING PROGRAM

## 2025-03-14 PROCEDURE — 80053 COMPREHEN METABOLIC PANEL: CPT | Performed by: STUDENT IN AN ORGANIZED HEALTH CARE EDUCATION/TRAINING PROGRAM

## 2025-03-14 PROCEDURE — 85652 RBC SED RATE AUTOMATED: CPT | Performed by: STUDENT IN AN ORGANIZED HEALTH CARE EDUCATION/TRAINING PROGRAM

## 2025-03-14 PROCEDURE — 99215 OFFICE O/P EST HI 40 MIN: CPT | Mod: PBBFAC | Performed by: STUDENT IN AN ORGANIZED HEALTH CARE EDUCATION/TRAINING PROGRAM

## 2025-03-14 PROCEDURE — 82728 ASSAY OF FERRITIN: CPT | Performed by: STUDENT IN AN ORGANIZED HEALTH CARE EDUCATION/TRAINING PROGRAM

## 2025-03-14 PROCEDURE — 83540 ASSAY OF IRON: CPT | Performed by: STUDENT IN AN ORGANIZED HEALTH CARE EDUCATION/TRAINING PROGRAM

## 2025-03-14 PROCEDURE — 99999 PR PBB SHADOW E&M-EST. PATIENT-LVL V: CPT | Mod: PBBFAC,,, | Performed by: STUDENT IN AN ORGANIZED HEALTH CARE EDUCATION/TRAINING PROGRAM

## 2025-03-14 NOTE — PROGRESS NOTES
Subjective:      Patient ID: Page Grissom is a 74 y.o. female.    Chief Complaint: RA and osteoporosis.    HPI:   Patient with asthma, DM2, HTN, CKD 3, osteoporosis, secondary hyperparathyroidism (renal), former smoker, fibromyalgia, seropositive RA presents for Rheumatology follow up for RA and osteoporosis.    Rheumatoid arthritis.  Doing well from RA perspective. Taking Plaquenil 200 mg daily. Last eye exam was 09/2024 with no maculopathy. In the past was on MTX but off it for years due to remission. Denies prolonged stiffness, joint swelling, joint pain other than OA knees.     Osteoarthritis.  Severe bone on bone OA in the knees. Takes Tylenol which helps. Used to get steroid injections in the knees. Feels fine now and does not want any Orthopedic evaluation or treatment. Does not want surgery. Knees don't bother her much if she walks slowly and avoids stairs.    Osteoporosis.  Not currently on treatment. In 2017, she took Fosamax but was forgetting to take it. Took IV Reclast 10/2018 and 03/2022 (due to worsening BMD on DXA in 2022). Had difficulty getting venous access for the Reclast and had flu-like symptoms for 3-4 days afterward, so Reclast was discontinued. Repeat DXA 6/2024 shows osteoporosis. Our clinical pharmacist and I both counseled her to take Prolia. However patient reports that her daughter who is POA does not want her to start any new medications.    Had menopause at about 37 years of age following oophorectomy.  Taking Vitamin D supplements: 2500 units daily.  Invasive dental work: needs teeth pulled and partial dentures placed.  History of falls: Had 1 fall getting out of a chair a year or so ago. No fractures from it.  Personal hx of fractures: chronic compression fracture T11.  Family hx of fractures: None.  Acid reflux: Yes.  History of skeletal metastases or radiation to the skeleton: None.  History of kidney stones: No.    Social Hx: Former smoker 1 PPD, quit a few years ago. No  "alcohol use. Drinks 32 oz coke twice daily.      Objective:   BP (!) 153/73   Pulse 68   Ht 5' 4" (1.626 m)   Wt 118.5 kg (261 lb 3.9 oz)   BMI 44.84 kg/m²   Physical Exam  Constitutional:       General: She is not in acute distress.     Appearance: Normal appearance. She is obese.   HENT:      Head: Normocephalic and atraumatic.      Mouth/Throat:      Mouth: Mucous membranes are moist.      Pharynx: Oropharynx is clear.   Cardiovascular:      Rate and Rhythm: Normal rate and regular rhythm.   Pulmonary:      Effort: Pulmonary effort is normal.      Breath sounds: Normal breath sounds.   Abdominal:      Palpations: Abdomen is soft.      Tenderness: There is no abdominal tenderness.   Musculoskeletal:         General: No swelling or tenderness.      Cervical back: Normal range of motion. No tenderness.      Comments: No synovitis or joint tenderness.   Skin:     General: Skin is warm and dry.   Neurological:      Mental Status: She is alert and oriented to person, place, and time. Mental status is at baseline.             Assessment and Plan:     Problem List Items Addressed This Visit          Unprioritized    Stage 3b chronic kidney disease    Rheumatoid arthritis involving multiple sites with positive rheumatoid factor - Primary    Relevant Orders    CBC Auto Differential    Comprehensive Metabolic Panel    C-Reactive Protein    Sedimentation rate    Class 3 severe obesity due to excess calories with serious comorbidity and body mass index (BMI) of 40.0 to 44.9 in adult    Age-related osteoporosis without current pathological fracture    High risk medication use    Vitamin D deficiency disease     Other Visit Diagnoses         Immunodeficiency due to drug therapy          Encounter for monitoring immunosuppressive medication therapy causing immunodeficiency          Normocytic anemia        Relevant Orders    Ferritin    Iron and TIBC            Patient with asthma, DM2, HTN, CKD 3, osteoporosis, secondary " hyperparathyroidism (renal), former smoker, fibromyalgia, seropositive RA presents for Rheumatology follow up for RA and osteoporosis.    Seropositive rheumatoid arthritis  Labs in 2010:  RF+ 809  CCP+ 576  NAYELY and myomarker panel negative    Monitoring labs 09/2024:  CBC - stable microcytic anemia  CMP - stable CKD stage 3b  ESR and CRP - stable    Last Plaquenil eye exam without maculopathy 09/2024.    Plan:  Has been in remission for several years. Trial of stopping Plaquenil.        Osteoporosis  Secondary hyperparathyroidism due to CKD  DXA 06/06/2024: osteoporosis hip. Lumbar spine T score is normal but suspect falsely elevated due to sclerosis and other degenerative changes.  Vitamin D 57, , CKD stage 3b.  No fragility fractures. Has chronic compression fracture of T11 on imaging.  Not an ideal candidate for anabolic agent with secondary hyperparathyroidism.    Plan:  Advised Prolia but her daughter is POA and does not want her to start new medications.  If she decides to start Prolia, she needs invasive dental work completed prior. She wants several teeth pulled.        CKD stage 3b  Stable. Renally dose medications. Avoid NSAIDs.      Obesity.  Mediterranean diet.  Muscle strengthening exercise.        Normocytic anemia  Check iron studies.      High Risk Medication Monitoring encounter  Drug therapy requiring intensive monitoring for toxicity  No current medication related issues, no evidence of toxicity  Appropriate labs ordered for toxicity monitoring    Compromised immune system secondary to autoimmune disease and/or use of immunosuppressive drugs.  Monitor carefully for infections.  Advised patient to get immediate medical care if any infection arises.  Also advised strict adherence age-appropriate vaccinations and cancer screenings with PCP.    Patient advised to hold DMARD and/or biologic therapy for signs of infection or for surgery. If you are unsure what to do please call our office for  instruction.[facility] [phone]        Informed patient that I will be leaving [facility] in June and she will have to establish with an external Rheumatologist for follow up in the next 4-6 months.          I spent a total of 40 minutes on the day of the visit.  This includes face to face time and non-face to face time preparing to see the patient (eg, review of tests), obtaining and/or reviewing separately obtained history, documenting clinical information in the electronic or other health record, independently interpreting results and communicating results to the patient/family/caregiver, or care coordinator.

## 2025-03-14 NOTE — PATIENT INSTRUCTIONS
Try to hold hydroxychloroquine for 2-4 weeks and see if your arthritis continues to do well.  Establish with a new Rheumatologist since I am leaving in June.

## 2025-03-15 LAB — ERYTHROCYTE [SEDIMENTATION RATE] IN BLOOD BY PHOTOMETRIC METHOD: 105 MM/HR (ref 0–36)

## 2025-03-20 ENCOUNTER — RESULTS FOLLOW-UP (OUTPATIENT)
Dept: RHEUMATOLOGY | Facility: CLINIC | Age: 75
End: 2025-03-20

## 2025-04-01 ENCOUNTER — HOSPITAL ENCOUNTER (OUTPATIENT)
Dept: RADIOLOGY | Facility: HOSPITAL | Age: 75
Discharge: HOME | End: 2025-04-01
Attending: PEDIATRICS
Payer: MEDICARE

## 2025-04-01 VITALS — HEIGHT: 64 IN | BODY MASS INDEX: 44.6 KG/M2 | WEIGHT: 261.25 LBS

## 2025-04-01 DIAGNOSIS — Z12.31 ENCOUNTER FOR SCREENING MAMMOGRAM FOR MALIGNANT NEOPLASM OF BREAST: ICD-10-CM

## 2025-04-01 PROCEDURE — 77067 SCR MAMMO BI INCL CAD: CPT | Mod: 26,,, | Performed by: STUDENT IN AN ORGANIZED HEALTH CARE EDUCATION/TRAINING PROGRAM

## 2025-04-01 PROCEDURE — 77063 BREAST TOMOSYNTHESIS BI: CPT | Mod: 26,,, | Performed by: STUDENT IN AN ORGANIZED HEALTH CARE EDUCATION/TRAINING PROGRAM

## 2025-04-01 PROCEDURE — 77067 SCR MAMMO BI INCL CAD: CPT | Mod: TC

## 2025-04-23 DIAGNOSIS — J45.30 MILD PERSISTENT ASTHMA WITHOUT COMPLICATION: ICD-10-CM

## 2025-04-23 RX ORDER — ALBUTEROL SULFATE 90 UG/1
2 INHALANT RESPIRATORY (INHALATION) EVERY 6 HOURS PRN
Qty: 25.5 G | Refills: 3 | Status: SHIPPED | OUTPATIENT
Start: 2025-04-23

## 2025-05-08 DIAGNOSIS — M05.79 RHEUMATOID ARTHRITIS INVOLVING MULTIPLE SITES WITH POSITIVE RHEUMATOID FACTOR: ICD-10-CM

## 2025-05-08 NOTE — TELEPHONE ENCOUNTER
----- Message from Sean sent at 5/8/2025 12:22 PM CDT -----  Contact: [phone]@patient  Requesting an RX refill or new RX.hydroxychloroquine (PLAQUENIL) 200 mg tabletIs this a refill or new RX: refill RX name and strength (copy/paste from chart):  Is this a 30 day or 90 day RX: Pharmacy name and phone #   [id number] - [place] - [address] AT [facility] [address] have asked that we provide their patients with the following 2 reminders -- prescription refills can take up to 72 hours, and a friendly reminder that in the future you can use your [facility] account to request refills:  
No care due was identified.  [facility] Embedded Care Due Messages. Reference number: [id number].   5/08/2025 1:18:33 PM CDT  
PAST MEDICAL HISTORY:  No pertinent past medical history

## 2025-05-13 RX ORDER — HYDROXYCHLOROQUINE SULFATE 200 MG/1
200 TABLET, FILM COATED ORAL DAILY
Qty: 90 TABLET | Refills: 0 | Status: SHIPPED | OUTPATIENT
Start: 2025-05-13

## 2025-05-30 ENCOUNTER — LAB VISIT (OUTPATIENT)
Dept: LAB | Facility: HOSPITAL | Age: 75
End: 2025-05-30
Attending: NURSE PRACTITIONER
Payer: MEDICARE

## 2025-05-30 DIAGNOSIS — R80.9 CONTROLLED TYPE 2 DIABETES MELLITUS WITH MICROALBUMINURIA, WITHOUT LONG-TERM CURRENT USE OF INSULIN: ICD-10-CM

## 2025-05-30 DIAGNOSIS — E11.29 CONTROLLED TYPE 2 DIABETES MELLITUS WITH MICROALBUMINURIA, WITHOUT LONG-TERM CURRENT USE OF INSULIN: ICD-10-CM

## 2025-05-30 LAB
ALBUMIN SERPL BCP-MCNC: 3.2 G/DL (ref 3.5–5.2)
ALP SERPL-CCNC: 97 UNIT/L (ref 40–150)
ALT SERPL W/O P-5'-P-CCNC: 10 UNIT/L (ref 10–44)
ANION GAP (OHS): 9 MMOL/L (ref 8–16)
AST SERPL-CCNC: 14 UNIT/L (ref 11–45)
BILIRUB SERPL-MCNC: 0.3 MG/DL (ref 0.1–1)
BUN SERPL-MCNC: 21 MG/DL (ref 8–23)
CALCIUM SERPL-MCNC: 8.9 MG/DL (ref 8.7–10.5)
CHLORIDE SERPL-SCNC: 105 MMOL/L (ref 95–110)
CHOLEST SERPL-MCNC: 138 MG/DL (ref 120–199)
CHOLEST/HDLC SERPL: 2.3 {RATIO} (ref 2–5)
CO2 SERPL-SCNC: 27 MMOL/L (ref 23–29)
CREAT SERPL-MCNC: 1.2 MG/DL (ref 0.5–1.4)
EAG (OHS): 120 MG/DL (ref 68–131)
GFR SERPLBLD CREATININE-BSD FMLA CKD-EPI: 48 ML/MIN/1.73/M2
GLUCOSE SERPL-MCNC: 133 MG/DL (ref 70–110)
HBA1C MFR BLD: 5.8 % (ref 4–5.6)
HDLC SERPL-MCNC: 61 MG/DL (ref 40–75)
HDLC SERPL: 44.2 % (ref 20–50)
LDLC SERPL CALC-MCNC: 64 MG/DL (ref 63–159)
NONHDLC SERPL-MCNC: 77 MG/DL
POTASSIUM SERPL-SCNC: 4.1 MMOL/L (ref 3.5–5.1)
PROT SERPL-MCNC: 7.6 GM/DL (ref 6–8.4)
SODIUM SERPL-SCNC: 141 MMOL/L (ref 136–145)
TRIGL SERPL-MCNC: 65 MG/DL (ref 30–150)
TSH SERPL-ACNC: 1.78 UIU/ML (ref 0.4–4)

## 2025-05-30 PROCEDURE — 84443 ASSAY THYROID STIM HORMONE: CPT

## 2025-05-30 PROCEDURE — 80053 COMPREHEN METABOLIC PANEL: CPT

## 2025-05-30 PROCEDURE — 83036 HEMOGLOBIN GLYCOSYLATED A1C: CPT

## 2025-05-30 PROCEDURE — 80061 LIPID PANEL: CPT

## 2025-05-30 PROCEDURE — 36415 COLL VENOUS BLD VENIPUNCTURE: CPT | Mod: PN

## 2025-06-06 ENCOUNTER — OFFICE VISIT (OUTPATIENT)
Dept: INTERNAL MEDICINE | Facility: CLINIC | Age: 75
End: 2025-06-06
Payer: MEDICARE

## 2025-06-06 VITALS
WEIGHT: 261.44 LBS | OXYGEN SATURATION: 96 % | HEIGHT: 64 IN | SYSTOLIC BLOOD PRESSURE: 132 MMHG | TEMPERATURE: 98 F | BODY MASS INDEX: 44.63 KG/M2 | DIASTOLIC BLOOD PRESSURE: 66 MMHG | HEART RATE: 78 BPM

## 2025-06-06 DIAGNOSIS — K21.9 GASTROESOPHAGEAL REFLUX DISEASE WITHOUT ESOPHAGITIS: ICD-10-CM

## 2025-06-06 DIAGNOSIS — E11.69 HYPERLIPIDEMIA ASSOCIATED WITH TYPE 2 DIABETES MELLITUS: ICD-10-CM

## 2025-06-06 DIAGNOSIS — E66.01 CLASS 3 SEVERE OBESITY DUE TO EXCESS CALORIES WITH SERIOUS COMORBIDITY AND BODY MASS INDEX (BMI) OF 40.0 TO 44.9 IN ADULT: ICD-10-CM

## 2025-06-06 DIAGNOSIS — N25.81 SECONDARY HYPERPARATHYROIDISM OF RENAL ORIGIN: ICD-10-CM

## 2025-06-06 DIAGNOSIS — E78.5 HYPERLIPIDEMIA ASSOCIATED WITH TYPE 2 DIABETES MELLITUS: ICD-10-CM

## 2025-06-06 DIAGNOSIS — M79.7 FIBROMYALGIA: ICD-10-CM

## 2025-06-06 DIAGNOSIS — M05.79 RHEUMATOID ARTHRITIS INVOLVING MULTIPLE SITES WITH POSITIVE RHEUMATOID FACTOR: ICD-10-CM

## 2025-06-06 DIAGNOSIS — I70.0 AORTIC ATHEROSCLEROSIS: ICD-10-CM

## 2025-06-06 DIAGNOSIS — Z79.60 LONG-TERM USE OF IMMUNOSUPPRESSANT MEDICATION: ICD-10-CM

## 2025-06-06 DIAGNOSIS — J45.30 MILD PERSISTENT ASTHMA WITHOUT COMPLICATION: ICD-10-CM

## 2025-06-06 DIAGNOSIS — N18.32 STAGE 3B CHRONIC KIDNEY DISEASE: Primary | ICD-10-CM

## 2025-06-06 DIAGNOSIS — E11.59 HYPERTENSION ASSOCIATED WITH DIABETES: ICD-10-CM

## 2025-06-06 DIAGNOSIS — I15.2 HYPERTENSION ASSOCIATED WITH DIABETES: ICD-10-CM

## 2025-06-06 DIAGNOSIS — M81.0 AGE-RELATED OSTEOPOROSIS WITHOUT CURRENT PATHOLOGICAL FRACTURE: ICD-10-CM

## 2025-06-06 DIAGNOSIS — E66.813 CLASS 3 SEVERE OBESITY DUE TO EXCESS CALORIES WITH SERIOUS COMORBIDITY AND BODY MASS INDEX (BMI) OF 40.0 TO 44.9 IN ADULT: ICD-10-CM

## 2025-06-06 DIAGNOSIS — E11.29 CONTROLLED TYPE 2 DIABETES MELLITUS WITH MICROALBUMINURIA, WITHOUT LONG-TERM CURRENT USE OF INSULIN: ICD-10-CM

## 2025-06-06 DIAGNOSIS — I47.29 NONSUSTAINED VENTRICULAR TACHYCARDIA: ICD-10-CM

## 2025-06-06 DIAGNOSIS — R80.9 CONTROLLED TYPE 2 DIABETES MELLITUS WITH MICROALBUMINURIA, WITHOUT LONG-TERM CURRENT USE OF INSULIN: ICD-10-CM

## 2025-06-06 PROCEDURE — 99215 OFFICE O/P EST HI 40 MIN: CPT | Mod: PBBFAC | Performed by: PEDIATRICS

## 2025-06-06 PROCEDURE — 99999 PR PBB SHADOW E&M-EST. PATIENT-LVL V: CPT | Mod: PBBFAC,,, | Performed by: PEDIATRICS

## 2025-06-08 DIAGNOSIS — I10 PRIMARY HYPERTENSION: ICD-10-CM

## 2025-06-09 RX ORDER — HYDRALAZINE HYDROCHLORIDE 50 MG/1
50 TABLET, FILM COATED ORAL 3 TIMES DAILY
Qty: 270 TABLET | Refills: 3 | Status: SHIPPED | OUTPATIENT
Start: 2025-06-09

## 2025-06-09 NOTE — TELEPHONE ENCOUNTER
Refill Decision Note   Page Grissom  is requesting a refill authorization.  Brief Assessment and Rationale for Refill:  Approve     Medication Therapy Plan:         Comments:     Note composed:4:44 PM 06/09/2025             Appointments     Last Visit   6/6/2025 Abraham Roman MD   Next Visit   Visit date not found Abraham Roman MD

## 2025-06-18 ENCOUNTER — TELEPHONE (OUTPATIENT)
Dept: INTERNAL MEDICINE | Facility: CLINIC | Age: 75
End: 2025-06-18
Payer: COMMERCIAL

## 2025-06-18 NOTE — TELEPHONE ENCOUNTER
Copied from CRM #[id number]. Topic: General Inquiry - Patient Advice  >> Jun 18, 2025  2:17 PM Iniki wrote:  .Type: Patient Call Back      Who called:  patient    What is the request in detail:     calling needing to drop paperwork off and filled by June 26th please call back  Can the clinic reply by [facility]?         Would the patient rather a call back or a response via [facility]?  call    Best call back number:

## 2025-06-18 NOTE — TELEPHONE ENCOUNTER
Spoke with daughter Cassy. She was asking if she could drop off paperwork to fill out.    Writer in chart to do pt discharge follow-up call.

## 2025-06-24 ENCOUNTER — TELEPHONE (OUTPATIENT)
Dept: INTERNAL MEDICINE | Facility: CLINIC | Age: 75
End: 2025-06-24
Payer: COMMERCIAL

## 2025-08-07 DIAGNOSIS — M05.79 RHEUMATOID ARTHRITIS INVOLVING MULTIPLE SITES WITH POSITIVE RHEUMATOID FACTOR: ICD-10-CM

## 2025-08-07 RX ORDER — HYDROXYCHLOROQUINE SULFATE 200 MG/1
200 TABLET, FILM COATED ORAL DAILY
Qty: 90 TABLET | Refills: 0 | Status: SHIPPED | OUTPATIENT
Start: 2025-08-07

## 2025-08-07 NOTE — TELEPHONE ENCOUNTER
Copied from CRM #[id number]. Topic: Medications - Medication Refill  >> Aug 7, 2025 10:47 AM Jen wrote:  Type:  RX Refill Request    Who Called: pt   Refill or New Rx:refill  RX Name and Strength:hydroxychloroquine (PLAQUENIL) 200 mg tablet  How is the patient currently taking it? (ex. 1XDay):Take 1 tablet (200 mg total) by mouth once daily.  Is this a 30 day or 90 day RX:30  Preferred Pharmacy with phone number:  [id number] - [place], [place] - [address] AT [facility] [address]  [address]  [place] [address]  Phone: [phone] Fax: [phone]     Local or Mail Order:local   Ordering Provider:Jessie   Would the patient rather a call back or a response via [facility]? Call back   Best Call Back Number:[phone]   Additional Information: no

## 2025-08-07 NOTE — TELEPHONE ENCOUNTER
Plaquenil refilled, last seen by Dr Pierre 3/2025. Has left department. Will need to be seen in follow up by rheumatology and if they are not seeing, then outside rheum. Referral placed.

## (undated) DEVICE — APPLICATOR CHLORAPREP ORN 26ML

## (undated) DEVICE — SOL NS 1000CC

## (undated) DEVICE — DRAPE THREE-QTR REINF 53X77IN

## (undated) DEVICE — SUT MONOCYRL 4-0 PS2 UND

## (undated) DEVICE — SUT PROLENE 0 MO6 30IN BLUE

## (undated) DEVICE — BLLN CATH CRE 10-12X3X75

## (undated) DEVICE — ELECTRODE REM PLYHSV RETURN 9

## (undated) DEVICE — SEE MEDLINE ITEM 157117

## (undated) DEVICE — SHEET THYROID W/ISO-BAC

## (undated) DEVICE — GLOVE SURG BIOGEL LATEX SZ 7.5

## (undated) DEVICE — Device

## (undated) DEVICE — COVER LIGHT HANDLE 80/CA

## (undated) DEVICE — SYR SLIP TIP 20CC

## (undated) DEVICE — BLLN CATH DIL ENDO 8-10X3X7

## (undated) DEVICE — FORCEP JAW RADIAL PULM 2X100CM

## (undated) DEVICE — BLLN CATH DIL ENDO 15-18X5.

## (undated) DEVICE — MANIFOLD 4 PORT

## (undated) DEVICE — TUBING SUC UNIV W/CONN 12FT

## (undated) DEVICE — TOWEL OR DISP STRL BLUE 4/PK

## (undated) DEVICE — SYS LABEL CORRECT MED

## (undated) DEVICE — SPONGE IV DRAIN 4X4 STERILE

## (undated) DEVICE — TUBING SUCTION STRAIGHT .25X20

## (undated) DEVICE — TRAP MUCUS SPECIMEN 80CC

## (undated) DEVICE — SEE MEDLINE ITEM 157027

## (undated) DEVICE — TRAY TRACH BLUE RHINO 8

## (undated) DEVICE — SYR 60CC W/GAUGE

## (undated) DEVICE — PACK ECLIPSE SET-UP W/O DRAPE

## (undated) DEVICE — STOPCOCK DISCOFIX 3 WAY

## (undated) DEVICE — WIRE JAGWIRE 35G PULMONARY

## (undated) DEVICE — ADAPTER SWIVEL

## (undated) DEVICE — GLOVE BIOGEL SKINSENSE PI 7.5

## (undated) DEVICE — DILATOR CRE 18-20MM

## (undated) DEVICE — BOWL STERILE LARGE 32OZ

## (undated) DEVICE — SUT VICRYL 3-0 27 SH

## (undated) DEVICE — BLADE SURG CARBON STEEL SZ11